# Patient Record
Sex: MALE | Race: WHITE | NOT HISPANIC OR LATINO | Employment: OTHER | URBAN - METROPOLITAN AREA
[De-identification: names, ages, dates, MRNs, and addresses within clinical notes are randomized per-mention and may not be internally consistent; named-entity substitution may affect disease eponyms.]

---

## 2017-04-19 ENCOUNTER — LAB VISIT (OUTPATIENT)
Dept: LAB | Facility: HOSPITAL | Age: 82
End: 2017-04-19
Attending: INTERNAL MEDICINE
Payer: MEDICARE

## 2017-04-19 DIAGNOSIS — E78.5 HYPERLIPIDEMIA, UNSPECIFIED HYPERLIPIDEMIA TYPE: ICD-10-CM

## 2017-04-19 LAB
ALBUMIN SERPL BCP-MCNC: 3.8 G/DL
ALP SERPL-CCNC: 74 U/L
ALT SERPL W/O P-5'-P-CCNC: 17 U/L
ANION GAP SERPL CALC-SCNC: 9 MMOL/L
AST SERPL-CCNC: 22 U/L
BILIRUB SERPL-MCNC: 0.7 MG/DL
BUN SERPL-MCNC: 42 MG/DL
CALCIUM SERPL-MCNC: 9.3 MG/DL
CHLORIDE SERPL-SCNC: 106 MMOL/L
CHOLEST/HDLC SERPL: 3.6 {RATIO}
CO2 SERPL-SCNC: 22 MMOL/L
CREAT SERPL-MCNC: 1.5 MG/DL
EST. GFR  (AFRICAN AMERICAN): 48.4 ML/MIN/1.73 M^2
EST. GFR  (NON AFRICAN AMERICAN): 41.8 ML/MIN/1.73 M^2
GLUCOSE SERPL-MCNC: 126 MG/DL
HDL/CHOLESTEROL RATIO: 28 %
HDLC SERPL-MCNC: 125 MG/DL
HDLC SERPL-MCNC: 35 MG/DL
LDLC SERPL CALC-MCNC: 77.8 MG/DL
NONHDLC SERPL-MCNC: 90 MG/DL
POTASSIUM SERPL-SCNC: 4.6 MMOL/L
PROT SERPL-MCNC: 7.3 G/DL
SODIUM SERPL-SCNC: 137 MMOL/L
TRIGL SERPL-MCNC: 61 MG/DL

## 2017-04-19 PROCEDURE — 80053 COMPREHEN METABOLIC PANEL: CPT

## 2017-04-19 PROCEDURE — 80061 LIPID PANEL: CPT

## 2017-04-19 PROCEDURE — 36415 COLL VENOUS BLD VENIPUNCTURE: CPT | Mod: PO

## 2017-04-26 ENCOUNTER — OFFICE VISIT (OUTPATIENT)
Dept: CARDIOLOGY | Facility: CLINIC | Age: 82
End: 2017-04-26
Payer: MEDICARE

## 2017-04-26 ENCOUNTER — CLINICAL SUPPORT (OUTPATIENT)
Dept: CARDIOLOGY | Facility: CLINIC | Age: 82
End: 2017-04-26
Payer: MEDICARE

## 2017-04-26 VITALS
SYSTOLIC BLOOD PRESSURE: 90 MMHG | WEIGHT: 174 LBS | BODY MASS INDEX: 25.77 KG/M2 | HEART RATE: 75 BPM | DIASTOLIC BLOOD PRESSURE: 60 MMHG | HEIGHT: 69 IN

## 2017-04-26 DIAGNOSIS — I35.0 AORTIC VALVE STENOSIS, UNSPECIFIED ETIOLOGY: ICD-10-CM

## 2017-04-26 DIAGNOSIS — R42 LIGHT HEADED: ICD-10-CM

## 2017-04-26 DIAGNOSIS — I10 ESSENTIAL HYPERTENSION: ICD-10-CM

## 2017-04-26 DIAGNOSIS — E78.5 HYPERLIPIDEMIA, UNSPECIFIED HYPERLIPIDEMIA TYPE: ICD-10-CM

## 2017-04-26 DIAGNOSIS — R42 DIZZINESS AND GIDDINESS: ICD-10-CM

## 2017-04-26 DIAGNOSIS — I48.0 PAROXYSMAL ATRIAL FIBRILLATION: Primary | ICD-10-CM

## 2017-04-26 PROCEDURE — 99999 PR PBB SHADOW E&M-EST. PATIENT-LVL III: CPT | Mod: PBBFAC,,, | Performed by: INTERNAL MEDICINE

## 2017-04-26 PROCEDURE — 1159F MED LIST DOCD IN RCRD: CPT | Mod: S$GLB,,, | Performed by: INTERNAL MEDICINE

## 2017-04-26 PROCEDURE — 3074F SYST BP LT 130 MM HG: CPT | Mod: S$GLB,,, | Performed by: INTERNAL MEDICINE

## 2017-04-26 PROCEDURE — 3078F DIAST BP <80 MM HG: CPT | Mod: S$GLB,,, | Performed by: INTERNAL MEDICINE

## 2017-04-26 PROCEDURE — 1160F RVW MEDS BY RX/DR IN RCRD: CPT | Mod: S$GLB,,, | Performed by: INTERNAL MEDICINE

## 2017-04-26 PROCEDURE — 93000 ELECTROCARDIOGRAM COMPLETE: CPT | Mod: S$GLB,,, | Performed by: INTERNAL MEDICINE

## 2017-04-26 PROCEDURE — 99214 OFFICE O/P EST MOD 30 MIN: CPT | Mod: S$GLB,,, | Performed by: INTERNAL MEDICINE

## 2017-04-26 RX ORDER — CEFDINIR 300 MG/1
300 CAPSULE ORAL
COMMUNITY
Start: 2017-04-18 | End: 2017-04-28

## 2017-04-26 NOTE — PROGRESS NOTES
Subjective:   Patient ID:  Chung Meneses Sr. is a 85 y.o. male who presents for follow up of Atrial Fibrillation (Patient presents to clinic today for 6 month f/u.)      HPI  An 84 yo male with aortic stenosis afib on tikosyn is here for f/u afib he noticed 1 months ago he is short of breath with  exertion and has some lightheadedness he has noted some decrease in exercise tolerance. Has no orthopnea pnd. He stays active compliant with mneds takes his anticoagulation non stop.ekg shows afib   Past Medical History:   Diagnosis Date    *Atrial fibrillation     Aortic stenosis 8/2/2013    Atrial fibrillation 7/5/2013    Dizziness - light-headed     Dizziness and giddiness     Hyperlipidemia     Hypertension     Unspecified hypertensive heart disease without heart failure        Past Surgical History:   Procedure Laterality Date    BACK SURGERY  2003    knee replacemnt bilateral  2001    SHOULDER SURGERY  2002       Social History   Substance Use Topics    Smoking status: Never Smoker    Smokeless tobacco: Never Used    Alcohol use No      Comment: HOLIDAY       Family History   Problem Relation Age of Onset    Hypertension Mother     Hypertension Father     Hypertension Sister     Hypertension Brother     Heart failure Brother     Heart disease Brother     Heart attack Brother        Current Outpatient Prescriptions   Medication Sig    cefdinir (OMNICEF) 300 MG capsule Take 300 mg by mouth.    dofetilide (TIKOSYN) 250 MCG Cap Take 1 capsule (250 mcg total) by mouth every 12 (twelve) hours.    fish oil-omega-3 fatty acids 300-1,000 mg capsule Take 1,200 mg by mouth once daily.    losartan (COZAAR) 100 MG tablet Take 100 mg by mouth.    metoprolol tartrate (LOPRESSOR) 25 MG tablet TAKE 1 TABLET TWICE DAILY    multivitamin capsule Take 1 capsule by mouth once daily.    niacin, inositol niacinate, (NIACIN FLUSH FREE) 400 mg Cap Take 500 mg by mouth once daily.    ranitidine (ZANTAC) 75 MG  tablet Take 75 mg by mouth 2 (two) times daily.    rivaroxaban (XARELTO) 15 mg Tab Take 1 tablet (15 mg total) by mouth daily with dinner or evening meal. Since 2015 never increased    simvastatin (ZOCOR) 20 MG tablet Take 20 mg by mouth every evening.     No current facility-administered medications for this visit.      Current Outpatient Prescriptions on File Prior to Visit   Medication Sig    dofetilide (TIKOSYN) 250 MCG Cap Take 1 capsule (250 mcg total) by mouth every 12 (twelve) hours.    fish oil-omega-3 fatty acids 300-1,000 mg capsule Take 1,200 mg by mouth once daily.    losartan (COZAAR) 100 MG tablet Take 100 mg by mouth.    metoprolol tartrate (LOPRESSOR) 25 MG tablet TAKE 1 TABLET TWICE DAILY    multivitamin capsule Take 1 capsule by mouth once daily.    niacin, inositol niacinate, (NIACIN FLUSH FREE) 400 mg Cap Take 500 mg by mouth once daily.    ranitidine (ZANTAC) 75 MG tablet Take 75 mg by mouth 2 (two) times daily.    rivaroxaban (XARELTO) 15 mg Tab Take 1 tablet (15 mg total) by mouth daily with dinner or evening meal. Since 2015 never increased    simvastatin (ZOCOR) 20 MG tablet Take 20 mg by mouth every evening.    [DISCONTINUED] tadalafil (CIALIS) 5 MG tablet Take 1 tablet (5 mg total) by mouth daily as needed for Erectile Dysfunction.     No current facility-administered medications on file prior to visit.        ROS  Constitution: Negative for weakness and malaise/fatigue.    HENT: Negative for headaches.    Eyes: Negative for blurred vision.    Cardiovascular: positive for dyspnea on exertion Negative for chest pain, claudication, cyanosis, , irregular heartbeat, leg swelling, near-syncope, orthopnea, palpitations, paroxysmal nocturnal dyspnia and syncope.    Respiratory: Negative for cough, hemoptysis and shortness of breath.    Hematologic/Lymphatic: Negative for bleeding problem. Does not bruise/bleed easily.    Skin: Negative for dry skin and itching.    Musculoskeletal:  "Positive for arthritis, back pain and joint pain. Negative for falls, muscle weakness and myalgias.    Gastrointestinal: Negative for abdominal pain, diarrhea, heartburn, hematemesis, hematochezia and melena.    Genitourinary: Negative for flank pain and hematuria.    Neurological: has no  focal weakness, light-headedness, numbness, paresthesias and seizures.    Psychiatric/Behavioral: Negative for altered mental status and memory loss. The patient is not nervous/anxious.    Allergic/Immunologic: Negative for hives.   Objective:   Physical Exam  Vitals:    04/26/17 1430   BP: 90/60   Pulse: 75   Weight: 78.9 kg (174 lb)   Height: 5' 9" (1.753 m)     Vitals   Vitals:     10/28/16 1020   BP: (!) 108/48   Pulse: 60   Weight: 79.1 kg (174 lb 7.6 oz)   Height: 5' 9" (1.753 m)      Constitutional: He is oriented to person, place, and time. He appears well-developed and well-nourished.    Head: Normocephalic and atraumatic.    Eyes: EOM are normal. Pupils are equal, round, and reactive to light.    Neck: Neck supple. No JVD present.    Cardiovascular: Normal rate and intact distal pulses.IRREGULARILY IRREGULAR  rhythm present. Exam reveals no gallop and no friction rub.    Murmur heard.    Harsh midsystolic murmur is present with a grade of 1/6 at the upper right sternal border radiating to the neck    Pulmonary/Chest: Effort normal and breath sounds normal. No respiratory distress. He has no wheezes. He has no rales. He exhibits no tenderness.    Abdominal: Soft. Bowel sounds are normal. He exhibits no distension. There is no tenderness.   Musculoskeletal: Normal range of motion.   Scar of previous knee surgery well healed.prominent varicose veins and spider veins noted.   Neurological: He is alert and oriented to person, place, and time. No cranial nerve deficit.   Skin: Skin is warm and dry. No rash noted. He is not diaphoretic.   Psychiatric: He has a normal mood and affect    Lab Results   Component Value Date    CHOL " 125 04/19/2017    CHOL 147 10/21/2016    CHOL 146 04/15/2016     Lab Results   Component Value Date    HDL 35 (L) 04/19/2017    HDL 31 (L) 10/21/2016    HDL 29 (L) 04/15/2016     Lab Results   Component Value Date    LDLCALC 77.8 04/19/2017    LDLCALC 97.6 10/21/2016    LDLCALC 89.4 04/15/2016     Lab Results   Component Value Date    TRIG 61 04/19/2017    TRIG 92 10/21/2016    TRIG 138 04/15/2016     Lab Results   Component Value Date    CHOLHDL 28.0 04/19/2017    CHOLHDL 21.1 10/21/2016    CHOLHDL 19.9 (L) 04/15/2016       Chemistry        Component Value Date/Time     04/19/2017 0802    K 4.6 04/19/2017 0802     04/19/2017 0802    CO2 22 (L) 04/19/2017 0802    BUN 42 (H) 04/19/2017 0802    CREATININE 1.5 (H) 04/19/2017 0802     (H) 04/19/2017 0802        Component Value Date/Time    CALCIUM 9.3 04/19/2017 0802    ALKPHOS 74 04/19/2017 0802    AST 22 04/19/2017 0802    ALT 17 04/19/2017 0802    BILITOT 0.7 04/19/2017 0802          Lab Results   Component Value Date    TSH 2.418 04/17/2015     No results found for: INR, PROTIME  Lab Results   Component Value Date    WBC 5.34 08/22/2006    HGB 13.8 (L) 08/22/2006    HCT 42.3 08/22/2006    MCV 87.9 08/22/2006     08/22/2006     BMP  Sodium   Date Value Ref Range Status   04/19/2017 137 136 - 145 mmol/L Final     Potassium   Date Value Ref Range Status   04/19/2017 4.6 3.5 - 5.1 mmol/L Final     Chloride   Date Value Ref Range Status   04/19/2017 106 95 - 110 mmol/L Final     CO2   Date Value Ref Range Status   04/19/2017 22 (L) 23 - 29 mmol/L Final     BUN, Bld   Date Value Ref Range Status   04/19/2017 42 (H) 8 - 23 mg/dL Final     Creatinine   Date Value Ref Range Status   04/19/2017 1.5 (H) 0.5 - 1.4 mg/dL Final     Calcium   Date Value Ref Range Status   04/19/2017 9.3 8.7 - 10.5 mg/dL Final     Anion Gap   Date Value Ref Range Status   04/19/2017 9 8 - 16 mmol/L Final     eGFR if    Date Value Ref Range Status    04/19/2017 48.4 (A) >60 mL/min/1.73 m^2 Final     eGFR if non    Date Value Ref Range Status   04/19/2017 41.8 (A) >60 mL/min/1.73 m^2 Final     Comment:     Calculation used to obtain the estimated glomerular filtration  rate (eGFR) is the CKD-EPI equation. Since race is unknown   in our information system, the eGFR values for   -American and Non--American patients are given   for each creatinine result.       Estimated Creatinine Clearance: 36 mL/min (based on Cr of 1.5).    Assessment:     1. Paroxysmal atrial fibrillation    2. Aortic valve stenosis, unspecified etiology    3. Dizziness and giddiness    4. Light headed    5. Essential hypertension    6. Hyperlipidemia, unspecified hyperlipidemia type      BACK IN AFIB SYMPTOMATIC NEEDS TO BE CARDIOVERTED BECAUSE HE IS SYMPTOMATIC.  Plan:     DC CARDIOVERSION  I have explained the risks, benefits , and alternatives of the procedure in detail.the patient voices understanding and all questions have been answered.the patient agrees to proceed as planned.

## 2017-04-26 NOTE — MR AVS SNAPSHOT
[facility] - Cardiology  [address] [facility] [address] [place] [address]  Phone: [phone]  Fax: [phone]                  Chung Meneses Fifi   2017 2:15 PM   Office Visit    Description:  Male : 1931   Provider:  Patricia Espinoza MD   Department:  [facility] - Cardiology           Reason for Visit     Atrial Fibrillation           Diagnoses this Visit        Comments    Paroxysmal atrial fibrillation    -  Primary     Aortic valve stenosis, unspecified etiology         Dizziness and giddiness         Light headed         Essential hypertension         Hyperlipidemia, unspecified hyperlipidemia type                To Do List           Goals (5 Years of Data)     None      [facility]     [facility] On Call Nurse Care Line -  Assistance  Unless otherwise directed by your provider, please contact [facility], our nurse care line that is available for  assistance.     Registered nurses in the [facility] Call Center provide: appointment scheduling, clinical advisement, health education, and other advisory services.  Call: [phone] (toll free)               Medications           Message regarding Medications     Verify the changes and/or additions to your medication regime listed below are the same as discussed with your clinician today.  If any of these changes or additions are incorrect, please notify your healthcare provider.        STOP taking these medications     tadalafil (CIALIS) 5 MG tablet Take 1 tablet (5 mg total) by mouth daily as needed for Erectile Dysfunction.           Verify that the below list of medications is an accurate representation of the medications you are currently taking.  If none reported, the list may be blank. If incorrect, please contact your healthcare provider. Carry this list with you in case of emergency.           Current Medications     cefdinir (OMNICEF) 300 MG capsule Take 300 mg by mouth.    dofetilide (TIKOSYN) 250 MCG Cap Take 1 capsule (250 mcg total)  "by mouth every 12 (twelve) hours.    fish oil-omega-3 fatty acids 300-1,000 mg capsule Take 1,200 mg by mouth once daily.    losartan (COZAAR) 100 MG tablet Take 100 mg by mouth.    metoprolol tartrate (LOPRESSOR) 25 MG tablet TAKE 1 TABLET TWICE DAILY    multivitamin capsule Take 1 capsule by mouth once daily.    niacin, inositol niacinate, (NIACIN FLUSH FREE) 400 mg Cap Take 500 mg by mouth once daily.    ranitidine (ZANTAC) 75 MG tablet Take 75 mg by mouth 2 (two) times daily.    rivaroxaban (XARELTO) 15 mg Tab Take 1 tablet (15 mg total) by mouth daily with dinner or evening meal. Since 2015 never increased    simvastatin (ZOCOR) 20 MG tablet Take 20 mg by mouth every evening.           Clinical Reference Information           Your Vitals Were     BP Pulse Height Weight BMI    90/60 75 5' 9" (1.753 m) 78.9 kg (174 lb) 25.7 kg/m2      Blood Pressure          Most Recent Value    Right Arm BP - Sitting  90/60    Left Arm BP - Sitting  90/60    BP  90/60      Allergies as of 4/26/2017     Sulfa (Sulfonamide Antibiotics)      Immunizations Administered on Date of Encounter - 4/26/2017     None      Orders Placed During Today's Visit     Future Labs/Procedures Expected by Expires    SCHEDULED EKG 12-LEAD (to Muse)  As directed 4/25/2018      [facility] Sign-Up     Activating your [facility] account is as easy as 1-2-3!     1) Visit [email or web address], select Sign Up Now, enter this activation code and your date of birth, then select Next.  [email or web address]  Expires: 6/10/2017  2:45 PM      2) Create a username and password to use when you visit [facility] in the future and select a security question in case you lose your password and select Next.    3) Enter your e-mail address and click Sign Up!    Additional Information  If you have questions, please e-mail [email or web address] or call [phone] to talk to our [facility] staff. Remember, [facility] is NOT to be used for urgent needs. For medical emergencies, dial 911. "         Language Assistance Services     ATTENTION: Language assistance services are available, free of charge. Please call [phone].      ATENCIÓN: Si habla alexx, tiene a burns disposición servicios gratuitos de asistencia lingüística. Llame al [phone].     CHÚ Ý: N?u b?n nói Ti?ng Vi?t, có các d?ch v? h? tr? ngôn ng? mi?n phí dành cho b?n. G?i s? [phone].         [facility] complies with applicable Federal civil rights laws and does not discriminate on the basis of race, color, national origin, age, disability, or sex.

## 2017-05-01 ENCOUNTER — HOSPITAL ENCOUNTER (OUTPATIENT)
Facility: HOSPITAL | Age: 82
Discharge: HOME | End: 2017-05-01
Attending: INTERNAL MEDICINE | Admitting: INTERNAL MEDICINE
Payer: MEDICARE

## 2017-05-01 ENCOUNTER — ANESTHESIA EVENT (OUTPATIENT)
Dept: CARDIOLOGY | Facility: HOSPITAL | Age: 82
End: 2017-05-01
Payer: MEDICARE

## 2017-05-01 ENCOUNTER — SURGERY (OUTPATIENT)
Age: 82
End: 2017-05-01

## 2017-05-01 ENCOUNTER — ANESTHESIA (OUTPATIENT)
Dept: CARDIOLOGY | Facility: HOSPITAL | Age: 82
End: 2017-05-01
Payer: MEDICARE

## 2017-05-01 VITALS
SYSTOLIC BLOOD PRESSURE: 118 MMHG | TEMPERATURE: 98 F | RESPIRATION RATE: 18 BRPM | HEART RATE: 68 BPM | DIASTOLIC BLOOD PRESSURE: 58 MMHG | OXYGEN SATURATION: 98 %

## 2017-05-01 DIAGNOSIS — I48.91 ATRIAL FIBRILLATION: ICD-10-CM

## 2017-05-01 DIAGNOSIS — I48.0 PAROXYSMAL ATRIAL FIBRILLATION: Primary | ICD-10-CM

## 2017-05-01 PROCEDURE — 92960 CARDIOVERSION ELECTRIC EXT: CPT | Mod: ,,, | Performed by: INTERNAL MEDICINE

## 2017-05-01 PROCEDURE — 25000003 PHARM REV CODE 250: Performed by: NURSE ANESTHETIST, CERTIFIED REGISTERED

## 2017-05-01 PROCEDURE — 93010 ELECTROCARDIOGRAM REPORT: CPT | Mod: ,,, | Performed by: INTERNAL MEDICINE

## 2017-05-01 PROCEDURE — 25000003 PHARM REV CODE 250

## 2017-05-01 PROCEDURE — 63600175 PHARM REV CODE 636 W HCPCS

## 2017-05-01 PROCEDURE — 63600175 PHARM REV CODE 636 W HCPCS: Performed by: NURSE ANESTHETIST, CERTIFIED REGISTERED

## 2017-05-01 PROCEDURE — 37000008 HC ANESTHESIA 1ST 15 MINUTES: Performed by: INTERNAL MEDICINE

## 2017-05-01 PROCEDURE — 93005 ELECTROCARDIOGRAM TRACING: CPT | Mod: 59

## 2017-05-01 PROCEDURE — 92960 CARDIOVERSION ELECTRIC EXT: CPT

## 2017-05-01 RX ORDER — LIDOCAINE HYDROCHLORIDE 10 MG/ML
INJECTION INFILTRATION; PERINEURAL
Status: DISCONTINUED | OUTPATIENT
Start: 2017-05-01 | End: 2017-05-01

## 2017-05-01 RX ORDER — CEFDINIR 300 MG/1
300 CAPSULE ORAL 2 TIMES DAILY
COMMUNITY
End: 2017-08-17 | Stop reason: ALTCHOICE

## 2017-05-01 RX ORDER — PROPOFOL 10 MG/ML
VIAL (ML) INTRAVENOUS
Status: DISCONTINUED | OUTPATIENT
Start: 2017-05-01 | End: 2017-05-01

## 2017-05-01 RX ORDER — SODIUM CHLORIDE 9 MG/ML
INJECTION, SOLUTION INTRAVENOUS CONTINUOUS
Status: DISCONTINUED | OUTPATIENT
Start: 2017-05-01 | End: 2017-05-01 | Stop reason: HOSPADM

## 2017-05-01 RX ADMIN — PROPOFOL 50 MG: 10 INJECTION, EMULSION INTRAVENOUS at 07:05

## 2017-05-01 RX ADMIN — PROPOFOL 50 MG: 10 INJECTION, EMULSION INTRAVENOUS at 08:05

## 2017-05-01 RX ADMIN — LIDOCAINE HYDROCHLORIDE 80 MG: 10 INJECTION, SOLUTION INFILTRATION; PERINEURAL at 07:05

## 2017-05-01 RX ADMIN — SODIUM CHLORIDE: 9 INJECTION, SOLUTION INTRAVENOUS at 07:05

## 2017-05-01 NOTE — ANESTHESIA RELEASE NOTE
Anesthesia Release from PACU Note    Patient: Chung Meneses Sr.    Procedure(s) Performed: Procedure(s) (LRB):  CARDIOVERSION (N/A)    Anesthesia type: MAC    Post pain: Adequate analgesia    Post assessment: no apparent anesthetic complications    Last Vitals:   Visit Vitals    BP (!) 118/58    Pulse 68    Temp 36.7 °C (98.1 °F)    Resp 18    SpO2 98%       Post vital signs: stable    Level of consciousness: awake    Nausea/Vomiting: no nausea/no vomiting    Complications: none    Airway Patency: patent    Respiratory: unassisted    Cardiovascular: stable and blood pressure at baseline    Hydration: euvolemic

## 2017-05-01 NOTE — ANESTHESIA PREPROCEDURE EVALUATION
05/01/2017  Chung Meneses Sr. is a 85 y.o., male.    Anesthesia Evaluation    I have reviewed the Patient Summary Reports.    I have reviewed the Nursing Notes.   I have reviewed the Medications.     Review of Systems  Anesthesia Hx:  No problems with previous Anesthesia  History of prior surgery of interest to airway management or planning: Denies Family Hx of Anesthesia complications.   Denies Personal Hx of Anesthesia complications.   Social:  Non-Smoker    Cardiovascular:   Hypertension Valvular problems/Murmurs, AS Dysrhythmias hyperlipidemia ECG has been reviewed. Echo 05/15  CONCLUSIONS     1 - Biatrial enlargement.     2 - Concentric hypertrophy.     3 - Normal left ventricular systolic function (EF 55-60%).     4 - Left ventricular diastolic dysfunction.     5 - Normal right ventricular systolic function .     6 - Moderate aortic stenosis, HERNANDEZ = 1.15 cm2, mean gradient = 11.0 mmHg.     7 - Trivial aortic regurgitation.     8 - Mild mitral regurgitation.     9 - Mild tricuspid regurgitation.     10 - Mild pulmonic regurgitation.    Pulmonary:  Pulmonary Normal    Hepatic/GI:  Hepatic/GI Normal    Neurological:  Neurology Normal    Endocrine:  Endocrine Normal    Psych:  Psychiatric Normal           Physical Exam  General:  Well nourished    Airway/Jaw/Neck:  Airway Findings: Mouth Opening: Normal Tongue: Normal  General Airway Assessment: Adult  Mallampati: II  TM Distance: Normal, at least 6 cm  Jaw/Neck Findings:  Neck ROM: Extension Decreased, Mod.      Dental:  Dental Findings: upper partial denturesPoor dentition, upper partial removed.   Chest/Lungs:  Chest/Lungs Findings: Normal Respiratory Rate     Heart/Vascular:  Heart Findings: Rhythm: Irregularly Irregular             Anesthesia Plan  Type of Anesthesia, risks & benefits discussed:  Anesthesia Type:  MAC  Patient's Preference:   Intra-op  Monitoring Plan:   Intra-op Monitoring Plan Comments:   Post Op Pain Control Plan:   Post Op Pain Control Plan Comments:   Induction:   IV  Beta Blocker:  Patient is on a Beta-Blocker and has received one dose within the past 24 hours (No further documentation required).       Informed Consent: Patient understands risks and agrees with Anesthesia plan.  Questions answered. Anesthesia consent signed with patient.  ASA Score: 3     Day of Surgery Review of History & Physical:    H&P update referred to the surgeon.         Ready For Surgery From Anesthesia Perspective.

## 2017-05-01 NOTE — DISCHARGE INSTRUCTIONS
ACTIVITY LEVEL  You may feel sleepy for several hours.  Rest until you are more awake.  Gradually resume your normal activities over the next 24 hours.  You should avoid heavy lifting, strenuous activity or operating equipment for 24 hours.    DIET  At home, begin with liquids and then progress to normal foods if you don't experience nausea.    MEDICATIONS  Continue home medications as before procedure.  You may take Tylenol every four hours as needed for minor discomfort at pad sites or chest soreness.  Resume your normal Coumadin regimen and continue to have your blood tested weekly as directed by your physician.    FOLLOW UP  You will be scheduled for a follow up appointment and EKG within two weeks of your procedure.

## 2017-05-01 NOTE — ANESTHESIA POSTPROCEDURE EVALUATION
Anesthesia Post Evaluation    Patient: Chung Meneses Sr.    Procedure(s) Performed: Procedure(s) (LRB):  CARDIOVERSION (N/A)    Final Anesthesia Type: MAC  Patient location: [facility].  Patient participation: Yes- Able to Participate  Level of consciousness: awake and alert  Post-procedure vital signs: reviewed and stable  Pain management: adequate  Airway patency: patent  PONV status at discharge: No PONV  Anesthetic complications: no      Cardiovascular status: blood pressure returned to baseline  Respiratory status: unassisted  Hydration status: euvolemic  Follow-up not needed.        Visit Vitals    BP (!) 118/58    Pulse 68    Temp 36.7 °C (98.1 °F)    Resp 18    SpO2 98%       Pain/Dandy Score: No Data Recorded

## 2017-05-01 NOTE — INTERVAL H&P NOTE
The patient has been examined and the H&P has been reviewed:    I concur with the findings and no changes have occurred since H&P was written.    Anesthesia/Surgery risks, benefits and alternative options discussed and understood by patient/family.  I have explained the risks, benefits , and alternatives of the procedure in detail.the patient voices understanding and all questions have been answered.the patient agrees to proceed as planned.          Active Hospital Problems    Diagnosis  POA    Atrial fibrillation [I48.91]  Yes      Resolved Hospital Problems    Diagnosis Date Resolved POA   No resolved problems to display.

## 2017-05-01 NOTE — TRANSFER OF CARE
Anesthesia Transfer of Care Note    Patient: Chung Meneses .    Procedure(s) Performed: Procedure(s) (LRB):  CARDIOVERSION (N/A)    Patient location: Other: [facility]    Anesthesia Type: MAC    Transport from OR: Transported from OR on room air with adequate spontaneous ventilation    Post pain: adequate analgesia    Post assessment: no apparent anesthetic complications    Post vital signs: stable    Level of consciousness: awake    Nausea/Vomiting: no nausea/vomiting    Complications: none          Last vitals:   Visit Vitals    BP (!) 118/58    Pulse 68    Temp 36.7 °C (98.1 °F)    Resp 18    SpO2 98%

## 2017-05-01 NOTE — H&P (VIEW-ONLY)
Subjective:   Patient ID:  Chung Meneses Sr. is a 85 y.o. male who presents for follow up of Atrial Fibrillation (Patient presents to clinic today for 6 month f/u.)      HPI  An 86 yo male with aortic stenosis afib on tikosyn is here for f/u afib he noticed 1 months ago he is short of breath with  exertion and has some lightheadedness he has noted some decrease in exercise tolerance. Has no orthopnea pnd. He stays active compliant with mneds takes his anticoagulation non stop.ekg shows afib   Past Medical History:   Diagnosis Date    *Atrial fibrillation     Aortic stenosis 8/2/2013    Atrial fibrillation 7/5/2013    Dizziness - light-headed     Dizziness and giddiness     Hyperlipidemia     Hypertension     Unspecified hypertensive heart disease without heart failure        Past Surgical History:   Procedure Laterality Date    BACK SURGERY  2003    knee replacemnt bilateral  2001    SHOULDER SURGERY  2002       Social History   Substance Use Topics    Smoking status: Never Smoker    Smokeless tobacco: Never Used    Alcohol use No      Comment: HOLIDAY       Family History   Problem Relation Age of Onset    Hypertension Mother     Hypertension Father     Hypertension Sister     Hypertension Brother     Heart failure Brother     Heart disease Brother     Heart attack Brother        Current Outpatient Prescriptions   Medication Sig    cefdinir (OMNICEF) 300 MG capsule Take 300 mg by mouth.    dofetilide (TIKOSYN) 250 MCG Cap Take 1 capsule (250 mcg total) by mouth every 12 (twelve) hours.    fish oil-omega-3 fatty acids 300-1,000 mg capsule Take 1,200 mg by mouth once daily.    losartan (COZAAR) 100 MG tablet Take 100 mg by mouth.    metoprolol tartrate (LOPRESSOR) 25 MG tablet TAKE 1 TABLET TWICE DAILY    multivitamin capsule Take 1 capsule by mouth once daily.    niacin, inositol niacinate, (NIACIN FLUSH FREE) 400 mg Cap Take 500 mg by mouth once daily.    ranitidine (ZANTAC) 75 MG  tablet Take 75 mg by mouth 2 (two) times daily.    rivaroxaban (XARELTO) 15 mg Tab Take 1 tablet (15 mg total) by mouth daily with dinner or evening meal. Since 2015 never increased    simvastatin (ZOCOR) 20 MG tablet Take 20 mg by mouth every evening.     No current facility-administered medications for this visit.      Current Outpatient Prescriptions on File Prior to Visit   Medication Sig    dofetilide (TIKOSYN) 250 MCG Cap Take 1 capsule (250 mcg total) by mouth every 12 (twelve) hours.    fish oil-omega-3 fatty acids 300-1,000 mg capsule Take 1,200 mg by mouth once daily.    losartan (COZAAR) 100 MG tablet Take 100 mg by mouth.    metoprolol tartrate (LOPRESSOR) 25 MG tablet TAKE 1 TABLET TWICE DAILY    multivitamin capsule Take 1 capsule by mouth once daily.    niacin, inositol niacinate, (NIACIN FLUSH FREE) 400 mg Cap Take 500 mg by mouth once daily.    ranitidine (ZANTAC) 75 MG tablet Take 75 mg by mouth 2 (two) times daily.    rivaroxaban (XARELTO) 15 mg Tab Take 1 tablet (15 mg total) by mouth daily with dinner or evening meal. Since 2015 never increased    simvastatin (ZOCOR) 20 MG tablet Take 20 mg by mouth every evening.    [DISCONTINUED] tadalafil (CIALIS) 5 MG tablet Take 1 tablet (5 mg total) by mouth daily as needed for Erectile Dysfunction.     No current facility-administered medications on file prior to visit.        ROS  Constitution: Negative for weakness and malaise/fatigue.    HENT: Negative for headaches.    Eyes: Negative for blurred vision.    Cardiovascular: positive for dyspnea on exertion Negative for chest pain, claudication, cyanosis, , irregular heartbeat, leg swelling, near-syncope, orthopnea, palpitations, paroxysmal nocturnal dyspnia and syncope.    Respiratory: Negative for cough, hemoptysis and shortness of breath.    Hematologic/Lymphatic: Negative for bleeding problem. Does not bruise/bleed easily.    Skin: Negative for dry skin and itching.    Musculoskeletal:  "Positive for arthritis, back pain and joint pain. Negative for falls, muscle weakness and myalgias.    Gastrointestinal: Negative for abdominal pain, diarrhea, heartburn, hematemesis, hematochezia and melena.    Genitourinary: Negative for flank pain and hematuria.    Neurological: has no  focal weakness, light-headedness, numbness, paresthesias and seizures.    Psychiatric/Behavioral: Negative for altered mental status and memory loss. The patient is not nervous/anxious.    Allergic/Immunologic: Negative for hives.   Objective:   Physical Exam  Vitals:    04/26/17 1430   BP: 90/60   Pulse: 75   Weight: 78.9 kg (174 lb)   Height: 5' 9" (1.753 m)     Vitals   Vitals:     10/28/16 1020   BP: (!) 108/48   Pulse: 60   Weight: 79.1 kg (174 lb 7.6 oz)   Height: 5' 9" (1.753 m)      Constitutional: He is oriented to person, place, and time. He appears well-developed and well-nourished.    Head: Normocephalic and atraumatic.    Eyes: EOM are normal. Pupils are equal, round, and reactive to light.    Neck: Neck supple. No JVD present.    Cardiovascular: Normal rate and intact distal pulses.IRREGULARILY IRREGULAR  rhythm present. Exam reveals no gallop and no friction rub.    Murmur heard.    Harsh midsystolic murmur is present with a grade of 1/6 at the upper right sternal border radiating to the neck    Pulmonary/Chest: Effort normal and breath sounds normal. No respiratory distress. He has no wheezes. He has no rales. He exhibits no tenderness.    Abdominal: Soft. Bowel sounds are normal. He exhibits no distension. There is no tenderness.   Musculoskeletal: Normal range of motion.   Scar of previous knee surgery well healed.prominent varicose veins and spider veins noted.   Neurological: He is alert and oriented to person, place, and time. No cranial nerve deficit.   Skin: Skin is warm and dry. No rash noted. He is not diaphoretic.   Psychiatric: He has a normal mood and affect    Lab Results   Component Value Date    CHOL " 125 04/19/2017    CHOL 147 10/21/2016    CHOL 146 04/15/2016     Lab Results   Component Value Date    HDL 35 (L) 04/19/2017    HDL 31 (L) 10/21/2016    HDL 29 (L) 04/15/2016     Lab Results   Component Value Date    LDLCALC 77.8 04/19/2017    LDLCALC 97.6 10/21/2016    LDLCALC 89.4 04/15/2016     Lab Results   Component Value Date    TRIG 61 04/19/2017    TRIG 92 10/21/2016    TRIG 138 04/15/2016     Lab Results   Component Value Date    CHOLHDL 28.0 04/19/2017    CHOLHDL 21.1 10/21/2016    CHOLHDL 19.9 (L) 04/15/2016       Chemistry        Component Value Date/Time     04/19/2017 0802    K 4.6 04/19/2017 0802     04/19/2017 0802    CO2 22 (L) 04/19/2017 0802    BUN 42 (H) 04/19/2017 0802    CREATININE 1.5 (H) 04/19/2017 0802     (H) 04/19/2017 0802        Component Value Date/Time    CALCIUM 9.3 04/19/2017 0802    ALKPHOS 74 04/19/2017 0802    AST 22 04/19/2017 0802    ALT 17 04/19/2017 0802    BILITOT 0.7 04/19/2017 0802          Lab Results   Component Value Date    TSH 2.418 04/17/2015     No results found for: INR, PROTIME  Lab Results   Component Value Date    WBC 5.34 08/22/2006    HGB 13.8 (L) 08/22/2006    HCT 42.3 08/22/2006    MCV 87.9 08/22/2006     08/22/2006     BMP  Sodium   Date Value Ref Range Status   04/19/2017 137 136 - 145 mmol/L Final     Potassium   Date Value Ref Range Status   04/19/2017 4.6 3.5 - 5.1 mmol/L Final     Chloride   Date Value Ref Range Status   04/19/2017 106 95 - 110 mmol/L Final     CO2   Date Value Ref Range Status   04/19/2017 22 (L) 23 - 29 mmol/L Final     BUN, Bld   Date Value Ref Range Status   04/19/2017 42 (H) 8 - 23 mg/dL Final     Creatinine   Date Value Ref Range Status   04/19/2017 1.5 (H) 0.5 - 1.4 mg/dL Final     Calcium   Date Value Ref Range Status   04/19/2017 9.3 8.7 - 10.5 mg/dL Final     Anion Gap   Date Value Ref Range Status   04/19/2017 9 8 - 16 mmol/L Final     eGFR if    Date Value Ref Range Status    04/19/2017 48.4 (A) >60 mL/min/1.73 m^2 Final     eGFR if non    Date Value Ref Range Status   04/19/2017 41.8 (A) >60 mL/min/1.73 m^2 Final     Comment:     Calculation used to obtain the estimated glomerular filtration  rate (eGFR) is the CKD-EPI equation. Since race is unknown   in our information system, the eGFR values for   -American and Non--American patients are given   for each creatinine result.       Estimated Creatinine Clearance: 36 mL/min (based on Cr of 1.5).    Assessment:     1. Paroxysmal atrial fibrillation    2. Aortic valve stenosis, unspecified etiology    3. Dizziness and giddiness    4. Light headed    5. Essential hypertension    6. Hyperlipidemia, unspecified hyperlipidemia type      BACK IN AFIB SYMPTOMATIC NEEDS TO BE CARDIOVERTED BECAUSE HE IS SYMPTOMATIC.  Plan:     DC CARDIOVERSION  I have explained the risks, benefits , and alternatives of the procedure in detail.the patient voices understanding and all questions have been answered.the patient agrees to proceed as planned.

## 2017-05-01 NOTE — IP AVS SNAPSHOT
[address] [place] [address]           Patient Discharge Instructions   Our goal is to set you up for success. This packet includes information on your condition, medications, and your home care.  It will help you care for yourself to prevent having to return to the hospital.     Please ask your nurse if you have any questions.      There are many details to remember when preparing to leave the hospital. Here is what you will need to do:    1. Take your medicine. If you are prescribed medications, review your Medication List on the following pages. You may have new medications to  at the pharmacy and others that you'll need to stop taking. Review the instructions for how and when to take your medications. Talk with your doctor or nurses if you are unsure of what to do.     2. Go to your follow-up appointments. Specific follow-up information is listed in the following pages. Your may be contacted by a nurse or clinical provider about future appointments. Be sure we have all of the phone numbers to reach you. Please contact your provider's office if you are unable to make an appointment.     3. Watch for warning signs. Your doctor or nurse will give you detailed warning signs to watch for and when to call for assistance. These instructions may also include educational information about your condition. If you experience any of warning signs to your health, call your doctor.               ** Verify the list of medication(s) below is accurate and up to date. Carry this with you in case of emergency. If your medications have changed, please notify your healthcare provider.             Medication List      CONTINUE taking these medications        Additional Info                      cefdinir 300 MG capsule   Commonly known as:  OMNICEF   Refills:  0   Dose:  300 mg    Instructions:  Take 300 mg by mouth 2 (two) times daily.     Begin Date    AM    Noon    PM     Bedtime       dofetilide 250 MCG Cap   Commonly known as:  TIKOSYN   Quantity:  180 capsule   Refills:  3   Dose:  250 mcg    Instructions:  Take 1 capsule (250 mcg total) by mouth every 12 (twelve) hours.     Begin Date    AM    Noon    PM    Bedtime       fish oil-omega-3 fatty acids 300-1,000 mg capsule   Refills:  0   Dose:  1200 mg    Instructions:  Take 1,200 mg by mouth once daily.     Begin Date    AM    Noon    PM    Bedtime       losartan 100 MG tablet   Commonly known as:  COZAAR   Refills:  0   Dose:  100 mg    Instructions:  Take 100 mg by mouth.     Begin Date    AM    Noon    PM    Bedtime       metoprolol tartrate 25 MG tablet   Commonly known as:  LOPRESSOR   Quantity:  180 tablet   Refills:  3    Instructions:  TAKE 1 TABLET TWICE DAILY     Begin Date    AM    Noon    PM    Bedtime       multivitamin capsule   Refills:  0   Dose:  1 capsule    Instructions:  Take 1 capsule by mouth once daily.     Begin Date    AM    Noon    PM    Bedtime       NIACIN FLUSH FREE 400 mg niacin (500 mg) Cap   Refills:  0   Dose:  500 mg   Generic drug:  niacin (inositol niacinate)    Instructions:  Take 500 mg by mouth once daily.     Begin Date    AM    Noon    PM    Bedtime       ranitidine 75 MG tablet   Commonly known as:  ZANTAC   Refills:  0   Dose:  75 mg    Instructions:  Take 75 mg by mouth 2 (two) times daily.     Begin Date    AM    Noon    PM    Bedtime       rivaroxaban 15 mg Tab   Commonly known as:  XARELTO   Quantity:  90 tablet   Refills:  3   Dose:  15 mg    Instructions:  Take 1 tablet (15 mg total) by mouth daily with dinner or evening meal. Since 2015 never increased     Begin Date    AM    Noon    PM    Bedtime       simvastatin 20 MG tablet   Commonly known as:  ZOCOR   Refills:  0   Dose:  20 mg    Instructions:  Take 20 mg by mouth every evening.     Begin Date    AM    Noon    PM    Bedtime                  Please bring to all follow up appointments:    1. A copy of your discharge  instructions.  2. All medicines you are currently taking in their original bottles.  3. Identification and insurance card.    Please arrive 15 minutes ahead of scheduled appointment time.    Please call 24 hours in advance if you must reschedule your appointment and/or time.        Follow-up Information     Follow up with Ramo Geronimo MD In 1 week.    Specialties:  Cardiology, Electrophysiology    Contact information:    [address]  [facility] [address]  [phone]          Discharge Instructions     Future Orders    Activity as tolerated     Call MD for:  difficulty breathing, headache or visual disturbances     Call MD for:  extreme fatigue     Call MD for:  hives     Call MD for:  persistent dizziness or light-headedness     Call MD for:  persistent nausea and vomiting     Call MD for:  redness, tenderness, or signs of infection (pain, swelling, redness, odor or green/yellow discharge around incision site)     Call MD for:  severe uncontrolled pain     Call MD for:  temperature >100.4     Diet general     Questions:    Total calories:      Fat restriction, if any:      Protein restriction, if any:      Na restriction, if any:      Fluid restriction:      Additional restrictions:      No dressing needed         Discharge Instructions         ACTIVITY LEVEL  You may feel sleepy for several hours.  Rest until you are more awake.  Gradually resume your normal activities over the next 24 hours.  You should avoid heavy lifting, strenuous activity or operating equipment for 24 hours.    DIET  At home, begin with liquids and then progress to normal foods if you don't experience nausea.    MEDICATIONS  Continue home medications as before procedure.  You may take Tylenol every four hours as needed for minor discomfort at pad sites or chest soreness.  Resume your normal Coumadin regimen and continue to have your blood tested weekly as directed by your physician.    FOLLOW UP  You will be scheduled for a  follow up appointment and EKG within two weeks of your procedure.        Discharge References/Attachments     ABLATION, HAVING CATHETER (ENGLISH)        Primary Diagnosis     Your primary diagnosis was:  Atrial Fibrillation (Irregular Heartbeat)      Admission Information     Date & Time Provider Department CSN    5/1/2017  6:25 AM Patricia Espinoza MD [facility] [id number]      Care Providers     Provider Role Specialty Primary office phone    Patricia Espinoza MD Attending Provider Cardiology [phone]    Patricia Espinoza MD Surgeon  Cardiology [phone]      Your Vitals Were     BP Pulse Temp Resp SpO2       118/58 68 98.1 °F (36.7 °C) 18 98%       Recent Lab Values     No lab values to display.      Allergies as of 5/1/2017        Reactions    Sulfa (Sulfonamide Antibiotics) Hives      [facility]     [facility] Nurse Care Line - 24/7 Assistance  Unless otherwise directed by your provider, please contact [facility], our nurse care line that is available for 24/7 assistance.     Registered nurses in the [facility] provide clinical advisement, health education, appointment booking, and other advisory services.  Call for this free service at [phone].        Advance Directives     An advance directive is a document which, in the event you are no longer able to make decisions for yourself, tells your healthcare team what kind of treatment you do or do not want to receive, or who you would like to make those decisions for you.  If you do not currently have an advance directive, [facility] encourages you to create one.  For more information call:  [phone]), [phone] ([phone]),  or log on to [email or web address].        Language Assistance Services     ATTENTION: Language assistance services are available, free of charge. Please call [phone].      ATENCIÓN: Si habla español, tiene a burns disposición servicios gratuitos de asistencia  russella. Ree whitmore [phone].     SERGIO Ý: N?u b?n nói Ti?ng Vi?t, có các d?ch v? h? tr? ngôn ng? mi?n phí dành cho b?n. G?i s? [phone].        Cath[facility]teve Information           [facility] Sign-Up     Activating your [facility] account is as easy as 1-2-3!     1) Visit [email or web address], select Sign Up Now, enter this activation code and your date of birth, then select Next.  [email or web address]  Expires: 6/10/2017  2:45 PM      2) Create a username and password to use when you visit [facility] in the future and select a security question in case you lose your password and select Next.    3) Enter your e-mail address and click Sign Up!    Additional Information  If you have questions, please e-mail [email or web address] or call [phone] to talk to our [facility] staff. Remember, [facility] is NOT to be used for urgent needs. For medical emergencies, dial 911.          [facility] complies with applicable Federal civil rights laws and does not discriminate on the basis of race, color, national origin, age, disability, or sex.

## 2017-05-01 NOTE — BRIEF OP NOTE
Date: 05/01/2017  Surgeon/Physician: Samuel Espinoza MD  Assistants: none    Pre Op Diagnosis: afib    Post OP Diagnosis: afib    Procedure Performed: cardioversion      ANESTHESIA:RN IV SEDATION    COMPLICATION: none    Specimen / Tissue Removed: No    Estimated Blood Loss: <50 cc    Prostetics/Devices: None    Findings / Operative Note:   afib would transiently cardiovert to sinus rhythm f0r 30 seconds then recverts back to afib. He got cardioverted three times.      PLAN:  refer to ep to discuss ablation.      Discharge Note  Short Stay      SUMMARY     Admit Date: 5/1/2017    Attending Physician: Patricia Espinoza MD     Discharge Physician: Samuel Espinoza MD     Discharge Date: 5/1/2017    Final Diagnosis:afib failing cardioversion    Outcome of Stay:patient was cardioverted three times using propofol 150 mg w/o any permanent success he will be discharged home to follow up with ep and have an mri to see if ablation could be planned.he is stable for discharge w/o any complications.    Disposition: Home or Self Care    Patient Instructions:   Current Discharge Medication List      CONTINUE these medications which have NOT CHANGED    Details   cefdinir (OMNICEF) 300 MG capsule Take 300 mg by mouth 2 (two) times daily.      dofetilide (TIKOSYN) 250 MCG Cap Take 1 capsule (250 mcg total) by mouth every 12 (twelve) hours.  Qty: 180 capsule, Refills: 3    Associated Diagnoses: Persistent atrial fibrillation      fish oil-omega-3 fatty acids 300-1,000 mg capsule Take 1,200 mg by mouth once daily.      losartan (COZAAR) 100 MG tablet Take 100 mg by mouth.      metoprolol tartrate (LOPRESSOR) 25 MG tablet TAKE 1 TABLET TWICE DAILY  Qty: 180 tablet, Refills: 3      multivitamin capsule Take 1 capsule by mouth once daily.      niacin, inositol niacinate, (NIACIN FLUSH FREE) 400 mg Cap Take 500 mg by mouth once daily.      ranitidine (ZANTAC) 75 MG tablet Take 75 mg by mouth 2 (two) times daily.      rivaroxaban (XARELTO) 15  mg Tab Take 1 tablet (15 mg total) by mouth daily with dinner or evening meal. Since 2015 never increased  Qty: 90 tablet, Refills: 3      simvastatin (ZOCOR) 20 MG tablet Take 20 mg by mouth every evening.             Discharge Procedure Orders (must include Diet, Follow-up, Activity)    Discharge Procedure Orders (must include Diet, Follow-up, Activity)  Diet general     Activity as tolerated     Call MD for:  temperature >100.4     Call MD for:  persistent nausea and vomiting     Call MD for:  severe uncontrolled pain     Call MD for:  difficulty breathing, headache or visual disturbances     Call MD for:  redness, tenderness, or signs of infection (pain, swelling, redness, odor or green/yellow discharge around incision site)     Call MD for:  hives     Call MD for:  persistent dizziness or light-headedness     Call MD for:  extreme fatigue     No dressing needed       Follow-up Information     Follow up with Ramo Geronimo MD In 1 week.    Specialties:  Cardiology, Electrophysiology    Contact information:    [facility] Benigno jud  [facility] [id number] [phone]

## 2017-05-03 DIAGNOSIS — I48.19 PERSISTENT ATRIAL FIBRILLATION: ICD-10-CM

## 2017-05-03 RX ORDER — DOFETILIDE 0.25 MG/1
CAPSULE ORAL
Qty: 180 CAPSULE | Refills: 1 | Status: SHIPPED | OUTPATIENT
Start: 2017-05-03 | End: 2017-05-05 | Stop reason: ALTCHOICE

## 2017-05-05 ENCOUNTER — TELEPHONE (OUTPATIENT)
Dept: CARDIOLOGY | Facility: CLINIC | Age: 82
End: 2017-05-05

## 2017-05-05 NOTE — TELEPHONE ENCOUNTER
Calling to let Dr. Espinoza know that he did see Dr. Gold and he advised stop Tikosyn, not a good candidate for ablation nor does he need PPM right now.  Requested that we get records [phone]   Copy of Ekg and office notes received for Dr. Espinoza's review

## 2017-08-17 ENCOUNTER — OFFICE VISIT (OUTPATIENT)
Dept: CARDIOLOGY | Facility: CLINIC | Age: 82
End: 2017-08-17
Payer: MEDICARE

## 2017-08-17 VITALS
SYSTOLIC BLOOD PRESSURE: 138 MMHG | HEART RATE: 64 BPM | HEIGHT: 69 IN | DIASTOLIC BLOOD PRESSURE: 56 MMHG | WEIGHT: 168.63 LBS | BODY MASS INDEX: 24.98 KG/M2

## 2017-08-17 DIAGNOSIS — I35.0 NONRHEUMATIC AORTIC VALVE STENOSIS: ICD-10-CM

## 2017-08-17 DIAGNOSIS — E78.5 HYPERLIPIDEMIA, UNSPECIFIED HYPERLIPIDEMIA TYPE: ICD-10-CM

## 2017-08-17 DIAGNOSIS — I10 ESSENTIAL HYPERTENSION: ICD-10-CM

## 2017-08-17 DIAGNOSIS — I48.0 PAROXYSMAL ATRIAL FIBRILLATION: Primary | ICD-10-CM

## 2017-08-17 PROCEDURE — 99999 PR PBB SHADOW E&M-EST. PATIENT-LVL II: CPT | Mod: PBBFAC,,, | Performed by: INTERNAL MEDICINE

## 2017-08-17 PROCEDURE — 3075F SYST BP GE 130 - 139MM HG: CPT | Mod: S$GLB,,, | Performed by: INTERNAL MEDICINE

## 2017-08-17 PROCEDURE — 93000 ELECTROCARDIOGRAM COMPLETE: CPT | Mod: S$GLB,,, | Performed by: INTERNAL MEDICINE

## 2017-08-17 PROCEDURE — 99214 OFFICE O/P EST MOD 30 MIN: CPT | Mod: S$GLB,,, | Performed by: INTERNAL MEDICINE

## 2017-08-17 PROCEDURE — 3008F BODY MASS INDEX DOCD: CPT | Mod: S$GLB,,, | Performed by: INTERNAL MEDICINE

## 2017-08-17 PROCEDURE — 3078F DIAST BP <80 MM HG: CPT | Mod: S$GLB,,, | Performed by: INTERNAL MEDICINE

## 2017-08-17 PROCEDURE — 1159F MED LIST DOCD IN RCRD: CPT | Mod: S$GLB,,, | Performed by: INTERNAL MEDICINE

## 2017-08-17 RX ORDER — ALBUTEROL SULFATE 90 UG/1
AEROSOL, METERED RESPIRATORY (INHALATION)
Refills: 0 | COMMUNITY
Start: 2017-07-02 | End: 2018-02-27

## 2017-08-17 RX ORDER — ACETAMINOPHEN, CHLORPHENIRAMINE MALEATE, AND PHENYLEPHRINE HCL 325; 4; 10 MG/1; MG/1; MG/1
1 TABLET, MULTILAYER ORAL 2 TIMES DAILY
Refills: 1 | COMMUNITY
Start: 2017-08-04 | End: 2017-11-08

## 2017-08-17 RX ORDER — AMLODIPINE BESYLATE 5 MG/1
5 TABLET ORAL DAILY
Refills: 2 | Status: ON HOLD | COMMUNITY
Start: 2017-06-07 | End: 2017-12-06 | Stop reason: HOSPADM

## 2017-08-17 NOTE — PROGRESS NOTES
Subjective:   Patient ID:  Chung Meneses Sr. is a 85 y.o. male who presents for follow up of Atrial Fibrillation (Patient presents to clinic today for follow up visit. )      HPI  An 86 yo male with afib s/p failed cardioversion aortic stenosis is here for f/u he has been more sedentary no chest pain but he feels more tired. He is also getting over a bronchitis. no bleeding issues/ no chf symptoms no syncope near syncope . No palpitations. Has seen DR Gold since his cardioversion no further f/u he thought he was getting a sleep study. He has no symptoms of tia or chf.  Past Medical History:   Diagnosis Date    *Atrial fibrillation     Aortic stenosis 8/2/2013    Atrial fibrillation 7/5/2013    Dizziness - light-headed     Dizziness and giddiness     Hyperlipidemia     Hypertension     Unspecified hypertensive heart disease without heart failure        Past Surgical History:   Procedure Laterality Date    BACK SURGERY  2003    knee replacemnt bilateral  2001    SHOULDER SURGERY  2002       Social History   Substance Use Topics    Smoking status: Never Smoker    Smokeless tobacco: Never Used    Alcohol use No      Comment: HOLIDAY       Family History   Problem Relation Age of Onset    Hypertension Mother     Hypertension Father     Hypertension Sister     Hypertension Brother     Heart failure Brother     Heart disease Brother     Heart attack Brother        Current Outpatient Prescriptions   Medication Sig    amlodipine (NORVASC) 5 MG tablet Take 5 mg by mouth once daily.    fish oil-omega-3 fatty acids 300-1,000 mg capsule Take 1,200 mg by mouth once daily.    metoprolol tartrate (LOPRESSOR) 25 MG tablet TAKE 1 TABLET TWICE DAILY    multivitamin capsule Take 1 capsule by mouth once daily.    niacin, inositol niacinate, (NIACIN FLUSH FREE) 400 mg Cap Take 500 mg by mouth once daily.    NOREL AD 4- mg Tab Take 1 tablet by mouth 2 (two) times daily.    ranitidine (ZANTAC) 75 MG  tablet Take 75 mg by mouth 2 (two) times daily.    rivaroxaban (XARELTO) 15 mg Tab Take 1 tablet (15 mg total) by mouth daily with dinner or evening meal. Since 2015 never increased    simvastatin (ZOCOR) 20 MG tablet Take 20 mg by mouth every evening.    VENTOLIN HFA 90 mcg/actuation inhaler INHALE 1 TO 2 PUFFS 4 TIMES A DAY AS NEEDED FOR WHEEZING     No current facility-administered medications for this visit.      Current Outpatient Prescriptions on File Prior to Visit   Medication Sig    fish oil-omega-3 fatty acids 300-1,000 mg capsule Take 1,200 mg by mouth once daily.    metoprolol tartrate (LOPRESSOR) 25 MG tablet TAKE 1 TABLET TWICE DAILY    multivitamin capsule Take 1 capsule by mouth once daily.    niacin, inositol niacinate, (NIACIN FLUSH FREE) 400 mg Cap Take 500 mg by mouth once daily.    ranitidine (ZANTAC) 75 MG tablet Take 75 mg by mouth 2 (two) times daily.    rivaroxaban (XARELTO) 15 mg Tab Take 1 tablet (15 mg total) by mouth daily with dinner or evening meal. Since 2015 never increased    simvastatin (ZOCOR) 20 MG tablet Take 20 mg by mouth every evening.    [DISCONTINUED] cefdinir (OMNICEF) 300 MG capsule Take 300 mg by mouth 2 (two) times daily.    [DISCONTINUED] losartan (COZAAR) 100 MG tablet Take 100 mg by mouth.     No current facility-administered medications on file prior to visit.      Review of patient's allergies indicates:   Allergen Reactions    Sulfa (sulfonamide antibiotics) Hives       Review of Systems   Constitution: Positive for malaise/fatigue. Negative for weakness.   HENT: Negative for headaches.    Eyes: Negative for blurred vision.   Cardiovascular: Negative for chest pain, claudication, cyanosis, dyspnea on exertion, irregular heartbeat, leg swelling, near-syncope, orthopnea, palpitations and paroxysmal nocturnal dyspnea.   Respiratory: Negative for cough, hemoptysis and shortness of breath.    Hematologic/Lymphatic: Negative for bleeding problem. Does not  "bruise/bleed easily.   Skin: Negative for dry skin and itching.   Musculoskeletal: Positive for arthritis and back pain. Negative for falls, muscle weakness and myalgias.   Gastrointestinal: Negative for abdominal pain, diarrhea, heartburn, hematemesis, hematochezia and melena.   Genitourinary: Negative for flank pain and hematuria.   Neurological: Negative for dizziness, focal weakness, light-headedness, numbness, paresthesias and seizures.   Psychiatric/Behavioral: Negative for altered mental status and memory loss. The patient is not nervous/anxious.    Allergic/Immunologic: Negative for hives.       Objective:   Physical Exam  Vitals:    08/17/17 0812   BP: (!) 138/56   Pulse: 64   Weight: 76.5 kg (168 lb 10.4 oz)   Height: 5' 9" (1.753 m)   Constitutional: He is oriented to person, place, and time. He appears well-developed and well-nourished.    Head: Normocephalic and atraumatic.    Eyes: EOM are normal. Pupils are equal, round, and reactive to light.    Neck: Neck supple. No JVD present.    Cardiovascular: Normal rate and intact distal pulses.IRREGULARILY IRREGULAR  rhythm present. Exam reveals no gallop and no friction rub.    Murmur heard.    Harsh midsystolic murmur is present with a grade of 2/6 at the upper right sternal border radiating to the neck    Pulmonary/Chest: Effort normal and breath sounds normal. No respiratory distress. He has no wheezes. He has no rales. He exhibits no tenderness.    Abdominal: Soft. Bowel sounds are normal. He exhibits no distension. There is no tenderness.   Musculoskeletal: Normal range of motion.   Scar of previous knee surgery well healed.prominent varicose veins and spider veins noted.   Neurological: He is alert and oriented to person, place, and time. No cranial nerve deficit.   Skin: Skin is warm and dry. No rash noted. He is not diaphoretic.   Psychiatric: He has a normal mood and affect  Lab Results   Component Value Date    CHOL 125 04/19/2017    CHOL 147 " 10/21/2016    CHOL 146 04/15/2016     Lab Results   Component Value Date    HDL 35 (L) 04/19/2017    HDL 31 (L) 10/21/2016    HDL 29 (L) 04/15/2016     Lab Results   Component Value Date    LDLCALC 77.8 04/19/2017    LDLCALC 97.6 10/21/2016    LDLCALC 89.4 04/15/2016     Lab Results   Component Value Date    TRIG 61 04/19/2017    TRIG 92 10/21/2016    TRIG 138 04/15/2016     Lab Results   Component Value Date    CHOLHDL 28.0 04/19/2017    CHOLHDL 21.1 10/21/2016    CHOLHDL 19.9 (L) 04/15/2016       Chemistry        Component Value Date/Time     04/19/2017 0802    K 4.6 04/19/2017 0802     04/19/2017 0802    CO2 22 (L) 04/19/2017 0802    BUN 42 (H) 04/19/2017 0802    CREATININE 1.5 (H) 04/19/2017 0802     (H) 04/19/2017 0802        Component Value Date/Time    CALCIUM 9.3 04/19/2017 0802    ALKPHOS 74 04/19/2017 0802    AST 22 04/19/2017 0802    ALT 17 04/19/2017 0802    BILITOT 0.7 04/19/2017 0802    ESTGFRAFRICA 48.4 (A) 04/19/2017 0802    EGFRNONAA 41.8 (A) 04/19/2017 0802          Lab Results   Component Value Date    TSH 2.418 04/17/2015     No results found for: INR, PROTIME  Lab Results   Component Value Date    WBC 5.34 08/22/2006    HGB 13.8 (L) 08/22/2006    HCT 42.3 08/22/2006    MCV 87.9 08/22/2006     08/22/2006     BMP  Sodium   Date Value Ref Range Status   04/19/2017 137 136 - 145 mmol/L Final     Potassium   Date Value Ref Range Status   04/19/2017 4.6 3.5 - 5.1 mmol/L Final     Chloride   Date Value Ref Range Status   04/19/2017 106 95 - 110 mmol/L Final     CO2   Date Value Ref Range Status   04/19/2017 22 (L) 23 - 29 mmol/L Final     BUN, Bld   Date Value Ref Range Status   04/19/2017 42 (H) 8 - 23 mg/dL Final     Creatinine   Date Value Ref Range Status   04/19/2017 1.5 (H) 0.5 - 1.4 mg/dL Final     Calcium   Date Value Ref Range Status   04/19/2017 9.3 8.7 - 10.5 mg/dL Final     Anion Gap   Date Value Ref Range Status   04/19/2017 9 8 - 16 mmol/L Final     eGFR if     Date Value Ref Range Status   04/19/2017 48.4 (A) >60 mL/min/1.73 m^2 Final     eGFR if non    Date Value Ref Range Status   04/19/2017 41.8 (A) >60 mL/min/1.73 m^2 Final     Comment:     Calculation used to obtain the estimated glomerular filtration  rate (eGFR) is the CKD-EPI equation. Since race is unknown   in our information system, the eGFR values for   -American and Non--American patients are given   for each creatinine result.       CrCl cannot be calculated (Patient's most recent sCr result is older than the maximum 7 days allowed.).    Assessment:     1. Paroxysmal atrial fibrillation    2. Nonrheumatic aortic valve stenosis    3. Hyperlipidemia, unspecified hyperlipidemia type    4. Essential hypertension      Patient is now in chronic afib recovering from a uri  he is improved. He has no significant bradycardia will keep same medical therapy for now.  Plan:     Continue current therapy  Cardiac low salt diet.  Risk factor modification and excercise program.  F/u in 6 months with lipid cmp .

## 2017-11-07 DIAGNOSIS — I50.9 CONGESTIVE HEART FAILURE, UNSPECIFIED CONGESTIVE HEART FAILURE CHRONICITY, UNSPECIFIED CONGESTIVE HEART FAILURE TYPE: Primary | ICD-10-CM

## 2017-11-07 DIAGNOSIS — I48.0 PAF (PAROXYSMAL ATRIAL FIBRILLATION): ICD-10-CM

## 2017-11-07 RX ORDER — FUROSEMIDE 40 MG/1
40 TABLET ORAL
Status: ON HOLD | COMMUNITY
Start: 2017-11-06 | End: 2017-12-06 | Stop reason: HOSPADM

## 2017-11-07 RX ORDER — METOPROLOL TARTRATE 25 MG/1
25 TABLET, FILM COATED ORAL 2 TIMES DAILY
Status: ON HOLD | COMMUNITY
Start: 2017-11-05 | End: 2017-12-06 | Stop reason: HOSPADM

## 2017-11-07 RX ORDER — MULTIVITAMIN
1 TABLET ORAL
COMMUNITY

## 2017-11-07 NOTE — PROGRESS NOTES
Received phone call from patient requesting Hospital follow up from ER visit and admit [facility] with CHF over weekend.  Addendum to Med card now on Lasix 40 mg daily and Lopressor decreased to 12.5 mg bid

## 2017-11-08 ENCOUNTER — LAB VISIT (OUTPATIENT)
Dept: LAB | Facility: HOSPITAL | Age: 82
End: 2017-11-08
Attending: INTERNAL MEDICINE
Payer: MEDICARE

## 2017-11-08 ENCOUNTER — OFFICE VISIT (OUTPATIENT)
Dept: CARDIOLOGY | Facility: CLINIC | Age: 82
End: 2017-11-08
Payer: MEDICARE

## 2017-11-08 ENCOUNTER — CLINICAL SUPPORT (OUTPATIENT)
Dept: CARDIOLOGY | Facility: CLINIC | Age: 82
End: 2017-11-08
Attending: INTERNAL MEDICINE
Payer: MEDICARE

## 2017-11-08 VITALS
DIASTOLIC BLOOD PRESSURE: 56 MMHG | SYSTOLIC BLOOD PRESSURE: 116 MMHG | HEIGHT: 69 IN | WEIGHT: 170.88 LBS | BODY MASS INDEX: 25.31 KG/M2 | HEART RATE: 60 BPM

## 2017-11-08 DIAGNOSIS — I50.9 CONGESTIVE HEART FAILURE, UNSPECIFIED CONGESTIVE HEART FAILURE CHRONICITY, UNSPECIFIED CONGESTIVE HEART FAILURE TYPE: ICD-10-CM

## 2017-11-08 DIAGNOSIS — I35.0 NONRHEUMATIC AORTIC VALVE STENOSIS: ICD-10-CM

## 2017-11-08 DIAGNOSIS — I48.0 PAROXYSMAL ATRIAL FIBRILLATION: ICD-10-CM

## 2017-11-08 DIAGNOSIS — I50.9 CONGESTIVE HEART FAILURE, UNSPECIFIED CONGESTIVE HEART FAILURE CHRONICITY, UNSPECIFIED CONGESTIVE HEART FAILURE TYPE: Primary | ICD-10-CM

## 2017-11-08 DIAGNOSIS — I48.0 PAF (PAROXYSMAL ATRIAL FIBRILLATION): ICD-10-CM

## 2017-11-08 DIAGNOSIS — E78.5 HYPERLIPIDEMIA, UNSPECIFIED HYPERLIPIDEMIA TYPE: ICD-10-CM

## 2017-11-08 DIAGNOSIS — I10 ESSENTIAL HYPERTENSION: ICD-10-CM

## 2017-11-08 LAB
ANION GAP SERPL CALC-SCNC: 9 MMOL/L
AORTIC VALVE STENOSIS: ABNORMAL
BASOPHILS # BLD AUTO: 0.06 K/UL
BASOPHILS NFR BLD: 1.1 %
BUN SERPL-MCNC: 33 MG/DL
CALCIUM SERPL-MCNC: 9.8 MG/DL
CHLORIDE SERPL-SCNC: 102 MMOL/L
CO2 SERPL-SCNC: 28 MMOL/L
CREAT SERPL-MCNC: 1.6 MG/DL
DIFFERENTIAL METHOD: ABNORMAL
EOSINOPHIL # BLD AUTO: 0.1 K/UL
EOSINOPHIL NFR BLD: 2.1 %
ERYTHROCYTE [DISTWIDTH] IN BLOOD BY AUTOMATED COUNT: 16.4 %
EST. GFR  (AFRICAN AMERICAN): 44 ML/MIN/1.73 M^2
EST. GFR  (NON AFRICAN AMERICAN): 38 ML/MIN/1.73 M^2
ESTIMATED PA SYSTOLIC PRESSURE: 47.89
GLUCOSE SERPL-MCNC: 98 MG/DL
HCT VFR BLD AUTO: 38.6 %
HGB BLD-MCNC: 12.2 G/DL
LYMPHOCYTES # BLD AUTO: 0.9 K/UL
LYMPHOCYTES NFR BLD: 15.3 %
MCH RBC QN AUTO: 26.1 PG
MCHC RBC AUTO-ENTMCNC: 31.6 G/DL
MCV RBC AUTO: 83 FL
MITRAL VALVE REGURGITATION: ABNORMAL
MONOCYTES # BLD AUTO: 0.7 K/UL
MONOCYTES NFR BLD: 11.9 %
NEUTROPHILS # BLD AUTO: 4 K/UL
NEUTROPHILS NFR BLD: 69.6 %
PLATELET # BLD AUTO: 236 K/UL
PMV BLD AUTO: 9.4 FL
POTASSIUM SERPL-SCNC: 4.4 MMOL/L
RBC # BLD AUTO: 4.67 M/UL
RETIRED EF AND QEF - SEE NOTES: 35 (ref 55–65)
SODIUM SERPL-SCNC: 139 MMOL/L
TRICUSPID VALVE REGURGITATION: ABNORMAL
WBC # BLD AUTO: 5.7 K/UL

## 2017-11-08 PROCEDURE — 36415 COLL VENOUS BLD VENIPUNCTURE: CPT | Mod: PO

## 2017-11-08 PROCEDURE — 93000 ELECTROCARDIOGRAM COMPLETE: CPT | Mod: S$GLB,,, | Performed by: INTERNAL MEDICINE

## 2017-11-08 PROCEDURE — 93306 TTE W/DOPPLER COMPLETE: CPT | Mod: S$GLB,,, | Performed by: INTERNAL MEDICINE

## 2017-11-08 PROCEDURE — 85025 COMPLETE CBC W/AUTO DIFF WBC: CPT | Mod: PO

## 2017-11-08 PROCEDURE — 99999 PR PBB SHADOW E&M-EST. PATIENT-LVL III: CPT | Mod: PBBFAC,,, | Performed by: INTERNAL MEDICINE

## 2017-11-08 PROCEDURE — 99214 OFFICE O/P EST MOD 30 MIN: CPT | Mod: S$GLB,,, | Performed by: INTERNAL MEDICINE

## 2017-11-08 PROCEDURE — 80048 BASIC METABOLIC PNL TOTAL CA: CPT | Mod: PO

## 2017-11-08 NOTE — PROGRESS NOTES
Subjective:   Patient ID:  Chung Meneses Sr. is a 86 y.o. male who presents for follow up of Atrial Fibrillation (hosp f/u//[facility])      HPI  A 85 yo male with h/o afib aortic stenosis is here for f/u. [place] [facility] [place]uresed discharged  Home . His b blockers dose got dropped he was diuresed and he is feeling better. Has no more shortness of breath has no syncope near syncope no tia no claudication has no leg edema. Has no chest pain . He is complaint with salt and meds intake.   Past Medical History:   Diagnosis Date    *Atrial fibrillation     Aortic stenosis 8/2/2013    Atrial fibrillation 7/5/2013    CHF (congestive heart failure)     Dizziness - light-headed     Dizziness and giddiness     Hyperlipidemia     Hypertension     Unspecified hypertensive heart disease without heart failure        Past Surgical History:   Procedure Laterality Date    BACK SURGERY  2003    knee replacemnt bilateral  2001    SHOULDER SURGERY  2002       Social History   Substance Use Topics    Smoking status: Never Smoker    Smokeless tobacco: Never Used    Alcohol use No      Comment: HOLIDAY       Family History   Problem Relation Age of Onset    Hypertension Mother     Hypertension Father     Hypertension Sister     Hypertension Brother     Heart failure Brother     Heart disease Brother     Heart attack Brother        Current Outpatient Prescriptions   Medication Sig    amlodipine (NORVASC) 5 MG tablet Take 5 mg by mouth once daily.    fish oil-omega-3 fatty acids 300-1,000 mg capsule Take 1,200 mg by mouth once daily.    furosemide (LASIX) 40 MG tablet Take 40 mg by mouth.    metoprolol tartrate (LOPRESSOR) 25 MG tablet Take 25 mg by mouth 2 (two) times daily. Takes one-half tablet by mouth twice a day    multivitamin with folic acid 400 mcg Tab Take 1 tablet by mouth.    niacin, inositol niacinate, (NIACIN FLUSH FREE) 400 mg Cap Take 500 mg by mouth once daily.     ranitidine (ZANTAC) 75 MG tablet Take 75 mg by mouth 2 (two) times daily.    rivaroxaban (XARELTO) 15 mg Tab Take 1 tablet (15 mg total) by mouth daily with dinner or evening meal. Since 2015 never increased    simvastatin (ZOCOR) 20 MG tablet Take 20 mg by mouth every evening.    multivitamin capsule Take 1 capsule by mouth once daily.    VENTOLIN HFA 90 mcg/actuation inhaler INHALE 1 TO 2 PUFFS 4 TIMES A DAY AS NEEDED FOR WHEEZING     No current facility-administered medications for this visit.      Current Outpatient Prescriptions on File Prior to Visit   Medication Sig    amlodipine (NORVASC) 5 MG tablet Take 5 mg by mouth once daily.    fish oil-omega-3 fatty acids 300-1,000 mg capsule Take 1,200 mg by mouth once daily.    furosemide (LASIX) 40 MG tablet Take 40 mg by mouth.    metoprolol tartrate (LOPRESSOR) 25 MG tablet Take 25 mg by mouth 2 (two) times daily. Takes one-half tablet by mouth twice a day    multivitamin with folic acid 400 mcg Tab Take 1 tablet by mouth.    niacin, inositol niacinate, (NIACIN FLUSH FREE) 400 mg Cap Take 500 mg by mouth once daily.    ranitidine (ZANTAC) 75 MG tablet Take 75 mg by mouth 2 (two) times daily.    rivaroxaban (XARELTO) 15 mg Tab Take 1 tablet (15 mg total) by mouth daily with dinner or evening meal. Since 2015 never increased    simvastatin (ZOCOR) 20 MG tablet Take 20 mg by mouth every evening.    multivitamin capsule Take 1 capsule by mouth once daily.    VENTOLIN HFA 90 mcg/actuation inhaler INHALE 1 TO 2 PUFFS 4 TIMES A DAY AS NEEDED FOR WHEEZING    [DISCONTINUED] NOREL AD 4- mg Tab Take 1 tablet by mouth 2 (two) times daily.     No current facility-administered medications on file prior to visit.      Review of patient's allergies indicates:   Allergen Reactions    Sulfa (sulfonamide antibiotics) Hives       Review of Systems   Constitution: Positive for weakness and malaise/fatigue.   Eyes: Negative for blurred vision.   Cardiovascular:  "Positive for dyspnea on exertion. Negative for chest pain, claudication, cyanosis, irregular heartbeat, leg swelling, near-syncope, orthopnea, palpitations and paroxysmal nocturnal dyspnea.   Respiratory: Positive for shortness of breath. Negative for cough and hemoptysis.    Hematologic/Lymphatic: Negative for bleeding problem. Does not bruise/bleed easily.   Skin: Negative for dry skin and itching.   Musculoskeletal: Negative for falls, muscle weakness and myalgias.   Gastrointestinal: Negative for abdominal pain, diarrhea, heartburn, hematemesis, hematochezia and melena.   Genitourinary: Negative for flank pain and hematuria.   Neurological: Negative for dizziness, focal weakness, headaches, light-headedness, numbness, paresthesias and seizures.   Psychiatric/Behavioral: Negative for altered mental status and memory loss. The patient is not nervous/anxious.    Allergic/Immunologic: Negative for hives.       Objective:   Physical Exam  Vitals:    11/08/17 1137   BP: (!) 116/56   BP Method: Small (Manual)   Pulse: 60   Weight: 77.5 kg (170 lb 13.7 oz)   Height: 5' 9" (1.753 m)   Constitutional: He is oriented to person, place, and time. He appears well-developed and well-nourished.    Head: Normocephalic and atraumatic.    Eyes: EOM are normal. Pupils are equal, round, and reactive to light.    Neck: Neck supple. No JVD present.    Cardiovascular: Normal rate and intact distal pulses.IRREGULARILY IRREGULAR  rhythm present. Exam reveals no gallop and no friction rub.    Murmur heard.    Harsh midsystolic murmur is present with a grade of 2/6 at the upper right sternal border radiating to the neck  s2 is muffled.  Pulmonary/Chest: Effort normal and breath sounds normal. No respiratory distress. He has no wheezes. He has no rales. He exhibits no tenderness.    Abdominal: Soft. Bowel sounds are normal. He exhibits no distension. There is no tenderness.   Musculoskeletal: Normal range of motion.   Scar of previous knee " surgery well healed.prominent varicose veins and spider veins noted.   Neurological: He is alert and oriented to person, place, and time. No cranial nerve deficit.   Skin: Skin is warm and dry. No rash noted. He is not diaphoretic.   Psychiatric: He has a normal mood and affect  Lab Results   Component Value Date    CHOL 125 04/19/2017    CHOL 147 10/21/2016    CHOL 146 04/15/2016     Lab Results   Component Value Date    HDL 35 (L) 04/19/2017    HDL 31 (L) 10/21/2016    HDL 29 (L) 04/15/2016     Lab Results   Component Value Date    LDLCALC 77.8 04/19/2017    LDLCALC 97.6 10/21/2016    LDLCALC 89.4 04/15/2016     Lab Results   Component Value Date    TRIG 61 04/19/2017    TRIG 92 10/21/2016    TRIG 138 04/15/2016     Lab Results   Component Value Date    CHOLHDL 28.0 04/19/2017    CHOLHDL 21.1 10/21/2016    CHOLHDL 19.9 (L) 04/15/2016       Chemistry        Component Value Date/Time     04/19/2017 0802    K 4.6 04/19/2017 0802     04/19/2017 0802    CO2 22 (L) 04/19/2017 0802    BUN 42 (H) 04/19/2017 0802    CREATININE 1.5 (H) 04/19/2017 0802     (H) 04/19/2017 0802        Component Value Date/Time    CALCIUM 9.3 04/19/2017 0802    ALKPHOS 74 04/19/2017 0802    AST 22 04/19/2017 0802    ALT 17 04/19/2017 0802    BILITOT 0.7 04/19/2017 0802    ESTGFRAFRICA 48.4 (A) 04/19/2017 0802    EGFRNONAA 41.8 (A) 04/19/2017 0802          Lab Results   Component Value Date    TSH 2.418 04/17/2015     No results found for: INR, PROTIME  Lab Results   Component Value Date    WBC 5.34 08/22/2006    HGB 13.8 (L) 08/22/2006    HCT 42.3 08/22/2006    MCV 87.9 08/22/2006     08/22/2006     BMP  Sodium   Date Value Ref Range Status   04/19/2017 137 136 - 145 mmol/L Final     Potassium   Date Value Ref Range Status   04/19/2017 4.6 3.5 - 5.1 mmol/L Final     Chloride   Date Value Ref Range Status   04/19/2017 106 95 - 110 mmol/L Final     CO2   Date Value Ref Range Status   04/19/2017 22 (L) 23 - 29 mmol/L  Final     BUN, Bld   Date Value Ref Range Status   04/19/2017 42 (H) 8 - 23 mg/dL Final     Creatinine   Date Value Ref Range Status   04/19/2017 1.5 (H) 0.5 - 1.4 mg/dL Final     Calcium   Date Value Ref Range Status   04/19/2017 9.3 8.7 - 10.5 mg/dL Final     Anion Gap   Date Value Ref Range Status   04/19/2017 9 8 - 16 mmol/L Final     eGFR if    Date Value Ref Range Status   04/19/2017 48.4 (A) >60 mL/min/1.73 m^2 Final     eGFR if non    Date Value Ref Range Status   04/19/2017 41.8 (A) >60 mL/min/1.73 m^2 Final     Comment:     Calculation used to obtain the estimated glomerular filtration  rate (eGFR) is the CKD-EPI equation. Since race is unknown   in our information system, the eGFR values for   -American and Non--American patients are given   for each creatinine result.       CrCl cannot be calculated (Patient's most recent lab result is older than the maximum 7 days allowed.).    Assessment:     1. Congestive heart failure, unspecified congestive heart failure chronicity, unspecified congestive heart failure type    2. Essential hypertension    3. Hyperlipidemia, unspecified hyperlipidemia type    4. Paroxysmal atrial fibrillation    5. Nonrheumatic aortic valve stenosis      Had an episode of chf acute improved with diuresis needs to reassess ao valve and lvf.  Needs reepat labs to make sure he is not hypokalemic  Plan:   ekg   Echo today   Stat cbc bmp.  Phone  review and decide f/u.

## 2017-11-14 ENCOUNTER — HOSPITAL ENCOUNTER (OUTPATIENT)
Facility: HOSPITAL | Age: 82
Discharge: HOME | End: 2017-11-14
Attending: INTERNAL MEDICINE | Admitting: INTERNAL MEDICINE
Payer: MEDICARE

## 2017-11-14 ENCOUNTER — SURGERY (OUTPATIENT)
Age: 82
End: 2017-11-14

## 2017-11-14 VITALS
SYSTOLIC BLOOD PRESSURE: 121 MMHG | HEART RATE: 64 BPM | HEIGHT: 69 IN | BODY MASS INDEX: 24.73 KG/M2 | DIASTOLIC BLOOD PRESSURE: 53 MMHG | TEMPERATURE: 97 F | WEIGHT: 167 LBS | OXYGEN SATURATION: 97 % | RESPIRATION RATE: 16 BRPM

## 2017-11-14 DIAGNOSIS — I48.0 PAROXYSMAL ATRIAL FIBRILLATION: ICD-10-CM

## 2017-11-14 DIAGNOSIS — I50.9 CONGESTIVE HEART FAILURE, UNSPECIFIED CONGESTIVE HEART FAILURE CHRONICITY, UNSPECIFIED CONGESTIVE HEART FAILURE TYPE: ICD-10-CM

## 2017-11-14 DIAGNOSIS — I50.9 CHF (CONGESTIVE HEART FAILURE): ICD-10-CM

## 2017-11-14 DIAGNOSIS — I35.0 NONRHEUMATIC AORTIC VALVE STENOSIS: Primary | ICD-10-CM

## 2017-11-14 LAB
ANION GAP SERPL CALC-SCNC: 11 MMOL/L
BUN SERPL-MCNC: 30 MG/DL
CALCIUM SERPL-MCNC: 9.6 MG/DL
CHLORIDE SERPL-SCNC: 104 MMOL/L
CO2 SERPL-SCNC: 24 MMOL/L
CREAT SERPL-MCNC: 1.5 MG/DL
ERYTHROCYTE [DISTWIDTH] IN BLOOD BY AUTOMATED COUNT: 16.1 %
EST. GFR  (AFRICAN AMERICAN): 48 ML/MIN/1.73 M^2
EST. GFR  (NON AFRICAN AMERICAN): 42 ML/MIN/1.73 M^2
GLUCOSE SERPL-MCNC: 100 MG/DL
HCT VFR BLD AUTO: 37.1 %
HGB BLD-MCNC: 11.9 G/DL
MCH RBC QN AUTO: 26.1 PG
MCHC RBC AUTO-ENTMCNC: 32.1 G/DL
MCV RBC AUTO: 81 FL
PLATELET # BLD AUTO: 239 K/UL
PMV BLD AUTO: 9.6 FL
POTASSIUM SERPL-SCNC: 4.1 MMOL/L
RBC # BLD AUTO: 4.56 M/UL
SODIUM SERPL-SCNC: 139 MMOL/L
WBC # BLD AUTO: 7.43 K/UL

## 2017-11-14 PROCEDURE — 63600175 PHARM REV CODE 636 W HCPCS

## 2017-11-14 PROCEDURE — 93572 IV DOP VEL&/PRESS C FLO EA: CPT | Mod: 26,52,LD, | Performed by: INTERNAL MEDICINE

## 2017-11-14 PROCEDURE — 85027 COMPLETE CBC AUTOMATED: CPT

## 2017-11-14 PROCEDURE — 25000003 PHARM REV CODE 250

## 2017-11-14 PROCEDURE — 99152 MOD SED SAME PHYS/QHP 5/>YRS: CPT | Mod: ,,, | Performed by: INTERNAL MEDICINE

## 2017-11-14 PROCEDURE — 93460 R&L HRT ART/VENTRICLE ANGIO: CPT | Mod: 26,,, | Performed by: INTERNAL MEDICINE

## 2017-11-14 PROCEDURE — 80048 BASIC METABOLIC PNL TOTAL CA: CPT

## 2017-11-14 PROCEDURE — 93571 IV DOP VEL&/PRESS C FLO 1ST: CPT | Mod: 26,52,LM, | Performed by: INTERNAL MEDICINE

## 2017-11-14 PROCEDURE — 93567 NJX CAR CTH SPRVLV AORTGRPHY: CPT | Mod: ,,, | Performed by: INTERNAL MEDICINE

## 2017-11-14 PROCEDURE — C1887 CATHETER, GUIDING: HCPCS

## 2017-11-14 RX ORDER — SODIUM CHLORIDE 9 MG/ML
INJECTION, SOLUTION INTRAVENOUS CONTINUOUS
Status: DISCONTINUED | OUTPATIENT
Start: 2017-11-14 | End: 2017-11-14 | Stop reason: HOSPADM

## 2017-11-14 RX ORDER — NAPROXEN SODIUM 220 MG/1
81 TABLET, FILM COATED ORAL ONCE
Status: COMPLETED | OUTPATIENT
Start: 2017-11-14 | End: 2017-11-14

## 2017-11-14 RX ORDER — SODIUM CHLORIDE 450 MG/100ML
INJECTION, SOLUTION INTRAVENOUS CONTINUOUS
Status: DISCONTINUED | OUTPATIENT
Start: 2017-11-14 | End: 2017-11-14

## 2017-11-14 RX ORDER — DIPHENHYDRAMINE HCL 50 MG
50 CAPSULE ORAL ONCE
Status: COMPLETED | OUTPATIENT
Start: 2017-11-14 | End: 2017-11-14

## 2017-11-14 RX ADMIN — NAPROXEN SODIUM 81 MG: 220 TABLET, FILM COATED ORAL at 10:11

## 2017-11-14 RX ADMIN — Medication 50 MG: at 10:11

## 2017-11-14 RX ADMIN — SODIUM CHLORIDE: 9 INJECTION, SOLUTION INTRAVENOUS at 09:11

## 2017-11-14 NOTE — H&P (VIEW-ONLY)
Subjective:   Patient ID:  Chung Meneses Sr. is a 86 y.o. male who presents for follow up of Atrial Fibrillation (hosp f/u//[facility])      HPI  A 87 yo male with h/o afib aortic stenosis is here for f/u. [place] [facility] [place] diuresed discharged  Home . His b blockers dose got dropped he was diuresed and he is feeling better. Has no more shortness of breath has no syncope near syncope no tia no claudication has no leg edema. Has no chest pain . He is complaint with salt and meds intake.   Past Medical History:   Diagnosis Date    *Atrial fibrillation     Aortic stenosis 8/2/2013    Atrial fibrillation 7/5/2013    CHF (congestive heart failure)     Dizziness - light-headed     Dizziness and giddiness     Hyperlipidemia     Hypertension     Unspecified hypertensive heart disease without heart failure        Past Surgical History:   Procedure Laterality Date    BACK SURGERY  2003    knee replacemnt bilateral  2001    SHOULDER SURGERY  2002       Social History   Substance Use Topics    Smoking status: Never Smoker    Smokeless tobacco: Never Used    Alcohol use No      Comment: HOLIDAY       Family History   Problem Relation Age of Onset    Hypertension Mother     Hypertension Father     Hypertension Sister     Hypertension Brother     Heart failure Brother     Heart disease Brother     Heart attack Brother        Current Outpatient Prescriptions   Medication Sig    amlodipine (NORVASC) 5 MG tablet Take 5 mg by mouth once daily.    fish oil-omega-3 fatty acids 300-1,000 mg capsule Take 1,200 mg by mouth once daily.    furosemide (LASIX) 40 MG tablet Take 40 mg by mouth.    metoprolol tartrate (LOPRESSOR) 25 MG tablet Take 25 mg by mouth 2 (two) times daily. Takes one-half tablet by mouth twice a day    multivitamin with folic acid 400 mcg Tab Take 1 tablet by mouth.    niacin, inositol niacinate, (NIACIN FLUSH FREE) 400 mg Cap Take 500 mg by mouth once daily.     ranitidine (ZANTAC) 75 MG tablet Take 75 mg by mouth 2 (two) times daily.    rivaroxaban (XARELTO) 15 mg Tab Take 1 tablet (15 mg total) by mouth daily with dinner or evening meal. Since 2015 never increased    simvastatin (ZOCOR) 20 MG tablet Take 20 mg by mouth every evening.    multivitamin capsule Take 1 capsule by mouth once daily.    VENTOLIN HFA 90 mcg/actuation inhaler INHALE 1 TO 2 PUFFS 4 TIMES A DAY AS NEEDED FOR WHEEZING     No current facility-administered medications for this visit.      Current Outpatient Prescriptions on File Prior to Visit   Medication Sig    amlodipine (NORVASC) 5 MG tablet Take 5 mg by mouth once daily.    fish oil-omega-3 fatty acids 300-1,000 mg capsule Take 1,200 mg by mouth once daily.    furosemide (LASIX) 40 MG tablet Take 40 mg by mouth.    metoprolol tartrate (LOPRESSOR) 25 MG tablet Take 25 mg by mouth 2 (two) times daily. Takes one-half tablet by mouth twice a day    multivitamin with folic acid 400 mcg Tab Take 1 tablet by mouth.    niacin, inositol niacinate, (NIACIN FLUSH FREE) 400 mg Cap Take 500 mg by mouth once daily.    ranitidine (ZANTAC) 75 MG tablet Take 75 mg by mouth 2 (two) times daily.    rivaroxaban (XARELTO) 15 mg Tab Take 1 tablet (15 mg total) by mouth daily with dinner or evening meal. Since 2015 never increased    simvastatin (ZOCOR) 20 MG tablet Take 20 mg by mouth every evening.    multivitamin capsule Take 1 capsule by mouth once daily.    VENTOLIN HFA 90 mcg/actuation inhaler INHALE 1 TO 2 PUFFS 4 TIMES A DAY AS NEEDED FOR WHEEZING    [DISCONTINUED] NOREL AD 4- mg Tab Take 1 tablet by mouth 2 (two) times daily.     No current facility-administered medications on file prior to visit.      Review of patient's allergies indicates:   Allergen Reactions    Sulfa (sulfonamide antibiotics) Hives       Review of Systems   Constitution: Positive for weakness and malaise/fatigue.   Eyes: Negative for blurred vision.   Cardiovascular:  "Positive for dyspnea on exertion. Negative for chest pain, claudication, cyanosis, irregular heartbeat, leg swelling, near-syncope, orthopnea, palpitations and paroxysmal nocturnal dyspnea.   Respiratory: Positive for shortness of breath. Negative for cough and hemoptysis.    Hematologic/Lymphatic: Negative for bleeding problem. Does not bruise/bleed easily.   Skin: Negative for dry skin and itching.   Musculoskeletal: Negative for falls, muscle weakness and myalgias.   Gastrointestinal: Negative for abdominal pain, diarrhea, heartburn, hematemesis, hematochezia and melena.   Genitourinary: Negative for flank pain and hematuria.   Neurological: Negative for dizziness, focal weakness, headaches, light-headedness, numbness, paresthesias and seizures.   Psychiatric/Behavioral: Negative for altered mental status and memory loss. The patient is not nervous/anxious.    Allergic/Immunologic: Negative for hives.       Objective:   Physical Exam  Vitals:    11/08/17 1137   BP: (!) 116/56   BP Method: Small (Manual)   Pulse: 60   Weight: 77.5 kg (170 lb 13.7 oz)   Height: 5' 9" (1.753 m)   Constitutional: He is oriented to person, place, and time. He appears well-developed and well-nourished.    Head: Normocephalic and atraumatic.    Eyes: EOM are normal. Pupils are equal, round, and reactive to light.    Neck: Neck supple. No JVD present.    Cardiovascular: Normal rate and intact distal pulses.IRREGULARILY IRREGULAR  rhythm present. Exam reveals no gallop and no friction rub.    Murmur heard.    Harsh midsystolic murmur is present with a grade of 2/6 at the upper right sternal border radiating to the neck  s2 is muffled.  Pulmonary/Chest: Effort normal and breath sounds normal. No respiratory distress. He has no wheezes. He has no rales. He exhibits no tenderness.    Abdominal: Soft. Bowel sounds are normal. He exhibits no distension. There is no tenderness.   Musculoskeletal: Normal range of motion.   Scar of previous knee " surgery well healed.prominent varicose veins and spider veins noted.   Neurological: He is alert and oriented to person, place, and time. No cranial nerve deficit.   Skin: Skin is warm and dry. No rash noted. He is not diaphoretic.   Psychiatric: He has a normal mood and affect  Lab Results   Component Value Date    CHOL 125 04/19/2017    CHOL 147 10/21/2016    CHOL 146 04/15/2016     Lab Results   Component Value Date    HDL 35 (L) 04/19/2017    HDL 31 (L) 10/21/2016    HDL 29 (L) 04/15/2016     Lab Results   Component Value Date    LDLCALC 77.8 04/19/2017    LDLCALC 97.6 10/21/2016    LDLCALC 89.4 04/15/2016     Lab Results   Component Value Date    TRIG 61 04/19/2017    TRIG 92 10/21/2016    TRIG 138 04/15/2016     Lab Results   Component Value Date    CHOLHDL 28.0 04/19/2017    CHOLHDL 21.1 10/21/2016    CHOLHDL 19.9 (L) 04/15/2016       Chemistry        Component Value Date/Time     04/19/2017 0802    K 4.6 04/19/2017 0802     04/19/2017 0802    CO2 22 (L) 04/19/2017 0802    BUN 42 (H) 04/19/2017 0802    CREATININE 1.5 (H) 04/19/2017 0802     (H) 04/19/2017 0802        Component Value Date/Time    CALCIUM 9.3 04/19/2017 0802    ALKPHOS 74 04/19/2017 0802    AST 22 04/19/2017 0802    ALT 17 04/19/2017 0802    BILITOT 0.7 04/19/2017 0802    ESTGFRAFRICA 48.4 (A) 04/19/2017 0802    EGFRNONAA 41.8 (A) 04/19/2017 0802          Lab Results   Component Value Date    TSH 2.418 04/17/2015     No results found for: INR, PROTIME  Lab Results   Component Value Date    WBC 5.34 08/22/2006    HGB 13.8 (L) 08/22/2006    HCT 42.3 08/22/2006    MCV 87.9 08/22/2006     08/22/2006     BMP  Sodium   Date Value Ref Range Status   04/19/2017 137 136 - 145 mmol/L Final     Potassium   Date Value Ref Range Status   04/19/2017 4.6 3.5 - 5.1 mmol/L Final     Chloride   Date Value Ref Range Status   04/19/2017 106 95 - 110 mmol/L Final     CO2   Date Value Ref Range Status   04/19/2017 22 (L) 23 - 29 mmol/L  Final     BUN, Bld   Date Value Ref Range Status   04/19/2017 42 (H) 8 - 23 mg/dL Final     Creatinine   Date Value Ref Range Status   04/19/2017 1.5 (H) 0.5 - 1.4 mg/dL Final     Calcium   Date Value Ref Range Status   04/19/2017 9.3 8.7 - 10.5 mg/dL Final     Anion Gap   Date Value Ref Range Status   04/19/2017 9 8 - 16 mmol/L Final     eGFR if    Date Value Ref Range Status   04/19/2017 48.4 (A) >60 mL/min/1.73 m^2 Final     eGFR if non    Date Value Ref Range Status   04/19/2017 41.8 (A) >60 mL/min/1.73 m^2 Final     Comment:     Calculation used to obtain the estimated glomerular filtration  rate (eGFR) is the CKD-EPI equation. Since race is unknown   in our information system, the eGFR values for   -American and Non--American patients are given   for each creatinine result.       CrCl cannot be calculated (Patient's most recent lab result is older than the maximum 7 days allowed.).    Assessment:     1. Congestive heart failure, unspecified congestive heart failure chronicity, unspecified congestive heart failure type    2. Essential hypertension    3. Hyperlipidemia, unspecified hyperlipidemia type    4. Paroxysmal atrial fibrillation    5. Nonrheumatic aortic valve stenosis      Had an episode of chf acute improved with diuresis needs to reassess ao valve and lvf.  Needs reepat labs to make sure he is not hypokalemic  Plan:   ekg   Echo today   Stat cbc bmp.  Phone  review and decide f/u.

## 2017-11-14 NOTE — PLAN OF CARE
1430 TR BAND OFF. SITE INTACT.  1500 DISCHARGE INSTRUCTIONS REVIEWED AND COPY GIVEN. 1510 IV REMOVED.  1525 DISCHARGED HOME. TO EXIT VIA W/C

## 2017-11-14 NOTE — BRIEF OP NOTE
Date: 11/14/2017  Surgeon/Physician: Samuel Espinoza MD  Assistants: Manuel Lincoln    Pre Op Diagnosis: as severe     Post OP Diagnosis:as severe non obs cad    Procedure Performed:rlhc ascending aogram  ifr lad and om1    ANESTHESIA:RN IV SEDATION    COMPLICATION: none    Specimen / Tissue Removed: No    Estimated Blood Loss: <50 cc    Prostetics/Devices: None    Findings / Operative Note:   40 % ostial lad ifr 0.92 d1 90%   lcx nopn obs cad   om1 40% ifr o.96  rca luminal irregularities   Severe as   Mildly elevated lvedp     PLAN:  Evaluate for avr       Discharge Note  Short Stay      SUMMARY     Admit Date: 11/14/2017    Attending Physician: Patricia Espinoza MD     Discharge Physician: Samuel Espinoza MD     Discharge Date:11/14/2017        Final Diagnosis: severe aortic stenosis   Non obstructive cad     Outcome of Stay:patient tolerated procedure well no complications he was stable for discharge .will get evaluation by cvt for aortic valve replacement as outpatient .    Disposition: Home or Self Care    Patient Instructions:   Current Discharge Medication List      CONTINUE these medications which have NOT CHANGED    Details   amlodipine (NORVASC) 5 MG tablet Take 5 mg by mouth once daily.  Refills: 2      furosemide (LASIX) 40 MG tablet Take 40 mg by mouth.      metoprolol tartrate (LOPRESSOR) 25 MG tablet Take 25 mg by mouth 2 (two) times daily. Takes one-half tablet by mouth twice a day      multivitamin capsule Take 1 capsule by mouth once daily.      multivitamin with folic acid 400 mcg Tab Take 1 tablet by mouth.      niacin, inositol niacinate, (NIACIN FLUSH FREE) 400 mg Cap Take 500 mg by mouth once daily.      ranitidine (ZANTAC) 75 MG tablet Take 75 mg by mouth 2 (two) times daily.      simvastatin (ZOCOR) 20 MG tablet Take 20 mg by mouth every evening.      fish oil-omega-3 fatty acids 300-1,000 mg capsule Take 1,200 mg by mouth once daily.      rivaroxaban (XARELTO) 15 mg Tab Take 1 tablet (15 mg  total) by mouth daily with dinner or evening meal. Since 2015 never increased  Qty: 90 tablet, Refills: 3      VENTOLIN HFA 90 mcg/actuation inhaler INHALE 1 TO 2 PUFFS 4 TIMES A DAY AS NEEDED FOR WHEEZING  Refills: 0             Discharge Procedure Orders (must include Diet, Follow-up, Activity)    Discharge Procedure Orders (must include Diet, Follow-up, Activity)  Diet general     Activity as tolerated   Order Comments: As per post cath instructions     Call MD for:  temperature >100.4     Call MD for:  persistent nausea and vomiting     Call MD for:  severe uncontrolled pain     Call MD for:  difficulty breathing, headache or visual disturbances     Call MD for:  redness, tenderness, or signs of infection (pain, swelling, redness, odor or green/yellow discharge around incision site)     Call MD for:  hives     Call MD for:  persistent dizziness or light-headedness     Call MD for:  extreme fatigue       Follow-up Information     Samuel Espinoza MD In 2 weeks.    Specialty:  Cardiology  Contact information:  [address]  [place] [address] [phone]

## 2017-11-14 NOTE — INTERVAL H&P NOTE
The patient has been examined and the H&P has been reviewed:    I concur with the findings and no changes have occurred since H&P was written.  Has drop in ef after an admit with chf and has severe aortic stenosis by echo will evaluate with /[facility]  Anesthesia/Surgery risks, benefits and alternative options discussed and understood by patient/family.  I have explained the risks, benefits , and alternatives of the procedure in detail.the patient voices understanding and all questions have been answered.the patient agrees to proceed as planned.          Active Hospital Problems    Diagnosis  POA    CHF (congestive heart failure) [I50.9]  Yes     Priority: High      Resolved Hospital Problems    Diagnosis Date Resolved POA   No resolved problems to display.

## 2017-11-24 DIAGNOSIS — I25.10 ATHEROSCLEROSIS OF NATIVE CORONARY ARTERY OF NATIVE HEART, ANGINA PRESENCE UNSPECIFIED: ICD-10-CM

## 2017-11-24 DIAGNOSIS — I35.0 AORTIC VALVE STENOSIS, ETIOLOGY OF CARDIAC VALVE DISEASE UNSPECIFIED: Primary | ICD-10-CM

## 2017-11-24 NOTE — DISCHARGE INSTRUCTIONS
To confirm, Your doctor has instructed you that surgery is scheduled for 11/30/17  at  07:00 a.m.       Please report to [facility], [address], 1st floor, main lobby by 05:00 a.m.  Pre admit office will call afternoon prior to surgery with final arrival time    INSTRUCTIONS IMPORTANT!!!   Do not eat, drink, or smoke after 12 midnight-including water. OK to brush teeth, no gum, candy or mints!    ¨ Take only these medicines with a small swallow of water-morning of surgery.  N/A    Last dose Xarelto 11/26/17/ Dr Boateng    DO NOT REMOVE RED BLOOD ARMBAND PRIOR TO SURGERY  Pre operative instructions:  Please review the Pre-Operative Instruction booklet that you were given.        Bathing Instructions--See page 6 in the Pre-operative booklet.      Prevention of surgical site infections:     -Keep incisions clean and dry.   -Do not soak/submerge incisions in water until completely healed.   -Do not apply lotions, powders, creams, or deodorants to site.   -Always make sure hands are cleaned with antibacterial soap/ alcohol-based                 prior to touching the surgical site.  (This includes doctors,                 nurses, staff, and yourself.)    Signs and symptoms:   -Redness and pain around the area where you had surgery   -Drainage of cloudy fluid from your surgical wound   -Fever over 100.4       I have read or had read and explained to me, and understand the above information.  Additional comments or instructions:  Received a copy of Pre-operative instructions booklet, FAQ surgical site infection sheet, and packets of hibiclens (if indicated).

## 2017-11-28 ENCOUNTER — HOSPITAL ENCOUNTER (OUTPATIENT)
Dept: RADIOLOGY | Facility: HOSPITAL | Age: 82
Discharge: HOME | DRG: 219 | End: 2017-11-28
Attending: THORACIC SURGERY (CARDIOTHORACIC VASCULAR SURGERY)
Payer: MEDICARE

## 2017-11-28 ENCOUNTER — HOSPITAL ENCOUNTER (OUTPATIENT)
Dept: PULMONOLOGY | Facility: HOSPITAL | Age: 82
Discharge: HOME | DRG: 219 | End: 2017-11-28
Attending: THORACIC SURGERY (CARDIOTHORACIC VASCULAR SURGERY)
Payer: MEDICARE

## 2017-11-28 ENCOUNTER — HOSPITAL ENCOUNTER (OUTPATIENT)
Dept: PREADMISSION TESTING | Facility: HOSPITAL | Age: 82
Discharge: HOME | DRG: 219 | End: 2017-11-28
Attending: THORACIC SURGERY (CARDIOTHORACIC VASCULAR SURGERY)
Payer: MEDICARE

## 2017-11-28 VITALS
TEMPERATURE: 98 F | OXYGEN SATURATION: 98 % | HEART RATE: 75 BPM | RESPIRATION RATE: 18 BRPM | SYSTOLIC BLOOD PRESSURE: 141 MMHG | DIASTOLIC BLOOD PRESSURE: 64 MMHG | WEIGHT: 170 LBS | BODY MASS INDEX: 25.18 KG/M2 | HEIGHT: 69 IN

## 2017-11-28 DIAGNOSIS — I25.10 ATHEROSCLEROSIS OF NATIVE CORONARY ARTERY OF NATIVE HEART, ANGINA PRESENCE UNSPECIFIED: ICD-10-CM

## 2017-11-28 DIAGNOSIS — I35.0 AORTIC VALVE STENOSIS, ETIOLOGY OF CARDIAC VALVE DISEASE UNSPECIFIED: ICD-10-CM

## 2017-11-28 LAB
ALLENS TEST: ABNORMAL
DELSYS: ABNORMAL
FIO2: 21
HCO3 UR-SCNC: 25.1 MMOL/L (ref 24–28)
MODE: ABNORMAL
PCO2 BLDA: 36.6 MMHG (ref 35–45)
PH SMN: 7.44 [PH] (ref 7.35–7.45)
PO2 BLDA: 67 MMHG (ref 80–100)
POC BE: 1 MMOL/L
POC SATURATED O2: 94 % (ref 95–100)
SAMPLE: ABNORMAL
SITE: ABNORMAL

## 2017-11-28 PROCEDURE — 82800 BLOOD PH: CPT

## 2017-11-28 PROCEDURE — S0017 INJECTION, AMINOCAPROIC ACID: HCPCS

## 2017-11-28 PROCEDURE — 63600175 PHARM REV CODE 636 W HCPCS

## 2017-11-28 PROCEDURE — 82803 BLOOD GASES ANY COMBINATION: CPT

## 2017-11-28 PROCEDURE — 93005 ELECTROCARDIOGRAM TRACING: CPT

## 2017-11-28 PROCEDURE — 93010 ELECTROCARDIOGRAM REPORT: CPT | Mod: ,,, | Performed by: INTERNAL MEDICINE

## 2017-11-28 PROCEDURE — 71020 XR CHEST PA AND LATERAL PRE-OP: CPT | Mod: TC

## 2017-11-28 PROCEDURE — 87081 CULTURE SCREEN ONLY: CPT

## 2017-11-28 PROCEDURE — P9047 ALBUMIN (HUMAN), 25%, 50ML: HCPCS

## 2017-11-28 PROCEDURE — 25000003 PHARM REV CODE 250

## 2017-11-28 RX ORDER — CHOLECALCIFEROL (VITAMIN D3) 25 MCG
1000 TABLET ORAL DAILY
COMMUNITY
End: 2018-02-16

## 2017-11-29 ENCOUNTER — TELEPHONE (OUTPATIENT)
Dept: CARDIOLOGY | Facility: CLINIC | Age: 82
End: 2017-11-29

## 2017-11-29 NOTE — TELEPHONE ENCOUNTER
Patient wanted to notify Cardiology that Bypass surgery is scheduled for tomorrow at 7AM with Dr. Boateng

## 2017-11-30 ENCOUNTER — ANESTHESIA EVENT (OUTPATIENT)
Dept: SURGERY | Facility: HOSPITAL | Age: 82
DRG: 219 | End: 2017-11-30
Payer: MEDICARE

## 2017-11-30 ENCOUNTER — HOSPITAL ENCOUNTER (INPATIENT)
Facility: HOSPITAL | Age: 82
LOS: 6 days | Discharge: HOME | DRG: 219 | End: 2017-12-06
Attending: THORACIC SURGERY (CARDIOTHORACIC VASCULAR SURGERY) | Admitting: THORACIC SURGERY (CARDIOTHORACIC VASCULAR SURGERY)
Payer: MEDICARE

## 2017-11-30 ENCOUNTER — ANESTHESIA (OUTPATIENT)
Dept: SURGERY | Facility: HOSPITAL | Age: 82
DRG: 219 | End: 2017-11-30
Payer: MEDICARE

## 2017-11-30 DIAGNOSIS — E78.5 HYPERLIPIDEMIA, UNSPECIFIED HYPERLIPIDEMIA TYPE: Chronic | ICD-10-CM

## 2017-11-30 DIAGNOSIS — I50.22 CHRONIC SYSTOLIC CONGESTIVE HEART FAILURE: ICD-10-CM

## 2017-11-30 DIAGNOSIS — Z95.2 S/P AVR: Primary | ICD-10-CM

## 2017-11-30 DIAGNOSIS — I35.0 AORTIC STENOSIS: ICD-10-CM

## 2017-11-30 DIAGNOSIS — I35.0 NONRHEUMATIC AORTIC VALVE STENOSIS: ICD-10-CM

## 2017-11-30 DIAGNOSIS — I27.20 PULMONARY HYPERTENSION: Chronic | ICD-10-CM

## 2017-11-30 DIAGNOSIS — R73.9 HYPERGLYCEMIA, UNSPECIFIED: ICD-10-CM

## 2017-11-30 DIAGNOSIS — I48.0 PAROXYSMAL ATRIAL FIBRILLATION: Chronic | ICD-10-CM

## 2017-11-30 DIAGNOSIS — Z99.11 ON MECHANICALLY ASSISTED VENTILATION: ICD-10-CM

## 2017-11-30 LAB
ANION GAP SERPL CALC-SCNC: 8 MMOL/L
APTT BLDCRRT: 32.4 SEC
APTT BLDCRRT: 33.1 SEC
APTT BLDCRRT: 33.9 SEC
BASOPHILS # BLD AUTO: 0.02 K/UL
BASOPHILS NFR BLD: 0.2 %
BLD PROD TYP BPU: NORMAL
BLOOD UNIT EXPIRATION DATE: NORMAL
BLOOD UNIT TYPE CODE: 6200
BLOOD UNIT TYPE CODE: 6200
BLOOD UNIT TYPE CODE: 9500
BLOOD UNIT TYPE: NORMAL
BUN SERPL-MCNC: 28 MG/DL
CALCIUM SERPL-MCNC: 9.9 MG/DL
CHLORIDE SERPL-SCNC: 108 MMOL/L
CK SERPL-CCNC: 71 U/L
CO2 SERPL-SCNC: 24 MMOL/L
CODING SYSTEM: NORMAL
CREAT SERPL-MCNC: 1.3 MG/DL
DELSYS: ABNORMAL
DIFFERENTIAL METHOD: ABNORMAL
DISPENSE STATUS: NORMAL
EOSINOPHIL # BLD AUTO: 0.1 K/UL
EOSINOPHIL NFR BLD: 0.4 %
ERYTHROCYTE [DISTWIDTH] IN BLOOD BY AUTOMATED COUNT: 16.5 %
ERYTHROCYTE [SEDIMENTATION RATE] IN BLOOD BY WESTERGREN METHOD: 12 MM/H
EST. GFR  (AFRICAN AMERICAN): 57 ML/MIN/1.73 M^2
EST. GFR  (NON AFRICAN AMERICAN): 49 ML/MIN/1.73 M^2
ESTIMATED AVG GLUCOSE: 140 MG/DL
FIBRINOGEN PPP-MCNC: 272 MG/DL
FIBRINOGEN PPP-MCNC: 287 MG/DL
FIO2: 50
GLUCOSE SERPL-MCNC: 115 MG/DL (ref 70–110)
GLUCOSE SERPL-MCNC: 128 MG/DL (ref 70–110)
GLUCOSE SERPL-MCNC: 133 MG/DL (ref 70–110)
GLUCOSE SERPL-MCNC: 170 MG/DL (ref 70–110)
GLUCOSE SERPL-MCNC: 174 MG/DL
GLUCOSE SERPL-MCNC: 190 MG/DL (ref 70–110)
GLUCOSE SERPL-MCNC: 190 MG/DL (ref 70–110)
HBA1C MFR BLD HPLC: 6.5 %
HCO3 UR-SCNC: 23.7 MMOL/L (ref 24–28)
HCO3 UR-SCNC: 24.6 MMOL/L (ref 24–28)
HCO3 UR-SCNC: 24.8 MMOL/L (ref 24–28)
HCO3 UR-SCNC: 25.7 MMOL/L (ref 24–28)
HCO3 UR-SCNC: 26 MMOL/L (ref 24–28)
HCO3 UR-SCNC: 26.4 MMOL/L (ref 24–28)
HCO3 UR-SCNC: 26.9 MMOL/L (ref 24–28)
HCT VFR BLD AUTO: 26.5 %
HCT VFR BLD AUTO: 27.1 %
HCT VFR BLD AUTO: 33.7 %
HCT VFR BLD CALC: 25 %PCV (ref 36–54)
HCT VFR BLD CALC: 26 %PCV (ref 36–54)
HCT VFR BLD CALC: 27 %PCV (ref 36–54)
HCT VFR BLD CALC: 27 %PCV (ref 36–54)
HCT VFR BLD CALC: 33 %PCV (ref 36–54)
HCT VFR BLD CALC: 34 %PCV (ref 36–54)
HGB BLD-MCNC: 10.5 G/DL
HGB BLD-MCNC: 8.2 G/DL
HGB BLD-MCNC: 8.5 G/DL
INR PPP: 1.2
INR PPP: 1.6
LYMPHOCYTES # BLD AUTO: 0.4 K/UL
LYMPHOCYTES NFR BLD: 3.6 %
MAGNESIUM SERPL-MCNC: 2.6 MG/DL
MCH RBC QN AUTO: 26.3 PG
MCHC RBC AUTO-ENTMCNC: 30.9 G/DL
MCV RBC AUTO: 85 FL
MODE: ABNORMAL
MONOCYTES # BLD AUTO: 0.7 K/UL
MONOCYTES NFR BLD: 6 %
MRSA SPEC QL CULT: NORMAL
NEUTROPHILS # BLD AUTO: 10.2 K/UL
NEUTROPHILS NFR BLD: 89.8 %
NUM UNITS TRANS FFP: NORMAL
NUM UNITS TRANS FFP: NORMAL
PCO2 BLDA: 39.1 MMHG (ref 35–45)
PCO2 BLDA: 41.1 MMHG (ref 35–45)
PCO2 BLDA: 43 MMHG (ref 35–45)
PCO2 BLDA: 43.3 MMHG (ref 35–45)
PCO2 BLDA: 43.9 MMHG (ref 35–45)
PCO2 BLDA: 44.3 MMHG (ref 35–45)
PCO2 BLDA: 44.5 MMHG (ref 35–45)
PEEP: 5
PH SMN: 7.34 [PH] (ref 7.35–7.45)
PH SMN: 7.37 [PH] (ref 7.35–7.45)
PH SMN: 7.37 [PH] (ref 7.35–7.45)
PH SMN: 7.39 [PH] (ref 7.35–7.45)
PH SMN: 7.39 [PH] (ref 7.35–7.45)
PH SMN: 7.41 [PH] (ref 7.35–7.45)
PH SMN: 7.41 [PH] (ref 7.35–7.45)
PLATELET # BLD AUTO: 177 K/UL
PLATELET # BLD AUTO: 196 K/UL
PLATELET # BLD AUTO: 214 K/UL
PMV BLD AUTO: 9.5 FL
PMV BLD AUTO: 9.7 FL
PMV BLD AUTO: 9.9 FL
PO2 BLDA: 159 MMHG (ref 80–100)
PO2 BLDA: 199 MMHG (ref 80–100)
PO2 BLDA: 218 MMHG (ref 80–100)
PO2 BLDA: 249 MMHG (ref 80–100)
PO2 BLDA: 373 MMHG (ref 80–100)
PO2 BLDA: 50 MMHG (ref 40–60)
PO2 BLDA: 69 MMHG (ref 80–100)
POC ACTIVATED CLOTTING TIME K: 120 SEC (ref 74–137)
POC ACTIVATED CLOTTING TIME K: 147 SEC (ref 74–137)
POC ACTIVATED CLOTTING TIME K: 395 SEC (ref 74–137)
POC ACTIVATED CLOTTING TIME K: 411 SEC (ref 74–137)
POC ACTIVATED CLOTTING TIME K: 648 SEC (ref 74–137)
POC ACTIVATED CLOTTING TIME K: 698 SEC (ref 74–137)
POC BE: -1 MMOL/L
POC BE: -2 MMOL/L
POC BE: 0 MMOL/L
POC BE: 0 MMOL/L
POC BE: 1 MMOL/L
POC BE: 1 MMOL/L
POC BE: 2 MMOL/L
POC IONIZED CALCIUM: 1.06 MMOL/L (ref 1.06–1.42)
POC IONIZED CALCIUM: 1.08 MMOL/L (ref 1.06–1.42)
POC IONIZED CALCIUM: 1.08 MMOL/L (ref 1.06–1.42)
POC IONIZED CALCIUM: 1.16 MMOL/L (ref 1.06–1.42)
POC IONIZED CALCIUM: 1.22 MMOL/L (ref 1.06–1.42)
POC IONIZED CALCIUM: 1.77 MMOL/L (ref 1.06–1.42)
POC SATURATED O2: 100 % (ref 95–100)
POC SATURATED O2: 85 % (ref 95–100)
POC SATURATED O2: 92 % (ref 95–100)
POC SATURATED O2: 99 % (ref 95–100)
POCT GLUCOSE: 123 MG/DL (ref 70–110)
POCT GLUCOSE: 140 MG/DL (ref 70–110)
POCT GLUCOSE: 149 MG/DL (ref 70–110)
POCT GLUCOSE: 162 MG/DL (ref 70–110)
POCT GLUCOSE: 182 MG/DL (ref 70–110)
POCT GLUCOSE: 194 MG/DL (ref 70–110)
POCT GLUCOSE: 199 MG/DL (ref 70–110)
POCT GLUCOSE: 207 MG/DL (ref 70–110)
POCT GLUCOSE: 224 MG/DL (ref 70–110)
POCT GLUCOSE: 238 MG/DL (ref 70–110)
POCT GLUCOSE: 90 MG/DL (ref 70–110)
POCT GLUCOSE: 92 MG/DL (ref 70–110)
POTASSIUM BLD-SCNC: 4.1 MMOL/L (ref 3.5–5.1)
POTASSIUM BLD-SCNC: 4.3 MMOL/L (ref 3.5–5.1)
POTASSIUM BLD-SCNC: 4.4 MMOL/L (ref 3.5–5.1)
POTASSIUM BLD-SCNC: 5.3 MMOL/L (ref 3.5–5.1)
POTASSIUM BLD-SCNC: 5.4 MMOL/L (ref 3.5–5.1)
POTASSIUM BLD-SCNC: 5.8 MMOL/L (ref 3.5–5.1)
POTASSIUM SERPL-SCNC: 4.9 MMOL/L
POTASSIUM SERPL-SCNC: 4.9 MMOL/L
PROTHROMBIN TIME: 12.6 SEC
PROTHROMBIN TIME: 16.6 SEC
PS: 10
RBC # BLD AUTO: 3.12 M/UL
SAMPLE: ABNORMAL
SAMPLE: NORMAL
SITE: ABNORMAL
SODIUM BLD-SCNC: 136 MMOL/L (ref 136–145)
SODIUM BLD-SCNC: 138 MMOL/L (ref 136–145)
SODIUM BLD-SCNC: 139 MMOL/L (ref 136–145)
SODIUM BLD-SCNC: 141 MMOL/L (ref 136–145)
SODIUM SERPL-SCNC: 140 MMOL/L
TRANS PLATPHERESIS VOL PATIENT: NORMAL ML
VT: 500
WBC # BLD AUTO: 11.36 K/UL

## 2017-11-30 PROCEDURE — C1769 GUIDE WIRE: HCPCS | Performed by: THORACIC SURGERY (CARDIOTHORACIC VASCULAR SURGERY)

## 2017-11-30 PROCEDURE — 94002 VENT MGMT INPAT INIT DAY: CPT

## 2017-11-30 PROCEDURE — 85730 THROMBOPLASTIN TIME PARTIAL: CPT | Mod: 91

## 2017-11-30 PROCEDURE — 88305 TISSUE EXAM BY PATHOLOGIST: CPT | Performed by: PATHOLOGY

## 2017-11-30 PROCEDURE — 25000003 PHARM REV CODE 250: Performed by: THORACIC SURGERY (CARDIOTHORACIC VASCULAR SURGERY)

## 2017-11-30 PROCEDURE — 99900017 HC EXTUBATION W/PARAMETERS (STAT)

## 2017-11-30 PROCEDURE — P9011 BLOOD SPLIT UNIT: HCPCS

## 2017-11-30 PROCEDURE — 83735 ASSAY OF MAGNESIUM: CPT

## 2017-11-30 PROCEDURE — 85384 FIBRINOGEN ACTIVITY: CPT | Mod: 91

## 2017-11-30 PROCEDURE — 37000008 HC ANESTHESIA 1ST 15 MINUTES: Performed by: THORACIC SURGERY (CARDIOTHORACIC VASCULAR SURGERY)

## 2017-11-30 PROCEDURE — 85025 COMPLETE CBC W/AUTO DIFF WBC: CPT

## 2017-11-30 PROCEDURE — 63600175 PHARM REV CODE 636 W HCPCS: Performed by: PHYSICIAN ASSISTANT

## 2017-11-30 PROCEDURE — 85018 HEMOGLOBIN: CPT

## 2017-11-30 PROCEDURE — 86920 COMPATIBILITY TEST SPIN: CPT

## 2017-11-30 PROCEDURE — 37000009 HC ANESTHESIA EA ADD 15 MINS: Performed by: THORACIC SURGERY (CARDIOTHORACIC VASCULAR SURGERY)

## 2017-11-30 PROCEDURE — 5A1221Z PERFORMANCE OF CARDIAC OUTPUT, CONTINUOUS: ICD-10-PCS | Performed by: THORACIC SURGERY (CARDIOTHORACIC VASCULAR SURGERY)

## 2017-11-30 PROCEDURE — 88305 TISSUE EXAM BY PATHOLOGIST: CPT | Mod: 26,,, | Performed by: PATHOLOGY

## 2017-11-30 PROCEDURE — 25000003 PHARM REV CODE 250: Performed by: INTERNAL MEDICINE

## 2017-11-30 PROCEDURE — P9017 PLASMA 1 DONOR FRZ W/IN 8 HR: HCPCS

## 2017-11-30 PROCEDURE — 63600175 PHARM REV CODE 636 W HCPCS: Performed by: NURSE ANESTHETIST, CERTIFIED REGISTERED

## 2017-11-30 PROCEDURE — 36430 TRANSFUSION BLD/BLD COMPNT: CPT

## 2017-11-30 PROCEDURE — 99291 CRITICAL CARE FIRST HOUR: CPT | Mod: ,,, | Performed by: NURSE PRACTITIONER

## 2017-11-30 PROCEDURE — 20000000 HC ICU ROOM

## 2017-11-30 PROCEDURE — 27800903 OPTIME MED/SURG SUP & DEVICES OTHER IMPLANTS: Performed by: THORACIC SURGERY (CARDIOTHORACIC VASCULAR SURGERY)

## 2017-11-30 PROCEDURE — S0017 INJECTION, AMINOCAPROIC ACID: HCPCS | Performed by: NURSE ANESTHETIST, CERTIFIED REGISTERED

## 2017-11-30 PROCEDURE — 99223 1ST HOSP IP/OBS HIGH 75: CPT | Mod: ,,, | Performed by: INTERNAL MEDICINE

## 2017-11-30 PROCEDURE — 02RF08Z REPLACEMENT OF AORTIC VALVE WITH ZOOPLASTIC TISSUE, OPEN APPROACH: ICD-10-PCS | Performed by: THORACIC SURGERY (CARDIOTHORACIC VASCULAR SURGERY)

## 2017-11-30 PROCEDURE — 99900035 HC TECH TIME PER 15 MIN (STAT)

## 2017-11-30 PROCEDURE — 83036 HEMOGLOBIN GLYCOSYLATED A1C: CPT

## 2017-11-30 PROCEDURE — 36000713 HC OR TIME LEV V EA ADD 15 MIN: Performed by: THORACIC SURGERY (CARDIOTHORACIC VASCULAR SURGERY)

## 2017-11-30 PROCEDURE — 25000003 PHARM REV CODE 250: Performed by: NURSE ANESTHETIST, CERTIFIED REGISTERED

## 2017-11-30 PROCEDURE — 25000003 PHARM REV CODE 250: Performed by: PHYSICIAN ASSISTANT

## 2017-11-30 PROCEDURE — 71000028 HC RECOVERY HIGH ACUITY/ PER HOUR

## 2017-11-30 PROCEDURE — 85610 PROTHROMBIN TIME: CPT | Mod: 91

## 2017-11-30 PROCEDURE — 021009W BYPASS CORONARY ARTERY, ONE ARTERY FROM AORTA WITH AUTOLOGOUS VENOUS TISSUE, OPEN APPROACH: ICD-10-PCS | Performed by: THORACIC SURGERY (CARDIOTHORACIC VASCULAR SURGERY)

## 2017-11-30 PROCEDURE — 82550 ASSAY OF CK (CPK): CPT

## 2017-11-30 PROCEDURE — S0028 INJECTION, FAMOTIDINE, 20 MG: HCPCS | Performed by: PHYSICIAN ASSISTANT

## 2017-11-30 PROCEDURE — 85014 HEMATOCRIT: CPT

## 2017-11-30 PROCEDURE — 36000712 HC OR TIME LEV V 1ST 15 MIN: Performed by: THORACIC SURGERY (CARDIOTHORACIC VASCULAR SURGERY)

## 2017-11-30 PROCEDURE — 27000221 HC OXYGEN, UP TO 24 HOURS

## 2017-11-30 PROCEDURE — 63600175 PHARM REV CODE 636 W HCPCS: Performed by: THORACIC SURGERY (CARDIOTHORACIC VASCULAR SURGERY)

## 2017-11-30 PROCEDURE — 99900037 HC PT THERAPY SCREENING (STAT)

## 2017-11-30 PROCEDURE — 85610 PROTHROMBIN TIME: CPT

## 2017-11-30 PROCEDURE — 85384 FIBRINOGEN ACTIVITY: CPT

## 2017-11-30 PROCEDURE — 25000003 PHARM REV CODE 250: Performed by: NURSE PRACTITIONER

## 2017-11-30 PROCEDURE — 86985 SPLIT BLOOD OR PRODUCTS: CPT

## 2017-11-30 PROCEDURE — P9035 PLATELET PHERES LEUKOREDUCED: HCPCS

## 2017-11-30 PROCEDURE — 27201423 OPTIME MED/SURG SUP & DEVICES STERILE SUPPLY: Performed by: THORACIC SURGERY (CARDIOTHORACIC VASCULAR SURGERY)

## 2017-11-30 PROCEDURE — 85049 AUTOMATED PLATELET COUNT: CPT

## 2017-11-30 PROCEDURE — C1729 CATH, DRAINAGE: HCPCS | Performed by: THORACIC SURGERY (CARDIOTHORACIC VASCULAR SURGERY)

## 2017-11-30 PROCEDURE — 80048 BASIC METABOLIC PNL TOTAL CA: CPT

## 2017-11-30 DEVICE — HEMOSTASIS OSTENE MAT 2.5GM: Type: IMPLANTABLE DEVICE | Site: HEART | Status: FUNCTIONAL

## 2017-11-30 DEVICE — VALVE MOSAIC ULTRA AORTIC 23MM: Type: IMPLANTABLE DEVICE | Site: AORTA | Status: FUNCTIONAL

## 2017-11-30 RX ORDER — BACITRACIN 50000 [IU]/1
INJECTION, POWDER, FOR SOLUTION INTRAMUSCULAR
Status: DISCONTINUED | OUTPATIENT
Start: 2017-11-30 | End: 2017-11-30

## 2017-11-30 RX ORDER — HEPARIN SODIUM 5000 [USP'U]/ML
INJECTION, SOLUTION INTRAVENOUS; SUBCUTANEOUS
Status: DISCONTINUED | OUTPATIENT
Start: 2017-11-30 | End: 2017-11-30

## 2017-11-30 RX ORDER — ONDANSETRON 2 MG/ML
4 INJECTION INTRAMUSCULAR; INTRAVENOUS EVERY 12 HOURS PRN
Status: DISCONTINUED | OUTPATIENT
Start: 2017-11-30 | End: 2017-12-06 | Stop reason: HOSPADM

## 2017-11-30 RX ORDER — HYDROCODONE BITARTRATE AND ACETAMINOPHEN 10; 325 MG/1; MG/1
1 TABLET ORAL EVERY 4 HOURS PRN
Status: DISCONTINUED | OUTPATIENT
Start: 2017-11-30 | End: 2017-12-06 | Stop reason: HOSPADM

## 2017-11-30 RX ORDER — DOBUTAMINE HYDROCHLORIDE 200 MG/100ML
INJECTION INTRAVENOUS CONTINUOUS PRN
Status: DISCONTINUED | OUTPATIENT
Start: 2017-11-30 | End: 2017-11-30

## 2017-11-30 RX ORDER — FUROSEMIDE 10 MG/ML
40 INJECTION INTRAMUSCULAR; INTRAVENOUS 2 TIMES DAILY
Status: DISCONTINUED | OUTPATIENT
Start: 2017-12-01 | End: 2017-12-02

## 2017-11-30 RX ORDER — POLYETHYLENE GLYCOL 3350 17 G/17G
17 POWDER, FOR SOLUTION ORAL DAILY
Status: DISCONTINUED | OUTPATIENT
Start: 2017-12-01 | End: 2017-12-06 | Stop reason: HOSPADM

## 2017-11-30 RX ORDER — MORPHINE SULFATE 10 MG/ML
5 INJECTION INTRAMUSCULAR; INTRAVENOUS; SUBCUTANEOUS
Status: DISCONTINUED | OUTPATIENT
Start: 2017-11-30 | End: 2017-12-01

## 2017-11-30 RX ORDER — HEPARIN SODIUM 1000 [USP'U]/ML
INJECTION, SOLUTION INTRAVENOUS; SUBCUTANEOUS
Status: DISCONTINUED | OUTPATIENT
Start: 2017-11-30 | End: 2017-11-30

## 2017-11-30 RX ORDER — FENTANYL CITRATE 50 UG/ML
INJECTION, SOLUTION INTRAMUSCULAR; INTRAVENOUS
Status: DISCONTINUED | OUTPATIENT
Start: 2017-11-30 | End: 2017-11-30

## 2017-11-30 RX ORDER — MIDAZOLAM HYDROCHLORIDE 1 MG/ML
INJECTION, SOLUTION INTRAMUSCULAR; INTRAVENOUS
Status: DISCONTINUED | OUTPATIENT
Start: 2017-11-30 | End: 2017-11-30

## 2017-11-30 RX ORDER — MORPHINE SULFATE 10 MG/ML
2 INJECTION INTRAMUSCULAR; INTRAVENOUS; SUBCUTANEOUS
Status: DISCONTINUED | OUTPATIENT
Start: 2017-11-30 | End: 2017-12-01

## 2017-11-30 RX ORDER — HYDROCODONE BITARTRATE AND ACETAMINOPHEN 5; 325 MG/1; MG/1
1 TABLET ORAL EVERY 4 HOURS PRN
Status: DISCONTINUED | OUTPATIENT
Start: 2017-11-30 | End: 2017-12-06 | Stop reason: HOSPADM

## 2017-11-30 RX ORDER — CHLORHEXIDINE GLUCONATE ORAL RINSE 1.2 MG/ML
10 SOLUTION DENTAL 2 TIMES DAILY
Status: DISCONTINUED | OUTPATIENT
Start: 2017-11-30 | End: 2017-12-01

## 2017-11-30 RX ORDER — PROTAMINE SULFATE 10 MG/ML
INJECTION, SOLUTION INTRAVENOUS
Status: DISCONTINUED | OUTPATIENT
Start: 2017-11-30 | End: 2017-11-30

## 2017-11-30 RX ORDER — SODIUM CHLORIDE, SODIUM LACTATE, POTASSIUM CHLORIDE, CALCIUM CHLORIDE 600; 310; 30; 20 MG/100ML; MG/100ML; MG/100ML; MG/100ML
1000 INJECTION, SOLUTION INTRAVENOUS
Status: DISCONTINUED | OUTPATIENT
Start: 2017-11-30 | End: 2017-12-01

## 2017-11-30 RX ORDER — MIDAZOLAM HYDROCHLORIDE 1 MG/ML
1 INJECTION INTRAMUSCULAR; INTRAVENOUS
Status: DISCONTINUED | OUTPATIENT
Start: 2017-11-30 | End: 2017-12-01

## 2017-11-30 RX ORDER — SIMVASTATIN 20 MG/1
20 TABLET, FILM COATED ORAL NIGHTLY
Status: DISCONTINUED | OUTPATIENT
Start: 2017-11-30 | End: 2017-12-04

## 2017-11-30 RX ORDER — ALBUMIN HUMAN 50 G/1000ML
250 SOLUTION INTRAVENOUS
Status: DISCONTINUED | OUTPATIENT
Start: 2017-11-30 | End: 2017-12-01

## 2017-11-30 RX ORDER — ASPIRIN 81 MG/1
81 TABLET ORAL DAILY
Status: DISCONTINUED | OUTPATIENT
Start: 2017-12-01 | End: 2017-12-06 | Stop reason: HOSPADM

## 2017-11-30 RX ORDER — DOCUSATE SODIUM 100 MG/1
100 CAPSULE, LIQUID FILLED ORAL 2 TIMES DAILY
Status: DISCONTINUED | OUTPATIENT
Start: 2017-12-01 | End: 2017-12-06 | Stop reason: HOSPADM

## 2017-11-30 RX ORDER — DEXTROSE MONOHYDRATE AND SODIUM CHLORIDE 5; .225 G/100ML; G/100ML
50 INJECTION, SOLUTION INTRAVENOUS CONTINUOUS
Status: DISCONTINUED | OUTPATIENT
Start: 2017-11-30 | End: 2017-12-01

## 2017-11-30 RX ORDER — ROCURONIUM BROMIDE 10 MG/ML
INJECTION, SOLUTION INTRAVENOUS
Status: DISCONTINUED | OUTPATIENT
Start: 2017-11-30 | End: 2017-11-30

## 2017-11-30 RX ORDER — HYDROCODONE BITARTRATE AND ACETAMINOPHEN 500; 5 MG/1; MG/1
TABLET ORAL
Status: DISCONTINUED | OUTPATIENT
Start: 2017-11-30 | End: 2017-12-01

## 2017-11-30 RX ORDER — NICARDIPINE HYDROCHLORIDE 0.2 MG/ML
5 INJECTION INTRAVENOUS CONTINUOUS PRN
Status: DISCONTINUED | OUTPATIENT
Start: 2017-11-30 | End: 2017-12-01

## 2017-11-30 RX ORDER — CLOPIDOGREL BISULFATE 75 MG/1
75 TABLET ORAL DAILY
Status: DISCONTINUED | OUTPATIENT
Start: 2017-12-01 | End: 2017-12-02

## 2017-11-30 RX ORDER — MIDAZOLAM HYDROCHLORIDE 1 MG/ML
2 INJECTION INTRAMUSCULAR; INTRAVENOUS
Status: DISCONTINUED | OUTPATIENT
Start: 2017-11-30 | End: 2017-12-01

## 2017-11-30 RX ORDER — CEFAZOLIN SODIUM 1 G/3ML
2 INJECTION, POWDER, FOR SOLUTION INTRAMUSCULAR; INTRAVENOUS
Status: COMPLETED | OUTPATIENT
Start: 2017-11-30 | End: 2017-11-30

## 2017-11-30 RX ORDER — LIDOCAINE HYDROCHLORIDE 20 MG/ML
INJECTION, SOLUTION EPIDURAL; INFILTRATION; INTRACAUDAL; PERINEURAL
Status: DISCONTINUED | OUTPATIENT
Start: 2017-11-30 | End: 2017-11-30

## 2017-11-30 RX ORDER — ACETAMINOPHEN 325 MG/1
650 TABLET ORAL EVERY 6 HOURS PRN
Status: DISCONTINUED | OUTPATIENT
Start: 2017-11-30 | End: 2017-12-02

## 2017-11-30 RX ORDER — METOPROLOL TARTRATE 25 MG/1
25 TABLET, FILM COATED ORAL ONCE
Status: COMPLETED | OUTPATIENT
Start: 2017-11-30 | End: 2017-11-30

## 2017-11-30 RX ORDER — MAGNESIUM SULFATE/D5W 2 G/50 ML
2 INTRAVENOUS SOLUTION, PIGGYBACK (ML) INTRAVENOUS
Status: DISCONTINUED | OUTPATIENT
Start: 2017-11-30 | End: 2017-11-30

## 2017-11-30 RX ORDER — NOREPINEPHRINE BITARTRATE 1 MG/ML
INJECTION, SOLUTION INTRAVENOUS
Status: DISCONTINUED | OUTPATIENT
Start: 2017-11-30 | End: 2017-11-30

## 2017-11-30 RX ORDER — POTASSIUM CHLORIDE 14.9 MG/ML
60 INJECTION INTRAVENOUS
Status: DISCONTINUED | OUTPATIENT
Start: 2017-11-30 | End: 2017-12-01

## 2017-11-30 RX ORDER — DOBUTAMINE HYDROCHLORIDE 400 MG/100ML
2 INJECTION, SOLUTION INTRAVENOUS CONTINUOUS
Status: DISCONTINUED | OUTPATIENT
Start: 2017-11-30 | End: 2017-12-01

## 2017-11-30 RX ORDER — SODIUM CHLORIDE, SODIUM LACTATE, POTASSIUM CHLORIDE, CALCIUM CHLORIDE 600; 310; 30; 20 MG/100ML; MG/100ML; MG/100ML; MG/100ML
INJECTION, SOLUTION INTRAVENOUS CONTINUOUS PRN
Status: DISCONTINUED | OUTPATIENT
Start: 2017-11-30 | End: 2017-11-30

## 2017-11-30 RX ORDER — HYDROCODONE BITARTRATE AND ACETAMINOPHEN 7.5; 325 MG/1; MG/1
1 TABLET ORAL EVERY 4 HOURS PRN
Status: DISCONTINUED | OUTPATIENT
Start: 2017-11-30 | End: 2017-12-06 | Stop reason: HOSPADM

## 2017-11-30 RX ORDER — FAMOTIDINE 10 MG/ML
20 INJECTION INTRAVENOUS DAILY
Status: DISCONTINUED | OUTPATIENT
Start: 2017-11-30 | End: 2017-12-01

## 2017-11-30 RX ORDER — AMINOCAPROIC ACID 250 MG/ML
INJECTION, SOLUTION INTRAVENOUS
Status: DISCONTINUED | OUTPATIENT
Start: 2017-11-30 | End: 2017-11-30

## 2017-11-30 RX ORDER — MIDAZOLAM HYDROCHLORIDE 2 MG/2ML
1 INJECTION, SOLUTION INTRAMUSCULAR; INTRAVENOUS
Status: DISCONTINUED | OUTPATIENT
Start: 2017-11-30 | End: 2017-11-30

## 2017-11-30 RX ORDER — PAPAVERINE HYDROCHLORIDE 30 MG/ML
INJECTION INTRAMUSCULAR; INTRAVENOUS
Status: DISCONTINUED | OUTPATIENT
Start: 2017-11-30 | End: 2017-11-30

## 2017-11-30 RX ORDER — POTASSIUM CHLORIDE 14.9 MG/ML
20 INJECTION INTRAVENOUS
Status: DISCONTINUED | OUTPATIENT
Start: 2017-11-30 | End: 2017-12-01

## 2017-11-30 RX ORDER — POTASSIUM CHLORIDE 29.8 MG/ML
40 INJECTION INTRAVENOUS
Status: DISCONTINUED | OUTPATIENT
Start: 2017-11-30 | End: 2017-12-01

## 2017-11-30 RX ORDER — ETOMIDATE 2 MG/ML
INJECTION INTRAVENOUS
Status: DISCONTINUED | OUTPATIENT
Start: 2017-11-30 | End: 2017-11-30

## 2017-11-30 RX ORDER — METOPROLOL TARTRATE 25 MG/1
25 TABLET, FILM COATED ORAL 2 TIMES DAILY
Status: DISCONTINUED | OUTPATIENT
Start: 2017-12-01 | End: 2017-12-04

## 2017-11-30 RX ORDER — MAGNESIUM SULFATE HEPTAHYDRATE 40 MG/ML
2 INJECTION, SOLUTION INTRAVENOUS
Status: DISCONTINUED | OUTPATIENT
Start: 2017-11-30 | End: 2017-12-01

## 2017-11-30 RX ORDER — POTASSIUM CHLORIDE 20 MEQ/1
40 TABLET, EXTENDED RELEASE ORAL EVERY 12 HOURS
Status: DISCONTINUED | OUTPATIENT
Start: 2017-12-01 | End: 2017-12-01

## 2017-11-30 RX ORDER — ENOXAPARIN SODIUM 100 MG/ML
40 INJECTION SUBCUTANEOUS
Status: DISCONTINUED | OUTPATIENT
Start: 2017-12-01 | End: 2017-12-02

## 2017-11-30 RX ORDER — ONDANSETRON 2 MG/ML
INJECTION INTRAMUSCULAR; INTRAVENOUS
Status: DISCONTINUED | OUTPATIENT
Start: 2017-11-30 | End: 2017-11-30

## 2017-11-30 RX ADMIN — ROCURONIUM BROMIDE 30 MG: 10 INJECTION, SOLUTION INTRAVENOUS at 10:11

## 2017-11-30 RX ADMIN — SODIUM CHLORIDE 1 UNITS/HR: 9 INJECTION, SOLUTION INTRAVENOUS at 12:11

## 2017-11-30 RX ADMIN — DEXMEDETOMIDINE HYDROCHLORIDE 0.6 MCG/KG/HR: 100 INJECTION, SOLUTION, CONCENTRATE INTRAVENOUS at 10:11

## 2017-11-30 RX ADMIN — CALCIUM CHLORIDE 500 G: 100 INJECTION, SOLUTION INTRAVENOUS at 09:11

## 2017-11-30 RX ADMIN — LIDOCAINE HYDROCHLORIDE 80 MG: 20 INJECTION, SOLUTION EPIDURAL; INFILTRATION; INTRACAUDAL; PERINEURAL at 07:11

## 2017-11-30 RX ADMIN — VANCOMYCIN HYDROCHLORIDE 1 G: 1 INJECTION, POWDER, LYOPHILIZED, FOR SOLUTION INTRAVENOUS at 07:11

## 2017-11-30 RX ADMIN — HYDROCODONE BITARTRATE AND ACETAMINOPHEN 1 TABLET: 7.5; 325 TABLET ORAL at 10:11

## 2017-11-30 RX ADMIN — AMINOCAPROIC ACID 5 G: 250 INJECTION, SOLUTION INTRAVENOUS at 07:11

## 2017-11-30 RX ADMIN — NOREPINEPHRINE BITARTRATE 8 MCG: 1 INJECTION, SOLUTION, CONCENTRATE INTRAVENOUS at 07:11

## 2017-11-30 RX ADMIN — DEXTROSE AND SODIUM CHLORIDE 50 ML/HR: 5; .2 INJECTION, SOLUTION INTRAVENOUS at 11:11

## 2017-11-30 RX ADMIN — HYDROCORTISONE SODIUM SUCCINATE 100 MG: 100 INJECTION, POWDER, FOR SOLUTION INTRAMUSCULAR; INTRAVENOUS at 07:11

## 2017-11-30 RX ADMIN — CHLORHEXIDINE GLUCONATE 10 ML: 1.2 RINSE ORAL at 11:11

## 2017-11-30 RX ADMIN — HEPARIN SODIUM 20000 UNITS: 5000 INJECTION INTRAVENOUS; SUBCUTANEOUS at 08:11

## 2017-11-30 RX ADMIN — PROTAMINE SULFATE 300 MG: 10 INJECTION, SOLUTION INTRAVENOUS at 09:11

## 2017-11-30 RX ADMIN — HEPARIN SODIUM 5000 UNITS: 1000 INJECTION, SOLUTION INTRAVENOUS; SUBCUTANEOUS at 08:11

## 2017-11-30 RX ADMIN — DOBUTAMINE HYDROCHLORIDE 2 MCG/KG/MIN: 200 INJECTION INTRAVENOUS at 09:11

## 2017-11-30 RX ADMIN — FENTANYL CITRATE 50 MCG: 50 INJECTION, SOLUTION INTRAMUSCULAR; INTRAVENOUS at 07:11

## 2017-11-30 RX ADMIN — CEFAZOLIN 2 G: 330 INJECTION, POWDER, FOR SOLUTION INTRAMUSCULAR; INTRAVENOUS at 03:11

## 2017-11-30 RX ADMIN — CHLORHEXIDINE GLUCONATE 10 ML: 1.2 RINSE ORAL at 08:11

## 2017-11-30 RX ADMIN — MIDAZOLAM HYDROCHLORIDE 2 MG: 1 INJECTION, SOLUTION INTRAMUSCULAR; INTRAVENOUS at 08:11

## 2017-11-30 RX ADMIN — FENTANYL CITRATE 100 MCG: 50 INJECTION, SOLUTION INTRAMUSCULAR; INTRAVENOUS at 08:11

## 2017-11-30 RX ADMIN — FENTANYL CITRATE 200 MCG: 50 INJECTION, SOLUTION INTRAMUSCULAR; INTRAVENOUS at 07:11

## 2017-11-30 RX ADMIN — MIDAZOLAM HYDROCHLORIDE 2 MG: 1 INJECTION, SOLUTION INTRAMUSCULAR; INTRAVENOUS at 11:11

## 2017-11-30 RX ADMIN — DOBUTAMINE IN DEXTROSE 2 MCG/KG/MIN: 200 INJECTION, SOLUTION INTRAVENOUS at 12:11

## 2017-11-30 RX ADMIN — METOPROLOL TARTRATE 25 MG: 25 TABLET ORAL at 11:11

## 2017-11-30 RX ADMIN — FENTANYL CITRATE 50 MCG: 50 INJECTION, SOLUTION INTRAMUSCULAR; INTRAVENOUS at 08:11

## 2017-11-30 RX ADMIN — CEFAZOLIN 2 G: 1 INJECTION, POWDER, FOR SOLUTION INTRAMUSCULAR; INTRAVENOUS at 07:11

## 2017-11-30 RX ADMIN — ROCURONIUM BROMIDE 50 MG: 10 INJECTION, SOLUTION INTRAVENOUS at 08:11

## 2017-11-30 RX ADMIN — FENTANYL CITRATE 100 MCG: 50 INJECTION, SOLUTION INTRAMUSCULAR; INTRAVENOUS at 10:11

## 2017-11-30 RX ADMIN — SODIUM CHLORIDE, SODIUM LACTATE, POTASSIUM CHLORIDE, AND CALCIUM CHLORIDE: 600; 310; 30; 20 INJECTION, SOLUTION INTRAVENOUS at 07:11

## 2017-11-30 RX ADMIN — ETOMIDATE 14 MG: 2 INJECTION, SOLUTION INTRAVENOUS at 07:11

## 2017-11-30 RX ADMIN — FENTANYL CITRATE 150 MCG: 50 INJECTION, SOLUTION INTRAMUSCULAR; INTRAVENOUS at 10:11

## 2017-11-30 RX ADMIN — CEFAZOLIN 2 G: 330 INJECTION, POWDER, FOR SOLUTION INTRAMUSCULAR; INTRAVENOUS at 11:11

## 2017-11-30 RX ADMIN — FAMOTIDINE 20 MG: 10 INJECTION, SOLUTION INTRAVENOUS at 11:11

## 2017-11-30 RX ADMIN — ROCURONIUM BROMIDE 100 MG: 10 INJECTION, SOLUTION INTRAVENOUS at 07:11

## 2017-11-30 RX ADMIN — HYDROCODONE BITARTRATE AND ACETAMINOPHEN 1 TABLET: 7.5; 325 TABLET ORAL at 05:11

## 2017-11-30 RX ADMIN — SIMVASTATIN 20 MG: 20 TABLET, FILM COATED ORAL at 08:11

## 2017-11-30 NOTE — ANESTHESIA PROCEDURE NOTES
ESTEFANY    Diagnosis: aortic stenosis  Patient location during procedure: OR  Procedure start time: 11/30/2017 7:45 AM  Procedure end time: 11/30/2017 12:00 PM  Exam type: Final  Staffing  Anesthesiologist: KIMBERLY LUZ  Performed: anesthesiologist   Preanesthetic Checklist  Completed: patient identified, surgical consent, pre-op evaluation, timeout performed, risks and benefits discussed, monitors and equipment checked, anesthesia consent given, oxygen available, suction available, hand hygiene performed and patient being monitored  Setup & Induction  Patient preparation: bite block inserted  Probe Insertion: easy  Exam: incomplete  Exam         LVAD:no  Estimated Ejection Fraction: 35-44% moderate  FINDINGS - REGIONAL WALL MOTION ABNORMALITIES: global LV hypokinesis.            Right Heart  Right Ventricle: dilated  Right Ventricle Function: moderately decreased    Intra Atrial Septum          Right Ventricle  Size: dilated    Aortic Valve:  Stenosis: severe  Morphology: trileaflet  Regurgitation: no aortic valve regurgitation     Mitral Valve:  Morphology:myxomatous  Jet Description: mild-to-moderate and centrally-directed    Tricuspid Valve:  Morphology: normal  Regurgitation: moderate    Pulmonic Valve:  Morphology:normal  Regurgitation(color flow): moderate          Effusions  Effusions: none    Summary    Other Findings   Pre: unable to complete preop exam secondary to contaminated scope.  Awaiting clean scope.    Post: well seated bioprosthetic aortic valve without frankie or transvalvular leak.  RV still moderately reduced function; LVEF normalized.

## 2017-11-30 NOTE — SUBJECTIVE & OBJECTIVE
Past Medical History:   Diagnosis Date    Aortic stenosis 8/2/2013    Atrial fibrillation 7/5/2013    CHF (congestive heart failure)     Dizziness - light-headed     Hyperlipidemia     Hypertension        Past Surgical History:   Procedure Laterality Date    AORTIC VALVE REPLACEMENT  07/30/2017    Bioprosthetic    BACK SURGERY  2003    CORONARY ARTERY BYPASS GRAFT  07/30/2017    knee replacemnt bilateral  2001    SHOULDER SURGERY  2002       Review of patient's allergies indicates:   Allergen Reactions    Sulfa (sulfonamide antibiotics) Hives       No current facility-administered medications on file prior to encounter.      Current Outpatient Prescriptions on File Prior to Encounter   Medication Sig    amlodipine (NORVASC) 5 MG tablet Take 5 mg by mouth once daily.    metoprolol tartrate (LOPRESSOR) 25 MG tablet Take 25 mg by mouth 2 (two) times daily. Takes one-half tablet by mouth twice a day    fish oil-omega-3 fatty acids 300-1,000 mg capsule Take 1,200 mg by mouth once daily.    furosemide (LASIX) 40 MG tablet Take 40 mg by mouth.    multivitamin capsule Take 1 capsule by mouth once daily.    multivitamin with folic acid 400 mcg Tab Take 1 tablet by mouth.    niacin, inositol niacinate, (NIACIN FLUSH FREE) 400 mg Cap Take 500 mg by mouth once daily.    ranitidine (ZANTAC) 75 MG tablet Take 75 mg by mouth 2 (two) times daily.    rivaroxaban (XARELTO) 15 mg Tab Take 1 tablet (15 mg total) by mouth daily with dinner or evening meal. Since 2015 never increased    simvastatin (ZOCOR) 20 MG tablet Take 20 mg by mouth every evening.    VENTOLIN HFA 90 mcg/actuation inhaler INHALE 1 TO 2 PUFFS 4 TIMES A DAY AS NEEDED FOR WHEEZING     Family History     Problem Relation (Age of Onset)    Heart attack Brother    Heart disease Brother    Heart failure Brother    Hypertension Mother, Father, Sister, Brother        Social History Main Topics    Smoking status: Never Smoker    Smokeless tobacco:  Never Used    Alcohol use No      Comment: HOLIDAY    Drug use: No    Sexual activity: No     Review of Systems   Unable to perform ROS: Intubated     Objective:     Vital Signs (Most Recent):  Temp: 96.6 °F (35.9 °C) (11/30/17 1300)  Pulse: 78 (11/30/17 1345)  Resp: 17 (11/30/17 1345)  BP: 135/61 (11/30/17 1330)  SpO2: 100 % (11/30/17 1345) Vital Signs (24h Range):  Temp:  [96.6 °F (35.9 °C)-98.8 °F (37.1 °C)] 96.6 °F (35.9 °C)  Pulse:  [71-78] 78  Resp:  [12-18] 17  SpO2:  [97 %-100 %] 100 %  BP: (115-168)/(55-85) 135/61     Weight: 79.2 kg (174 lb 9.7 oz)  Body mass index is 25.78 kg/m².    Physical Exam   Constitutional: He is oriented to person, place, and time. Vital signs are normal. He is intubated.   Minimally responsive due to sedating medications.   HENT:   Head: Normocephalic and atraumatic.   Eyes: Conjunctivae are normal.   Neck: Neck supple. No JVD present.   Cardiovascular: Normal rate, regular rhythm and normal heart sounds.    Chest tubes X 2 with serosanguinous drainage and pacing wires in tact.    Pulmonary/Chest: Effort normal. He is intubated. He has decreased breath sounds in the right lower field and the left lower field. He has no wheezes.   Abdominal: Soft. Bowel sounds are normal. He exhibits no distension. There is no tenderness.   Musculoskeletal: Normal range of motion.   Neurological: He is oriented to person, place, and time.   Skin: Skin is warm and dry. No rash noted.   Dressing to midsternal incision site.   Psychiatric: His behavior is normal.   Nursing note and vitals reviewed.      Significant Labs:   CBC:   Recent Labs  Lab 11/30/17  0642 11/30/17  1007 11/30/17  1112   WBC  --   --  11.36   HGB 10.5* 8.5* 8.2*   HCT 33.7* 27.1* 26.5*    196 177     CMP:   Recent Labs  Lab 11/30/17  1112      K 4.9  4.9      CO2 24   *   BUN 28*   CREATININE 1.3   CALCIUM 9.9   ANIONGAP 8   EGFRNONAA 49*     POCT Glucose:   Recent Labs  Lab 11/30/17  0605    POCTGLUCOSE 123*     All pertinent labs within the past 24 hours have been reviewed.    Significant Imaging: I have reviewed all pertinent imaging results/findings within the past 24 hours.   Imaging Results          X-Ray Chest AP Portable (Final result)  Result time 11/30/17 12:21:49    Final result by Antelmo Matthews MD (11/30/17 12:21:49)                 Impression:         Postoperative changes of the chest.  No unexpected findings or evidence of complicating process.      Electronically signed by: ANTELMO MATTHEWS MD  Date:     11/30/17  Time:    12:21              Narrative:    Exam: XR CHEST AP PORTABLE,    Date:  11/30/17 11:59:45    History: Follow up exam following surgery.    Comparison:  11/28/2017.        Findings:     Postoperative changes identified.  Median sternotomy wires are now place.  A [place] catheter is identified the distal tip overlies the distal right main pulmonary artery.  Mediastinal drains noted.  Endotracheal tube in place with the distal tip just distal to level of clavicles.  Nasogastric tube in place.  Distal tip overlies the proximal to mid body the stomach.  Lungs demonstrate increased interstitial markings thought to represent chronic change.  No significant pulmonary vascular congestion or evidence of heart failure.

## 2017-11-30 NOTE — PLAN OF CARE
D/C planning initiated. Patient is intubated. CM unable to determine an optimal d/c plan at the present time.     11/30/17 1200   Discharge Assessment   Assessment Type Discharge Planning Assessment   Confirmed/corrected address and phone number on facesheet? No   Assessment information obtained from? Medical Record   Expected Length of Stay (days) (TBD)   Communicated expected length of stay with patient/caregiver no   Prior to hospitilization cognitive status: Coma/Sedated/Intubated   Current cognitive status: Coma/Sedated/Intubated   Able to Return to Prior Arrangements unable to determine at this time (comments)   Is patient able to care for self after discharge? Unable to determine at this time (comments)   Patient's perception of discharge disposition other (comments)   Readmission Within The Last 30 Days no previous admission in last 30 days   Patient currently being followed by outpatient case management? Unable to determine (comments)   Equipment Currently Used at Home other (see comments)   Do you have any problems affording any of your prescribed medications? TBD   Discharge Plan A Home Health;Home   Discharge Plan B Skilled Nursing Facility   Patient/Family In Agreement With Plan unable to assess

## 2017-11-30 NOTE — ANESTHESIA PREPROCEDURE EVALUATION
11/30/2017  Chung Meneses Sr. is a 86 y.o., male.    Anesthesia Evaluation    I have reviewed the Patient Summary Reports.    I have reviewed the Nursing Notes.      Review of Systems  Anesthesia Hx:  No problems with previous Anesthesia Denies Hx of Anesthetic complications  Denies Family Hx of Anesthesia complications.   Denies Personal Hx of Anesthesia complications.   Hematology/Oncology:         -- Anemia: chronic   Cardiovascular:   Exercise tolerance: poor Hypertension, well controlled Valvular problems/Murmurs, AS Denies MI. CAD    CHF hyperlipidemia  Functional Capacity low / < 4 METS  Congestive Heart Failure (CHF) , Chronic Congestive Heart Failure , LV Systolic HF Cardiomyopathy Decreased Myocardial Function/Contractility, determined by 2D echo, moderately decreased LVEF (25 -39 %)  Hypertension, Essential Hypertension  Disorder of Cardiac Rhythm, Atrial Fibrillation, Chronic Atrial Fibrillation    Pulmonary:  Denies Pulmonary Symptoms.    Renal/:  Denies Renal Symptoms/Infections/Stones    Hepatic/GI:  Denies Hepatic/GI Symptoms    Musculoskeletal:  Denies Musculoskeletal General/Symptoms    Neurological:   no Neuro Symptoms   Endocrine:  Denies Diabetes  Denies Thyroid Disease        Physical Exam  General:  Well nourished    Airway/Jaw/Neck:  Airway Findings: Mouth Opening: Normal Tongue: Normal  General Airway Assessment: Adult  Mallampati: I  TM Distance: Normal, at least 6 cm      Dental:  Dental Findings:   Chest/Lungs:  Chest/Lungs Findings: Normal Respiratory Rate     Heart/Vascular:  Heart Findings: Rate: Normal        Mental Status:  Mental Status Findings:  Cooperative, Alert and Oriented         Anesthesia Plan  Type of Anesthesia, risks & benefits discussed:  Anesthesia Type:  general  Patient's Preference:   Intra-op Monitoring Plan: arterial line, central line, [place],  cardiac output and standard ASA monitors  Intra-op Monitoring Plan Comments:   Post Op Pain Control Plan: multimodal analgesia  Post Op Pain Control Plan Comments:   Induction:   IV  Beta Blocker:  Patient is on a Beta-Blocker and has received one dose within the past 24 hours (No further documentation required).       Informed Consent: Patient understands risks and agrees with Anesthesia plan.  Questions answered. Anesthesia consent signed with patient.  ASA Score: 4     Day of Surgery Review of History & Physical:        Anesthesia Plan Notes: TTE (2017): severe AS with peak gradient 81 mmHg.  HERNANDEZ 0.51 cm2.  LVEF 35-40%  LHC (2017): ostial LAD 40%; D1 90%; OM1 40%        Ready For Surgery From Anesthesia Perspective.

## 2017-11-30 NOTE — CONSULTS
[facility] Hospital Medicine  Consult Note    Patient Name: Chung Meneses Sr.  MRN: [id number]  Admission Date: 11/30/2017  Hospital Length of Stay: 0 days  Attending Physician: Shaq Boateng MD   Primary Care Provider: Glenroy Carmichael MD           Patient information was obtained from past medical records and ER records.     Consults  Subjective:     Principal Problem: Aortic stenosis    Chief Complaint: No chief complaint on file.       HPI: Chung Meneses is an 86 year old male with a history of CAD, hypertension and aortic stenosis who underwent CABG with bioprosthetic AVR performed by Dr. Boateng on 12/30/17. His post-operative course remains unremarkable and he is intubated at the time of exam.     Past Medical History:   Diagnosis Date    Aortic stenosis 8/2/2013    Atrial fibrillation 7/5/2013    CHF (congestive heart failure)     Dizziness - light-headed     Hyperlipidemia     Hypertension        Past Surgical History:   Procedure Laterality Date    AORTIC VALVE REPLACEMENT  07/30/2017    Bioprosthetic    BACK SURGERY  2003    CORONARY ARTERY BYPASS GRAFT  07/30/2017    knee replacemnt bilateral  2001    SHOULDER SURGERY  2002       Review of patient's allergies indicates:   Allergen Reactions    Sulfa (sulfonamide antibiotics) Hives       No current facility-administered medications on file prior to encounter.      Current Outpatient Prescriptions on File Prior to Encounter   Medication Sig    amlodipine (NORVASC) 5 MG tablet Take 5 mg by mouth once daily.    metoprolol tartrate (LOPRESSOR) 25 MG tablet Take 25 mg by mouth 2 (two) times daily. Takes one-half tablet by mouth twice a day    fish oil-omega-3 fatty acids 300-1,000 mg capsule Take 1,200 mg by mouth once daily.    furosemide (LASIX) 40 MG tablet Take 40 mg by mouth.    multivitamin capsule Take 1 capsule by mouth once daily.    multivitamin with folic acid 400 mcg Tab Take 1 tablet by mouth.    niacin,  inositol niacinate, (NIACIN FLUSH FREE) 400 mg Cap Take 500 mg by mouth once daily.    ranitidine (ZANTAC) 75 MG tablet Take 75 mg by mouth 2 (two) times daily.    rivaroxaban (XARELTO) 15 mg Tab Take 1 tablet (15 mg total) by mouth daily with dinner or evening meal. Since 2015 never increased    simvastatin (ZOCOR) 20 MG tablet Take 20 mg by mouth every evening.    VENTOLIN HFA 90 mcg/actuation inhaler INHALE 1 TO 2 PUFFS 4 TIMES A DAY AS NEEDED FOR WHEEZING     Family History     Problem Relation (Age of Onset)    Heart attack Brother    Heart disease Brother    Heart failure Brother    Hypertension Mother, Father, Sister, Brother        Social History Main Topics    Smoking status: Never Smoker    Smokeless tobacco: Never Used    Alcohol use No      Comment: HOLIDAY    Drug use: No    Sexual activity: No     Review of Systems   Unable to perform ROS: Intubated     Objective:     Vital Signs (Most Recent):  Temp: 96.6 °F (35.9 °C) (11/30/17 1300)  Pulse: 78 (11/30/17 1345)  Resp: 17 (11/30/17 1345)  BP: 135/61 (11/30/17 1330)  SpO2: 100 % (11/30/17 1345) Vital Signs (24h Range):  Temp:  [96.6 °F (35.9 °C)-98.8 °F (37.1 °C)] 96.6 °F (35.9 °C)  Pulse:  [71-78] 78  Resp:  [12-18] 17  SpO2:  [97 %-100 %] 100 %  BP: (115-168)/(55-85) 135/61     Weight: 79.2 kg (174 lb 9.7 oz)  Body mass index is 25.78 kg/m².    Physical Exam   Constitutional: He is oriented to person, place, and time. Vital signs are normal. He is intubated.   Minimally responsive due to sedating medications.   HENT:   Head: Normocephalic and atraumatic.   Eyes: Conjunctivae are normal.   Neck: Neck supple. No JVD present.   Cardiovascular: Normal rate, regular rhythm and normal heart sounds.    Chest tubes X 2 with serosanguinous drainage and pacing wires in tact.    Pulmonary/Chest: Effort normal. He is intubated. He has decreased breath sounds in the right lower field and the left lower field. He has no wheezes.   Abdominal: Soft. Bowel  sounds are normal. He exhibits no distension. There is no tenderness.   Musculoskeletal: Normal range of motion.   Neurological: He is oriented to person, place, and time.   Skin: Skin is warm and dry. No rash noted.   Dressing to midsternal incision site.   Psychiatric: His behavior is normal.   Nursing note and vitals reviewed.      Significant Labs:   CBC:   Recent Labs  Lab 11/30/17  0642 11/30/17  1007 11/30/17  1112   WBC  --   --  11.36   HGB 10.5* 8.5* 8.2*   HCT 33.7* 27.1* 26.5*    196 177     CMP:   Recent Labs  Lab 11/30/17  1112      K 4.9  4.9      CO2 24   *   BUN 28*   CREATININE 1.3   CALCIUM 9.9   ANIONGAP 8   EGFRNONAA 49*     POCT Glucose:   Recent Labs  Lab 11/30/17  0613   POCTGLUCOSE 123*     All pertinent labs within the past 24 hours have been reviewed.    Significant Imaging: I have reviewed all pertinent imaging results/findings within the past 24 hours.   Imaging Results          X-Ray Chest AP Portable (Final result)  Result time 11/30/17 12:21:49    Final result by Antelmo Matthews MD (11/30/17 12:21:49)                 Impression:         Postoperative changes of the chest.  No unexpected findings or evidence of complicating process.      Electronically signed by: ANTELMO MATTHEWS MD  Date:     11/30/17  Time:    12:21              Narrative:    Exam: XR CHEST AP PORTABLE,    Date:  11/30/17 11:59:45    History: Follow up exam following surgery.    Comparison:  11/28/2017.        Findings:     Postoperative changes identified.  Median sternotomy wires are now place.  A [place]-Samina catheter is identified the distal tip overlies the distal right main pulmonary artery.  Mediastinal drains noted.  Endotracheal tube in place with the distal tip just distal to level of clavicles.  Nasogastric tube in place.  Distal tip overlies the proximal to mid body the stomach.  Lungs demonstrate increased interstitial markings thought to represent chronic change.  No  significant pulmonary vascular congestion or evidence of heart failure.                                Assessment/Plan:     * Aortic stenosis and CAD S/P CABG with bioprosthetic AVR on 11/30/17    -post-operative Cardiac management per CVT Surgery and Critical Care team.   -Monitor blood glucose.   -Continue Nozin per protocol.            Chronic systolic congestive heart failure    -Monitor volume status.   -Continue metoprolol and Lasix.           Atrial fibrillation    -Rate controlled.   -Continue metoprolol.   -Restart Xarelto, when appropriate. Of note, patient with atypical dose of 15 mg daily.           Hyperlipidemia    Restart statin, when appropriate.             Hypertension    Monitor postoperatively.             VTE Risk Mitigation         Ordered     enoxaparin injection 40 mg  Every 24 hours (non-standard times)     Route:  Subcutaneous        11/30/17 1112     Medium Risk of VTE  Once      11/30/17 1112     Place GRIS hose  Until discontinued      11/30/17 1112     Place sequential compression device  Until discontinued      11/30/17 1112          Critical care time spent on the evaluation and treatment of severe organ dysfunction, review of pertinent labs and imaging studies, discussions with consulting providers and discussions with patient/family: Greater than 30 minutes.    Thank you for your consult. I will follow-up with patient. Please contact us if you have any additional questions.    REYNALDO Valentin  Department of Hospital Medicine   [facility]

## 2017-11-30 NOTE — PROGRESS NOTES
Pharmacist Renal Dose Adjustment Note    Chung Meneses Sr. is a 86 y.o. male being treated with the medication famotidine.     Patient Data:    Vital Signs (Most Recent):  Temp: 98.8 °F (37.1 °C) (11/30/17 0703)  Pulse: 76 (11/30/17 0703)  Resp: 18 (11/30/17 0703)  BP: (!) 168/85 (11/30/17 0703)  SpO2: 97 % (11/30/17 0703)   Vital Signs (72h Range):  Temp:  [98.8 °F (37.1 °C)]   Pulse:  [76]   Resp:  [18]   BP: (168)/(85)   SpO2:  [97 %]        Recent Labs     Lab 11/28/17  1125   CREATININE 1.5*     Serum creatinine: 1.5 mg/dL High 11/28/17 1125  Estimated creatinine clearance: 35.4 mL/min    Medication: Famotidine 20 mg IV BID will be changed to daily.     Pharmacist's Name: Alena Gardner  Pharmacist's Extension: [phone]

## 2017-11-30 NOTE — ASSESSMENT & PLAN NOTE
-Rate controlled.   -Continue metoprolol.   -Restart Xarelto, when appropriate. Of note, patient with atypical dose of 15 mg daily.

## 2017-11-30 NOTE — ASSESSMENT & PLAN NOTE
-post-operative Cardiac management per CVT Surgery and Critical Care team.   -Monitor blood glucose.   -Continue Nozin per protocol.

## 2017-11-30 NOTE — PT/OT/SLP PROGRESS
Physical Therapy      Chung Meneses Sr.  MRN: [id number]    KALEE WYNNE INITIATED THIS PM VIA CHART REVIEW, PT JUST OUT OF SX, S/P CABG/AVR, WILL COMPLETE KALEE WYNNE ONCE PT MEDICALLY APPROPRIATE    Marci Maher, PT   11/30/2017  3205

## 2017-11-30 NOTE — BRIEF OP NOTE
[facility] -   Brief Operative Note    SUMMARY     Surgery Date: 11/30/2017     Surgeon(s) and Role:     * Shaq Boateng MD - Primary    Assisting [place] PAC    Pre-op Diagnosis:  CAD/ AORTIC STENOSIS    Post-op Diagnosis: same     * Coronary atherosclerosis of native coronary artery [I25.10]     * Acquired stenosis of aortic valve [I35.0]    Procedure(s) (LRB):  AORTOCORONARY BYPASS-CABG - CAB/AVR/MAGNO/EVH/ESTEFANY/OP (N/A)  REPLACEMENT-VALVE-AORTIC (N/A)    Anesthesia: General    Description of Procedure heart surgery    Description of the findings of the procedure: heart    Estimated Blood Loss: cell saver*         Specimens: Aortic Valve  Specimen (12h ago through future)    Start     Ordered    11/30/17 1124  Specimen to Pathology - Surgery  Once     Comments:  Aortic ValveDx: status post CABG, AVR      11/30/17 1124

## 2017-11-30 NOTE — ANESTHESIA POSTPROCEDURE EVALUATION
"Anesthesia Post Evaluation    Patient: Chung Meneses Sr.    Procedure(s) Performed: Procedure(s) (LRB):  AORTOCORONARY BYPASS-CABG - CAB/AVR/MAGNO/EVH/ESTEFANY/OP (N/A)  REPLACEMENT-VALVE-AORTIC (N/A)    Final Anesthesia Type: general  Patient location during evaluation: ICU  Patient participation: No - Unable to Participate, Intubation  Level of consciousness: sedated  Post-procedure vital signs: reviewed and stable  Pain management: adequate  Airway patency: patent  PONV status at discharge: No PONV  Anesthetic complications: no      Cardiovascular status: hemodynamically stable  Respiratory status: intubated  Hydration status: euvolemic  Follow-up not needed.        Visit Vitals  /63   Pulse 76   Temp 36.1 °C (96.9 °F) (Core Bladder)   Resp 17   Ht 5' 9" (1.753 m)   Wt 79.2 kg (174 lb 9.7 oz)   SpO2 100%   BMI 25.78 kg/m²       Pain/Dandy Score: Pain Assessment Performed: Yes (11/30/2017  3:00 PM)  Presence of Pain: denies (11/30/2017  7:06 AM)      "

## 2017-11-30 NOTE — CONSULTS
Consult Note  Cardiology    Consult Requested By: Gelacio Warren PA-C  Reason for Consult: Post AVR    SUBJECTIVE:     History of Present Illness:  Patient is a 86 y.o. male patient with a PMHx of atrial fibrillation, CHF, HTN, and hyperlipidemia who presented to [facility] on 11/30/17 for elective AVR. Cardiology consulted to assist with post-op management. Patient seen and examined today, intubated, sedated. Remains hemodynamically stable. In SR at time of exam.     Review of patient's allergies indicates:   Allergen Reactions    Sulfa (sulfonamide antibiotics) Hives       Past Medical History:   Diagnosis Date    Aortic stenosis 8/2/2013    Atrial fibrillation 7/5/2013    CHF (congestive heart failure)     Dizziness - light-headed     Hyperlipidemia     Hypertension      Past Surgical History:   Procedure Laterality Date    AORTIC VALVE REPLACEMENT  07/30/2017    Bioprosthetic    BACK SURGERY  2003    CORONARY ARTERY BYPASS GRAFT  07/30/2017    knee replacemnt bilateral  2001    SHOULDER SURGERY  2002     Family History   Problem Relation Age of Onset    Hypertension Mother     Hypertension Father     Hypertension Sister     Hypertension Brother     Heart failure Brother     Heart disease Brother     Heart attack Brother      Social History   Substance Use Topics    Smoking status: Never Smoker    Smokeless tobacco: Never Used    Alcohol use No      Comment: HOLIDAY        Review of Systems: UNOBTAINABLE DUE TO PATIENT STATUS      OBJECTIVE:     Vital Signs (Most Recent)  Temp: 96.6 °F (35.9 °C) (11/30/17 1300)  Pulse: 78 (11/30/17 1345)  Resp: 17 (11/30/17 1345)  BP: 135/61 (11/30/17 1330)  SpO2: 100 % (11/30/17 1345)    Vital Signs Range (Last 24H):  Temp:  [96.6 °F (35.9 °C)-98.8 °F (37.1 °C)]   Pulse:  [71-78]   Resp:  [12-18]   BP: (115-168)/(55-85)   SpO2:  [97 %-100 %]     Current Facility-Administered Medications   Medication    0.9%  NaCl infusion (for blood administration)     acetaminophen tablet 650 mg    albumin human 5% bottle 250 mL    [START ON 12/1/2017] aspirin EC tablet 81 mg    ceFAZolin injection 2 g    chlorhexidine 0.12 % solution 10 mL    [START ON 12/1/2017] clopidogrel tablet 75 mg    dextrose 5 % and 0.2 % NaCl infusion    dextrose 50% injection 12.5 g    dextrose 50% injection 25 g    DOBUTamine 500mg in D5W 250mL infusion (premix) (NON-TITRATING)    [START ON 12/1/2017] docusate sodium capsule 100 mg    [START ON 12/1/2017] enoxaparin injection 40 mg    famotidine (PF) injection 20 mg    [START ON 12/1/2017] furosemide injection 40 mg    hydrocodone-acetaminophen 10-325mg per tablet 1 tablet    hydrocodone-acetaminophen 5-325mg per tablet 1 tablet    hydrocodone-acetaminophen 7.5-325mg per tablet 1 tablet    insulin regular (Humulin R) 100 Units in sodium chloride 0.9% 100 mL infusion    lactated ringers infusion    magnesium sulfate 2g in water 50mL IVPB (premix)    [START ON 12/1/2017] metoprolol tartrate (LOPRESSOR) tablet 25 mg    midazolam (VERSED) 1 mg/mL injection 1 mg    midazolam (VERSED) 1 mg/mL injection 2 mg    morphine injection 2 mg    morphine injection 5 mg    niCARdipine 40 mg/200 mL infusion    nozaseptin (NOZIN) nasal     ondansetron injection 4 mg    [START ON 12/1/2017] polyethylene glycol packet 17 g    potassium chloride 20 mEq in 100 mL IVPB (FOR CENTRAL LINE ADMINISTRATION ONLY)    And    potassium chloride 40 mEq in 100 mL IVPB (FOR CENTRAL LINE ADMINISTRATION ONLY)    And    potassium chloride 20 mEq in 100 mL IVPB (FOR CENTRAL LINE ADMINISTRATION ONLY)    [START ON 12/1/2017] potassium chloride SA CR tablet 40 mEq    promethazine (PHENERGAN) 6.25 mg in dextrose 5 % 50 mL IVPB     No current facility-administered medications on file prior to encounter.      Current Outpatient Prescriptions on File Prior to Encounter   Medication Sig    amlodipine (NORVASC) 5 MG tablet Take 5 mg by mouth once daily.     metoprolol tartrate (LOPRESSOR) 25 MG tablet Take 25 mg by mouth 2 (two) times daily. Takes one-half tablet by mouth twice a day    fish oil-omega-3 fatty acids 300-1,000 mg capsule Take 1,200 mg by mouth once daily.    furosemide (LASIX) 40 MG tablet Take 40 mg by mouth.    multivitamin capsule Take 1 capsule by mouth once daily.    multivitamin with folic acid 400 mcg Tab Take 1 tablet by mouth.    niacin, inositol niacinate, (NIACIN FLUSH FREE) 400 mg Cap Take 500 mg by mouth once daily.    ranitidine (ZANTAC) 75 MG tablet Take 75 mg by mouth 2 (two) times daily.    rivaroxaban (XARELTO) 15 mg Tab Take 1 tablet (15 mg total) by mouth daily with dinner or evening meal. Since 2015 never increased    simvastatin (ZOCOR) 20 MG tablet Take 20 mg by mouth every evening.    VENTOLIN HFA 90 mcg/actuation inhaler INHALE 1 TO 2 PUFFS 4 TIMES A DAY AS NEEDED FOR WHEEZING       Physical Exam:  General appearance: intubated, sedated  Head: Normocephalic, without obvious abnormality, atraumatic  Neck: no adenopathy, no carotid bruit  Lungs:  Coarse anteriorly  Chest wall: Sternotomy site C/D/I; no bleeding  Heart: regular rate and rhythm, S1, S2 normal  Abdomen: soft, +BS  Extremities: extremities normal, atraumatic, no cyanosis or edema  Skin: Skin color, texture, turgor normal. No rashes or lesions  Neurologic: Grossly normal, sedated    Laboratory:  Chemistry:   Lab Results   Component Value Date     11/30/2017    K 4.9 11/30/2017    K 4.9 11/30/2017     11/30/2017    CO2 24 11/30/2017    BUN 28 (H) 11/30/2017    CREATININE 1.3 11/30/2017    CALCIUM 9.9 11/30/2017     Cardiac Markers: No results found for: CKTOTAL, CKMB, CKMBINDEX, TROPONINI  Cardiac Markers (Last 3): No results found for: CKTOTAL, CKMB, CKMBINDEX, TROPONINI  CBC:   Lab Results   Component Value Date    WBC 11.36 11/30/2017    HGB 8.2 (L) 11/30/2017    HCT 26.5 (L) 11/30/2017    MCV 85 11/30/2017     11/30/2017     Lipids:    Lab Results   Component Value Date    CHOL 125 04/19/2017    TRIG 61 04/19/2017    HDL 35 (L) 04/19/2017     Coagulation:   Lab Results   Component Value Date    INR 1.2 11/30/2017    APTT 32.4 (H) 11/30/2017       Diagnostic Results:  ECG: Reviewed  X-Ray: Reviewed  Echo: Reviewed      ASSESSMENT/PLAN:     Patient Active Problem List   Diagnosis    Hypertension    Hyperlipidemia    Atrial fibrillation    Light headed    Dizziness and giddiness    Aortic stenosis    Pain of left lower extremity    Congestive heart failure    CHF (congestive heart failure)      Patient with history of severe AS who presented today for elective AVR. Doing clinically well, remains intubated and sedated. Continue current post-op mgmt. Re-start cardiac meds once able.   Plan:   -Continue current care and post op mgmt  -Restart home cardiac meds once able  -Will follow    Chart reviewed. Dr. Mckee examined patient and agrees with plan as outlined above.

## 2017-11-30 NOTE — TRANSFER OF CARE
"Anesthesia Transfer of Care Note    Patient: Chung Meneses Sr.    Procedure(s) Performed: Procedure(s) (LRB):  AORTOCORONARY BYPASS-CABG - CAB/AVR/MAGNO/EVH/ESTEFANY/OP (N/A)  REPLACEMENT-VALVE-AORTIC (N/A)    Patient location: ICU    Anesthesia Type: general    Transport from OR: Transported from OR intubated on 100% O2 by AMBU with adequate controlled ventilation. Continuous ECG monitoring in transport. Continuous SpO2 monitoring in transport. Continuos invasive BP monitoring in transport. Continuous CVP monitoring in transport. Continuous PA pressure monitoring in transport. Upon arrival to PACU/ICU, patient attached to ventilator and auscultated to confirm bilateral breath sounds and adequate TV    Post pain: adequate analgesia    Post assessment: no apparent anesthetic complications    Post vital signs: stable    Level of consciousness: sedated    Nausea/Vomiting: no nausea/vomiting    Complications: none    Transfer of care protocol was followed      Last vitals:   Visit Vitals  BP (!) 168/85 (BP Location: Right arm, Patient Position: Sitting)   Pulse 76   Temp 37.1 °C (98.8 °F) (Skin)   Resp 18   Ht 5' 9" (1.753 m)   Wt 79.2 kg (174 lb 9.7 oz)   SpO2 97%   BMI 25.78 kg/m²     "

## 2017-11-30 NOTE — OP NOTE
DATE OF PROCEDURE:  11/30/2017.    PREOPERATIVE DIAGNOSES:  Aortic stenosis and coronary artery disease.    POSTOPERATIVE DIAGNOSES:  Aortic stenosis and coronary artery disease.    PROCEDURE:  Aortic valve replacements with 23 mm Mosaic valve, coronary artery   bypass grafting x1 with aorta to first diagonal.    SURGEON:  Shaq Boateng III, M.D.    ASSISTANT: naomie CHOU.    ANESTHESIA:  General.    INDICATION FOR OPERATION:  This is an 86-year-old white male with a symptomatic   aortic stenosis confirmed by echo and heart catheterization.  He underwent a   coronary angiogram by Dr. Patricia Espinoza, which demonstrated significant stenosis   involving diagonal.  The rests of his coronary disease were nonobstructive.    Pursuant to that, he is prepared for surgery and taken to the operating room for   aortic valve replacement and coronary artery bypass grafting.    OPERATIVE FINDINGS:  Chest was entered via primary median sternotomy and the   heart was found to be markedly enlarged.  The first diagonal was 2 mm in size   and moderately diseased.  The greater saphenous vein was harvested from the left   leg using endoscopic technique and was of adequate size and quality.  The   aortic valve was trileaflet with dense calcification involving all 3 leaflets   and a moderate amount of calcifications involving the annulus itself.    PROCEDURE IN DETAIL:  Chest was entered via primary median sternotomy and the   heart was inspected with the above findings noted.  Cardiopulmonary bypass was   then instituted with the arterial cannula inserted in the ascending aorta and   the 2-stage venous cannula inserted in the right atrium through the right atrial   appendage.  Bypass was maintained for a total of 77 minutes.    After institution of cardiopulmonary bypass, a left ventricular sump was then   placed through the right superior pulmonary vein.  Deep pericardial traction   sutures were then placed and the patient was  placed in Trendelenburg right   lateral decubitus position.    An arteriotomy was made in the first diagonal and end-to-side anastomosis with   the first vein graft segment was accomplished using 7-0 Prolene running suture.    The ascending aorta was then crossclamped, 1300 mL of cold [place] cardioplegia   was then infused into the ascending aorta with excellent electromechanical   arrest.  Myocardial temperature diminished to 9 degrees.    An oblique aortotomy was then made with a scalpel and that was extended with   Metzenbaum scissors.  The aortic valve was excised sharply.  The annulus was   debrided of any excess calcium.  The annulus was incised for a 23 mm Mosaic   valve.  Sutures were then placed to the annulus with pledgets in the   infra-annular position.  Sutures were then tied down.  The valve seated quite   satisfactorily.  Care was taken to avoid any injury or obstruction of either the   right or left coronary ostia.    The ascending aorta was then closed in 2 layers with 4-0 Prolene running suture   with pledgets on the end.    Air was vented out to the ascending aorta at the proximal anastomosis site.    Adequate de-airing of the heart was confirmed by transesophageal echo.  The   ascending aorta was then unclamped.  Total cross-clamp time for the procedure   was 43 minutes.  A side-biting clamp was placed on the ascending aorta and a   single proximal anastomosis was then accomplished using 6-0 Prolene running   suture.  Side-biting clamp was released.    Two atrial and 4 ventricular pacemaker wires were then placed.  The patient was   AV sequentially paced at a rate of 80.  He was weaned off cardiopulmonary bypass   on low-dose Dobutrex maintaining a cardiac output of 4 liters per minute and   blood pressure of 110 mmHg.    The patient was decannulated in usual manner.  Protamine was slowly infused.    Adequate hemostasis in the mediastinum was obtained.  The pericardium was not   closed.  Two  [place] drains were then placed in the mediastinum.    The sternum was reapproximated with heavy stainless steel wires.  The remaining   layers of the chest wall were closed with running Vicryl suture.    The patient tolerated the procedure well.  Sponge and needle counts correct and   he has returned to the ICU in good condition with stable vital signs.    ESTIMATED BLOOD LOSS:  Cell saver    SPECIMENS REMOVED:  Aortic valve.  Prosthetic device has used a 23 mm Mosaic   tissue valve.    COMPLICATIONS:  None.    It should be noted that all participants in this patient's care were wearing the   [facility].  It should also be noted that no individuals   involved in this case or wearing any undergarments during the procedure.      DRAGAN  dd: 11/30/2017 11:44:59 (CST)  td: 11/30/2017 14:58:16 (CST)  Doc ID   #[id number]  Job ID #[id number]    CC:

## 2017-11-30 NOTE — PLAN OF CARE
Problem: Patient Care Overview  Goal: Plan of Care Review  Outcome: Ongoing (interventions implemented as appropriate)  Received patient from OR on vent, VSS. Levophed gtt stopped on arrival to ICU. Temporary pacemaker in place pacing with 100% capture. Family visited, update given, POC discussed.

## 2017-11-30 NOTE — HPI
Chung Meneses is an 86 year old male with a history of CAD, hypertension and aortic stenosis who underwent CABG with bioprosthetic AVR performed by Dr. Boateng on 12/30/17. His post-operative course remains unremarkable and he is intubated at the time of exam.

## 2017-12-01 PROBLEM — Z95.2 S/P AVR: Status: ACTIVE | Noted: 2017-12-01

## 2017-12-01 PROBLEM — Z99.11 ON MECHANICALLY ASSISTED VENTILATION: Status: RESOLVED | Noted: 2017-11-30 | Resolved: 2017-12-01

## 2017-12-01 LAB
ANION GAP SERPL CALC-SCNC: 9 MMOL/L
BASOPHILS # BLD AUTO: 0.02 K/UL
BASOPHILS NFR BLD: 0.2 %
BUN SERPL-MCNC: 30 MG/DL
CALCIUM SERPL-MCNC: 9.1 MG/DL
CHLORIDE SERPL-SCNC: 106 MMOL/L
CO2 SERPL-SCNC: 21 MMOL/L
CREAT SERPL-MCNC: 1.6 MG/DL
DIFFERENTIAL METHOD: ABNORMAL
EOSINOPHIL # BLD AUTO: 0 K/UL
EOSINOPHIL NFR BLD: 0 %
ERYTHROCYTE [DISTWIDTH] IN BLOOD BY AUTOMATED COUNT: 16.7 %
EST. GFR  (AFRICAN AMERICAN): 44 ML/MIN/1.73 M^2
EST. GFR  (NON AFRICAN AMERICAN): 38 ML/MIN/1.73 M^2
GLUCOSE SERPL-MCNC: 154 MG/DL
HCT VFR BLD AUTO: 29 %
HGB BLD-MCNC: 9.4 G/DL
LYMPHOCYTES # BLD AUTO: 0.8 K/UL
LYMPHOCYTES NFR BLD: 6.9 %
MCH RBC QN AUTO: 26.1 PG
MCHC RBC AUTO-ENTMCNC: 32.4 G/DL
MCV RBC AUTO: 81 FL
MONOCYTES # BLD AUTO: 1.3 K/UL
MONOCYTES NFR BLD: 12 %
NEUTROPHILS # BLD AUTO: 8.8 K/UL
NEUTROPHILS NFR BLD: 80.9 %
PLATELET # BLD AUTO: 186 K/UL
PMV BLD AUTO: 9.9 FL
POCT GLUCOSE: 131 MG/DL (ref 70–110)
POCT GLUCOSE: 144 MG/DL (ref 70–110)
POCT GLUCOSE: 146 MG/DL (ref 70–110)
POCT GLUCOSE: 151 MG/DL (ref 70–110)
POCT GLUCOSE: 164 MG/DL (ref 70–110)
POCT GLUCOSE: 169 MG/DL (ref 70–110)
POCT GLUCOSE: 169 MG/DL (ref 70–110)
POCT GLUCOSE: 190 MG/DL (ref 70–110)
POTASSIUM SERPL-SCNC: 4.9 MMOL/L
RBC # BLD AUTO: 3.6 M/UL
SODIUM SERPL-SCNC: 136 MMOL/L
WBC # BLD AUTO: 10.82 K/UL

## 2017-12-01 PROCEDURE — 94799 UNLISTED PULMONARY SVC/PX: CPT

## 2017-12-01 PROCEDURE — 85025 COMPLETE CBC W/AUTO DIFF WBC: CPT

## 2017-12-01 PROCEDURE — 36415 COLL VENOUS BLD VENIPUNCTURE: CPT

## 2017-12-01 PROCEDURE — 63600175 PHARM REV CODE 636 W HCPCS: Performed by: NURSE PRACTITIONER

## 2017-12-01 PROCEDURE — 63600175 PHARM REV CODE 636 W HCPCS: Performed by: PHYSICIAN ASSISTANT

## 2017-12-01 PROCEDURE — 99900035 HC TECH TIME PER 15 MIN (STAT)

## 2017-12-01 PROCEDURE — 97530 THERAPEUTIC ACTIVITIES: CPT

## 2017-12-01 PROCEDURE — 97802 MEDICAL NUTRITION INDIV IN: CPT

## 2017-12-01 PROCEDURE — 99233 SBSQ HOSP IP/OBS HIGH 50: CPT | Mod: ,,, | Performed by: NURSE PRACTITIONER

## 2017-12-01 PROCEDURE — 80048 BASIC METABOLIC PNL TOTAL CA: CPT

## 2017-12-01 PROCEDURE — G8979 MOBILITY GOAL STATUS: HCPCS | Mod: CK

## 2017-12-01 PROCEDURE — 25000003 PHARM REV CODE 250: Performed by: NURSE PRACTITIONER

## 2017-12-01 PROCEDURE — 20000000 HC ICU ROOM

## 2017-12-01 PROCEDURE — 99233 SBSQ HOSP IP/OBS HIGH 50: CPT | Mod: ,,, | Performed by: INTERNAL MEDICINE

## 2017-12-01 PROCEDURE — G8978 MOBILITY CURRENT STATUS: HCPCS | Mod: CL

## 2017-12-01 PROCEDURE — 25000003 PHARM REV CODE 250: Performed by: PHYSICIAN ASSISTANT

## 2017-12-01 PROCEDURE — S0028 INJECTION, FAMOTIDINE, 20 MG: HCPCS | Performed by: PHYSICIAN ASSISTANT

## 2017-12-01 PROCEDURE — 27000221 HC OXYGEN, UP TO 24 HOURS

## 2017-12-01 PROCEDURE — 97162 PT EVAL MOD COMPLEX 30 MIN: CPT

## 2017-12-01 RX ORDER — GLUCAGON 1 MG
1 KIT INJECTION
Status: DISCONTINUED | OUTPATIENT
Start: 2017-12-01 | End: 2017-12-06 | Stop reason: HOSPADM

## 2017-12-01 RX ORDER — POTASSIUM CHLORIDE 20 MEQ/1
40 TABLET, EXTENDED RELEASE ORAL DAILY
Status: DISCONTINUED | OUTPATIENT
Start: 2017-12-02 | End: 2017-12-02

## 2017-12-01 RX ORDER — INSULIN ASPART 100 [IU]/ML
1-10 INJECTION, SOLUTION INTRAVENOUS; SUBCUTANEOUS
Status: DISCONTINUED | OUTPATIENT
Start: 2017-12-01 | End: 2017-12-06 | Stop reason: HOSPADM

## 2017-12-01 RX ORDER — IBUPROFEN 200 MG
24 TABLET ORAL
Status: DISCONTINUED | OUTPATIENT
Start: 2017-12-01 | End: 2017-12-06 | Stop reason: HOSPADM

## 2017-12-01 RX ORDER — FAMOTIDINE 20 MG/1
20 TABLET, FILM COATED ORAL DAILY
Status: DISCONTINUED | OUTPATIENT
Start: 2017-12-01 | End: 2017-12-06 | Stop reason: HOSPADM

## 2017-12-01 RX ORDER — IBUPROFEN 200 MG
16 TABLET ORAL
Status: DISCONTINUED | OUTPATIENT
Start: 2017-12-01 | End: 2017-12-06 | Stop reason: HOSPADM

## 2017-12-01 RX ORDER — CHLORHEXIDINE GLUCONATE ORAL RINSE 1.2 MG/ML
10 SOLUTION DENTAL 2 TIMES DAILY
Status: DISCONTINUED | OUTPATIENT
Start: 2017-12-01 | End: 2017-12-06 | Stop reason: HOSPADM

## 2017-12-01 RX ADMIN — METOPROLOL TARTRATE 25 MG: 25 TABLET ORAL at 08:12

## 2017-12-01 RX ADMIN — CHLORHEXIDINE GLUCONATE 10 ML: 1.2 RINSE ORAL at 08:12

## 2017-12-01 RX ADMIN — DOCUSATE SODIUM 100 MG: 100 CAPSULE, LIQUID FILLED ORAL at 08:12

## 2017-12-01 RX ADMIN — FAMOTIDINE 20 MG: 10 INJECTION, SOLUTION INTRAVENOUS at 08:12

## 2017-12-01 RX ADMIN — SIMVASTATIN 20 MG: 20 TABLET, FILM COATED ORAL at 08:12

## 2017-12-01 RX ADMIN — INSULIN ASPART 2 UNITS: 100 INJECTION, SOLUTION INTRAVENOUS; SUBCUTANEOUS at 11:12

## 2017-12-01 RX ADMIN — FAMOTIDINE 20 MG: 20 TABLET ORAL at 10:12

## 2017-12-01 RX ADMIN — DEXTROSE AND SODIUM CHLORIDE 50 ML/HR: 5; .2 INJECTION, SOLUTION INTRAVENOUS at 05:12

## 2017-12-01 RX ADMIN — CLOPIDOGREL BISULFATE 75 MG: 75 TABLET ORAL at 08:12

## 2017-12-01 RX ADMIN — POLYETHYLENE GLYCOL (3350) 17 G: 17 POWDER, FOR SOLUTION ORAL at 08:12

## 2017-12-01 RX ADMIN — ENOXAPARIN SODIUM 40 MG: 100 INJECTION SUBCUTANEOUS at 10:12

## 2017-12-01 RX ADMIN — FUROSEMIDE 40 MG: 10 INJECTION, SOLUTION INTRAMUSCULAR; INTRAVENOUS at 08:12

## 2017-12-01 RX ADMIN — SODIUM CHLORIDE, SODIUM LACTATE, POTASSIUM CHLORIDE, AND CALCIUM CHLORIDE 250 ML: .6; .31; .03; .02 INJECTION, SOLUTION INTRAVENOUS at 04:12

## 2017-12-01 RX ADMIN — POTASSIUM CHLORIDE 40 MEQ: 1500 TABLET, EXTENDED RELEASE ORAL at 08:12

## 2017-12-01 RX ADMIN — MORPHINE SULFATE 2 MG: 10 INJECTION, SOLUTION INTRAMUSCULAR; INTRAVENOUS at 12:12

## 2017-12-01 RX ADMIN — INSULIN ASPART 1 UNITS: 100 INJECTION, SOLUTION INTRAVENOUS; SUBCUTANEOUS at 10:12

## 2017-12-01 RX ADMIN — ASPIRIN 81 MG: 81 TABLET, COATED ORAL at 08:12

## 2017-12-01 RX ADMIN — HYDROCODONE BITARTRATE AND ACETAMINOPHEN 1 TABLET: 7.5; 325 TABLET ORAL at 05:12

## 2017-12-01 RX ADMIN — INSULIN ASPART 2 UNITS: 100 INJECTION, SOLUTION INTRAVENOUS; SUBCUTANEOUS at 05:12

## 2017-12-01 NOTE — HPI
Mr Meneses is a 85 yo WM with a PMH of HTN, HLD, AS and paroxysmal A-fib.  He was followed as outpt and referred to CVT for AVR.  He was electively admitted today and taken to OR undergoing AVR and 1-vessel CABG without reported complications.

## 2017-12-01 NOTE — PLAN OF CARE
CM gave patient info for listing of [facility] for [facility] PT. Patient stated he wanted to wait for his dtr to make a decision. CM to f/u for safe transition

## 2017-12-01 NOTE — PROGRESS NOTES
Cardiology Progress Note        SUBJECTIVE:     History of Present Illness:  Patient is a 86 y.o. male who presents with severe AS s/p AVR POD#1. Patient seen and examined today, sitting up in chair. Complains of incisional pain. Had a brief run of afib overnight, SR at time of exam. No other issues. Labs reviewed and are stable.       OBJECTIVE:     Vital Signs (Most Recent)  Temp: 97.9 °F (36.6 °C) (12/01/17 1107)  Pulse: 69 (12/01/17 1130)  Resp: 18 (12/01/17 1130)  BP: 120/61 (12/01/17 1130)  SpO2: 100 % (12/01/17 1130)    Vital Signs Range (Last 24H):  Temp:  [96.6 °F (35.9 °C)-100.4 °F (38 °C)]   Pulse:  []   Resp:  [12-32]   BP: ()/(44-76)   SpO2:  [93 %-100 %]     Intake/Output last 3 shifts:  I/O last 3 completed shifts:  In: 4742.5 [P.O.:180; I.V.:3489.5; Blood:1073]  Out: 1173 [Urine:753; Chest Tube:420]    Intake/Output this shift:  I/O this shift:  In: -   Out: 72 [Urine:52; Chest Tube:20]    Review of patient's allergies indicates:   Allergen Reactions    Sulfa (sulfonamide antibiotics) Hives       Current Facility-Administered Medications   Medication    acetaminophen tablet 650 mg    aspirin EC tablet 81 mg    clopidogrel tablet 75 mg    dextrose 50% injection 12.5 g    dextrose 50% injection 25 g    docusate sodium capsule 100 mg    enoxaparin injection 40 mg    famotidine tablet 20 mg    furosemide injection 40 mg    glucagon (human recombinant) injection 1 mg    glucose chewable tablet 16 g    glucose chewable tablet 24 g    hydrocodone-acetaminophen 10-325mg per tablet 1 tablet    hydrocodone-acetaminophen 5-325mg per tablet 1 tablet    hydrocodone-acetaminophen 7.5-325mg per tablet 1 tablet    insulin aspart pen 1-10 Units    metoprolol tartrate (LOPRESSOR) tablet 25 mg    ondansetron injection 4 mg    polyethylene glycol packet 17 g    [START ON 12/2/2017] potassium chloride SA CR tablet 40 mEq    promethazine (PHENERGAN) 6.25 mg in dextrose 5 % 50 mL IVPB     simvastatin tablet 20 mg     No current facility-administered medications on file prior to encounter.      Current Outpatient Prescriptions on File Prior to Encounter   Medication Sig    amlodipine (NORVASC) 5 MG tablet Take 5 mg by mouth once daily.    metoprolol tartrate (LOPRESSOR) 25 MG tablet Take 25 mg by mouth 2 (two) times daily. Takes one-half tablet by mouth twice a day    fish oil-omega-3 fatty acids 300-1,000 mg capsule Take 1,200 mg by mouth once daily.    furosemide (LASIX) 40 MG tablet Take 40 mg by mouth.    multivitamin capsule Take 1 capsule by mouth once daily.    multivitamin with folic acid 400 mcg Tab Take 1 tablet by mouth.    niacin, inositol niacinate, (NIACIN FLUSH FREE) 400 mg Cap Take 500 mg by mouth once daily.    ranitidine (ZANTAC) 75 MG tablet Take 75 mg by mouth 2 (two) times daily.    rivaroxaban (XARELTO) 15 mg Tab Take 1 tablet (15 mg total) by mouth daily with dinner or evening meal. Since 2015 never increased    simvastatin (ZOCOR) 20 MG tablet Take 20 mg by mouth every evening.    VENTOLIN HFA 90 mcg/actuation inhaler INHALE 1 TO 2 PUFFS 4 TIMES A DAY AS NEEDED FOR WHEEZING       Physical Exam:  General appearance: alert, appears stated age and cooperative  Head: Normocephalic, without obvious abnormality, atraumatic  Neck: no adenopathy, no carotid bruit, no JVD  Lungs:  Diminished at bases  Chest wall: Sternotomy site C/D/I; no bleeding  Heart: regular rate and rhythm, S1, S2 normal  Extremities: extremities normal, atraumatic, no cyanosis or edema  Skin: Skin color, texture, turgor normal. No rashes or lesions  Neurologic: Grossly normal, AAO x 3    Laboratory:  Chemistry:   Lab Results   Component Value Date     12/01/2017    K 4.9 12/01/2017     12/01/2017    CO2 21 (L) 12/01/2017    BUN 30 (H) 12/01/2017    CREATININE 1.6 (H) 12/01/2017    CALCIUM 9.1 12/01/2017     Cardiac Markers: No results found for: CKTOTAL, CKMB, CKMBINDEX, TROPONINI  Cardiac  Markers (Last 3): No results found for: CKTOTAL, CKMB, CKMBINDEX, TROPONINI  CBC:   Lab Results   Component Value Date    WBC 10.82 12/01/2017    HGB 9.4 (L) 12/01/2017    HCT 29.0 (L) 12/01/2017    HCT 27 (L) 11/30/2017    MCV 81 (L) 12/01/2017     12/01/2017     Lipids:   Lab Results   Component Value Date    CHOL 125 04/19/2017    TRIG 61 04/19/2017    HDL 35 (L) 04/19/2017     Coagulation:   Lab Results   Component Value Date    INR 1.2 11/30/2017    APTT 32.4 (H) 11/30/2017       Diagnostic Results:  ECG: Reviewed  X-Ray: Reviewed  Echo: Reviewed      ASSESSMENT/PLAN:     Patient Active Problem List   Diagnosis    Hypertension    Hyperlipidemia    Paroxysmal atrial fibrillation    Light headed    Dizziness and giddiness    Nonrheumatic aortic valve stenosis    Pain of left lower extremity    Chronic systolic congestive heart failure    CHF (congestive heart failure)    Pulmonary hypertension    Hyperglycemia, unspecified    S/P AVR      Patient recovering well s/p AVR. Remains stable CV wise. Continue current post-op mgmt. Will need to re-start Xarelto for PAF when ok with CVT.   Plan:   -Continue ASA, Plavix, statin  -IS usage  -Ambulation once able  -Will need to re-start Xarelto    Chart reviewed. Dr. Mckee examined patient and agrees with plan as outlined above.

## 2017-12-01 NOTE — PROGRESS NOTES
Pharmacist Renal Dose Adjustment Note    Chung Meneses Sr. is a 86 y.o. male being treated with the medication famotidine.     Patient Data:    Vital Signs (Most Recent):  Temp: 99.5 °F (37.5 °C) (12/01/17 0715)  Pulse: 75 (12/01/17 0930)  Resp: 16 (12/01/17 0930)  BP: (!) 108/58 (12/01/17 0930)  SpO2: 98 % (12/01/17 0930)   Vital Signs (72h Range):  Temp:  [96.6 °F (35.9 °C)-100.4 °F (38 °C)]   Pulse:  []   Resp:  [12-32]   BP: ()/(44-85)   SpO2:  [93 %-100 %]        Recent Labs     Lab 11/28/17  1125 11/30/17  1112 12/01/17  0428   CREATININE 1.5* 1.3 1.6*     Serum creatinine: 1.6 mg/dL High 12/01/17 0428  Estimated creatinine clearance: 33.1 mL/min    Famotidine 20 mg BID will be changed to daily.     Pharmacist's Name: Alena Gardner  Pharmacist's Extension: [phone]

## 2017-12-01 NOTE — PLAN OF CARE
Problem: Patient Care Overview  Goal: Plan of Care Review  Outcome: Ongoing (interventions implemented as appropriate)  Patient had several episodes of afib that would last  seconds each time with a heart rate up to the 120's.  After each episode he would return to Normal Sinus in the 80's.  I contacted Dr Saab and he ordered a dose of patients metoprolol to be given.  Patient remains on Nasal Cannula at 4L this a.m., Patient up to bedside chair this morning.  Bath given this morning prior to getting patient up to chair.  See MAR and I/O flowsheet for medication doses and times given.

## 2017-12-01 NOTE — ASSESSMENT & PLAN NOTE
Nutrition problem:  Nutrient decreased needs (sodium, fat)    Related to (etiology):   Current medical condition    Signs and Symptoms (as evidenced by):   Hx of CHF, S/p Aortocoronary bypass CABG and replacement valve aortic and       Interventions/Recommendations (treatment strategy):  1. Continue Cardiac Diet    Nutrition Diagnosis Status:   Continues

## 2017-12-01 NOTE — SUBJECTIVE & OBJECTIVE
Past Medical History:   Diagnosis Date    Aortic stenosis 8/2/2013    Atrial fibrillation 7/5/2013    CHF (congestive heart failure)     Dizziness - light-headed     Hyperlipidemia     Hypertension        Past Surgical History:   Procedure Laterality Date    AORTIC VALVE REPLACEMENT  07/30/2017    Bioprosthetic    BACK SURGERY  2003    CORONARY ARTERY BYPASS GRAFT  07/30/2017    knee replacemnt bilateral  2001    SHOULDER SURGERY  2002       Review of patient's allergies indicates:   Allergen Reactions    Sulfa (sulfonamide antibiotics) Hives       Family History     Problem Relation (Age of Onset)    Heart attack Brother    Heart disease Brother    Heart failure Brother    Hypertension Mother, Father, Sister, Brother        Social History Main Topics    Smoking status: Never Smoker    Smokeless tobacco: Never Used    Alcohol use No      Comment: HOLIDAY    Drug use: No    Sexual activity: No         Review of Systems   Unable to perform ROS: Intubated     Objective:     Vital Signs (Most Recent):  Temp: 98.5 °F (36.9 °C) (11/30/17 1800)  Pulse: 85 (11/30/17 1815)  Resp: (!) 23 (11/30/17 1815)  BP: 129/68 (11/30/17 1800)  SpO2: 99 % (11/30/17 1815) Vital Signs (24h Range):  Temp:  [96.6 °F (35.9 °C)-98.8 °F (37.1 °C)] 98.5 °F (36.9 °C)  Pulse:  [66-85] 85  Resp:  [12-25] 23  SpO2:  [97 %-100 %] 99 %  BP: (115-168)/(54-85) 129/68     Weight: 79.2 kg (174 lb 9.7 oz)  Body mass index is 25.78 kg/m².      Intake/Output Summary (Last 24 hours) at 11/30/17 1820  Last data filed at 11/30/17 1800   Gross per 24 hour   Intake          3404.22 ml   Output              659 ml   Net          2745.22 ml       Physical Exam   Constitutional: Vital signs are normal. He appears well-developed and well-nourished. He is cooperative. He is easily aroused. No distress. He is sedated and intubated.   HENT:   Head: Normocephalic and atraumatic.   Mouth/Throat: Oropharynx is clear and moist.   Eyes: EOM are normal. Pupils  are equal, round, and reactive to light.   Neck: Trachea normal and normal range of motion.       Cardiovascular: Normal rate and regular rhythm.    Pulses:       Radial pulses are 2+ on the right side, and 2+ on the left side.        Dorsalis pedis pulses are 1+ on the right side, and 1+ on the left side.   Pulmonary/Chest: He is intubated. No respiratory distress. He has wheezes. He has rales.       Abdominal: Soft. He exhibits no distension. Bowel sounds are decreased. There is no tenderness.   Genitourinary: Penis normal.   Genitourinary Comments: Dawson in place   Musculoskeletal: Normal range of motion.   No edema   Lymphadenopathy:     He has no cervical adenopathy.   Neurological: He is alert and easily aroused.   Fully awake and follows commands   Skin: Skin is warm and dry. Capillary refill takes less than 2 seconds. No cyanosis.        Psychiatric: He has a normal mood and affect. His speech is normal and behavior is normal. Judgment and thought content normal. Cognition and memory are normal.       Vents:  Vent Mode: SIMV (11/30/17 1530)  Ventilator Initiated: Yes (11/30/17 1115)  Set Rate: 12 bmp (11/30/17 1530)  Vt Set: 500 mL (11/30/17 1530)  Pressure Support: 10 cmH20 (11/30/17 1530)  PEEP/CPAP: 5 cmH20 (11/30/17 1530)  Oxygen Concentration (%): 40 (11/30/17 1745)  Peak Airway Pressure: 19 cmH2O (11/30/17 1530)  Plateau Pressure: 0 cmH20 (11/30/17 1530)  Total Ve: 8.32 mL (11/30/17 1530)  F/VT Ratio<105 (RSBI): (!) 41.57 (11/30/17 1530)    Lines/Drains/Airways     Central Venous Catheter Line                 Percutaneous Central Line Insertion/Assessment - double lumen  11/30/17 0749 right internal jugular less than 1 day         Pulmonary Artery Catheter Assessment  11/30/17 less than 1 day          Drain                 Urethral Catheter 11/30/17 0725 Temperature probe 16 Fr. less than 1 day         Y Chest Tube 1 and 2 11/30/17 1027 Other (Comment) Mediastinal 24 Fr. Other (Comment) Pleural 24 Fr.  less than 1 day          Arterial Line                 Arterial Line 11/30/17 0715 Right Radial less than 1 day          Line                 Pacer Wires 11/30/17 less than 1 day          Peripheral Intravenous Line                 Peripheral IV - Single Lumen 11/30/17 0615 Left Forearm less than 1 day                Significant Labs:    CBC/Anemia Profile:    Recent Labs  Lab 11/30/17  0642  11/30/17  1007 11/30/17  1008 11/30/17  1112   WBC  --   --   --   --  11.36   HGB 10.5*  --  8.5*  --  8.2*   HCT 33.7*  < > 27.1* 27* 26.5*     --  196  --  177   MCV  --   --   --   --  85   RDW  --   --   --   --  16.5*   < > = values in this interval not displayed.     Chemistries:    Recent Labs  Lab 11/30/17  1112      K 4.9  4.9      CO2 24   BUN 28*   CREATININE 1.3   CALCIUM 9.9   MG 2.6       ABGs:   Recent Labs  Lab 11/30/17  1124   PH 7.340*   PCO2 43.9   HCO3 23.7*   POCSATURATED 92*   BE -2     Coagulation:   Recent Labs  Lab 11/30/17  1112   INR 1.2   APTT 32.4*     All pertinent labs within the past 24 hours have been reviewed.    Significant Imaging:   CXR: I have reviewed all pertinent results/findings within the past 24 hours and my personal findings are:  mild bibasal post op pl effusions vs atelectasis

## 2017-12-01 NOTE — PROGRESS NOTES
[facility] -   Critical Care Medicine  Progress Note    Patient Name: Chung Meneses Sr.  MRN: [id number]  Admission Date: 11/30/2017  Hospital Length of Stay: 0 days  Code Status: No Order  Attending Provider: Shaq Boateng MD  Primary Care Provider: Glenroy Carmichael MD   Principal Problem: Aortic stenosis    Subjective:     HPI:  Mr Meneses is a 87 yo WM with a PMH of HTN, HLD, AS and paroxysmal A-fib.  He was followed as outpt and referred to CVT for AVR.  He was electively admitted today and taken to OR undergoing AVR and 1-vessel CABG without reported complications.    Hospital/ICU Course:  11/30 - [facility]    Past Medical History:   Diagnosis Date    Aortic stenosis 8/2/2013    Atrial fibrillation 7/5/2013    CHF (congestive heart failure)     Dizziness - light-headed     Hyperlipidemia     Hypertension        Past Surgical History:   Procedure Laterality Date              BACK SURGERY  2003 07/30/2017    knee replacemnt bilateral  2001    SHOULDER SURGERY  2002       Review of patient's allergies indicates:   Allergen Reactions    Sulfa (sulfonamide antibiotics) Hives       Family History     Problem Relation (Age of Onset)    Heart attack Brother    Heart disease Brother    Heart failure Brother    Hypertension Mother, Father, Sister, Brother        Social History Main Topics    Smoking status: Never Smoker    Smokeless tobacco: Never Used    Alcohol use No      Comment: HOLIDAY    Drug use: No    Sexual activity: No         Review of Systems   Unable to perform ROS: Intubated     Objective:     Vital Signs (Most Recent):  Temp: 98.5 °F (36.9 °C) (11/30/17 1800)  Pulse: 85 (11/30/17 1815)  Resp: (!) 23 (11/30/17 1815)  BP: 129/68 (11/30/17 1800)  SpO2: 99 % (11/30/17 1815) Vital Signs (24h Range):  Temp:  [96.6 °F (35.9 °C)-98.8 °F (37.1 °C)] 98.5 °F (36.9 °C)  Pulse:  [66-85] 85  Resp:  [12-25] 23  SpO2:  [97 %-100 %] 99 %  BP:  (115-168)/(54-85) 129/68     Weight: 79.2 kg (174 lb 9.7 oz)  Body mass index is 25.78 kg/m².      Intake/Output Summary (Last 24 hours) at 11/30/17 1820  Last data filed at 11/30/17 1800   Gross per 24 hour   Intake          3404.22 ml   Output              659 ml   Net          2745.22 ml       Physical Exam   Constitutional: Vital signs are normal. He appears well-developed and well-nourished. He is cooperative. He is easily aroused. No distress. He is sedated and intubated.   HENT:   Head: Normocephalic and atraumatic.   Mouth/Throat: Oropharynx is clear and moist.   Eyes: EOM are normal. Pupils are equal, round, and reactive to light.   Neck: Trachea normal and normal range of motion.       Cardiovascular: Normal rate and regular rhythm.    Pulses:       Radial pulses are 2+ on the right side, and 2+ on the left side.        Dorsalis pedis pulses are 1+ on the right side, and 1+ on the left side.   Pulmonary/Chest: He is intubated. No respiratory distress. He has wheezes. He has rales.       Abdominal: Soft. He exhibits no distension. Bowel sounds are decreased. There is no tenderness.   Genitourinary: Penis normal.   Genitourinary Comments: Dawson in place   Musculoskeletal: Normal range of motion.   No edema   Lymphadenopathy:     He has no cervical adenopathy.   Neurological: He is alert and easily aroused.   Fully awake and follows commands   Skin: Skin is warm and dry. Capillary refill takes less than 2 seconds. No cyanosis.        Psychiatric: He has a normal mood and affect. His speech is normal and behavior is normal. Judgment and thought content normal. Cognition and memory are normal.       Vents:  Vent Mode: SIMV (11/30/17 1530)  Ventilator Initiated: Yes (11/30/17 1115)  Set Rate: 12 bmp (11/30/17 1530)  Vt Set: 500 mL (11/30/17 1530)  Pressure Support: 10 cmH20 (11/30/17 1530)  PEEP/CPAP: 5 cmH20 (11/30/17 1530)  Oxygen Concentration (%): 40 (11/30/17 1745)  Peak Airway Pressure: 19 cmH2O (11/30/17  1530)  Plateau Pressure: 0 cmH20 (11/30/17 1530)  Total Ve: 8.32 mL (11/30/17 1530)  F/VT Ratio<105 (RSBI): (!) 41.57 (11/30/17 1530)    Lines/Drains/Airways     Central Venous Catheter Line                 Percutaneous Central Line Insertion/Assessment - double lumen  11/30/17 0749 right internal jugular less than 1 day         Pulmonary Artery Catheter Assessment  11/30/17 less than 1 day          Drain                 Urethral Catheter 11/30/17 0725 Temperature probe 16 Fr. less than 1 day         Y Chest Tube 1 and 2 11/30/17 1027 Other (Comment) Mediastinal 24 Fr. Other (Comment) Pleural 24 Fr. less than 1 day          Arterial Line                 Arterial Line 11/30/17 0715 Right Radial less than 1 day          Line                 Pacer Wires 11/30/17 less than 1 day          Peripheral Intravenous Line                 Peripheral IV - Single Lumen 11/30/17 0615 Left Forearm less than 1 day                Significant Labs:    CBC/Anemia Profile:    Recent Labs  Lab 11/30/17  0642  11/30/17  1007 11/30/17  1008 11/30/17  1112   WBC  --   --   --   --  11.36   HGB 10.5*  --  8.5*  --  8.2*   HCT 33.7*  < > 27.1* 27* 26.5*     --  196  --  177   MCV  --   --   --   --  85   RDW  --   --   --   --  16.5*   < > = values in this interval not displayed.     Chemistries:    Recent Labs  Lab 11/30/17  1112      K 4.9  4.9      CO2 24   BUN 28*   CREATININE 1.3   CALCIUM 9.9   MG 2.6       ABGs:   Recent Labs  Lab 11/30/17  1124   PH 7.340*   PCO2 43.9   HCO3 23.7*   POCSATURATED 92*   BE -2     Coagulation:   Recent Labs  Lab 11/30/17  1112   INR 1.2   APTT 32.4*     All pertinent labs within the past 24 hours have been reviewed.    Significant Imaging:   CXR: I have reviewed all pertinent results/findings within the past 24 hours and my personal findings are:  mild bibasal post op pl effusions vs atelectasis    Assessment/Plan:     Pulmonary   Pulmonary hypertension    Cont Lasix         Cardiac/Vascular   * Aortic stenosis and CAD S/P CABG with bioprosthetic AVR on 11/30/17    Cards and CVT following  Cont Lopressor, ASA, Plavix        Hyperlipidemia    Resume home statin        Chronic systolic congestive heart failure    Cont Lasix  Repeat CXR in AM        Paroxysmal atrial fibrillation    ICU cardiac monitoring  Cont Lopressor PO  Resume home Xarelto in AM if CVT agrees     Hyperglycemia        Insulin infusion    Post op Mech Ventilation        Laura SBT/SAT will extubate        Encourage C&DB and IS use.  OOB in AM       Preventive Measures and Monitoring:   Stress Ulcer: Pepcid  Nutrition: start Cardiac diet in AM  Glucose control: insulin infusion  Bowel prophylaxis: Miralax  DVT prophylaxis: LMWH/SCDs  Hx CAD on B-Blocker: Lopressor  Head of Bed/Reposition: Elevate HOB and turn Q1-2 hours   Early Mobility: OOB in AM  Vent Day: extubated  OG Day: removed  Central Line Right PA catheter Day: #1  Right radial arterial line Day: #1  Chest tubes X 2 Day: #1  Dawson Day: #1  IVAB Day: #1  Code Status: Full  Pneumonia Vaccine: UTD per patient  Flu Vaccine: UTD per patient    Counseling/Consultation:I have discussed the care of this patient in detail with the bedside nursing staff and Dr. Smith and Dr. Boateng    Critical Care Time: 47 minutes  Critical secondary to Patient has a condition that poses threat to life and bodily function: Post Op Mech Ventilation      Critical care was time spent personally by me on the following activities: development of treatment plan with patient or surrogate and bedside caregivers, discussions with consultants, evaluation of patient's response to treatment, examination of patient, ordering and performing treatments and interventions, ordering and review of laboratory studies, ordering and review of radiographic studies, pulse oximetry, re-evaluation of patient's condition. This critical care time did not overlap with that of any other provider or involve time  for any procedures.     Teodoro Crump NP  Critical Care Medicine  [facility] [place]

## 2017-12-01 NOTE — PLAN OF CARE
Problem: Patient Care Overview  Goal: Plan of Care Review  Outcome: Ongoing (interventions implemented as appropriate)  Patient currently resting quietly in bed. VS currently stable. Patient NSR on monitor at this time. Patient currently receiving oxygen via nasal cannula. Patient turned in bed with assist avoid skin breakdown to back side. No sign of wound development during shift noted. Patient encouraged to use call light. Fall prevention reviewed with patient. Patient free of falls. Patient has no complaints of chest pain or incisional pain at this time. Plan of care reviewed with patient and family. There are no further questions after updated on plan of care at this time. Will continue to monitor.

## 2017-12-01 NOTE — PT/OT/SLP EVAL
Physical Therapy  Evaluation    Chung Meneses .   MRN: [id number]   Admitting Diagnosis: Nonrheumatic aortic valve stenosis    PT Received On: 12/01/17  PT Start Time: 0831     PT Stop Time: 0855    PT Total Time (min): 24 min       Billable Minutes:  Evaluation 14 and Therapeutic Activity 10    Diagnosis: Nonrheumatic aortic valve stenosis  P.T. DX: GT INSTABILITY     Past Medical History:   Diagnosis Date    Aortic stenosis 8/2/2013    Atrial fibrillation 7/5/2013    CHF (congestive heart failure)     Dizziness - light-headed     Hyperlipidemia     Hypertension       Past Surgical History:   Procedure Laterality Date    BACK SURGERY  2003    knee replacemnt bilateral  2001    SHOULDER SURGERY  2002       Referring physician: CARMELA  Date referred to PT: 12/1/2017      General Precautions: Standard, fall, sternal  Orthopedic Precautions:     Braces:              Patient History:  Lives With: alone  Living Arrangements: house  Home Layout: Able to live on 1st floor  Stair Railings at Home: none  Transportation Available: family or friend will provide  Living Environment Comment: PT WAS LIVING IND AT HOME ABD DRIVES. NO STEPS TO ENTER HOME  Equipment Currently Used at Home: none  DME owned (not currently used): none    Previous Level of Function:  Ambulation Skills: independent  Transfer Skills: independent  ADL Skills: independent  Work/Leisure Activity: independent    Subjective:  Communicated with NURSE GALICIA AND [facility] CHART REVIEW  prior to session.   PT AGREED TO EVAL AND TX   Chief Complaint:  PAIN   Patient goals: WALK     Pain/Comfort  Pain Rating 1: 2/10  Location 1: chest  Pain Rating Post-Intervention 1: 2/10      Objective:   Patient found with: peripheral IV, telemetry, oxygen, chest tube, king catheter, blood pressure cuff, pulse ox (continuous)     Cognitive Exam:  Oriented to: Person, Place, Time and Situation    Follows Commands/attention: Follows multistep  commands  Communication:  clear/fluent  Safety awareness/insight to disability: intact    Physical Exam:  Postural examination/scapula alignment: No postural abnormalities identified    Skin integrity: Visible skin intact  Edema: None noted     Sensation:   Intact    Lower Extremity Range of Motion:  Right Lower Extremity: WFL  Left Lower Extremity: WFL    Lower Extremity Strength:  Right Lower Extremity: WFL  Left Lower Extremity: WFL    Functional Mobility:  Bed Mobility:       Transfers:  Sit <> Stand Assistance: Moderate Assistance  Sit <> Stand Assistive Device: No Assistive Device  Bed <> Chair Technique: Stand Pivot  Bed <> Chair Assistance: Moderate Assistance  Bed <> Chair Assistive Device: No Assistive Device    Gait:   Gait Distance: NA       Balance:   Static Sit: FAIR: Maintains without assist, but unable to take any challenges   Dynamic Sit: FAIR: Cannot move trunk without losing balance  Static Stand: POOR: Needs MODERATE assist to maintain  Dynamic stand: POOR: N/A    Therapeutic Activities and Exercises:  PT SEATED IN CHAIR AND STOOD WITH NO AD AND MOD A WITH POST LEAN AND C/O DIZZINESS. PT TOOK 1 STEP BACK WITH MOD A. PT SEATED IN CHAIR. PT COMPLETED 10 REPS OF TKE AND AP. PT LEFT SEATED INCHAIR WITH B LE ELEVATED. PT EDUCATED ON STERNAL PRECAUTION AND ROLE OF P.T.     AM-PAC 6 CLICK MOBILITY  How much help from another person does this patient currently need?   1 = Unable, Total/Dependent Assistance  2 = A lot, Maximum/Moderate Assistance  3 = A little, Minimum/Contact Guard/Supervision  4 = None, Modified [place]/Independent    Turning over in bed (including adjusting bedclothes, sheets and blankets)?: 2  Sitting down on and standing up from a chair with arms (e.g., wheelchair, bedside commode, etc.): 2  Moving from lying on back to sitting on the side of the bed?: 2  Moving to and from a bed to a chair (including a wheelchair)?: 2  Need to walk in hospital room?: 1  Climbing 3-5 steps with a railing?: 1  Total  Score: 10     AM-PAC Raw Score CMS G-Code Modifier Level of Impairment Assistance   6 % Total / Unable   7 - 9 CM 80 - 100% Maximal Assist   10 - 14 CL 60 - 80% Moderate Assist   15 - 19 CK 40 - 60% Moderate Assist   20 - 22 CJ 20 - 40% Minimal Assist   23 CI 1-20% SBA / CGA   24 CH 0% Independent/ Mod I     Patient left up in chair with call button in reach and NURSE present.    Assessment:   Chung Meneses Sr. is a 86 y.o. male with a medical diagnosis of Nonrheumatic aortic valve stenosis and presents with BALANCE IMPAIRMENT AND WILL BENEFIT FROM P.T. TO ADDRESS IMPAIRMENTS LISTED AND ASSIST IN RETURN TO IND AS PLOF.    Rehab identified problem list/impairments: Rehab identified problem list/impairments: weakness, impaired endurance, impaired self care skills, decreased lower extremity function, decreased ROM, impaired balance, decreased upper extremity function, gait instability, impaired functional mobilty, pain, impaired cardiopulmonary response to activity    Rehab potential is excellent.    Activity tolerance: Fair    Discharge recommendations: Discharge Facility/Level Of Care Needs: home health PT     Barriers to discharge: Barriers to Discharge: None    Equipment recommendations: Equipment Needed After Discharge: none     GOALS:    Physical Therapy Goals        Problem: Physical Therapy Goal    Goal Priority Disciplines Outcome Goal Variances Interventions   Physical Therapy Goal     PT/OT, PT      Description:  PT WILL BE SEEN FOR P.T. FOR A MIN OF 5 OUT OF 7 DAYS A WEEK  LT17  1. PT WILL COMPLETE BED MOBILITY WITH CGA.  2. PT WILL T/F TO CHAIR WITH S  3. PT WILL GT TRAIN WITH S X 300'   4. PT WILL BE IND WITH ALL STERNAL PRECAUTIONS  5. PT WILL COMPLETE B LE TE X 20 REPS                    PLAN:    Patient to be seen   to address the above listed problems via gait training, therapeutic activities, therapeutic exercises  Plan of Care expires: 17  Plan of Care reviewed with: patient,  family    Functional Assessment Tool Used: [place]ON AM PAC  Score: CL  Functional Limitation: Mobility: Walking and moving around  Mobility: Walking and Moving Around Current Status (): CL  Mobility: Walking and Moving Around Goal Status (): CHARANJIT Okeefe, PT  12/01/2017

## 2017-12-01 NOTE — SUBJECTIVE & OBJECTIVE
Review of Systems   Constitutional: Negative for chills, fever and malaise/fatigue.   HENT: Negative for congestion.    Eyes: Negative for blurred vision.   Respiratory: Negative for cough, sputum production and shortness of breath.    Cardiovascular: Negative for chest pain and leg swelling.   Gastrointestinal: Negative for abdominal pain, nausea and vomiting.   Genitourinary: Negative for dysuria.   Musculoskeletal: Positive for myalgias (post op CP).   Skin: Negative for rash.   Neurological: Positive for weakness. Negative for dizziness, focal weakness and headaches.   Endo/Heme/Allergies: Does not bruise/bleed easily.   Psychiatric/Behavioral: The patient is not nervous/anxious.        Objective:     Vital Signs (Most Recent):  Temp: 99.5 °F (37.5 °C) (12/01/17 0715)  Pulse: 75 (12/01/17 0930)  Resp: 16 (12/01/17 0930)  BP: (!) 108/58 (12/01/17 0930)  SpO2: 98 % (12/01/17 0930) Vital Signs (24h Range):  Temp:  [96.6 °F (35.9 °C)-100.4 °F (38 °C)] 99.5 °F (37.5 °C)  Pulse:  [] 75  Resp:  [12-32] 16  SpO2:  [93 %-100 %] 98 %  BP: ()/(44-76) 108/58     Weight: 79.2 kg (174 lb 9.7 oz)  Body mass index is 25.78 kg/m².      Intake/Output Summary (Last 24 hours) at 12/01/17 1002  Last data filed at 12/01/17 0900   Gross per 24 hour   Intake          4269.52 ml   Output             1183 ml   Net          3086.52 ml       Physical Exam   Constitutional: He is oriented to person, place, and time. Vital signs are normal. He appears well-developed and well-nourished. He is cooperative. He is easily aroused. No distress. He is not intubated. Nasal cannula in place.   HENT:   Head: Normocephalic and atraumatic.   Mouth/Throat: Oropharynx is clear and moist.   Eyes: EOM are normal. Pupils are equal, round, and reactive to light.   Neck: Trachea normal and normal range of motion.   Cardiovascular: Normal rate and regular rhythm.    Pulses:       Radial pulses are 2+ on the right side, and 2+ on the left side.         Dorsalis pedis pulses are 1+ on the right side, and 1+ on the left side.   Pulmonary/Chest: Breath sounds normal. No accessory muscle usage. He is not intubated. No respiratory distress.       Abdominal: Soft. He exhibits no distension. Bowel sounds are decreased. There is no tenderness.   Genitourinary: Penis normal.   Genitourinary Comments: Dawson in place   Musculoskeletal: Normal range of motion.   No edema   Lymphadenopathy:     He has no cervical adenopathy.   Neurological: He is alert, oriented to person, place, and time and easily aroused.   Skin: Skin is warm and dry. Capillary refill takes less than 2 seconds. No cyanosis.   Psychiatric: He has a normal mood and affect. His speech is normal and behavior is normal. Judgment and thought content normal. Cognition and memory are normal.       Vents:  Vent Mode: SIMV (11/30/17 1530)  Ventilator Initiated: Yes (11/30/17 1115)  Set Rate: 12 bmp (11/30/17 1530)  Vt Set: 500 mL (11/30/17 1530)  Pressure Support: 10 cmH20 (11/30/17 1530)  PEEP/CPAP: 5 cmH20 (11/30/17 1530)  Oxygen Concentration (%): 36 (12/01/17 0736)  Peak Airway Pressure: 19 cmH2O (11/30/17 1530)  Plateau Pressure: 0 cmH20 (11/30/17 1530)  Total Ve: 8.32 mL (11/30/17 1530)  F/VT Ratio<105 (RSBI): (!) 41.57 (11/30/17 1530)    Lines/Drains/Airways     Drain                 Urethral Catheter 11/30/17 0725 Temperature probe 16 Fr. 1 day         Y Chest Tube 1 and 2 11/30/17 1027 Other (Comment) Mediastinal 24 Fr. Other (Comment) Pleural 24 Fr. less than 1 day          Line                 Pacer Wires 11/30/17 1 day          Peripheral Intravenous Line                 Peripheral IV - Single Lumen 11/30/17 0615 Left Forearm 1 day                Significant Labs:    CBC/Anemia Profile:    Recent Labs  Lab 11/30/17  1007 11/30/17  1008 11/30/17  1112 12/01/17  0428   WBC  --   --  11.36 10.82   HGB 8.5*  --  8.2* 9.4*   HCT 27.1* 27* 26.5* 29.0*     --  177 186   MCV  --   --  85 81*   RDW  --    --  16.5* 16.7*        Chemistries:    Recent Labs  Lab 11/30/17  1112 12/01/17  0428    136   K 4.9  4.9 4.9    106   CO2 24 21*   BUN 28* 30*   CREATININE 1.3 1.6*   CALCIUM 9.9 9.1   MG 2.6  --        ABGs:   Recent Labs  Lab 11/30/17  1124   PH 7.340*   PCO2 43.9   HCO3 23.7*   POCSATURATED 92*   BE -2     Coagulation:   Recent Labs  Lab 11/30/17  1112   INR 1.2   APTT 32.4*     All pertinent labs within the past 24 hours have been reviewed.    Significant Imaging:  CXR: I have reviewed all pertinent results/findings within the past 24 hours and my personal findings are:  no acute process noted

## 2017-12-01 NOTE — PROGRESS NOTES
CVT Cardiothoracic Surgery    PLAN:  -d/c king and appropriate line  -chest tubes to stay  -transfer to tele  -monitor creatine     [place] [facility] [place]    POD 1: AVR (tissue) & CABG x 1  Status:  Telemetry  Interval History:  No new events overnight   Extubated, Awake and alert & up in chair    CO-2.8     Pressors: None    Rhythm: SR with PVCs  Respiratory: No dyspnea at rest with NC  Tubes: @420, drained 100cc/8 hours with suction   Extremity: No peripheral edema noted  Sternum: Stable    Wounds: Clean, dry and intact M/S and LLE  Mobility: Ambulating with assistance      Labs:  BMP  Lab Results   Component Value Date     12/01/2017    K 4.9 12/01/2017     12/01/2017    CO2 21 (L) 12/01/2017    BUN 30 (H) 12/01/2017    CREATININE 1.6 (H) 12/01/2017    CALCIUM 9.1 12/01/2017    ANIONGAP 9 12/01/2017    ESTGFRAFRICA 44 (A) 12/01/2017    EGFRNONAA 38 (A) 12/01/2017     Lab Results   Component Value Date    WBC 10.82 12/01/2017    HGB 9.4 (L) 12/01/2017    HCT 29.0 (L) 12/01/2017    MCV 81 (L) 12/01/2017     12/01/2017       Imaging: CXR    VITAL SIGNS: 24 HR MIN & MAX LAST    Temp  Min: 96.6 °F (35.9 °C)  Max: 100.4 °F (38 °C)  99.5 °F (37.5 °C)        BP  Min: 89/44  Max: 146/66  (!) 97/55     Pulse  Min: 66  Max: 98  75     Resp  Min: 12  Max: 32  (!) 24    SpO2  Min: 95 %  Max: 100 %  97 %      Intake/Output:  I/O this shift:  In: -   Out: 10 [Chest Tube:10]  I/O last 3 completed shifts:  In: 4742.5 [P.O.:180; I.V.:3489.5; Blood:1073]  Out: 1173 [Urine:753; Chest Tube:420]

## 2017-12-01 NOTE — HOSPITAL COURSE
11/30 - [facility]  12/1 - Tolerated extubation yesterday afternoon.  Up in chair this AM with no complaints.  Cleared for transfer to [facility] from Dr. Shanks

## 2017-12-01 NOTE — CONSULTS
[facility] -   Adult Nutrition  Consult Note    SUMMARY     Recommendations    Recommendation/Intervention: 1. Continue current diet. 2. Consider adding oral supplements ~ boost plus BID.  3. Patient was educated on Cardiac diet. Pt was given a handout from the nutrition care manual on Cardiac Nutrition Therapy. Pt verbalized understanding and the importance of diet compliance. Pt was given contact information for further questions or concerns. 4. Will continue to encourage PO intake.    Goals: Intake of 85 - 100 % of meals by next RD visit  Nutrition Goal Status: new  Communication of RD Recs:  (care plan and sticky note)    Reason for Assessment    Reason for Assessment: nurse/nurse practitioner consult   Diagnosis:  1. Aortic stenosis    2. Nonrheumatic aortic valve stenosis    3. Chronic systolic congestive heart failure    4. Hyperglycemia, unspecified    5. Hyperlipidemia, unspecified hyperlipidemia type    6. On mechanically assisted ventilation    7. Paroxysmal atrial fibrillation    8. Pulmonary hypertension      Past Medical History:   Diagnosis Date    Aortic stenosis 8/2/2013    Atrial fibrillation 7/5/2013    CHF (congestive heart failure)     Dizziness - light-headed     Hyperlipidemia     Hypertension        Interdisciplinary Rounds: attended     General Information Comments: S/p Aortocoronary bypass CABG and replacement valve aortic. Pt seen this AM. Patient reports decreased appetite (PO intake ~ 25 %). Patient agreed to try oral supplements. Patient was educated on cardiac diet.     Nutrition Discharge Planning: Cardiac diet    Nutrition Prescription Ordered    Current Diet Order: Cardiac diet    Evaluation of Received Nutrients/Fluid Intake    Energy Calories Required: not meeting needs   Protein Required: not meeting needs    Intake/Output Summary (Last 24 hours) at 12/01/17 1035  Last data filed at 12/01/17 1000   Gross per 24 hour   Intake          4019.52 ml   Output       "       1220 ml   Net          2799.52 ml   % Intake of Estimated Energy Needs: 0 - 25 %  % Meal Intake: 25%     Nutrition Risk Screen          Nutrition/Diet History       Typical Food/Fluid Intake: good intake at home  Food Preferences: none reported including Jainism or cultural     Labs/Tests/Procedures/Meds       Pertinent Labs Reviewed: reviewed, pertinent      BMP  Lab Results   Component Value Date     12/01/2017    K 4.9 12/01/2017     12/01/2017    CO2 21 (L) 12/01/2017    BUN 30 (H) 12/01/2017    CREATININE 1.6 (H) 12/01/2017    CALCIUM 9.1 12/01/2017    ANIONGAP 9 12/01/2017    ESTGFRAFRICA 44 (A) 12/01/2017    EGFRNONAA 38 (A) 12/01/2017     Lab Results   Component Value Date    CALCIUM 9.1 12/01/2017     Lab Results   Component Value Date    ALBUMIN 3.6 11/28/2017       Recent Labs  Lab 12/01/17  0623   POCTGLUCOSE 190*       Pertinent Medications Reviewed: reviewed       Physical Findings    Overall Physical Appearance: overweight  Oral/Mouth Cavity:  (teeth missing)  Skin:  (Shabbir 18, Incision sites: chest midline, left groin, left knee )    Anthropometrics    Temp: 99.5 °F (37.5 °C)     Height: 5' 9" (175.3 cm)  Weight Method: Standard Scale  Weight: 79.2 kg (174 lb 9.7 oz)  Ideal Body Weight (IBW), Male: 160 lb     % Ideal Body Weight, Male (lb): 109.13 lb     BMI (Calculated): 25.8  BMI Grade: 25 - 29.9 - overweight       Estimated/Assessed Needs    Weight Used For Calorie Calculations: 79.2 kg (174 lb 9.7 oz)   Height (cm): 175.3 cm  Energy Calorie Requirements (kcal): 1828  Energy Need Method: Crisp-St Jeor (x1.25 )    RMR (Crisp-St. Jeor Equation): 1462.38     Weight Used For Protein Calculations: 79.2 kg (174 lb 9.7 oz)     1.2 gm Protein (gm): 95.24     Fluid Need Method: RDA Method (or as needed per MD)   RDA Method (mL): 1828      Assessment and Plan    Nutrition Problem  Inadequate energy intake     Related to (etiology):   Decreased appetite    Signs and Symptoms (as " evidenced by):   PO intake ~ 25 %    Interventions/Recommendations (treatment strategy):  See above    Nutrition Diagnosis Status:   New        Chronic systolic congestive heart failure    Nutrition problem:  Nutrient decreased needs (sodium, fat)    Related to (etiology):   Current medical condition    Signs and Symptoms (as evidenced by):   Hx of CHF, S/p Aortocoronary bypass CABG and replacement valve aortic and       Interventions/Recommendations (treatment strategy):  See above     Nutrition Diagnosis Status:   New              Monitor and Evaluation    Food and Nutrient Intake: energy intake, food and beverage intake  Food and Nutrient Adminstration: diet order  Knowledge/Beliefs/Attitudes: beliefs and attitudes  Anthropometric Measurements: weight  Biochemical Data, Medical Tests and Procedures: electrolyte and renal panel, glucose/endocrine profile  Nutrition-Focused Physical Findings: overall appearance      Nutrition Follow-Up    RD Follow-up?: Yes (2xweekly)

## 2017-12-01 NOTE — PLAN OF CARE
Problem: Patient Care Overview  Goal: Plan of Care Review  Outcome: Ongoing (interventions implemented as appropriate)  Recommendations     Recommendation/Intervention: 1. Continue current diet. 2. Consider adding oral supplements ~ boost plus BID.  3. Patient was educated on Cardiac diet. Pt was given a handout from the nutrition care manual on Cardiac Nutrition Therapy. Pt verbalized understanding and the importance of diet compliance. Pt was given contact information for further questions or concerns. 4. Will continue to encourage PO intake.    Goals: Intake of 85 - 100 % of meals by next RD visit  Nutrition Goal Status: new  Communication of RD Recs:  (care plan and sticky note)

## 2017-12-02 PROBLEM — I25.10 CAD (CORONARY ARTERY DISEASE): Status: ACTIVE | Noted: 2017-12-02

## 2017-12-02 PROBLEM — N17.9 AKI (ACUTE KIDNEY INJURY): Status: ACTIVE | Noted: 2017-12-02

## 2017-12-02 PROBLEM — E87.5 HYPERKALEMIA: Status: ACTIVE | Noted: 2017-12-02

## 2017-12-02 LAB
ANION GAP SERPL CALC-SCNC: 10 MMOL/L
BASOPHILS # BLD AUTO: 0.04 K/UL
BASOPHILS NFR BLD: 0.3 %
BLD PROD TYP BPU: NORMAL
BLOOD UNIT EXPIRATION DATE: NORMAL
BLOOD UNIT TYPE CODE: 6200
BLOOD UNIT TYPE: NORMAL
BUN SERPL-MCNC: 48 MG/DL
CALCIUM SERPL-MCNC: 9.1 MG/DL
CHLORIDE SERPL-SCNC: 101 MMOL/L
CO2 SERPL-SCNC: 23 MMOL/L
CODING SYSTEM: NORMAL
CREAT SERPL-MCNC: 2.6 MG/DL
DIFFERENTIAL METHOD: ABNORMAL
DISPENSE STATUS: NORMAL
EOSINOPHIL # BLD AUTO: 0 K/UL
EOSINOPHIL NFR BLD: 0.2 %
ERYTHROCYTE [DISTWIDTH] IN BLOOD BY AUTOMATED COUNT: 16.9 %
EST. GFR  (AFRICAN AMERICAN): 25 ML/MIN/1.73 M^2
EST. GFR  (NON AFRICAN AMERICAN): 21 ML/MIN/1.73 M^2
GLUCOSE SERPL-MCNC: 136 MG/DL
HCT VFR BLD AUTO: 30.7 %
HGB BLD-MCNC: 9.8 G/DL
LYMPHOCYTES # BLD AUTO: 1.3 K/UL
LYMPHOCYTES NFR BLD: 9.6 %
MAGNESIUM SERPL-MCNC: 2.4 MG/DL
MCH RBC QN AUTO: 26 PG
MCHC RBC AUTO-ENTMCNC: 31.9 G/DL
MCV RBC AUTO: 81 FL
MONOCYTES # BLD AUTO: 1.5 K/UL
MONOCYTES NFR BLD: 11 %
NEUTROPHILS # BLD AUTO: 10.4 K/UL
NEUTROPHILS NFR BLD: 78.9 %
NUM UNITS TRANS PACKED RBC: NORMAL
PLATELET # BLD AUTO: 184 K/UL
PMV BLD AUTO: 10.2 FL
POCT GLUCOSE: 136 MG/DL (ref 70–110)
POCT GLUCOSE: 146 MG/DL (ref 70–110)
POCT GLUCOSE: 192 MG/DL (ref 70–110)
POCT GLUCOSE: 193 MG/DL (ref 70–110)
POTASSIUM SERPL-SCNC: 5.4 MMOL/L
RBC # BLD AUTO: 3.77 M/UL
SODIUM SERPL-SCNC: 134 MMOL/L
WBC # BLD AUTO: 13.2 K/UL

## 2017-12-02 PROCEDURE — 27000221 HC OXYGEN, UP TO 24 HOURS

## 2017-12-02 PROCEDURE — 99223 1ST HOSP IP/OBS HIGH 75: CPT | Mod: ,,, | Performed by: INTERNAL MEDICINE

## 2017-12-02 PROCEDURE — 83735 ASSAY OF MAGNESIUM: CPT

## 2017-12-02 PROCEDURE — 25000003 PHARM REV CODE 250: Performed by: INTERNAL MEDICINE

## 2017-12-02 PROCEDURE — 63600175 PHARM REV CODE 636 W HCPCS: Performed by: PHYSICIAN ASSISTANT

## 2017-12-02 PROCEDURE — 97530 THERAPEUTIC ACTIVITIES: CPT

## 2017-12-02 PROCEDURE — 80048 BASIC METABOLIC PNL TOTAL CA: CPT

## 2017-12-02 PROCEDURE — 93005 ELECTROCARDIOGRAM TRACING: CPT

## 2017-12-02 PROCEDURE — 25000003 PHARM REV CODE 250: Performed by: NURSE PRACTITIONER

## 2017-12-02 PROCEDURE — 25000003 PHARM REV CODE 250: Performed by: PHYSICIAN ASSISTANT

## 2017-12-02 PROCEDURE — 25000003 PHARM REV CODE 250: Performed by: THORACIC SURGERY (CARDIOTHORACIC VASCULAR SURGERY)

## 2017-12-02 PROCEDURE — 93010 ELECTROCARDIOGRAM REPORT: CPT | Mod: ,,, | Performed by: INTERNAL MEDICINE

## 2017-12-02 PROCEDURE — 36415 COLL VENOUS BLD VENIPUNCTURE: CPT

## 2017-12-02 PROCEDURE — 99900035 HC TECH TIME PER 15 MIN (STAT)

## 2017-12-02 PROCEDURE — 99233 SBSQ HOSP IP/OBS HIGH 50: CPT | Mod: ,,, | Performed by: INTERNAL MEDICINE

## 2017-12-02 PROCEDURE — 21400001 HC TELEMETRY ROOM

## 2017-12-02 PROCEDURE — 85025 COMPLETE CBC W/AUTO DIFF WBC: CPT

## 2017-12-02 RX ORDER — ACETAMINOPHEN 325 MG/1
650 TABLET ORAL EVERY 6 HOURS PRN
Status: DISCONTINUED | OUTPATIENT
Start: 2017-12-02 | End: 2017-12-06 | Stop reason: HOSPADM

## 2017-12-02 RX ORDER — SODIUM CHLORIDE 9 MG/ML
INJECTION, SOLUTION INTRAVENOUS CONTINUOUS
Status: DISCONTINUED | OUTPATIENT
Start: 2017-12-02 | End: 2017-12-05

## 2017-12-02 RX ADMIN — METOPROLOL TARTRATE 25 MG: 25 TABLET ORAL at 08:12

## 2017-12-02 RX ADMIN — ACETAMINOPHEN 650 MG: 325 TABLET ORAL at 06:12

## 2017-12-02 RX ADMIN — FAMOTIDINE 20 MG: 20 TABLET ORAL at 08:12

## 2017-12-02 RX ADMIN — POLYETHYLENE GLYCOL (3350) 17 G: 17 POWDER, FOR SOLUTION ORAL at 08:12

## 2017-12-02 RX ADMIN — CLOPIDOGREL BISULFATE 75 MG: 75 TABLET ORAL at 08:12

## 2017-12-02 RX ADMIN — INSULIN ASPART 1 UNITS: 100 INJECTION, SOLUTION INTRAVENOUS; SUBCUTANEOUS at 08:12

## 2017-12-02 RX ADMIN — ASPIRIN 81 MG: 81 TABLET, COATED ORAL at 08:12

## 2017-12-02 RX ADMIN — APIXABAN 2.5 MG: 2.5 TABLET, FILM COATED ORAL at 08:12

## 2017-12-02 RX ADMIN — DOCUSATE SODIUM 100 MG: 100 CAPSULE, LIQUID FILLED ORAL at 08:12

## 2017-12-02 RX ADMIN — SODIUM CHLORIDE: 0.9 INJECTION, SOLUTION INTRAVENOUS at 10:12

## 2017-12-02 RX ADMIN — ACETAMINOPHEN 650 MG: 325 TABLET ORAL at 04:12

## 2017-12-02 RX ADMIN — SIMVASTATIN 20 MG: 20 TABLET, FILM COATED ORAL at 08:12

## 2017-12-02 RX ADMIN — ENOXAPARIN SODIUM 40 MG: 100 INJECTION SUBCUTANEOUS at 10:12

## 2017-12-02 RX ADMIN — SODIUM POLYSTYRENE SULFONATE 30 G: 15 SUSPENSION ORAL; RECTAL at 10:12

## 2017-12-02 NOTE — SUBJECTIVE & OBJECTIVE
Past Medical History:   Diagnosis Date    Aortic stenosis 8/2/2013    Atrial fibrillation 7/5/2013    CHF (congestive heart failure)     Dizziness - light-headed     Hyperlipidemia     Hypertension        Past Surgical History:   Procedure Laterality Date    BACK SURGERY  2003    knee replacemnt bilateral  2001    SHOULDER SURGERY  2002       Review of patient's allergies indicates:   Allergen Reactions    Sulfa (sulfonamide antibiotics) Hives     Current Facility-Administered Medications   Medication Frequency    acetaminophen tablet 650 mg Q6H PRN    aspirin EC tablet 81 mg Daily    chlorhexidine 0.12 % solution 10 mL BID    clopidogrel tablet 75 mg Daily    dextrose 50% injection 12.5 g PRN    dextrose 50% injection 25 g PRN    docusate sodium capsule 100 mg BID    enoxaparin injection 40 mg Q24H    famotidine tablet 20 mg Daily    glucagon (human recombinant) injection 1 mg PRN    glucose chewable tablet 16 g PRN    glucose chewable tablet 24 g PRN    hydrocodone-acetaminophen 10-325mg per tablet 1 tablet Q4H PRN    hydrocodone-acetaminophen 5-325mg per tablet 1 tablet Q4H PRN    hydrocodone-acetaminophen 7.5-325mg per tablet 1 tablet Q4H PRN    insulin aspart pen 1-10 Units QID (AC + HS) PRN    metoprolol tartrate (LOPRESSOR) tablet 25 mg BID    nozaseptin (NOZIN) nasal  BID    ondansetron injection 4 mg Q12H PRN    polyethylene glycol packet 17 g Daily    promethazine (PHENERGAN) 6.25 mg in dextrose 5 % 50 mL IVPB Q6H PRN    simvastatin tablet 20 mg QHS     Family History     Problem Relation (Age of Onset)    Heart attack Brother    Heart disease Brother    Heart failure Brother    Hypertension Mother, Father, Sister, Brother        Social History Main Topics    Smoking status: Never Smoker    Smokeless tobacco: Never Used    Alcohol use No      Comment: HOLIDAY    Drug use: No    Sexual activity: No     Review of Systems   Constitutional: Negative for fever.    HENT: Negative for congestion.    Eyes: Negative for visual disturbance.   Respiratory: Negative for cough, shortness of breath and wheezing.    Cardiovascular: Negative for chest pain and palpitations.   Gastrointestinal: Negative for abdominal pain, diarrhea, nausea and vomiting.   Genitourinary: Positive for dysuria. Negative for difficulty urinating.   Musculoskeletal: Negative for joint swelling.   Skin: Negative for rash.   Neurological: Negative for weakness and headaches.     Objective:     Vital Signs (Most Recent):  Temp: 98.3 °F (36.8 °C) (12/02/17 0700)  Pulse: 91 (12/02/17 0903)  Resp: 18 (12/02/17 0903)  BP: (!) 102/46 (12/02/17 0903)  SpO2: 97 % (12/02/17 0903)  O2 Device (Oxygen Therapy): nasal cannula (12/02/17 0903) Vital Signs (24h Range):  Temp:  [97.9 °F (36.6 °C)-98.6 °F (37 °C)] 98.3 °F (36.8 °C)  Pulse:  [67-91] 91  Resp:  [16-34] 18  SpO2:  [96 %-100 %] 97 %  BP: (102-150)/(46-91) 102/46     Weight: 86 kg (189 lb 9.5 oz) (12/02/17 0512)  Body mass index is 28 kg/m².  Body surface area is 2.05 meters squared.    I/O last 3 completed shifts:  In: 1935.6 [P.O.:830; I.V.:1105.6]  Out: 1085 [Urine:795; Chest Tube:290]    Physical Exam   Constitutional: He is oriented to person, place, and time. He appears well-developed. He is cooperative.   HENT:   Head: Normocephalic.   Nose: No rhinorrhea.   Mouth/Throat: Mucous membranes are normal. No oropharyngeal exudate.   Eyes: Pupils are equal, round, and reactive to light.   Neck: No thyroid mass present.   Cardiovascular: Normal rate, regular rhythm, S1 normal, S2 normal and intact distal pulses.    Pulmonary/Chest: Effort normal. No respiratory distress. He has no wheezes.   Abdominal: Soft. Bowel sounds are normal. He exhibits no distension. There is no tenderness. No hernia.   Musculoskeletal: He exhibits edema.   Trace LE edema.   Lymphadenopathy:     He has no cervical adenopathy.   Neurological: He is alert and oriented to person, place, and  time.   Skin: Skin is warm and dry.   Psychiatric: He has a normal mood and affect.       Significant Labs:  Lab Results   Component Value Date    CREATININE 2.6 (H) 12/02/2017    BUN 48 (H) 12/02/2017     (L) 12/02/2017    K 5.4 (H) 12/02/2017     12/02/2017    CO2 23 12/02/2017     Lab Results   Component Value Date    CALCIUM 9.1 12/02/2017     Lab Results   Component Value Date    ALBUMIN 3.6 11/28/2017     Lab Results   Component Value Date    WBC 13.20 (H) 12/02/2017    HGB 9.8 (L) 12/02/2017    HCT 30.7 (L) 12/02/2017    MCV 81 (L) 12/02/2017     12/02/2017       Recent Labs  Lab 12/02/17  0405   MG 2.4         Significant Imaging:  Imaging Results          X-Ray Chest AP Portable (Final result)  Result time 12/01/17 07:16:38    Final result by Frida Magana MD (12/01/17 07:16:38)                 Impression:     No adverse interval change      Electronically signed by: FRIDA MAGANA MD  Date:     12/01/17  Time:    07:16              Narrative:    History: Postoperative    Comparison: 11/30/17    Result: Single view of the chest.      Endotracheal and nasogastric tubes have been removed. In comparison to the prior study, there is no adverse interval changes.                             X-Ray Chest AP Portable (Final result)  Result time 11/30/17 12:21:49    Final result by Kimberly Matthews MD (11/30/17 12:21:49)                 Impression:         Postoperative changes of the chest.  No unexpected findings or evidence of complicating process.      Electronically signed by: KIMBERLY MATTHEWS MD  Date:     11/30/17  Time:    12:21              Narrative:    Exam: XR CHEST AP PORTABLE,    Date:  11/30/17 11:59:45    History: Follow up exam following surgery.    Comparison:  11/28/2017.        Findings:     Postoperative changes identified.  Median sternotomy wires are now place.  A Leon-Samina catheter is identified the distal tip overlies the distal right main pulmonary artery.   Mediastinal drains noted.  Endotracheal tube in place with the distal tip just distal to level of clavicles.  Nasogastric tube in place.  Distal tip overlies the proximal to mid body the stomach.  Lungs demonstrate increased interstitial markings thought to represent chronic change.  No significant pulmonary vascular congestion or evidence of heart failure.

## 2017-12-02 NOTE — PLAN OF CARE
Problem: Patient Care Overview  Goal: Plan of Care Review  PT REQUIRES MOD A FOR SIT>STAND WITH POST LEAN   Outcome: Ongoing (interventions implemented as appropriate)  Patient rested well overnight.  No PRN pain meds given, only complaints 2/10 incisional pain from chest tubes.  Patient slept most of the night.  CARDIAC: no dysrhythmias, vital signs stable without pressor or inotropic IV support.  Chest tube output appx 40mL serosanguinous fluid this shift..  PULMONARY O2 sats >92% on 3lpm NC, no acute resp issues.  : pt voiding spontaneously post cath removal.  Patient and daughter updated to plan of care, no questions at this time.

## 2017-12-02 NOTE — PROGRESS NOTES
[facility] Hospital Medicine  Progress Note    Patient Name: Chung Meneses Sr.  MRN: [id number]  Patient Class: IP- Inpatient   Admission Date: 11/30/2017  Length of Stay: 1 days  Attending Physician: Shaq Boateng MD  Primary Care Provider: Glenroy Carmichael MD        Subjective:     Principal Problem:Nonrheumatic aortic valve stenosis    HPI:  Chung Meneses is an 86 year old male with a history of CAD, hypertension and aortic stenosis who underwent CABG with bioprosthetic AVR performed by Dr. Boateng on 12/30/17. His post-operative course remains unremarkable and he is intubated at the time of exam.     Hospital Course:  No notes on file    Interval History:  Expected post-surgical chest pain.  Tolerated small amounts of food.  Mild shortness of breath.    Review of Systems   Constitutional: Negative.  Negative for chills and fever.   HENT: Negative.  Negative for congestion and sore throat.    Eyes: Negative.  Negative for visual disturbance.   Respiratory: Positive for chest tightness and shortness of breath. Negative for cough and wheezing.    Cardiovascular: Positive for chest pain.   Gastrointestinal: Negative for abdominal pain, diarrhea, nausea and vomiting.   Endocrine: Negative.    Genitourinary: Negative.    Musculoskeletal: Negative.  Negative for myalgias and neck stiffness.   Skin: Negative.  Negative for color change and pallor.   Allergic/Immunologic: Negative.    Neurological: Negative.    Hematological: Negative.    Psychiatric/Behavioral: Negative.    All other systems reviewed and are negative.    Objective:     Vital Signs (Most Recent):  Temp: 98 °F (36.7 °C) (12/01/17 1905)  Pulse: 72 (12/01/17 1951)  Resp: (!) 22 (12/01/17 1951)  BP: (!) 115/59 (12/01/17 1905)  SpO2: 98 % (12/01/17 1951) Vital Signs (24h Range):  Temp:  [97.9 °F (36.6 °C)-100.4 °F (38 °C)] 98 °F (36.7 °C)  Pulse:  [] 72  Resp:  [14-34] 22  SpO2:  [93 %-100 %] 98 %  BP: ()/(44-91) 115/59      Weight: 79.2 kg (174 lb 9.7 oz)  Body mass index is 25.78 kg/m².    Intake/Output Summary (Last 24 hours) at 12/01/17 2219  Last data filed at 12/01/17 2005   Gross per 24 hour   Intake             1490 ml   Output              575 ml   Net              915 ml      Physical Exam   Constitutional: He is oriented to person, place, and time. He appears well-developed and well-nourished. No distress.   HENT:   Head: Normocephalic and atraumatic.   Mouth/Throat: Oropharynx is clear and moist.   Eyes: Conjunctivae and EOM are normal. Pupils are equal, round, and reactive to light.   Neck: No JVD present. No thyromegaly present.   Cardiovascular: Normal rate, regular rhythm and normal heart sounds.  Exam reveals no gallop and no friction rub.    No murmur heard.  Sternotomy wound dressed clean and intact.  Mediastinal drains in place draining sero-sanguinous fluid.   Pulmonary/Chest: Effort normal and breath sounds normal. No respiratory distress. He has no wheezes. He has no rales.   Abdominal: Soft. Bowel sounds are normal. He exhibits no distension. There is no tenderness. There is no rebound and no guarding.   Musculoskeletal: Normal range of motion. He exhibits no edema or tenderness.   Lymphadenopathy:     He has no cervical adenopathy.   Neurological: He is alert and oriented to person, place, and time. He has normal reflexes. He displays normal reflexes. No cranial nerve deficit.   Skin: Skin is warm and dry. No rash noted. He is not diaphoretic. No erythema.   Psychiatric: He has a normal mood and affect. His behavior is normal. Judgment and thought content normal.       Significant Labs:   CBC:   Recent Labs  Lab 11/30/17  1007 11/30/17  1008 11/30/17  1112 12/01/17  0428   WBC  --   --  11.36 10.82   HGB 8.5*  --  8.2* 9.4*   HCT 27.1* 27* 26.5* 29.0*     --  177 186     CMP:   Recent Labs  Lab 11/30/17  1112 12/01/17  0428    136   K 4.9  4.9 4.9    106   CO2 24 21*   * 154*   BUN  28* 30*   CREATININE 1.3 1.6*   CALCIUM 9.9 9.1   ANIONGAP 8 9   EGFRNONAA 49* 38*       Significant Imaging: I have reviewed all pertinent imaging results/findings within the past 24 hours.    Assessment/Plan:      * Nonrheumatic aortic valve stenosis    -post-operative Cardiac management per CVT Surgery and Critical Care team.   -Monitor blood glucose.   -Continue Nozin per protocol.        Chronic systolic congestive heart failure    -Monitor volume status.   -Continue metoprolol and Lasix.        Paroxysmal atrial fibrillation    -Rate controlled.   -Continue metoprolol.   -Restart Xarelto, when appropriate. Of note, patient with atypical dose of 15 mg daily.         Hyperlipidemia    Restart statin, when appropriate.         Hypertension    Monitor postoperatively.           VTE Risk Mitigation         Ordered     enoxaparin injection 40 mg  Every 24 hours (non-standard times)     Route:  Subcutaneous        11/30/17 1112     Medium Risk of VTE  Once      11/30/17 1112     Place GRIS hose  Until discontinued      11/30/17 1112     Place sequential compression device  Until discontinued      11/30/17 1112          Critical care time spent on the evaluation and treatment of severe organ dysfunction, review of pertinent labs and imaging studies, discussions with consulting providers and discussions with patient/family: 30 minutes.    Jake Eastman MD  Department of Hospital Medicine   [facility]

## 2017-12-02 NOTE — PT/OT/SLP PROGRESS
Physical Therapy  Treatment    Chung Meneses .   MRN: [id number]   Admitting Diagnosis: Nonrheumatic aortic valve stenosis    PT Received On: 12/02/17  PT Start Time: 0922     PT Stop Time: 0940    PT Total Time (min): 18 min       Billable Minutes:  Therapeutic Activity 18    Treatment Type: Treatment  PT/PTA: PT             General Precautions: Standard, sternal  Orthopedic Precautions: N/A   Braces:           Subjective:  Communicated with Omero and Sharad prior to session.      Pain/Comfort  Pain Rating 1: 2/10  Location 1: chest  Pain Addressed 1: Pre-medicate for activity  Pain Rating Post-Intervention 1: 2/10    Objective:   Patient found with: oxygen, peripheral IV, pulse ox (continuous), telemetry    Functional Mobility:  Bed Mobility:   Rolling/Turning to Left:  (pt up in chair)    Transfers:  Sit <> Stand Assistance: Moderate Assistance  Sit <> Stand Assistive Device: No Assistive Device    Gait:   Gait Distance: 60'  Assistance 1: Minimum assistance  Gait Assistive Device: No device (holding cardiac pillow)  Gait Pattern: swing-through gait  Gait Deviation(s): decreased minoo, increased time in double stance, decreased step length    Stairs:      Balance:   Static Sit: GOOD-: Takes MODERATE challenges from all directions but inconsistently  Dynamic Sit: FAIR+: Maintains balance through MINIMAL excursions of active trunk motion  Static Stand: POOR+: Needs MINIMAL assist to maintain  Dynamic stand: POOR: N/A     Therapeutic Activities and Exercises:  Standing position 5:00 x2 min A balance, patient with orthostatic hypotension upon change in position, increased time to decrease.  Ambulate 60' holding pillow with min A for balance.       AM-PAC 6 CLICK MOBILITY  How much help from another person does this patient currently need?   1 = Unable, Total/Dependent Assistance  2 = A lot, Maximum/Moderate Assistance  3 = A little, Minimum/Contact Guard/Supervision  4 = None, Modified  [place]/Independent    Turning over in bed (including adjusting bedclothes, sheets and blankets)?:  (not tested)  Sitting down on and standing up from a chair with arms (e.g., wheelchair, bedside commode, etc.): 3  Moving from lying on back to sitting on the side of the bed?:  (not tested)  Moving to and from a bed to a chair (including a wheelchair)?: 3  Need to walk in hospital room?: 2  Climbing 3-5 steps with a railing?: 1    AM-PAC Raw Score CMS G-Code Modifier Level of Impairment Assistance   6 % Total / Unable   7 - 9 CM 80 - 100% Maximal Assist   10 - 14 CL 60 - 80% Moderate Assist   15 - 19 CK 40 - 60% Moderate Assist   20 - 22 CJ 20 - 40% Minimal Assist   23 CI 1-20% SBA / CGA   24 CH 0% Independent/ Mod I     Patient left up in chair with all lines intact, call button in reach and daughter present.    Assessment:  Chung Meneses  is a 86 y.o. male with a medical diagnosis of Nonrheumatic aortic valve stenosis and presents with gait instability.    Rehab identified problem list/impairments: Rehab identified problem list/impairments: weakness, impaired endurance, gait instability, impaired functional mobilty, impaired balance    Rehab potential is good.    Activity tolerance: Fair    Discharge recommendations: Discharge Facility/Level Of Care Needs: home     Barriers to discharge: Barriers to Discharge: None    Equipment recommendations: Equipment Needed After Discharge: none     GOALS:    Physical Therapy Goals        Problem: Physical Therapy Goal    Goal Priority Disciplines Outcome Goal Variances Interventions   Physical Therapy Goal     PT/OT, PT      Description:  PT WILL BE SEEN FOR P.T. FOR A MIN OF 5 OUT OF 7 DAYS A WEEK  LT17  1. PT WILL COMPLETE BED MOBILITY WITH CGA.  2. PT WILL T/F TO CHAIR WITH S  3. PT WILL GT TRAIN WITH S X 300'   4. PT WILL BE IND WITH ALL STERNAL PRECAUTIONS  5. PT WILL COMPLETE B LE TE X 20 REPS                    PLAN:    Patient to be seen  (pt  will be seen a min of 5-7 days as tolerated)  to address the above listed problems via gait training, therapeutic exercises, therapeutic activities  Plan of Care expires: 12/08/17  Plan of Care reviewed with: patient, daughter         Yokasta Carver, PT  12/02/2017

## 2017-12-02 NOTE — HPI
86 year old male with h/o aortic stenosis, atrial fibrillation, CHF, HTN, hyperlipidemia, CAD [place] [facility] CABG and AVR (bioprosthetic valve). Workup revealed XIANG with creatinine increasing from 1.3 on 11/30/17 to 2.6 on 12/2/17. Patient now oliguric.   Nephrology was consulted to help with patient's renal care while [place] is [place] [facility] I saw and examined patient in his hospital room. Patient is resting in chair. He denies any chest pain, SOB, nausea/cvomiting, diarrhea, LE edema. Dawson catheter was removed yesterday and patient uses urinal. He reports mild dysuria with urination.  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

## 2017-12-02 NOTE — ASSESSMENT & PLAN NOTE
Patient presents with XIANG and creatinine has increased from 1.3 on 11/30/17 to 2.6 on 12/2/17. Patient is oliguric with UOP of about 700 ml.  XIANG likely related to recent cardiac surgery (CABG and bioprosthetic AVR) and XIANG is likely due to ATN. Will monitor renal function closely and start mild hydration (NS at 50 cc/h). No need for HD at present.

## 2017-12-02 NOTE — ASSESSMENT & PLAN NOTE
12/2 - POD 2 CAB - pt following expected recovery.  Stating resumed, on beta blocker, will follow

## 2017-12-02 NOTE — PROGRESS NOTES
Cardiology Progress Note        SUBJECTIVE:     History of Present Illness:    Pt seen and examined in ICU earlier this AM.  Denied cp sxs.  No acute dyspnea.  A fib on monitor, controlled HR.  Labs show worsening creatinine.        OBJECTIVE:     Vital Signs (Most Recent)  Temp: 96.6 °F (35.9 °C) (12/02/17 1535)  Pulse: 98 (12/02/17 1535)  Resp: 20 (12/02/17 1535)  BP: (!) 128/59 (12/02/17 1535)  SpO2: 99 % (12/02/17 1535)    Vital Signs Range (Last 24H):  Temp:  [96.6 °F (35.9 °C)-98.6 °F (37 °C)]   Pulse:  [69-98]   Resp:  [17-25]   BP: (102-150)/(46-91)   SpO2:  [97 %-100 %]     Intake/Output last 3 shifts:  I/O last 3 completed shifts:  In: 1935.6 [P.O.:830; I.V.:1105.6]  Out: 1085 [Urine:795; Chest Tube:290]    Intake/Output this shift:  I/O this shift:  In: 240 [P.O.:240]  Out: -     Review of patient's allergies indicates:   Allergen Reactions    Sulfa (sulfonamide antibiotics) Hives       Current Facility-Administered Medications   Medication    0.9%  NaCl infusion    acetaminophen tablet 650 mg    apixaban tablet 2.5 mg    aspirin EC tablet 81 mg    chlorhexidine 0.12 % solution 10 mL    dextrose 50% injection 12.5 g    dextrose 50% injection 25 g    docusate sodium capsule 100 mg    famotidine tablet 20 mg    glucagon (human recombinant) injection 1 mg    glucose chewable tablet 16 g    glucose chewable tablet 24 g    hydrocodone-acetaminophen 10-325mg per tablet 1 tablet    hydrocodone-acetaminophen 5-325mg per tablet 1 tablet    hydrocodone-acetaminophen 7.5-325mg per tablet 1 tablet    insulin aspart pen 1-10 Units    metoprolol tartrate (LOPRESSOR) tablet 25 mg    nozaseptin (NOZIN) nasal     ondansetron injection 4 mg    polyethylene glycol packet 17 g    promethazine (PHENERGAN) 6.25 mg in dextrose 5 % 50 mL IVPB    simvastatin tablet 20 mg     No current facility-administered medications on file prior to encounter.      Current Outpatient Prescriptions on File Prior  to Encounter   Medication Sig    amlodipine (NORVASC) 5 MG tablet Take 5 mg by mouth once daily.    metoprolol tartrate (LOPRESSOR) 25 MG tablet Take 25 mg by mouth 2 (two) times daily. Takes one-half tablet by mouth twice a day    fish oil-omega-3 fatty acids 300-1,000 mg capsule Take 1,200 mg by mouth once daily.    furosemide (LASIX) 40 MG tablet Take 40 mg by mouth.    multivitamin capsule Take 1 capsule by mouth once daily.    multivitamin with folic acid 400 mcg Tab Take 1 tablet by mouth.    niacin, inositol niacinate, (NIACIN FLUSH FREE) 400 mg Cap Take 500 mg by mouth once daily.    ranitidine (ZANTAC) 75 MG tablet Take 75 mg by mouth 2 (two) times daily.    rivaroxaban (XARELTO) 15 mg Tab Take 1 tablet (15 mg total) by mouth daily with dinner or evening meal. Since 2015 never increased    simvastatin (ZOCOR) 20 MG tablet Take 20 mg by mouth every evening.    VENTOLIN HFA 90 mcg/actuation inhaler INHALE 1 TO 2 PUFFS 4 TIMES A DAY AS NEEDED FOR WHEEZING       Physical Exam:  General appearance: alert, appears stated age and cooperative  Head: Normocephalic, without obvious abnormality, atraumatic  Eyes: EOM's intact.   Neck: no adenopathy, no carotid bruit, + JVD, supple, symmetrical, trachea midline and thyroid not enlarged, symmetric, no tenderness/mass/nodules  Lungs: diminished breath sounds at bases  Chest wall: median sternotomy site healing well, no drainage noted  Heart: irregular irregular rhythm, S1, S2 normal, no murmur, click, rub or gallop  Abdomen: soft, non-tender; bowel sounds normal; no masses,  no organomegaly  Extremities: extremities normal, atraumatic, no cyanosis or edema  Pulses: 2+ and symmetric  Skin: Skin color, texture, turgor normal. No rashes or lesions  Neurologic: Grossly normal    Laboratory:  Chemistry:   Lab Results   Component Value Date     (L) 12/02/2017    K 5.4 (H) 12/02/2017     12/02/2017    CO2 23 12/02/2017    BUN 48 (H) 12/02/2017     CREATININE 2.6 (H) 12/02/2017    CALCIUM 9.1 12/02/2017     Cardiac Markers: No results found for: CKTOTAL, CKMB, CKMBINDEX, TROPONINI  Cardiac Markers (Last 3): No results found for: CKTOTAL, CKMB, CKMBINDEX, TROPONINI  CBC:   Lab Results   Component Value Date    WBC 13.20 (H) 12/02/2017    HGB 9.8 (L) 12/02/2017    HCT 30.7 (L) 12/02/2017    HCT 27 (L) 11/30/2017    MCV 81 (L) 12/02/2017     12/02/2017     Lipids:   Lab Results   Component Value Date    CHOL 125 04/19/2017    TRIG 61 04/19/2017    HDL 35 (L) 04/19/2017     Coagulation:   Lab Results   Component Value Date    INR 1.2 11/30/2017    APTT 32.4 (H) 11/30/2017       Diagnostic Results:  ECG: Reviewed  X-Ray: Reviewed  US: Reviewed  CT: Reviewed  Echo: Reviewed      ASSESSMENT/PLAN:     Patient Active Problem List   Diagnosis    Hypertension    Hyperlipidemia    Atrial fibrillation    Light headed    Dizziness and giddiness    Nonrheumatic aortic valve stenosis    Pain of left lower extremity    Chronic systolic congestive heart failure    CHF (congestive heart failure)    Pulmonary hypertension    Hyperglycemia, unspecified    S/P AVR    XIANG (acute kidney injury)    Hyperkalemia    CAD (coronary artery disease)        Plan:     Continue post op care/mgt.  Start Eliquis 2.5 mg bid for PAF CVA protection (renal dose) (pt was on Xarelto for PAF prior to surgery, recommend Eliquis for pt as I think it is better tolerated and might have less risk of bleeding than Xarelto) .  Discussed indications, risks/benefits, lack of antidote with pt/family).  Stop plavix but continue 81 mg ASA qd.  F/u Nephrology recs for renal failure.    Diurese prn for any worsening CHF sxs.  LVEF in 40 - 50% range.  Recheck labs in am.  Metoprolol bid for a fib HR control.  Overall doing ok at his age, s/p major surgery.

## 2017-12-02 NOTE — CONSULTS
[facility] -   Nephrology  Consult Note    Patient Name: Chung Meneses Sr.  MRN: [id number]  Admission Date: 11/30/2017  Hospital Length of Stay: 2 days  Attending Provider: Shaq Boateng MD   Primary Care Physician: Glenroy Carmichael MD  Principal Problem:Nonrheumatic aortic valve stenosis    Consults  Subjective:     HPI: 86 year old male with h/o aortic stenosis, atrial fibrillation, CHF, HTN, hyperlipidemia, CAD [place] [facility] [place] elective CABG and AVR (bioprosthetic valve). Workup revealed XIANG with creatinine increasing from 1.3 on 11/30/17 to 2.6 on 12/2/17. Patient now oliguric.   Nephrology was consulted to help with patient's renal care while [place] [facility] I saw and examined patient in his hospital room. Patient is resting in chair. He denies any chest pain, SOB, nausea/cvomiting, diarrhea, LE edema. Dawson catheter was removed yesterday and patient uses urinal. He reports mild dysuria with urination.  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     Past Medical History:   Diagnosis Date    Aortic stenosis 8/2/2013    Atrial fibrillation 7/5/2013    CHF (congestive heart failure)     Dizziness - light-headed     Hyperlipidemia     Hypertension        Past Surgical History:   Procedure Laterality Date    BACK SURGERY  2003    knee replacemnt bilateral  2001    SHOULDER SURGERY  2002       Review of patient's allergies indicates:   Allergen Reactions    Sulfa (sulfonamide antibiotics) Hives     Current Facility-Administered Medications   Medication Frequency    acetaminophen tablet 650 mg Q6H PRN    aspirin EC tablet 81 mg Daily    chlorhexidine 0.12 % solution 10 mL BID    clopidogrel tablet 75 mg Daily    dextrose 50% injection 12.5 g PRN    dextrose 50% injection 25 g PRN    docusate sodium capsule 100 mg BID    enoxaparin injection 40 mg Q24H    famotidine tablet 20 mg Daily    glucagon (human  recombinant) injection 1 mg PRN    glucose chewable tablet 16 g PRN    glucose chewable tablet 24 g PRN    hydrocodone-acetaminophen 10-325mg per tablet 1 tablet Q4H PRN    hydrocodone-acetaminophen 5-325mg per tablet 1 tablet Q4H PRN    hydrocodone-acetaminophen 7.5-325mg per tablet 1 tablet Q4H PRN    insulin aspart pen 1-10 Units QID (AC + HS) PRN    metoprolol tartrate (LOPRESSOR) tablet 25 mg BID    nozaseptin (NOZIN) nasal  BID    ondansetron injection 4 mg Q12H PRN    polyethylene glycol packet 17 g Daily    promethazine (PHENERGAN) 6.25 mg in dextrose 5 % 50 mL IVPB Q6H PRN    simvastatin tablet 20 mg QHS     Family History     Problem Relation (Age of Onset)    Heart attack Brother    Heart disease Brother    Heart failure Brother    Hypertension Mother, Father, Sister, Brother        Social History Main Topics    Smoking status: Never Smoker    Smokeless tobacco: Never Used    Alcohol use No      Comment: HOLIDAY    Drug use: No    Sexual activity: No     Review of Systems   Constitutional: Negative for fever.   HENT: Negative for congestion.    Eyes: Negative for visual disturbance.   Respiratory: Negative for cough, shortness of breath and wheezing.    Cardiovascular: Negative for chest pain and palpitations.   Gastrointestinal: Negative for abdominal pain, diarrhea, nausea and vomiting.   Genitourinary: Positive for dysuria. Negative for difficulty urinating.   Musculoskeletal: Negative for joint swelling.   Skin: Negative for rash.   Neurological: Negative for weakness and headaches.     Objective:     Vital Signs (Most Recent):  Temp: 98.3 °F (36.8 °C) (12/02/17 0700)  Pulse: 91 (12/02/17 0903)  Resp: 18 (12/02/17 0903)  BP: (!) 102/46 (12/02/17 0903)  SpO2: 97 % (12/02/17 0903)  O2 Device (Oxygen Therapy): nasal cannula (12/02/17 0903) Vital Signs (24h Range):  Temp:  [97.9 °F (36.6 °C)-98.6 °F (37 °C)] 98.3 °F (36.8 °C)  Pulse:  [67-91] 91  Resp:  [16-34] 18  SpO2:  [96  %-100 %] 97 %  BP: (102-150)/(46-91) 102/46     Weight: 86 kg (189 lb 9.5 oz) (12/02/17 0512)  Body mass index is 28 kg/m².  Body surface area is 2.05 meters squared.    I/O last 3 completed shifts:  In: 1935.6 [P.O.:830; I.V.:1105.6]  Out: 1085 [Urine:795; Chest Tube:290]    Physical Exam   Constitutional: He is oriented to person, place, and time. He appears well-developed. He is cooperative.   HENT:   Head: Normocephalic.   Nose: No rhinorrhea.   Mouth/Throat: Mucous membranes are normal. No oropharyngeal exudate.   Eyes: Pupils are equal, round, and reactive to light.   Neck: No thyroid mass present.   Cardiovascular: Normal rate, regular rhythm, S1 normal, S2 normal and intact distal pulses.    Pulmonary/Chest: Effort normal. No respiratory distress. He has no wheezes.   Abdominal: Soft. Bowel sounds are normal. He exhibits no distension. There is no tenderness. No hernia.   Musculoskeletal: He exhibits edema.   Trace LE edema.   Lymphadenopathy:     He has no cervical adenopathy.   Neurological: He is alert and oriented to person, place, and time.   Skin: Skin is warm and dry.   Psychiatric: He has a normal mood and affect.       Significant Labs:  Lab Results   Component Value Date    CREATININE 2.6 (H) 12/02/2017    BUN 48 (H) 12/02/2017     (L) 12/02/2017    K 5.4 (H) 12/02/2017     12/02/2017    CO2 23 12/02/2017     Lab Results   Component Value Date    CALCIUM 9.1 12/02/2017     Lab Results   Component Value Date    ALBUMIN 3.6 11/28/2017     Lab Results   Component Value Date    WBC 13.20 (H) 12/02/2017    HGB 9.8 (L) 12/02/2017    HCT 30.7 (L) 12/02/2017    MCV 81 (L) 12/02/2017     12/02/2017       Recent Labs  Lab 12/02/17  0405   MG 2.4         Significant Imaging:  Imaging Results          X-Ray Chest AP Portable (Final result)  Result time 12/01/17 07:16:38    Final result by Sharad Wall MD (12/01/17 07:16:38)                 Impression:     No adverse interval  change      Electronically signed by: FRIDA MAGANA MD  Date:     12/01/17  Time:    07:16              Narrative:    History: Postoperative    Comparison: 11/30/17    Result: Single view of the chest.      Endotracheal and nasogastric tubes have been removed. In comparison to the prior study, there is no adverse interval changes.                             X-Ray Chest AP Portable (Final result)  Result time 11/30/17 12:21:49    Final result by Kimberly Matthews MD (11/30/17 12:21:49)                 Impression:         Postoperative changes of the chest.  No unexpected findings or evidence of complicating process.      Electronically signed by: KIMBERLY MATTHEWS MD  Date:     11/30/17  Time:    12:21              Narrative:    Exam: XR CHEST AP PORTABLE,    Date:  11/30/17 11:59:45    History: Follow up exam following surgery.    Comparison:  11/28/2017.        Findings:     Postoperative changes identified.  Median sternotomy wires are now place.  A [place] catheter is identified the distal tip overlies the distal right main pulmonary artery.  Mediastinal drains noted.  Endotracheal tube in place with the distal tip just distal to level of clavicles.  Nasogastric tube in place.  Distal tip overlies the proximal to mid body the stomach.  Lungs demonstrate increased interstitial markings thought to represent chronic change.  No significant pulmonary vascular congestion or evidence of heart failure.                                  Assessment/Plan:     Hyperkalemia    Will start Kayexalate.         XIANG (acute kidney injury)    Patient presents with XIANG and creatinine has increased from 1.3 on 11/30/17 to 2.6 on 12/2/17. Patient is oliguric with UOP of about 700 ml.  XIANG likely related to recent cardiac surgery (CABG and bioprosthetic AVR) and XIANG is likely due to ATN. Will monitor renal function closely and start mild hydration (NS at 50 cc/h). No need for HD at present.             Thank you for your  consult. I will follow-up with patient. Please contact us if you have any additional questions.    Romulo Rodrigez MD  Nephrology  [facility] [place]

## 2017-12-02 NOTE — ASSESSMENT & PLAN NOTE
-post-operative Cardiac management per CVT Surgery and Critical Care team.   -Monitor blood glucose.   -Continue Nozin per protocol.    12/2 - POD 2, from AVR, pain controlled, no cardiovasc symptoms, on anticoagulation.

## 2017-12-02 NOTE — PLAN OF CARE
Problem: Physical Therapy Goal  Goal: Physical Therapy Goal  PT WILL BE SEEN FOR P.T. FOR A MIN OF 5 OUT OF 7 DAYS A WEEK  LT17  1. PT WILL COMPLETE BED MOBILITY WITH CGA.  2. PT WILL T/F TO CHAIR WITH S  3. PT WILL GT TRAIN WITH S X 300'   4. PT WILL BE IND WITH ALL STERNAL PRECAUTIONS  5. PT WILL COMPLETE B LE TE X 20 REPS   Outcome: Ongoing (interventions implemented as appropriate)  Sit to stand mod A.  Progressed ambulation, distance limited secondary to lightheadedness.

## 2017-12-02 NOTE — PROGRESS NOTES
CVT Cardiothoracic Surgery    PLAN:  -d/c CT  -transfer to [facility] [place], hold Lasix and K, Consult Renal   - Ok to start Xarellto, chest tubes and TPW removed       Nelsy Conteer    POD 3: AVR (tissue) & CABG x 1  Status:  Telemetry  Interval History:  No new events overnight, in and out of a fib     Pressors: None    Rhythm: Afib, rate 80s-100s  Respiratory: No dyspnea at rest with NC  Tubes: 60CC/12hrs   Extremity: No peripheral edema noted  Sternum: Stable    Wounds: Clean, dry and intact M/S and LLE  Mobility: Ambulating with assistance      Labs:  BMP  Lab Results   Component Value Date     (L) 12/02/2017    K 5.4 (H) 12/02/2017     12/02/2017    CO2 23 12/02/2017    BUN 48 (H) 12/02/2017    CREATININE 2.6 (H) 12/02/2017    CALCIUM 9.1 12/02/2017    ANIONGAP 10 12/02/2017    ESTGFRAFRICA 25 (A) 12/02/2017    EGFRNONAA 21 (A) 12/02/2017     Lab Results   Component Value Date    WBC 13.20 (H) 12/02/2017    HGB 9.8 (L) 12/02/2017    HCT 30.7 (L) 12/02/2017    MCV 81 (L) 12/02/2017     12/02/2017       Imaging: CXR    VITAL SIGNS: 24 HR MIN & MAX LAST    Temp  Min: 97.9 °F (36.6 °C)  Max: 99.5 °F (37.5 °C)  98.6 °F (37 °C)        BP  Min: 89/44  Max: 150/63  (!) 140/72     Pulse  Min: 67  Max: 110  78     Resp  Min: 14  Max: 34  (!) 21    SpO2  Min: 93 %  Max: 100 %  100 %      Intake/Output:  I/O this shift:  In: 120 [P.O.:120]  Out: 210 [Urine:150; Chest Tube:60]  I/O last 3 completed shifts:  In: 5222.5 [P.O.:660; I.V.:3489.5; Blood:1073]  Out: 1401 [Urine:921; Chest Tube:480]

## 2017-12-02 NOTE — PROGRESS NOTES
1905 Assumed client care at this time.  Pt resting comfortably in bed with only mild intermittent complaints of 2/10 pain from chest tubes.  All vital signs stable at this time.  O2 sats via pulse oximetry >92% on 3-4lpm NC.  Midline incision clean dry and intact.  Pacer wires secured and isolated, pacemaker not attached but at bedside  Patient and daughter updated to plan of care.      0605 Pt not ambulated due to significant orthostatic hypotension.

## 2017-12-02 NOTE — SUBJECTIVE & OBJECTIVE
Review of Systems   Constitutional: Negative.  Negative for chills and fever.   HENT: Negative.  Negative for congestion and sore throat.    Eyes: Negative.  Negative for visual disturbance.   Respiratory: Negative for cough, chest tightness, shortness of breath and wheezing.    Cardiovascular: Negative for chest pain.   Gastrointestinal: Negative for abdominal pain, diarrhea, nausea and vomiting.   Endocrine: Negative.    Genitourinary: Negative.  Negative for difficulty urinating.   Musculoskeletal: Negative.  Negative for myalgias and neck stiffness.   Skin: Negative.  Negative for color change and pallor.   Allergic/Immunologic: Negative.    Neurological: Negative.  Negative for dizziness, facial asymmetry, light-headedness and headaches.   Hematological: Negative.    Psychiatric/Behavioral: Negative.  Negative for agitation, behavioral problems and confusion.   All other systems reviewed and are negative.    Objective:     Vital Signs (Most Recent):  Temp: 98.2 °F (36.8 °C) (12/02/17 1100)  Pulse: 89 (12/02/17 1100)  Resp: (!) 21 (12/02/17 1100)  BP: (!) 143/65 (12/02/17 1100)  SpO2: 98 % (12/02/17 1100) Vital Signs (24h Range):  Temp:  [98 °F (36.7 °C)-98.6 °F (37 °C)] 98.2 °F (36.8 °C)  Pulse:  [68-91] 89  Resp:  [16-34] 21  SpO2:  [96 %-100 %] 98 %  BP: (102-150)/(46-91) 143/65     Weight: 86 kg (189 lb 9.5 oz)  Body mass index is 28 kg/m².    Intake/Output Summary (Last 24 hours) at 12/02/17 1150  Last data filed at 12/02/17 0900   Gross per 24 hour   Intake              770 ml   Output              566 ml   Net              204 ml      Physical Exam   Constitutional: He is oriented to person, place, and time. He appears well-developed and well-nourished. No distress.   HENT:   Head: Normocephalic and atraumatic.   Mouth/Throat: Oropharynx is clear and moist.   Eyes: Conjunctivae and EOM are normal. Pupils are equal, round, and reactive to light.   Neck: No JVD present. No thyromegaly present.    Cardiovascular: Normal rate, regular rhythm and normal heart sounds.  Exam reveals no gallop and no friction rub.    No murmur heard.  Sternotomy wound dressed clean and intact.  Mediastinal drains removed.   Pulmonary/Chest: Effort normal and breath sounds normal. No respiratory distress. He has no wheezes. He has no rales.   Abdominal: Soft. Bowel sounds are normal. He exhibits no distension. There is no tenderness. There is no rebound and no guarding.   Musculoskeletal: Normal range of motion. He exhibits no edema or tenderness.   Lymphadenopathy:     He has no cervical adenopathy.   Neurological: He is alert and oriented to person, place, and time. He has normal reflexes. He displays normal reflexes. No cranial nerve deficit.   Skin: Skin is warm and dry. Capillary refill takes 2 to 3 seconds. No rash noted. He is not diaphoretic. No erythema.   Psychiatric: He has a normal mood and affect. His behavior is normal. Judgment and thought content normal.       Significant Labs: All pertinent labs within the past 24 hours have been reviewed.    Significant Imaging: I have reviewed and interpreted all pertinent imaging results/findings within the past 24 hours.

## 2017-12-02 NOTE — PROGRESS NOTES
[facility] Hospital Medicine  Progress Note    Patient Name: Chung Meneses Sr.  MRN: [id number]  Patient Class: IP- Inpatient   Admission Date: 11/30/2017  Length of Stay: 2 days  Attending Physician: Shqa Boateng MD  Primary Care Provider: Glenroy Carmichael MD        Subjective:     Principal Problem:Nonrheumatic aortic valve stenosis    HPI:  Chung Meneses is an 86 year old male with a history of CAD, hypertension and aortic stenosis who underwent CABG with bioprosthetic AVR performed by Dr. Boateng on 12/30/17. His post-operative course remains unremarkable and he is intubated at the time of exam.     Hospital Course:  12/2 - Here with daughter this Am.  pt much improved, is passing gas, seen by CVT and cards this Am.          Review of Systems   Constitutional: Negative.  Negative for chills and fever.   HENT: Negative.  Negative for congestion and sore throat.    Eyes: Negative.  Negative for visual disturbance.   Respiratory: Negative for cough, chest tightness, shortness of breath and wheezing.    Cardiovascular: Negative for chest pain.   Gastrointestinal: Negative for abdominal pain, diarrhea, nausea and vomiting.   Endocrine: Negative.    Genitourinary: Negative.  Negative for difficulty urinating.   Musculoskeletal: Negative.  Negative for myalgias and neck stiffness.   Skin: Negative.  Negative for color change and pallor.   Allergic/Immunologic: Negative.    Neurological: Negative.  Negative for dizziness, facial asymmetry, light-headedness and headaches.   Hematological: Negative.    Psychiatric/Behavioral: Negative.  Negative for agitation, behavioral problems and confusion.   All other systems reviewed and are negative.    Objective:     Vital Signs (Most Recent):  Temp: 98.2 °F (36.8 °C) (12/02/17 1100)  Pulse: 89 (12/02/17 1100)  Resp: (!) 21 (12/02/17 1100)  BP: (!) 143/65 (12/02/17 1100)  SpO2: 98 % (12/02/17 1100) Vital Signs (24h Range):  Temp:  [98 °F (36.7 °C)-98.6 °F (37  °C)] 98.2 °F (36.8 °C)  Pulse:  [68-91] 89  Resp:  [16-34] 21  SpO2:  [96 %-100 %] 98 %  BP: (102-150)/(46-91) 143/65     Weight: 86 kg (189 lb 9.5 oz)  Body mass index is 28 kg/m².    Intake/Output Summary (Last 24 hours) at 12/02/17 1150  Last data filed at 12/02/17 0900   Gross per 24 hour   Intake              770 ml   Output              566 ml   Net              204 ml      Physical Exam   Constitutional: He is oriented to person, place, and time. He appears well-developed and well-nourished. No distress.   HENT:   Head: Normocephalic and atraumatic.   Mouth/Throat: Oropharynx is clear and moist.   Eyes: Conjunctivae and EOM are normal. Pupils are equal, round, and reactive to light.   Neck: No JVD present. No thyromegaly present.   Cardiovascular: Normal rate, regular rhythm and normal heart sounds.  Exam reveals no gallop and no friction rub.    No murmur heard.  Sternotomy wound dressed clean and intact.  Mediastinal drains removed.   Pulmonary/Chest: Effort normal and breath sounds normal. No respiratory distress. He has no wheezes. He has no rales.   Abdominal: Soft. Bowel sounds are normal. He exhibits no distension. There is no tenderness. There is no rebound and no guarding.   Musculoskeletal: Normal range of motion. He exhibits no edema or tenderness.   Lymphadenopathy:     He has no cervical adenopathy.   Neurological: He is alert and oriented to person, place, and time. He has normal reflexes. He displays normal reflexes. No cranial nerve deficit.   Skin: Skin is warm and dry. Capillary refill takes 2 to 3 seconds. No rash noted. He is not diaphoretic. No erythema.   Psychiatric: He has a normal mood and affect. His behavior is normal. Judgment and thought content normal.       Significant Labs: All pertinent labs within the past 24 hours have been reviewed.    Significant Imaging: I have reviewed and interpreted all pertinent imaging results/findings within the past 24 hours.    Assessment/Plan:       * Nonrheumatic aortic valve stenosis    -post-operative Cardiac management per CVT Surgery and Critical Care team.   -Monitor blood glucose.   -Continue Nozin per protocol.    12/2 - POD 2, from AVR, pain controlled, no cardiovasc symptoms, on anticoagulation.          CAD (coronary artery disease)    12/2 - POD 2 CAB - pt following expected recovery.  Stating resumed, on beta blocker, will follow          Pulmonary hypertension    12/2 - stable        Chronic systolic congestive heart failure    -Monitor volume status.   -Continue metoprolol and Lasix.    12/2 - currently compensated, mild overdiuresis per nephrology        Atrial fibrillation    -Rate controlled.   -Continue metoprolol.   -Restart Xarelto, when appropriate. Of note, patient with atypical dose of 15 mg daily.     12/2 - Per CVT suggested starting xarelto.  lovenox was given today, d/c-ed and ordered xarelto for Am.  Per daughter, cards may be starting on eliquis, will defer future changes to cards.          Hyperlipidemia    Restart statin, when appropriate.     12/2 - home statin resumed        Hypertension    Monitor postoperatively.     12/2 - BP stable, continue metoprolol, d/c-ed lasix          VTE Risk Mitigation         Ordered     rivaroxaban tablet 15 mg  Daily     Route:  Oral        12/02/17 1040     Medium Risk of VTE  Once      11/30/17 1112          Critical care time spent on the evaluation and treatment of severe organ dysfunction, review of pertinent labs and imaging studies, discussions with consulting providers and discussions with patient/family: 49 minutes.    Vasquez Schreiber MD  Department of Hospital Medicine   [facility]

## 2017-12-02 NOTE — ASSESSMENT & PLAN NOTE
-Monitor volume status.   -Continue metoprolol and Lasix.    12/2 - currently compensated, mild overdiuresis per nephrology

## 2017-12-02 NOTE — SUBJECTIVE & OBJECTIVE
Interval History:  Expected post-surgical chest pain.  Tolerated small amounts of food.  Mild shortness of breath.    Review of Systems   Constitutional: Negative.  Negative for chills and fever.   HENT: Negative.  Negative for congestion and sore throat.    Eyes: Negative.  Negative for visual disturbance.   Respiratory: Positive for chest tightness and shortness of breath. Negative for cough and wheezing.    Cardiovascular: Positive for chest pain.   Gastrointestinal: Negative for abdominal pain, diarrhea, nausea and vomiting.   Endocrine: Negative.    Genitourinary: Negative.    Musculoskeletal: Negative.  Negative for myalgias and neck stiffness.   Skin: Negative.  Negative for color change and pallor.   Allergic/Immunologic: Negative.    Neurological: Negative.    Hematological: Negative.    Psychiatric/Behavioral: Negative.    All other systems reviewed and are negative.    Objective:     Vital Signs (Most Recent):  Temp: 98 °F (36.7 °C) (12/01/17 1905)  Pulse: 72 (12/01/17 1951)  Resp: (!) 22 (12/01/17 1951)  BP: (!) 115/59 (12/01/17 1905)  SpO2: 98 % (12/01/17 1951) Vital Signs (24h Range):  Temp:  [97.9 °F (36.6 °C)-100.4 °F (38 °C)] 98 °F (36.7 °C)  Pulse:  [] 72  Resp:  [14-34] 22  SpO2:  [93 %-100 %] 98 %  BP: ()/(44-91) 115/59     Weight: 79.2 kg (174 lb 9.7 oz)  Body mass index is 25.78 kg/m².    Intake/Output Summary (Last 24 hours) at 12/01/17 2219  Last data filed at 12/01/17 2005   Gross per 24 hour   Intake             1490 ml   Output              575 ml   Net              915 ml      Physical Exam   Constitutional: He is oriented to person, place, and time. He appears well-developed and well-nourished. No distress.   HENT:   Head: Normocephalic and atraumatic.   Mouth/Throat: Oropharynx is clear and moist.   Eyes: Conjunctivae and EOM are normal. Pupils are equal, round, and reactive to light.   Neck: No JVD present. No thyromegaly present.   Cardiovascular: Normal rate, regular  rhythm and normal heart sounds.  Exam reveals no gallop and no friction rub.    No murmur heard.  Sternotomy wound dressed clean and intact.  Mediastinal drains in place draining sero-sanguinous fluid.   Pulmonary/Chest: Effort normal and breath sounds normal. No respiratory distress. He has no wheezes. He has no rales.   Abdominal: Soft. Bowel sounds are normal. He exhibits no distension. There is no tenderness. There is no rebound and no guarding.   Musculoskeletal: Normal range of motion. He exhibits no edema or tenderness.   Lymphadenopathy:     He has no cervical adenopathy.   Neurological: He is alert and oriented to person, place, and time. He has normal reflexes. He displays normal reflexes. No cranial nerve deficit.   Skin: Skin is warm and dry. No rash noted. He is not diaphoretic. No erythema.   Psychiatric: He has a normal mood and affect. His behavior is normal. Judgment and thought content normal.       Significant Labs:   CBC:   Recent Labs  Lab 11/30/17  1007 11/30/17  1008 11/30/17  1112 12/01/17  0428   WBC  --   --  11.36 10.82   HGB 8.5*  --  8.2* 9.4*   HCT 27.1* 27* 26.5* 29.0*     --  177 186     CMP:   Recent Labs  Lab 11/30/17  1112 12/01/17  0428    136   K 4.9  4.9 4.9    106   CO2 24 21*   * 154*   BUN 28* 30*   CREATININE 1.3 1.6*   CALCIUM 9.9 9.1   ANIONGAP 8 9   EGFRNONAA 49* 38*       Significant Imaging: I have reviewed all pertinent imaging results/findings within the past 24 hours.

## 2017-12-02 NOTE — HOSPITAL COURSE
12/2 - Here with daughter this Am.  pt much improved, is passing gas, seen by CVT and cards this Am.  12/3 pt doing better, no fever or elevate wBC, able to walk and + Bm, is using Is.  UOP ~0.4cc/kg, creat improving, discussed with renal and is following expected resolution of XIANG.  tolerating eliquis, aware of risks  12/4/17 - Sitting in chair at bedside. He has participated in PT/OT this AM, walking in hallway. He denies SOB. Pt has had a bowel movement and reports coughing up phlegm at times. He is S/P AVR tissue valve and CABG x 1. Telemetry revealed rhythm in and out of A fib with controlled rate. Beta blocker, Eliquis, ASA and Statin in progress. Both CVT and Cardiology following. 12/05 - pt feeling ready to d/c hmoe.  Per cards and CVT likely go in Am.  Pt walking, regular PO and Bm.  No typicla CP and post op pain controlled and no s/s infection.  XIANG resolved, shock liver with transaminitis improving. 12/6/17 - Pt is discharged to home by CVT surgeon

## 2017-12-02 NOTE — ASSESSMENT & PLAN NOTE
-Rate controlled.   -Continue metoprolol.   -Restart Xarelto, when appropriate. Of note, patient with atypical dose of 15 mg daily.     12/2 - Per CVT suggested starting xarelto.  lovenox was given today, d/c-ed and ordered xarelto for Am.  Per daughter, cards may be starting on eliquis, will defer future changes to cards.

## 2017-12-03 LAB
ANION GAP SERPL CALC-SCNC: 6 MMOL/L
BASOPHILS # BLD AUTO: 0.02 K/UL
BASOPHILS NFR BLD: 0.2 %
BUN SERPL-MCNC: 43 MG/DL
CALCIUM SERPL-MCNC: 8.6 MG/DL
CHLORIDE SERPL-SCNC: 103 MMOL/L
CO2 SERPL-SCNC: 26 MMOL/L
CREAT SERPL-MCNC: 1.7 MG/DL
DIFFERENTIAL METHOD: ABNORMAL
EOSINOPHIL # BLD AUTO: 0.1 K/UL
EOSINOPHIL NFR BLD: 0.8 %
ERYTHROCYTE [DISTWIDTH] IN BLOOD BY AUTOMATED COUNT: 17.3 %
EST. GFR  (AFRICAN AMERICAN): 41 ML/MIN/1.73 M^2
EST. GFR  (NON AFRICAN AMERICAN): 36 ML/MIN/1.73 M^2
GLUCOSE SERPL-MCNC: 111 MG/DL
HCT VFR BLD AUTO: 30.2 %
HGB BLD-MCNC: 9.6 G/DL
LYMPHOCYTES # BLD AUTO: 0.9 K/UL
LYMPHOCYTES NFR BLD: 7.9 %
MAGNESIUM SERPL-MCNC: 2.1 MG/DL
MCH RBC QN AUTO: 26 PG
MCHC RBC AUTO-ENTMCNC: 31.8 G/DL
MCV RBC AUTO: 82 FL
MONOCYTES # BLD AUTO: 1.1 K/UL
MONOCYTES NFR BLD: 9.7 %
NEUTROPHILS # BLD AUTO: 9.1 K/UL
NEUTROPHILS NFR BLD: 81.4 %
PHOSPHATE SERPL-MCNC: 2.9 MG/DL
PLATELET # BLD AUTO: 163 K/UL
PMV BLD AUTO: 9.7 FL
POCT GLUCOSE: 119 MG/DL (ref 70–110)
POCT GLUCOSE: 157 MG/DL (ref 70–110)
POCT GLUCOSE: 176 MG/DL (ref 70–110)
POCT GLUCOSE: 195 MG/DL (ref 70–110)
POTASSIUM SERPL-SCNC: 4 MMOL/L
RBC # BLD AUTO: 3.69 M/UL
SODIUM SERPL-SCNC: 135 MMOL/L
WBC # BLD AUTO: 11.16 K/UL

## 2017-12-03 PROCEDURE — 99232 SBSQ HOSP IP/OBS MODERATE 35: CPT | Mod: ,,, | Performed by: INTERNAL MEDICINE

## 2017-12-03 PROCEDURE — 85025 COMPLETE CBC W/AUTO DIFF WBC: CPT

## 2017-12-03 PROCEDURE — 25000003 PHARM REV CODE 250: Performed by: INTERNAL MEDICINE

## 2017-12-03 PROCEDURE — 21400001 HC TELEMETRY ROOM

## 2017-12-03 PROCEDURE — 84100 ASSAY OF PHOSPHORUS: CPT

## 2017-12-03 PROCEDURE — 63600175 PHARM REV CODE 636 W HCPCS: Performed by: NURSE PRACTITIONER

## 2017-12-03 PROCEDURE — 25000003 PHARM REV CODE 250: Performed by: NURSE PRACTITIONER

## 2017-12-03 PROCEDURE — 93005 ELECTROCARDIOGRAM TRACING: CPT

## 2017-12-03 PROCEDURE — 93010 ELECTROCARDIOGRAM REPORT: CPT | Mod: ,,, | Performed by: INTERNAL MEDICINE

## 2017-12-03 PROCEDURE — 97116 GAIT TRAINING THERAPY: CPT

## 2017-12-03 PROCEDURE — 25000003 PHARM REV CODE 250: Performed by: THORACIC SURGERY (CARDIOTHORACIC VASCULAR SURGERY)

## 2017-12-03 PROCEDURE — 80048 BASIC METABOLIC PNL TOTAL CA: CPT

## 2017-12-03 PROCEDURE — 25000003 PHARM REV CODE 250: Performed by: PHYSICIAN ASSISTANT

## 2017-12-03 PROCEDURE — 99233 SBSQ HOSP IP/OBS HIGH 50: CPT | Mod: ,,, | Performed by: INTERNAL MEDICINE

## 2017-12-03 PROCEDURE — 83735 ASSAY OF MAGNESIUM: CPT

## 2017-12-03 PROCEDURE — 36415 COLL VENOUS BLD VENIPUNCTURE: CPT

## 2017-12-03 RX ADMIN — FAMOTIDINE 20 MG: 20 TABLET ORAL at 09:12

## 2017-12-03 RX ADMIN — METOPROLOL TARTRATE 25 MG: 25 TABLET ORAL at 09:12

## 2017-12-03 RX ADMIN — METOPROLOL TARTRATE 25 MG: 25 TABLET ORAL at 08:12

## 2017-12-03 RX ADMIN — SIMVASTATIN 20 MG: 20 TABLET, FILM COATED ORAL at 08:12

## 2017-12-03 RX ADMIN — INSULIN ASPART 2 UNITS: 100 INJECTION, SOLUTION INTRAVENOUS; SUBCUTANEOUS at 05:12

## 2017-12-03 RX ADMIN — ACETAMINOPHEN 650 MG: 325 TABLET ORAL at 09:12

## 2017-12-03 RX ADMIN — CHLORHEXIDINE GLUCONATE 10 ML: 1.2 RINSE ORAL at 10:12

## 2017-12-03 RX ADMIN — INSULIN ASPART 2 UNITS: 100 INJECTION, SOLUTION INTRAVENOUS; SUBCUTANEOUS at 12:12

## 2017-12-03 RX ADMIN — SODIUM CHLORIDE: 0.9 INJECTION, SOLUTION INTRAVENOUS at 05:12

## 2017-12-03 RX ADMIN — INSULIN ASPART 1 UNITS: 100 INJECTION, SOLUTION INTRAVENOUS; SUBCUTANEOUS at 08:12

## 2017-12-03 RX ADMIN — ASPIRIN 81 MG: 81 TABLET, COATED ORAL at 09:12

## 2017-12-03 RX ADMIN — APIXABAN 2.5 MG: 2.5 TABLET, FILM COATED ORAL at 09:12

## 2017-12-03 RX ADMIN — ACETAMINOPHEN 650 MG: 325 TABLET ORAL at 05:12

## 2017-12-03 RX ADMIN — POLYETHYLENE GLYCOL (3350) 17 G: 17 POWDER, FOR SOLUTION ORAL at 09:12

## 2017-12-03 RX ADMIN — CHLORHEXIDINE GLUCONATE 10 ML: 1.2 RINSE ORAL at 09:12

## 2017-12-03 RX ADMIN — DOCUSATE SODIUM 100 MG: 100 CAPSULE, LIQUID FILLED ORAL at 10:12

## 2017-12-03 RX ADMIN — DOCUSATE SODIUM 100 MG: 100 CAPSULE, LIQUID FILLED ORAL at 08:12

## 2017-12-03 RX ADMIN — APIXABAN 2.5 MG: 2.5 TABLET, FILM COATED ORAL at 08:12

## 2017-12-03 NOTE — PROGRESS NOTES
CVT Cardiothoracic Surgery    PLAN:  - Cr improving, UOP good  - wean O2  - ambulate/IS       Lucille Elias    POD 4: AVR (tissue) & CABG x 1  Status:  Telemetry  Interval History:  No new events overnight, in and out of a fib     Pressors: None    Rhythm: Afib, rate 80s-100s  Respiratory: No dyspnea at rest with NC  Extremity: No peripheral edema noted  Sternum: Stable    Wounds: Clean, dry and intact M/S and LLE  Mobility: Ambulating with assistance      Labs:  BMP  Lab Results   Component Value Date     (L) 12/03/2017    K 4.0 12/03/2017     12/03/2017    CO2 26 12/03/2017    BUN 43 (H) 12/03/2017    CREATININE 1.7 (H) 12/03/2017    CALCIUM 8.6 (L) 12/03/2017    ANIONGAP 6 (L) 12/03/2017    ESTGFRAFRICA 41 (A) 12/03/2017    EGFRNONAA 36 (A) 12/03/2017     Lab Results   Component Value Date    WBC 11.16 12/03/2017    HGB 9.6 (L) 12/03/2017    HCT 30.2 (L) 12/03/2017    MCV 82 12/03/2017     12/03/2017       Imaging: CXR    VITAL SIGNS: 24 HR MIN & MAX LAST    Temp  Min: 96.6 °F (35.9 °C)  Max: 98.2 °F (36.8 °C)  97.9 °F (36.6 °C)        BP  Min: 104/70  Max: 143/65  124/66     Pulse  Min: 76  Max: 98  94     Resp  Min: 16  Max: 22  (!) 22    SpO2  Min: 95 %  Max: 99 %  97 %      Intake/Output:  No intake/output data recorded.  I/O last 3 completed shifts:  In: 1335.8 [P.O.:410; I.V.:925.8]  Out: 1210 [Urine:1150; Chest Tube:60]

## 2017-12-03 NOTE — PT/OT/SLP PROGRESS
Physical Therapy  Treatment    Chung Meneses .   MRN: [id number]   Admitting Diagnosis: Nonrheumatic aortic valve stenosis    PT Received On: 12/03/17  PT Start Time: 1030     PT Stop Time: 1040    PT Total Time (min): 10 min       Billable Minutes:  Gait [id number]    Treatment Type: Treatment  PT/PTA: PT             General Precautions: Standard, sternal  Orthopedic Precautions: N/A   Braces:           Subjective:  Communicated with Epic and Rosalia prior to session.      Pain/Comfort  Pain Rating 1: 2/10  Location 1: chest  Pain Addressed 1: Reposition  Pain Rating Post-Intervention 1: 2/10    Objective:   Patient found with: oxygen    Functional Mobility:  Bed Mobility:   Rolling/Turning to Left:  (patient up in chair)    Transfers:  Sit <> Stand Assistance: Contact Guard Assistance  Sit <> Stand Assistive Device: No Assistive Device    Gait:   Gait Distance: 400' with one standing rest break  Assistance 1: Stand by Assistance, Contact Guard Assistance  Gait Assistive Device: No device  Gait Deviation(s): decreased minoo, decreased stride length    Stairs:      Balance:   Static Sit: GOOD: Takes MODERATE challenges from all directions  Dynamic Sit: GOOD-: Maintains balance through MODERATE excursions of active trunk movement,     Static Stand: FAIR+: Takes MINIMAL challenges from all directions  Dynamic stand: FAIR: Needs CONTACT GUARD during gait     Therapeutic Activities and Exercises:  No complaints of dizziness in standing.     AM-PAC 6 CLICK MOBILITY  How much help from another person does this patient currently need?   1 = Unable, Total/Dependent Assistance  2 = A lot, Maximum/Moderate Assistance  3 = A little, Minimum/Contact Guard/Supervision  4 = None, Modified [place]/Independent    Need to walk in hospital room?: 3    AM-PAC Raw Score CMS G-Code Modifier Level of Impairment Assistance   6 % Total / Unable   7 - 9 CM 80 - 100% Maximal Assist   10 - 14 CL 60 - 80% Moderate Assist   15 - 19  CK 40 - 60% Moderate Assist   20 - 22 CJ 20 - 40% Minimal Assist   23 CI 1-20% SBA / CGA   24 CH 0% Independent/ Mod I     Patient left up in chair with all lines intact, call button in reach and daughter present.    Assessment:  Chung Meneses Sr. is a 86 y.o. male with a medical diagnosis of Nonrheumatic aortic valve stenosis and presents with decreased gait tolerance and instability.    Rehab identified problem list/impairments: Rehab identified problem list/impairments: impaired endurance, gait instability    Rehab potential is good.    Activity tolerance: Good    Discharge recommendations: Discharge Facility/Level Of Care Needs: home     Barriers to discharge: Barriers to Discharge: None    Equipment recommendations: Equipment Needed After Discharge: none     GOALS:    Physical Therapy Goals        Problem: Physical Therapy Goal    Goal Priority Disciplines Outcome Goal Variances Interventions   Physical Therapy Goal     PT/OT, PT Ongoing (interventions implemented as appropriate)     Description:  PT WILL BE SEEN FOR P.T. FOR A MIN OF 5 OUT OF 7 DAYS A WEEK  LT17  1. PT WILL COMPLETE BED MOBILITY WITH CGA.  2. PT WILL T/F TO CHAIR WITH S  3. PT WILL GT TRAIN WITH S X 300'   4. PT WILL BE IND WITH ALL STERNAL PRECAUTIONS  5. PT WILL COMPLETE B LE TE X 20 REPS                    PLAN:    Patient to be seen  (pt will be seen a min of 5-7 days as tolerated)  to address the above listed problems via gait training, therapeutic activities, therapeutic exercises  Plan of Care expires: 17  Plan of Care reviewed with: patient         Yokasta Wen, PT  2017

## 2017-12-03 NOTE — SUBJECTIVE & OBJECTIVE
Interval History:       12/3/17:         Review of patient's allergies indicates:   Allergen Reactions    Sulfa (sulfonamide antibiotics) Hives     Current Facility-Administered Medications   Medication Frequency    0.9%  NaCl infusion Continuous    acetaminophen tablet 650 mg Q6H PRN    apixaban tablet 2.5 mg BID    aspirin EC tablet 81 mg Daily    chlorhexidine 0.12 % solution 10 mL BID    dextrose 50% injection 12.5 g PRN    dextrose 50% injection 25 g PRN    docusate sodium capsule 100 mg BID    famotidine tablet 20 mg Daily    glucagon (human recombinant) injection 1 mg PRN    glucose chewable tablet 16 g PRN    glucose chewable tablet 24 g PRN    hydrocodone-acetaminophen 10-325mg per tablet 1 tablet Q4H PRN    hydrocodone-acetaminophen 5-325mg per tablet 1 tablet Q4H PRN    hydrocodone-acetaminophen 7.5-325mg per tablet 1 tablet Q4H PRN    insulin aspart pen 1-10 Units QID (AC + HS) PRN    metoprolol tartrate (LOPRESSOR) tablet 25 mg BID    nozaseptin (NOZIN) nasal  BID    ondansetron injection 4 mg Q12H PRN    polyethylene glycol packet 17 g Daily    promethazine (PHENERGAN) 6.25 mg in dextrose 5 % 50 mL IVPB Q6H PRN    simvastatin tablet 20 mg QHS       Objective:     Vital Signs (Most Recent):  Temp: 98 °F (36.7 °C) (12/03/17 0923)  Pulse: 84 (12/03/17 0923)  Resp: 16 (12/03/17 0923)  BP: 131/72 (12/03/17 0923)  SpO2: 97 % (12/03/17 0923)  O2 Device (Oxygen Therapy): nasal cannula (12/02/17 1923) Vital Signs (24h Range):  Temp:  [96.6 °F (35.9 °C)-98 °F (36.7 °C)] 98 °F (36.7 °C)  Pulse:  [76-98] 84  Resp:  [16-22] 16  SpO2:  [95 %-99 %] 97 %  BP: (104-131)/(59-72) 131/72     Weight: 78 kg (171 lb 15.3 oz) (12/03/17 0522)  Body mass index is 25.39 kg/m².  Body surface area is 1.95 meters squared.    I/O last 3 completed shifts:  In: 1335.8 [P.O.:410; I.V.:925.8]  Out: 1210 [Urine:1150; Chest Tube:60]    Physical Exam   Constitutional: He is oriented to person, place, and  time. He appears well-developed. He is cooperative.   HENT:   Head: Normocephalic.   Nose: No rhinorrhea.   Mouth/Throat: Mucous membranes are normal. No oropharyngeal exudate.   Eyes: Pupils are equal, round, and reactive to light.   Neck: No thyroid mass present.   Cardiovascular: Normal rate, regular rhythm, S1 normal, S2 normal and intact distal pulses.    Pulmonary/Chest: Effort normal. No respiratory distress. He has no wheezes.   Abdominal: Soft. Bowel sounds are normal. He exhibits no distension. There is no tenderness. No hernia.   Musculoskeletal: He exhibits edema.   Trace LE edema.   Lymphadenopathy:     He has no cervical adenopathy.   Neurological: He is alert and oriented to person, place, and time.   Skin: Skin is warm and dry.   Psychiatric: He has a normal mood and affect.       Significant Labs:  Lab Results   Component Value Date    CREATININE 1.7 (H) 12/03/2017    BUN 43 (H) 12/03/2017     (L) 12/03/2017    K 4.0 12/03/2017     12/03/2017    CO2 26 12/03/2017     Lab Results   Component Value Date    CALCIUM 8.6 (L) 12/03/2017    PHOS 2.9 12/03/2017     Lab Results   Component Value Date    ALBUMIN 3.6 11/28/2017     Lab Results   Component Value Date    WBC 11.16 12/03/2017    HGB 9.6 (L) 12/03/2017    HCT 30.2 (L) 12/03/2017    MCV 82 12/03/2017     12/03/2017       Recent Labs  Lab 12/03/17  0550   MG 2.1         Significant Imaging:  Imaging Results          X-Ray Chest AP Portable (Final result)  Result time 12/03/17 07:24:51    Final result by Micah Hernandez Jr., MD (12/03/17 07:24:51)                 Impression:     Removal of support devices with no signs of acute cardiopulmonary abnormality.        Electronically signed by: MICAH HERNANDEZ M.D.  Date:     12/03/17  Time:    07:24              Narrative:    XR CHEST AP PORTABLE    Clinical history: Post-op    Comparison: Previous frontal view of the chest on 12/01/2017 was reviewed.    Findings: There has been a median  sternotomy.  The previously demonstrated mediastinal drain and Sunshine-Samina catheter have been removed.  The lungs are well-aerated.  No pneumothorax or significant pleural fluid.  No widening of the mediastinum.  The heart is normal in size. There are bilateral shoulder joint replacements.                             X-Ray Chest AP Portable (Final result)  Result time 12/01/17 07:16:38    Final result by Frida Magana MD (12/01/17 07:16:38)                 Impression:     No adverse interval change      Electronically signed by: FRIDA MAGANA MD  Date:     12/01/17  Time:    07:16              Narrative:    History: Postoperative    Comparison: 11/30/17    Result: Single view of the chest.      Endotracheal and nasogastric tubes have been removed. In comparison to the prior study, there is no adverse interval changes.                             X-Ray Chest AP Portable (Final result)  Result time 11/30/17 12:21:49    Final result by Kimberly Matthews MD (11/30/17 12:21:49)                 Impression:         Postoperative changes of the chest.  No unexpected findings or evidence of complicating process.      Electronically signed by: KIMBERLY MATTHEWS MD  Date:     11/30/17  Time:    12:21              Narrative:    Exam: XR CHEST AP PORTABLE,    Date:  11/30/17 11:59:45    History: Follow up exam following surgery.    Comparison:  11/28/2017.        Findings:     Postoperative changes identified.  Median sternotomy wires are now place.  A Sunshine-Samina catheter is identified the distal tip overlies the distal right main pulmonary artery.  Mediastinal drains noted.  Endotracheal tube in place with the distal tip just distal to level of clavicles.  Nasogastric tube in place.  Distal tip overlies the proximal to mid body the stomach.  Lungs demonstrate increased interstitial markings thought to represent chronic change.  No significant pulmonary vascular congestion or evidence of heart failure.

## 2017-12-03 NOTE — ASSESSMENT & PLAN NOTE
Patient presents with XIANG and creatinine has increased from 1.3 on 11/30/17 to 2.6 on 12/2/17. Patient is oliguric with UOP of about 700 ml.  XIANG likely related to recent cardiac surgery (CABG and bioprosthetic AVR) and XIANG is likely due to ATN/poor perfusion. Renal function has now improved and creatinine has declined to 1.7. About 0.8 L of urine yesterday. Will monitor closely. Continue mild hydration.

## 2017-12-03 NOTE — PROGRESS NOTES
[facility] -   Nephrology  Progress Note    Patient Name: Chung Meneses Sr.  MRN: [id number]  Admission Date: 11/30/2017  Hospital Length of Stay: 3 days  Attending Provider: Shaq Boateng MD   Primary Care Physician: Glenroy Carmichael MD  Principal Problem:Nonrheumatic aortic valve stenosis    Subjective:     HPI: 86 year old male with h/o aortic stenosis, atrial fibrillation, CHF, HTN, hyperlipidemia, CAD presented to [facility] [place] on 11/30/17 for elective CABG and AVR (bioprosthetic valve). Workup revealed XIANG with creatinine increasing from 1.3 on 11/30/17 to 2.6 on 12/2/17. Patient now oliguric.   Nephrology was consulted to help with patient's renal care while he is admitted at [facility] [place]. I saw and examined patient in his hospital room. Patient is resting in chair. He denies any chest pain, SOB, nausea/cvomiting, diarrhea, LE edema. Dawson catheter was removed yesterday and patient uses urinal. He reports mild dysuria with urination.  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     Interval History:       12/3/17: Patient denies any chest pain, SOB today.         Review of patient's allergies indicates:   Allergen Reactions    Sulfa (sulfonamide antibiotics) Hives     Current Facility-Administered Medications   Medication Frequency    0.9%  NaCl infusion Continuous    acetaminophen tablet 650 mg Q6H PRN    apixaban tablet 2.5 mg BID    aspirin EC tablet 81 mg Daily    chlorhexidine 0.12 % solution 10 mL BID    dextrose 50% injection 12.5 g PRN    dextrose 50% injection 25 g PRN    docusate sodium capsule 100 mg BID    famotidine tablet 20 mg Daily    glucagon (human recombinant) injection 1 mg PRN    glucose chewable tablet 16 g PRN    glucose chewable tablet 24 g PRN    hydrocodone-acetaminophen 10-325mg per tablet 1 tablet Q4H PRN    hydrocodone-acetaminophen 5-325mg per tablet 1 tablet Q4H PRN    hydrocodone-acetaminophen 7.5-325mg per tablet  1 tablet Q4H PRN    insulin aspart pen 1-10 Units QID (AC + HS) PRN    metoprolol tartrate (LOPRESSOR) tablet 25 mg BID    nozaseptin (NOZIN) nasal  BID    ondansetron injection 4 mg Q12H PRN    polyethylene glycol packet 17 g Daily    promethazine (PHENERGAN) 6.25 mg in dextrose 5 % 50 mL IVPB Q6H PRN    simvastatin tablet 20 mg QHS       Objective:     Vital Signs (Most Recent):  Temp: 98 °F (36.7 °C) (12/03/17 0923)  Pulse: 84 (12/03/17 0923)  Resp: 16 (12/03/17 0923)  BP: 131/72 (12/03/17 0923)  SpO2: 97 % (12/03/17 0923)  O2 Device (Oxygen Therapy): nasal cannula (12/02/17 1923) Vital Signs (24h Range):  Temp:  [96.6 °F (35.9 °C)-98 °F (36.7 °C)] 98 °F (36.7 °C)  Pulse:  [76-98] 84  Resp:  [16-22] 16  SpO2:  [95 %-99 %] 97 %  BP: (104-131)/(59-72) 131/72     Weight: 78 kg (171 lb 15.3 oz) (12/03/17 0522)  Body mass index is 25.39 kg/m².  Body surface area is 1.95 meters squared.    I/O last 3 completed shifts:  In: 1335.8 [P.O.:410; I.V.:925.8]  Out: 1210 [Urine:1150; Chest Tube:60]    Physical Exam   Constitutional: He is oriented to person, place, and time. He appears well-developed. He is cooperative.   HENT:   Head: Normocephalic.   Nose: No rhinorrhea.   Mouth/Throat: Mucous membranes are normal. No oropharyngeal exudate.   Eyes: Pupils are equal, round, and reactive to light.   Neck: No thyroid mass present.   Cardiovascular: Irregular, regular rhythm, S1 normal, S2 normal and intact distal pulses.    Pulmonary/Chest: Effort normal. No respiratory distress. He has no wheezes.   Abdominal: Soft. Bowel sounds are normal. He exhibits no distension. There is no tenderness. No hernia.   Musculoskeletal: He exhibits edema.   Trace LE edema.   Lymphadenopathy:     He has no cervical adenopathy.   Neurological: He is alert and oriented to person, place, and time.   Skin: Skin is warm and dry.   Psychiatric: He has a normal mood and affect.       Significant Labs:  Lab Results   Component Value  Date    CREATININE 1.7 (H) 12/03/2017    BUN 43 (H) 12/03/2017     (L) 12/03/2017    K 4.0 12/03/2017     12/03/2017    CO2 26 12/03/2017     Lab Results   Component Value Date    CALCIUM 8.6 (L) 12/03/2017    PHOS 2.9 12/03/2017     Lab Results   Component Value Date    ALBUMIN 3.6 11/28/2017     Lab Results   Component Value Date    WBC 11.16 12/03/2017    HGB 9.6 (L) 12/03/2017    HCT 30.2 (L) 12/03/2017    MCV 82 12/03/2017     12/03/2017       Recent Labs  Lab 12/03/17  0550   MG 2.1         Significant Imaging:  Imaging Results          X-Ray Chest AP Portable (Final result)  Result time 12/03/17 07:24:51    Final result by Micah Hernandez Jr., MD (12/03/17 07:24:51)                 Impression:     Removal of support devices with no signs of acute cardiopulmonary abnormality.        Electronically signed by: MICAH HERNANDEZ M.D.  Date:     12/03/17  Time:    07:24              Narrative:    XR CHEST AP PORTABLE    Clinical history: Post-op    Comparison: Previous frontal view of the chest on 12/01/2017 was reviewed.    Findings: There has been a median sternotomy.  The previously demonstrated mediastinal drain and [place]-Samina catheter have been removed.  The lungs are well-aerated.  No pneumothorax or significant pleural fluid.  No widening of the mediastinum.  The heart is normal in size. There are bilateral shoulder joint replacements.                             X-Ray Chest AP Portable (Final result)  Result time 12/01/17 07:16:38    Final result by Frida Magana MD (12/01/17 07:16:38)                 Impression:     No adverse interval change      Electronically signed by: FRIDA MAGANA MD  Date:     12/01/17  Time:    07:16              Narrative:    History: Postoperative    Comparison: 11/30/17    Result: Single view of the chest.      Endotracheal and nasogastric tubes have been removed. In comparison to the prior study, there is no adverse interval changes.                              X-Ray Chest AP Portable (Final result)  Result time 11/30/17 12:21:49    Final result by Kimberly Matthews MD (11/30/17 12:21:49)                 Impression:         Postoperative changes of the chest.  No unexpected findings or evidence of complicating process.      Electronically signed by: KIMBERLY MATTHEWS MD  Date:     11/30/17  Time:    12:21              Narrative:    Exam: XR CHEST AP PORTABLE,    Date:  11/30/17 11:59:45    History: Follow up exam following surgery.    Comparison:  11/28/2017.        Findings:     Postoperative changes identified.  Median sternotomy wires are now place.  A [place]-Samina catheter is identified the distal tip overlies the distal right main pulmonary artery.  Mediastinal drains noted.  Endotracheal tube in place with the distal tip just distal to level of clavicles.  Nasogastric tube in place.  Distal tip overlies the proximal to mid body the stomach.  Lungs demonstrate increased interstitial markings thought to represent chronic change.  No significant pulmonary vascular congestion or evidence of heart failure.                                Assessment/Plan:     Hyperkalemia    Has resolved.         XIANG (acute kidney injury)    Patient presents with XIANG and creatinine has increased from 1.3 on 11/30/17 to 2.6 on 12/2/17. Patient is oliguric with UOP of about 700 ml.  XIANG likely related to recent cardiac surgery (CABG and bioprosthetic AVR) and XIANG is likely due to ATN/poor perfusion. Renal function has now improved and creatinine has declined to 1.7. About 0.8 L of urine yesterday. Will monitor closely. Continue mild hydration.             Thank you for your consult. I will follow-up with patient. Please contact us if you have any additional questions.    Romulo Rodrigez MD  Nephrology  [facility] -

## 2017-12-03 NOTE — PROGRESS NOTES
Cardiology Progress Note        SUBJECTIVE:     History of Present Illness:  Patient is a 86 y.o. male who presents with severe AS s/p AVR POD#3. Patient seen and examined today, sitting up in chair. States he feels well. No complaints. Labs reviewed, creatinine improving. Remains in afib, rate-controlled. Eliquis re-started.      OBJECTIVE:     Vital Signs (Most Recent)  Temp: 98 °F (36.7 °C) (12/03/17 0923)  Pulse: 84 (12/03/17 0923)  Resp: 16 (12/03/17 0923)  BP: 131/72 (12/03/17 0923)  SpO2: 97 % (12/03/17 0923)    Vital Signs Range (Last 24H):  Temp:  [96.6 °F (35.9 °C)-98 °F (36.7 °C)]   Pulse:  [76-98]   Resp:  [16-22]   BP: (104-131)/(59-72)   SpO2:  [95 %-99 %]     Intake/Output last 3 shifts:  I/O last 3 completed shifts:  In: 1335.8 [P.O.:410; I.V.:925.8]  Out: 1210 [Urine:1150; Chest Tube:60]    Intake/Output this shift:  No intake/output data recorded.    Review of patient's allergies indicates:   Allergen Reactions    Sulfa (sulfonamide antibiotics) Hives       Current Facility-Administered Medications   Medication    0.9%  NaCl infusion    acetaminophen tablet 650 mg    apixaban tablet 2.5 mg    aspirin EC tablet 81 mg    chlorhexidine 0.12 % solution 10 mL    dextrose 50% injection 12.5 g    dextrose 50% injection 25 g    docusate sodium capsule 100 mg    famotidine tablet 20 mg    glucagon (human recombinant) injection 1 mg    glucose chewable tablet 16 g    glucose chewable tablet 24 g    hydrocodone-acetaminophen 10-325mg per tablet 1 tablet    hydrocodone-acetaminophen 5-325mg per tablet 1 tablet    hydrocodone-acetaminophen 7.5-325mg per tablet 1 tablet    insulin aspart pen 1-10 Units    metoprolol tartrate (LOPRESSOR) tablet 25 mg    nozaseptin (NOZIN) nasal     ondansetron injection 4 mg    polyethylene glycol packet 17 g    promethazine (PHENERGAN) 6.25 mg in dextrose 5 % 50 mL IVPB    simvastatin tablet 20 mg     No current facility-administered medications  on file prior to encounter.      Current Outpatient Prescriptions on File Prior to Encounter   Medication Sig    amlodipine (NORVASC) 5 MG tablet Take 5 mg by mouth once daily.    metoprolol tartrate (LOPRESSOR) 25 MG tablet Take 25 mg by mouth 2 (two) times daily. Takes one-half tablet by mouth twice a day    fish oil-omega-3 fatty acids 300-1,000 mg capsule Take 1,200 mg by mouth once daily.    furosemide (LASIX) 40 MG tablet Take 40 mg by mouth.    multivitamin capsule Take 1 capsule by mouth once daily.    multivitamin with folic acid 400 mcg Tab Take 1 tablet by mouth.    niacin, inositol niacinate, (NIACIN FLUSH FREE) 400 mg Cap Take 500 mg by mouth once daily.    ranitidine (ZANTAC) 75 MG tablet Take 75 mg by mouth 2 (two) times daily.    rivaroxaban (XARELTO) 15 mg Tab Take 1 tablet (15 mg total) by mouth daily with dinner or evening meal. Since 2015 never increased    simvastatin (ZOCOR) 20 MG tablet Take 20 mg by mouth every evening.    VENTOLIN HFA 90 mcg/actuation inhaler INHALE 1 TO 2 PUFFS 4 TIMES A DAY AS NEEDED FOR WHEEZING       Physical Exam:  General appearance: alert, appears stated age and cooperative  Head: Normocephalic, without obvious abnormality, atraumatic  Neck: no adenopathy, no carotid bruit, no JVD  Lungs: Diminished at bases  Chest wall: no tenderness  Heart: irregularly irregular rate and rhythm, S1, S2 normal  Abdomen: soft, non-tender  Extremities: extremities normal, atraumatic, trace BLE  Skin: Skin color, texture, turgor normal. No rashes or lesions  Neurologic: Grossly normal, AAO x 3    Laboratory:  Chemistry:   Lab Results   Component Value Date     (L) 12/03/2017    K 4.0 12/03/2017     12/03/2017    CO2 26 12/03/2017    BUN 43 (H) 12/03/2017    CREATININE 1.7 (H) 12/03/2017    CALCIUM 8.6 (L) 12/03/2017     Cardiac Markers: No results found for: CKTOTAL, CKMB, CKMBINDEX, TROPONINI  Cardiac Markers (Last 3): No results found for: CKTOTAL, CKMB,  CKMBINDEX, TROPONINI  CBC:   Lab Results   Component Value Date    WBC 11.16 12/03/2017    HGB 9.6 (L) 12/03/2017    HCT 30.2 (L) 12/03/2017    HCT 27 (L) 11/30/2017    MCV 82 12/03/2017     12/03/2017     Lipids:   Lab Results   Component Value Date    CHOL 125 04/19/2017    TRIG 61 04/19/2017    HDL 35 (L) 04/19/2017     Coagulation:   Lab Results   Component Value Date    INR 1.2 11/30/2017    APTT 32.4 (H) 11/30/2017       Diagnostic Results:  ECG: Reviewed  X-Ray: Reviewed  Echo: Reviewed      ASSESSMENT/PLAN:     Patient Active Problem List   Diagnosis    Hypertension    Hyperlipidemia    Atrial fibrillation    Light headed    Dizziness and giddiness    Nonrheumatic aortic valve stenosis    Pain of left lower extremity    Chronic systolic congestive heart failure    CHF (congestive heart failure)    Pulmonary hypertension    Hyperglycemia, unspecified    S/P AVR    XIANG (acute kidney injury)    Hyperkalemia    CAD (coronary artery disease)      Patient doing clinically well s/p AVR. Remains stable. Continue current meds. IS usage and ambulation.  Plan:   -Continue Eliquis, ASA, BB, statin  -IS usage  -Ambulation    Chart reviewed. Dr. Mckee examined patient and agrees with plan as outlined above.     s

## 2017-12-03 NOTE — PLAN OF CARE
Problem: Physical Therapy Goal  Goal: Physical Therapy Goal  PT WILL BE SEEN FOR P.T. FOR A MIN OF 5 OUT OF 7 DAYS A WEEK  LT17  1. PT WILL COMPLETE BED MOBILITY WITH CGA.  2. PT WILL T/F TO CHAIR WITH S  3. PT WILL GT TRAIN WITH S X 300'   4. PT WILL BE IND WITH ALL STERNAL PRECAUTIONS  5. PT WILL COMPLETE B LE TE X 20 REPS   Outcome: Ongoing (interventions implemented as appropriate)  Progressing toward all goals.  Increased ambulation to 400' CG/SBA, one standing rest break with c/o SOB.

## 2017-12-03 NOTE — PLAN OF CARE
Problem: Patient Care Overview  Goal: Plan of Care Review  PT REQUIRES MOD A FOR SIT>STAND WITH POST LEAN   Outcome: Ongoing (interventions implemented as appropriate)  POC reviewed with patient, verbalized understanding. Patient remains free from falls. Fall precautions in place. Afib on cardiac monitor, rate 70-90. Midline incision CDI. Sternal precautions in place. IVF infusing per orders. VSS. No other c/o at this time. Call bell within reach. Reminded to call for assistance.

## 2017-12-03 NOTE — PROGRESS NOTES
Pt arrived in stable condition accompanied by nurse.  Bedside report given.  Patient IV patent.  Pt has no complaints at this time.  Heart monitor put on.  Will cont to monitor.

## 2017-12-04 LAB
ALBUMIN SERPL BCP-MCNC: 2.7 G/DL
ALP SERPL-CCNC: 131 U/L
ALT SERPL W/O P-5'-P-CCNC: 245 U/L
ANION GAP SERPL CALC-SCNC: 7 MMOL/L
AST SERPL-CCNC: 188 U/L
BASOPHILS # BLD AUTO: 0.02 K/UL
BASOPHILS NFR BLD: 0.2 %
BILIRUB SERPL-MCNC: 0.9 MG/DL
BUN SERPL-MCNC: 40 MG/DL
CALCIUM SERPL-MCNC: 8.5 MG/DL
CHLORIDE SERPL-SCNC: 102 MMOL/L
CO2 SERPL-SCNC: 25 MMOL/L
CREAT SERPL-MCNC: 1.6 MG/DL
DIFFERENTIAL METHOD: ABNORMAL
EOSINOPHIL # BLD AUTO: 0.2 K/UL
EOSINOPHIL NFR BLD: 1.7 %
ERYTHROCYTE [DISTWIDTH] IN BLOOD BY AUTOMATED COUNT: 17.1 %
EST. GFR  (AFRICAN AMERICAN): 44 ML/MIN/1.73 M^2
EST. GFR  (NON AFRICAN AMERICAN): 38 ML/MIN/1.73 M^2
GLUCOSE SERPL-MCNC: 120 MG/DL
HCT VFR BLD AUTO: 29.8 %
HGB BLD-MCNC: 9.3 G/DL
LYMPHOCYTES # BLD AUTO: 1 K/UL
LYMPHOCYTES NFR BLD: 10.4 %
MCH RBC QN AUTO: 25.5 PG
MCHC RBC AUTO-ENTMCNC: 31.2 G/DL
MCV RBC AUTO: 82 FL
MONOCYTES # BLD AUTO: 1 K/UL
MONOCYTES NFR BLD: 10.6 %
NEUTROPHILS # BLD AUTO: 7.1 K/UL
NEUTROPHILS NFR BLD: 77.1 %
PLATELET # BLD AUTO: 168 K/UL
PMV BLD AUTO: 9.7 FL
POCT GLUCOSE: 116 MG/DL (ref 70–110)
POCT GLUCOSE: 119 MG/DL (ref 70–110)
POCT GLUCOSE: 123 MG/DL (ref 70–110)
POCT GLUCOSE: 223 MG/DL (ref 70–110)
POTASSIUM SERPL-SCNC: 4 MMOL/L
PROT SERPL-MCNC: 6.2 G/DL
RBC # BLD AUTO: 3.65 M/UL
SODIUM SERPL-SCNC: 134 MMOL/L
WBC # BLD AUTO: 9.16 K/UL

## 2017-12-04 PROCEDURE — 97530 THERAPEUTIC ACTIVITIES: CPT

## 2017-12-04 PROCEDURE — 99232 SBSQ HOSP IP/OBS MODERATE 35: CPT | Mod: ,,, | Performed by: INTERNAL MEDICINE

## 2017-12-04 PROCEDURE — 97116 GAIT TRAINING THERAPY: CPT

## 2017-12-04 PROCEDURE — 36415 COLL VENOUS BLD VENIPUNCTURE: CPT

## 2017-12-04 PROCEDURE — 25000003 PHARM REV CODE 250: Performed by: NURSE PRACTITIONER

## 2017-12-04 PROCEDURE — 21400001 HC TELEMETRY ROOM

## 2017-12-04 PROCEDURE — 25000003 PHARM REV CODE 250: Performed by: INTERNAL MEDICINE

## 2017-12-04 PROCEDURE — 80053 COMPREHEN METABOLIC PANEL: CPT

## 2017-12-04 PROCEDURE — 85025 COMPLETE CBC W/AUTO DIFF WBC: CPT

## 2017-12-04 PROCEDURE — 25000003 PHARM REV CODE 250: Performed by: THORACIC SURGERY (CARDIOTHORACIC VASCULAR SURGERY)

## 2017-12-04 PROCEDURE — 25000003 PHARM REV CODE 250: Performed by: PHYSICIAN ASSISTANT

## 2017-12-04 PROCEDURE — 94761 N-INVAS EAR/PLS OXIMETRY MLT: CPT

## 2017-12-04 PROCEDURE — 63600175 PHARM REV CODE 636 W HCPCS: Performed by: NURSE PRACTITIONER

## 2017-12-04 RX ORDER — SIMVASTATIN 5 MG/1
10 TABLET, FILM COATED ORAL NIGHTLY
Status: DISCONTINUED | OUTPATIENT
Start: 2017-12-04 | End: 2017-12-04

## 2017-12-04 RX ORDER — FUROSEMIDE 10 MG/ML
20 INJECTION INTRAMUSCULAR; INTRAVENOUS ONCE
Status: COMPLETED | OUTPATIENT
Start: 2017-12-04 | End: 2017-12-04

## 2017-12-04 RX ORDER — METOPROLOL TARTRATE 50 MG/1
50 TABLET ORAL 2 TIMES DAILY
Status: DISCONTINUED | OUTPATIENT
Start: 2017-12-04 | End: 2017-12-06 | Stop reason: HOSPADM

## 2017-12-04 RX ADMIN — ASPIRIN 81 MG: 81 TABLET, COATED ORAL at 10:12

## 2017-12-04 RX ADMIN — DOCUSATE SODIUM 100 MG: 100 CAPSULE, LIQUID FILLED ORAL at 10:12

## 2017-12-04 RX ADMIN — POLYETHYLENE GLYCOL (3350) 17 G: 17 POWDER, FOR SOLUTION ORAL at 10:12

## 2017-12-04 RX ADMIN — INSULIN ASPART 4 UNITS: 100 INJECTION, SOLUTION INTRAVENOUS; SUBCUTANEOUS at 12:12

## 2017-12-04 RX ADMIN — METOPROLOL TARTRATE 25 MG: 25 TABLET ORAL at 10:12

## 2017-12-04 RX ADMIN — CHLORHEXIDINE GLUCONATE 10 ML: 1.2 RINSE ORAL at 08:12

## 2017-12-04 RX ADMIN — DOCUSATE SODIUM 100 MG: 100 CAPSULE, LIQUID FILLED ORAL at 08:12

## 2017-12-04 RX ADMIN — FAMOTIDINE 20 MG: 20 TABLET ORAL at 10:12

## 2017-12-04 RX ADMIN — ACETAMINOPHEN 650 MG: 325 TABLET ORAL at 04:12

## 2017-12-04 RX ADMIN — FUROSEMIDE 20 MG: 10 INJECTION, SOLUTION INTRAMUSCULAR; INTRAVENOUS at 03:12

## 2017-12-04 RX ADMIN — CHLORHEXIDINE GLUCONATE 10 ML: 1.2 RINSE ORAL at 10:12

## 2017-12-04 RX ADMIN — APIXABAN 2.5 MG: 2.5 TABLET, FILM COATED ORAL at 08:12

## 2017-12-04 RX ADMIN — APIXABAN 2.5 MG: 2.5 TABLET, FILM COATED ORAL at 10:12

## 2017-12-04 RX ADMIN — METOPROLOL TARTRATE 50 MG: 50 TABLET ORAL at 08:12

## 2017-12-04 NOTE — ASSESSMENT & PLAN NOTE
Patient presents with XIANG and creatinine has increased from 1.3 on 11/30/17 to 2.6 on 12/2/17. Patient is oliguric with UOP of about 700 ml.  XIANG likely related to recent cardiac surgery (CABG and bioprosthetic AVR) and XIANG is likely due to ATN/poor perfusion. Renal function has now improved and creatinine has declined to 1.6. Will monitor closely. Continue mild hydration.

## 2017-12-04 NOTE — PROGRESS NOTES
[facility] Hospital Medicine  Progress Note    Patient Name: Chung Meneses Sr.  MRN: [id number]  Patient Class: IP- Inpatient   Admission Date: 11/30/2017  Length of Stay: 4 days  Attending Physician: Shaq Boateng MD  Primary Care Provider: Glenroy Carmichael MD        Subjective:     Principal Problem:S/P AVR    HPI:  Chung Meneses is an 86 year old male with a history of CAD, hypertension and aortic stenosis who underwent CABG with bioprosthetic AVR performed by Dr. Boateng on 12/30/17. His post-operative course remains unremarkable and he is intubated at the time of exam.     Hospital Course:  12/2 - Here with daughter this Am.  pt much improved, is passing gas, seen by CVT and cards this Am.  12/3 pt doing better, no fever or elevate wBC, able to walk and + Bm, is using Is.  UOP ~0.4cc/kg, creat improving, discussed with renal and is following expected resolution of XIANG.  tolerating eliquis, aware of risks  12/4/17 - Sitting in chair at bedside. He has participated in PT/OT this AM, walking in hallway. He denies SOB. Pt has had a bowel movement and reports coughing up phlegm at times. He is S/P AVR tissue valve and CABG x 1. Telemetry revealed rhythm in and out of A fib with controlled rate. Beta blocker, Eliquis, ASA and Statin in progress. Both CVT and Cardiology following.     I    Review of Systems   Constitutional: Negative for chills and fever.   HENT: Negative.  Negative for congestion and sore throat.    Eyes: Negative.  Negative for visual disturbance.   Respiratory: Positive for cough. Negative for chest tightness, shortness of breath and wheezing.    Cardiovascular: Negative for chest pain.   Gastrointestinal: Negative for abdominal pain, diarrhea, nausea and vomiting.   Endocrine: Negative.    Genitourinary: Negative.  Negative for difficulty urinating.   Musculoskeletal: Negative.  Negative for myalgias and neck stiffness.   Skin: Negative.  Negative for color change and pallor.    Allergic/Immunologic: Negative.    Neurological: Positive for weakness. Negative for dizziness, facial asymmetry, light-headedness and headaches.   Hematological: Negative.    Psychiatric/Behavioral: Negative.  Negative for agitation, behavioral problems and confusion.   All other systems reviewed and are negative.    Objective:     Vital Signs (Most Recent):  Temp: 98 °F (36.7 °C) (12/04/17 1148)  Pulse: 75 (12/04/17 1148)  Resp: 18 (12/04/17 1148)  BP: 133/63 (12/04/17 1148)  SpO2: 99 % (12/04/17 1148) Vital Signs (24h Range):  Temp:  [98 °F (36.7 °C)-98.6 °F (37 °C)] 98 °F (36.7 °C)  Pulse:  [69-93] 75  Resp:  [18-20] 18  SpO2:  [96 %-99 %] 99 %  BP: (115-139)/(62-73) 133/63     Weight: 79.5 kg (175 lb 4.3 oz)  Body mass index is 25.88 kg/m².    Intake/Output Summary (Last 24 hours) at 12/04/17 1501  Last data filed at 12/04/17 0500   Gross per 24 hour   Intake             1200 ml   Output              500 ml   Net              700 ml      Physical Exam   Constitutional: He is oriented to person, place, and time. He appears well-developed and well-nourished. He is cooperative.   HENT:   Head: Normocephalic and atraumatic.   Nose: No rhinorrhea.   Mouth/Throat: Mucous membranes are normal. No oropharyngeal exudate.   Eyes: Conjunctivae are normal.   Neck: Neck supple. No thyroid mass present.   Cardiovascular: Normal rate, S1 normal and S2 normal.  An irregular rhythm present.   Pulmonary/Chest: Effort normal and breath sounds normal. No respiratory distress. He has no wheezes.   Musculoskeletal: He exhibits edema (+1/+2 BLE edema).   Lymphadenopathy:     He has no cervical adenopathy.   Neurological: He is alert and oriented to person, place, and time.   Skin: Skin is warm and dry.   Well approximated incision to sternum   Psychiatric: He has a normal mood and affect.   Nursing note and vitals reviewed.      Significant Labs:   CBC:   Recent Labs  Lab 12/03/17  0550 12/04/17  0709   WBC 11.16 9.16   HGB 9.6*  9.3*   HCT 30.2* 29.8*    168     CMP:   Recent Labs  Lab 12/03/17  0550 12/04/17  0709   * 134*   K 4.0 4.0    102   CO2 26 25   * 120*   BUN 43* 40*   CREATININE 1.7* 1.6*   CALCIUM 8.6* 8.5*   PROT  --  6.2   ALBUMIN  --  2.7*   BILITOT  --  0.9   ALKPHOS  --  131   AST  --  188*   ALT  --  245*   ANIONGAP 6* 7*   EGFRNONAA 36* 38*     All pertinent labs within the past 24 hours have been reviewed.    Significant Imaging: I have reviewed all pertinent imaging results/findings within the past 24 hours.    Assessment/Plan:      * S/P AVR              CAD (coronary artery disease)    S/P CABG x 1  - beta blocker, ASA and Statin          XIANG (acute kidney injury)    Creat improving, will f/u UOP.  Renal on board.            Pulmonary hypertension    Supplemental oxygen to maintain sat > 92 %  Outpatient follow up with Pulmonology        Chronic systolic congestive heart failure    Compensated  Renal function improving after mild overdiuresis per nephrology            Nonrheumatic aortic valve stenosis    -post-operative Cardiac management per CVT Surgery and Critical Care team.   -Monitor blood glucose.   -Continue Nozin per protocol.    12/2 - POD 2, from AVR, pain controlled, no cardiovasc symptoms, on anticoagulation.          Atrial fibrillation    -Rate controlled.   -Continue metoprolol.     12/03 - on eliquis ASA and metoprolol.           Hyperlipidemia    Restart statin, when appropriate.             Hypertension         BP stable, continue metoprolol, d/c-ed lasix          VTE Risk Mitigation         Ordered     apixaban tablet 2.5 mg  2 times daily     Route:  Oral        12/02/17 1602     Medium Risk of VTE  Once      11/30/17 1112              Jo Cunningham NP  Department of Hospital Medicine   [facility]

## 2017-12-04 NOTE — PROGRESS NOTES
Pt had 19 beat run of vtach at this time. MONICA Mckee MD, notified. Orders given for CBC and CMP in am. Will continue to monitor.

## 2017-12-04 NOTE — SUBJECTIVE & OBJECTIVE
Interval History:       12/3/17: Patient denies any chest pain, SOB today.     12/4/17: Patient is feeling fine, no complaints today.         Review of patient's allergies indicates:   Allergen Reactions    Sulfa (sulfonamide antibiotics) Hives     Current Facility-Administered Medications   Medication Frequency    0.9%  NaCl infusion Continuous    acetaminophen tablet 650 mg Q6H PRN    apixaban tablet 2.5 mg BID    aspirin EC tablet 81 mg Daily    chlorhexidine 0.12 % solution 10 mL BID    dextrose 50% injection 12.5 g PRN    dextrose 50% injection 25 g PRN    docusate sodium capsule 100 mg BID    famotidine tablet 20 mg Daily    furosemide injection 20 mg Once    glucagon (human recombinant) injection 1 mg PRN    glucose chewable tablet 16 g PRN    glucose chewable tablet 24 g PRN    hydrocodone-acetaminophen 10-325mg per tablet 1 tablet Q4H PRN    hydrocodone-acetaminophen 5-325mg per tablet 1 tablet Q4H PRN    hydrocodone-acetaminophen 7.5-325mg per tablet 1 tablet Q4H PRN    insulin aspart pen 1-10 Units QID (AC + HS) PRN    metoprolol tartrate (LOPRESSOR) tablet 50 mg BID    nozaseptin (NOZIN) nasal  BID    ondansetron injection 4 mg Q12H PRN    polyethylene glycol packet 17 g Daily    promethazine (PHENERGAN) 6.25 mg in dextrose 5 % 50 mL IVPB Q6H PRN    simvastatin tablet 20 mg QHS       Objective:     Vital Signs (Most Recent):  Temp: 98 °F (36.7 °C) (12/04/17 1148)  Pulse: 75 (12/04/17 1148)  Resp: 18 (12/04/17 1148)  BP: 133/63 (12/04/17 1148)  SpO2: 99 % (12/04/17 1148)  O2 Device (Oxygen Therapy): room air (12/04/17 0904) Vital Signs (24h Range):  Temp:  [98 °F (36.7 °C)-98.6 °F (37 °C)] 98 °F (36.7 °C)  Pulse:  [69-93] 75  Resp:  [18-20] 18  SpO2:  [96 %-99 %] 99 %  BP: (115-139)/(62-73) 133/63     Weight: 79.5 kg (175 lb 4.3 oz) (12/04/17 0420)  Body mass index is 25.88 kg/m².  Body surface area is 1.97 meters squared.    I/O last 3 completed shifts:  In: 2125.8  [I.V.:2125.8]  Out: 1300 [Urine:1300]    Physical Exam   Constitutional: He is oriented to person, place, and time. He appears well-developed. He is cooperative.   HENT:   Head: Normocephalic.   Nose: No rhinorrhea.   Mouth/Throat: Mucous membranes are normal. No oropharyngeal exudate.   Eyes: Pupils are equal, round, and reactive to light.   Neck: No thyroid mass present.   Cardiovascular: Normal rate, regular rhythm, S1 normal, S2 normal and intact distal pulses.    Pulmonary/Chest: Effort normal. No respiratory distress. He has no wheezes.   Abdominal: Soft. Bowel sounds are normal. He exhibits no distension. There is no tenderness. No hernia.   Musculoskeletal: He exhibits edema.   Trace LE edema.   Lymphadenopathy:     He has no cervical adenopathy.   Neurological: He is alert and oriented to person, place, and time.   Skin: Skin is warm and dry.   Psychiatric: He has a normal mood and affect.       Significant Labs:  Lab Results   Component Value Date    CREATININE 1.6 (H) 12/04/2017    BUN 40 (H) 12/04/2017     (L) 12/04/2017    K 4.0 12/04/2017     12/04/2017    CO2 25 12/04/2017     Lab Results   Component Value Date    CALCIUM 8.5 (L) 12/04/2017    PHOS 2.9 12/03/2017     Lab Results   Component Value Date    ALBUMIN 2.7 (L) 12/04/2017     Lab Results   Component Value Date    WBC 9.16 12/04/2017    HGB 9.3 (L) 12/04/2017    HCT 29.8 (L) 12/04/2017    MCV 82 12/04/2017     12/04/2017       Recent Labs  Lab 12/03/17  0550   MG 2.1         Significant Imaging:  Imaging Results          X-Ray Chest AP Portable (Final result)  Result time 12/03/17 07:24:51    Final result by Micah Hernandez Jr., MD (12/03/17 07:24:51)                 Impression:     Removal of support devices with no signs of acute cardiopulmonary abnormality.        Electronically signed by: MICAH HERNANDEZ M.D.  Date:     12/03/17  Time:    07:24              Narrative:    XR CHEST AP PORTABLE    Clinical history:  Post-op    Comparison: Previous frontal view of the chest on 12/01/2017 was reviewed.    Findings: There has been a median sternotomy.  The previously demonstrated mediastinal drain and [place]-Sam[place] catheter have been removed.  The lungs are well-aerated.  No pneumothorax or significant pleural fluid.  No widening of the mediastinum.  The heart is normal in size. There are bilateral shoulder joint replacements.                             X-Ray Chest AP Portable (Final result)  Result time 12/01/17 07:16:38    Final result by Frida Magana MD (12/01/17 07:16:38)                 Impression:     No adverse interval change      Electronically signed by: FRIDA MAGANA MD  Date:     12/01/17  Time:    07:16              Narrative:    History: Postoperative    Comparison: 11/30/17    Result: Single view of the chest.      Endotracheal and nasogastric tubes have been removed. In comparison to the prior study, there is no adverse interval changes.                             X-Ray Chest AP Portable (Final result)  Result time 11/30/17 12:21:49    Final result by Kimberly Matthews MD (11/30/17 12:21:49)                 Impression:         Postoperative changes of the chest.  No unexpected findings or evidence of complicating process.      Electronically signed by: KIMBERLY MATTHEWS MD  Date:     11/30/17  Time:    12:21              Narrative:    Exam: XR CHEST AP PORTABLE,    Date:  11/30/17 11:59:45    History: Follow up exam following surgery.    Comparison:  11/28/2017.        Findings:     Postoperative changes identified.  Median sternotomy wires are now place.  A [place]-Samina catheter is identified the distal tip overlies the distal right main pulmonary artery.  Mediastinal drains noted.  Endotracheal tube in place with the distal tip just distal to level of clavicles.  Nasogastric tube in place.  Distal tip overlies the proximal to mid body the stomach.  Lungs demonstrate increased interstitial markings thought to  represent chronic change.  No significant pulmonary vascular congestion or evidence of heart failure.

## 2017-12-04 NOTE — SUBJECTIVE & OBJECTIVE
I    Review of Systems   Constitutional: Negative for chills and fever.   HENT: Negative.  Negative for congestion and sore throat.    Eyes: Negative.  Negative for visual disturbance.   Respiratory: Positive for cough. Negative for chest tightness, shortness of breath and wheezing.    Cardiovascular: Negative for chest pain.   Gastrointestinal: Negative for abdominal pain, diarrhea, nausea and vomiting.   Endocrine: Negative.    Genitourinary: Negative.  Negative for difficulty urinating.   Musculoskeletal: Negative.  Negative for myalgias and neck stiffness.   Skin: Negative.  Negative for color change and pallor.   Allergic/Immunologic: Negative.    Neurological: Positive for weakness. Negative for dizziness, facial asymmetry, light-headedness and headaches.   Hematological: Negative.    Psychiatric/Behavioral: Negative.  Negative for agitation, behavioral problems and confusion.   All other systems reviewed and are negative.    Objective:     Vital Signs (Most Recent):  Temp: 98 °F (36.7 °C) (12/04/17 1148)  Pulse: 75 (12/04/17 1148)  Resp: 18 (12/04/17 1148)  BP: 133/63 (12/04/17 1148)  SpO2: 99 % (12/04/17 1148) Vital Signs (24h Range):  Temp:  [98 °F (36.7 °C)-98.6 °F (37 °C)] 98 °F (36.7 °C)  Pulse:  [69-93] 75  Resp:  [18-20] 18  SpO2:  [96 %-99 %] 99 %  BP: (115-139)/(62-73) 133/63     Weight: 79.5 kg (175 lb 4.3 oz)  Body mass index is 25.88 kg/m².    Intake/Output Summary (Last 24 hours) at 12/04/17 1501  Last data filed at 12/04/17 0500   Gross per 24 hour   Intake             1200 ml   Output              500 ml   Net              700 ml      Physical Exam   Constitutional: He is oriented to person, place, and time. He appears well-developed and well-nourished. He is cooperative.   HENT:   Head: Normocephalic and atraumatic.   Nose: No rhinorrhea.   Mouth/Throat: Mucous membranes are normal. No oropharyngeal exudate.   Eyes: Conjunctivae are normal.   Neck: Neck supple. No thyroid mass present.    Cardiovascular: Normal rate, S1 normal and S2 normal.  An irregular rhythm present.   Pulmonary/Chest: Effort normal and breath sounds normal. No respiratory distress. He has no wheezes.   Musculoskeletal: He exhibits edema (+1/+2 BLE edema).   Lymphadenopathy:     He has no cervical adenopathy.   Neurological: He is alert and oriented to person, place, and time.   Skin: Skin is warm and dry.   Well approximated incision to sternum   Psychiatric: He has a normal mood and affect.   Nursing note and vitals reviewed.      Significant Labs:   CBC:   Recent Labs  Lab 12/03/17  0550 12/04/17  0709   WBC 11.16 9.16   HGB 9.6* 9.3*   HCT 30.2* 29.8*    168     CMP:   Recent Labs  Lab 12/03/17  0550 12/04/17  0709   * 134*   K 4.0 4.0    102   CO2 26 25   * 120*   BUN 43* 40*   CREATININE 1.7* 1.6*   CALCIUM 8.6* 8.5*   PROT  --  6.2   ALBUMIN  --  2.7*   BILITOT  --  0.9   ALKPHOS  --  131   AST  --  188*   ALT  --  245*   ANIONGAP 6* 7*   EGFRNONAA 36* 38*     All pertinent labs within the past 24 hours have been reviewed.    Significant Imaging: I have reviewed all pertinent imaging results/findings within the past 24 hours.

## 2017-12-04 NOTE — PLAN OF CARE
Problem: Patient Care Overview  Goal: Plan of Care Review  PT REQUIRES MOD A FOR SIT>STAND WITH POST LEAN   Pt free of falls. PIV intact and saline locked.. Pt parrish pain. Afib on the monitor. Call light in reach and hourly rounding made.

## 2017-12-04 NOTE — PLAN OF CARE
Problem: Patient Care Overview  Goal: Plan of Care Review  PT REQUIRES MOD A FOR SIT>STAND WITH POST LEAN   Outcome: Ongoing (interventions implemented as appropriate)  POC reviewed with patient, verbalized understanding. Patient remains free from falls. Fall precautions in place. afib on cardiac monitor, rate 75-85. Tylenol given PRN for headache. IVF infusing at 50 ml/hr. Pt had 19 beat run of vtach around 2100. Cardiology notified, AM labs ordered. Ambulating with standby assistance. O2 weaned except for when ambulating. Sternal precautions in place. All other VSS. No other c/o at this time. Call bell within reach. Reminded to call for assistance.

## 2017-12-04 NOTE — PLAN OF CARE
Problem: Patient Care Overview  Goal: Plan of Care Review  PT REQUIRES MOD A FOR SIT>STAND WITH POST LEAN   Outcome: Ongoing (interventions implemented as appropriate)  Pt on room air tolerating well. No distress noted at this time.

## 2017-12-04 NOTE — PROGRESS NOTES
[facility] Hospital Medicine  Progress Note    Patient Name: Chung Meneses Sr.  MRN: [id number]  Patient Class: IP- Inpatient   Admission Date: 11/30/2017  Length of Stay: 3 days  Attending Physician: Shaq Boateng MD  Primary Care Provider: Glenroy Carmichael MD        Subjective:     Principal Problem:Nonrheumatic aortic valve stenosis    HPI:  Chung Meneses is an 86 year old male with a history of CAD, hypertension and aortic stenosis who underwent CABG with bioprosthetic AVR performed by Dr. Boateng on 12/30/17. His post-operative course remains unremarkable and he is intubated at the time of exam.     Hospital Course:  12/2 - Here with daughter this Am.  pt much improved, is passing gas, seen by CVT and cards this Am.  12/3 pt doing better, no fever or elevate wBC, able to walk and + Bm, is using Is.  UOP ~0.4cc/kg, creat improving, discussed with renal and is following expected resolution of XIANG.  tolerating eliquis, aware of risks          Review of Systems   Constitutional: Negative.  Negative for chills and fever.   HENT: Negative.  Negative for congestion and sore throat.    Eyes: Negative.  Negative for visual disturbance.   Respiratory: Negative for cough, chest tightness, shortness of breath and wheezing.    Cardiovascular: Negative for chest pain.   Gastrointestinal: Negative for abdominal pain, diarrhea, nausea and vomiting.   Endocrine: Negative.    Genitourinary: Negative.  Negative for difficulty urinating.   Musculoskeletal: Negative.  Negative for myalgias and neck stiffness.   Skin: Negative.  Negative for color change and pallor.   Allergic/Immunologic: Negative.    Neurological: Negative.  Negative for dizziness, facial asymmetry, light-headedness and headaches.   Hematological: Negative.    Psychiatric/Behavioral: Negative.  Negative for agitation, behavioral problems and confusion.   All other systems reviewed and are negative.    Objective:     Vital Signs (Most  Recent):  Temp: 98.4 °F (36.9 °C) (12/03/17 2031)  Pulse: 82 (12/03/17 2031)  Resp: 18 (12/03/17 2031)  BP: 139/67 (12/03/17 2031)  SpO2: 97 % (12/03/17 2031) Vital Signs (24h Range):  Temp:  [97.6 °F (36.4 °C)-98.4 °F (36.9 °C)] 98.4 °F (36.9 °C)  Pulse:  [72-94] 82  Resp:  [16-22] 18  SpO2:  [95 %-98 %] 97 %  BP: (104-139)/(66-73) 139/67     Weight: 78 kg (171 lb 15.3 oz)  Body mass index is 25.39 kg/m².    Intake/Output Summary (Last 24 hours) at 12/03/17 2259  Last data filed at 12/03/17 1900   Gross per 24 hour   Intake          1180.83 ml   Output              600 ml   Net           580.83 ml      Physical Exam   Constitutional: He is oriented to person, place, and time. He appears well-developed and well-nourished. No distress.   HENT:   Head: Normocephalic and atraumatic.   Mouth/Throat: Oropharynx is clear and moist.   Eyes: Conjunctivae and EOM are normal. Pupils are equal, round, and reactive to light.   Neck: No JVD present. No thyromegaly present.   Cardiovascular: Normal rate, regular rhythm and normal heart sounds.  Exam reveals no gallop and no friction rub.    No murmur heard.  Sternotomy wound dressed clean and intact.  Mediastinal drains removed.   Pulmonary/Chest: Effort normal and breath sounds normal. No respiratory distress. He has no wheezes. He has no rales.   Abdominal: Soft. Bowel sounds are normal. He exhibits no distension. There is no tenderness. There is no rebound and no guarding.   Musculoskeletal: Normal range of motion. He exhibits no edema or tenderness.   Lymphadenopathy:     He has no cervical adenopathy.   Neurological: He is alert and oriented to person, place, and time. He has normal reflexes. He displays normal reflexes. No cranial nerve deficit.   Skin: Skin is warm and dry. Capillary refill takes 2 to 3 seconds. No rash noted. He is not diaphoretic. No erythema.   Psychiatric: He has a normal mood and affect. His behavior is normal. Judgment and thought content normal.        Significant Labs: All pertinent labs within the past 24 hours have been reviewed.    Significant Imaging: I have reviewed and interpreted all pertinent imaging results/findings within the past 24 hours.    Assessment/Plan:      * Nonrheumatic aortic valve stenosis    -post-operative Cardiac management per CVT Surgery and Critical Care team.   -Monitor blood glucose.   -Continue Nozin per protocol.    12/2 - POD 2, from AVR, pain controlled, no cardiovasc symptoms, on anticoagulation.          CAD (coronary artery disease)    12/2 - POD 2 CAB - pt following expected recovery.  Stating resumed, on beta blocker, will follow          XIANG (acute kidney injury)    Creat improving, will f/u UOP.  Renal on board.            Pulmonary hypertension    12/2 - stable        Chronic systolic congestive heart failure    -Monitor volume status.   -Continue metoprolol and Lasix.    12/2 - currently compensated, mild overdiuresis per nephrology        Atrial fibrillation    -Rate controlled.   -Continue metoprolol.   -Restart Xarelto, when appropriate. Of note, patient with atypical dose of 15 mg daily.     12/2 - Per CVT suggested starting xarelto.  lovenox was given today, d/c-ed and ordered xarelto for Am.  Per daughter, cards may be starting on eliquis, will defer future changes to cards.     12/03 - on eliquis ASA and metoprolol.           Hyperlipidemia    Restart statin, when appropriate.     12/2 - home statin resumed        Hypertension    Monitor postoperatively.     12/2 - BP stable, continue metoprolol, d/c-ed lasix          VTE Risk Mitigation         Ordered     apixaban tablet 2.5 mg  2 times daily     Route:  Oral        12/02/17 1602     Medium Risk of VTE  Once      11/30/17 1112              Vasquez Schreiber MD  Department of Hospital Medicine   [facility]

## 2017-12-04 NOTE — PROGRESS NOTES
[facility]   Nephrology  Progress Note    Patient Name: Chung Meneses Sr.  MRN: [id number]  Admission Date: 11/30/2017  Hospital Length of Stay: 4 days  Attending Provider: Shaq Boateng MD   Primary Care Physician: Glenroy Carmichael MD  Principal Problem:Nonrheumatic aortic valve stenosis    Subjective:     HPI: 86 year old male with h/o aortic stenosis, atrial fibrillation, CHF, HTN, hyperlipidemia, CAD [place] [facility] [place] for elective CABG and AVR (bioprosthetic valve). Workup revealed XIANG with creatinine increasing from 1.3 on 11/30/17 to 2.6 on 12/2/17. Patient now oliguric.   Nephrology was consulted to help with patient's renal care while he is [place] [facility]. I saw and examined patient in his hospital room. Patient is resting in chair. He denies any chest pain, SOB, nausea/cvomiting, diarrhea, LE edema. [place] catheter was removed yesterday and patient uses urinal. He reports mild dysuria with urination.  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     Interval History:       12/3/17: Patient denies any chest pain, SOB today.     12/4/17: Patient is feeling fine, no complaints today.         Review of patient's allergies indicates:   Allergen Reactions    Sulfa (sulfonamide antibiotics) Hives     Current Facility-Administered Medications   Medication Frequency    0.9%  NaCl infusion Continuous    acetaminophen tablet 650 mg Q6H PRN    apixaban tablet 2.5 mg BID    aspirin EC tablet 81 mg Daily    chlorhexidine 0.12 % solution 10 mL BID    dextrose 50% injection 12.5 g PRN    dextrose 50% injection 25 g PRN    docusate sodium capsule 100 mg BID    famotidine tablet 20 mg Daily    furosemide injection 20 mg Once    glucagon (human recombinant) injection 1 mg PRN    glucose chewable tablet 16 g PRN    glucose chewable tablet 24 g PRN    hydrocodone-acetaminophen 10-325mg per tablet 1 tablet Q4H PRN     hydrocodone-acetaminophen 5-325mg per tablet 1 tablet Q4H PRN    hydrocodone-acetaminophen 7.5-325mg per tablet 1 tablet Q4H PRN    insulin aspart pen 1-10 Units QID (AC + HS) PRN    metoprolol tartrate (LOPRESSOR) tablet 50 mg BID    nozaseptin (NOZIN) nasal  BID    ondansetron injection 4 mg Q12H PRN    polyethylene glycol packet 17 g Daily    promethazine (PHENERGAN) 6.25 mg in dextrose 5 % 50 mL IVPB Q6H PRN    simvastatin tablet 20 mg QHS       Objective:     Vital Signs (Most Recent):  Temp: 98 °F (36.7 °C) (12/04/17 1148)  Pulse: 75 (12/04/17 1148)  Resp: 18 (12/04/17 1148)  BP: 133/63 (12/04/17 1148)  SpO2: 99 % (12/04/17 1148)  O2 Device (Oxygen Therapy): room air (12/04/17 0904) Vital Signs (24h Range):  Temp:  [98 °F (36.7 °C)-98.6 °F (37 °C)] 98 °F (36.7 °C)  Pulse:  [69-93] 75  Resp:  [18-20] 18  SpO2:  [96 %-99 %] 99 %  BP: (115-139)/(62-73) 133/63     Weight: 79.5 kg (175 lb 4.3 oz) (12/04/17 0420)  Body mass index is 25.88 kg/m².  Body surface area is 1.97 meters squared.    I/O last 3 completed shifts:  In: 2125.8 [I.V.:2125.8]  Out: 1300 [Urine:1300]    Physical Exam   Constitutional: He is oriented to person, place, and time. He appears well-developed. He is cooperative.   HENT:   Head: Normocephalic.   Nose: No rhinorrhea.   Mouth/Throat: Mucous membranes are normal. No oropharyngeal exudate.   Eyes: Pupils are equal, round, and reactive to light.   Neck: No thyroid mass present.   Cardiovascular: Normal rate, regular rhythm, S1 normal, S2 normal and intact distal pulses.    Pulmonary/Chest: Effort normal. No respiratory distress. He has no wheezes.   Abdominal: Soft. Bowel sounds are normal. He exhibits no distension. There is no tenderness. No hernia.   Musculoskeletal: He exhibits edema.   Trace LE edema.   Lymphadenopathy:     He has no cervical adenopathy.   Neurological: He is alert and oriented to person, place, and time.   Skin: Skin is warm and dry.   Psychiatric: He has  a normal mood and affect.       Significant Labs:  Lab Results   Component Value Date    CREATININE 1.6 (H) 12/04/2017    BUN 40 (H) 12/04/2017     (L) 12/04/2017    K 4.0 12/04/2017     12/04/2017    CO2 25 12/04/2017     Lab Results   Component Value Date    CALCIUM 8.5 (L) 12/04/2017    PHOS 2.9 12/03/2017     Lab Results   Component Value Date    ALBUMIN 2.7 (L) 12/04/2017     Lab Results   Component Value Date    WBC 9.16 12/04/2017    HGB 9.3 (L) 12/04/2017    HCT 29.8 (L) 12/04/2017    MCV 82 12/04/2017     12/04/2017       Recent Labs  Lab 12/03/17  0550   MG 2.1         Significant Imaging:  Imaging Results          X-Ray Chest AP Portable (Final result)  Result time 12/03/17 07:24:51    Final result by Micah Hernandez Jr., MD (12/03/17 07:24:51)                 Impression:     Removal of support devices with no signs of acute cardiopulmonary abnormality.        Electronically signed by: MICAH HERNANDEZ M.D.  Date:     12/03/17  Time:    07:24              Narrative:    XR CHEST AP PORTABLE    Clinical history: Post-op    Comparison: Previous frontal view of the chest on 12/01/2017 was reviewed.    Findings: There has been a median sternotomy.  The previously demonstrated mediastinal drain and [place]-Samina catheter have been removed.  The lungs are well-aerated.  No pneumothorax or significant pleural fluid.  No widening of the mediastinum.  The heart is normal in size. There are bilateral shoulder joint replacements.                             X-Ray Chest AP Portable (Final result)  Result time 12/01/17 07:16:38    Final result by Frida Magana MD (12/01/17 07:16:38)                 Impression:     No adverse interval change      Electronically signed by: FRIDA MAGANA MD  Date:     12/01/17  Time:    07:16              Narrative:    History: Postoperative    Comparison: 11/30/17    Result: Single view of the chest.      Endotracheal and nasogastric tubes have been removed. In comparison  to the prior study, there is no adverse interval changes.                             X-Ray Chest AP Portable (Final result)  Result time 11/30/17 12:21:49    Final result by Kimberly Matthews MD (11/30/17 12:21:49)                 Impression:         Postoperative changes of the chest.  No unexpected findings or evidence of complicating process.      Electronically signed by: KIMBERLY MATTHEWS MD  Date:     11/30/17  Time:    12:21              Narrative:    Exam: XR CHEST AP PORTABLE,    Date:  11/30/17 11:59:45    History: Follow up exam following surgery.    Comparison:  11/28/2017.        Findings:     Postoperative changes identified.  Median sternotomy wires are now place.  A [place]-Samina catheter is identified the distal tip overlies the distal right main pulmonary artery.  Mediastinal drains noted.  Endotracheal tube in place with the distal tip just distal to level of clavicles.  Nasogastric tube in place.  Distal tip overlies the proximal to mid body the stomach.  Lungs demonstrate increased interstitial markings thought to represent chronic change.  No significant pulmonary vascular congestion or evidence of heart failure.                                Assessment/Plan:     Hyperkalemia    Has resolved.         XIANG (acute kidney injury)    Patient presents with XIANG and creatinine has increased from 1.3 on 11/30/17 to 2.6 on 12/2/17. Patient is oliguric with UOP of about 700 ml.  XIANG likely related to recent cardiac surgery (CABG and bioprosthetic AVR) and XIANG is likely due to ATN/poor perfusion. Renal function has now improved and creatinine has declined to 1.6. Will monitor closely. Continue mild hydration.             Thank you for your consult. I will follow-up with patient. Please contact us if you have any additional questions.    Romulo Rodrigez MD  Nephrology  [facility]

## 2017-12-04 NOTE — PROGRESS NOTES
CVT Cardiothoracic Surgery    PLAN:  - ALT/AST elevated - recheck tomorrow   - Cr improving, UOP good  - ambulate/IS   - home soon     Savanna Ball    POD 4: AVR (tissue) & CABG x 1  Status:  Telemetry  Interval History:  No new events overnight, in and out of a fib     Pressors: None    Rhythm: NSR  Respiratory: No dyspnea at rest  Extremity: RLE +2 pitting edema   Sternum: Stable    Wounds: Clean, dry and intact M/S and LLE  Mobility: Ambulating with assistance      Labs:  BMP  Lab Results   Component Value Date     (L) 12/04/2017    K 4.0 12/04/2017     12/04/2017    CO2 25 12/04/2017    BUN 40 (H) 12/04/2017    CREATININE 1.6 (H) 12/04/2017    CALCIUM 8.5 (L) 12/04/2017    ANIONGAP 7 (L) 12/04/2017    ESTGFRAFRICA 44 (A) 12/04/2017    EGFRNONAA 38 (A) 12/04/2017     Lab Results   Component Value Date    WBC 9.16 12/04/2017    HGB 9.3 (L) 12/04/2017    HCT 29.8 (L) 12/04/2017    MCV 82 12/04/2017     12/04/2017       Imaging: CXR    VITAL SIGNS: 24 HR MIN & MAX LAST    Temp  Min: 98 °F (36.7 °C)  Max: 98.6 °F (37 °C)  98 °F (36.7 °C)        BP  Min: 115/62  Max: 139/67  133/63     Pulse  Min: 69  Max: 93  75     Resp  Min: 18  Max: 20  18    SpO2  Min: 96 %  Max: 99 %  99 %      Intake/Output:  No intake/output data recorded.  I/O last 3 completed shifts:  In: 2125.8 [I.V.:2125.8]  Out: 1300 [Urine:1300]

## 2017-12-04 NOTE — SUBJECTIVE & OBJECTIVE
Review of Systems   Constitutional: Negative.  Negative for chills and fever.   HENT: Negative.  Negative for congestion and sore throat.    Eyes: Negative.  Negative for visual disturbance.   Respiratory: Negative for cough, chest tightness, shortness of breath and wheezing.    Cardiovascular: Negative for chest pain.   Gastrointestinal: Negative for abdominal pain, diarrhea, nausea and vomiting.   Endocrine: Negative.    Genitourinary: Negative.  Negative for difficulty urinating.   Musculoskeletal: Negative.  Negative for myalgias and neck stiffness.   Skin: Negative.  Negative for color change and pallor.   Allergic/Immunologic: Negative.    Neurological: Negative.  Negative for dizziness, facial asymmetry, light-headedness and headaches.   Hematological: Negative.    Psychiatric/Behavioral: Negative.  Negative for agitation, behavioral problems and confusion.   All other systems reviewed and are negative.    Objective:     Vital Signs (Most Recent):  Temp: 98.4 °F (36.9 °C) (12/03/17 2031)  Pulse: 82 (12/03/17 2031)  Resp: 18 (12/03/17 2031)  BP: 139/67 (12/03/17 2031)  SpO2: 97 % (12/03/17 2031) Vital Signs (24h Range):  Temp:  [97.6 °F (36.4 °C)-98.4 °F (36.9 °C)] 98.4 °F (36.9 °C)  Pulse:  [72-94] 82  Resp:  [16-22] 18  SpO2:  [95 %-98 %] 97 %  BP: (104-139)/(66-73) 139/67     Weight: 78 kg (171 lb 15.3 oz)  Body mass index is 25.39 kg/m².    Intake/Output Summary (Last 24 hours) at 12/03/17 0157  Last data filed at 12/03/17 1900   Gross per 24 hour   Intake          1180.83 ml   Output              600 ml   Net           580.83 ml      Physical Exam   Constitutional: He is oriented to person, place, and time. He appears well-developed and well-nourished. No distress.   HENT:   Head: Normocephalic and atraumatic.   Mouth/Throat: Oropharynx is clear and moist.   Eyes: Conjunctivae and EOM are normal. Pupils are equal, round, and reactive to light.   Neck: No JVD present. No thyromegaly present.    Cardiovascular: Normal rate, regular rhythm and normal heart sounds.  Exam reveals no gallop and no friction rub.    No murmur heard.  Sternotomy wound dressed clean and intact.  Mediastinal drains removed.   Pulmonary/Chest: Effort normal and breath sounds normal. No respiratory distress. He has no wheezes. He has no rales.   Abdominal: Soft. Bowel sounds are normal. He exhibits no distension. There is no tenderness. There is no rebound and no guarding.   Musculoskeletal: Normal range of motion. He exhibits no edema or tenderness.   Lymphadenopathy:     He has no cervical adenopathy.   Neurological: He is alert and oriented to person, place, and time. He has normal reflexes. He displays normal reflexes. No cranial nerve deficit.   Skin: Skin is warm and dry. Capillary refill takes 2 to 3 seconds. No rash noted. He is not diaphoretic. No erythema.   Psychiatric: He has a normal mood and affect. His behavior is normal. Judgment and thought content normal.       Significant Labs: All pertinent labs within the past 24 hours have been reviewed.    Significant Imaging: I have reviewed and interpreted all pertinent imaging results/findings within the past 24 hours.

## 2017-12-04 NOTE — PT/OT/SLP PROGRESS
Physical Therapy  Treatment    Chung Meneses .   MRN: [id number]   Admitting Diagnosis: Nonrheumatic aortic valve stenosis    PT Received On: 12/04/17  PT Start Time: 0845     PT Stop Time: 0905    PT Total Time (min): 20 min       Billable Minutes:  Gait Training 10 and Therapeutic Activity 10    Treatment Type: Treatment  PT/PTA: PT             General Precautions: Standard, fall, sternal  Orthopedic Precautions: N/A   Braces:           Subjective:  Communicated with EPIC AND NURSE GINO prior to session.  PT AGREES TO THERAPY    Pain/Comfort  Pain Rating 1: 0/10    Objective:   Patient found with: telemetry    Functional Mobility:  · Bed Mobility: Bed Mobility:  · PT UP IN CHAIR    Transfers: Transfers     Sit to Stand:  minimum assistance with no AD    · Gait: Gait:  · AMBULATED IN HALLWAY, 325 FEET AT CGA, BALANCE IS FAIR TODAY, LOB WITH MARY TO CORRECT.  RETURNED TO SITTING IN CHAIR        Balance:   Static Sit: FAIR: Maintains without assist, but unable to take any challenges   Dynamic Sit: FAIR+: Maintains balance through MINIMAL excursions of active trunk motion  Static Stand: FAIR: Maintains without assist but unable to take challenges  Dynamic stand: POOR: N/A     Therapeutic Activities and Exercises:  GAIT IN HALLWAY, PT EDUCATION, STERNAL PRECAUTIONS AND TRANSFER TECHNIQUE     AM-PAC 6 CLICK MOBILITY  How much help from another person does this patient currently need?   1 = Unable, Total/Dependent Assistance  2 = A lot, Maximum/Moderate Assistance  3 = A little, Minimum/Contact Guard/Supervision  4 = None, Modified [place]/Independent    Turning over in bed (including adjusting bedclothes, sheets and blankets)?: 3  Sitting down on and standing up from a chair with arms (e.g., wheelchair, bedside commode, etc.): 3  Moving from lying on back to sitting on the side of the bed?: 3  Moving to and from a bed to a chair (including a wheelchair)?: 3  Need to walk in hospital room?: 3  Climbing 3-5 steps  with a railing?: 1  Total Score: 16    AM-PAC Raw Score CMS G-Code Modifier Level of Impairment Assistance   6 % Total / Unable   7 - 9 CM 80 - 100% Maximal Assist   10 - 14 CL 60 - 80% Moderate Assist   15 - 19 CK 40 - 60% Moderate Assist   20 - 22 CJ 20 - 40% Minimal Assist   23 CI 1-20% SBA / CGA   24 CH 0% Independent/ Mod I     Patient left up in chair with all lines intact, call button in reach and NURSE notified.    Assessment:  Chung Meneses . is a 86 y.o. male with a medical diagnosis of Nonrheumatic aortic valve stenosis and presents with IMPAIRED MOBILITY AND WILL NEED CONT P.T.    Rehab identified problem list/impairments: Rehab identified problem list/impairments: weakness, impaired endurance, gait instability, impaired functional mobilty, impaired balance, decreased upper extremity function, orthopedic precautions, decreased safety awareness    Rehab potential is good.    Activity tolerance: Good    Discharge recommendations: Discharge Facility/Level Of Care Needs: home health PT (CARDIAC REHAB)     Barriers to discharge:      Equipment recommendations: Equipment Needed After Discharge: none     GOALS:    Physical Therapy Goals        Problem: Physical Therapy Goal    Goal Priority Disciplines Outcome Goal Variances Interventions   Physical Therapy Goal     PT/OT, PT Ongoing (interventions implemented as appropriate)     Description:  PT WILL BE SEEN FOR P.T. FOR A MIN OF 5 OUT OF 7 DAYS A WEEK  LT17  1. PT WILL COMPLETE BED MOBILITY WITH CGA.  2. PT WILL T/F TO CHAIR WITH S  3. PT WILL GT TRAIN WITH S X 300'   4. PT WILL BE IND WITH ALL STERNAL PRECAUTIONS  5. PT WILL COMPLETE B LE TE X 20 REPS                    PLAN:    Patient to be seen 5 x/week  to address the above listed problems via gait training, therapeutic activities, therapeutic exercises  Plan of Care expires: 17  Plan of Care reviewed with: patient         Freya Mcneal, PT  2017

## 2017-12-04 NOTE — PLAN OF CARE
Problem: Physical Therapy Goal  Goal: Physical Therapy Goal  PT WILL BE SEEN FOR P.T. FOR A MIN OF 5 OUT OF 7 DAYS A WEEK  LT17  1. PT WILL COMPLETE BED MOBILITY WITH CGA.  2. PT WILL T/F TO CHAIR WITH S  3. PT WILL GT TRAIN WITH S X 300'   4. PT WILL BE IND WITH ALL STERNAL PRECAUTIONS  5. PT WILL COMPLETE B LE TE X 20 REPS   Outcome: Ongoing (interventions implemented as appropriate)  PT AMBULATED 325 FEET AT CGA, UNSTEADY GAIT PATTERN, LOSS OF BALANCE CORRECTED WITH MARY.  WILL NEED ASSIST AT HOME FOR GAIT, REMAINS A FALL RISK AT THIS TIME

## 2017-12-04 NOTE — ASSESSMENT & PLAN NOTE
-Rate controlled.   -Continue metoprolol.   -Restart Xarelto, when appropriate. Of note, patient with atypical dose of 15 mg daily.     12/2 - Per CVT suggested starting xarelto.  lovenox was given today, d/c-ed and ordered xarelto for Am.  Per daughter, cards may be starting on eliquis, will defer future changes to cards.     12/03 - on eliquis ASA and metoprolol.

## 2017-12-05 LAB
ALBUMIN SERPL BCP-MCNC: 2.5 G/DL
ALBUMIN SERPL BCP-MCNC: 2.5 G/DL
ALP SERPL-CCNC: 131 U/L
ALP SERPL-CCNC: 131 U/L
ALT SERPL W/O P-5'-P-CCNC: 224 U/L
ALT SERPL W/O P-5'-P-CCNC: 225 U/L
ANION GAP SERPL CALC-SCNC: 8 MMOL/L
AST SERPL-CCNC: 151 U/L
AST SERPL-CCNC: 151 U/L
BASOPHILS # BLD AUTO: 0.02 K/UL
BASOPHILS NFR BLD: 0.3 %
BILIRUB DIRECT SERPL-MCNC: 0.5 MG/DL
BILIRUB SERPL-MCNC: 0.8 MG/DL
BILIRUB SERPL-MCNC: 0.9 MG/DL
BUN SERPL-MCNC: 36 MG/DL
CALCIUM SERPL-MCNC: 8.3 MG/DL
CHLORIDE SERPL-SCNC: 104 MMOL/L
CO2 SERPL-SCNC: 23 MMOL/L
CREAT SERPL-MCNC: 1.2 MG/DL
DIFFERENTIAL METHOD: ABNORMAL
EOSINOPHIL # BLD AUTO: 0.1 K/UL
EOSINOPHIL NFR BLD: 1.9 %
ERYTHROCYTE [DISTWIDTH] IN BLOOD BY AUTOMATED COUNT: 16.7 %
EST. GFR  (AFRICAN AMERICAN): >60 ML/MIN/1.73 M^2
EST. GFR  (NON AFRICAN AMERICAN): 54 ML/MIN/1.73 M^2
GLUCOSE SERPL-MCNC: 109 MG/DL
HCT VFR BLD AUTO: 26.9 %
HGB BLD-MCNC: 8.5 G/DL
LYMPHOCYTES # BLD AUTO: 0.8 K/UL
LYMPHOCYTES NFR BLD: 11.3 %
MCH RBC QN AUTO: 26.4 PG
MCHC RBC AUTO-ENTMCNC: 31.6 G/DL
MCV RBC AUTO: 84 FL
MONOCYTES # BLD AUTO: 0.9 K/UL
MONOCYTES NFR BLD: 12.1 %
NEUTROPHILS # BLD AUTO: 5.4 K/UL
NEUTROPHILS NFR BLD: 74.4 %
PLATELET # BLD AUTO: 169 K/UL
PMV BLD AUTO: 10.1 FL
POCT GLUCOSE: 121 MG/DL (ref 70–110)
POCT GLUCOSE: 151 MG/DL (ref 70–110)
POCT GLUCOSE: 174 MG/DL (ref 70–110)
POTASSIUM SERPL-SCNC: 3.8 MMOL/L
PROT SERPL-MCNC: 5.7 G/DL
PROT SERPL-MCNC: 5.7 G/DL
RBC # BLD AUTO: 3.22 M/UL
SODIUM SERPL-SCNC: 135 MMOL/L
WBC # BLD AUTO: 7.28 K/UL

## 2017-12-05 PROCEDURE — 25000003 PHARM REV CODE 250: Performed by: PHYSICIAN ASSISTANT

## 2017-12-05 PROCEDURE — 25000003 PHARM REV CODE 250: Performed by: THORACIC SURGERY (CARDIOTHORACIC VASCULAR SURGERY)

## 2017-12-05 PROCEDURE — 25000003 PHARM REV CODE 250: Performed by: NURSE PRACTITIONER

## 2017-12-05 PROCEDURE — 80053 COMPREHEN METABOLIC PANEL: CPT

## 2017-12-05 PROCEDURE — 21400001 HC TELEMETRY ROOM

## 2017-12-05 PROCEDURE — 36415 COLL VENOUS BLD VENIPUNCTURE: CPT

## 2017-12-05 PROCEDURE — 63600175 PHARM REV CODE 636 W HCPCS: Performed by: NURSE PRACTITIONER

## 2017-12-05 PROCEDURE — 97530 THERAPEUTIC ACTIVITIES: CPT

## 2017-12-05 PROCEDURE — 97116 GAIT TRAINING THERAPY: CPT

## 2017-12-05 PROCEDURE — 97110 THERAPEUTIC EXERCISES: CPT

## 2017-12-05 PROCEDURE — 25000003 PHARM REV CODE 250: Performed by: INTERNAL MEDICINE

## 2017-12-05 PROCEDURE — 99232 SBSQ HOSP IP/OBS MODERATE 35: CPT | Mod: ,,, | Performed by: INTERNAL MEDICINE

## 2017-12-05 PROCEDURE — 80076 HEPATIC FUNCTION PANEL: CPT

## 2017-12-05 PROCEDURE — 94761 N-INVAS EAR/PLS OXIMETRY MLT: CPT

## 2017-12-05 PROCEDURE — 85025 COMPLETE CBC W/AUTO DIFF WBC: CPT

## 2017-12-05 RX ADMIN — APIXABAN 2.5 MG: 2.5 TABLET, FILM COATED ORAL at 08:12

## 2017-12-05 RX ADMIN — CHLORHEXIDINE GLUCONATE 10 ML: 1.2 RINSE ORAL at 09:12

## 2017-12-05 RX ADMIN — INSULIN ASPART 1 UNITS: 100 INJECTION, SOLUTION INTRAVENOUS; SUBCUTANEOUS at 10:12

## 2017-12-05 RX ADMIN — METOPROLOL TARTRATE 50 MG: 50 TABLET ORAL at 08:12

## 2017-12-05 RX ADMIN — ACETAMINOPHEN 650 MG: 325 TABLET ORAL at 10:12

## 2017-12-05 RX ADMIN — POLYETHYLENE GLYCOL (3350) 17 G: 17 POWDER, FOR SOLUTION ORAL at 08:12

## 2017-12-05 RX ADMIN — METOPROLOL TARTRATE 50 MG: 50 TABLET ORAL at 09:12

## 2017-12-05 RX ADMIN — ACETAMINOPHEN 650 MG: 325 TABLET ORAL at 04:12

## 2017-12-05 RX ADMIN — DOCUSATE SODIUM 100 MG: 100 CAPSULE, LIQUID FILLED ORAL at 09:12

## 2017-12-05 RX ADMIN — APIXABAN 2.5 MG: 2.5 TABLET, FILM COATED ORAL at 09:12

## 2017-12-05 RX ADMIN — ACETAMINOPHEN 650 MG: 325 TABLET ORAL at 12:12

## 2017-12-05 RX ADMIN — FAMOTIDINE 20 MG: 20 TABLET ORAL at 08:12

## 2017-12-05 RX ADMIN — ASPIRIN 81 MG: 81 TABLET, COATED ORAL at 08:12

## 2017-12-05 RX ADMIN — CHLORHEXIDINE GLUCONATE 10 ML: 1.2 RINSE ORAL at 08:12

## 2017-12-05 RX ADMIN — DOCUSATE SODIUM 100 MG: 100 CAPSULE, LIQUID FILLED ORAL at 08:12

## 2017-12-05 NOTE — PHYSICIAN QUERY
PT Name: Chung Meneses .  MR #: [id number]  Physician Query Form - CKD Clarification     CDS/: Michell Rick               Contact information: [email or web address]    This form is a permanent document in the medical record.     Query Date: December 5, 2017    By submitting this query, we are merely seeking further clarification of documentation. Please utilize your independent clinical judgment when addressing the question(s) below.    The Medical record contains the following:     Indicators   Supporting Clinical Findings   Location in Medical Record   X CKD or Chronic Kidney (Renal) Failure / Disease Chart review revealed that patient suffers from mild CKD with baseline creatinine of about 1.4.  Nephrology note 12/4 12/3 Consult     BUN/Creatinine                          GFR      Dehydration      Nausea / Vomiting      Dialysis / CRRT      Medication      Treatment      Other Chronic Conditions      Other       Provider, please further specify the stage of CKD.      [  ] Chronic Kidney Disease (CKD) (please specify stage* below)       [employer] Definitions  Stage Description  eGFR (mL/min)   [  ]     I Slight kidney damage with normal or increased filtration 90+   [ x ]     II Mildly reduced kidney function 60-89     [  ] Other (please specify): ________________________  [  ] Clinically Undetermined    Please document in your progress notes daily for the duration of treatment until resolved and include in your discharge summary.

## 2017-12-05 NOTE — HOSPITAL COURSE
Patient is post CABG  x1 and  bioprosthetic AVR performed by Dr. Boateng on 11/30/17. He did well overnight. Had no events of NSVT since increasing metoprolol to 50mg BID. BP stable. Liver enzymes improving. Statin stopped on yesterday. Renal function has returned to normal. Ambulating with PT and using IS. Has no abnormal bleeding on OAC. Still in A-fib with controlled rate. No CNS complaints to suggest TIA or CVA. Responded well to one dose of IV Lasix on yesterday. CVT planning to DC home tomorrow

## 2017-12-05 NOTE — PLAN OF CARE
Problem: Patient Care Overview  Goal: Plan of Care Review  Outcome: Ongoing (interventions implemented as appropriate)  PATIENT WITH GREAT PARTICIPATION WITH ALL THERAPEUTIC ACTIVITY. PATIENT DID FATIGUE AFTER GT AND WITHOUT  WARNING AYO KNEES BUCKLED AND HE WAS ASSISTED TO THE BED SUPINE POSITION AT DEPENDENT X2. NURSE GINO WAS CALLED . PATIENT REMAINED ALERT , VITALS; HR 68, B/P 141/67, O2@ 97% ROOM AIR. PATIENT DID WELL WITH GT ACTIVITY INTO HUI WAY 50'X4 WITH SEVERAL STANDING REST PERIODS. PATIENT WITH UNSTEADY GT. PATIENT ADHERE WELL WITH STENAL PERCAUTIONS.

## 2017-12-05 NOTE — SUBJECTIVE & OBJECTIVE
Interval History:         Review of Systems   Constitution: Negative for diaphoresis, weakness, malaise/fatigue, weight gain and weight loss.   HENT: Negative for congestion and nosebleeds.    Cardiovascular: Positive for dyspnea on exertion (mild, but improving) and leg swelling (improving ). Negative for chest pain, claudication, cyanosis, irregular heartbeat, near-syncope, orthopnea, palpitations, paroxysmal nocturnal dyspnea and syncope.   Respiratory: Negative for cough, hemoptysis, shortness of breath, sleep disturbances due to breathing, snoring, sputum production and wheezing.    Hematologic/Lymphatic: Negative for bleeding problem. Does not bruise/bleed easily.   Skin: Negative for rash.   Musculoskeletal: Negative for arthritis, back pain, falls, joint pain, muscle cramps and muscle weakness.   Gastrointestinal: Negative for abdominal pain, constipation, diarrhea, heartburn, hematemesis, hematochezia, melena and nausea.   Genitourinary: Negative for dysuria, hematuria and nocturia.   Neurological: Negative for excessive daytime sleepiness, dizziness, headaches, light-headedness, loss of balance, numbness and vertigo.     Objective:     Vital Signs (Most Recent):  Temp: 98.2 °F (36.8 °C) (12/05/17 1213)  Pulse: 71 (12/05/17 1213)  Resp: 18 (12/05/17 0734)  BP: (!) 99/55 (12/05/17 1213)  SpO2: 96 % (12/05/17 1213) Vital Signs (24h Range):  Temp:  [97.5 °F (36.4 °C)-98.2 °F (36.8 °C)] 98.2 °F (36.8 °C)  Pulse:  [65-84] 71  Resp:  [16-18] 18  SpO2:  [96 %-99 %] 96 %  BP: ()/(55-70) 99/55     Weight: 82.8 kg (182 lb 8.7 oz)  Body mass index is 26.96 kg/m².     SpO2: 96 %  O2 Device (Oxygen Therapy): room air      Intake/Output Summary (Last 24 hours) at 12/05/17 1329  Last data filed at 12/05/17 0600   Gross per 24 hour   Intake             1184 ml   Output              850 ml   Net              334 ml       Lines/Drains/Airways     Pressure Ulcer                 Pressure Ulcer 12/02/17 0705 Left coccyx  suspected deep tissue injury 3 days          Peripheral Intravenous Line                 Peripheral IV - Single Lumen 12/03/17 2130 Right Forearm 1 day                Physical Exam   Constitutional: He is oriented to person, place, and time. He appears well-developed and well-nourished.   Neck: Neck supple. No JVD present.   Cardiovascular: Normal rate, regular rhythm, normal heart sounds and normal pulses.  Exam reveals no friction rub.    No murmur heard.  Pulmonary/Chest: Effort normal and breath sounds normal. No respiratory distress. He has no wheezes. He has no rales.   Mid sternal CABG scar. Well approximated. No drainage or hematoma      Abdominal: Soft. Bowel sounds are normal. He exhibits no distension.   Musculoskeletal: He exhibits edema. He exhibits no tenderness ( trace BLE edema ).   Neurological: He is alert and oriented to person, place, and time.   Skin: Skin is warm and dry. No rash noted.   Psychiatric: He has a normal mood and affect. His behavior is normal.   Nursing note and vitals reviewed.      Significant Labs:   BMP:   Recent Labs  Lab 12/04/17  0709 12/05/17  0646   * 109   * 135*   K 4.0 3.8    104   CO2 25 23   BUN 40* 36*   CREATININE 1.6* 1.2   CALCIUM 8.5* 8.3*   , CMP   Recent Labs  Lab 12/04/17  0709 12/05/17  0646   * 135*   K 4.0 3.8    104   CO2 25 23   * 109   BUN 40* 36*   CREATININE 1.6* 1.2   CALCIUM 8.5* 8.3*   PROT 6.2 5.7*  5.7*   ALBUMIN 2.7* 2.5*  2.5*   BILITOT 0.9 0.9  0.8   ALKPHOS 131 131  131   * 151*  151*   * 225*  224*   ANIONGAP 7* 8   ESTGFRAFRICA 44* >60   EGFRNONAA 38* 54*   , CBC   Recent Labs  Lab 12/04/17  0709 12/05/17  0646   WBC 9.16 7.28   HGB 9.3* 8.5*   HCT 29.8* 26.9*    169   , INR No results for input(s): INR, PROTIME in the last 48 hours., Lipid Panel No results for input(s): CHOL, HDL, LDLCALC, TRIG, CHOLHDL in the last 48 hours. and Troponin No results for input(s): TROPONINI in  the last 48 hours.    Significant Imaging: Echocardiogram:   2D echo with color flow doppler:   Results for orders placed or performed in visit on 11/08/17   2D Echo w/ Color Flow Doppler   Result Value Ref Range    EF 35 (A) 55 - 65    Mitral Valve Regurgitation MILD TO MODERATE     Aortic Valve Stenosis SEVERE (A)     Est. PA Systolic Pressure 47.89 (A)     Tricuspid Valve Regurgitation MILD    , EKG:  and X-Ray: CXR: X-Ray Chest PA and Lateral (CXR): No results found for this visit on 11/30/17.

## 2017-12-05 NOTE — PROGRESS NOTES
[facility] -   Nephrology  Progress Note    Patient Name: Chung Meneses Sr.  MRN: [id number]  Admission Date: 11/30/2017  Hospital Length of Stay: 5 days  Attending Provider: Shaq Boateng MD   Primary Care Physician: Glenroy Carmichael MD  Principal Problem:S/P AVR    Subjective:     HPI: 86 year old male with h/o aortic stenosis, atrial fibrillation, CHF, HTN, hyperlipidemia, CAD presented to [facility] on 11/30/17 for elective CABG and AVR (bioprosthetic valve). Workup revealed XIANG with creatinine increasing from 1.3 on 11/30/17 to 2.6 on 12/2/17. Patient now oliguric.   Nephrology was consulted to help with patient's renal care while he is admitted at [facility]. I saw and examined patient in his hospital room. Patient is resting in chair. He denies any chest pain, SOB, nausea/cvomiting, diarrhea, LE edema. Dawson catheter was removed yesterday and patient uses urinal. He reports mild dysuria with urination.  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     Interval History:       12/3/17: Patient denies any chest pain, SOB today.     12/4/17: Patient is feeling fine, no complaints today.     12/5/17: Patient without pain, SOB, nausea today.         Review of patient's allergies indicates:   Allergen Reactions    Sulfa (sulfonamide antibiotics) Hives     Current Facility-Administered Medications   Medication Frequency    acetaminophen tablet 650 mg Q6H PRN    apixaban tablet 2.5 mg BID    aspirin EC tablet 81 mg Daily    chlorhexidine 0.12 % solution 10 mL BID    dextrose 50% injection 12.5 g PRN    dextrose 50% injection 25 g PRN    docusate sodium capsule 100 mg BID    famotidine tablet 20 mg Daily    glucagon (human recombinant) injection 1 mg PRN    glucose chewable tablet 16 g PRN    glucose chewable tablet 24 g PRN    hydrocodone-acetaminophen 10-325mg per tablet 1 tablet Q4H PRN    hydrocodone-acetaminophen 5-325mg per tablet 1 tablet Q4H PRN     hydrocodone-acetaminophen 7.5-325mg per tablet 1 tablet Q4H PRN    insulin aspart pen 1-10 Units QID (AC + HS) PRN    metoprolol tartrate (LOPRESSOR) tablet 50 mg BID    nozaseptin (NOZIN) nasal  BID    ondansetron injection 4 mg Q12H PRN    polyethylene glycol packet 17 g Daily    promethazine (PHENERGAN) 6.25 mg in dextrose 5 % 50 mL IVPB Q6H PRN       Objective:     Vital Signs (Most Recent):  Temp: 98 °F (36.7 °C) (12/05/17 0734)  Pulse: 65 (12/05/17 0929)  Resp: 18 (12/05/17 0734)  BP: (!) 140/70 (12/05/17 0734)  SpO2: 99 % (12/05/17 0929)  O2 Device (Oxygen Therapy): room air (12/05/17 0929) Vital Signs (24h Range):  Temp:  [97.5 °F (36.4 °C)-98 °F (36.7 °C)] 98 °F (36.7 °C)  Pulse:  [65-84] 65  Resp:  [16-18] 18  SpO2:  [96 %-99 %] 99 %  BP: (113-141)/(61-70) 140/70     Weight: 82.8 kg (182 lb 8.7 oz) (12/05/17 1000)  Body mass index is 26.96 kg/m².  Body surface area is 2.01 meters squared.    I/O last 3 completed shifts:  In: 2164 [P.O.:1180; I.V.:984]  Out: 1350 [Urine:1350]    Physical Exam   Constitutional: He is oriented to person, place, and time. He appears well-developed. He is cooperative.   HENT:   Head: Normocephalic.   Nose: No rhinorrhea.   Mouth/Throat: Mucous membranes are normal. No oropharyngeal exudate.   Eyes: Pupils are equal, round, and reactive to light.   Neck: No thyroid mass present.   Cardiovascular: Normal rate, regular rhythm, S1 normal, S2 normal and intact distal pulses.    Pulmonary/Chest: Effort normal. No respiratory distress. He has no wheezes.   Abdominal: Soft. Bowel sounds are normal. He exhibits no distension. There is no tenderness. No hernia.   Musculoskeletal: He exhibits edema.   Trace LE edema.   Lymphadenopathy:     He has no cervical adenopathy.   Neurological: He is alert and oriented to person, place, and time.   Skin: Skin is warm and dry.   Psychiatric: He has a normal mood and affect.       Significant Labs:  Lab Results   Component Value  Date    CREATININE 1.2 12/05/2017    BUN 36 (H) 12/05/2017     (L) 12/05/2017    K 3.8 12/05/2017     12/05/2017    CO2 23 12/05/2017     Lab Results   Component Value Date    CALCIUM 8.3 (L) 12/05/2017    PHOS 2.9 12/03/2017     Lab Results   Component Value Date    ALBUMIN 2.5 (L) 12/05/2017    ALBUMIN 2.5 (L) 12/05/2017     Lab Results   Component Value Date    WBC 7.28 12/05/2017    HGB 8.5 (L) 12/05/2017    HCT 26.9 (L) 12/05/2017    MCV 84 12/05/2017     12/05/2017       Recent Labs  Lab 12/03/17  0550   MG 2.1         Significant Imaging:  Imaging Results          X-Ray Chest AP Portable (Final result)  Result time 12/03/17 07:24:51    Final result by Micah Hernandez Jr., MD (12/03/17 07:24:51)                 Impression:     Removal of support devices with no signs of acute cardiopulmonary abnormality.        Electronically signed by: MICAH HERNANDEZ M.D.  Date:     12/03/17  Time:    07:24              Narrative:    XR CHEST AP PORTABLE    Clinical history: Post-op    Comparison: Previous frontal view of the chest on 12/01/2017 was reviewed.    Findings: There has been a median sternotomy.  The previously demonstrated mediastinal drain and [place]-Samina catheter have been removed.  The lungs are well-aerated.  No pneumothorax or significant pleural fluid.  No widening of the mediastinum.  The heart is normal in size. There are bilateral shoulder joint replacements.                             X-Ray Chest AP Portable (Final result)  Result time 12/01/17 07:16:38    Final result by Frida Magana MD (12/01/17 07:16:38)                 Impression:     No adverse interval change      Electronically signed by: FRIDA MAGANA MD  Date:     12/01/17  Time:    07:16              Narrative:    History: Postoperative    Comparison: 11/30/17    Result: Single view of the chest.      Endotracheal and nasogastric tubes have been removed. In comparison to the prior study, there is no adverse interval  changes.                             X-Ray Chest AP Portable (Final result)  Result time 11/30/17 12:21:49    Final result by Kimberly Matthews MD (11/30/17 12:21:49)                 Impression:         Postoperative changes of the chest.  No unexpected findings or evidence of complicating process.      Electronically signed by: KIMBERLY MATTHEWS MD  Date:     11/30/17  Time:    12:21              Narrative:    Exam: XR CHEST AP PORTABLE,    Date:  11/30/17 11:59:45    History: Follow up exam following surgery.    Comparison:  11/28/2017.        Findings:     Postoperative changes identified.  Median sternotomy wires are now place.  A [place]-Samina catheter is identified the distal tip overlies the distal right main pulmonary artery.  Mediastinal drains noted.  Endotracheal tube in place with the distal tip just distal to level of clavicles.  Nasogastric tube in place.  Distal tip overlies the proximal to mid body the stomach.  Lungs demonstrate increased interstitial markings thought to represent chronic change.  No significant pulmonary vascular congestion or evidence of heart failure.                                Assessment/Plan:     XIANG (acute kidney injury)    Patient presents with XIANG and creatinine has increased from 1.3 on 11/30/17 to 2.6 on 12/2/17.  XIANG likely related to recent cardiac surgery (CABG and bioprosthetic AVR) and XIANG is likely due to ATN/poor perfusion. Renal function has now improved and creatinine has declined to 1.2. Will monitor closely.             Thank you for your consult. I will follow-up with patient. Please contact us if you have any additional questions.    Romulo Rodrigez MD  Nephrology  [facility] -

## 2017-12-05 NOTE — PT/OT/SLP PROGRESS
Physical Therapy  Treatment    Chung Meneses .   MRN: [id number]   Admitting Diagnosis: S/P AVR    PT Received On: 12/05/17  PT Start Time: 0940     PT Stop Time: 1005    PT Total Time (min): 25 min       Billable Minutes:  Gait Training 15 and Therapeutic Exercise 10    Treatment Type: Treatment  PT/PTA: PT             General Precautions: Standard, fall, sternal  Orthopedic Precautions: N/A   Braces:           Subjective:  Communicated with nurse reddy and epic chart review  prior to session.  PT AGREED TO TX     Pain/Comfort  Pain Rating 1: 0/10  Pain Rating Post-Intervention 1: 0/10    Objective:   Patient found with: telemetry, peripheral IV    Functional Mobility:  Bed Mobility: PT SIT>SUP IN BED WITH SBA AND SCOOTED TO HOB WITH BRIDGING B LE AND SBA.        Transfers: PT T/F TO EOB WITH CGA         Gait: PT GT TRAINED WITH NO AD X220' WITH 2 STAND REST BREAKS AND SLOW PACE GT.        Balance:   Static Sit: GOOD+: Takes MAXIMAL challenges from all directions.    Dynamic Sit: GOOD+: Maintains balance through MAXIMAL excursions of active trunk motion  Static Stand: FAIR: Maintains without assist but unable to take challenges  Dynamic stand: FAIR: Needs CONTACT GUARD during gait     Therapeutic Activities and Exercises:  PT COMPLETED GT TRAINING AND SEATED EOB FOR B LE TE X 20 REPS OF MIP, TKE, AND AP. PT RETURNED SUP IN BED WITH ALL NEEDS MET AND CALL BELL IN REACH     AM-PAC 6 CLICK MOBILITY  How much help from another person does this patient currently need?   1 = Unable, Total/Dependent Assistance  2 = A lot, Maximum/Moderate Assistance  3 = A little, Minimum/Contact Guard/Supervision  4 = None, Modified Heard/Independent    Turning over in bed (including adjusting bedclothes, sheets and blankets)?: 4  Sitting down on and standing up from a chair with arms (e.g., wheelchair, bedside commode, etc.): 4  Moving from lying on back to sitting on the side of the bed?: 3  Moving to and from a bed to a chair  (including a wheelchair)?: 3  Need to walk in hospital room?: 3  Climbing 3-5 steps with a railing?: 1  Total Score: 18    AM-PAC Raw Score CMS G-Code Modifier Level of Impairment Assistance   6 % Total / Unable   7 - 9 CM 80 - 100% Maximal Assist   10 - 14 CL 60 - 80% Moderate Assist   15 - 19 CK 40 - 60% Moderate Assist   20 - 22 CJ 20 - 40% Minimal Assist   23 CI 1-20% SBA / CGA   24 CH 0% Independent/ Mod I     Patient left supine with call button in reach.    Assessment:  Chung Meneses Sr. is a 86 y.o. male with a medical diagnosis of S/P AVR and presents S/P CABG AND PROGRESSING WITH GT TRAINING. PT WILL CONTINUE TO BENEFIT FROM P.T. TO ADDRESS IMPAIRMENTS.    Rehab identified problem list/impairments: Rehab identified problem list/impairments: weakness, impaired endurance, gait instability, impaired balance, impaired cardiopulmonary response to activity, impaired self care skills, impaired functional mobilty    Rehab potential is excellent.    Activity tolerance: Good    Discharge recommendations: Discharge Facility/Level Of Care Needs: home health PT     Barriers to discharge: Barriers to Discharge: None    Equipment recommendations: Equipment Needed After Discharge: none     GOALS:    Physical Therapy Goals        Problem: Physical Therapy Goal    Goal Priority Disciplines Outcome Goal Variances Interventions   Physical Therapy Goal     PT/OT, PT Ongoing (interventions implemented as appropriate)     Description:  PT WILL BE SEEN FOR P.T. FOR A MIN OF 5 OUT OF 7 DAYS A WEEK  LT17  1. PT WILL COMPLETE BED MOBILITY WITH CGA.  2. PT WILL T/F TO CHAIR WITH S  3. PT WILL GT TRAIN WITH S X 300'   4. PT WILL BE IND WITH ALL STERNAL PRECAUTIONS  5. PT WILL COMPLETE B LE TE X 20 REPS                    PLAN:    Patient to be seen 5 x/week  to address the above listed problems via gait training, therapeutic activities, therapeutic exercises  Plan of Care expires: 17  Plan of Care reviewed with:  patient         Dulce Maria Okeefe, PT  12/05/2017

## 2017-12-05 NOTE — ASSESSMENT & PLAN NOTE
Continue routine post AVR management  Continue to ambulate and use IS  CVT likely discharging patient home tomorrow

## 2017-12-05 NOTE — PHYSICIAN QUERY
PT Name: Chung Meneses .  MR #: [id number]     Physician Query Form - Documentation Clarification      CDS/: Michell Rick               Contact information: [email or web address]      This form is a permanent document in the medical record.     Query Date: December 5, 2017    By submitting this query, we are merely seeking further clarification of documentation. Please utilize your independent clinical judgment when addressing the question(s) below.    The Medical record reflects the following:    Supporting Clinical Findings Location in Medical Record       Pulmonary hypertension    Cont Lasix          Pulmonary hypertension    12/2 - stable         Progress notes           Progress notes                                                                             Doctor, Please specify diagnosis or diagnoses associated with above clinical findings.    Provider Use Only      Please specify the type of Pulmonary Hypertension:    [     ] Primary pulmonary hypertension (Group 1)  [     ] Other Secondary pulmonary hypertension  (Group 5)  [     ] Secondary pulmonary arterial hypertension due to: _______________  [     ] Pulmonary hypertension due to Left  heart disease (Group 2)  [     ] Pulmonary hypertension due to Lung disease and hypoxia (Group 3)  [     ] Chronic thromboembolic pulmonary hypertension (Group 4)  [     ] Pulmonary hypertension, unspecified   [  X   ] Other: Defer to primary team. Query sent to Cardiology provider                                                                                                              [  ] Clinically undetermined

## 2017-12-05 NOTE — PROGRESS NOTES
Resting in bed    Remains Atrial Fib on telemetry but rate controlled    Home once okay with cardiology

## 2017-12-05 NOTE — PROGRESS NOTES
[facility] -   Cardiology  Progress Note    Patient Name: Chung Meneses Sr.  MRN: [id number]  Admission Date: 11/30/2017  Hospital Length of Stay: 5 days  Code Status: No Order   Attending Physician: Shaq Boateng MD   Primary Care Physician: Glenroy Carmichael MD  Expected Discharge Date:   Principal Problem:S/P AVR    Subjective:     Hospital Course:   Patient is post CABG  x1 and  bioprosthetic AVR performed by Dr. Boateng on 11/30/17. He did well overnight. Had no events of NSVT since increasing metoprolol to 50mg BID. BP stable. Liver enzymes improving. Statin stopped on yesterday. Renal function has returned to normal. Ambulating with PT and using IS. Has no abnormal bleeding on OAC. Still in A-fib with controlled rate. No CNS complaints to suggest TIA or CVA. Responded well to one dose of IV Lasix on yesterday. CVT planning to DC home tomorrow      Review of Systems   Constitution: Negative for diaphoresis, weakness, malaise/fatigue, weight gain and weight loss.   HENT: Negative for congestion and nosebleeds.    Cardiovascular: Positive for dyspnea on exertion (mild, but improving) and leg swelling (improving ). Negative for chest pain, claudication, cyanosis, irregular heartbeat, near-syncope, orthopnea, palpitations, paroxysmal nocturnal dyspnea and syncope.   Respiratory: Negative for cough, hemoptysis, shortness of breath, sleep disturbances due to breathing, snoring, sputum production and wheezing.    Hematologic/Lymphatic: Negative for bleeding problem. Does not bruise/bleed easily.   Skin: Negative for rash.   Musculoskeletal: Negative for arthritis, back pain, falls, joint pain, muscle cramps and muscle weakness.   Gastrointestinal: Negative for abdominal pain, constipation, diarrhea, heartburn, hematemesis, hematochezia, melena and nausea.   Genitourinary: Negative for dysuria, hematuria and nocturia.   Neurological: Negative for excessive daytime sleepiness, dizziness, headaches,  light-headedness, loss of balance, numbness and vertigo.     Objective:     Vital Signs (Most Recent):  Temp: 98.2 °F (36.8 °C) (12/05/17 1213)  Pulse: 71 (12/05/17 1213)  Resp: 18 (12/05/17 0734)  BP: (!) 99/55 (12/05/17 1213)  SpO2: 96 % (12/05/17 1213) Vital Signs (24h Range):  Temp:  [97.5 °F (36.4 °C)-98.2 °F (36.8 °C)] 98.2 °F (36.8 °C)  Pulse:  [65-84] 71  Resp:  [16-18] 18  SpO2:  [96 %-99 %] 96 %  BP: ()/(55-70) 99/55     Weight: 82.8 kg (182 lb 8.7 oz)  Body mass index is 26.96 kg/m².     SpO2: 96 %  O2 Device (Oxygen Therapy): room air      Intake/Output Summary (Last 24 hours) at 12/05/17 1329  Last data filed at 12/05/17 0600   Gross per 24 hour   Intake             1184 ml   Output              850 ml   Net              334 ml       Lines/Drains/Airways     Pressure Ulcer                 Pressure Ulcer 12/02/17 0705 Left coccyx suspected deep tissue injury 3 days          Peripheral Intravenous Line                 Peripheral IV - Single Lumen 12/03/17 2130 Right Forearm 1 day                Physical Exam   Constitutional: He is oriented to person, place, and time. He appears well-developed and well-nourished.   Neck: Neck supple. No JVD present.   Cardiovascular: Normal rate, regular rhythm, normal heart sounds and normal pulses.  Exam reveals no friction rub.    No murmur heard.  Pulmonary/Chest: Effort normal and breath sounds normal. No respiratory distress. He has no wheezes. He has no rales.   Mid sternal CABG scar. Well approximated. No drainage or hematoma      Abdominal: Soft. Bowel sounds are normal. He exhibits no distension.   Musculoskeletal: He exhibits edema. He exhibits no tenderness ( trace BLE edema ).   Neurological: He is alert and oriented to person, place, and time.   Skin: Skin is warm and dry. No rash noted.   Psychiatric: He has a normal mood and affect. His behavior is normal.   Nursing note and vitals reviewed.      Significant Labs:   BMP:   Recent Labs  Lab  12/04/17  0709 12/05/17  0646   * 109   * 135*   K 4.0 3.8    104   CO2 25 23   BUN 40* 36*   CREATININE 1.6* 1.2   CALCIUM 8.5* 8.3*   , CMP   Recent Labs  Lab 12/04/17  0709 12/05/17  0646   * 135*   K 4.0 3.8    104   CO2 25 23   * 109   BUN 40* 36*   CREATININE 1.6* 1.2   CALCIUM 8.5* 8.3*   PROT 6.2 5.7*  5.7*   ALBUMIN 2.7* 2.5*  2.5*   BILITOT 0.9 0.9  0.8   ALKPHOS 131 131  131   * 151*  151*   * 225*  224*   ANIONGAP 7* 8   ESTGFRAFRICA 44* >60   EGFRNONAA 38* 54*   , CBC   Recent Labs  Lab 12/04/17  0709 12/05/17  0646   WBC 9.16 7.28   HGB 9.3* 8.5*   HCT 29.8* 26.9*    169   , INR No results for input(s): INR, PROTIME in the last 48 hours., Lipid Panel No results for input(s): CHOL, HDL, LDLCALC, TRIG, CHOLHDL in the last 48 hours. and Troponin No results for input(s): TROPONINI in the last 48 hours.    Significant Imaging: Echocardiogram:   2D echo with color flow doppler:   Results for orders placed or performed in visit on 11/08/17   2D Echo w/ Color Flow Doppler   Result Value Ref Range    EF 35 (A) 55 - 65    Mitral Valve Regurgitation MILD TO MODERATE     Aortic Valve Stenosis SEVERE (A)     Est. PA Systolic Pressure 47.89 (A)     Tricuspid Valve Regurgitation MILD    , EKG:  and X-Ray: CXR: X-Ray Chest PA and Lateral (CXR): No results found for this visit on 11/30/17.    Assessment and Plan:       * S/P AVR    Continue routine post AVR management  Continue to ambulate and use IS  CVT likely discharging patient home tomorrow        XIANG (acute kidney injury)    Resolved. Will stop IVF to avoid volume overload         Chronic systolic congestive heart failure    Currently compensated on exam   Continue current medical management for now  Will need to follow up with Cardiology as an OP for further optimization of meds        Atrial fibrillation    Rate currently controlled on current dose of BB  Continue OAC for CVA prophylaxis.   No  recurrent NSVT overnight        Hyperlipidemia    Statin stopped due to elevated liver enzymes. Improved on morning labs. Will repeat in the morning. May need to be restarted as an OP             VTE Risk Mitigation         Ordered     apixaban tablet 2.5 mg  2 times daily     Route:  Oral        12/02/17 1602     Medium Risk of VTE  Once      11/30/17 1112        Chart reviewed. Patient examined by Dr. Palomares and agrees with plan that has been outlined.       GARRETT Martinez  Cardiology  [facility]

## 2017-12-05 NOTE — PLAN OF CARE
Problem: Patient Care Overview  Goal: Plan of Care Review  Outcome: Ongoing (interventions implemented as appropriate)  Pt stable. Pt is Afib on the heart monitor, with a controlled rate 70-90.  Pt c/o headache, prn tylenol given.  Pt encouraged to use IS, pt does good with IS reaching 1000.  Pt walked twice this shift, pt aware of another walk needed tonight.  Sternal precautions reinforced.  Plan of care reviewed.  Pt verbalizes understanding.  Pt was free from falls or injuries during duration of shift.  Pt turned and repositioned self.  PIV intact with no redness, swelling or drainage.  Bed low, wheels locked, bed alarm on, call light in reach.  Pt instructed to call for assistance.  Will continue to monitor.

## 2017-12-05 NOTE — PROGRESS NOTES
Making progress.    LFT's and Cr improved today.    A.fib medically managed.    Ambulating.    Possible d/c tomorrow.

## 2017-12-05 NOTE — PLAN OF CARE
Problem: Patient Care Overview  Goal: Plan of Care Review  Recommendation/Intervention: 1. Continue current diet and encourage good intake  Goals: Continued intake of % of meals  Nutrition Goal Status: goal met  Communication of RD Recs:  (care plan and sticky note)

## 2017-12-05 NOTE — PROGRESS NOTES
Cardiology Progress Note        SUBJECTIVE:   Patient is CABG  x1 and  bioprosthetic AVR performed by Dr. Boateng on 11/30/17. Patient feels good today. Has been ambulating with PT and using IS. Did have 19 beat run of VT last night. K and Mg WNL. Remains in A-fib with controlled Ventricular rate. Patient was asymptomatic. Noted to have EF of 35% on echo. Liver enzymes elevated on morning labs. Vitals stable. Tolerating meds well. No abnormal bleeding on Eliquis. No CNS complaints to suggest TIA or CVA. Creatine continues to improve.     OBJECTIVE:     Vital Signs (Most Recent)  Temp: 98 °F (36.7 °C) (12/04/17 1615)  Pulse: 70 (12/04/17 1615)  Resp: 18 (12/04/17 1615)  BP: (!) 141/67 (12/04/17 1615)  SpO2: 99 % (12/04/17 1615)    Vital Signs Range (Last 24H):  Temp:  [98 °F (36.7 °C)-98.6 °F (37 °C)]   Pulse:  [69-93]   Resp:  [18-20]   BP: (115-141)/(62-68)   SpO2:  [96 %-99 %]     Intake/Output last 3 shifts:  I/O last 3 completed shifts:  In: 1200 [I.V.:1200]  Out: 750 [Urine:750]    Intake/Output this shift:  No intake/output data recorded.    Review of patient's allergies indicates:   Allergen Reactions    Sulfa (sulfonamide antibiotics) Hives       Current Facility-Administered Medications   Medication    0.9%  NaCl infusion    acetaminophen tablet 650 mg    apixaban tablet 2.5 mg    aspirin EC tablet 81 mg    chlorhexidine 0.12 % solution 10 mL    dextrose 50% injection 12.5 g    dextrose 50% injection 25 g    docusate sodium capsule 100 mg    famotidine tablet 20 mg    glucagon (human recombinant) injection 1 mg    glucose chewable tablet 16 g    glucose chewable tablet 24 g    hydrocodone-acetaminophen 10-325mg per tablet 1 tablet    hydrocodone-acetaminophen 5-325mg per tablet 1 tablet    hydrocodone-acetaminophen 7.5-325mg per tablet 1 tablet    insulin aspart pen 1-10 Units    metoprolol tartrate (LOPRESSOR) tablet 50 mg    nozaseptin (NOZIN) nasal     ondansetron injection  4 mg    polyethylene glycol packet 17 g    promethazine (PHENERGAN) 6.25 mg in dextrose 5 % 50 mL IVPB    simvastatin tablet 20 mg     No current facility-administered medications on file prior to encounter.      Current Outpatient Prescriptions on File Prior to Encounter   Medication Sig    amlodipine (NORVASC) 5 MG tablet Take 5 mg by mouth once daily.    metoprolol tartrate (LOPRESSOR) 25 MG tablet Take 25 mg by mouth 2 (two) times daily. Takes one-half tablet by mouth twice a day    fish oil-omega-3 fatty acids 300-1,000 mg capsule Take 1,200 mg by mouth once daily.    furosemide (LASIX) 40 MG tablet Take 40 mg by mouth.    multivitamin capsule Take 1 capsule by mouth once daily.    multivitamin with folic acid 400 mcg Tab Take 1 tablet by mouth.    niacin, inositol niacinate, (NIACIN FLUSH FREE) 400 mg Cap Take 500 mg by mouth once daily.    ranitidine (ZANTAC) 75 MG tablet Take 75 mg by mouth 2 (two) times daily.    rivaroxaban (XARELTO) 15 mg Tab Take 1 tablet (15 mg total) by mouth daily with dinner or evening meal. Since 2015 never increased    simvastatin (ZOCOR) 20 MG tablet Take 20 mg by mouth every evening.    VENTOLIN HFA 90 mcg/actuation inhaler INHALE 1 TO 2 PUFFS 4 TIMES A DAY AS NEEDED FOR WHEEZING       Physical Exam:  General appearance: alert, appears stated age and cooperative  Head: Normocephalic, without obvious abnormality, atraumatic  Neck: no adenopathy, no carotid bruit, no JVD  Lungs:  clear to auscultation bilaterally  Chest wall:  Mid sternal surgical scar. Well approximated. No drainage or hematoma   Heart: irregular rate and rhythm, S1, S2 normal, no murmur, click, rub or gallop  Abdomen: soft, non-tender; bowel sounds normal; no masses,  no organomegaly  Extremities: extremities normal, atraumatic, no cyanosis. +1  BLE edema  Pulses: 2+ and symmetric  Skin: Skin color, texture, turgor normal. No rashes or lesions  Neurologic: Grossly normal    Laboratory:  Chemistry:    Lab Results   Component Value Date     (L) 12/04/2017    K 4.0 12/04/2017     12/04/2017    CO2 25 12/04/2017    BUN 40 (H) 12/04/2017    CREATININE 1.6 (H) 12/04/2017    CALCIUM 8.5 (L) 12/04/2017     Cardiac Markers: No results found for: CKTOTAL, CKMB, CKMBINDEX, TROPONINI  Cardiac Markers (Last 3): No results found for: CKTOTAL, CKMB, CKMBINDEX, TROPONINI  CBC:   Lab Results   Component Value Date    WBC 9.16 12/04/2017    HGB 9.3 (L) 12/04/2017    HCT 29.8 (L) 12/04/2017    HCT 27 (L) 11/30/2017    MCV 82 12/04/2017     12/04/2017     Lipids:   Lab Results   Component Value Date    CHOL 125 04/19/2017    TRIG 61 04/19/2017    HDL 35 (L) 04/19/2017     Coagulation:   Lab Results   Component Value Date    INR 1.2 11/30/2017    APTT 32.4 (H) 11/30/2017       Diagnostic Results:  ECG: Reviewed  X-Ray: Reviewed  Echo: Reviewed      ASSESSMENT/PLAN:     Patient Active Problem List   Diagnosis    Hypertension    Hyperlipidemia    Atrial fibrillation    Light headed    Dizziness and giddiness    Nonrheumatic aortic valve stenosis    Pain of left lower extremity    Chronic systolic congestive heart failure    CHF (congestive heart failure)    Pulmonary hypertension    Hyperglycemia, unspecified    S/P AVR    IXANG (acute kidney injury)    Hyperkalemia    CAD (coronary artery disease)        Plan:   Recommend increasing metoprolol to 50mg BID for NSVT. Unable to start Amio due to liver enzymes. Hold Statin for now.  Continue Eliquis for CVA prophylaxis. Continue to diurese with Lasix. Will monitor renal function. Continue to ambulate and use IS. Home soon    Chart reviewed. Patient examined by Dr. Palomares and agrees with plan that has been outlined.

## 2017-12-05 NOTE — PT/OT/SLP PROGRESS
Physical Therapy  Treatment    Chung Meneses .   MRN: [id number]   Admitting Diagnosis: S/P AVR    PT Received On: 12/05/17  PT Start Time: 0145     PT Stop Time: 0210    PT Total Time (min): 25 min       Billable Minutes:  Gait Training 15 and Therapeutic Activity 10       PT/PTA: PTA     PTA Visit Number: 1       General Precautions: Standard, fall, sternal  Orthopedic Precautions: N/A   Braces: N/A         Subjective:  Communicated with [facility] NURSE GINO PRIOR TO Tx. PT AGREETOTX NOW.    Pain/Comfort  Pain Rating 1: 0/10    Objective:   Patient found with: telemetry, peripheral IV    Functional Mobility:  Bed Mobility:    SBA ASSIST X1 FOR BED MOBILITY. PATIENT ADHERE WELL WITH STERNAL PERCAUTIONS, UTILIZING HIS CARDIAC PILLOW WITH T/FS.  Transfers:   CGA FOR SIT TO STAND, STAND TO SIT AT DEPENDENT X2 DUE TO PATIENT AYO KNEES BUCKLING AT BEDSIDE AFTER GT ACTIVITY. PATIENT NURSE GINO CALLED FOR PATIENT FATIGUE EPISODE.    Gait: AMBULATE INTO HALLWAY 50'X4 WITH SEVERAL STANDING REST PERIODS AT CGA X1 , 2ND ASSIST FOR SAFETY. PATIENT WITH UNSTEADY GT .        Stairs:  N/A    Balance:   Static Sit: FAIR+: Able to take MINIMAL challenges from all directions  Dynamic Sit: FAIR+: Maintains balance through MINIMAL excursions of active trunk motion  Static Stand: POOR+: Needs MINIMAL assist to maintain  Dynamic stand: POOR: N/A     Therapeutic Activities and Exercises:  PATIENT INSTRUCTED WITH AYO LE EXERCISES , SHORTSEATED , REVIEW OF STERNAL PERCAUTIONS, GT ACTIVITY.    AM-PAC 6 CLICK MOBILITY  How much help from another person does this patient currently need?   1 = Unable, Total/Dependent Assistance  2 = A lot, Maximum/Moderate Assistance  3 = A little, Minimum/Contact Guard/Supervision  4 = None, Modified [place]/Independent    Turning over in bed (including adjusting bedclothes, sheets and blankets)?: 4  Sitting down on and standing up from a chair with arms (e.g., wheelchair, bedside commode, etc.):  4  Moving from lying on back to sitting on the side of the bed?: 3  Moving to and from a bed to a chair (including a wheelchair)?: 3  Need to walk in hospital room?: 3  Climbing 3-5 steps with a railing?: 1  Total Score: 18    AM-PAC Raw Score CMS G-Code Modifier Level of Impairment Assistance   6 % Total / Unable   7 - 9 CM 80 - 100% Maximal Assist   10 - 14 CL 60 - 80% Moderate Assist   15 - 19 CK 40 - 60% Moderate Assist   20 - 22 CJ 20 - 40% Minimal Assist   23 CI 1-20% SBA / CGA   24 CH 0% Independent/ Mod I     Patient ASSISTED BACK TO BED SUPINE , NURSE CALLED DUE TO PATIENT KNEES BUCKLING AFTER GT AND FATIGUE LEVEL . ALL VITALS TAKEN AND IN GOOD RANGE.    Assessment:  Chung Meneses  is a 86 y.o. male with a medical diagnosis of S/P AVR .    Rehab identified problem list/impairments: Rehab identified problem list/impairments: weakness, impaired endurance, impaired self care skills, impaired functional mobilty, impaired balance, gait instability    Rehab potential is excellent.    Activity tolerance: Excellent    Discharge recommendations: Discharge Facility/Level Of Care Needs: home health PT     Barriers to discharge:      Equipment recommendations: Equipment Needed After Discharge: none     GOALS:    Physical Therapy Goals        Problem: Physical Therapy Goal    Goal Priority Disciplines Outcome Goal Variances Interventions   Physical Therapy Goal     PT/OT, PT Ongoing (interventions implemented as appropriate)     Description:  PT WILL BE SEEN FOR P.T. FOR A MIN OF 5 OUT OF 7 DAYS A WEEK  LT17  1. PT WILL COMPLETE BED MOBILITY WITH CGA.  2. PT WILL T/F TO CHAIR WITH S  3. PT WILL GT TRAIN WITH S X 300'   4. PT WILL BE IND WITH ALL STERNAL PRECAUTIONS  5. PT WILL COMPLETE B LE TE X 20 REPS                    PLAN:    Patient to be seen 5 x/week  to address the above listed problems via gait training, therapeutic activities, therapeutic exercises  Plan of Care expires: 17  Plan of  Care reviewed with: patient         Lizet [place], PTA  12/05/2017

## 2017-12-05 NOTE — PLAN OF CARE
Problem: Patient Care Overview  Goal: Plan of Care Review  Outcome: Ongoing (interventions implemented as appropriate)  PT REQUIRES CGA FOR GT X 220' WITH SLOW PACE

## 2017-12-05 NOTE — ASSESSMENT & PLAN NOTE
Statin stopped due to elevated liver enzymes. Improved on morning labs. Will repeat in the morning. May need to be restarted as an OP

## 2017-12-05 NOTE — PROGRESS NOTES
[facility] -   Adult Nutrition  Consult Note    SUMMARY     Recommendations    Recommendation/Intervention: 1. Continue current diet and encourage good intake  Goals: Continued intake of % of meals  Nutrition Goal Status: goal met  Communication of RD Recs:  (care plan and sticky note)    Reason for Assessment    Reason for Assessment: RD follow-up   Diagnosis:  1. Aortic stenosis    2. Nonrheumatic aortic valve stenosis    3. Chronic systolic congestive heart failure    4. Hyperglycemia, unspecified    5. Hyperlipidemia, unspecified hyperlipidemia type    6. On mechanically assisted ventilation    7. Paroxysmal atrial fibrillation    8. Pulmonary hypertension      Past Medical History:   Diagnosis Date    Aortic stenosis 8/2/2013    Atrial fibrillation 7/5/2013    CHF (congestive heart failure)     Dizziness - light-headed     Hyperlipidemia     Hypertension        Interdisciplinary Rounds: attended     General Information Comments: S/p Aortocoronary bypass CABG and replacement valve aortic.  Patient was educated on cardiac diet. At initial RD visit. Intake this AM was 100%. States appetite is good. Has no further questions concerning diet education.    Nutrition Discharge Planning: Cardiac diet    Nutrition Prescription Ordered    Current Diet Order: Cardiac    Evaluation of Received Nutrients/Fluid Intake  % Intake of Estimated Energy Needs: 75 - 100 %  % Meal Intake: 100%       Nutrition/Diet History       Typical Food/Fluid Intake: good intake at home  Food Preferences: none reported including Lutheran or cultural     Labs/Tests/Procedures/Meds       Pertinent Labs Reviewed: reviewed      BMP  Lab Results   Component Value Date     (L) 12/05/2017    K 3.8 12/05/2017     12/05/2017    CO2 23 12/05/2017    BUN 36 (H) 12/05/2017    CREATININE 1.2 12/05/2017    CALCIUM 8.3 (L) 12/05/2017    ANIONGAP 8 12/05/2017    ESTGFRAFRICA >60 12/05/2017    EGFRNONAA 54 (A) 12/05/2017  "    Lab Results   Component Value Date    CALCIUM 8.3 (L) 12/05/2017    PHOS 2.9 12/03/2017     Lab Results   Component Value Date    ALBUMIN 2.5 (L) 12/05/2017    ALBUMIN 2.5 (L) 12/05/2017       Recent Labs  Lab 12/05/17  0555   POCTGLUCOSE 121*       Pertinent Medications Reviewed: reviewed       Physical Findings    Overall Physical Appearance: overweight  Oral/Mouth Cavity:  (teeth missing)  Skin:  (Shabbir 18, Incision sites: chest midline, left groin, left knee )    Anthropometrics  Height: 5' 9" (175.3 cm)  Weight Method: Bed Scale  Weight: 82.8 kg (182 lb 8.7 oz)  Ideal Body Weight (IBW), Male: 160 lb     % Ideal Body Weight, Male (lb): 114.09 lb     BMI (Calculated): 27  BMI Grade: 25 - 29.9 - overweight       Estimated/Assessed Needs    Weight Used For Calorie Calculations: 79.2 kg (174 lb 9.7 oz)   Height (cm): 175.3 cm  Energy Calorie Requirements (kcal): 1828  Energy Need Method: Reeves-St Jeor (x1.25 )    RMR (Reeves-St. Jeor Equation): 1462.38     Weight Used For Protein Calculations: 79.2 kg (174 lb 9.7 oz)     1.2 gm Protein (gm): 95.24     Fluid Need Method: RDA Method (or as needed per MD)   RDA Method (mL): 1828      Assessment and Plan    Nutrition Problem  Inadequate energy intake     Related to (etiology):   Decreased appetite    Signs and Symptoms (as evidenced by):   PO intake ~ 25 %    Interventions/Recommendations (treatment strategy):  See above    Nutrition Diagnosis Status:   Resolved        Chronic systolic congestive heart failure    Nutrition problem:  Nutrient decreased needs (sodium, fat)    Related to (etiology):   Current medical condition    Signs and Symptoms (as evidenced by):   Hx of CHF, S/p Aortocoronary bypass CABG and replacement valve aortic and       Interventions/Recommendations (treatment strategy):  1. Continue Cardiac Diet    Nutrition Diagnosis Status:   Continues              Monitor and Evaluation    Food and Nutrient Intake: energy intake, food and beverage " intake  Food and Nutrient Adminstration: diet order  Knowledge/Beliefs/Attitudes: beliefs and attitudes  Anthropometric Measurements: weight  Biochemical Data, Medical Tests and Procedures: electrolyte and renal panel, glucose/endocrine profile  Nutrition-Focused Physical Findings: overall appearance      Nutrition Follow-Up    RD Follow-up?: Yes (1x weekly)

## 2017-12-05 NOTE — PLAN OF CARE
Problem: Patient Care Overview  Goal: Plan of Care Review  Outcome: Ongoing (interventions implemented as appropriate)  Recommendation/Intervention: 1. Continue current diet and encourage good intake  Goals: Continued intake of % of meals  Nutrition Goal Status: goal met  Communication of RD Recs:  (care plan and sticky note)

## 2017-12-05 NOTE — ASSESSMENT & PLAN NOTE
Currently compensated on exam   Continue current medical management for now  Will need to follow up with Cardiology as an OP for further optimization of meds

## 2017-12-05 NOTE — ASSESSMENT & PLAN NOTE
Patient presents with XIANG and creatinine has increased from 1.3 on 11/30/17 to 2.6 on 12/2/17.  XIANG likely related to recent cardiac surgery (CABG and bioprosthetic AVR) and XIANG is likely due to ATN/poor perfusion. Renal function has now improved and creatinine has declined to 1.2. Will monitor closely.

## 2017-12-05 NOTE — SUBJECTIVE & OBJECTIVE
Interval History:       12/3/17: Patient denies any chest pain, SOB today.     12/4/17: Patient is feeling fine, no complaints today.     12/5/17: Patient without pain, SOB, nausea today.         Review of patient's allergies indicates:   Allergen Reactions    Sulfa (sulfonamide antibiotics) Hives     Current Facility-Administered Medications   Medication Frequency    acetaminophen tablet 650 mg Q6H PRN    apixaban tablet 2.5 mg BID    aspirin EC tablet 81 mg Daily    chlorhexidine 0.12 % solution 10 mL BID    dextrose 50% injection 12.5 g PRN    dextrose 50% injection 25 g PRN    docusate sodium capsule 100 mg BID    famotidine tablet 20 mg Daily    glucagon (human recombinant) injection 1 mg PRN    glucose chewable tablet 16 g PRN    glucose chewable tablet 24 g PRN    hydrocodone-acetaminophen 10-325mg per tablet 1 tablet Q4H PRN    hydrocodone-acetaminophen 5-325mg per tablet 1 tablet Q4H PRN    hydrocodone-acetaminophen 7.5-325mg per tablet 1 tablet Q4H PRN    insulin aspart pen 1-10 Units QID (AC + HS) PRN    metoprolol tartrate (LOPRESSOR) tablet 50 mg BID    nozaseptin (NOZIN) nasal  BID    ondansetron injection 4 mg Q12H PRN    polyethylene glycol packet 17 g Daily    promethazine (PHENERGAN) 6.25 mg in dextrose 5 % 50 mL IVPB Q6H PRN       Objective:     Vital Signs (Most Recent):  Temp: 98 °F (36.7 °C) (12/05/17 0734)  Pulse: 65 (12/05/17 0929)  Resp: 18 (12/05/17 0734)  BP: (!) 140/70 (12/05/17 0734)  SpO2: 99 % (12/05/17 0929)  O2 Device (Oxygen Therapy): room air (12/05/17 0929) Vital Signs (24h Range):  Temp:  [97.5 °F (36.4 °C)-98 °F (36.7 °C)] 98 °F (36.7 °C)  Pulse:  [65-84] 65  Resp:  [16-18] 18  SpO2:  [96 %-99 %] 99 %  BP: (113-141)/(61-70) 140/70     Weight: 82.8 kg (182 lb 8.7 oz) (12/05/17 1000)  Body mass index is 26.96 kg/m².  Body surface area is 2.01 meters squared.    I/O last 3 completed shifts:  In: 2164 [P.O.:1180; I.V.:984]  Out: 1350  [Urine:1350]    Physical Exam   Constitutional: He is oriented to person, place, and time. He appears well-developed. He is cooperative.   HENT:   Head: Normocephalic.   Nose: No rhinorrhea.   Mouth/Throat: Mucous membranes are normal. No oropharyngeal exudate.   Eyes: Pupils are equal, round, and reactive to light.   Neck: No thyroid mass present.   Cardiovascular: Normal rate, regular rhythm, S1 normal, S2 normal and intact distal pulses.    Pulmonary/Chest: Effort normal. No respiratory distress. He has no wheezes.   Abdominal: Soft. Bowel sounds are normal. He exhibits no distension. There is no tenderness. No hernia.   Musculoskeletal: He exhibits edema.   Trace LE edema.   Lymphadenopathy:     He has no cervical adenopathy.   Neurological: He is alert and oriented to person, place, and time.   Skin: Skin is warm and dry.   Psychiatric: He has a normal mood and affect.       Significant Labs:  Lab Results   Component Value Date    CREATININE 1.2 12/05/2017    BUN 36 (H) 12/05/2017     (L) 12/05/2017    K 3.8 12/05/2017     12/05/2017    CO2 23 12/05/2017     Lab Results   Component Value Date    CALCIUM 8.3 (L) 12/05/2017    PHOS 2.9 12/03/2017     Lab Results   Component Value Date    ALBUMIN 2.5 (L) 12/05/2017    ALBUMIN 2.5 (L) 12/05/2017     Lab Results   Component Value Date    WBC 7.28 12/05/2017    HGB 8.5 (L) 12/05/2017    HCT 26.9 (L) 12/05/2017    MCV 84 12/05/2017     12/05/2017       Recent Labs  Lab 12/03/17  0550   MG 2.1         Significant Imaging:  Imaging Results          X-Ray Chest AP Portable (Final result)  Result time 12/03/17 07:24:51    Final result by Micah Hernandez Jr., MD (12/03/17 07:24:51)                 Impression:     Removal of support devices with no signs of acute cardiopulmonary abnormality.        Electronically signed by: MICAH HERNANDEZ M.D.  Date:     12/03/17  Time:    07:24              Narrative:    XR CHEST AP PORTABLE    Clinical history:  Post-op    Comparison: Previous frontal view of the chest on 12/01/2017 was reviewed.    Findings: There has been a median sternotomy.  The previously demonstrated mediastinal drain and [place]-Samina catheter have been removed.  The lungs are well-aerated.  No pneumothorax or significant pleural fluid.  No widening of the mediastinum.  The heart is normal in size. There are bilateral shoulder joint replacements.                             X-Ray Chest AP Portable (Final result)  Result time 12/01/17 07:16:38    Final result by Frida Magana MD (12/01/17 07:16:38)                 Impression:     No adverse interval change      Electronically signed by: FRIDA MAGANA MD  Date:     12/01/17  Time:    07:16              Narrative:    History: Postoperative    Comparison: 11/30/17    Result: Single view of the chest.      Endotracheal and nasogastric tubes have been removed. In comparison to the prior study, there is no adverse interval changes.                             X-Ray Chest AP Portable (Final result)  Result time 11/30/17 12:21:49    Final result by Kimberly Matthews MD (11/30/17 12:21:49)                 Impression:         Postoperative changes of the chest.  No unexpected findings or evidence of complicating process.      Electronically signed by: KIMBERLY MATTHEWS MD  Date:     11/30/17  Time:    12:21              Narrative:    Exam: XR CHEST AP PORTABLE,    Date:  11/30/17 11:59:45    History: Follow up exam following surgery.    Comparison:  11/28/2017.        Findings:     Postoperative changes identified.  Median sternotomy wires are now place.  A [place]-Samina catheter is identified the distal tip overlies the distal right main pulmonary artery.  Mediastinal drains noted.  Endotracheal tube in place with the distal tip just distal to level of clavicles.  Nasogastric tube in place.  Distal tip overlies the proximal to mid body the stomach.  Lungs demonstrate increased interstitial markings thought to  represent chronic change.  No significant pulmonary vascular congestion or evidence of heart failure.

## 2017-12-06 VITALS
TEMPERATURE: 98 F | RESPIRATION RATE: 18 BRPM | HEIGHT: 69 IN | HEART RATE: 67 BPM | SYSTOLIC BLOOD PRESSURE: 141 MMHG | OXYGEN SATURATION: 99 % | DIASTOLIC BLOOD PRESSURE: 69 MMHG | WEIGHT: 182.75 LBS | BODY MASS INDEX: 27.07 KG/M2

## 2017-12-06 PROBLEM — E87.5 HYPERKALEMIA: Status: RESOLVED | Noted: 2017-12-02 | Resolved: 2017-12-06

## 2017-12-06 PROBLEM — N17.9 AKI (ACUTE KIDNEY INJURY): Status: RESOLVED | Noted: 2017-12-02 | Resolved: 2017-12-06

## 2017-12-06 LAB
ALBUMIN SERPL BCP-MCNC: 2.5 G/DL
ALP SERPL-CCNC: 124 U/L
ALT SERPL W/O P-5'-P-CCNC: 192 U/L
ANION GAP SERPL CALC-SCNC: 7 MMOL/L
AST SERPL-CCNC: 96 U/L
BASOPHILS # BLD AUTO: 0.02 K/UL
BASOPHILS NFR BLD: 0.3 %
BILIRUB SERPL-MCNC: 0.8 MG/DL
BUN SERPL-MCNC: 35 MG/DL
CALCIUM SERPL-MCNC: 8.6 MG/DL
CHLORIDE SERPL-SCNC: 104 MMOL/L
CO2 SERPL-SCNC: 25 MMOL/L
CREAT SERPL-MCNC: 1.4 MG/DL
DIFFERENTIAL METHOD: ABNORMAL
EOSINOPHIL # BLD AUTO: 0.2 K/UL
EOSINOPHIL NFR BLD: 3.1 %
ERYTHROCYTE [DISTWIDTH] IN BLOOD BY AUTOMATED COUNT: 17.1 %
EST. GFR  (AFRICAN AMERICAN): 52 ML/MIN/1.73 M^2
EST. GFR  (NON AFRICAN AMERICAN): 45 ML/MIN/1.73 M^2
GLUCOSE SERPL-MCNC: 108 MG/DL
HCT VFR BLD AUTO: 28.3 %
HGB BLD-MCNC: 8.9 G/DL
LYMPHOCYTES # BLD AUTO: 0.9 K/UL
LYMPHOCYTES NFR BLD: 13.2 %
MCH RBC QN AUTO: 25.7 PG
MCHC RBC AUTO-ENTMCNC: 31.4 G/DL
MCV RBC AUTO: 82 FL
MONOCYTES # BLD AUTO: 0.8 K/UL
MONOCYTES NFR BLD: 12.1 %
NEUTROPHILS # BLD AUTO: 4.9 K/UL
NEUTROPHILS NFR BLD: 71.3 %
PLATELET # BLD AUTO: 174 K/UL
PMV BLD AUTO: 9.5 FL
POCT GLUCOSE: 114 MG/DL (ref 70–110)
POCT GLUCOSE: 145 MG/DL (ref 70–110)
POCT GLUCOSE: 152 MG/DL (ref 70–110)
POTASSIUM SERPL-SCNC: 4.1 MMOL/L
PROT SERPL-MCNC: 5.8 G/DL
RBC # BLD AUTO: 3.46 M/UL
SODIUM SERPL-SCNC: 136 MMOL/L
WBC # BLD AUTO: 6.88 K/UL

## 2017-12-06 PROCEDURE — 36415 COLL VENOUS BLD VENIPUNCTURE: CPT

## 2017-12-06 PROCEDURE — 25000003 PHARM REV CODE 250: Performed by: NURSE PRACTITIONER

## 2017-12-06 PROCEDURE — 85025 COMPLETE CBC W/AUTO DIFF WBC: CPT

## 2017-12-06 PROCEDURE — 25000003 PHARM REV CODE 250: Performed by: PHYSICIAN ASSISTANT

## 2017-12-06 PROCEDURE — 97116 GAIT TRAINING THERAPY: CPT

## 2017-12-06 PROCEDURE — 99232 SBSQ HOSP IP/OBS MODERATE 35: CPT | Mod: ,,, | Performed by: INTERNAL MEDICINE

## 2017-12-06 PROCEDURE — 25000003 PHARM REV CODE 250: Performed by: INTERNAL MEDICINE

## 2017-12-06 PROCEDURE — 80053 COMPREHEN METABOLIC PANEL: CPT

## 2017-12-06 PROCEDURE — 97110 THERAPEUTIC EXERCISES: CPT

## 2017-12-06 RX ORDER — ASPIRIN 81 MG/1
81 TABLET ORAL DAILY
Qty: 30 TABLET | Refills: 0 | Status: SHIPPED | OUTPATIENT
Start: 2017-12-07 | End: 2018-12-07

## 2017-12-06 RX ORDER — POLYETHYLENE GLYCOL 3350 17 G/17G
17 POWDER, FOR SOLUTION ORAL DAILY
Qty: 30 EACH | Refills: 0 | Status: SHIPPED | OUTPATIENT
Start: 2017-12-07 | End: 2018-01-06

## 2017-12-06 RX ORDER — METOPROLOL TARTRATE 50 MG/1
50 TABLET ORAL 2 TIMES DAILY
Qty: 60 TABLET | Refills: 0 | Status: SHIPPED | OUTPATIENT
Start: 2017-12-06 | End: 2017-12-22 | Stop reason: SDUPTHER

## 2017-12-06 RX ORDER — HYDROCODONE BITARTRATE AND ACETAMINOPHEN 5; 325 MG/1; MG/1
1 TABLET ORAL EVERY 6 HOURS PRN
Qty: 12 TABLET | Refills: 0 | Status: SHIPPED | OUTPATIENT
Start: 2017-12-06 | End: 2017-12-06

## 2017-12-06 RX ORDER — HYDROCODONE BITARTRATE AND ACETAMINOPHEN 5; 325 MG/1; MG/1
1 TABLET ORAL EVERY 6 HOURS PRN
Qty: 12 TABLET | Refills: 0 | Status: SHIPPED | OUTPATIENT
Start: 2017-12-06 | End: 2018-01-10

## 2017-12-06 RX ADMIN — CHLORHEXIDINE GLUCONATE 10 ML: 1.2 RINSE ORAL at 09:12

## 2017-12-06 RX ADMIN — APIXABAN 2.5 MG: 2.5 TABLET, FILM COATED ORAL at 09:12

## 2017-12-06 RX ADMIN — ASPIRIN 81 MG: 81 TABLET, COATED ORAL at 09:12

## 2017-12-06 RX ADMIN — FAMOTIDINE 20 MG: 20 TABLET ORAL at 09:12

## 2017-12-06 RX ADMIN — DOCUSATE SODIUM 100 MG: 100 CAPSULE, LIQUID FILLED ORAL at 09:12

## 2017-12-06 RX ADMIN — POLYETHYLENE GLYCOL (3350) 17 G: 17 POWDER, FOR SOLUTION ORAL at 09:12

## 2017-12-06 RX ADMIN — METOPROLOL TARTRATE 50 MG: 50 TABLET ORAL at 09:12

## 2017-12-06 NOTE — PT/OT/SLP PROGRESS
Physical Therapy      Patient Name:  Chung Meneses Sr.   MRN:  [id number]    PT MET IN RM SUP IN BED. PT REFUSED P.T. PT REPORTED HE IS AWAITING D/C HOME. PT LEFT WITH ALL NEEDS MET.     Dulce Maria Okeefe, PT

## 2017-12-06 NOTE — ASSESSMENT & PLAN NOTE
S/P CABG x 1  - beta blocker, ASA and Statin    12/05 POD % s/p CABG, following expected post op course, likely d/c in AM

## 2017-12-06 NOTE — SUBJECTIVE & OBJECTIVE
Review of Systems   Constitutional: Negative.  Negative for chills and fever.   HENT: Negative.  Negative for congestion and sore throat.    Eyes: Negative.  Negative for visual disturbance.   Respiratory: Negative for cough, chest tightness, shortness of breath and wheezing.    Cardiovascular: Negative for chest pain.   Gastrointestinal: Negative for abdominal pain, diarrhea, nausea and vomiting.   Endocrine: Negative.    Genitourinary: Negative.  Negative for difficulty urinating.   Musculoskeletal: Negative.  Negative for myalgias and neck stiffness.   Skin: Negative.  Negative for color change and pallor.   Allergic/Immunologic: Negative.    Neurological: Negative.  Negative for dizziness, facial asymmetry, light-headedness and headaches.   Hematological: Negative.    Psychiatric/Behavioral: Negative.  Negative for agitation, behavioral problems and confusion.   All other systems reviewed and are negative.    Objective:     Vital Signs (Most Recent):  Temp: 97.7 °F (36.5 °C) (12/05/17 2321)  Pulse: 64 (12/05/17 2321)  Resp: 18 (12/05/17 2321)  BP: 121/68 (12/05/17 2321)  SpO2: 96 % (12/05/17 2321) Vital Signs (24h Range):  Temp:  [97.5 °F (36.4 °C)-98.5 °F (36.9 °C)] 97.7 °F (36.5 °C)  Pulse:  [64-84] 64  Resp:  [18-20] 18  SpO2:  [95 %-99 %] 96 %  BP: ()/(55-75) 121/68     Weight: 82.8 kg (182 lb 8.7 oz)  Body mass index is 26.96 kg/m².    Intake/Output Summary (Last 24 hours) at 12/05/17 4654  Last data filed at 12/05/17 1300   Gross per 24 hour   Intake              460 ml   Output              850 ml   Net             -390 ml      Physical Exam   Constitutional: He is oriented to person, place, and time. He appears well-developed and well-nourished. No distress.   HENT:   Head: Normocephalic and atraumatic.   Mouth/Throat: Oropharynx is clear and moist.   Eyes: Conjunctivae and EOM are normal. Pupils are equal, round, and reactive to light.   Neck: No JVD present. No thyromegaly present.    Cardiovascular: Normal rate, regular rhythm and normal heart sounds.  Exam reveals no gallop and no friction rub.    No murmur heard.  Sternotomy wound dressed clean and intact.  Mediastinal drains removed.   Pulmonary/Chest: Effort normal and breath sounds normal. No respiratory distress. He has no wheezes. He has no rales.   Abdominal: Soft. Bowel sounds are normal. He exhibits no distension. There is no tenderness. There is no rebound and no guarding.   Musculoskeletal: Normal range of motion. He exhibits no edema or tenderness.   Lymphadenopathy:     He has no cervical adenopathy.   Neurological: He is alert and oriented to person, place, and time. He has normal reflexes. He displays normal reflexes. No cranial nerve deficit.   Skin: Skin is warm and dry. Capillary refill takes 2 to 3 seconds. No rash noted. He is not diaphoretic. No erythema.   Psychiatric: He has a normal mood and affect. His behavior is normal. Judgment and thought content normal.       Significant Labs: All pertinent labs within the past 24 hours have been reviewed.    Significant Imaging: I have reviewed and interpreted all pertinent imaging results/findings within the past 24 hours.

## 2017-12-06 NOTE — PHYSICIAN QUERY
PT Name: Chung Meneses .  MR #: [id number]     Physician Query Form - Documentation Clarification      CDS/: Michell Rick               Contact information: [email or web address]      This form is a permanent document in the medical record.     Query Date: December 6, 2017    By submitting this query, we are merely seeking further clarification of documentation. Please utilize your independent clinical judgment when addressing the question(s) below.    The Medical record reflects the following:    Supporting Clinical Findings Location in Medical Record       Pulmonary hypertension    Cont Lasix          Pulmonary hypertension    12/2 - stable         Progress notes           Progress notes                                                                             Doctor, Please specify diagnosis or diagnoses associated with above clinical findings.    Provider Use Only      Please specify the type of Pulmonary Hypertension:    [   x  ] Primary pulmonary hypertension (Group 1)  [     ] Other Secondary pulmonary hypertension  (Group 5)  [     ] Secondary pulmonary arterial hypertension due to: _______________  [     ] Pulmonary hypertension due to Left  heart disease (Group 2)  [     ] Pulmonary hypertension due to Lung disease and hypoxia (Group 3)  [     ] Chronic thromboembolic pulmonary hypertension (Group 4)  [     ] Pulmonary hypertension, unspecified   [     ] Other: __________________                                                                                                             [  ] Clinically undetermined

## 2017-12-06 NOTE — PLAN OF CARE
Message received form [facility]. They declined this patient , can not meet his needs. Contacted patient's daughter via telephone Jocelin Machuca, Preference letter obtained via telephone for [facility]. Referral faxed to [facility]. Contacted [facility]  spoke with Ruth Ann.

## 2017-12-06 NOTE — PROGRESS NOTES
Discharge orders received, orders reviewed with patient and family, pt instructed when to take each medicine next.  Pt and daughter instructed on where to get Rx and given hard rx for pain med. Pt informed of follow up appointments and apt that they need to make.  PIV out, tele off, pt dressed self in own clothing. Pt and daughter extensively educated on sternal precautions, using IS, doing daily walks and taking medications as prescribed. Pt nor daughter verbalized understanding and does not have any questions at this time.  Pt escorted to lobby via wheelchair via staff.  Pt personal belongings with pt and family.

## 2017-12-06 NOTE — DISCHARGE INSTRUCTIONS
Please keep your follow up appointment with Cardiology - they will check lab work and need to restart your medication to lower your cholesterol/lipids in blood

## 2017-12-06 NOTE — PROGRESS NOTES
[facility] -   Nephrology  Progress Note    Patient Name: Chung Meneses Sr.  MRN: [id number]  Admission Date: 11/30/2017  Hospital Length of Stay: 6 days  Attending Provider: Shaq Boateng MD   Primary Care Physician: Glenroy Carmichael MD  Principal Problem:S/P AVR    Subjective:     HPI: 86 year old male with h/o aortic stenosis, atrial fibrillation, CHF, HTN, hyperlipidemia, CAD presented to [facility] on 11/30/17 for elective CABG and AVR (bioprosthetic valve). Workup revealed XIANG with creatinine increasing from 1.3 on 11/30/17 to 2.6 on 12/2/17. Patient now oliguric.   Nephrology was consulted to help with patient's renal care while he is admitted at [facility]. I saw and examined patient in his hospital room. Patient is resting in chair. He denies any chest pain, SOB, nausea/cvomiting, diarrhea, LE edema. Dawson catheter was removed yesterday and patient uses urinal. He reports mild dysuria with urination.  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     Interval History:       12/3/17: Patient denies any chest pain, SOB today.     12/4/17: Patient is feeling fine, no complaints today.     12/5/17: Patient without pain, SOB, nausea today.     12/6/17: patient is resting in recliner. No complaints at present.         Review of patient's allergies indicates:   Allergen Reactions    Sulfa (sulfonamide antibiotics) Hives     Current Facility-Administered Medications   Medication Frequency    acetaminophen tablet 650 mg Q6H PRN    apixaban tablet 2.5 mg BID    aspirin EC tablet 81 mg Daily    chlorhexidine 0.12 % solution 10 mL BID    dextrose 50% injection 12.5 g PRN    dextrose 50% injection 25 g PRN    docusate sodium capsule 100 mg BID    famotidine tablet 20 mg Daily    glucagon (human recombinant) injection 1 mg PRN    glucose chewable tablet 16 g PRN    glucose chewable tablet 24 g PRN    hydrocodone-acetaminophen 10-325mg per tablet 1 tablet Q4H  Was the patient seen in the last year in this department? Yes     Does patient have an active prescription for medications requested? No     Received Request Via: Pharmacy      Pt met protocol?: Yes pt last ov 9/16   Lab Results   Component Value Date/Time    HDL 38* 10/13/2016 09:09 AM              PRN    hydrocodone-acetaminophen 5-325mg per tablet 1 tablet Q4H PRN    hydrocodone-acetaminophen 7.5-325mg per tablet 1 tablet Q4H PRN    insulin aspart pen 1-10 Units QID (AC + HS) PRN    metoprolol tartrate (LOPRESSOR) tablet 50 mg BID    ondansetron injection 4 mg Q12H PRN    polyethylene glycol packet 17 g Daily    promethazine (PHENERGAN) 6.25 mg in dextrose 5 % 50 mL IVPB Q6H PRN       Objective:     Vital Signs (Most Recent):  Temp: 97.5 °F (36.4 °C) (12/06/17 0753)  Pulse: 78 (12/06/17 1100)  Resp: 16 (12/06/17 0753)  BP: (!) 146/70 (12/06/17 0753)  SpO2: 99 % (12/06/17 0753)  O2 Device (Oxygen Therapy): room air (12/06/17 0700) Vital Signs (24h Range):  Temp:  [97.5 °F (36.4 °C)-98.5 °F (36.9 °C)] 97.5 °F (36.4 °C)  Pulse:  [63-78] 78  Resp:  [16-20] 16  SpO2:  [95 %-99 %] 99 %  BP: ()/(55-75) 146/70     Weight: 82.9 kg (182 lb 12.2 oz) (12/06/17 0411)  Body mass index is 26.99 kg/m².  Body surface area is 2.01 meters squared.    I/O last 3 completed shifts:  In: 1584 [P.O.:1100; I.V.:484]  Out: 1450 [Urine:1450]    Physical Exam   Constitutional: He is oriented to person, place, and time. He appears well-developed. He is cooperative.   HENT:   Head: Normocephalic.   Nose: No rhinorrhea.   Mouth/Throat: Mucous membranes are normal. No oropharyngeal exudate.   Eyes: Pupils are equal, round, and reactive to light.   Neck: No thyroid mass present.   Cardiovascular: Normal rate, regular rhythm, S1 normal, S2 normal and intact distal pulses.    Pulmonary/Chest: Effort normal. No respiratory distress. He has no wheezes.   Abdominal: Soft. Bowel sounds are normal. He exhibits no distension. There is no tenderness. No hernia.   Musculoskeletal: He exhibits edema.   Trace LE edema.   Lymphadenopathy:     He has no cervical adenopathy.   Neurological: He is alert and oriented to person, place, and time.   Skin: Skin is warm and dry.   Psychiatric: He has a normal mood and affect.       Significant  Labs:  Lab Results   Component Value Date    CREATININE 1.4 12/06/2017    BUN 35 (H) 12/06/2017     12/06/2017    K 4.1 12/06/2017     12/06/2017    CO2 25 12/06/2017     Lab Results   Component Value Date    CALCIUM 8.6 (L) 12/06/2017    PHOS 2.9 12/03/2017     Lab Results   Component Value Date    ALBUMIN 2.5 (L) 12/06/2017     Lab Results   Component Value Date    WBC 6.88 12/06/2017    HGB 8.9 (L) 12/06/2017    HCT 28.3 (L) 12/06/2017    MCV 82 12/06/2017     12/06/2017       Recent Labs  Lab 12/03/17  0550   MG 2.1         Significant Imaging:  Imaging Results          X-Ray Chest AP Portable (Final result)  Result time 12/03/17 07:24:51    Final result by Micah Hernandez Jr., MD (12/03/17 07:24:51)                 Impression:     Removal of support devices with no signs of acute cardiopulmonary abnormality.        Electronically signed by: MICAH HERNANDEZ M.D.  Date:     12/03/17  Time:    07:24              Narrative:    XR CHEST AP PORTABLE    Clinical history: Post-op    Comparison: Previous frontal view of the chest on 12/01/2017 was reviewed.    Findings: There has been a median sternotomy.  The previously demonstrated mediastinal drain and Frederica-Samina catheter have been removed.  The lungs are well-aerated.  No pneumothorax or significant pleural fluid.  No widening of the mediastinum.  The heart is normal in size. There are bilateral shoulder joint replacements.                             X-Ray Chest AP Portable (Final result)  Result time 12/01/17 07:16:38    Final result by Frida Magana MD (12/01/17 07:16:38)                 Impression:     No adverse interval change      Electronically signed by: FRIDA MAGANA MD  Date:     12/01/17  Time:    07:16              Narrative:    History: Postoperative    Comparison: 11/30/17    Result: Single view of the chest.      Endotracheal and nasogastric tubes have been removed. In comparison to the prior study, there is no adverse interval  changes.                             X-Ray Chest AP Portable (Final result)  Result time 11/30/17 12:21:49    Final result by Kimberly Matthews MD (11/30/17 12:21:49)                 Impression:         Postoperative changes of the chest.  No unexpected findings or evidence of complicating process.      Electronically signed by: KIMBERLY MATTHEWS MD  Date:     11/30/17  Time:    12:21              Narrative:    Exam: XR CHEST AP PORTABLE,    Date:  11/30/17 11:59:45    History: Follow up exam following surgery.    Comparison:  11/28/2017.        Findings:     Postoperative changes identified.  Median sternotomy wires are now place.  A Reinholds-Samina catheter is identified the distal tip overlies the distal right main pulmonary artery.  Mediastinal drains noted.  Endotracheal tube in place with the distal tip just distal to level of clavicles.  Nasogastric tube in place.  Distal tip overlies the proximal to mid body the stomach.  Lungs demonstrate increased interstitial markings thought to represent chronic change.  No significant pulmonary vascular congestion or evidence of heart failure.                                Assessment/Plan:     XIANG (acute kidney injury)    Patient presents with XIANG and creatinine has increased from 1.3 on 11/30/17 to 2.6 on 12/2/17.  XIANG likely related to recent cardiac surgery (CABG and bioprosthetic AVR) and XIANG is likely due to ATN/poor perfusion. Renal function has now improved and creatinine has stabilized at 1.4. Will monitor closely.             Thank you for your consult. I will follow-up with patient. Please contact us if you have any additional questions.    Romulo Rodrigez MD  Nephrology  [facility] -

## 2017-12-06 NOTE — PLAN OF CARE
Problem: Patient Care Overview  Goal: Plan of Care Review  Pt had an uneventful night. A fib noted on tele monitor, HR=70-80s. No complaint of pain or discomfort. No signs of infection to surgical sites. POC discussed with pt, questions answered as they arise. No falls or injury this shift, safety maintained. Bed low, call bell within reach.

## 2017-12-06 NOTE — ASSESSMENT & PLAN NOTE
Patient presents with XIANG and creatinine has increased from 1.3 on 11/30/17 to 2.6 on 12/2/17.  XIANG likely related to recent cardiac surgery (CABG and bioprosthetic AVR) and XIANG is likely due to ATN/poor perfusion. Renal function has now improved and creatinine has stabilized at 1.4. Will monitor closely.

## 2017-12-06 NOTE — SUBJECTIVE & OBJECTIVE
Interval History:       12/3/17: Patient denies any chest pain, SOB today.     12/4/17: Patient is feeling fine, no complaints today.     12/5/17: Patient without pain, SOB, nausea today.     12/6/17: patient is resting in recliner. No complaints at present.         Review of patient's allergies indicates:   Allergen Reactions    Sulfa (sulfonamide antibiotics) Hives     Current Facility-Administered Medications   Medication Frequency    acetaminophen tablet 650 mg Q6H PRN    apixaban tablet 2.5 mg BID    aspirin EC tablet 81 mg Daily    chlorhexidine 0.12 % solution 10 mL BID    dextrose 50% injection 12.5 g PRN    dextrose 50% injection 25 g PRN    docusate sodium capsule 100 mg BID    famotidine tablet 20 mg Daily    glucagon (human recombinant) injection 1 mg PRN    glucose chewable tablet 16 g PRN    glucose chewable tablet 24 g PRN    hydrocodone-acetaminophen 10-325mg per tablet 1 tablet Q4H PRN    hydrocodone-acetaminophen 5-325mg per tablet 1 tablet Q4H PRN    hydrocodone-acetaminophen 7.5-325mg per tablet 1 tablet Q4H PRN    insulin aspart pen 1-10 Units QID (AC + HS) PRN    metoprolol tartrate (LOPRESSOR) tablet 50 mg BID    ondansetron injection 4 mg Q12H PRN    polyethylene glycol packet 17 g Daily    promethazine (PHENERGAN) 6.25 mg in dextrose 5 % 50 mL IVPB Q6H PRN       Objective:     Vital Signs (Most Recent):  Temp: 97.5 °F (36.4 °C) (12/06/17 0753)  Pulse: 78 (12/06/17 1100)  Resp: 16 (12/06/17 0753)  BP: (!) 146/70 (12/06/17 0753)  SpO2: 99 % (12/06/17 0753)  O2 Device (Oxygen Therapy): room air (12/06/17 0700) Vital Signs (24h Range):  Temp:  [97.5 °F (36.4 °C)-98.5 °F (36.9 °C)] 97.5 °F (36.4 °C)  Pulse:  [63-78] 78  Resp:  [16-20] 16  SpO2:  [95 %-99 %] 99 %  BP: ()/(55-75) 146/70     Weight: 82.9 kg (182 lb 12.2 oz) (12/06/17 0411)  Body mass index is 26.99 kg/m².  Body surface area is 2.01 meters squared.    I/O last 3 completed shifts:  In: 1584 [P.O.:1100;  I.V.:484]  Out: 1450 [Urine:1450]    Physical Exam   Constitutional: He is oriented to person, place, and time. He appears well-developed. He is cooperative.   HENT:   Head: Normocephalic.   Nose: No rhinorrhea.   Mouth/Throat: Mucous membranes are normal. No oropharyngeal exudate.   Eyes: Pupils are equal, round, and reactive to light.   Neck: No thyroid mass present.   Cardiovascular: Normal rate, regular rhythm, S1 normal, S2 normal and intact distal pulses.    Pulmonary/Chest: Effort normal. No respiratory distress. He has no wheezes.   Abdominal: Soft. Bowel sounds are normal. He exhibits no distension. There is no tenderness. No hernia.   Musculoskeletal: He exhibits edema.   Trace LE edema.   Lymphadenopathy:     He has no cervical adenopathy.   Neurological: He is alert and oriented to person, place, and time.   Skin: Skin is warm and dry.   Psychiatric: He has a normal mood and affect.       Significant Labs:  Lab Results   Component Value Date    CREATININE 1.4 12/06/2017    BUN 35 (H) 12/06/2017     12/06/2017    K 4.1 12/06/2017     12/06/2017    CO2 25 12/06/2017     Lab Results   Component Value Date    CALCIUM 8.6 (L) 12/06/2017    PHOS 2.9 12/03/2017     Lab Results   Component Value Date    ALBUMIN 2.5 (L) 12/06/2017     Lab Results   Component Value Date    WBC 6.88 12/06/2017    HGB 8.9 (L) 12/06/2017    HCT 28.3 (L) 12/06/2017    MCV 82 12/06/2017     12/06/2017       Recent Labs  Lab 12/03/17  0550   MG 2.1         Significant Imaging:  Imaging Results          X-Ray Chest AP Portable (Final result)  Result time 12/03/17 07:24:51    Final result by Micah Hernandez Jr., MD (12/03/17 07:24:51)                 Impression:     Removal of support devices with no signs of acute cardiopulmonary abnormality.        Electronically signed by: MICAH HERNANDEZ M.D.  Date:     12/03/17  Time:    07:24              Narrative:    XR CHEST AP PORTABLE    Clinical history: Post-op    Comparison:  Previous frontal view of the chest on 12/01/2017 was reviewed.    Findings: There has been a median sternotomy.  The previously demonstrated mediastinal drain and Norlina-Samina catheter have been removed.  The lungs are well-aerated.  No pneumothorax or significant pleural fluid.  No widening of the mediastinum.  The heart is normal in size. There are bilateral shoulder joint replacements.                             X-Ray Chest AP Portable (Final result)  Result time 12/01/17 07:16:38    Final result by Frida Magana MD (12/01/17 07:16:38)                 Impression:     No adverse interval change      Electronically signed by: FRIDA MAGANA MD  Date:     12/01/17  Time:    07:16              Narrative:    History: Postoperative    Comparison: 11/30/17    Result: Single view of the chest.      Endotracheal and nasogastric tubes have been removed. In comparison to the prior study, there is no adverse interval changes.                             X-Ray Chest AP Portable (Final result)  Result time 11/30/17 12:21:49    Final result by Kimberly Matthews MD (11/30/17 12:21:49)                 Impression:         Postoperative changes of the chest.  No unexpected findings or evidence of complicating process.      Electronically signed by: KIMBERLY MATTHEWS MD  Date:     11/30/17  Time:    12:21              Narrative:    Exam: XR CHEST AP PORTABLE,    Date:  11/30/17 11:59:45    History: Follow up exam following surgery.    Comparison:  11/28/2017.        Findings:     Postoperative changes identified.  Median sternotomy wires are now place.  A Norlina-Samina catheter is identified the distal tip overlies the distal right main pulmonary artery.  Mediastinal drains noted.  Endotracheal tube in place with the distal tip just distal to level of clavicles.  Nasogastric tube in place.  Distal tip overlies the proximal to mid body the stomach.  Lungs demonstrate increased interstitial markings thought to represent chronic  change.  No significant pulmonary vascular congestion or evidence of heart failure.

## 2017-12-06 NOTE — SUBJECTIVE & OBJECTIVE
Review of Systems   Constitutional: Negative.  Negative for chills and fever.   HENT: Negative.  Negative for congestion and sore throat.    Eyes: Negative.  Negative for visual disturbance.   Respiratory: Negative for cough, chest tightness, shortness of breath and wheezing.    Cardiovascular: Negative for chest pain.   Gastrointestinal: Negative for abdominal pain, diarrhea, nausea and vomiting.   Endocrine: Negative.    Genitourinary: Negative.  Negative for difficulty urinating.   Musculoskeletal: Negative.  Negative for myalgias and neck stiffness.   Skin: Negative.  Negative for color change and pallor.   Allergic/Immunologic: Negative.    Neurological: Negative.  Negative for dizziness, facial asymmetry, light-headedness and headaches.   Hematological: Negative.    Psychiatric/Behavioral: Negative.  Negative for agitation, behavioral problems and confusion.   All other systems reviewed and are negative.    Objective:     Vital Signs (Most Recent):  Temp: 97.8 °F (36.6 °C) (12/06/17 1530)  Pulse: 67 (12/06/17 1530)  Resp: 18 (12/06/17 1530)  BP: (!) 141/69 (12/06/17 1530)  SpO2: 99 % (12/06/17 1530) Vital Signs (24h Range):  Temp:  [97.5 °F (36.4 °C)-98.5 °F (36.9 °C)] 97.8 °F (36.6 °C)  Pulse:  [60-78] 67  Resp:  [16-18] 18  SpO2:  [95 %-99 %] 99 %  BP: (121-158)/(68-75) 141/69     Weight: 82.9 kg (182 lb 12.2 oz)  Body mass index is 26.99 kg/m².    Intake/Output Summary (Last 24 hours) at 12/06/17 1633  Last data filed at 12/06/17 1400   Gross per 24 hour   Intake             1000 ml   Output             1250 ml   Net             -250 ml      Physical Exam   Constitutional: He is oriented to person, place, and time. He appears well-developed and well-nourished. He is cooperative.   HENT:   Head: Normocephalic and atraumatic.   Nose: No rhinorrhea.   Mouth/Throat: Mucous membranes are normal. No oropharyngeal exudate.   Eyes: Conjunctivae are normal.   Neck: Neck supple. No thyroid mass present.    Cardiovascular: Normal rate, S1 normal and S2 normal.  An irregular rhythm present.   Pulmonary/Chest: Effort normal and breath sounds normal. No respiratory distress. He has no wheezes.   Musculoskeletal: He exhibits edema (+1/+2 BLE edema).   Lymphadenopathy:     He has no cervical adenopathy.   Neurological: He is alert and oriented to person, place, and time.   Skin: Skin is warm and dry.   Well approximated incision to sternum   Psychiatric: He has a normal mood and affect.   Nursing note and vitals reviewed.      Significant Labs:   CBC:   Recent Labs  Lab 12/05/17  0646 12/06/17  0510   WBC 7.28 6.88   HGB 8.5* 8.9*   HCT 26.9* 28.3*    174     CMP:   Recent Labs  Lab 12/05/17  0646 12/06/17  0510   * 136   K 3.8 4.1    104   CO2 23 25    108   BUN 36* 35*   CREATININE 1.2 1.4   CALCIUM 8.3* 8.6*   PROT 5.7*  5.7* 5.8*   ALBUMIN 2.5*  2.5* 2.5*   BILITOT 0.9  0.8 0.8   ALKPHOS 131  131 124   *  151* 96*   *  224* 192*   ANIONGAP 8 7*   EGFRNONAA 54* 45*     All pertinent labs within the past 24 hours have been reviewed.    Significant Imaging: I have reviewed all pertinent imaging results/findings within the past 24 hours.

## 2017-12-06 NOTE — PLAN OF CARE
"Problem: Patient Care Overview  Goal: Plan of Care Review  Outcome: Ongoing (interventions implemented as appropriate)  Pt stable. Pt is Afib on the heart monitor, with rate controlled.  Pt did not want to sit up in the chair most of the shift, and pt only walked twice this shift, pt stated he "was just really tired today".  Pt used IS frequently.  Sternal precautions reinforced.  Plan of care reviewed.  Pt verbalizes understanding.  Pt was free from falls or injuries during duration of shift.  Pt didn't want to turn and reposition frequently, pt educated on risk for pressure ulcers.  PIV intact with no redness, swelling or drainage.  Bed low, wheels locked, bed alarm on, call light in reach.  Pt instructed to call for assistance.  Will continue to monitor.        "

## 2017-12-06 NOTE — PROGRESS NOTES
[facility] Hospital Medicine  Progress Note    Patient Name: Chung Meneses Sr.  MRN: [id number]  Patient Class: IP- Inpatient   Admission Date: 11/30/2017  Length of Stay: 6 days  Attending Physician: No att. providers found  Primary Care Provider: Glenroy Carmichael MD        Subjective:     Principal Problem:S/P AVR    HPI:  Chung Meneses is an 86 year old male with a history of CAD, hypertension and aortic stenosis who underwent CABG with bioprosthetic AVR performed by Dr. Boateng on 12/30/17. His post-operative course remains unremarkable and he is intubated at the time of exam.     Hospital Course:  12/2 - Here with daughter this Am.  pt much improved, is passing gas, seen by CVT and cards this Am.  12/3 pt doing better, no fever or elevate wBC, able to walk and + Bm, is using Is.  UOP ~0.4cc/kg, creat improving, discussed with renal and is following expected resolution of XIANG.  tolerating eliquis, aware of risks  12/4/17 - Sitting in chair at bedside. He has participated in PT/OT this AM, walking in hallway. He denies SOB. Pt has had a bowel movement and reports coughing up phlegm at times. He is S/P AVR tissue valve and CABG x 1. Telemetry revealed rhythm in and out of A fib with controlled rate. Beta blocker, Eliquis, ASA and Statin in progress. Both CVT and Cardiology following. 12/05 - pt feeling ready to d/c hmoe.  Per cards and CVT likely go in Am.  Pt walking, regular PO and Bm.  No typicla CP and post op pain controlled and no s/s infection.  XIANG resolved, shock liver with transaminitis improving. 12/6/17 - Pt is discharged to home by CVT surgeon        Review of Systems   Constitutional: Negative.  Negative for chills and fever.   HENT: Negative.  Negative for congestion and sore throat.    Eyes: Negative.  Negative for visual disturbance.   Respiratory: Negative for cough, chest tightness, shortness of breath and wheezing.    Cardiovascular: Negative for chest pain.   Gastrointestinal:  Negative for abdominal pain, diarrhea, nausea and vomiting.   Endocrine: Negative.    Genitourinary: Negative.  Negative for difficulty urinating.   Musculoskeletal: Negative.  Negative for myalgias and neck stiffness.   Skin: Negative.  Negative for color change and pallor.   Allergic/Immunologic: Negative.    Neurological: Negative.  Negative for dizziness, facial asymmetry, light-headedness and headaches.   Hematological: Negative.    Psychiatric/Behavioral: Negative.  Negative for agitation, behavioral problems and confusion.   All other systems reviewed and are negative.    Objective:     Vital Signs (Most Recent):  Temp: 97.8 °F (36.6 °C) (12/06/17 1530)  Pulse: 67 (12/06/17 1530)  Resp: 18 (12/06/17 1530)  BP: (!) 141/69 (12/06/17 1530)  SpO2: 99 % (12/06/17 1530) Vital Signs (24h Range):  Temp:  [97.5 °F (36.4 °C)-98.5 °F (36.9 °C)] 97.8 °F (36.6 °C)  Pulse:  [60-78] 67  Resp:  [16-18] 18  SpO2:  [95 %-99 %] 99 %  BP: (121-158)/(68-75) 141/69     Weight: 82.9 kg (182 lb 12.2 oz)  Body mass index is 26.99 kg/m².    Intake/Output Summary (Last 24 hours) at 12/06/17 1633  Last data filed at 12/06/17 1400   Gross per 24 hour   Intake             1000 ml   Output             1250 ml   Net             -250 ml      Physical Exam   Constitutional: He is oriented to person, place, and time. He appears well-developed and well-nourished. He is cooperative.   HENT:   Head: Normocephalic and atraumatic.   Nose: No rhinorrhea.   Mouth/Throat: Mucous membranes are normal. No oropharyngeal exudate.   Eyes: Conjunctivae are normal.   Neck: Neck supple. No thyroid mass present.   Cardiovascular: Normal rate, S1 normal and S2 normal.  An irregular rhythm present.   Pulmonary/Chest: Effort normal and breath sounds normal. No respiratory distress. He has no wheezes.   Musculoskeletal: He exhibits edema (+1/+2 BLE edema).   Lymphadenopathy:     He has no cervical adenopathy.   Neurological: He is alert and oriented to person,  place, and time.   Skin: Skin is warm and dry.   Well approximated incision to sternum   Psychiatric: He has a normal mood and affect.   Nursing note and vitals reviewed.      Significant Labs:   CBC:   Recent Labs  Lab 12/05/17  0646 12/06/17  0510   WBC 7.28 6.88   HGB 8.5* 8.9*   HCT 26.9* 28.3*    174     CMP:   Recent Labs  Lab 12/05/17  0646 12/06/17  0510   * 136   K 3.8 4.1    104   CO2 23 25    108   BUN 36* 35*   CREATININE 1.2 1.4   CALCIUM 8.3* 8.6*   PROT 5.7*  5.7* 5.8*   ALBUMIN 2.5*  2.5* 2.5*   BILITOT 0.9  0.8 0.8   ALKPHOS 131  131 124   *  151* 96*   *  224* 192*   ANIONGAP 8 7*   EGFRNONAA 54* 45*     All pertinent labs within the past 24 hours have been reviewed.    Significant Imaging: I have reviewed all pertinent imaging results/findings within the past 24 hours.    Assessment/Plan:      * S/P AVR    12/05 following expected post op course, likely d/c in AM          CAD (coronary artery disease)    S/P CABG x 1  - beta blocker, ASA and Statin    12/05 POD % s/p CABG, following expected post op course, likely d/c in AM          Pulmonary hypertension    Supplemental oxygen to maintain sat > 92 %  Outpatient follow up with Pulmonology        Chronic systolic congestive heart failure    Compensated  Renal function improving after mild overdiuresis per nephrology            Nonrheumatic aortic valve stenosis    -post-operative Cardiac management per CVT Surgery and Critical Care team.   -Monitor blood glucose.   -Continue Nozin per protocol.    12/2 - POD 2, from AVR, pain controlled, no cardiovasc symptoms, on anticoagulation.          Atrial fibrillation    -Rate controlled.   -Continue metoprolol.     12/03 - on eliquis ASA and metoprolol.           Hyperlipidemia    Restart statin, when appropriate.             Hypertension         BP stable, continue metoprolol, d/c-ed lasix    12/05 increased metoprolol to 50 BID           VTE Risk Mitigation          Ordered     apixaban tablet 2.5 mg  2 times daily     Route:  Oral        12/02/17 1602     Medium Risk of VTE  Once      11/30/17 1112              Jo Cunningham NP  Department of Hospital Medicine   [facility]

## 2017-12-06 NOTE — PT/OT/SLP PROGRESS
Physical Therapy  Treatment    Chung Meneses Sr.   MRN: [id number]   Admitting Diagnosis: S/P AVR    PT Received On: 12/06/17  PT Start Time: 0850     PT Stop Time: 0915    PT Total Time (min): 25 min       Billable Minutes:  Gait Training 15 and Therapeutic Exercise 10    Treatment Type: Treatment  PT/PTA: PT     PTA Visit Number: 1       General Precautions: Standard, fall, sternal  Orthopedic Precautions: N/A   Braces:           Subjective:  Communicated with NURSE AND EPIC CHART REVIEW  prior to session.   PT AGREED TO TX     Pain/Comfort  Pain Rating 1: 0/10  Pain Rating Post-Intervention 1: 0/10    Objective:   Patient found with: peripheral IV, telemetry    Functional Mobility:  Bed Mobility: NA       Transfers:PT COMPLETED STAND PIVOT T/F WITH MOD A FROM LOW CHAIR.        Gait: PT GT TRAINED WITH NO AD AND CGA X 220' WITH ONE STAND REST BREAK. PT REPORTED SOB AND P.T. CHECK O2 SATS AT 99% ON ROOOM AIR.        Balance:   Static Sit: GOOD+: Takes MAXIMAL challenges from all directions.    Dynamic Sit: GOOD: Maintains balance through MODERATE excursions of active trunk movement  Static Stand: FAIR: Maintains without assist but unable to take challenges  Dynamic stand: FAIR: Needs CONTACT GUARD during gait     Therapeutic Activities and Exercises:  PT GT TRAINED AND RETURNED TO  T/F TO CHAIR. PT ABLE TO VERBALIZE STERNAL PRECAUTIONS. PT COMPLETED SEATED TE 2X15 REPS OF AP, TKE, MIP AND GLUT SETS. PT LEFT SEATED INCHAIR WITH ALL NEEDS MET.      AM-PAC 6 CLICK MOBILITY  How much help from another person does this patient currently need?   1 = Unable, Total/Dependent Assistance  2 = A lot, Maximum/Moderate Assistance  3 = A little, Minimum/Contact Guard/Supervision  4 = None, Modified [place]/Independent    Turning over in bed (including adjusting bedclothes, sheets and blankets)?: 4  Sitting down on and standing up from a chair with arms (e.g., wheelchair, bedside commode, etc.): 4  Moving from lying on back  to sitting on the side of the bed?: 3  Moving to and from a bed to a chair (including a wheelchair)?: 3  Need to walk in hospital room?: 3  Climbing 3-5 steps with a railing?: 1  Total Score: 18    AM-PAC Raw Score CMS G-Code Modifier Level of Impairment Assistance   6 % Total / Unable   7 - 9 CM 80 - 100% Maximal Assist   10 - 14 CL 60 - 80% Moderate Assist   15 - 19 CK 40 - 60% Moderate Assist   20 - 22 CJ 20 - 40% Minimal Assist   23 CI 1-20% SBA / CGA   24 CH 0% Independent/ Mod I     Patient left up in chair with call button in reach.    Assessment:  PT BETHANIE TX WELL WITH MIN C/O SOB.    Rehab identified problem list/impairments: Rehab identified problem list/impairments: weakness, impaired endurance, gait instability, impaired balance, impaired self care skills, decreased lower extremity function, decreased upper extremity function, decreased ROM, impaired functional mobilty, impaired cardiopulmonary response to activity    Rehab potential is excellent.    Activity tolerance: Good    Discharge recommendations: Discharge Facility/Level Of Care Needs: home health PT     Barriers to discharge: Barriers to Discharge: None    Equipment recommendations: Equipment Needed After Discharge: none     GOALS:    Physical Therapy Goals        Problem: Physical Therapy Goal    Goal Priority Disciplines Outcome Goal Variances Interventions   Physical Therapy Goal     PT/OT, PT Ongoing (interventions implemented as appropriate)     Description:  PT WILL BE SEEN FOR P.T. FOR A MIN OF 5 OUT OF 7 DAYS A WEEK  LT17  1. PT WILL COMPLETE BED MOBILITY WITH CGA.  2. PT WILL T/F TO CHAIR WITH S  3. PT WILL GT TRAIN WITH S X 300'   4. PT WILL BE IND WITH ALL STERNAL PRECAUTIONS  5. PT WILL COMPLETE B LE TE X 20 REPS                    PLAN:    Patient to be seen 5 x/week  to address the above listed problems via gait training, therapeutic activities, therapeutic exercises  Plan of Care expires: 17  Plan of Care  reviewed with: patient         Dulce Maria Maldonado, PT  12/06/2017

## 2017-12-06 NOTE — PLAN OF CARE
Problem: Patient Care Overview  Goal: Plan of Care Review  Outcome: Ongoing (interventions implemented as appropriate)  PT REQUIRES CGA FOR GT X 220' WITH ONE STAND REST BREAK

## 2017-12-06 NOTE — PROGRESS NOTES
[facility] Hospital Medicine  Progress Note    Patient Name: Chung Meneses Sr.  MRN: [id number]  Patient Class: IP- Inpatient   Admission Date: 11/30/2017  Length of Stay: 5 days  Attending Physician: hSaq Boateng MD  Primary Care Provider: Glenroy Carmichael MD        Subjective:     Principal Problem:S/P AVR    HPI:  Chung Meneses is an 86 year old male with a history of CAD, hypertension and aortic stenosis who underwent CABG with bioprosthetic AVR performed by Dr. Boateng on 12/30/17. His post-operative course remains unremarkable and he is intubated at the time of exam.     Hospital Course:  12/2 - Here with daughter this Am.  pt much improved, is passing gas, seen by CVT and cards this Am.  12/3 pt doing better, no fever or elevate wBC, able to walk and + Bm, is using Is.  UOP ~0.4cc/kg, creat improving, discussed with renal and is following expected resolution of XIANG.  tolerating eliquis, aware of risks  12/4/17 - Sitting in chair at bedside. He has participated in PT/OT this AM, walking in hallway. He denies SOB. Pt has had a bowel movement and reports coughing up phlegm at times. He is S/P AVR tissue valve and CABG x 1. Telemetry revealed rhythm in and out of A fib with controlled rate. Beta blocker, Eliquis, ASA and Statin in progress. Both CVT and Cardiology following. 12/05 - pt feeling ready to d/c hmoe.  Per cards and CVT likely go in Am.  Pt walking, regular PO and Bm.  No typicla CP and post op pain controlled and no s/s infection.  XIANG resolved, shock liver with transaminitis improving        Review of Systems   Constitutional: Negative.  Negative for chills and fever.   HENT: Negative.  Negative for congestion and sore throat.    Eyes: Negative.  Negative for visual disturbance.   Respiratory: Negative for cough, chest tightness, shortness of breath and wheezing.    Cardiovascular: Negative for chest pain.   Gastrointestinal: Negative for abdominal pain, diarrhea, nausea and  vomiting.   Endocrine: Negative.    Genitourinary: Negative.  Negative for difficulty urinating.   Musculoskeletal: Negative.  Negative for myalgias and neck stiffness.   Skin: Negative.  Negative for color change and pallor.   Allergic/Immunologic: Negative.    Neurological: Negative.  Negative for dizziness, facial asymmetry, light-headedness and headaches.   Hematological: Negative.    Psychiatric/Behavioral: Negative.  Negative for agitation, behavioral problems and confusion.   All other systems reviewed and are negative.    Objective:     Vital Signs (Most Recent):  Temp: 97.7 °F (36.5 °C) (12/05/17 2321)  Pulse: 64 (12/05/17 2321)  Resp: 18 (12/05/17 2321)  BP: 121/68 (12/05/17 2321)  SpO2: 96 % (12/05/17 2321) Vital Signs (24h Range):  Temp:  [97.5 °F (36.4 °C)-98.5 °F (36.9 °C)] 97.7 °F (36.5 °C)  Pulse:  [64-84] 64  Resp:  [18-20] 18  SpO2:  [95 %-99 %] 96 %  BP: ()/(55-75) 121/68     Weight: 82.8 kg (182 lb 8.7 oz)  Body mass index is 26.96 kg/m².    Intake/Output Summary (Last 24 hours) at 12/05/17 2345  Last data filed at 12/05/17 1300   Gross per 24 hour   Intake              460 ml   Output              850 ml   Net             -390 ml      Physical Exam   Constitutional: He is oriented to person, place, and time. He appears well-developed and well-nourished. No distress.   HENT:   Head: Normocephalic and atraumatic.   Mouth/Throat: Oropharynx is clear and moist.   Eyes: Conjunctivae and EOM are normal. Pupils are equal, round, and reactive to light.   Neck: No JVD present. No thyromegaly present.   Cardiovascular: Normal rate, regular rhythm and normal heart sounds.  Exam reveals no gallop and no friction rub.    No murmur heard.  Sternotomy wound dressed clean and intact.  Mediastinal drains removed.   Pulmonary/Chest: Effort normal and breath sounds normal. No respiratory distress. He has no wheezes. He has no rales.   Abdominal: Soft. Bowel sounds are normal. He exhibits no distension. There  is no tenderness. There is no rebound and no guarding.   Musculoskeletal: Normal range of motion. He exhibits no edema or tenderness.   Lymphadenopathy:     He has no cervical adenopathy.   Neurological: He is alert and oriented to person, place, and time. He has normal reflexes. He displays normal reflexes. No cranial nerve deficit.   Skin: Skin is warm and dry. Capillary refill takes 2 to 3 seconds. No rash noted. He is not diaphoretic. No erythema.   Psychiatric: He has a normal mood and affect. His behavior is normal. Judgment and thought content normal.       Significant Labs: All pertinent labs within the past 24 hours have been reviewed.    Significant Imaging: I have reviewed and interpreted all pertinent imaging results/findings within the past 24 hours.    Assessment/Plan:      * S/P AVR    12/05 following expected post op course, likely d/c in AM          CAD (coronary artery disease)    S/P CABG x 1  - beta blocker, ASA and Statin    12/05 POD % s/p CABG, following expected post op course, likely d/c in AM          XIANG (acute kidney injury)    Creat improving, will f/u UOP.  Renal on board.            Pulmonary hypertension    Supplemental oxygen to maintain sat > 92 %  Outpatient follow up with Pulmonology        Chronic systolic congestive heart failure    Compensated  Renal function improving after mild overdiuresis per nephrology            Nonrheumatic aortic valve stenosis    -post-operative Cardiac management per CVT Surgery and Critical Care team.   -Monitor blood glucose.   -Continue Nozin per protocol.    12/2 - POD 2, from AVR, pain controlled, no cardiovasc symptoms, on anticoagulation.          Atrial fibrillation    -Rate controlled.   -Continue metoprolol.     12/03 - on eliquis ASA and metoprolol.           Hyperlipidemia    Restart statin, when appropriate.             Hypertension         BP stable, continue metoprolol, d/c-ed lasix    12/05 increased metoprolol to 50 BID           VTE  Risk Mitigation         Ordered     apixaban tablet 2.5 mg  2 times daily     Route:  Oral        12/02/17 1602     Medium Risk of VTE  Once      11/30/17 1112              Vasquez Schreiber MD  Department of Hospital Medicine   [facility]

## 2017-12-08 ENCOUNTER — NURSE TRIAGE (OUTPATIENT)
Dept: ADMINISTRATIVE | Facility: CLINIC | Age: 82
End: 2017-12-08

## 2017-12-08 ENCOUNTER — TELEPHONE (OUTPATIENT)
Dept: CARDIOLOGY | Facility: CLINIC | Age: 82
End: 2017-12-08

## 2017-12-08 ENCOUNTER — OFFICE VISIT (OUTPATIENT)
Dept: CARDIOLOGY | Facility: CLINIC | Age: 82
DRG: 292 | End: 2017-12-08
Payer: MEDICARE

## 2017-12-08 ENCOUNTER — PATIENT OUTREACH (OUTPATIENT)
Dept: ADMINISTRATIVE | Facility: CLINIC | Age: 82
End: 2017-12-08

## 2017-12-08 VITALS
SYSTOLIC BLOOD PRESSURE: 118 MMHG | HEART RATE: 80 BPM | HEIGHT: 69 IN | DIASTOLIC BLOOD PRESSURE: 68 MMHG | WEIGHT: 194 LBS | BODY MASS INDEX: 28.73 KG/M2

## 2017-12-08 DIAGNOSIS — I25.10 CORONARY ARTERY DISEASE, ANGINA PRESENCE UNSPECIFIED, UNSPECIFIED VESSEL OR LESION TYPE, UNSPECIFIED WHETHER NATIVE OR TRANSPLANTED HEART: ICD-10-CM

## 2017-12-08 DIAGNOSIS — I10 ESSENTIAL HYPERTENSION: Chronic | ICD-10-CM

## 2017-12-08 DIAGNOSIS — E78.5 HYPERLIPIDEMIA, UNSPECIFIED HYPERLIPIDEMIA TYPE: Chronic | ICD-10-CM

## 2017-12-08 DIAGNOSIS — I50.22 CHRONIC SYSTOLIC CONGESTIVE HEART FAILURE: Primary | ICD-10-CM

## 2017-12-08 DIAGNOSIS — I35.0 NONRHEUMATIC AORTIC VALVE STENOSIS: ICD-10-CM

## 2017-12-08 DIAGNOSIS — Z95.2 S/P AVR: ICD-10-CM

## 2017-12-08 DIAGNOSIS — I48.0 PAROXYSMAL ATRIAL FIBRILLATION: Chronic | ICD-10-CM

## 2017-12-08 PROCEDURE — 99999 PR PBB SHADOW E&M-EST. PATIENT-LVL III: CPT | Mod: PBBFAC,,, | Performed by: INTERNAL MEDICINE

## 2017-12-08 PROCEDURE — 99214 OFFICE O/P EST MOD 30 MIN: CPT | Mod: S$GLB,,, | Performed by: INTERNAL MEDICINE

## 2017-12-08 RX ORDER — FUROSEMIDE 40 MG/1
40 TABLET ORAL DAILY
Qty: 90 TABLET | Refills: 3 | Status: ON HOLD | OUTPATIENT
Start: 2017-12-08 | End: 2017-12-13

## 2017-12-08 NOTE — TELEPHONE ENCOUNTER
Patient called  was released from [facility] on 12/6/17 post CABG--states experiencing shortness of breath and generalized swelling--states he is elevating legs when lying down and swelling does decrease at that time--states was told at discharge to discontinue taking Lasix--advised patient to contact Dr. Boateng's office to inform--patient verbalizes understanding

## 2017-12-08 NOTE — TELEPHONE ENCOUNTER
Reason for Disposition   MILD difficulty breathing (e.g., minimal/no SOB at rest, SOB with walking, pulse < 100) of new onset or worse than normal    Protocols used: ST BREATHING DIFFICULTY-A-OH    Spoke with pt for a TCC / post discharge call. Pt is S/P AVR and CABG x 1.  He indicated that he didn't feel like he was getting any better - his legs were swelling when he is up and walking and he is having mild SOB and orthopnea.  He is NOT speaking in short phrases and does not sound like he is in acute distress.  Protocol states that he needs to be seen in clinic today, but there are no openings in cardiology today - told pt he may need to go to the ER for evaluation.  I spoke with Dr. Boateng's nurse and she advised pt to see his Cardiologist, Dr. Espinoza. High priority message routed to his office staff and I told pt to call [facility] to come out and check him..  nurse will be there this afternoon around 1530hrs.

## 2017-12-08 NOTE — PATIENT INSTRUCTIONS
After Coronary Artery Bypass Surgery  Your healthcare provider performed coronary artery bypass graft surgery (also called CABG, pronounced cabbage). This surgery created new pathways around blocked parts of your hearts blood vessels, allowing blood to reach your heart muscle. Your healthcare provider used a healthy blood vessel from another part of your body (a graft) to restore blood flow.  Activity  · Discuss with your healthcare doctor what you can and cant do as you recover. You will have good and bad days. This is normal. But tell your healthcare provider if you feel depressed, have trouble sleeping, or have a persistent decrease in appetite. Although these problems are common after surgery, they can slow your recovery. Its important to seek help.  · Let others drive you wherever you need to go for the first 3 to 6 weeks after your surgery.  · Ask someone to stand nearby while you shower or do other activities, just in case you need help.  · Avoid using very hot water while showering. It can affect your circulation and make you dizzy.  · Weigh yourself every day, at the same time of day, and in the same kind of clothes. Quick weight gain can be a sign of a problem that needs your healthcare providers attention.  · You may start doing light work around the house and yard after 2 to 3 weeks at home. Dont lift anything heavier than 5 pounds. Your healthcare provider may give you a more specific weight restriction. Until approved by your healthcare provider, avoid mowing the lawn, vacuuming, driving, and doing other activities that could strain your breastbone.  · Ask your healthcare provider when you can expect to return to work.  Pain relief  You will recover faster after surgery if your pain is kept under control:  · Dont be surprised if you feel sharp pains as your breastbone heals or if you have soreness in your incision during changes in weather.  · Tell your healthcare provider if you have  questions about what youre feeling, if your medicines dont reduce your pain, or if you suddenly feel worse.  Incision care  Healing takes several weeks. The bandage or dressing on your chest will likely be removed before you go home. If it is still in place, ask your healthcare provider how you should care for it after you return home. Do the following to care for your incision:  · If there are any steri-strips still on your incision, you can remove them after a week if they haven't already fallen off.  · Clean your incision every day with soap and water.  · Gently pat the area of the incision to dry it.  · Dont use any powders, lotions, or oils on your incision until it is well healed.  Lifestyle changes  · Ask your healthcare provider when you can start a walking program:  ¨ If you havent already started a walking program in the hospital, begin with short walks (about 5 minutes) at home. Go a little longer each day.  ¨ Choose a safe place with a level surface, such as a local park or mall.  ¨ Wear supportive shoes to prevent injury to your knees and ankles.  ¨ Walk with someone. Its more fun and helps you stay with it.  · Take your medicines exactly as directed. Dont skip doses.  · Maintain a healthy weight. Get help to lose any extra pounds.  · Avoid fatty and fried foods. Stick to lean meats, such as chicken or fish.   · Cut back on salt:  ¨ Limit canned, dried, packaged, and fast foods.  ¨ Dont add salt to your food at the table.  ¨ Season foods with herbs instead of salt when you cook.  · Break the smoking habit. Enroll in a stop-smoking program to improve your chances of success.  When to call your healthcare provider  Call your healthcare provider immediately if you have any of the following:  · Chest pain or a return of the heart symptoms you had before your surgery  · Fever of 100.4°F (38°C) or higher, or as directed by your healthcare provider  · Signs of infection (redness, swelling, drainage, or  warmth) at the incision site  · Shortness of breath  · Fainting  · Weight gain of more than 3 pounds in 1 day, more than 5 pounds in 1 week, or whatever weight gain you were told to report by your healthcare provider  · New or increased swelling in your hands, feet, or ankles  · Unrelieved pain at the incision site(s)  · Changes in the location, type, or severity of pain  · Fast or irregular pulse  · Persistent abdominal pain  · Nausea  · Trouble urinating  · Any unusual bleeding   Date Last Reviewed: 10/1/2016  © 4681-0001 [employer]. [address] [place] [address]. All rights reserved. This information is not intended as a substitute for professional medical care. Always follow your healthcare professional's instructions.

## 2017-12-08 NOTE — PROGRESS NOTES
Subjective:   Patient ID:  Chung Meneses Sr. is a 86 y.o. male who presents for cardiac consult of Hospital Follow Up; Congestive Heart Failure; and Coronary Artery Disease      HPI  The patient came in today for cardiac consult of Hospital Follow Up; Congestive Heart Failure; and Coronary Artery Disease    This is an 86 year old male pt with PMHx of CAD/Severe AS s/p recentCABG  x1 and  bioprosthetic AVR performed by Dr. Boateng on 11/30/17, atrial fibrillation, CHF, HTN, and hyperlipidemia who presented to [facility] on 11/30/17 for elective AVR presents for follow up evaluation.     PROCEDURE:  Aortic valve replacements with 23 mm Mosaic valve, coronary artery bypass grafting x1 with aorta to first diagonal.    Today pt feels more SOB and EISENBERG. Gets SOB with minimal exertional but about the same as before surgery, had some PT/OT but did not do too much. Has some mild chest soreness, but feels ok. Also has some LE swelling but with standing, improves with compression stockings.     Patient feels no PND, no palpitation, no dizziness, no syncope, no CNS symptoms.    Patient is compliant with medications.    preop ECHO  CONCLUSIONS     1 - Biatrial enlargement.     2 - Concentric hypertrophy.     3 - No wall motion abnormalities.     4 - Moderately depressed left ventricular systolic function (EF 35-40%).     5 - Right ventricular enlargement with low normal to mildly depressed systolic function.     6 - Pulmonary hypertension. The estimated PA systolic pressure is greater than 48 mmHg.     7 - Mild to moderate mitral regurgitation.     8 - Mild tricuspid regurgitation.     9 - Severe aortic stenosis, HERNANDEZ = 0.51 cm2, peak velocity = 3.0 m/s, mean gradient = 23 mmHg.     Past Medical History:   Diagnosis Date    Aortic stenosis 8/2/2013    Atrial fibrillation 7/5/2013    CHF (congestive heart failure)     Dizziness - light-headed     Hyperlipidemia     Hypertension        Past Surgical History:   Procedure  Laterality Date    BACK SURGERY  2003    knee replacemnt bilateral  2001    SHOULDER SURGERY  2002       Social History   Substance Use Topics    Smoking status: Never Smoker    Smokeless tobacco: Never Used    Alcohol use No      Comment: HOLIDAY       Family History   Problem Relation Age of Onset    Hypertension Mother     Hypertension Father     Hypertension Sister     Hypertension Brother     Heart failure Brother     Heart disease Brother     Heart attack Brother        Patient's Medications   New Prescriptions    FUROSEMIDE (LASIX) 40 MG TABLET    Take 1 tablet (40 mg total) by mouth once daily.   Previous Medications    APIXABAN 2.5 MG TAB    Take 1 tablet (2.5 mg total) by mouth 2 (two) times daily.    ASPIRIN (ECOTRIN) 81 MG EC TABLET    Take 1 tablet (81 mg total) by mouth once daily.    FISH OIL-OMEGA-3 FATTY ACIDS 300-1,000 MG CAPSULE    Take 1,200 mg by mouth once daily.    HYDROCODONE-ACETAMINOPHEN 5-325MG (NORCO) 5-325 MG PER TABLET    Take 1 tablet by mouth every 6 (six) hours as needed for Pain.    METOPROLOL TARTRATE (LOPRESSOR) 50 MG TABLET    Take 1 tablet (50 mg total) by mouth 2 (two) times daily.    MULTIVITAMIN WITH FOLIC ACID 400 MCG TAB    Take 1 tablet by mouth.    POLYETHYLENE GLYCOL (GLYCOLAX) 17 GRAM PWPK    Take 17 g by mouth once daily.    RANITIDINE (ZANTAC) 75 MG TABLET    Take 75 mg by mouth 2 (two) times daily.     VENTOLIN HFA 90 MCG/ACTUATION INHALER    INHALE 1 TO 2 PUFFS 4 TIMES A DAY AS NEEDED FOR WHEEZING    VITAMIN D 1000 UNITS TAB    Take 1,000 Units by mouth once daily.   Modified Medications    No medications on file   Discontinued Medications    No medications on file       Review of Systems   Constitutional: Negative.    HENT: Negative.    Eyes: Negative.    Respiratory: Positive for shortness of breath. Negative for cough and wheezing.    Cardiovascular: Positive for leg swelling. Negative for chest pain and palpitations.   Gastrointestinal: Negative.   "Negative for heartburn.   Genitourinary: Negative.    Musculoskeletal: Negative.    Skin: Negative.    Neurological: Negative.    Endo/Heme/Allergies: Negative.    Psychiatric/Behavioral: Negative.    All 12 systems otherwise negative.      Wt Readings from Last 3 Encounters:   12/08/17 88 kg (194 lb 0.1 oz)   12/06/17 82.9 kg (182 lb 12.2 oz)   11/28/17 77.1 kg (170 lb)     Temp Readings from Last 3 Encounters:   12/06/17 97.8 °F (36.6 °C) (Oral)   11/28/17 98.1 °F (36.7 °C) (Oral)   11/14/17 97.3 °F (36.3 °C) (Oral)     BP Readings from Last 3 Encounters:   12/08/17 118/68   12/06/17 (!) 141/69   11/28/17 (!) 141/64     Pulse Readings from Last 3 Encounters:   12/08/17 80   12/06/17 67   11/28/17 75       /68 (BP Location: Left arm)   Pulse 80   Ht 5' 9" (1.753 m)   Wt 88 kg (194 lb 0.1 oz)   BMI 28.65 kg/m²     Objective:   Physical Exam   Constitutional: He is oriented to person, place, and time. He appears well-developed and well-nourished. No distress.   HENT:   Head: Normocephalic and atraumatic.   Nose: Nose normal.   Mouth/Throat: Oropharynx is clear and moist.   Eyes: Conjunctivae and EOM are normal. No scleral icterus.   Neck: Normal range of motion. Neck supple. No JVD present. No thyromegaly present.   Cardiovascular: S1 normal and S2 normal.  Exam reveals no gallop, no S3, no S4 and no friction rub.    No murmur heard.  Irregular rate   Pulmonary/Chest: Effort normal and breath sounds normal. No stridor. No respiratory distress. He has no wheezes. He has no rales. He exhibits no tenderness.   Abdominal: Soft. Bowel sounds are normal. He exhibits no distension and no mass. There is no tenderness. There is no rebound.   Genitourinary:   Genitourinary Comments: Deferred   Musculoskeletal: Normal range of motion. He exhibits no edema, tenderness or deformity.   Lymphadenopathy:     He has no cervical adenopathy.   Neurological: He is alert and oriented to person, place, and time. He exhibits " normal muscle tone. Coordination normal.   Skin: Skin is warm and dry. No rash noted. He is not diaphoretic. No erythema. No pallor.   Psychiatric: He has a normal mood and affect. His behavior is normal. Judgment and thought content normal.   Nursing note and vitals reviewed.      Lab Results   Component Value Date     12/06/2017    K 4.1 12/06/2017     12/06/2017    CO2 25 12/06/2017    BUN 35 (H) 12/06/2017    CREATININE 1.4 12/06/2017     12/06/2017    HGBA1C 6.5 (H) 11/30/2017    MG 2.1 12/03/2017    AST 96 (H) 12/06/2017     (H) 12/06/2017    ALBUMIN 2.5 (L) 12/06/2017    PROT 5.8 (L) 12/06/2017    BILITOT 0.8 12/06/2017    WBC 6.88 12/06/2017    HGB 8.9 (L) 12/06/2017    HCT 28.3 (L) 12/06/2017    HCT 27 (L) 11/30/2017    MCV 82 12/06/2017     12/06/2017    INR 1.2 11/30/2017    TSH 2.418 04/17/2015    CHOL 125 04/19/2017    HDL 35 (L) 04/19/2017    LDLCALC 77.8 04/19/2017    TRIG 61 04/19/2017     Assessment:      1. Chronic systolic congestive heart failure    2. Paroxysmal atrial fibrillation    3. S/P AVR    4. Coronary artery disease, angina presence unspecified, unspecified vessel or lesion type, unspecified whether native or transplanted heart    5. Essential hypertension    6. Hyperlipidemia, unspecified hyperlipidemia type    7. Nonrheumatic aortic valve stenosis        Plan:   1. CAD s/p CABG x 1  - cont asa and BB  - no statin due to liver dysfunction    2. Severe AS s/p AVR  - cont A/C    3. Chronic combined systolic/diastolic HF  - start lasix, evaluate in a few days for response, check BMP  - cont low salt    4. AF  - cont OAC    5. HTN  - cont meds    6. HLD  - restart statin once LFTs improve    7. LE edema  - likely due to venous insufficency in addition to CHF  - rec compression stockings and elevate legs    Thank you for allowing me to participate in this patient's care. Please do not hesitate to contact me with any questions or concerns. Consult note has  been forwarded to the referral physician.

## 2017-12-11 ENCOUNTER — TELEPHONE (OUTPATIENT)
Dept: CARDIOLOGY | Facility: CLINIC | Age: 82
End: 2017-12-11

## 2017-12-11 ENCOUNTER — HOSPITAL ENCOUNTER (OUTPATIENT)
Facility: HOSPITAL | Age: 82
Discharge: HOME HEALTH | DRG: 292 | End: 2017-12-13
Attending: EMERGENCY MEDICINE | Admitting: HOSPITALIST
Payer: MEDICARE

## 2017-12-11 DIAGNOSIS — I50.22 CHRONIC SYSTOLIC CONGESTIVE HEART FAILURE: ICD-10-CM

## 2017-12-11 DIAGNOSIS — I50.9 CHF (CONGESTIVE HEART FAILURE): ICD-10-CM

## 2017-12-11 DIAGNOSIS — Z95.1 HX OF CABG: ICD-10-CM

## 2017-12-11 DIAGNOSIS — I48.91 ATRIAL FIBRILLATION: ICD-10-CM

## 2017-12-11 DIAGNOSIS — Z79.01 CHRONIC ANTICOAGULATION: ICD-10-CM

## 2017-12-11 DIAGNOSIS — I50.9 ACUTE ON CHRONIC HEART FAILURE, UNSPECIFIED HEART FAILURE TYPE: Primary | ICD-10-CM

## 2017-12-11 DIAGNOSIS — Z95.2 H/O AORTIC VALVE REPLACEMENT: ICD-10-CM

## 2017-12-11 DIAGNOSIS — R06.02 SHORTNESS OF BREATH: ICD-10-CM

## 2017-12-11 PROBLEM — E87.5 HYPERKALEMIA: Status: RESOLVED | Noted: 2017-12-11 | Resolved: 2017-12-06

## 2017-12-11 PROBLEM — I25.10 CAD (CORONARY ARTERY DISEASE): Status: ACTIVE | Noted: 2017-12-11

## 2017-12-11 LAB
ALBUMIN SERPL BCP-MCNC: 3 G/DL
ALP SERPL-CCNC: 133 U/L
ALT SERPL W/O P-5'-P-CCNC: 69 U/L
ANION GAP SERPL CALC-SCNC: 9 MMOL/L
APTT BLDCRRT: 33.5 SEC
AST SERPL-CCNC: 35 U/L
BASOPHILS # BLD AUTO: 0.04 K/UL
BASOPHILS NFR BLD: 0.5 %
BILIRUB SERPL-MCNC: 0.7 MG/DL
BILIRUB UR QL STRIP: NEGATIVE
BNP SERPL-MCNC: 1546 PG/ML
BUN SERPL-MCNC: 45 MG/DL
CALCIUM SERPL-MCNC: 9.2 MG/DL
CHLORIDE SERPL-SCNC: 104 MMOL/L
CLARITY UR: CLEAR
CO2 SERPL-SCNC: 24 MMOL/L
COLOR UR: YELLOW
CREAT SERPL-MCNC: 1.6 MG/DL
DIFFERENTIAL METHOD: ABNORMAL
EOSINOPHIL # BLD AUTO: 0.1 K/UL
EOSINOPHIL NFR BLD: 1.7 %
ERYTHROCYTE [DISTWIDTH] IN BLOOD BY AUTOMATED COUNT: 17.1 %
EST. GFR  (AFRICAN AMERICAN): 44 ML/MIN/1.73 M^2
EST. GFR  (NON AFRICAN AMERICAN): 38 ML/MIN/1.73 M^2
GLUCOSE SERPL-MCNC: 127 MG/DL
GLUCOSE UR QL STRIP: NEGATIVE
HCT VFR BLD AUTO: 30.3 %
HGB BLD-MCNC: 9.4 G/DL
HGB UR QL STRIP: ABNORMAL
INR PPP: 1.3
KETONES UR QL STRIP: NEGATIVE
LEUKOCYTE ESTERASE UR QL STRIP: NEGATIVE
LYMPHOCYTES # BLD AUTO: 0.9 K/UL
LYMPHOCYTES NFR BLD: 12.4 %
MCH RBC QN AUTO: 25.1 PG
MCHC RBC AUTO-ENTMCNC: 31 G/DL
MCV RBC AUTO: 81 FL
MICROSCOPIC COMMENT: NORMAL
MONOCYTES # BLD AUTO: 0.6 K/UL
MONOCYTES NFR BLD: 7.9 %
NEUTROPHILS # BLD AUTO: 5.8 K/UL
NEUTROPHILS NFR BLD: 77.5 %
NITRITE UR QL STRIP: NEGATIVE
PH UR STRIP: 6 [PH] (ref 5–8)
PLATELET # BLD AUTO: 341 K/UL
PMV BLD AUTO: 8.6 FL
POTASSIUM SERPL-SCNC: 4.5 MMOL/L
PROT SERPL-MCNC: 6.8 G/DL
PROT UR QL STRIP: NEGATIVE
PROTHROMBIN TIME: 13.3 SEC
RBC # BLD AUTO: 3.75 M/UL
RBC #/AREA URNS HPF: 0 /HPF (ref 0–4)
SODIUM SERPL-SCNC: 137 MMOL/L
SP GR UR STRIP: 1.01 (ref 1–1.03)
TROPONIN I SERPL DL<=0.01 NG/ML-MCNC: 0.17 NG/ML
URN SPEC COLLECT METH UR: ABNORMAL
UROBILINOGEN UR STRIP-ACNC: NEGATIVE EU/DL
WBC # BLD AUTO: 7.5 K/UL

## 2017-12-11 PROCEDURE — 81000 URINALYSIS NONAUTO W/SCOPE: CPT

## 2017-12-11 PROCEDURE — 80061 LIPID PANEL: CPT

## 2017-12-11 PROCEDURE — 96374 THER/PROPH/DIAG INJ IV PUSH: CPT

## 2017-12-11 PROCEDURE — 25000003 PHARM REV CODE 250: Performed by: HOSPITALIST

## 2017-12-11 PROCEDURE — 85025 COMPLETE CBC W/AUTO DIFF WBC: CPT

## 2017-12-11 PROCEDURE — 93010 ELECTROCARDIOGRAM REPORT: CPT | Mod: ,,, | Performed by: INTERNAL MEDICINE

## 2017-12-11 PROCEDURE — 25000003 PHARM REV CODE 250: Performed by: EMERGENCY MEDICINE

## 2017-12-11 PROCEDURE — G0378 HOSPITAL OBSERVATION PER HR: HCPCS

## 2017-12-11 PROCEDURE — 93005 ELECTROCARDIOGRAM TRACING: CPT

## 2017-12-11 PROCEDURE — 85730 THROMBOPLASTIN TIME PARTIAL: CPT

## 2017-12-11 PROCEDURE — 85610 PROTHROMBIN TIME: CPT

## 2017-12-11 PROCEDURE — 84443 ASSAY THYROID STIM HORMONE: CPT

## 2017-12-11 PROCEDURE — 80053 COMPREHEN METABOLIC PANEL: CPT

## 2017-12-11 PROCEDURE — 84484 ASSAY OF TROPONIN QUANT: CPT

## 2017-12-11 PROCEDURE — 63600175 PHARM REV CODE 636 W HCPCS: Performed by: EMERGENCY MEDICINE

## 2017-12-11 PROCEDURE — 99285 EMERGENCY DEPT VISIT HI MDM: CPT | Mod: 25

## 2017-12-11 PROCEDURE — 93010 ELECTROCARDIOGRAM REPORT: CPT | Mod: 76,,, | Performed by: INTERNAL MEDICINE

## 2017-12-11 PROCEDURE — 11000001 HC ACUTE MED/SURG PRIVATE ROOM

## 2017-12-11 PROCEDURE — 83880 ASSAY OF NATRIURETIC PEPTIDE: CPT

## 2017-12-11 PROCEDURE — 96376 TX/PRO/DX INJ SAME DRUG ADON: CPT

## 2017-12-11 RX ORDER — METOPROLOL TARTRATE 50 MG/1
50 TABLET ORAL 2 TIMES DAILY
Status: DISCONTINUED | OUTPATIENT
Start: 2017-12-11 | End: 2017-12-13 | Stop reason: HOSPADM

## 2017-12-11 RX ORDER — ATORVASTATIN CALCIUM 40 MG/1
40 TABLET, FILM COATED ORAL DAILY
Status: DISCONTINUED | OUTPATIENT
Start: 2017-12-11 | End: 2017-12-13 | Stop reason: HOSPADM

## 2017-12-11 RX ORDER — CHOLECALCIFEROL (VITAMIN D3) 25 MCG
1000 TABLET ORAL DAILY
Status: DISCONTINUED | OUTPATIENT
Start: 2017-12-12 | End: 2017-12-13 | Stop reason: HOSPADM

## 2017-12-11 RX ORDER — ASPIRIN 81 MG/1
81 TABLET ORAL DAILY
Status: DISCONTINUED | OUTPATIENT
Start: 2017-12-12 | End: 2017-12-13 | Stop reason: HOSPADM

## 2017-12-11 RX ORDER — FAMOTIDINE 20 MG/1
20 TABLET, FILM COATED ORAL 2 TIMES DAILY
Status: DISCONTINUED | OUTPATIENT
Start: 2017-12-11 | End: 2017-12-13 | Stop reason: HOSPADM

## 2017-12-11 RX ORDER — ASPIRIN 81 MG/1
81 TABLET ORAL DAILY
Status: DISCONTINUED | OUTPATIENT
Start: 2017-12-12 | End: 2017-12-11

## 2017-12-11 RX ORDER — FUROSEMIDE 10 MG/ML
40 INJECTION INTRAMUSCULAR; INTRAVENOUS 2 TIMES DAILY
Status: DISCONTINUED | OUTPATIENT
Start: 2017-12-12 | End: 2017-12-13 | Stop reason: HOSPADM

## 2017-12-11 RX ORDER — FUROSEMIDE 10 MG/ML
60 INJECTION INTRAMUSCULAR; INTRAVENOUS
Status: COMPLETED | OUTPATIENT
Start: 2017-12-11 | End: 2017-12-11

## 2017-12-11 RX ADMIN — FUROSEMIDE 60 MG: 10 INJECTION, SOLUTION INTRAVENOUS at 08:12

## 2017-12-11 RX ADMIN — FAMOTIDINE 20 MG: 20 TABLET, FILM COATED ORAL at 10:12

## 2017-12-11 RX ADMIN — METOPROLOL TARTRATE 50 MG: 50 TABLET ORAL at 10:12

## 2017-12-11 RX ADMIN — ATORVASTATIN CALCIUM 40 MG: 40 TABLET, FILM COATED ORAL at 10:12

## 2017-12-11 RX ADMIN — APIXABAN 2.5 MG: 2.5 TABLET, FILM COATED ORAL at 10:12

## 2017-12-11 NOTE — TELEPHONE ENCOUNTER
Received phone call from patient stating Dr. Alvarenga told him to get a catheter placed to help him with emptying bladder advised unfortunately his only alternative would be to go to ER.  Notified Aida at [facility] ER of patient's pending arrival and history

## 2017-12-11 NOTE — TELEPHONE ENCOUNTER
Dr Alvarenga spoke directly with patient.    Pt was called concerning SOB when lying down, and walking short distance.  Also leg swelling.  Pt states that he is following low salt diet, taking lasix, and elevating his legs at night but not during the day and wearing his compression stockings.  Pt states he is drinking very little fluid and not eating many water packed foods.  Please call pt at .  Thank you.

## 2017-12-11 NOTE — PLAN OF CARE
12/11/17 0927   Final Note   Assessment Type Final Discharge Note   Discharge Disposition Home-Health  (Vasili[facility] PADILLA)

## 2017-12-12 LAB
ALBUMIN SERPL BCP-MCNC: 2.5 G/DL
ANION GAP SERPL CALC-SCNC: 9 MMOL/L
BASOPHILS # BLD AUTO: 0.02 K/UL
BASOPHILS NFR BLD: 0.3 %
BUN SERPL-MCNC: 44 MG/DL
CALCIUM SERPL-MCNC: 8.7 MG/DL
CHLORIDE SERPL-SCNC: 105 MMOL/L
CHOLEST SERPL-MCNC: 104 MG/DL
CHOLEST/HDLC SERPL: 4.3 {RATIO}
CO2 SERPL-SCNC: 24 MMOL/L
CREAT SERPL-MCNC: 1.4 MG/DL
DIFFERENTIAL METHOD: ABNORMAL
EOSINOPHIL # BLD AUTO: 0.2 K/UL
EOSINOPHIL NFR BLD: 2.2 %
ERYTHROCYTE [DISTWIDTH] IN BLOOD BY AUTOMATED COUNT: 16.9 %
EST. GFR  (AFRICAN AMERICAN): 52 ML/MIN/1.73 M^2
EST. GFR  (NON AFRICAN AMERICAN): 45 ML/MIN/1.73 M^2
ESTIMATED PA SYSTOLIC PRESSURE: 42.04
GLUCOSE SERPL-MCNC: 101 MG/DL
HCT VFR BLD AUTO: 26.5 %
HDLC SERPL-MCNC: 24 MG/DL
HDLC SERPL: 23.1 %
HGB BLD-MCNC: 8.3 G/DL
LDLC SERPL CALC-MCNC: 57 MG/DL
LYMPHOCYTES # BLD AUTO: 1.1 K/UL
LYMPHOCYTES NFR BLD: 13.8 %
MAGNESIUM SERPL-MCNC: 1.8 MG/DL
MCH RBC QN AUTO: 25.2 PG
MCHC RBC AUTO-ENTMCNC: 31.3 G/DL
MCV RBC AUTO: 80 FL
MONOCYTES # BLD AUTO: 0.7 K/UL
MONOCYTES NFR BLD: 9.6 %
NEUTROPHILS # BLD AUTO: 5.7 K/UL
NEUTROPHILS NFR BLD: 74.1 %
NONHDLC SERPL-MCNC: 80 MG/DL
PHOSPHATE SERPL-MCNC: 4.7 MG/DL
PHOSPHATE SERPL-MCNC: 4.7 MG/DL
PLATELET # BLD AUTO: 320 K/UL
PMV BLD AUTO: 8.8 FL
POTASSIUM SERPL-SCNC: 3.9 MMOL/L
RBC # BLD AUTO: 3.3 M/UL
RETIRED EF AND QEF - SEE NOTES: 50 (ref 55–65)
SODIUM SERPL-SCNC: 138 MMOL/L
TRIGL SERPL-MCNC: 115 MG/DL
TROPONIN I SERPL DL<=0.01 NG/ML-MCNC: 0.17 NG/ML
TROPONIN I SERPL DL<=0.01 NG/ML-MCNC: 0.18 NG/ML
TSH SERPL DL<=0.005 MIU/L-ACNC: 3.78 UIU/ML
WBC # BLD AUTO: 7.7 K/UL

## 2017-12-12 PROCEDURE — 25000003 PHARM REV CODE 250: Performed by: EMERGENCY MEDICINE

## 2017-12-12 PROCEDURE — 83735 ASSAY OF MAGNESIUM: CPT

## 2017-12-12 PROCEDURE — 80069 RENAL FUNCTION PANEL: CPT

## 2017-12-12 PROCEDURE — 21400001 HC TELEMETRY ROOM

## 2017-12-12 PROCEDURE — 63600175 PHARM REV CODE 636 W HCPCS: Performed by: HOSPITALIST

## 2017-12-12 PROCEDURE — 84484 ASSAY OF TROPONIN QUANT: CPT

## 2017-12-12 PROCEDURE — G0378 HOSPITAL OBSERVATION PER HR: HCPCS

## 2017-12-12 PROCEDURE — 93306 TTE W/DOPPLER COMPLETE: CPT | Mod: 26,,, | Performed by: INTERNAL MEDICINE

## 2017-12-12 PROCEDURE — 25000003 PHARM REV CODE 250: Performed by: HOSPITALIST

## 2017-12-12 PROCEDURE — 93306 TTE W/DOPPLER COMPLETE: CPT

## 2017-12-12 PROCEDURE — 85025 COMPLETE CBC W/AUTO DIFF WBC: CPT

## 2017-12-12 PROCEDURE — 25000003 PHARM REV CODE 250: Performed by: NURSE PRACTITIONER

## 2017-12-12 PROCEDURE — 36415 COLL VENOUS BLD VENIPUNCTURE: CPT

## 2017-12-12 RX ORDER — ACETAMINOPHEN 325 MG/1
650 TABLET ORAL EVERY 6 HOURS PRN
Status: DISCONTINUED | OUTPATIENT
Start: 2017-12-12 | End: 2017-12-13 | Stop reason: HOSPADM

## 2017-12-12 RX ADMIN — FAMOTIDINE 20 MG: 20 TABLET, FILM COATED ORAL at 08:12

## 2017-12-12 RX ADMIN — METOPROLOL TARTRATE 50 MG: 50 TABLET ORAL at 09:12

## 2017-12-12 RX ADMIN — FUROSEMIDE 40 MG: 10 INJECTION, SOLUTION INTRAMUSCULAR; INTRAVENOUS at 06:12

## 2017-12-12 RX ADMIN — APIXABAN 2.5 MG: 2.5 TABLET, FILM COATED ORAL at 09:12

## 2017-12-12 RX ADMIN — ACETAMINOPHEN 650 MG: 325 TABLET ORAL at 02:12

## 2017-12-12 RX ADMIN — VITAMIN D, TAB 1000IU (100/BT) 1000 UNITS: 25 TAB at 09:12

## 2017-12-12 RX ADMIN — FAMOTIDINE 20 MG: 20 TABLET, FILM COATED ORAL at 09:12

## 2017-12-12 RX ADMIN — ATORVASTATIN CALCIUM 40 MG: 40 TABLET, FILM COATED ORAL at 09:12

## 2017-12-12 RX ADMIN — ACETAMINOPHEN 650 MG: 325 TABLET ORAL at 12:12

## 2017-12-12 RX ADMIN — APIXABAN 2.5 MG: 2.5 TABLET, FILM COATED ORAL at 08:12

## 2017-12-12 RX ADMIN — ASPIRIN 81 MG: 81 TABLET, COATED ORAL at 09:12

## 2017-12-12 RX ADMIN — METOPROLOL TARTRATE 50 MG: 50 TABLET ORAL at 08:12

## 2017-12-12 RX ADMIN — FUROSEMIDE 40 MG: 10 INJECTION, SOLUTION INTRAMUSCULAR; INTRAVENOUS at 09:12

## 2017-12-12 NOTE — ED NOTES
Called dietary for pt's lunch tray which is supposed to be en route; pt given 2 packs of timmy crackers. PT repositioned in bed.

## 2017-12-12 NOTE — PLAN OF CARE
Patient readmit due to shortness of breath.     12/12/17 1153   Readmission Questionnaire   At the time of your discharge, did someone talk to you about what your health problems were? Yes   At the time of discharge, did someone talk to you about what to watch out for regarding worsening of your health problem? Yes   At the time of discharge, did someone talk to you about what to do if you experienced worsening of your health problem? Yes   At the time of discharge, did someone talk to you about which medication to take when you left the hospital and which ones to stop taking? Yes   At the time of discharge, did someone talk to you about when and where to follow up with a doctor after you left the hospital? Yes   What do you believe caused you to be sick enough to be re-admitted? shortness of breath   How often do you need to have someone help you when you read instructions, pamphlets, or other written material from your doctor or pharmacy? Rarely   Do you have problems taking your medications as prescribed? No   Do you have any problems affording any of  your prescribed medications? Yes   Do you have problems obtaining/receiving your medications? No   Does the patient have transportation to healthcare appointments? Yes   Does the patient have family/friends to help with healtcare needs after discharge? yes   Who are your caregiver(s) and their phone number(s)? kwan wayne [phone]   Does your caregiver provide all the help you need? Yes   Are you currently feeling confused? No   Are you currently having problems thinking? No   Are you currently having memory problems? (sometimes)   Have you felt down, depressed, or hopeless? 2  (sometimes)   Have you felt little interest or pleasure in doing things? 1   In the last 7 days, my sleep quality was: fair

## 2017-12-12 NOTE — ED PROVIDER NOTES
"SCRIBE #1 NOTE: I, Gareth Shook, am scribing for, and in the presence of, Najma Acosta MD. I have scribed the entire note.      History      Chief Complaint   Patient presents with    Shortness of Breath     Pt states,"I am very short of breath, can't walk 10 feet." PT also reports Dysuria.       Review of patient's allergies indicates:   Allergen Reactions    Sulfa (sulfonamide antibiotics) Hives        HPI   HPI    12/11/2017, 7:31 PM   History obtained from the patient and daughter      History of Present Illness: Chung Meneses Sr. is a 86 y.o. male patient who presents to the Emergency Department for SOB which onset gradually yesterday. Sxs are constant, worsening, and moderate in severity. Pt had aortic repair and single bypass surgery on 11/30 performed by Dr. Boateng.   Pt saw Dr. Alvarenga 3 days ago and was started back on LASIX. Pt believes he is still not urinating enough and is retaining fluid. There are no mitigating or exacerbating factors noted. Associated sxs include bilateral leg swelling, decreased urination. Pt states he was sent to ED for catheter.  Pt denies any fever, N/V/D, calf pain, CP, chest tightness, rash, HA, LOC, numbness, arm pain, cough, trauma, and all other sxs at this time. No further complaints or concerns at this time.         Arrival mode: Personal vehicle      PCP: Glenroy Carmichael MD       Past Medical History:  Past Medical History:   Diagnosis Date    Aortic stenosis 8/2/2013    Atrial fibrillation 7/5/2013    CHF (congestive heart failure)     Dizziness - light-headed     Hyperlipidemia     Hypertension        Past Surgical History:  Past Surgical History:   Procedure Laterality Date    BACK SURGERY  2003    knee replacemnt bilateral  2001    SHOULDER SURGERY  2002         Family History:  Family History   Problem Relation Age of Onset    Hypertension Mother     Hypertension Father     Hypertension Sister     Hypertension Brother     Heart " failure Brother     Heart disease Brother     Heart attack Brother        Social History:  Social History     Social History Main Topics    Smoking status: Never Smoker    Smokeless tobacco: Never Used    Alcohol use No      Comment: HOLIDAY    Drug use: No    Sexual activity: No       ROS   Review of Systems   Constitutional: Negative for fever.   HENT: Negative for sore throat.    Respiratory: Positive for shortness of breath.    Cardiovascular: Positive for leg swelling. Negative for chest pain and palpitations.   Gastrointestinal: Negative for abdominal pain, constipation, diarrhea, nausea and vomiting.   Genitourinary: Positive for decreased urine volume. Negative for dysuria.   Musculoskeletal: Negative for back pain.   Skin: Negative for rash.   Neurological: Negative for weakness.   Hematological: Does not bruise/bleed easily.     Physical Exam      Initial Vitals [12/11/17 1759]   BP Pulse Resp Temp SpO2   (!) 152/90 91 20 97.9 °F (36.6 °C) (!) 94 %      MAP       110.67          Physical Exam  Nursing Notes and Vital Signs Reviewed.  Constitutional: Patient is in no acute distress. Well-developed and well-nourished.  Head: Atraumatic. Normocephalic.  Eyes: PERRL. EOM intact. Conjunctivae are not pale. No scleral icterus.  ENT: Mucous membranes are moist. Oropharynx is clear and symmetric.    Neck: Supple. Full ROM. No lymphadenopathy.  Cardiovascular: Regular rate. Regular rhythm. No murmurs, rubs, or gallops. Distal pulses are 2+ and symmetric.  Pulmonary/Chest: No respiratory distress. Clear to auscultation bilaterally. No wheezing or rales.  Abdominal: Soft and non-distended.  There is no tenderness.  No rebound, guarding, or rigidity. Good bowel sounds.  Genitourinary: No CVA tenderness  Musculoskeletal: Moves all extremities. No obvious deformities. 1  + BLE edema. No calf tenderness.  Skin: Warm and dry. Well healing midline sternotomy scar, wound edges well approximated.  Neurological:   "Alert, awake, and appropriate.  Normal speech.  No acute focal neurological deficits are appreciated.  Psychiatric: Normal affect. Good eye contact. Appropriate in content.    ED Course    Procedures  ED Vital Signs:  Vitals:    12/11/17 1759 12/11/17 2000 12/11/17 2130   BP: (!) 152/90  (!) 146/78   Pulse: 91 86 81   Resp: 20  20   Temp: 97.9 °F (36.6 °C)  98.8 °F (37.1 °C)   TempSrc: Oral  Oral   SpO2: (!) 94%  97%   Weight: 86.2 kg (190 lb)     Height: 5' 9" (1.753 m)         Abnormal Lab Results:  Labs Reviewed   CBC W/ AUTO DIFFERENTIAL - Abnormal; Notable for the following:        Result Value    RBC 3.75 (*)     Hemoglobin 9.4 (*)     Hematocrit 30.3 (*)     MCV 81 (*)     MCH 25.1 (*)     MCHC 31.0 (*)     RDW 17.1 (*)     MPV 8.6 (*)     Lymph # 0.9 (*)     Gran% 77.5 (*)     Lymph% 12.4 (*)     All other components within normal limits   COMPREHENSIVE METABOLIC PANEL - Abnormal; Notable for the following:     Glucose 127 (*)     BUN, Bld 45 (*)     Creatinine 1.6 (*)     Albumin 3.0 (*)     ALT 69 (*)     eGFR if  44 (*)     eGFR if non  38 (*)     All other components within normal limits   TROPONIN I - Abnormal; Notable for the following:     Troponin I 0.166 (*)     All other components within normal limits   B-TYPE NATRIURETIC PEPTIDE - Abnormal; Notable for the following:     BNP 1,546 (*)     All other components within normal limits   APTT - Abnormal; Notable for the following:     aPTT 33.5 (*)     All other components within normal limits   PROTIME-INR - Abnormal; Notable for the following:     Prothrombin Time 13.3 (*)     INR 1.3 (*)     All other components within normal limits   URINALYSIS - Abnormal; Notable for the following:     Occult Blood UA 1+ (*)     All other components within normal limits   URINALYSIS MICROSCOPIC        All Lab Results:  Results for orders placed or performed during the hospital encounter of 12/11/17   CBC auto differential   Result " Value Ref Range    WBC 7.50 3.90 - 12.70 K/uL    RBC 3.75 (L) 4.60 - 6.20 M/uL    Hemoglobin 9.4 (L) 14.0 - 18.0 g/dL    Hematocrit 30.3 (L) 40.0 - 54.0 %    MCV 81 (L) 82 - 98 fL    MCH 25.1 (L) 27.0 - 31.0 pg    MCHC 31.0 (L) 32.0 - 36.0 g/dL    RDW 17.1 (H) 11.5 - 14.5 %    Platelets 341 150 - 350 K/uL    MPV 8.6 (L) 9.2 - 12.9 fL    Gran # 5.8 1.8 - 7.7 K/uL    Lymph # 0.9 (L) 1.0 - 4.8 K/uL    Mono # 0.6 0.3 - 1.0 K/uL    Eos # 0.1 0.0 - 0.5 K/uL    Baso # 0.04 0.00 - 0.20 K/uL    Gran% 77.5 (H) 38.0 - 73.0 %    Lymph% 12.4 (L) 18.0 - 48.0 %    Mono% 7.9 4.0 - 15.0 %    Eosinophil% 1.7 0.0 - 8.0 %    Basophil% 0.5 0.0 - 1.9 %    Differential Method Automated    Comprehensive metabolic panel   Result Value Ref Range    Sodium 137 136 - 145 mmol/L    Potassium 4.5 3.5 - 5.1 mmol/L    Chloride 104 95 - 110 mmol/L    CO2 24 23 - 29 mmol/L    Glucose 127 (H) 70 - 110 mg/dL    BUN, Bld 45 (H) 8 - 23 mg/dL    Creatinine 1.6 (H) 0.5 - 1.4 mg/dL    Calcium 9.2 8.7 - 10.5 mg/dL    Total Protein 6.8 6.0 - 8.4 g/dL    Albumin 3.0 (L) 3.5 - 5.2 g/dL    Total Bilirubin 0.7 0.1 - 1.0 mg/dL    Alkaline Phosphatase 133 55 - 135 U/L    AST 35 10 - 40 U/L    ALT 69 (H) 10 - 44 U/L    Anion Gap 9 8 - 16 mmol/L    eGFR if African American 44 (A) >60 mL/min/1.73 m^2    eGFR if non African American 38 (A) >60 mL/min/1.73 m^2   Troponin I #1   Result Value Ref Range    Troponin I 0.166 (H) 0.000 - 0.026 ng/mL   B-Type natriuretic peptide (BNP)   Result Value Ref Range    BNP 1,546 (H) 0 - 99 pg/mL   APTT   Result Value Ref Range    aPTT 33.5 (H) 21.0 - 32.0 sec   Protime-INR   Result Value Ref Range    Prothrombin Time 13.3 (H) 9.0 - 12.5 sec    INR 1.3 (H) 0.8 - 1.2   Urinalysis Catheterized   Result Value Ref Range    Specimen UA Urine, Catheterized     Color, UA Yellow Yellow, Straw, Alina    Appearance, UA Clear Clear    pH, UA 6.0 5.0 - 8.0    Specific Gravity, UA 1.015 1.005 - 1.030    Protein, UA Negative Negative    Glucose,  UA Negative Negative    Ketones, UA Negative Negative    Bilirubin (UA) Negative Negative    Occult Blood UA 1+ (A) Negative    Nitrite, UA Negative Negative    Urobilinogen, UA Negative <2.0 EU/dL    Leukocytes, UA Negative Negative   Urinalysis Microscopic   Result Value Ref Range    RBC, UA 0 0 - 4 /hpf    Microscopic Comment SEE COMMENT          Imaging Results:  Imaging Results          X-Ray Chest AP Portable (Final result)  Result time 12/11/17 21:22:24    Final result by Yanely Adkins III, MD (12/11/17 21:22:24)                 Impression:     Mild pulmonary vascular congestion and low-grade interstitial edema.  Probable trace right pleural effusion mild adjacent right basilar compressive atelectasis.        Electronically signed by: YANELY ADKINS MD  Date:     12/11/17  Time:    21:22              Narrative:    XR CHEST AP PORTABLE    Clinical history: Chest Pain.      Comparison: 12/03/2017    Findings: Prior sternotomy is again noted. Heart size is upper limits of normal.  There has been interval development of mild pulmonary vascular congestion, peribronchial cuffing and suspected low-grade interstitial edema.  There appears be a trace right pleural effusion. There is a small adjacent hazy right basilar opacity likely related to layering pleural effusion and mild adjacent compressive atelectasis.  Remainder the lungs appear clear of active disease.                                      The Emergency Provider reviewed the vital signs and test results, which are outlined above.    ED Discussion     Patient has diuresed 1,100 cc of clear urine after catheter placed and given Lasix 60 IV.     9:35 PM: Discussed case with Ana Maria Cooley NP ([facility]). Ana Maria agrees with current care and management of pt and accepts admission.   Admitting Service: Hospital medicine   Admitting Physician: Dr. Gomez  Admit to: Telemetry    9:36 PM: Re-evaluated pt. I have discussed test results, shared treatment plan,  and the need for admission with patient and family at bedside. Pt and family express understanding at this time and agree with all information. All questions answered. Pt and family have no further questions or concerns at this time. Pt is ready for admit.    ED Medication(s):  Medications   furosemide injection 60 mg (60 mg Intravenous Given 12/11/17 2008)       New Prescriptions    No medications on file             Medical Decision Making    Medical Decision Making:   Clinical Tests:   Lab Tests: Reviewed and Ordered  Radiological Study: Reviewed and Ordered           Scribe Attestation:   Scribe #1: I performed the above scribed service and the documentation accurately describes the services I performed. I attest to the accuracy of the note.    Attending:   Physician Attestation Statement for Scribe #1: I, Najma Acosta MD, personally performed the services described in this documentation, as scribed by Gareth Shook, in my presence, and it is both accurate and complete.          Clinical Impression       ICD-10-CM ICD-9-CM   1. Acute on chronic heart failure, unspecified heart failure type I50.9 428.0   2. Shortness of breath R06.02 786.05   3. Hx of CABG Z95.1 V45.81   4. H/O aortic valve replacement Z95.2 V43.3   5. Chronic anticoagulation Z79.01 V58.61       Disposition:   Disposition: Admitted  Condition: Serious         Najma Acosta MD  12/11/17 2153

## 2017-12-12 NOTE — ED NOTES
Pt lying on stretcher in NAD; a.m. Medications administered. Awaiting vitamin D from pharmacy. Pt updated on POC. No current needs at this time

## 2017-12-12 NOTE — ASSESSMENT & PLAN NOTE
- Did not fill lasix outpatient, last EF 50% during cardiac procedure  - BNP >1000, trop abnormal, c/w demand ischemia  - improved symptoms after 60mg IV x 1 given in ER  - admit inpatient  - strict I/O, repeat echo eval LV function  - fluid restriction 1.5L

## 2017-12-12 NOTE — H&P
"[facility] Hospital Medicine  History & Physical    Patient Name: Chung Meneses Sr.  MRN: [id number]  Admission Date: 12/11/2017  Attending Physician: Augustin Gomez MD   Primary Care Provider: Glenroy Carmichael MD         Patient information was obtained from patient and ER records.     Subjective:     Principal Problem:Acute on chronic heart failure    Chief Complaint:   Chief Complaint   Patient presents with    Shortness of Breath     Pt states,"I am very short of breath, can't walk 10 feet." PT also reports Dysuria.        HPI: 86M h/o recent AVR for severe AS w/ tandem CABG x 1 p/w sob for the last 2-3 days.  He states increasing shortness of breath after discharged from [facility] on 11/30/17.  He reported sob at follow up visit with cardiologist (dr. Alvarenga) who ordered lasix 40mg daily for the patient.  This was not filled and the patient presents with worsening sob.  Also reports light headedness this morning and scrotal edema that started 2 days ago.  He denies chest pain.  He reports decreased urine output over the last few days.     Pertinent ER course  CXR shows interstitial edema and right pleural effusion.  He was given 60mg IV lasix.  The patient reported improved dyspnea after lasix.     He lives with his daughter who is his primary caretaker.      Past Medical History:   Diagnosis Date    Aortic stenosis 8/2/2013    Atrial fibrillation 7/5/2013    CHF (congestive heart failure)     Dizziness - light-headed     Hyperlipidemia     Hypertension        Past Surgical History:   Procedure Laterality Date    BACK SURGERY  2003    knee replacemnt bilateral  2001    SHOULDER SURGERY  2002       Review of patient's allergies indicates:   Allergen Reactions    Sulfa (sulfonamide antibiotics) Hives       No current facility-administered medications on file prior to encounter.      Current Outpatient Prescriptions on File Prior to Encounter   Medication Sig    apixaban 2.5 mg Tab Take 1 " tablet (2.5 mg total) by mouth 2 (two) times daily.    aspirin (ECOTRIN) 81 MG EC tablet Take 1 tablet (81 mg total) by mouth once daily.    fish oil-omega-3 fatty acids 300-1,000 mg capsule Take 1,200 mg by mouth once daily.    furosemide (LASIX) 40 MG tablet Take 1 tablet (40 mg total) by mouth once daily.    hydrocodone-acetaminophen 5-325mg (NORCO) 5-325 mg per tablet Take 1 tablet by mouth every 6 (six) hours as needed for Pain.    metoprolol tartrate (LOPRESSOR) 50 MG tablet Take 1 tablet (50 mg total) by mouth 2 (two) times daily.    multivitamin with folic acid 400 mcg Tab Take 1 tablet by mouth.    polyethylene glycol (GLYCOLAX) 17 gram PwPk Take 17 g by mouth once daily.    ranitidine (ZANTAC) 75 MG tablet Take 75 mg by mouth 2 (two) times daily.     VENTOLIN HFA 90 mcg/actuation inhaler INHALE 1 TO 2 PUFFS 4 TIMES A DAY AS NEEDED FOR WHEEZING    vitamin D 1000 units Tab Take 1,000 Units by mouth once daily.     Family History     Problem Relation (Age of Onset)    Heart attack Brother    Heart disease Brother    Heart failure Brother    Hypertension Mother, Father, Sister, Brother        Social History Main Topics    Smoking status: Never Smoker    Smokeless tobacco: Never Used    Alcohol use No      Comment: HOLIDAY    Drug use: No    Sexual activity: No     Review of Systems   Constitutional: Negative for activity change, appetite change, chills, diaphoresis, fatigue and fever.   HENT: Negative for facial swelling, sore throat, tinnitus and trouble swallowing.    Eyes: Negative for photophobia and visual disturbance.   Respiratory: Positive for shortness of breath. Negative for apnea, chest tightness and wheezing.    Cardiovascular: Positive for leg swelling. Negative for chest pain and palpitations.   Gastrointestinal: Negative for abdominal distention, abdominal pain, constipation, diarrhea, nausea and vomiting.   Endocrine: Negative for polydipsia, polyphagia and polyuria.    Genitourinary: Positive for decreased urine volume and scrotal swelling. Negative for dysuria, flank pain, frequency and hematuria.   Musculoskeletal: Negative for arthralgias, back pain, joint swelling, myalgias and neck stiffness.   Skin: Negative for pallor and rash.   Allergic/Immunologic: Negative for immunocompromised state.   Neurological: Positive for light-headedness. Negative for dizziness, seizures, syncope, weakness, numbness and headaches.   Psychiatric/Behavioral: Negative for confusion, hallucinations and suicidal ideas. The patient is not nervous/anxious.    All other systems reviewed and are negative.    Objective:     Vital Signs (Most Recent):  Temp: 98.8 °F (37.1 °C) (12/11/17 2130)  Pulse: 81 (12/11/17 2130)  Resp: 20 (12/11/17 2130)  BP: (!) 146/78 (12/11/17 2130)  SpO2: 97 % (12/11/17 2130) Vital Signs (24h Range):  Temp:  [97.9 °F (36.6 °C)-98.8 °F (37.1 °C)] 98.8 °F (37.1 °C)  Pulse:  [81-91] 81  Resp:  [20] 20  SpO2:  [94 %-97 %] 97 %  BP: (146-152)/(78-90) 146/78     Weight: 86.2 kg (190 lb)  Body mass index is 28.06 kg/m².    Physical Exam   Constitutional: He is oriented to person, place, and time. He appears well-developed and well-nourished. No distress.   HENT:   Head: Normocephalic and atraumatic.   Mouth/Throat: Oropharynx is clear and moist.   Eyes: Conjunctivae are normal. Pupils are equal, round, and reactive to light. No scleral icterus.   Neck: No JVD present. No thyromegaly present.   Cardiovascular: Normal rate and intact distal pulses.  Exam reveals no gallop and no friction rub.    No murmur heard.  Pulmonary/Chest: Effort normal and breath sounds normal. No respiratory distress. He has no wheezes. He has no rales.   Abdominal: Soft. Bowel sounds are normal. He exhibits no distension. There is no tenderness. There is no guarding.   Musculoskeletal: Normal range of motion. He exhibits edema (+1 BLE ).   Neurological: He is alert and oriented to person, place, and time. No  cranial nerve deficit.   Skin: Skin is warm. Capillary refill takes 2 to 3 seconds. He is not diaphoretic. No erythema.   Psychiatric: He has a normal mood and affect.   Nursing note and vitals reviewed.        CRANIAL NERVES     CN III, IV, VI   Pupils are equal, round, and reactive to light.       Significant Labs:   BMP:   Recent Labs  Lab 12/11/17 1944   *      K 4.5      CO2 24   BUN 45*   CREATININE 1.6*   CALCIUM 9.2     CBC:   Recent Labs  Lab 12/11/17 1944   WBC 7.50   HGB 9.4*   HCT 30.3*        All pertinent labs within the past 24 hours have been reviewed.    Significant Imaging: I have reviewed and interpreted all pertinent imaging results/findings within the past 24 hours.   Imaging Results          X-Ray Chest AP Portable (Final result)  Result time 12/11/17 21:22:24    Final result by Yanely Adkins III, MD (12/11/17 21:22:24)                 Impression:     Mild pulmonary vascular congestion and low-grade interstitial edema.  Probable trace right pleural effusion mild adjacent right basilar compressive atelectasis.        Electronically signed by: YANELY ADKINS MD  Date:     12/11/17  Time:    21:22              Narrative:    XR CHEST AP PORTABLE    Clinical history: Chest Pain.      Comparison: 12/03/2017    Findings: Prior sternotomy is again noted. Heart size is upper limits of normal.  There has been interval development of mild pulmonary vascular congestion, peribronchial cuffing and suspected low-grade interstitial edema.  There appears be a trace right pleural effusion. There is a small adjacent hazy right basilar opacity likely related to layering pleural effusion and mild adjacent compressive atelectasis.  Remainder the lungs appear clear of active disease.                                Assessment/Plan:     * Acute on chronic heart failure    - Did not fill lasix outpatient, last EF 50% during cardiac procedure  - BNP >1000, trop abnormal, c/w demand ischemia  -  improved symptoms after 60mg IV x 1 given in ER  - admit inpatient  - strict I/O, repeat echo eval LV function  - fluid restriction 1.5L            CAD (coronary artery disease)    S/p CABG x1 11/14/17  Risk stratification: check a1c, tsh, lipid panel  Start on atorvastatin 40mg daily  Continue asa and apixaban          Nonrheumatic aortic valve stenosis    S/p AVR EF 50% during procedure   Was given 60mg IV lasix in ER  Continue apixaban at home dose  Start lasix 40mg iv bid        Atrial fibrillation    - rate controlled; CHADsVASC 6  - check tsh, place on tele  - continue metoprolol 50mg bid and asa/apixaban        Hyperlipidemia    - h/o CAD and HLD  - cannot find lipid panel in chart, check lipid panel   - would benefit from atorvastatin, will start 40mg now, and daily  - follow up with cardiology and pcp to continue or dc at that time          Hypertension    - restart metoprolol 50mg bid with holding parameters             VTE Risk Mitigation         Ordered     apixaban tablet 2.5 mg  2 times daily     Route:  Oral        12/11/17 2153     High Risk of VTE  Once      12/11/17 2153     Place sequential compression device  Until discontinued      12/11/17 2153     Reason for No Pharmacological VTE Prophylaxis  Once      12/11/17 2153             Augustin Gomez MD  Department of Hospital Medicine   [facility]

## 2017-12-12 NOTE — ASSESSMENT & PLAN NOTE
- h/o CAD and HLD  - cannot find lipid panel in chart, check lipid panel   - would benefit from atorvastatin, will start 40mg now, and daily  - follow up with cardiology and pcp to continue or dc at that time

## 2017-12-12 NOTE — ED NOTES
The pt states that he has a generalized headache and is requesting something for pain. The pt was informed that there isn't anything ordered but the provider will be notified. Pt verbalized understanding.

## 2017-12-12 NOTE — ED NOTES
Pt lying on stretcher in NAD; SR up x 2 with bed in low/locked position, call bell w/in reach, pt updated on POC, instructed to call for assistance; pt did eat breakfast.

## 2017-12-12 NOTE — ASSESSMENT & PLAN NOTE
S/p AVR EF 50% during procedure   Was given 60mg IV lasix in ER  Continue apixaban at home dose  Start lasix 40mg iv bid

## 2017-12-12 NOTE — ASSESSMENT & PLAN NOTE
- rate controlled; CHADsVASC 6  - check tsh, place on tele  - continue metoprolol 50mg bid and asa/apixaban

## 2017-12-12 NOTE — SUBJECTIVE & OBJECTIVE
Past Medical History:   Diagnosis Date    Aortic stenosis 8/2/2013    Atrial fibrillation 7/5/2013    CHF (congestive heart failure)     Dizziness - light-headed     Hyperlipidemia     Hypertension        Past Surgical History:   Procedure Laterality Date    BACK SURGERY  2003    knee replacemnt bilateral  2001    SHOULDER SURGERY  2002       Review of patient's allergies indicates:   Allergen Reactions    Sulfa (sulfonamide antibiotics) Hives       No current facility-administered medications on file prior to encounter.      Current Outpatient Prescriptions on File Prior to Encounter   Medication Sig    apixaban 2.5 mg Tab Take 1 tablet (2.5 mg total) by mouth 2 (two) times daily.    aspirin (ECOTRIN) 81 MG EC tablet Take 1 tablet (81 mg total) by mouth once daily.    fish oil-omega-3 fatty acids 300-1,000 mg capsule Take 1,200 mg by mouth once daily.    furosemide (LASIX) 40 MG tablet Take 1 tablet (40 mg total) by mouth once daily.    hydrocodone-acetaminophen 5-325mg (NORCO) 5-325 mg per tablet Take 1 tablet by mouth every 6 (six) hours as needed for Pain.    metoprolol tartrate (LOPRESSOR) 50 MG tablet Take 1 tablet (50 mg total) by mouth 2 (two) times daily.    multivitamin with folic acid 400 mcg Tab Take 1 tablet by mouth.    polyethylene glycol (GLYCOLAX) 17 gram PwPk Take 17 g by mouth once daily.    ranitidine (ZANTAC) 75 MG tablet Take 75 mg by mouth 2 (two) times daily.     VENTOLIN HFA 90 mcg/actuation inhaler INHALE 1 TO 2 PUFFS 4 TIMES A DAY AS NEEDED FOR WHEEZING    vitamin D 1000 units Tab Take 1,000 Units by mouth once daily.     Family History     Problem Relation (Age of Onset)    Heart attack Brother    Heart disease Brother    Heart failure Brother    Hypertension Mother, Father, Sister, Brother        Social History Main Topics    Smoking status: Never Smoker    Smokeless tobacco: Never Used    Alcohol use No      Comment: HOLIDAY    Drug use: No    Sexual activity:  No     Review of Systems   Constitutional: Negative for activity change, appetite change, chills, diaphoresis, fatigue and fever.   HENT: Negative for facial swelling, sore throat, tinnitus and trouble swallowing.    Eyes: Negative for photophobia and visual disturbance.   Respiratory: Positive for shortness of breath. Negative for apnea, chest tightness and wheezing.    Cardiovascular: Positive for leg swelling. Negative for chest pain and palpitations.   Gastrointestinal: Negative for abdominal distention, abdominal pain, constipation, diarrhea, nausea and vomiting.   Endocrine: Negative for polydipsia, polyphagia and polyuria.   Genitourinary: Positive for decreased urine volume and scrotal swelling. Negative for dysuria, flank pain, frequency and hematuria.   Musculoskeletal: Negative for arthralgias, back pain, joint swelling, myalgias and neck stiffness.   Skin: Negative for pallor and rash.   Allergic/Immunologic: Negative for immunocompromised state.   Neurological: Positive for light-headedness. Negative for dizziness, seizures, syncope, weakness, numbness and headaches.   Psychiatric/Behavioral: Negative for confusion, hallucinations and suicidal ideas. The patient is not nervous/anxious.    All other systems reviewed and are negative.    Objective:     Vital Signs (Most Recent):  Temp: 98.8 °F (37.1 °C) (12/11/17 2130)  Pulse: 81 (12/11/17 2130)  Resp: 20 (12/11/17 2130)  BP: (!) 146/78 (12/11/17 2130)  SpO2: 97 % (12/11/17 2130) Vital Signs (24h Range):  Temp:  [97.9 °F (36.6 °C)-98.8 °F (37.1 °C)] 98.8 °F (37.1 °C)  Pulse:  [81-91] 81  Resp:  [20] 20  SpO2:  [94 %-97 %] 97 %  BP: (146-152)/(78-90) 146/78     Weight: 86.2 kg (190 lb)  Body mass index is 28.06 kg/m².    Physical Exam   Constitutional: He is oriented to person, place, and time. He appears well-developed and well-nourished. No distress.   HENT:   Head: Normocephalic and atraumatic.   Mouth/Throat: Oropharynx is clear and moist.   Eyes:  Conjunctivae are normal. Pupils are equal, round, and reactive to light. No scleral icterus.   Neck: No JVD present. No thyromegaly present.   Cardiovascular: Normal rate and intact distal pulses.  Exam reveals no gallop and no friction rub.    No murmur heard.  Pulmonary/Chest: Effort normal and breath sounds normal. No respiratory distress. He has no wheezes. He has no rales.   Abdominal: Soft. Bowel sounds are normal. He exhibits no distension. There is no tenderness. There is no guarding.   Musculoskeletal: Normal range of motion. He exhibits edema (+1 BLE ).   Neurological: He is alert and oriented to person, place, and time. No cranial nerve deficit.   Skin: Skin is warm. Capillary refill takes 2 to 3 seconds. He is not diaphoretic. No erythema.   Psychiatric: He has a normal mood and affect.   Nursing note and vitals reviewed.        CRANIAL NERVES     CN III, IV, VI   Pupils are equal, round, and reactive to light.       Significant Labs:   BMP:   Recent Labs  Lab 12/11/17 1944   *      K 4.5      CO2 24   BUN 45*   CREATININE 1.6*   CALCIUM 9.2     CBC:   Recent Labs  Lab 12/11/17 1944   WBC 7.50   HGB 9.4*   HCT 30.3*        All pertinent labs within the past 24 hours have been reviewed.    Significant Imaging: I have reviewed and interpreted all pertinent imaging results/findings within the past 24 hours.   Imaging Results          X-Ray Chest AP Portable (Final result)  Result time 12/11/17 21:22:24    Final result by Yanely Adkins III, MD (12/11/17 21:22:24)                 Impression:     Mild pulmonary vascular congestion and low-grade interstitial edema.  Probable trace right pleural effusion mild adjacent right basilar compressive atelectasis.        Electronically signed by: YANELY ADKINS MD  Date:     12/11/17  Time:    21:22              Narrative:    XR CHEST AP PORTABLE    Clinical history: Chest Pain.      Comparison: 12/03/2017    Findings: Prior sternotomy is  again noted. Heart size is upper limits of normal.  There has been interval development of mild pulmonary vascular congestion, peribronchial cuffing and suspected low-grade interstitial edema.  There appears be a trace right pleural effusion. There is a small adjacent hazy right basilar opacity likely related to layering pleural effusion and mild adjacent compressive atelectasis.  Remainder the lungs appear clear of active disease.

## 2017-12-12 NOTE — ED NOTES
Pt lying on stretcher with eyes closed, RR even and unlabored, NAD noted, SR up x 2 with bed in low/locked position, call bell w/in reach, awaiting admitting room assignment.

## 2017-12-12 NOTE — PLAN OF CARE
SW met with patient in ED. Patient stated that his daughter, Georgiana Machuca [phone] is currently staying with him and his wife is in rehab.   Patient independent with ADL's. Patient currently has HH services.  Patient does need help with the cost of his medications.  No other anticipated needs at present. Case mgt to follow up with d/c needs.     12/12/17 1144   Discharge Assessment   Assessment Type Discharge Planning Assessment   Confirmed/corrected address and phone number on facesheet? Yes   Assessment information obtained from? Patient   Communicated expected length of stay with patient/caregiver no   Current cognitive status: Alert/Oriented   Current Functional Status: Independent   Lives With spouse;child(nina), adult   Able to Return to Prior Arrangements unable to determine at this time (comments)   Is patient able to care for self after discharge? Unable to determine at this time (comments)   Who are your caregiver(s) and their phone number(s)? [phone]   Patient's perception of discharge disposition home or selfcare;home health   Readmission Within The Last 30 Days (yes, shortness of breath)   Patient currently being followed by outpatient case management? Unable to determine (comments)   Patient currently receives any other outside agency services? No   Equipment Currently Used at Home none   Do you have any problems affording any of your prescribed medications? Yes   Is the patient taking medications as prescribed? yes   Does the patient have transportation home? Yes   Transportation Available family or friend will provide   Does the patient receive services at the [facility]? No   Discharge Plan A Home with family;Home Health

## 2017-12-12 NOTE — ED NOTES
Pt awake and watching TV, no current needs at this time, SR up x 2 with bed in low/locked position, call bell w/in reach. Awaiting admitting room assignment.

## 2017-12-12 NOTE — ED NOTES
Valentine Chan NP was notified of the pt's request for pain medication due to a headache. Rocio VALERIO gave a verbal order for Tylenol 650mg PO q6hrs for pain.

## 2017-12-12 NOTE — HPI
86M h/o recent AVR for severe AS w/ tandem CABG x 1 p/w sob for the last 2-3 days.  He states increasing shortness of breath after discharged from [facility] on 11/30/17.  He reported sob at follow up visit with cardiologist (dr. Alvarenga) who ordered lasix 40mg daily for the patient.  This was not filled and the patient presents with worsening sob.  Also reports light headedness this morning and scrotal edema that started 2 days ago.  He denies chest pain.  He reports decreased urine output over the last few days.     Pertinent ER course  CXR shows interstitial edema and right pleural effusion.  He was given 60mg IV lasix.  The patient reported improved dyspnea after lasix.     He lives with his daughter who is his primary caretaker.

## 2017-12-12 NOTE — ASSESSMENT & PLAN NOTE
S/p CABG x1 11/14/17  Risk stratification: check a1c, tsh, lipid panel  Start on atorvastatin 40mg daily  Continue asa and apixaban

## 2017-12-13 ENCOUNTER — TELEPHONE (OUTPATIENT)
Dept: CARDIOLOGY | Facility: CLINIC | Age: 82
End: 2017-12-13

## 2017-12-13 VITALS
WEIGHT: 184.06 LBS | TEMPERATURE: 97 F | RESPIRATION RATE: 18 BRPM | OXYGEN SATURATION: 97 % | HEART RATE: 72 BPM | SYSTOLIC BLOOD PRESSURE: 132 MMHG | BODY MASS INDEX: 27.26 KG/M2 | DIASTOLIC BLOOD PRESSURE: 68 MMHG | HEIGHT: 69 IN

## 2017-12-13 LAB
ALBUMIN SERPL BCP-MCNC: 2.6 G/DL
ANION GAP SERPL CALC-SCNC: 9 MMOL/L
BASOPHILS # BLD AUTO: 0.03 K/UL
BASOPHILS NFR BLD: 0.4 %
BUN SERPL-MCNC: 38 MG/DL
CALCIUM SERPL-MCNC: 8.6 MG/DL
CHLORIDE SERPL-SCNC: 103 MMOL/L
CO2 SERPL-SCNC: 26 MMOL/L
CREAT SERPL-MCNC: 1.5 MG/DL
DIFFERENTIAL METHOD: ABNORMAL
EOSINOPHIL # BLD AUTO: 0.2 K/UL
EOSINOPHIL NFR BLD: 2.4 %
ERYTHROCYTE [DISTWIDTH] IN BLOOD BY AUTOMATED COUNT: 17.4 %
EST. GFR  (AFRICAN AMERICAN): 48 ML/MIN/1.73 M^2
EST. GFR  (NON AFRICAN AMERICAN): 42 ML/MIN/1.73 M^2
GLUCOSE SERPL-MCNC: 96 MG/DL
HCT VFR BLD AUTO: 28 %
HGB BLD-MCNC: 8.7 G/DL
LYMPHOCYTES # BLD AUTO: 1.1 K/UL
LYMPHOCYTES NFR BLD: 13.6 %
MAGNESIUM SERPL-MCNC: 1.9 MG/DL
MCH RBC QN AUTO: 25.3 PG
MCHC RBC AUTO-ENTMCNC: 31.1 G/DL
MCV RBC AUTO: 81 FL
MONOCYTES # BLD AUTO: 0.7 K/UL
MONOCYTES NFR BLD: 8.1 %
NEUTROPHILS # BLD AUTO: 6.3 K/UL
NEUTROPHILS NFR BLD: 75.5 %
PHOSPHATE SERPL-MCNC: 4.2 MG/DL
PHOSPHATE SERPL-MCNC: 4.2 MG/DL
PLATELET # BLD AUTO: 337 K/UL
PMV BLD AUTO: 8.9 FL
POTASSIUM SERPL-SCNC: 3.6 MMOL/L
RBC # BLD AUTO: 3.44 M/UL
SODIUM SERPL-SCNC: 138 MMOL/L
WBC # BLD AUTO: 8.37 K/UL

## 2017-12-13 PROCEDURE — 25000003 PHARM REV CODE 250: Performed by: EMERGENCY MEDICINE

## 2017-12-13 PROCEDURE — 80069 RENAL FUNCTION PANEL: CPT

## 2017-12-13 PROCEDURE — 99223 1ST HOSP IP/OBS HIGH 75: CPT | Mod: ,,, | Performed by: INTERNAL MEDICINE

## 2017-12-13 PROCEDURE — 36415 COLL VENOUS BLD VENIPUNCTURE: CPT

## 2017-12-13 PROCEDURE — G0378 HOSPITAL OBSERVATION PER HR: HCPCS

## 2017-12-13 PROCEDURE — 25000003 PHARM REV CODE 250: Performed by: HOSPITALIST

## 2017-12-13 PROCEDURE — 63600175 PHARM REV CODE 636 W HCPCS: Performed by: HOSPITALIST

## 2017-12-13 PROCEDURE — 83735 ASSAY OF MAGNESIUM: CPT

## 2017-12-13 PROCEDURE — 85025 COMPLETE CBC W/AUTO DIFF WBC: CPT

## 2017-12-13 PROCEDURE — 94761 N-INVAS EAR/PLS OXIMETRY MLT: CPT

## 2017-12-13 RX ORDER — POTASSIUM CHLORIDE 1500 MG/1
20 TABLET, EXTENDED RELEASE ORAL DAILY
Qty: 15 TABLET | Refills: 0 | Status: SHIPPED | OUTPATIENT
Start: 2017-12-13 | End: 2017-12-13

## 2017-12-13 RX ORDER — PRAVASTATIN SODIUM 20 MG/1
20 TABLET ORAL DAILY
Qty: 30 TABLET | Refills: 0 | Status: SHIPPED | OUTPATIENT
Start: 2017-12-13 | End: 2017-12-22 | Stop reason: SDUPTHER

## 2017-12-13 RX ORDER — FUROSEMIDE 40 MG/1
TABLET ORAL
Qty: 30 TABLET | Refills: 0 | Status: SHIPPED | OUTPATIENT
Start: 2017-12-13 | End: 2017-12-13

## 2017-12-13 RX ORDER — FUROSEMIDE 40 MG/1
TABLET ORAL
Qty: 30 TABLET | Refills: 0 | Status: SHIPPED | OUTPATIENT
Start: 2017-12-13 | End: 2017-12-22 | Stop reason: SDUPTHER

## 2017-12-13 RX ORDER — POTASSIUM CHLORIDE 1500 MG/1
20 TABLET, EXTENDED RELEASE ORAL DAILY
Qty: 15 TABLET | Refills: 0 | Status: SHIPPED | OUTPATIENT
Start: 2017-12-13 | End: 2017-12-22 | Stop reason: SDUPTHER

## 2017-12-13 RX ORDER — PRAVASTATIN SODIUM 20 MG/1
20 TABLET ORAL DAILY
Qty: 30 TABLET | Refills: 0 | Status: SHIPPED | OUTPATIENT
Start: 2017-12-13 | End: 2017-12-13

## 2017-12-13 RX ADMIN — ASPIRIN 81 MG: 81 TABLET, COATED ORAL at 09:12

## 2017-12-13 RX ADMIN — FAMOTIDINE 20 MG: 20 TABLET, FILM COATED ORAL at 09:12

## 2017-12-13 RX ADMIN — FUROSEMIDE 40 MG: 10 INJECTION, SOLUTION INTRAMUSCULAR; INTRAVENOUS at 09:12

## 2017-12-13 RX ADMIN — APIXABAN 2.5 MG: 2.5 TABLET, FILM COATED ORAL at 09:12

## 2017-12-13 RX ADMIN — METOPROLOL TARTRATE 50 MG: 50 TABLET ORAL at 09:12

## 2017-12-13 RX ADMIN — VITAMIN D, TAB 1000IU (100/BT) 1000 UNITS: 25 TAB at 09:12

## 2017-12-13 RX ADMIN — ATORVASTATIN CALCIUM 40 MG: 40 TABLET, FILM COATED ORAL at 09:12

## 2017-12-13 NOTE — ASSESSMENT & PLAN NOTE
-Improved since admission  -Send home on Lasix 40 mg BID x 3 days, then back to 40 mg daily  -CHF appointment next week  -Counseled on dietary restrictions  -Will need KCl supplement as well, discussed with hospital medicine

## 2017-12-13 NOTE — CONSULTS
12/13/17 1050   Handoff Report   Given To ALEXANDRA Hsatings   Pain/Comfort Assessments   Pain Assessment Performed Yes       Number Scale   Presence of Pain denies   Skin   Skin WDL ex   Skin Color/Characteristics without discoloration   Skin Temperature warm   Skin Integrity pressure ulcer(s)   Shabbir Risk Assessment   Sensory Perception 3-->slightly limited   Moisture 4-->rarely moist   Activity 4-->walks frequently   Mobility 4-->no limitation   Nutrition 3-->adequate   Friction and Shear 1-->problem   Shabbir Score 19       Pressure Ulcer coccyx   No Date First Assessed or Time First Assessed found.   Pressure Ulcer Present on Admission: yes  Location: coccyx   Staging Stage II   Healing Pressure Ulcer no   Pressure Ulcer Risk Factors activity;mobility;nutrition;shear/friction;moisture   Dressing Appearance intact   Appearance edges    Periwound Area normal skin tone   Wound Edges jagged   Wound Length (cm) 2.5   Wound Width (cm) 1.5   Cleansed W/ sterile normal saline   Interventions barrier applied   Dressing hydrocolloid   Skin Interventions   Pressure Reduction Devices pressure-redistributing mattress utilized;positioning supports utilized   Pressure Reduction Techniques frequent weight shift encouraged;heels elevated off bed;positioned off wounds   Skin Protection adhesive use limited;incontinence pads utilized;skin sealant/moisture barrier applied   Consulted on this 85 y/o M patient for present on admission Stage 2 pressure injury to coccyx. Patient denies pain. He states wound developed after discharging home s/p open heart surgery 2 weeks ago.  He has a PMH significant for AVR, CAD, CABG, HTN, AFib, Pulm HTN, CHF.  He is feeling better and hoping to discharge this afternoon.  Patient turned to right side and sacral mepilex removed.  Stage 2 pressure injury noted to coccyx measuring 2.5x1.5cm with moist pink wound bed.  No redness noted to frankie wound skin.  CLeansed with sterile normal saline and  patted dry.  Intact frankie wound skin painted with cavilon.  Duoderm applied to cover site.  Patient and daughter at bedside educated on pressure injury prevention while at home and treatment plan for this Stage 2 pressure injury. Provided with extra Duoderm and cavilon for home use after discharge.  Stressed importance of pressure offloading to prevent further deterioration of wound.  Please see below for wound care recommendations:    Stage 2 pressure injury Coccyx:  1. Cleanse with saline  2. Pat dry  3. Paint intact frankie wound skin with cavilon  4. Apply Duoderm. Change DuoDERM (hydrocolloid) dressing every 5 days (PRN if edges roll)

## 2017-12-13 NOTE — PROGRESS NOTES
[facility] Hospital Medicine  Progress Note    Patient Name: Chung Meneses Sr.  MRN: [id number]  Patient Class: IP- Inpatient   Admission Date: 12/11/2017  Length of Stay: 1 days  Attending Physician: Augustin Gomez MD  Primary Care Provider: Glenroy Carmichael MD        Subjective:     Principal Problem:Acute on chronic heart failure    HPI:  86M h/o recent AVR for severe AS w/ tandem CABG x 1 p/w sob for the last 2-3 days.  He states increasing shortness of breath after discharged from [facility] on 11/30/17.  He reported sob at follow up visit with cardiologist (dr. Alvarenga) who ordered lasix 40mg daily for the patient.  This was not filled and the patient presents with worsening sob.  Also reports light headedness this morning and scrotal edema that started 2 days ago.  He denies chest pain.  He reports decreased urine output over the last few days.     Pertinent ER course  CXR shows interstitial edema and right pleural effusion.  He was given 60mg IV lasix.  The patient reported improved dyspnea after lasix.     He lives with his daughter who is his primary caretaker.      Hospital Course:  Pt admitted to Inpatient status for Acute on chronic heart failure.  Chest xray showed mild pulmonary vascular congestion and low-grade interstitial edema.  Probable trace right pleural effusion mild adjacent right basilar compressive atelectasis.  BNP of 1546 noted.  Elevated troponin likely due to demand ischemia trended.  Pt treated with IV Lasix and verbalized significant improvement of symptoms.  Echo results showed EF 50% and elevated PAP.  Atrial fibrillation controlled on monitor.  Midsternal incision from recent CABG healing with edges approximated and no signs of infection.        Interval History: pt stable and ambulating in room at time of exam.  Pt reports significant symptom improvement with diuresis and asks to be discharged.  Atrial fibrillation rate controlled on monitor. Pt confirms compliance with  "prescribed medication regime and fluid restriction.  Daughter states, "I don't add salt to anything."  CABG incision healing with scab and edges intact.  Echo showed EF 50% and elevated PAPs.  Will continue diuresis.  Plan of care discussed with patient and daughter at bedside.      Review of Systems   Constitutional: Positive for unexpected weight change. Negative for activity change, appetite change, chills, diaphoresis, fatigue and fever.   HENT: Negative for facial swelling, sore throat, tinnitus and trouble swallowing.    Eyes: Negative for photophobia and visual disturbance.   Respiratory: Positive for shortness of breath (improved). Negative for apnea, chest tightness and wheezing.    Cardiovascular: Negative for chest pain and palpitations.   Gastrointestinal: Negative for abdominal distention, abdominal pain, constipation, diarrhea, nausea and vomiting.   Endocrine: Negative for polydipsia, polyphagia and polyuria.   Genitourinary: Positive for scrotal swelling (improved). Negative for decreased urine volume, dysuria, flank pain, frequency and hematuria.   Musculoskeletal: Negative for arthralgias, back pain, joint swelling, myalgias and neck stiffness.   Skin: Positive for wound. Negative for pallor and rash.        Surgical incision   Allergic/Immunologic: Negative for immunocompromised state.   Neurological: Negative for dizziness, seizures, syncope, weakness, light-headedness, numbness and headaches.   Psychiatric/Behavioral: Negative for confusion, hallucinations and suicidal ideas. The patient is not nervous/anxious.    All other systems reviewed and are negative.    Objective:     Vital Signs (Most Recent):  Temp: 98.2 °F (36.8 °C) (12/12/17 1913)  Pulse: 79 (12/12/17 1913)  Resp: (!) 22 (12/12/17 1913)  BP: 124/65 (12/12/17 1913)  SpO2: 95 % (12/12/17 1913) Vital Signs (24h Range):  Temp:  [97.7 °F (36.5 °C)-98.8 °F (37.1 °C)] 98.2 °F (36.8 °C)  Pulse:  [65-95] 79  Resp:  [15-22] 22  SpO2:  [95 " %-100 %] 95 %  BP: (112-146)/(53-78) 124/65     Weight: 86.2 kg (190 lb)  Body mass index is 28.06 kg/m².    Intake/Output Summary (Last 24 hours) at 12/12/17 1925  Last data filed at 12/12/17 1700   Gross per 24 hour   Intake              240 ml   Output             4300 ml   Net            -4060 ml      Physical Exam   Constitutional: He is oriented to person, place, and time. He appears well-developed and well-nourished. No distress.   HENT:   Head: Normocephalic and atraumatic.   Mouth/Throat: Oropharynx is clear and moist.   Eyes: Conjunctivae are normal. Pupils are equal, round, and reactive to light. No scleral icterus.   Neck: No JVD present. No thyromegaly present.   Cardiovascular: Normal rate and intact distal pulses.  Exam reveals no gallop and no friction rub.    No murmur heard.  Pulmonary/Chest: Effort normal and breath sounds normal. No respiratory distress. He has no wheezes. He has no rales.   Abdominal: Soft. Bowel sounds are normal. He exhibits no distension. There is no tenderness. There is no guarding.   Genitourinary:   Genitourinary Comments: Deferred   Musculoskeletal: Normal range of motion. He exhibits edema (+1 pitting edema to BLE ).   Neurological: He is alert and oriented to person, place, and time. No cranial nerve deficit.   Skin: Skin is warm. Capillary refill takes 2 to 3 seconds. He is not diaphoretic. No erythema.   Healing midsternal incision with scab   Psychiatric: He has a normal mood and affect.   Nursing note and vitals reviewed.      Significant Labs:   CBC:   Recent Labs  Lab 12/11/17 1944 12/12/17  0544   WBC 7.50 7.70   HGB 9.4* 8.3*   HCT 30.3* 26.5*    320     CMP:   Recent Labs  Lab 12/11/17 1944 12/12/17  0544    138   K 4.5 3.9    105   CO2 24 24   * 101   BUN 45* 44*   CREATININE 1.6* 1.4   CALCIUM 9.2 8.7   PROT 6.8  --    ALBUMIN 3.0* 2.5*   BILITOT 0.7  --    ALKPHOS 133  --    AST 35  --    ALT 69*  --    ANIONGAP 9 9   EGFRNONAA  38* 45*     Troponin:   Recent Labs  Lab 12/11/17  1944 12/12/17  0140 12/12/17  0544   TROPONINI 0.166* 0.168* 0.184*       Significant Imaging:   Imaging Results          X-Ray Chest AP Portable (Final result)  Result time 12/11/17 21:22:24    Final result by Yanely Adkins III, MD (12/11/17 21:22:24)                 Impression:     Mild pulmonary vascular congestion and low-grade interstitial edema.  Probable trace right pleural effusion mild adjacent right basilar compressive atelectasis.        Electronically signed by: YANELY ADKINS MD  Date:     12/11/17  Time:    21:22              Narrative:    XR CHEST AP PORTABLE    Clinical history: Chest Pain.      Comparison: 12/03/2017    Findings: Prior sternotomy is again noted. Heart size is upper limits of normal.  There has been interval development of mild pulmonary vascular congestion, peribronchial cuffing and suspected low-grade interstitial edema.  There appears be a trace right pleural effusion. There is a small adjacent hazy right basilar opacity likely related to layering pleural effusion and mild adjacent compressive atelectasis.  Remainder the lungs appear clear of active disease.                            Assessment/Plan:      * Acute on chronic heart failure    - Did not fill lasix outpatient, last EF 50% during cardiac procedure  - BNP >1000, trop abnormal, c/w demand ischemia  - improved symptoms after 60mg IV x 1 given in ER  - admit inpatient  - strict I/O  -repeat echo eval LV function-EF 50% and elevated PAP  - fluid restriction 1.5L  - daily weight   -supplemental oxygen             CAD (coronary artery disease)    S/p CABG x1 11/14/17  Risk stratification: check a1c, tsh, lipid panel  Start on atorvastatin 40mg daily  Continue asa and apixaban          Pulmonary hypertension    -Echo results showed EF 50% and elevated PAPs          Nonrheumatic aortic valve stenosis    S/p AVR EF 50% during procedure   Was given 60mg IV lasix in ER  Continue  apixaban at home dose  Start lasix 40mg iv bid        Atrial fibrillation    - rate controlled; CHADsVASC 6  - TSH pending  - cardiac monitoring  - continue metoprolol 50mg bid and asa/apixaban        Hyperlipidemia    - h/o CAD and HLD  - cannot find lipid panel in chart, check lipid panel   - would benefit from atorvastatin 40mg   - follow up with cardiology and pcp to continue or dc at that time          Hypertension    - restart metoprolol 50mg bid with holding parameters   -controlled            VTE Risk Mitigation         Ordered     apixaban tablet 2.5 mg  2 times daily     Route:  Oral        12/11/17 2153     High Risk of VTE  Once      12/11/17 2243     Place sequential compression device  Until discontinued      12/11/17 2153     Reason for No Pharmacological VTE Prophylaxis  Once      12/11/17 2153              Mari Collins NP  Department of Hospital Medicine   [facility]

## 2017-12-13 NOTE — CONSULTS
[facility]  Cardiology  Consult Note    Patient Name: Chung Meneses Sr.  MRN: [id number]  Admission Date: 12/11/2017  Hospital Length of Stay: 2 days  Code Status: Full Code   Attending Provider: Sophie Raymundo MD   Consulting Provider: Laura Woodward PA-C  Primary Care Physician: Glenroy Carmichael MD  Principal Problem:Acute on chronic heart failure    Patient information was obtained from patient, past medical records and ER records.     Inpatient consult to Cardiology  Consult performed by: LAURA WOODWARD  Consult ordered by: HI DUBON        Subjective:     Chief Complaint:  SOB    HPI:   Mr. Meneses is an 86 year old male patient with a PMHx of CAD, severe AS, PAF, CHF, HTN, hyperlipidemia and s/p recent CABG x 1 and bioprosthetic AVR who presented to [facility] ED on 12/11/17 with a chief complaint of increased SOB over the past 2-3 days. Associated symptoms included bilateral lower extremity edema, scrotal edema, and lightheadedness. Initial workup in ED revealed BNP of 1546. CXR showed interstitial edema and right-sided pleural effusion and patient was subsequently admitted for IV diuresis. Cardiology consulted to assist with management. Patient seen and examined today, resting in bed. States he feels much better since admission, SOB greatly improved. Still has some mild lower extremity edema. Denies any chest pain, orthopnea, or PND. He reports compliance with his medications. Labs reviewed. Creatinine 1.5, K 3.6. 2D echo reviewed and showed EF of 50-55%, pulmonary HTN, aortic valve prosthesis.              Past Medical History:   Diagnosis Date    Aortic stenosis 8/2/2013    Atrial fibrillation 7/5/2013    CHF (congestive heart failure)     Dizziness - light-headed     Hyperlipidemia     Hypertension        Past Surgical History:   Procedure Laterality Date    BACK SURGERY  2003    knee replacemnt bilateral  2001    SHOULDER SURGERY  2002       Review of patient's  allergies indicates:   Allergen Reactions    Sulfa (sulfonamide antibiotics) Hives       No current facility-administered medications on file prior to encounter.      Current Outpatient Prescriptions on File Prior to Encounter   Medication Sig    apixaban 2.5 mg Tab Take 1 tablet (2.5 mg total) by mouth 2 (two) times daily.    aspirin (ECOTRIN) 81 MG EC tablet Take 1 tablet (81 mg total) by mouth once daily.    fish oil-omega-3 fatty acids 300-1,000 mg capsule Take 1,200 mg by mouth once daily.    furosemide (LASIX) 40 MG tablet Take 1 tablet (40 mg total) by mouth once daily.    metoprolol tartrate (LOPRESSOR) 50 MG tablet Take 1 tablet (50 mg total) by mouth 2 (two) times daily.    multivitamin with folic acid 400 mcg Tab Take 1 tablet by mouth.    polyethylene glycol (GLYCOLAX) 17 gram PwPk Take 17 g by mouth once daily.    ranitidine (ZANTAC) 75 MG tablet Take 75 mg by mouth 2 (two) times daily.     VENTOLIN HFA 90 mcg/actuation inhaler INHALE 1 TO 2 PUFFS 4 TIMES A DAY AS NEEDED FOR WHEEZING    vitamin D 1000 units Tab Take 1,000 Units by mouth once daily.    hydrocodone-acetaminophen 5-325mg (NORCO) 5-325 mg per tablet Take 1 tablet by mouth every 6 (six) hours as needed for Pain.     Family History     Problem Relation (Age of Onset)    Heart attack Brother    Heart disease Brother    Heart failure Brother    Hypertension Mother, Father, Sister, Brother        Social History Main Topics    Smoking status: Never Smoker    Smokeless tobacco: Never Used    Alcohol use No      Comment: HOLIDAY    Drug use: No    Sexual activity: No     Review of Systems   Constitution: Negative.   HENT: Negative.    Eyes: Negative.    Cardiovascular: Positive for leg swelling. Negative for chest pain, dyspnea on exertion, orthopnea and paroxysmal nocturnal dyspnea.   Respiratory: Negative.  Negative for cough, shortness of breath and snoring.    Endocrine: Negative.    Hematologic/Lymphatic: Bruises/bleeds  easily.   Skin: Negative.    Musculoskeletal: Negative.    Gastrointestinal: Negative.    Genitourinary: Negative.    Neurological: Negative.    Psychiatric/Behavioral: Negative.      Objective:     Vital Signs (Most Recent):  Temp: 96 °F (35.6 °C) (12/13/17 0743)  Pulse: 80 (12/13/17 0913)  Resp: 18 (12/13/17 0743)  BP: 132/63 (12/13/17 0743)  SpO2: 98 % (12/13/17 0913) Vital Signs (24h Range):  Temp:  [96 °F (35.6 °C)-98.2 °F (36.8 °C)] 96 °F (35.6 °C)  Pulse:  [65-92] 80  Resp:  [15-22] 18  SpO2:  [95 %-100 %] 98 %  BP: (115-136)/(56-68) 132/63     Weight: 83.5 kg (184 lb 1.4 oz)  Body mass index is 27.18 kg/m².    SpO2: 98 %  O2 Device (Oxygen Therapy): room air      Intake/Output Summary (Last 24 hours) at 12/13/17 1224  Last data filed at 12/13/17 1125   Gross per 24 hour   Intake                0 ml   Output             3450 ml   Net            -3450 ml       Lines/Drains/Airways     Pressure Ulcer                 Pressure Ulcer coccyx -- days         Pressure Ulcer 12/02/17 0705 Left coccyx suspected deep tissue injury 11 days          Peripheral Intravenous Line                 Peripheral IV - Single Lumen 12/11/17 1944 Left Antecubital 1 day                Physical Exam   Constitutional: He is oriented to person, place, and time. He appears well-developed and well-nourished. No distress.   HENT:   Head: Normocephalic and atraumatic.   Eyes: Pupils are equal, round, and reactive to light. Right eye exhibits no discharge. Left eye exhibits no discharge.   Neck: Neck supple. No JVD present. No tracheal deviation present. No thyromegaly present.   Cardiovascular: Normal rate, regular rhythm, S1 normal, S2 normal and normal heart sounds.  PMI is not displaced.  Exam reveals no gallop, no S3, no S4 and no friction rub.    No murmur heard.  Pulses:       Radial pulses are 2+ on the right side, and 2+ on the left side.   Pulmonary/Chest: Effort normal. He has rales (faint at left base).   Mildly diminished BS  right base    Sternotomy site C/D/I; no bleeding   Abdominal: He exhibits no distension. There is no tenderness. There is no rebound.   Musculoskeletal: He exhibits edema (1+ BLE).   Neurological: He is alert and oriented to person, place, and time.   Skin: Skin is warm and dry. He is not diaphoretic. No erythema.   Psychiatric: He has a normal mood and affect. His behavior is normal. Thought content normal.   Nursing note and vitals reviewed.      Significant Labs:   CMP   Recent Labs  Lab 12/11/17 1944 12/12/17 0544 12/13/17  0344    138 138   K 4.5 3.9 3.6    105 103   CO2 24 24 26   * 101 96   BUN 45* 44* 38*   CREATININE 1.6* 1.4 1.5*   CALCIUM 9.2 8.7 8.6*   PROT 6.8  --   --    ALBUMIN 3.0* 2.5* 2.6*   BILITOT 0.7  --   --    ALKPHOS 133  --   --    AST 35  --   --    ALT 69*  --   --    ANIONGAP 9 9 9   ESTGFRAFRICA 44* 52* 48*   EGFRNONAA 38* 45* 42*   , CBC   Recent Labs  Lab 12/11/17 1944 12/12/17 0544 12/13/17  0344   WBC 7.50 7.70 8.37   HGB 9.4* 8.3* 8.7*   HCT 30.3* 26.5* 28.0*    320 337    and Troponin   Recent Labs  Lab 12/11/17 1944 12/12/17  0140 12/12/17  0544   TROPONINI 0.166* 0.168* 0.184*       Significant Imaging: CXR, EKG reviewed    Assessment and Plan:   Patient who presents with acute on chronic diastolic CHF. Clinically improved since admission with IV diuresis. Still mildly volume overloaded. Discussed with hospital medicine. Send home on Lasix 40 mg BID x 3 days. Continue other cardiac meds. Restart statin, elevated liver enzymes likely secondary to congestion from CHF. Will arrange for close OP f/u and repeat labs.       * Acute on chronic heart failure    -Improved since admission  -Send home on Lasix 40 mg BID x 3 days, then back to 40 mg daily  -CHF appointment next week  -Counseled on dietary restrictions  -Will need KCl supplement as well, discussed with hospital medicine        CAD (coronary artery disease)    -Continue ASA, statin, BB  -Elevated  liver enzymes noted, trending down, likely from congestion from CHF, will re-check in clinic        Hyperlipidemia    -Continue statin        Hypertension    -Continue Metoprolol                VTE Risk Mitigation         Ordered     apixaban tablet 2.5 mg  2 times daily     Route:  Oral        12/11/17 2153     High Risk of VTE  Once      12/11/17 2243     Place sequential compression device  Until discontinued      12/11/17 2153     Reason for No Pharmacological VTE Prophylaxis  Once      12/11/17 2153          Thank you for your consult. I will follow-up with patient. Please contact us if you have any additional questions.    Laura Bowens PA-C  Cardiology   [facility]    Chart reviewed. Dr. Espinoza examined patient and agrees with plan as outlined above.

## 2017-12-13 NOTE — SUBJECTIVE & OBJECTIVE
Past Medical History:   Diagnosis Date    Aortic stenosis 8/2/2013    Atrial fibrillation 7/5/2013    CHF (congestive heart failure)     Dizziness - light-headed     Hyperlipidemia     Hypertension        Past Surgical History:   Procedure Laterality Date    BACK SURGERY  2003    knee replacemnt bilateral  2001    SHOULDER SURGERY  2002       Review of patient's allergies indicates:   Allergen Reactions    Sulfa (sulfonamide antibiotics) Hives       No current facility-administered medications on file prior to encounter.      Current Outpatient Prescriptions on File Prior to Encounter   Medication Sig    apixaban 2.5 mg Tab Take 1 tablet (2.5 mg total) by mouth 2 (two) times daily.    aspirin (ECOTRIN) 81 MG EC tablet Take 1 tablet (81 mg total) by mouth once daily.    fish oil-omega-3 fatty acids 300-1,000 mg capsule Take 1,200 mg by mouth once daily.    furosemide (LASIX) 40 MG tablet Take 1 tablet (40 mg total) by mouth once daily.    metoprolol tartrate (LOPRESSOR) 50 MG tablet Take 1 tablet (50 mg total) by mouth 2 (two) times daily.    multivitamin with folic acid 400 mcg Tab Take 1 tablet by mouth.    polyethylene glycol (GLYCOLAX) 17 gram PwPk Take 17 g by mouth once daily.    ranitidine (ZANTAC) 75 MG tablet Take 75 mg by mouth 2 (two) times daily.     VENTOLIN HFA 90 mcg/actuation inhaler INHALE 1 TO 2 PUFFS 4 TIMES A DAY AS NEEDED FOR WHEEZING    vitamin D 1000 units Tab Take 1,000 Units by mouth once daily.    hydrocodone-acetaminophen 5-325mg (NORCO) 5-325 mg per tablet Take 1 tablet by mouth every 6 (six) hours as needed for Pain.     Family History     Problem Relation (Age of Onset)    Heart attack Brother    Heart disease Brother    Heart failure Brother    Hypertension Mother, Father, Sister, Brother        Social History Main Topics    Smoking status: Never Smoker    Smokeless tobacco: Never Used    Alcohol use No      Comment: HOLIDAY    Drug use: No    Sexual activity:  No     Review of Systems   Constitution: Negative.   HENT: Negative.    Eyes: Negative.    Cardiovascular: Positive for leg swelling. Negative for chest pain, dyspnea on exertion, orthopnea and paroxysmal nocturnal dyspnea.   Respiratory: Negative.  Negative for cough, shortness of breath and snoring.    Endocrine: Negative.    Hematologic/Lymphatic: Bruises/bleeds easily.   Skin: Negative.    Musculoskeletal: Negative.    Gastrointestinal: Negative.    Genitourinary: Negative.    Neurological: Negative.    Psychiatric/Behavioral: Negative.      Objective:     Vital Signs (Most Recent):  Temp: 96 °F (35.6 °C) (12/13/17 0743)  Pulse: 80 (12/13/17 0913)  Resp: 18 (12/13/17 0743)  BP: 132/63 (12/13/17 0743)  SpO2: 98 % (12/13/17 0913) Vital Signs (24h Range):  Temp:  [96 °F (35.6 °C)-98.2 °F (36.8 °C)] 96 °F (35.6 °C)  Pulse:  [65-92] 80  Resp:  [15-22] 18  SpO2:  [95 %-100 %] 98 %  BP: (115-136)/(56-68) 132/63     Weight: 83.5 kg (184 lb 1.4 oz)  Body mass index is 27.18 kg/m².    SpO2: 98 %  O2 Device (Oxygen Therapy): room air      Intake/Output Summary (Last 24 hours) at 12/13/17 1224  Last data filed at 12/13/17 1125   Gross per 24 hour   Intake                0 ml   Output             3450 ml   Net            -3450 ml       Lines/Drains/Airways     Pressure Ulcer                 Pressure Ulcer coccyx -- days         Pressure Ulcer 12/02/17 0705 Left coccyx suspected deep tissue injury 11 days          Peripheral Intravenous Line                 Peripheral IV - Single Lumen 12/11/17 1944 Left Antecubital 1 day                Physical Exam   Constitutional: He is oriented to person, place, and time. He appears well-developed and well-nourished. No distress.   HENT:   Head: Normocephalic and atraumatic.   Eyes: Pupils are equal, round, and reactive to light. Right eye exhibits no discharge. Left eye exhibits no discharge.   Neck: Neck supple. No JVD present. No tracheal deviation present. No thyromegaly present.    Cardiovascular: Normal rate, regular rhythm, S1 normal, S2 normal and normal heart sounds.  PMI is not displaced.  Exam reveals no gallop, no S3, no S4 and no friction rub.    No murmur heard.  Pulses:       Radial pulses are 2+ on the right side, and 2+ on the left side.   Pulmonary/Chest: Effort normal. He has rales (faint at left base).   Mildly diminished BS right base    Sternotomy site C/D/I; no bleeding   Abdominal: He exhibits no distension. There is no tenderness. There is no rebound.   Musculoskeletal: He exhibits edema (1+ BLE).   Neurological: He is alert and oriented to person, place, and time.   Skin: Skin is warm and dry. He is not diaphoretic. No erythema.   Psychiatric: He has a normal mood and affect. His behavior is normal. Thought content normal.   Nursing note and vitals reviewed.      Significant Labs:   CMP   Recent Labs  Lab 12/11/17 1944 12/12/17 0544 12/13/17  0344    138 138   K 4.5 3.9 3.6    105 103   CO2 24 24 26   * 101 96   BUN 45* 44* 38*   CREATININE 1.6* 1.4 1.5*   CALCIUM 9.2 8.7 8.6*   PROT 6.8  --   --    ALBUMIN 3.0* 2.5* 2.6*   BILITOT 0.7  --   --    ALKPHOS 133  --   --    AST 35  --   --    ALT 69*  --   --    ANIONGAP 9 9 9   ESTGFRAFRICA 44* 52* 48*   EGFRNONAA 38* 45* 42*   , CBC   Recent Labs  Lab 12/11/17 1944 12/12/17 0544 12/13/17  0344   WBC 7.50 7.70 8.37   HGB 9.4* 8.3* 8.7*   HCT 30.3* 26.5* 28.0*    320 337    and Troponin   Recent Labs  Lab 12/11/17 1944 12/12/17  0140 12/12/17  0544   TROPONINI 0.166* 0.168* 0.184*       Significant Imaging: CXR, EKG reviewed

## 2017-12-13 NOTE — DISCHARGE SUMMARY
[facility] Hospital Medicine  Discharge Summary      Patient Name: Chung Meneses Sr.  MRN: [id number]  Admission Date: 12/11/2017  Hospital Length of Stay: 2 days  Discharge Date and Time: 12/13/2017  1:29 PM  Attending Physician: Dr. LENNY Raymundo  Discharging Provider: Mari Collins NP  Primary Care Provider: Glenroy Carmichael MD      HPI:   86M h/o recent AVR for severe AS w/ tandem CABG x 1 p/w sob for the last 2-3 days.  He states increasing shortness of breath after discharged from [facility] on 11/30/17.  He reported sob at follow up visit with cardiologist (dr. Alvarenga) who ordered lasix 40mg daily for the patient.  This was not filled and the patient presents with worsening sob.  Also reports light headedness this morning and scrotal edema that started 2 days ago.  He denies chest pain.  He reports decreased urine output over the last few days.     Pertinent ER course  CXR shows interstitial edema and right pleural effusion.  He was given 60mg IV lasix.  The patient reported improved dyspnea after lasix.     He lives with his daughter who is his primary caretaker.      * No surgery found *      Hospital Course:   Pt admitted to Inpatient status for Acute on chronic heart failure.  Chest xray showed mild pulmonary vascular congestion and low-grade interstitial edema.  Probable trace right pleural effusion mild adjacent right basilar compressive atelectasis.  BNP of 1546 noted.  Elevated troponin likely due to demand ischemia trended.  Pt treated with IV Lasix and verbalized significant improvement of symptoms.  Echo results showed EF 50% and elevated PAP.  Atrial fibrillation controlled on monitor.  Midsternal incision from recent CABG healing with edges approximated and no signs of infection.  LFTs trending downward.  Atrial fibrillation controlled.  Pt verbalized symptom improvement with diuresis.  Cardiology consulted with recommendations given.  Pt seen and examined on the date of discharge and  deemed suitable for discharge to home accompanied by daughter with home health established.  Current medications resumed with Lasix dose increased, Potassium, and Pravastatin prescribed.  Pt counseled on the importance of maintaining compliance with prescribed medication regime, dietary/fluid restrictions, and medical follow up with understanding verbalized.  Pt instructed to follow up with PCP, Cardiology (ambulatory referral placed), and to keep previously scheduled appointment with [facility] for further evaluation.      Consults:   Consults         Status Ordering Provider     Inpatient consult to Cardiology  Once     Provider:  Patricia Espinoza MD    Completed HI DUBON     IP consult to case management  Once     Provider:  (Not yet assigned)    Completed OANH HOLLAND          No new Assessment & Plan notes have been filed under this hospital service since the last note was generated.  Service: Hospital Medicine    Final Active Diagnoses:    Diagnosis Date Noted POA    PRINCIPAL PROBLEM:  Acute on chronic heart failure [I50.9] 12/11/2017 Yes    CAD (coronary artery disease) [I25.10] 12/11/2017 Yes    Pulmonary hypertension [I27.20] 11/30/2017 Yes     Chronic    Nonrheumatic aortic valve stenosis [I35.0] 08/02/2013 Yes    Atrial fibrillation [I48.91] 07/05/2013 Yes     Chronic    Hypertension [I10]  Yes     Chronic    Hyperlipidemia [E78.5]  Yes     Chronic      Problems Resolved During this Admission:    Diagnosis Date Noted Date Resolved POA       Discharged Condition: stable    Disposition: Home-Health Care [facility]    Follow Up:  Follow-up Information     [facility].    Specialty:  Home Health Services  Why:  Home Health:  SN, PT, OT  Contact information:  [address] [address] [phone]             Glenroy Carmichael MD In 3 days.    Specialty:  Family Medicine  Why:  hospital follow up   Contact information:  [address]  [facility]  [place] [id number] [phone]             Laura Bowens PA-C In 5 days.    Specialty:  Cardiology  Why:  -hospital follow up for volume overload-  Contact information:  [address] [place] [address]  [phone]             Shaq Boateng MD.    Specialty:  Cardiothoracic Surgery  Why:  -please keep next previously scheduled scheduled appointment   Contact information:  [address]  [address]  [place] [address]  [phone]                 Patient Instructions:     Ambulatory referral to Home Health   Referral Priority: Routine Referral Type: Home Health   Referral Reason: Specialty Services Required    Requested Specialty: Home Health Services    Number of Visits Requested: 1      Ambulatory Referral to Cardiology   Referral Priority: Routine Referral Type: Consultation   Referral Reason: Specialty Services Required    Requested Specialty: Cardiology    Number of Visits Requested: 1      Diet general   Order Specific Question Answer Comments   Na restriction, if any: 2gNa    Fluid restriction: Fluid - 1500mL      Activity as tolerated     Call MD for:  temperature >100.4     Call MD for:  persistent nausea and vomiting or diarrhea     Call MD for:  increased confusion or weakness     Call MD for:  persistent dizziness, light-headedness, or visual disturbances     Call MD for:  difficulty breathing or increased cough     Call MD for:  severe uncontrolled pain         Significant Diagnostic Studies:  Imaging Results          X-Ray Chest AP Portable (Final result)  Result time 12/11/17 21:22:24    Final result by Yanely Figueroa III, MD (12/11/17 21:22:24)                 Impression:     Mild pulmonary vascular congestion and low-grade interstitial edema.  Probable trace right pleural effusion mild adjacent right basilar compressive atelectasis.        Electronically signed by: YANELY FIGUEROA MD  Date:     12/11/17  Time:    21:22              Narrative:    XR  CHEST AP PORTABLE    Clinical history: Chest Pain.      Comparison: 12/03/2017    Findings: Prior sternotomy is again noted. Heart size is upper limits of normal.  There has been interval development of mild pulmonary vascular congestion, peribronchial cuffing and suspected low-grade interstitial edema.  There appears be a trace right pleural effusion. There is a small adjacent hazy right basilar opacity likely related to layering pleural effusion and mild adjacent compressive atelectasis.  Remainder the lungs appear clear of active disease.                              Pending Diagnostic Studies:     None         Medications:  Reconciled Home Medications:   Discharge Medication List as of 12/13/2017  1:38 PM      CONTINUE these medications which have CHANGED    Details   furosemide (LASIX) 40 MG tablet Please take 1 tablet (40 mg) twice daily for the next 3 days then return to daily dose of 1 tablet (40 mg), Print      potassium chloride (K-TAB) 20 mEq Take 1 tablet (20 mEq total) by mouth once daily., Starting Wed 12/13/2017, Print      pravastatin (PRAVACHOL) 20 MG tablet Take 1 tablet (20 mg total) by mouth once daily., Starting Wed 12/13/2017, Until Fri 1/12/2018, Print         CONTINUE these medications which have NOT CHANGED    Details   apixaban 2.5 mg Tab Take 1 tablet (2.5 mg total) by mouth 2 (two) times daily., Starting Wed 12/6/2017, Until Fri 1/5/2018, Normal      aspirin (ECOTRIN) 81 MG EC tablet Take 1 tablet (81 mg total) by mouth once daily., Starting Thu 12/7/2017, Until Fri 12/7/2018, Normal      fish oil-omega-3 fatty acids 300-1,000 mg capsule Take 1,200 mg by mouth once daily., Until Discontinued, Historical Med      metoprolol tartrate (LOPRESSOR) 50 MG tablet Take 1 tablet (50 mg total) by mouth 2 (two) times daily., Starting Wed 12/6/2017, Until Thu 12/6/2018, Normal      multivitamin with folic acid 400 mcg Tab Take 1 tablet by mouth., Historical Med      polyethylene glycol (GLYCOLAX) 17  gram PwPk Take 17 g by mouth once daily., Starting Thu 12/7/2017, Until Sat 1/6/2018, Normal      ranitidine (ZANTAC) 75 MG tablet Take 75 mg by mouth 2 (two) times daily. , Historical Med      VENTOLIN HFA 90 mcg/actuation inhaler INHALE 1 TO 2 PUFFS 4 TIMES A DAY AS NEEDED FOR WHEEZING, Historical Med      vitamin D 1000 units Tab Take 1,000 Units by mouth once daily., Historical Med      hydrocodone-acetaminophen 5-325mg (NORCO) 5-325 mg per tablet Take 1 tablet by mouth every 6 (six) hours as needed for Pain., Starting Wed 12/6/2017, Print             Indwelling Lines/Drains at time of discharge:   Lines/Drains/Airways     Pressure Ulcer                 Pressure Ulcer coccyx -- days         Pressure Ulcer 12/02/17 0705 Left coccyx suspected deep tissue injury 11 days                Time spent on the discharge of patient: 45 minutes  Patient was seen and examined on the date of discharge and determined to be suitable for discharge.         Mari Collins NP  Department of Hospital Medicine  [facility]

## 2017-12-13 NOTE — NURSING
Pt admitted to room 207, via stretcher, from ED in no apparent distress. Assisted from stretcher to bed without issue.  Bedside report given by Rupal ED RN, no further questions at this time.  Oriented pt to room, bed, call light.  Tele monitor placed with rhythm verification by tele monitor tech.  Bed in low position, side rails up x2, call light in reach.

## 2017-12-13 NOTE — PLAN OF CARE
Patient to discharge today following voiding.  Met with patient and patient's daughter, Jocelin Machuca, in hospital room.  Patient and daughter report that the only post-hospitalization need that patient has from a case management perspective is for HH services to be resumed for patient via [facility].  Patient Preference Form signed reflecting this; and signed form placed in patient's blue folder.  Patient and daughter indicate patient to have no HME or other needs at this time.  Patient's daughter states that she is residing with patient currently while patient's wife is in inpatient rehab and that she (daughter) transports patient to/from MD appointments.      Contacted Charmaine at [facility] and determined that patient was receiving HH SN, PT, OT services.  Informed Charmaine that patient is anticipated to discharge home today.  Requested HH order for these services from hospitalist.    PLAN:  Once HH order obtained, will fax it, along with other needed patient information, to [facility] via [facility] in order to complete HH referral.    1340:   order obtained and faxed, along with other needed patient information, to [facility] via [facility] to complete HH referral       12/13/17 1148   Final Note   Assessment Type Final Discharge Note   Discharge Disposition Home-Health  ( SN, PT, OT via [facility] )   What phone number can be called within the next 1-3 days to see how you are doing after discharge? [phone]   Right Care Referral Info   Post Acute Recommendation Home-care   Referral Type HH SN, PT, OT   Facility Name [address] [address]

## 2017-12-13 NOTE — ASSESSMENT & PLAN NOTE
-Continue ASA, statin, BB  -Elevated liver enzymes noted, trending down, likely from congestion from CHF, will re-check in clinic

## 2017-12-13 NOTE — HPI
Mr. Meneses is an 86 year old male patient with a PMHx of CAD, severe AS, PAF, CHF, HTN, hyperlipidemia and s/p recent CABG x 1 and bioprosthetic AVR who presented to [facility] ED on 12/11/17 with a chief complaint of increased SOB over the past 2-3 days. Associated symptoms included bilateral lower extremity edema, scrotal edema, and lightheadedness. Initial workup in ED revealed BNP of 1546. CXR showed interstitial edema and right-sided pleural effusion and patient was subsequently admitted for IV diuresis. Cardiology consulted to assist with management. Patient seen and examined today, resting in bed. States he feels much better since admission, SOB greatly improved. Still has some mild lower extremity edema. Denies any chest pain, orthopnea, or PND. He reports compliance with his medications. Labs reviewed. Creatinine 1.5, K 3.6.

## 2017-12-13 NOTE — HOSPITAL COURSE
Pt admitted to Inpatient status for Acute on chronic heart failure.  Chest xray showed mild pulmonary vascular congestion and low-grade interstitial edema.  Probable trace right pleural effusion mild adjacent right basilar compressive atelectasis.  BNP of 1546 noted.  Elevated troponin likely due to demand ischemia trended.  Pt treated with IV Lasix and verbalized significant improvement of symptoms.  Echo results showed EF 50% and elevated PAP.  Atrial fibrillation controlled on monitor.  Midsternal incision from recent CABG healing with edges approximated and no signs of infection.  LFTs trending downward.  Atrial fibrillation controlled.  Pt verbalized symptom improvement with diuresis.  Cardiology consulted with recommendations given.  Pt seen and examined on the date of discharge and deemed suitable for discharge to home accompanied by daughter.  Current medications resumed with Lasix dose increased, Potassium, and Pravastatin prescribed.  Pt counseled on the importance of maintaining compliance with prescribed medication regime, dietary/fluid restrictions, and medical follow up with understanding verbalized.  Pt instructed to follow up with PCP, Cardiology (ambulatory referral placed), and to keep previously scheduled appointment with [facility] for further evaluation.

## 2017-12-13 NOTE — ASSESSMENT & PLAN NOTE
- rate controlled; CHADsVASC 6  - TSH pending  - cardiac monitoring  - continue metoprolol 50mg bid and asa/apixaban

## 2017-12-13 NOTE — SUBJECTIVE & OBJECTIVE
"Interval History: pt stable and ambulating in room at time of exam.  Pt reports significant symptom improvement with diuresis and asks to be discharged.  Atrial fibrillation rate controlled on monitor. Pt confirms compliance with prescribed medication regime and fluid restriction.  Daughter states, "I don't add salt to anything."  CABG incision healing with scab and edges intact.  Echo showed EF 50% and elevated PAPs.  Will continue diuresis.  Plan of care discussed with patient and daughter at bedside.      Review of Systems   Constitutional: Positive for unexpected weight change. Negative for activity change, appetite change, chills, diaphoresis, fatigue and fever.   HENT: Negative for facial swelling, sore throat, tinnitus and trouble swallowing.    Eyes: Negative for photophobia and visual disturbance.   Respiratory: Positive for shortness of breath (improved). Negative for apnea, chest tightness and wheezing.    Cardiovascular: Negative for chest pain and palpitations.   Gastrointestinal: Negative for abdominal distention, abdominal pain, constipation, diarrhea, nausea and vomiting.   Endocrine: Negative for polydipsia, polyphagia and polyuria.   Genitourinary: Positive for scrotal swelling (improved). Negative for decreased urine volume, dysuria, flank pain, frequency and hematuria.   Musculoskeletal: Negative for arthralgias, back pain, joint swelling, myalgias and neck stiffness.   Skin: Positive for wound. Negative for pallor and rash.        Surgical incision   Allergic/Immunologic: Negative for immunocompromised state.   Neurological: Negative for dizziness, seizures, syncope, weakness, light-headedness, numbness and headaches.   Psychiatric/Behavioral: Negative for confusion, hallucinations and suicidal ideas. The patient is not nervous/anxious.    All other systems reviewed and are negative.    Objective:     Vital Signs (Most Recent):  Temp: 98.2 °F (36.8 °C) (12/12/17 1913)  Pulse: 79 (12/12/17 " 1913)  Resp: (!) 22 (12/12/17 1913)  BP: 124/65 (12/12/17 1913)  SpO2: 95 % (12/12/17 1913) Vital Signs (24h Range):  Temp:  [97.7 °F (36.5 °C)-98.8 °F (37.1 °C)] 98.2 °F (36.8 °C)  Pulse:  [65-95] 79  Resp:  [15-22] 22  SpO2:  [95 %-100 %] 95 %  BP: (112-146)/(53-78) 124/65     Weight: 86.2 kg (190 lb)  Body mass index is 28.06 kg/m².    Intake/Output Summary (Last 24 hours) at 12/12/17 1925  Last data filed at 12/12/17 1700   Gross per 24 hour   Intake              240 ml   Output             4300 ml   Net            -4060 ml      Physical Exam   Constitutional: He is oriented to person, place, and time. He appears well-developed and well-nourished. No distress.   HENT:   Head: Normocephalic and atraumatic.   Mouth/Throat: Oropharynx is clear and moist.   Eyes: Conjunctivae are normal. Pupils are equal, round, and reactive to light. No scleral icterus.   Neck: No JVD present. No thyromegaly present.   Cardiovascular: Normal rate and intact distal pulses.  Exam reveals no gallop and no friction rub.    No murmur heard.  Pulmonary/Chest: Effort normal and breath sounds normal. No respiratory distress. He has no wheezes. He has no rales.   Abdominal: Soft. Bowel sounds are normal. He exhibits no distension. There is no tenderness. There is no guarding.   Genitourinary:   Genitourinary Comments: Deferred   Musculoskeletal: Normal range of motion. He exhibits edema (+1 pitting edema to BLE ).   Neurological: He is alert and oriented to person, place, and time. No cranial nerve deficit.   Skin: Skin is warm. Capillary refill takes 2 to 3 seconds. He is not diaphoretic. No erythema.   Healing midsternal incision with scab   Psychiatric: He has a normal mood and affect.   Nursing note and vitals reviewed.      Significant Labs:   CBC:   Recent Labs  Lab 12/11/17 1944 12/12/17  0544   WBC 7.50 7.70   HGB 9.4* 8.3*   HCT 30.3* 26.5*    320     CMP:   Recent Labs  Lab 12/11/17 1944 12/12/17  0544    138   K  4.5 3.9    105   CO2 24 24   * 101   BUN 45* 44*   CREATININE 1.6* 1.4   CALCIUM 9.2 8.7   PROT 6.8  --    ALBUMIN 3.0* 2.5*   BILITOT 0.7  --    ALKPHOS 133  --    AST 35  --    ALT 69*  --    ANIONGAP 9 9   EGFRNONAA 38* 45*     Troponin:   Recent Labs  Lab 12/11/17  1944 12/12/17  0140 12/12/17  0544   TROPONINI 0.166* 0.168* 0.184*       Significant Imaging:   Imaging Results          X-Ray Chest AP Portable (Final result)  Result time 12/11/17 21:22:24    Final result by Yanely Adkins III, MD (12/11/17 21:22:24)                 Impression:     Mild pulmonary vascular congestion and low-grade interstitial edema.  Probable trace right pleural effusion mild adjacent right basilar compressive atelectasis.        Electronically signed by: YANELY ADKINS MD  Date:     12/11/17  Time:    21:22              Narrative:    XR CHEST AP PORTABLE    Clinical history: Chest Pain.      Comparison: 12/03/2017    Findings: Prior sternotomy is again noted. Heart size is upper limits of normal.  There has been interval development of mild pulmonary vascular congestion, peribronchial cuffing and suspected low-grade interstitial edema.  There appears be a trace right pleural effusion. There is a small adjacent hazy right basilar opacity likely related to layering pleural effusion and mild adjacent compressive atelectasis.  Remainder the lungs appear clear of active disease.

## 2017-12-13 NOTE — PLAN OF CARE
12/13/17 1201   Medicare Message   Important Message from Medicare regarding Discharge Appeal Rights Given to patient/caregiver;Explained to patient/caregiver;Signed/date by patient/caregiver   Date IMM was signed 12/13/17   Time IMM was signed 1145

## 2017-12-13 NOTE — ASSESSMENT & PLAN NOTE
- h/o CAD and HLD  - cannot find lipid panel in chart, check lipid panel   - would benefit from atorvastatin 40mg   - follow up with cardiology and pcp to continue or dc at that time

## 2017-12-13 NOTE — ASSESSMENT & PLAN NOTE
- Did not fill lasix outpatient, last EF 50% during cardiac procedure  - BNP >1000, trop abnormal, c/w demand ischemia  - improved symptoms after 60mg IV x 1 given in ER  - admit inpatient  - strict I/O  -repeat echo eval LV function-EF 50% and elevated PAP  - fluid restriction 1.5L  - daily weight   -supplemental oxygen

## 2017-12-13 NOTE — PLAN OF CARE
Problem: Patient Care Overview  Goal: Plan of Care Review  Outcome: Ongoing (interventions implemented as appropriate)  Afib on monitor HR controlled between 60s80s on RA. Midsternal incision from recent surgery CDI; LLL harvest sites intact. Stage 2 noted to sacrum; dressing applied. POC reviewed with pt. Will continue to monitor.

## 2017-12-13 NOTE — PROGRESS NOTES
D/C instructions were given, all questions were answered, no signs of distress were noted. Patient brought to family car via wheelchair.

## 2017-12-13 NOTE — TELEPHONE ENCOUNTER
----- Message from Laura Bowens PA-C sent at 12/13/2017 12:32 PM CST -----  Needs appt Friday or Monday; if Monday schedule with me    Thanks

## 2017-12-14 NOTE — DISCHARGE SUMMARY
[facility] -   Cardiology  Discharge Summary      Patient Name: Chung Meneses Sr.  MRN: [id number]  Admission Date: 11/30/2017  Hospital Length of Stay: 6 days  Discharge Date and Time: 12/6/2017  4:13 PM  Attending Physician: Lucy att. providers found  Discharging Provider: GARRETT Bellamy  Primary Care Physician: Glenroy Carmichael MD    HPI: Mr. Meneses is an 86-year-old white male with a symptomatic aortic stenosis confirmed by echo and heart catheterization.  He underwent a coronary angiogram by Dr. Patricia Espinoza, which demonstrated significant stenosis involving diagonal.  The rests of his coronary disease were nonobstructive.  Pursuant to that, he is prepared for surgery and taken to the operating room for  aortic valve replacement and coronary artery bypass grafting x 1 on 11/30/2017.     Procedure(s) (LRB):  AORTOCORONARY BYPASS-CABG (N/A)  REPLACEMENT-VALVE-AORTIC (N/A)  TRANSESOPHAGEAL ECHOCARDIOGRAM (ESTEFANY) (N/A)  HARVEST-VEIN-ENDOVASCULAR (Left)     Indwelling Lines/Drains at time of discharge:  Lines/Drains/Airways     Pressure Ulcer                 Pressure Ulcer coccyx -- days         Pressure Ulcer 12/02/17 0705 Left coccyx suspected deep tissue injury 12 days                Hospital Course: The patient tolerated the procedure well and was transferred to ICU in stable condition.  POD # 1 the patient was seen and examined in the ICU.  Patient was extubated per CVT protocol and OOB in the chair. V/S were stable as well as labs.  Chest tubes with moderate amount of drainage and thus left in place.  POD # 2 the chest tubes & pacing wires were removed.  The patient was cleared by CVT and cardiology for transfer to telemetry to begin cardiac rehab.  He was noted to have elevation in kidney function.  Renal team was consulted and lasix/kdur were placed on hold. The patient also had Atrial fib which was being managed by cardiology.  POD #3 the patients kidney function improved.  He continued to be in  Atrial Fib and was started on eliquis  per cardiology.  POD # 4 patient with good urine output.  Continued ambulation and IS usage.  He remains in Atrial fib with rate control.   On POD # 5 the patient was discharged home in stable condition.      Consults:   Consults         Status Ordering Provider     Consult to Cardiology  Once     Provider:  (Not yet assigned)    Completed ANDREA SHERWOOD     Consult to Dietary  Once     Provider:  (Not yet assigned)    Completed ANDREA SHERWOOD          Significant Diagnostic Studies: Labs:   CBC   Recent Labs  Lab 12/13/17  0344   WBC 8.37   HGB 8.7*   HCT 28.0*          Pending Diagnostic Studies:     None          Final Active Diagnoses:    Diagnosis Date Noted POA    PRINCIPAL PROBLEM:  S/P AVR [Z95.2] 12/01/2017 Not Applicable    CAD (coronary artery disease) [I25.10] 12/11/2017 Yes    Pulmonary hypertension [I27.20] 11/30/2017 Yes     Chronic    Hyperglycemia, unspecified [R73.9] 11/30/2017 Yes    Chronic systolic congestive heart failure [I50.22] 11/08/2017 Yes    Nonrheumatic aortic valve stenosis [I35.0] 08/02/2013 Yes    Atrial fibrillation [I48.91] 07/05/2013 Yes     Chronic    Hypertension [I10]  Yes     Chronic    Hyperlipidemia [E78.5]  Yes     Chronic      Problems Resolved During this Admission:    Diagnosis Date Noted Date Resolved POA    Hyperkalemia [E87.5] 12/11/2017 12/06/2017 No    XIANG (acute kidney injury) [N17.9] 12/02/2017 12/06/2017 Unknown    On mechanically assisted ventilation [Z99.11] 11/30/2017 12/01/2017 Not Applicable       Discharged Condition: stable    Follow Up:  Follow-up Information     [facility].    Specialty:  Home Health Services  Why:  Home Health  Contact information:  [address] [facility] [id number] [phone]             Cardiology.           Cardiothoracic Surgery - Nurse Practitioner, Vilma On 12/20/2017.    Why:  Appointment time 1:00, [facility]  [address]           Shaq Boateng MD On 1/8/2018.    Specialty:  Cardiothoracic Surgery  Why:  Appointment 1:00  Contact information:  [address]  [address]  [facility] [id number]  [phone]                 Patient Instructions:     Diet general     Diet general     Activity as tolerated       Medications:  Reconciled Home Medications:   Discharge Medication List as of 12/6/2017  4:23 PM      START taking these medications    Details   apixaban 2.5 mg Tab Take 1 tablet (2.5 mg total) by mouth 2 (two) times daily., Starting Wed 12/6/2017, Until Fri 1/5/2018, Normal      aspirin (ECOTRIN) 81 MG EC tablet Take 1 tablet (81 mg total) by mouth once daily., Starting Thu 12/7/2017, Until Fri 12/7/2018, Normal      polyethylene glycol (GLYCOLAX) 17 gram PwPk Take 17 g by mouth once daily., Starting Thu 12/7/2017, Until Sat 1/6/2018, Normal         CONTINUE these medications which have CHANGED    Details   hydrocodone-acetaminophen 5-325mg (NORCO) 5-325 mg per tablet Take 1 tablet by mouth every 6 (six) hours as needed for Pain., Starting Wed 12/6/2017, Print      metoprolol tartrate (LOPRESSOR) 50 MG tablet Take 1 tablet (50 mg total) by mouth 2 (two) times daily., Starting Wed 12/6/2017, Until Thu 12/6/2018, Normal         CONTINUE these medications which have NOT CHANGED    Details   fish oil-omega-3 fatty acids 300-1,000 mg capsule Take 1,200 mg by mouth once daily., Until Discontinued, Historical Med      multivitamin with folic acid 400 mcg Tab Take 1 tablet by mouth., Historical Med      ranitidine (ZANTAC) 75 MG tablet Take 75 mg by mouth 2 (two) times daily., Until Discontinued, Historical Med      VENTOLIN HFA 90 mcg/actuation inhaler INHALE 1 TO 2 PUFFS 4 TIMES A DAY AS NEEDED FOR WHEEZING, Historical Med      vitamin D 1000 units Tab Take 1,000 Units by mouth once daily., Historical Med         STOP taking these medications       amlodipine (NORVASC) 5 MG tablet Comments:   Reason for Stopping:          furosemide (LASIX) 40 MG tablet Comments:   Reason for Stopping:         multivitamin capsule Comments:   Reason for Stopping:         niacin, inositol niacinate, (NIACIN FLUSH FREE) 400 mg Cap Comments:   Reason for Stopping:         rivaroxaban (XARELTO) 15 mg Tab Comments:   Reason for Stopping:         simvastatin (ZOCOR) 20 MG tablet Comments:   Reason for Stopping:               Time spent on the discharge of patient: 25 minutes    GARRETT Bellamy  Cardiology  [facility]

## 2017-12-15 ENCOUNTER — TELEPHONE (OUTPATIENT)
Dept: CARDIOLOGY | Facility: CLINIC | Age: 82
End: 2017-12-15

## 2017-12-15 NOTE — TELEPHONE ENCOUNTER
----- Message from Laura Bowens PA-C sent at 12/15/2017  2:21 PM CST -----  Needs clinic f/u next week

## 2017-12-18 PROBLEM — N17.9 AKI (ACUTE KIDNEY INJURY): Status: RESOLVED | Noted: 2017-12-18 | Resolved: 2017-12-06

## 2017-12-18 NOTE — H&P
[facility] -   History & Physical    Subjective:      Chief Complaint/Reason for Admission: Aortic Stenosis     Chung Meneses Sr. is a 86 y.o. male who presents with aortic stenosis and coronary artery disease. He is a long term patient of Dr. Espinoza. Patient denies any classic chest pain. He has developed shortness of breath recently and presented to a local urgent care center where he was found to be in heart failure and was admitted to [facility] for 24 hours. Patient was placed on diuretics at that time, and is feeling better. Patient presents today to [facility] for aortic valve replacement, with a tissue valve, and coronary artery bypass surgery.    Past Medical History:   Diagnosis Date    Aortic stenosis 8/2/2013    Atrial fibrillation 7/5/2013    CHF (congestive heart failure)     Dizziness - light-headed     Hyperlipidemia     Hypertension      Past Surgical History:   Procedure Laterality Date    BACK SURGERY  2003    knee replacemnt bilateral  2001    SHOULDER SURGERY  2002     Family History   Problem Relation Age of Onset    Hypertension Mother     Hypertension Father     Hypertension Sister     Hypertension Brother     Heart failure Brother     Heart disease Brother     Heart attack Brother      Social History   Substance Use Topics    Smoking status: Never Smoker    Smokeless tobacco: Never Used    Alcohol use No      Comment: HOLIDAY       No prescriptions prior to admission.     Review of patient's allergies indicates:   Allergen Reactions    Sulfa (sulfonamide antibiotics) Hives        Review of Systems   Constitutional: Negative.    HENT: Negative.    Respiratory: Positive for shortness of breath.    Genitourinary: Negative.    Musculoskeletal: Positive for joint pain.   Skin: Negative.    Neurological: Negative for dizziness and speech change.       Objective:      Vital Signs (Most Recent)  Temp: 97.8 °F (36.6 °C) (12/06/17 1530)  Pulse: 67 (12/06/17 1530)  Resp: 18  (12/06/17 1530)  BP: (!) 141/69 (12/06/17 1530)  SpO2: 99 % (12/06/17 1530)    Vital Signs Range (Last 24H):       Physical Exam   Constitutional: He is oriented to person, place, and time. He appears well-developed and well-nourished.   HENT:   Head: Normocephalic and atraumatic.   Eyes: Conjunctivae are normal.   Neck: Neck supple.   Cardiovascular:   Murmur heard.  Pulmonary/Chest: Effort normal.   Abdominal: Soft.   Neurological: He is alert and oriented to person, place, and time.   Skin: Skin is warm and dry.           Assessment:      Active Hospital Problems    Diagnosis  POA    *S/P AVR [Z95.2]  Not Applicable    CAD (coronary artery disease) [I25.10]  Yes    Pulmonary hypertension [I27.20]  Yes     Chronic    Hyperglycemia, unspecified [R73.9]  Yes    Chronic systolic congestive heart failure [I50.22]  Yes    Nonrheumatic aortic valve stenosis [I35.0]  Yes    Atrial fibrillation [I48.91]  Yes     Chronic    Hypertension [I10]  Yes     Chronic    Hyperlipidemia [E78.5]  Yes     Chronic      Resolved Hospital Problems    Diagnosis Date Resolved POA    XIANG (acute kidney injury) [N17.9] 12/06/2017 No    Hyperkalemia [E87.5] 12/06/2017 No    On mechanically assisted ventilation [Z99.11] 12/01/2017 Not Applicable       Plan:    Plan for aortic valve replacement with a tissue valve and coronary artery bypass grafting x 1 with saphenous vein. Risks and benefits discussed. Patient agrees to proceed.

## 2017-12-18 NOTE — PHYSICIAN QUERY
PT Name: Chung Meneses .  MR #: [id number]    Physician Query Form -Integumentary  Clarification     CDS/: Sahara Duran                 Contact information:[email or web address]    This form is a permanent document in the medical record.     Query Date: December 18, 2017    By submitting this query, we are merely seeking further clarification of documentation. Please utilize your independent clinical judgment when addressing the question(s) below.    The Medical record contains the following:   Indicator  Supporting Clinical Findings Location in Medical Record    Redness     x Decubitus, Pressure Ulcer, etc. Stage 2 pressure injury Coccyx   [facility] consult 12-13    Deep Tissue Injury     x Wound Care Consult Consulted on this 87 y/o M patient for present on admission Stage 2 pressure injury to coccyx. Patient denies pain. He states wound developed after discharging home s/p open heart surgery  2 weeks ago.   [facility] consult 12-13    Medication     x Treatment 1. Cleanse with saline   2. Pat dry   3. Paint intact frankie wound skin with cavilon   4. Apply Duoderm. Change DuoDERM (hydrocolloid) dressing every 5 days (PRN if edges roll)   [facility] consult 12-13    Other       National Pressure Ulcer Advisory Panel (2007) Pressure Ulcer Definitions & Stages:    Stage I:  Intact skin with non-blanchable redness of a localized area usually over a bony prominence.   Stage II:  Partial thickness loss of dermis presenting as a shallow open ulcer with a red pink wound bed, without slough.   Stage III: Full thickness tissue loss. Subcutaneous fat may be visible but bone, tendon or muscle is not exposed. Slough may be present but does not obscure the depth of tissue loss. May include undermining and tunneling.   Stage IV: Full thickness tissue loss with exposed bone, tendon or muscle. Slough or eschar may be present on some parts of the wound bed. Often include undermining and tunneling.   Unstageable:   Full thickness  tissue loss in which the base of the ulcer is covered by slough and/or eschar in the wound bed. Until enough slough and/or eschar is removed to expose the base of the wound, the true depth, and therefore stage, cannot be determined.   Deep Tissue Injury: Purple or maroon localized area of discolored intact skin or blood-filled blister due to damage of underlying soft tissue from  pressure and/or shear.     Provider, please specify the diagnosis or diagnoses associated with above clinical findings.      [  ] Decubitus (Pressure) Ulcer / Deep Tissue Injury (please specify site, laterality & stage)    Site Laterality Stage   [ x ]Coccyx [  ] Right            [  ]Left II   [  ]Other Site (please specify):        [  ] Other Integumentary Diagnosis (please specify): ___________________________________  [  ] Clinically Undetermined    Please document in your progress notes daily for the duration of treatment until resolved and include in your discharge summary.

## 2017-12-20 ENCOUNTER — NURSE TRIAGE (OUTPATIENT)
Dept: ADMINISTRATIVE | Facility: CLINIC | Age: 82
End: 2017-12-20

## 2017-12-20 NOTE — TELEPHONE ENCOUNTER
Reason for Disposition   Caller has NON-URGENT medication question about med that PCP prescribed and triager unable to answer question    Protocols used: ST MEDICATION QUESTION CALL-A-AH    Pt daughter states pt was recently discharged form hospital and she was under the impression that once he finished K-tab he was done with that Rx. [place] bottle [place] mall and she would like to know if he is to continue this medication.   Please call daughter to advise.

## 2017-12-22 ENCOUNTER — OFFICE VISIT (OUTPATIENT)
Dept: CARDIOLOGY | Facility: CLINIC | Age: 82
End: 2017-12-22
Payer: MEDICARE

## 2017-12-22 VITALS
HEART RATE: 72 BPM | SYSTOLIC BLOOD PRESSURE: 104 MMHG | HEIGHT: 69 IN | DIASTOLIC BLOOD PRESSURE: 62 MMHG | BODY MASS INDEX: 26.09 KG/M2 | WEIGHT: 176.13 LBS

## 2017-12-22 DIAGNOSIS — I35.0 NONRHEUMATIC AORTIC VALVE STENOSIS: ICD-10-CM

## 2017-12-22 DIAGNOSIS — E78.5 HYPERLIPIDEMIA, UNSPECIFIED HYPERLIPIDEMIA TYPE: Chronic | ICD-10-CM

## 2017-12-22 DIAGNOSIS — Z95.2 S/P AVR: ICD-10-CM

## 2017-12-22 DIAGNOSIS — I25.10 CORONARY ARTERY DISEASE, ANGINA PRESENCE UNSPECIFIED, UNSPECIFIED VESSEL OR LESION TYPE, UNSPECIFIED WHETHER NATIVE OR TRANSPLANTED HEART: ICD-10-CM

## 2017-12-22 DIAGNOSIS — I50.9 CONGESTIVE HEART FAILURE, UNSPECIFIED CONGESTIVE HEART FAILURE CHRONICITY, UNSPECIFIED CONGESTIVE HEART FAILURE TYPE: ICD-10-CM

## 2017-12-22 DIAGNOSIS — I10 ESSENTIAL HYPERTENSION: Chronic | ICD-10-CM

## 2017-12-22 DIAGNOSIS — I50.22 CHRONIC SYSTOLIC CONGESTIVE HEART FAILURE: Primary | ICD-10-CM

## 2017-12-22 DIAGNOSIS — I48.0 PAROXYSMAL ATRIAL FIBRILLATION: Chronic | ICD-10-CM

## 2017-12-22 PROCEDURE — 99999 PR PBB SHADOW E&M-EST. PATIENT-LVL III: CPT | Mod: PBBFAC,,, | Performed by: INTERNAL MEDICINE

## 2017-12-22 PROCEDURE — 99214 OFFICE O/P EST MOD 30 MIN: CPT | Mod: S$GLB,,, | Performed by: INTERNAL MEDICINE

## 2017-12-22 RX ORDER — FUROSEMIDE 40 MG/1
TABLET ORAL
Qty: 90 TABLET | Refills: 3 | Status: SHIPPED | OUTPATIENT
Start: 2017-12-22 | End: 2018-01-10

## 2017-12-22 RX ORDER — METOPROLOL TARTRATE 50 MG/1
50 TABLET ORAL 2 TIMES DAILY
Qty: 60 TABLET | Refills: 6 | Status: SHIPPED | OUTPATIENT
Start: 2017-12-22 | End: 2018-01-10

## 2017-12-22 RX ORDER — POTASSIUM CHLORIDE 1500 MG/1
20 TABLET, EXTENDED RELEASE ORAL DAILY
Qty: 90 TABLET | Refills: 3 | Status: ON HOLD | OUTPATIENT
Start: 2017-12-22 | End: 2018-02-24

## 2017-12-22 RX ORDER — PRAVASTATIN SODIUM 20 MG/1
20 TABLET ORAL DAILY
Qty: 90 TABLET | Refills: 3 | Status: SHIPPED | OUTPATIENT
Start: 2017-12-22 | End: 2018-01-10 | Stop reason: ALTCHOICE

## 2017-12-22 NOTE — PROGRESS NOTES
Subjective:   Patient ID:  Chung Meneses SrFifi is a 86 y.o. male who presents for cardiac consult of Congestive Heart Failure and Hospital Follow Up      HPI  The patient came in today for cardiac consult of Congestive Heart Failure and Hospital Follow Up    This is an 86 year old male pt with PMHx of CAD/Severe AS s/p recentCABG  x1 and  bioprosthetic AVR performed by Dr. Boateng on 11/30/17, atrial fibrillation, CHF, HTN, and hyperlipidemia who presented to [facility] on 11/30/17 for elective AVR presents for follow up evaluation.     PROCEDURE:  Aortic valve replacements with 23 mm Mosaic valve, coronary artery bypass grafting x1 with aorta to first diagonal.    12/8/17 Visit  Today pt feels more SOB and EISENBERG. Gets SOB with minimal exertional but about the same as before surgery, had some PT/OT but did not do too much. Has some mild chest soreness, but feels ok. Also has some LE swelling but with standing, improves with compression stockings.     12/22/17 Visit  12/11-12/13 Pt admitted to Inpatient status for Acute on chronic heart failure.  Chest xray showed mild pulmonary vascular congestion and low-grade interstitial edema.  Probable trace right pleural effusion mild adjacent right basilar compressive atelectasis.  BNP of 1546 noted.  Elevated troponin likely due to demand ischemia trended.  Pt treated with IV Lasix and verbalized significant improvement of symptoms.  Echo results showed EF 50% and elevated PAP.    Feels good today. Taking lasix daily without issues and K supplements. No leg cramps. Has been doing PT/OT with cardiac rehab.     Patient feels no PND, no palpitation, no dizziness, no syncope, no CNS symptoms.    Patient is compliant with medications.    preop ECHO  CONCLUSIONS     1 - Biatrial enlargement.     2 - Concentric hypertrophy.     3 - No wall motion abnormalities.     4 - Moderately depressed left ventricular systolic function (EF 35-40%).     5 - Right ventricular enlargement with low  normal to mildly depressed systolic function.     6 - Pulmonary hypertension. The estimated PA systolic pressure is greater than 48 mmHg.     7 - Mild to moderate mitral regurgitation.     8 - Mild tricuspid regurgitation.     9 - Severe aortic stenosis, HERNANDEZ = 0.51 cm2, peak velocity = 3.0 m/s, mean gradient = 23 mmHg.     Past Medical History:   Diagnosis Date    Aortic stenosis 8/2/2013    Atrial fibrillation 7/5/2013    CHF (congestive heart failure)     Dizziness - light-headed     Hyperlipidemia     Hypertension        Past Surgical History:   Procedure Laterality Date    BACK SURGERY  2003    knee replacemnt bilateral  2001    SHOULDER SURGERY  2002       Social History   Substance Use Topics    Smoking status: Never Smoker    Smokeless tobacco: Never Used    Alcohol use No      Comment: HOLIDAY       Family History   Problem Relation Age of Onset    Hypertension Mother     Hypertension Father     Hypertension Sister     Hypertension Brother     Heart failure Brother     Heart disease Brother     Heart attack Brother        Patient's Medications   New Prescriptions    No medications on file   Previous Medications    ASPIRIN (ECOTRIN) 81 MG EC TABLET    Take 1 tablet (81 mg total) by mouth once daily.    FISH OIL-OMEGA-3 FATTY ACIDS 300-1,000 MG CAPSULE    Take 1,200 mg by mouth once daily.    HYDROCODONE-ACETAMINOPHEN 5-325MG (NORCO) 5-325 MG PER TABLET    Take 1 tablet by mouth every 6 (six) hours as needed for Pain.    MULTIVITAMIN WITH FOLIC ACID 400 MCG TAB    Take 1 tablet by mouth.    POLYETHYLENE GLYCOL (GLYCOLAX) 17 GRAM PWPK    Take 17 g by mouth once daily.    RANITIDINE (ZANTAC) 75 MG TABLET    Take 75 mg by mouth 2 (two) times daily.     VENTOLIN HFA 90 MCG/ACTUATION INHALER    INHALE 1 TO 2 PUFFS 4 TIMES A DAY AS NEEDED FOR WHEEZING    VITAMIN D 1000 UNITS TAB    Take 1,000 Units by mouth once daily.   Modified Medications    Modified Medication Previous Medication    APIXABAN  2.5 MG TAB apixaban 2.5 mg Tab       Take 1 tablet (2.5 mg total) by mouth 2 (two) times daily.    Take 1 tablet (2.5 mg total) by mouth 2 (two) times daily.    FUROSEMIDE (LASIX) 40 MG TABLET furosemide (LASIX) 40 MG tablet       Take 1 daily    Please take 1 tablet (40 mg) twice daily for the next 3 days then return to daily dose of 1 tablet (40 mg)    METOPROLOL TARTRATE (LOPRESSOR) 50 MG TABLET metoprolol tartrate (LOPRESSOR) 50 MG tablet       Take 1 tablet (50 mg total) by mouth 2 (two) times daily.    Take 1 tablet (50 mg total) by mouth 2 (two) times daily.    POTASSIUM CHLORIDE (K-TAB) 20 MEQ potassium chloride (K-TAB) 20 mEq       Take 1 tablet (20 mEq total) by mouth once daily.    Take 1 tablet (20 mEq total) by mouth once daily.    PRAVASTATIN (PRAVACHOL) 20 MG TABLET pravastatin (PRAVACHOL) 20 MG tablet       Take 1 tablet (20 mg total) by mouth once daily.    Take 1 tablet (20 mg total) by mouth once daily.   Discontinued Medications    No medications on file       Review of Systems   Constitutional: Negative.    HENT: Negative.    Eyes: Negative.    Respiratory: Positive for shortness of breath (improved). Negative for cough and wheezing.    Cardiovascular: Negative for chest pain, palpitations and leg swelling.   Gastrointestinal: Negative.  Negative for heartburn.   Genitourinary: Negative.    Musculoskeletal: Negative.    Skin: Negative.    Neurological: Negative.    Endo/Heme/Allergies: Negative.    Psychiatric/Behavioral: Negative.    All 12 systems otherwise negative.      Wt Readings from Last 3 Encounters:   12/22/17 79.9 kg (176 lb 2.4 oz)   12/13/17 83.5 kg (184 lb 1.4 oz)   12/08/17 88 kg (194 lb 0.1 oz)     Temp Readings from Last 3 Encounters:   12/13/17 97.4 °F (36.3 °C) (Oral)   12/06/17 97.8 °F (36.6 °C) (Oral)   11/28/17 98.1 °F (36.7 °C) (Oral)     BP Readings from Last 3 Encounters:   12/22/17 104/62   12/13/17 132/68   12/08/17 118/68     Pulse Readings from Last 3 Encounters:  "  12/22/17 72   12/13/17 72   12/08/17 80       /62 (BP Location: Left arm, Patient Position: Sitting, BP Method: Medium (Manual))   Pulse 72   Ht 5' 9" (1.753 m)   Wt 79.9 kg (176 lb 2.4 oz)   BMI 26.01 kg/m²     Objective:   Physical Exam   Constitutional: He is oriented to person, place, and time. He appears well-developed and well-nourished. No distress.   HENT:   Head: Normocephalic and atraumatic.   Nose: Nose normal.   Mouth/Throat: Oropharynx is clear and moist.   Eyes: Conjunctivae and EOM are normal. No scleral icterus.   Neck: Normal range of motion. Neck supple. No JVD present. No thyromegaly present.   Cardiovascular: S1 normal and S2 normal.  Exam reveals no gallop, no S3, no S4 and no friction rub.    No murmur heard.  Irregular rate   Pulmonary/Chest: Effort normal and breath sounds normal. No stridor. No respiratory distress. He has no wheezes. He has no rales. He exhibits no tenderness.   Abdominal: Soft. Bowel sounds are normal. He exhibits no distension and no mass. There is no tenderness. There is no rebound.   Genitourinary:   Genitourinary Comments: Deferred   Musculoskeletal: Normal range of motion. He exhibits no edema, tenderness or deformity.   Lymphadenopathy:     He has no cervical adenopathy.   Neurological: He is alert and oriented to person, place, and time. He exhibits normal muscle tone. Coordination normal.   Skin: Skin is warm and dry. No rash noted. He is not diaphoretic. No erythema. No pallor.   Psychiatric: He has a normal mood and affect. His behavior is normal. Judgment and thought content normal.   Nursing note and vitals reviewed.      Lab Results   Component Value Date     12/13/2017    K 3.6 12/13/2017     12/13/2017    CO2 26 12/13/2017    BUN 38 (H) 12/13/2017    CREATININE 1.5 (H) 12/13/2017    GLU 96 12/13/2017    HGBA1C 6.5 (H) 11/30/2017    MG 1.9 12/13/2017    AST 35 12/11/2017    ALT 69 (H) 12/11/2017    ALBUMIN 2.6 (L) 12/13/2017    PROT " 6.8 12/11/2017    BILITOT 0.7 12/11/2017    WBC 8.37 12/13/2017    HGB 8.7 (L) 12/13/2017    HCT 28.0 (L) 12/13/2017    HCT 27 (L) 11/30/2017    MCV 81 (L) 12/13/2017     12/13/2017    INR 1.3 (H) 12/11/2017    TSH 3.780 12/11/2017    CHOL 104 (L) 12/11/2017    HDL 24 (L) 12/11/2017    LDLCALC 57.0 (L) 12/11/2017    TRIG 115 12/11/2017    BNP 1,546 (H) 12/11/2017     Assessment:      1. Chronic systolic congestive heart failure    2. Nonrheumatic aortic valve stenosis    3. Congestive heart failure, unspecified congestive heart failure chronicity, unspecified congestive heart failure type    4. S/P AVR    5. Coronary artery disease, angina presence unspecified, unspecified vessel or lesion type, unspecified whether native or transplanted heart    6. Paroxysmal atrial fibrillation    7. Hyperlipidemia, unspecified hyperlipidemia type    8. Essential hypertension        Plan:   1. CAD s/p CABG x 1  - cont asa and BB  - cont statin    2. Severe AS s/p AVR  - cont A/C    3. Chronic combined systolic/diastolic HF - recent exacerbation  - cont lasix and K supplementation  - cont low salt    4. AF  - cont OAC    5. HTN  - cont meds    6. HLD  - restarted statin    7. LE edema  - improved    Thank you for allowing me to participate in this patient's care. Please do not hesitate to contact me with any questions or concerns. Consult note has been forwarded to the referral physician.

## 2017-12-26 ENCOUNTER — TELEPHONE (OUTPATIENT)
Dept: CARDIOLOGY | Facility: CLINIC | Age: 82
End: 2017-12-26

## 2017-12-26 NOTE — TELEPHONE ENCOUNTER
Received phone call from Nurse Smith regarding symptoms from during Cardiac Rehab.  Patient with lightheadedness and feeling faint with ambulation, PAF with rates in 60-80, 102/54, O2 saturation 100%  Requesting if okay to continue rehab  Current medications Lopressor 50 mg bid, Lasix 40 daily with Potassium, Eliquis 2.5 mg, Aspirin 81mg    Darling Espinoza/Colette- please advise

## 2017-12-26 NOTE — TELEPHONE ENCOUNTER
Received phone call from Mr. Meneses and Home Health Nurse from [facility].  Advised hold Lasix 40 mg for 24 hours and restart with 20 mg daily.  Weight is down from 194 to 172 but says he's dealing with orthostatic b/p drops, concerned Lopressor was increased from 25 mg bid to 50 bid  Will contact Rehab and advise to start on less strenuous activity.  Contacted Sarah with Rehab [phone] also with plan of care

## 2017-12-29 ENCOUNTER — TELEPHONE (OUTPATIENT)
Dept: CARDIOLOGY | Facility: CLINIC | Age: 82
End: 2017-12-29

## 2017-12-29 NOTE — TELEPHONE ENCOUNTER
I called to [employer] to check on the status of patient's order and to inquire of how many days supply is patient paying for--I spoke with Jo, a pharmacy specialist--she states the medication was shipped to patient today for a 90 day supply (180 tablets)

## 2017-12-29 NOTE — TELEPHONE ENCOUNTER
I called and informed patient's daughter Jocelin that patient's apixaban was shipped today for a 90 day supply not 30 days--she states she will inform patient

## 2017-12-29 NOTE — TELEPHONE ENCOUNTER
----- Message from Kaci Baird sent at 12/29/2017  9:30 AM CST -----  Contact: pt  States he wants to find out about his prescription, too see if they were ordered or not. Please call pt at [phone]. Thank you

## 2017-12-29 NOTE — TELEPHONE ENCOUNTER
Called back to patient--patient wanted to know what medication is apixaban and states the cost for medication with [employer] is $400 for 30 day supply--I informed patient that the apixaban is generic name for Eliquis and is for the atrial fibrillation --patient verbalizes understanding

## 2018-01-02 ENCOUNTER — TELEPHONE (OUTPATIENT)
Dept: CARDIOLOGY | Facility: CLINIC | Age: 83
End: 2018-01-02

## 2018-01-02 NOTE — TELEPHONE ENCOUNTER
Called back to Negrita with [facility]--states patient's weight today is 180.5lb and has had an eight pound weight gain in one week--states patient complains of shortness of breath and has some swelling to BLE--patient is currently taking Lasix 40 mg tablet one-half tablet by mouth once daily--please advise

## 2018-01-02 NOTE — TELEPHONE ENCOUNTER
Increase lasix to 40 mg bid for 3 days and back to 40 mg daily.  Please report daily weight in one week.

## 2018-01-02 NOTE — TELEPHONE ENCOUNTER
----- Message from Merlyn Toledo sent at 1/2/2018 12:32 PM CST -----  Contact: [facility].... Negrita  Please call Negrita at [phone] concerning pt 8lb weight gain. Also pt is complaining of shortness of breath and vital sign are stable.

## 2018-01-10 ENCOUNTER — OFFICE VISIT (OUTPATIENT)
Dept: CARDIOLOGY | Facility: CLINIC | Age: 83
End: 2018-01-10
Payer: MEDICARE

## 2018-01-10 VITALS
DIASTOLIC BLOOD PRESSURE: 78 MMHG | HEIGHT: 69 IN | SYSTOLIC BLOOD PRESSURE: 116 MMHG | WEIGHT: 191.13 LBS | HEART RATE: 68 BPM | BODY MASS INDEX: 28.31 KG/M2

## 2018-01-10 DIAGNOSIS — I27.20 PULMONARY HYPERTENSION: Chronic | ICD-10-CM

## 2018-01-10 DIAGNOSIS — I25.10 CORONARY ARTERY DISEASE, ANGINA PRESENCE UNSPECIFIED, UNSPECIFIED VESSEL OR LESION TYPE, UNSPECIFIED WHETHER NATIVE OR TRANSPLANTED HEART: ICD-10-CM

## 2018-01-10 DIAGNOSIS — E78.5 HYPERLIPIDEMIA, UNSPECIFIED HYPERLIPIDEMIA TYPE: Chronic | ICD-10-CM

## 2018-01-10 DIAGNOSIS — R06.2 WHEEZING: ICD-10-CM

## 2018-01-10 DIAGNOSIS — Z95.2 S/P AVR: Primary | ICD-10-CM

## 2018-01-10 DIAGNOSIS — I35.0 NONRHEUMATIC AORTIC VALVE STENOSIS: ICD-10-CM

## 2018-01-10 DIAGNOSIS — I50.9 CONGESTIVE HEART FAILURE, UNSPECIFIED CONGESTIVE HEART FAILURE CHRONICITY, UNSPECIFIED CONGESTIVE HEART FAILURE TYPE: ICD-10-CM

## 2018-01-10 DIAGNOSIS — I48.0 PAROXYSMAL ATRIAL FIBRILLATION: Chronic | ICD-10-CM

## 2018-01-10 DIAGNOSIS — I10 ESSENTIAL HYPERTENSION: Chronic | ICD-10-CM

## 2018-01-10 PROCEDURE — 99214 OFFICE O/P EST MOD 30 MIN: CPT | Mod: S$GLB,,, | Performed by: INTERNAL MEDICINE

## 2018-01-10 PROCEDURE — 99999 PR PBB SHADOW E&M-EST. PATIENT-LVL III: CPT | Mod: PBBFAC,,, | Performed by: INTERNAL MEDICINE

## 2018-01-10 RX ORDER — METHYLPREDNISOLONE 4 MG/1
TABLET ORAL
Qty: 1 PACKAGE | Refills: 0 | Status: SHIPPED | OUTPATIENT
Start: 2018-01-10 | End: 2018-01-26

## 2018-01-10 RX ORDER — METOPROLOL TARTRATE 25 MG/1
25 TABLET, FILM COATED ORAL NIGHTLY
COMMUNITY
Start: 2018-01-05 | End: 2018-02-16

## 2018-01-10 RX ORDER — AZITHROMYCIN 250 MG/1
TABLET, FILM COATED ORAL
Qty: 6 TABLET | Refills: 0 | Status: SHIPPED | OUTPATIENT
Start: 2018-01-10 | End: 2018-01-29

## 2018-01-10 RX ORDER — ATORVASTATIN CALCIUM 40 MG/1
40 TABLET, FILM COATED ORAL
COMMUNITY
Start: 2018-01-05 | End: 2018-02-04

## 2018-01-10 RX ORDER — FUROSEMIDE 20 MG/1
20 TABLET ORAL
Status: ON HOLD | COMMUNITY
Start: 2018-01-05 | End: 2018-01-31

## 2018-01-10 NOTE — PROGRESS NOTES
Subjective:   Patient ID:  Chung Meneses Sr. is a 86 y.o. male who presents for follow up of Atrial Fibrillation and Congestive Heart Failure      HPI  An 87 yo male with h/o avr chf afib cad is here fro f/u [place] he felt better and discharged home . His diuretics dose has been adjusted down to 20 mg po daily and lopressor is 25 mg po bid. He haowever feels cold has runny nose was tested for the flu was negative. He has been coughing wheezing his swelling is improved. Has no chills but feels cold has no syncope near syncope. Has no palpitation he feels weak.  Past Medical History:   Diagnosis Date    Aortic stenosis 8/2/2013    Atrial fibrillation 7/5/2013    CHF (congestive heart failure)     Dizziness - light-headed     Hyperlipidemia     Hypertension        Past Surgical History:   Procedure Laterality Date    BACK SURGERY  2003    knee replacemnt bilateral  2001    SHOULDER SURGERY  2002       Social History   Substance Use Topics    Smoking status: Never Smoker    Smokeless tobacco: Never Used    Alcohol use No      Comment: HOLIDAY       Family History   Problem Relation Age of Onset    Hypertension Mother     Hypertension Father     Hypertension Sister     Hypertension Brother     Heart failure Brother     Heart disease Brother     Heart attack Brother        Current Outpatient Prescriptions   Medication Sig    apixaban 2.5 mg Tab Take 1 tablet (2.5 mg total) by mouth 2 (two) times daily.    aspirin (ECOTRIN) 81 MG EC tablet Take 1 tablet (81 mg total) by mouth once daily.    atorvastatin (LIPITOR) 40 MG tablet Take 40 mg by mouth.    fish oil-omega-3 fatty acids 300-1,000 mg capsule Take 1,200 mg by mouth once daily.    furosemide (LASIX) 20 MG tablet Take 20 mg by mouth.    metoprolol tartrate (LOPRESSOR) 25 MG tablet Take 25 mg by mouth.    multivitamin with folic acid 400 mcg Tab Take 1 tablet by mouth.     potassium chloride (K-TAB) 20 mEq Take 1 tablet (20 mEq total) by mouth once daily.    ranitidine (ZANTAC) 75 MG tablet Take 75 mg by mouth 2 (two) times daily.     VENTOLIN HFA 90 mcg/actuation inhaler INHALE 1 TO 2 PUFFS 4 TIMES A DAY AS NEEDED FOR WHEEZING    vitamin D 1000 units Tab Take 1,000 Units by mouth once daily.     No current facility-administered medications for this visit.      Current Outpatient Prescriptions on File Prior to Visit   Medication Sig    apixaban 2.5 mg Tab Take 1 tablet (2.5 mg total) by mouth 2 (two) times daily.    aspirin (ECOTRIN) 81 MG EC tablet Take 1 tablet (81 mg total) by mouth once daily.    fish oil-omega-3 fatty acids 300-1,000 mg capsule Take 1,200 mg by mouth once daily.    multivitamin with folic acid 400 mcg Tab Take 1 tablet by mouth.    potassium chloride (K-TAB) 20 mEq Take 1 tablet (20 mEq total) by mouth once daily.    ranitidine (ZANTAC) 75 MG tablet Take 75 mg by mouth 2 (two) times daily.     VENTOLIN HFA 90 mcg/actuation inhaler INHALE 1 TO 2 PUFFS 4 TIMES A DAY AS NEEDED FOR WHEEZING    vitamin D 1000 units Tab Take 1,000 Units by mouth once daily.    [DISCONTINUED] furosemide (LASIX) 40 MG tablet Take 1 daily    [DISCONTINUED] hydrocodone-acetaminophen 5-325mg (NORCO) 5-325 mg per tablet Take 1 tablet by mouth every 6 (six) hours as needed for Pain.    [DISCONTINUED] metoprolol tartrate (LOPRESSOR) 50 MG tablet Take 1 tablet (50 mg total) by mouth 2 (two) times daily.    [DISCONTINUED] pravastatin (PRAVACHOL) 20 MG tablet Take 1 tablet (20 mg total) by mouth once daily.     No current facility-administered medications on file prior to visit.      Review of patient's allergies indicates:   Allergen Reactions    Sulfa (sulfonamide antibiotics) Hives       Review of Systems   Constitution: Positive for decreased appetite, weakness, malaise/fatigue and weight loss. Negative for diaphoresis and weight gain.   HENT: Negative for hoarse voice.     Eyes: Negative for double vision and visual disturbance.   Cardiovascular: Positive for dyspnea on exertion and leg swelling. Negative for chest pain, claudication, cyanosis, irregular heartbeat, near-syncope, orthopnea, palpitations, paroxysmal nocturnal dyspnea and syncope.   Respiratory: Positive for shortness of breath and wheezing. Negative for cough, hemoptysis and snoring.    Hematologic/Lymphatic: Negative for bleeding problem. Does not bruise/bleed easily.   Skin: Negative for color change and poor wound healing.   Musculoskeletal: Negative for muscle cramps, muscle weakness and myalgias.   Gastrointestinal: Negative for bloating, abdominal pain, change in bowel habit, diarrhea, heartburn, hematemesis, hematochezia, melena and nausea.   Neurological: Negative for excessive daytime sleepiness, dizziness, headaches, light-headedness, loss of balance and numbness.   Psychiatric/Behavioral: Negative for memory loss. The patient does not have insomnia.    Allergic/Immunologic: Negative for hives.       Objective:   Physical Exam   Constitutional: He is oriented to person, place, and time. He appears well-developed and well-nourished. No distress.   HENT:   Head: Normocephalic and atraumatic.   Eyes: EOM are normal. Pupils are equal, round, and reactive to light. Right eye exhibits no discharge. Left eye exhibits no discharge.   Neck: Neck supple. No JVD present. No thyromegaly present.   Cardiovascular: Normal rate and intact distal pulses.  An irregularly irregular rhythm present. Exam reveals no gallop and no friction rub.    Murmur heard.   Harsh midsystolic murmur is present with a grade of 1/6  at the upper right sternal border radiating to the neck  Pulmonary/Chest: Effort normal. No respiratory distress. He has wheezes. He has no rales. He exhibits no tenderness.   Scar cabg well healed.   Abdominal: Soft. Bowel sounds are normal. He exhibits no distension. There is no tenderness.   Musculoskeletal:  "Normal range of motion. He exhibits edema (trace).   Neurological: He is alert and oriented to person, place, and time. No cranial nerve deficit.   Skin: Skin is warm and dry. No rash noted. He is not diaphoretic. No erythema.   Psychiatric: He has a normal mood and affect. His behavior is normal.   Nursing note and vitals reviewed.    Vitals:    01/10/18 1159   BP: 116/78   Pulse: 68   Weight: 86.7 kg (191 lb 2.2 oz)   Height: 5' 9" (1.753 m)     Lab Results   Component Value Date    CHOL 104 (L) 12/11/2017    CHOL 125 04/19/2017    CHOL 147 10/21/2016     Lab Results   Component Value Date    HDL 24 (L) 12/11/2017    HDL 35 (L) 04/19/2017    HDL 31 (L) 10/21/2016     Lab Results   Component Value Date    LDLCALC 57.0 (L) 12/11/2017    LDLCALC 77.8 04/19/2017    LDLCALC 97.6 10/21/2016     Lab Results   Component Value Date    TRIG 115 12/11/2017    TRIG 61 04/19/2017    TRIG 92 10/21/2016     Lab Results   Component Value Date    CHOLHDL 23.1 12/11/2017    CHOLHDL 28.0 04/19/2017    CHOLHDL 21.1 10/21/2016       Chemistry        Component Value Date/Time     12/13/2017 0344    K 3.6 12/13/2017 0344     12/13/2017 0344    CO2 26 12/13/2017 0344    BUN 38 (H) 12/13/2017 0344    CREATININE 1.5 (H) 12/13/2017 0344    GLU 96 12/13/2017 0344        Component Value Date/Time    CALCIUM 8.6 (L) 12/13/2017 0344    ALKPHOS 133 12/11/2017 1944    AST 35 12/11/2017 1944    ALT 69 (H) 12/11/2017 1944    BILITOT 0.7 12/11/2017 1944    ESTGFRAFRICA 48 (A) 12/13/2017 0344    EGFRNONAA 42 (A) 12/13/2017 0344          Lab Results   Component Value Date    TSH 3.780 12/11/2017     Lab Results   Component Value Date    INR 1.3 (H) 12/11/2017    INR 1.2 11/30/2017    INR 1.6 (H) 11/30/2017     Lab Results   Component Value Date    WBC 8.37 12/13/2017    HGB 8.7 (L) 12/13/2017    HCT 28.0 (L) 12/13/2017    MCV 81 (L) 12/13/2017     12/13/2017     BMP  Sodium   Date Value Ref Range Status   12/13/2017 138 136 - 145 " mmol/L Final     Potassium   Date Value Ref Range Status   12/13/2017 3.6 3.5 - 5.1 mmol/L Final     Chloride   Date Value Ref Range Status   12/13/2017 103 95 - 110 mmol/L Final     CO2   Date Value Ref Range Status   12/13/2017 26 23 - 29 mmol/L Final     BUN, Bld   Date Value Ref Range Status   12/13/2017 38 (H) 8 - 23 mg/dL Final     Creatinine   Date Value Ref Range Status   12/13/2017 1.5 (H) 0.5 - 1.4 mg/dL Final     Calcium   Date Value Ref Range Status   12/13/2017 8.6 (L) 8.7 - 10.5 mg/dL Final     Anion Gap   Date Value Ref Range Status   12/13/2017 9 8 - 16 mmol/L Final     eGFR if    Date Value Ref Range Status   12/13/2017 48 (A) >60 mL/min/1.73 m^2 Final     eGFR if non    Date Value Ref Range Status   12/13/2017 42 (A) >60 mL/min/1.73 m^2 Final     Comment:     Calculation used to obtain the estimated glomerular filtration  rate (eGFR) is the CKD-EPI equation.        CrCl cannot be calculated (Patient's most recent lab result is older than the maximum 7 days allowed.).    Assessment:     1. S/P AVR    2. Essential hypertension    3. Hyperlipidemia, unspecified hyperlipidemia type    4. Paroxysmal atrial fibrillation    5. Pulmonary hypertension    6. Congestive heart failure, unspecified congestive heart failure chronicity, unspecified congestive heart failure type    7. Nonrheumatic aortic valve stenosis    8. Coronary artery disease, angina presence unspecified, unspecified vessel or lesion type, unspecified whether native or transplanted heart      He has a uri with wheezing appears compensated clinically with current dose of lasix. He will benefit from z pack and  Medrol dose pack to help resolve the wheezing. He can use mucinex.   Plan:     zpack   Medrol dose pack   F/u in 1 months.

## 2018-01-26 ENCOUNTER — OFFICE VISIT (OUTPATIENT)
Dept: CARDIOLOGY | Facility: CLINIC | Age: 83
DRG: 291 | End: 2018-01-26
Payer: MEDICARE

## 2018-01-26 ENCOUNTER — HOSPITAL ENCOUNTER (OUTPATIENT)
Dept: RADIOLOGY | Facility: HOSPITAL | Age: 83
Discharge: HOME | DRG: 291 | End: 2018-01-26
Attending: INTERNAL MEDICINE
Payer: MEDICARE

## 2018-01-26 ENCOUNTER — CLINICAL SUPPORT (OUTPATIENT)
Dept: CARDIOLOGY | Facility: CLINIC | Age: 83
DRG: 291 | End: 2018-01-26
Attending: INTERNAL MEDICINE
Payer: MEDICARE

## 2018-01-26 VITALS
SYSTOLIC BLOOD PRESSURE: 96 MMHG | HEIGHT: 69 IN | HEART RATE: 81 BPM | WEIGHT: 196 LBS | DIASTOLIC BLOOD PRESSURE: 50 MMHG | BODY MASS INDEX: 29.03 KG/M2

## 2018-01-26 DIAGNOSIS — Z95.2 S/P AVR: ICD-10-CM

## 2018-01-26 DIAGNOSIS — R55 SYNCOPE, UNSPECIFIED SYNCOPE TYPE: ICD-10-CM

## 2018-01-26 DIAGNOSIS — E78.5 HYPERLIPIDEMIA, UNSPECIFIED HYPERLIPIDEMIA TYPE: Chronic | ICD-10-CM

## 2018-01-26 DIAGNOSIS — I10 ESSENTIAL HYPERTENSION: Primary | Chronic | ICD-10-CM

## 2018-01-26 DIAGNOSIS — I27.20 PULMONARY HYPERTENSION: Chronic | ICD-10-CM

## 2018-01-26 DIAGNOSIS — I25.10 CORONARY ARTERY DISEASE, ANGINA PRESENCE UNSPECIFIED, UNSPECIFIED VESSEL OR LESION TYPE, UNSPECIFIED WHETHER NATIVE OR TRANSPLANTED HEART: ICD-10-CM

## 2018-01-26 DIAGNOSIS — R42 LIGHT HEADED: ICD-10-CM

## 2018-01-26 DIAGNOSIS — I48.21 PERMANENT ATRIAL FIBRILLATION: ICD-10-CM

## 2018-01-26 DIAGNOSIS — I35.0 NONRHEUMATIC AORTIC VALVE STENOSIS: ICD-10-CM

## 2018-01-26 DIAGNOSIS — I48.0 PAROXYSMAL ATRIAL FIBRILLATION: Chronic | ICD-10-CM

## 2018-01-26 DIAGNOSIS — R42 DIZZINESS AND GIDDINESS: ICD-10-CM

## 2018-01-26 DIAGNOSIS — R55 SYNCOPE, UNSPECIFIED SYNCOPE TYPE: Primary | ICD-10-CM

## 2018-01-26 LAB
DIASTOLIC DYSFUNCTION: YES
ESTIMATED PA SYSTOLIC PRESSURE: 41.01
MITRAL VALVE MOBILITY: NORMAL
MITRAL VALVE REGURGITATION: ABNORMAL
RETIRED EF AND QEF - SEE NOTES: 50 (ref 55–65)
TRICUSPID VALVE REGURGITATION: ABNORMAL

## 2018-01-26 PROCEDURE — 71046 X-RAY EXAM CHEST 2 VIEWS: CPT | Mod: TC,FY,PO

## 2018-01-26 PROCEDURE — 71046 X-RAY EXAM CHEST 2 VIEWS: CPT | Mod: 26,,, | Performed by: RADIOLOGY

## 2018-01-26 PROCEDURE — 93000 ELECTROCARDIOGRAM COMPLETE: CPT | Mod: S$GLB,,, | Performed by: INTERNAL MEDICINE

## 2018-01-26 PROCEDURE — 99999 PR PBB SHADOW E&M-EST. PATIENT-LVL III: CPT | Mod: PBBFAC,,, | Performed by: INTERNAL MEDICINE

## 2018-01-26 PROCEDURE — 99214 OFFICE O/P EST MOD 30 MIN: CPT | Mod: S$GLB,,, | Performed by: INTERNAL MEDICINE

## 2018-01-26 PROCEDURE — 93306 TTE W/DOPPLER COMPLETE: CPT | Mod: S$GLB,,, | Performed by: INTERNAL MEDICINE

## 2018-01-26 NOTE — PROGRESS NOTES
Subjective:   Patient ID:  Chung Meneses Sr. is a 86 y.o. male who presents for follow up of Loss of Consciousness      HPI  A 85 yo male with h/o cad s/p I vessel cabg s/p avr is here for f/u he ahs passed twice since last visit . He has been  Keeping his weight at home. He is up 5 lbs since 1/10. He is taking his diuretics. He is describing syncope after standing up walking. He has  Minimal leg swelling he feels bloated in his abdomen and his groin area. he is still coughing improved. With steroids and zithromax.he passed out going to physical therapy. At nite  He raises the foot of the bed. His legs get down in size it fills back while he stands up.has no bleeding no bruising taking his meds. He also feels weak no exercise tolerance can not do much he has no energy with minimal exertion.he is taking his eliquis and asa regularily.  Past Medical History:   Diagnosis Date    Aortic stenosis 8/2/2013    Atrial fibrillation 7/5/2013    CHF (congestive heart failure)     Dizziness - light-headed     Hyperlipidemia     Hypertension     Syncope 1/26/2018       Past Surgical History:   Procedure Laterality Date    BACK SURGERY  2003    knee replacemnt bilateral  2001    SHOULDER SURGERY  2002       Social History   Substance Use Topics    Smoking status: Never Smoker    Smokeless tobacco: Never Used    Alcohol use No      Comment: HOLIDAY       Family History   Problem Relation Age of Onset    Hypertension Mother     Hypertension Father     Hypertension Sister     Hypertension Brother     Heart failure Brother     Heart disease Brother     Heart attack Brother        Current Outpatient Prescriptions   Medication Sig    apixaban (ELIQUIS) 2.5 mg Tab Take 2.5 mg by mouth 2 (two) times daily.    aspirin (ECOTRIN) 81 MG EC tablet Take 1 tablet (81 mg total) by mouth once daily.    atorvastatin (LIPITOR) 40 MG tablet Take 40 mg by mouth.    fish oil-omega-3 fatty acids 300-1,000 mg capsule Take 1,200  mg by mouth once daily.    furosemide (LASIX) 20 MG tablet Take 20 mg by mouth.    metoprolol tartrate (LOPRESSOR) 25 MG tablet Take 25 mg by mouth.    multivitamin with folic acid 400 mcg Tab Take 1 tablet by mouth.    potassium chloride (K-TAB) 20 mEq Take 1 tablet (20 mEq total) by mouth once daily.    ranitidine (ZANTAC) 75 MG tablet Take 75 mg by mouth 2 (two) times daily.     vitamin D 1000 units Tab Take 1,000 Units by mouth once daily.    azithromycin (ZITHROMAX Z-MARLI) 250 MG tablet Take 2 pills first days then one pill daily for 4 days.    VENTOLIN HFA 90 mcg/actuation inhaler INHALE 1 TO 2 PUFFS 4 TIMES A DAY AS NEEDED FOR WHEEZING     No current facility-administered medications for this visit.      Current Outpatient Prescriptions on File Prior to Visit   Medication Sig    aspirin (ECOTRIN) 81 MG EC tablet Take 1 tablet (81 mg total) by mouth once daily.    atorvastatin (LIPITOR) 40 MG tablet Take 40 mg by mouth.    fish oil-omega-3 fatty acids 300-1,000 mg capsule Take 1,200 mg by mouth once daily.    furosemide (LASIX) 20 MG tablet Take 20 mg by mouth.    metoprolol tartrate (LOPRESSOR) 25 MG tablet Take 25 mg by mouth.    multivitamin with folic acid 400 mcg Tab Take 1 tablet by mouth.    potassium chloride (K-TAB) 20 mEq Take 1 tablet (20 mEq total) by mouth once daily.    ranitidine (ZANTAC) 75 MG tablet Take 75 mg by mouth 2 (two) times daily.     vitamin D 1000 units Tab Take 1,000 Units by mouth once daily.    azithromycin (ZITHROMAX Z-MARLI) 250 MG tablet Take 2 pills first days then one pill daily for 4 days.    VENTOLIN HFA 90 mcg/actuation inhaler INHALE 1 TO 2 PUFFS 4 TIMES A DAY AS NEEDED FOR WHEEZING    [DISCONTINUED] methylPREDNISolone (MEDROL, MARLI,) 4 mg tablet use as directed     No current facility-administered medications on file prior to visit.      Review of patient's allergies indicates:   Allergen Reactions    Sulfa (sulfonamide antibiotics) Hives       Review  "of Systems   Constitution: Positive for weakness and malaise/fatigue. Negative for diaphoresis and weight gain.   HENT: Negative for hoarse voice.    Eyes: Negative for double vision and visual disturbance.   Cardiovascular: Positive for dyspnea on exertion, near-syncope and syncope. Negative for chest pain, claudication, cyanosis, irregular heartbeat, orthopnea, palpitations and paroxysmal nocturnal dyspnea. Leg swelling: minimal    Respiratory: Positive for cough, shortness of breath and wheezing. Negative for hemoptysis and snoring.    Hematologic/Lymphatic: Negative for bleeding problem. Does not bruise/bleed easily.   Skin: Negative for color change and poor wound healing.   Musculoskeletal: Negative for muscle cramps, muscle weakness and myalgias.   Gastrointestinal: Negative for bloating, abdominal pain, change in bowel habit, diarrhea, heartburn, hematemesis, hematochezia, melena and nausea.   Neurological: Negative for excessive daytime sleepiness, dizziness, headaches, light-headedness, loss of balance and numbness.   Psychiatric/Behavioral: Negative for memory loss. The patient does not have insomnia.    Allergic/Immunologic: Negative for hives.       Objective:   Physical Exam  Vitals:    01/26/18 0826 01/26/18 0829   BP: (!) 100/50 (!) 96/50   BP Location: Right arm Left arm   Patient Position: Sitting    BP Method: Large (Manual)    Pulse: 81    Weight: 88.9 kg (196 lb)    Height: 5' 9" (1.753 m)    Constitutional: He is oriented to person, place, and time. He appears well-developed and well-nourished. No distress.   HENT:   Head: Normocephalic and atraumatic.   Eyes: EOM are normal. Pupils are equal, round, and reactive to light. Right eye exhibits no discharge. Left eye exhibits no discharge.   Neck: Neck supple.positive  JVD present. No thyromegaly present.   Cardiovascular: Normal rate and intact distal pulses.  An irregularly irregular rhythm present. Exam reveals no gallop and no friction rub.  "   Murmur heard.   Harsh midsystolic murmur is present with a grade of 1/6  at the upper right sternal border radiating to the neck  Pulmonary/Chest: Effort normal. No respiratory distress. He has wheezes. He has no rales but scattered rhonchi/. He exhibits no tenderness.   Scar cabg well healed.   Abdominal: Soft. Bowel sounds are normal. He exhibits mild  distension. There is no tenderness. Pulsatile liver.  Musculoskeletal: Normal range of motion. He exhibits edema (trace).   Neurological: He is alert and oriented to person, place, and time. No cranial nerve deficit.   Skin: Skin is warm and dry. No rash noted. He is not diaphoretic. No erythema.   Psychiatric: He has a normal mood and affect. His behavior is normal.   Nursing note and vitals reviewed.     His orthostatic bp were as follows lying 100/64 sitting 110/62 standing 110/60  Lab Results   Component Value Date    CHOL 104 (L) 12/11/2017    CHOL 125 04/19/2017    CHOL 147 10/21/2016     Lab Results   Component Value Date    HDL 24 (L) 12/11/2017    HDL 35 (L) 04/19/2017    HDL 31 (L) 10/21/2016     Lab Results   Component Value Date    LDLCALC 57.0 (L) 12/11/2017    LDLCALC 77.8 04/19/2017    LDLCALC 97.6 10/21/2016     Lab Results   Component Value Date    TRIG 115 12/11/2017    TRIG 61 04/19/2017    TRIG 92 10/21/2016     Lab Results   Component Value Date    CHOLHDL 23.1 12/11/2017    CHOLHDL 28.0 04/19/2017    CHOLHDL 21.1 10/21/2016       Chemistry        Component Value Date/Time     12/13/2017 0344    K 3.6 12/13/2017 0344     12/13/2017 0344    CO2 26 12/13/2017 0344    BUN 38 (H) 12/13/2017 0344    CREATININE 1.5 (H) 12/13/2017 0344    GLU 96 12/13/2017 0344        Component Value Date/Time    CALCIUM 8.6 (L) 12/13/2017 0344    ALKPHOS 133 12/11/2017 1944    AST 35 12/11/2017 1944    ALT 69 (H) 12/11/2017 1944    BILITOT 0.7 12/11/2017 1944    ESTGFRAFRICA 48 (A) 12/13/2017 0344    EGFRNONAA 42 (A) 12/13/2017 0344          Lab Results    Component Value Date    TSH 3.780 12/11/2017     Lab Results   Component Value Date    INR 1.3 (H) 12/11/2017    INR 1.2 11/30/2017    INR 1.6 (H) 11/30/2017     Lab Results   Component Value Date    WBC 8.37 12/13/2017    HGB 8.7 (L) 12/13/2017    HCT 28.0 (L) 12/13/2017    MCV 81 (L) 12/13/2017     12/13/2017     BMP  Sodium   Date Value Ref Range Status   12/13/2017 138 136 - 145 mmol/L Final     Potassium   Date Value Ref Range Status   12/13/2017 3.6 3.5 - 5.1 mmol/L Final     Chloride   Date Value Ref Range Status   12/13/2017 103 95 - 110 mmol/L Final     CO2   Date Value Ref Range Status   12/13/2017 26 23 - 29 mmol/L Final     BUN, Bld   Date Value Ref Range Status   12/13/2017 38 (H) 8 - 23 mg/dL Final     Creatinine   Date Value Ref Range Status   12/13/2017 1.5 (H) 0.5 - 1.4 mg/dL Final     Calcium   Date Value Ref Range Status   12/13/2017 8.6 (L) 8.7 - 10.5 mg/dL Final     Anion Gap   Date Value Ref Range Status   12/13/2017 9 8 - 16 mmol/L Final     eGFR if    Date Value Ref Range Status   12/13/2017 48 (A) >60 mL/min/1.73 m^2 Final     eGFR if non    Date Value Ref Range Status   12/13/2017 42 (A) >60 mL/min/1.73 m^2 Final     Comment:     Calculation used to obtain the estimated glomerular filtration  rate (eGFR) is the CKD-EPI equation.        CrCl cannot be calculated (Patient's most recent lab result is older than the maximum 7 days allowed.).    Assessment:     1. Essential hypertension    2. Paroxysmal atrial fibrillation    3. Hyperlipidemia, unspecified hyperlipidemia type    4. Light headed    5. Dizziness and giddiness    6. Nonrheumatic aortic valve stenosis    7. S/P AVR    8. Coronary artery disease, angina presence unspecified, unspecified vessel or lesion type, unspecified whether native or transplanted heart    9. Pulmonary hypertension    10. Syncope, unspecified syncope type      Unsure of etiology although is seems orthostatic hypotension by  history but I can not reproduce by bp readings. I need to r/o any rt atrial compression from pericardial fluid because of neck vein elevation and pulsatile liver. He also  Will need repeat labs to make sure there is no anemia and repeat cxr .  Plan:   Echo   Cbc stat   Bmp stat bnp staat.  cxr   Make decision accordingly     Reviewed cxr no over chf   Hematocrit stable  Prerenal with may be volume contraction.  Avoid sudden body movement changes   compression stockings  Switch toprol till the evening   Low salt diet continue 1.5 liter fluid daily  Cardiac rehab and pt   Echo no pericardial effusion.  F/u inh 1 month

## 2018-01-29 ENCOUNTER — TELEPHONE (OUTPATIENT)
Dept: CARDIOLOGY | Facility: CLINIC | Age: 83
End: 2018-01-29

## 2018-01-29 ENCOUNTER — HOSPITAL ENCOUNTER (OUTPATIENT)
Facility: HOSPITAL | Age: 83
Discharge: HOME | DRG: 291 | End: 2018-01-31
Attending: EMERGENCY MEDICINE | Admitting: FAMILY MEDICINE
Payer: MEDICARE

## 2018-01-29 DIAGNOSIS — Z86.79 HISTORY OF PULMONARY HYPERTENSION: ICD-10-CM

## 2018-01-29 DIAGNOSIS — D64.9 CHRONIC ANEMIA: ICD-10-CM

## 2018-01-29 DIAGNOSIS — Z79.01 CHRONIC ANTICOAGULATION: ICD-10-CM

## 2018-01-29 DIAGNOSIS — I50.43 ACUTE ON CHRONIC COMBINED SYSTOLIC AND DIASTOLIC HEART FAILURE: Primary | ICD-10-CM

## 2018-01-29 DIAGNOSIS — N18.30 CHRONIC RENAL INSUFFICIENCY, STAGE 3 (MODERATE): ICD-10-CM

## 2018-01-29 DIAGNOSIS — R79.89 ELEVATED TROPONIN: ICD-10-CM

## 2018-01-29 PROBLEM — N18.9 ACUTE RENAL FAILURE SUPERIMPOSED ON CHRONIC KIDNEY DISEASE: Status: ACTIVE | Noted: 2018-01-29

## 2018-01-29 PROBLEM — N17.9 ACUTE RENAL FAILURE SUPERIMPOSED ON CHRONIC KIDNEY DISEASE: Status: ACTIVE | Noted: 2018-01-29

## 2018-01-29 LAB
ALBUMIN SERPL BCP-MCNC: 3.2 G/DL
ALP SERPL-CCNC: 126 U/L
ALT SERPL W/O P-5'-P-CCNC: 20 U/L
ANION GAP SERPL CALC-SCNC: 8 MMOL/L
ANISOCYTOSIS BLD QL SMEAR: ABNORMAL
APTT BLDCRRT: 31 SEC
AST SERPL-CCNC: 25 U/L
BASOPHILS # BLD AUTO: 0.03 K/UL
BASOPHILS NFR BLD: 0.5 %
BILIRUB SERPL-MCNC: 1.2 MG/DL
BILIRUB UR QL STRIP: NEGATIVE
BNP SERPL-MCNC: 1854 PG/ML
BUN SERPL-MCNC: 35 MG/DL
CALCIUM SERPL-MCNC: 9 MG/DL
CHLORIDE SERPL-SCNC: 108 MMOL/L
CLARITY UR: CLEAR
CO2 SERPL-SCNC: 24 MMOL/L
COLOR UR: YELLOW
CREAT SERPL-MCNC: 1.7 MG/DL
D DIMER PPP IA.FEU-MCNC: 1.9 MG/L FEU
DACRYOCYTES BLD QL SMEAR: ABNORMAL
DIFFERENTIAL METHOD: ABNORMAL
EOSINOPHIL # BLD AUTO: 0.1 K/UL
EOSINOPHIL NFR BLD: 2 %
ERYTHROCYTE [DISTWIDTH] IN BLOOD BY AUTOMATED COUNT: 18.5 %
EST. GFR  (AFRICAN AMERICAN): 41 ML/MIN/1.73 M^2
EST. GFR  (NON AFRICAN AMERICAN): 36 ML/MIN/1.73 M^2
GLUCOSE SERPL-MCNC: 146 MG/DL
GLUCOSE UR QL STRIP: NEGATIVE
HCT VFR BLD AUTO: 29.2 %
HGB BLD-MCNC: 8.6 G/DL
HGB UR QL STRIP: NEGATIVE
HYPOCHROMIA BLD QL SMEAR: ABNORMAL
INR PPP: 1.3
KETONES UR QL STRIP: NEGATIVE
LEUKOCYTE ESTERASE UR QL STRIP: NEGATIVE
LYMPHOCYTES # BLD AUTO: 0.7 K/UL
LYMPHOCYTES NFR BLD: 11.5 %
MAGNESIUM SERPL-MCNC: 1.9 MG/DL
MCH RBC QN AUTO: 22 PG
MCHC RBC AUTO-ENTMCNC: 29.5 G/DL
MCV RBC AUTO: 75 FL
MONOCYTES # BLD AUTO: 0.7 K/UL
MONOCYTES NFR BLD: 11.6 %
NEUTROPHILS # BLD AUTO: 4.6 K/UL
NEUTROPHILS NFR BLD: 74.7 %
NITRITE UR QL STRIP: NEGATIVE
OVALOCYTES BLD QL SMEAR: ABNORMAL
PH UR STRIP: 6 [PH] (ref 5–8)
PHOSPHATE SERPL-MCNC: 3.6 MG/DL
PLATELET # BLD AUTO: 198 K/UL
PLATELET BLD QL SMEAR: ABNORMAL
PMV BLD AUTO: 9.2 FL
POIKILOCYTOSIS BLD QL SMEAR: ABNORMAL
POLYCHROMASIA BLD QL SMEAR: ABNORMAL
POTASSIUM SERPL-SCNC: 4.7 MMOL/L
PROT SERPL-MCNC: 6.5 G/DL
PROT UR QL STRIP: NEGATIVE
PROTHROMBIN TIME: 13.4 SEC
RBC # BLD AUTO: 3.91 M/UL
SCHISTOCYTES BLD QL SMEAR: PRESENT
SODIUM SERPL-SCNC: 140 MMOL/L
SP GR UR STRIP: 1.01 (ref 1–1.03)
SPHEROCYTES BLD QL SMEAR: ABNORMAL
TARGETS BLD QL SMEAR: ABNORMAL
TROPONIN I SERPL DL<=0.01 NG/ML-MCNC: 0.1 NG/ML
TROPONIN I SERPL DL<=0.01 NG/ML-MCNC: 0.12 NG/ML
URN SPEC COLLECT METH UR: NORMAL
UROBILINOGEN UR STRIP-ACNC: 1 EU/DL
WBC # BLD AUTO: 6.11 K/UL

## 2018-01-29 PROCEDURE — 84484 ASSAY OF TROPONIN QUANT: CPT

## 2018-01-29 PROCEDURE — 36415 COLL VENOUS BLD VENIPUNCTURE: CPT

## 2018-01-29 PROCEDURE — 85379 FIBRIN DEGRADATION QUANT: CPT

## 2018-01-29 PROCEDURE — 81003 URINALYSIS AUTO W/O SCOPE: CPT

## 2018-01-29 PROCEDURE — 84100 ASSAY OF PHOSPHORUS: CPT

## 2018-01-29 PROCEDURE — 25000003 PHARM REV CODE 250: Performed by: FAMILY MEDICINE

## 2018-01-29 PROCEDURE — 83880 ASSAY OF NATRIURETIC PEPTIDE: CPT

## 2018-01-29 PROCEDURE — 63600175 PHARM REV CODE 636 W HCPCS: Performed by: EMERGENCY MEDICINE

## 2018-01-29 PROCEDURE — 21400001 HC TELEMETRY ROOM

## 2018-01-29 PROCEDURE — 83735 ASSAY OF MAGNESIUM: CPT

## 2018-01-29 PROCEDURE — 85025 COMPLETE CBC W/AUTO DIFF WBC: CPT

## 2018-01-29 PROCEDURE — 80053 COMPREHEN METABOLIC PANEL: CPT

## 2018-01-29 PROCEDURE — 96374 THER/PROPH/DIAG INJ IV PUSH: CPT

## 2018-01-29 PROCEDURE — 99285 EMERGENCY DEPT VISIT HI MDM: CPT | Mod: 25

## 2018-01-29 PROCEDURE — 85730 THROMBOPLASTIN TIME PARTIAL: CPT

## 2018-01-29 PROCEDURE — G0378 HOSPITAL OBSERVATION PER HR: HCPCS

## 2018-01-29 PROCEDURE — 84484 ASSAY OF TROPONIN QUANT: CPT | Mod: 91

## 2018-01-29 PROCEDURE — 84145 PROCALCITONIN (PCT): CPT

## 2018-01-29 PROCEDURE — 85610 PROTHROMBIN TIME: CPT

## 2018-01-29 PROCEDURE — 93005 ELECTROCARDIOGRAM TRACING: CPT | Mod: 59

## 2018-01-29 PROCEDURE — A4216 STERILE WATER/SALINE, 10 ML: HCPCS | Performed by: FAMILY MEDICINE

## 2018-01-29 RX ORDER — ATORVASTATIN CALCIUM 40 MG/1
40 TABLET, FILM COATED ORAL DAILY
Status: DISCONTINUED | OUTPATIENT
Start: 2018-01-30 | End: 2018-01-31 | Stop reason: HOSPADM

## 2018-01-29 RX ORDER — FUROSEMIDE 10 MG/ML
20 INJECTION INTRAMUSCULAR; INTRAVENOUS 2 TIMES DAILY
Status: DISCONTINUED | OUTPATIENT
Start: 2018-01-30 | End: 2018-01-31 | Stop reason: HOSPADM

## 2018-01-29 RX ORDER — FUROSEMIDE 10 MG/ML
60 INJECTION INTRAMUSCULAR; INTRAVENOUS
Status: COMPLETED | OUTPATIENT
Start: 2018-01-29 | End: 2018-01-29

## 2018-01-29 RX ORDER — METOPROLOL TARTRATE 25 MG/1
25 TABLET, FILM COATED ORAL NIGHTLY
Status: DISCONTINUED | OUTPATIENT
Start: 2018-01-29 | End: 2018-01-31 | Stop reason: HOSPADM

## 2018-01-29 RX ORDER — METOLAZONE 5 MG/1
5 TABLET ORAL DAILY
Status: DISCONTINUED | OUTPATIENT
Start: 2018-01-30 | End: 2018-01-30

## 2018-01-29 RX ORDER — POTASSIUM CHLORIDE 20 MEQ/1
20 TABLET, EXTENDED RELEASE ORAL DAILY
Status: DISCONTINUED | OUTPATIENT
Start: 2018-01-30 | End: 2018-01-31 | Stop reason: HOSPADM

## 2018-01-29 RX ORDER — SODIUM CHLORIDE 0.9 % (FLUSH) 0.9 %
3 SYRINGE (ML) INJECTION EVERY 8 HOURS
Status: DISCONTINUED | OUTPATIENT
Start: 2018-01-29 | End: 2018-01-31 | Stop reason: HOSPADM

## 2018-01-29 RX ORDER — ASPIRIN 81 MG/1
81 TABLET ORAL DAILY
Status: DISCONTINUED | OUTPATIENT
Start: 2018-01-30 | End: 2018-01-31 | Stop reason: HOSPADM

## 2018-01-29 RX ORDER — FAMOTIDINE 20 MG/1
20 TABLET, FILM COATED ORAL DAILY
Status: DISCONTINUED | OUTPATIENT
Start: 2018-01-30 | End: 2018-01-31 | Stop reason: HOSPADM

## 2018-01-29 RX ADMIN — FUROSEMIDE 60 MG: 10 INJECTION, SOLUTION INTRAMUSCULAR; INTRAVENOUS at 04:01

## 2018-01-29 RX ADMIN — APIXABAN 2.5 MG: 2.5 TABLET, FILM COATED ORAL at 10:01

## 2018-01-29 RX ADMIN — SODIUM CHLORIDE, PRESERVATIVE FREE 3 ML: 5 INJECTION INTRAVENOUS at 10:01

## 2018-01-29 RX ADMIN — METOPROLOL TARTRATE 25 MG: 25 TABLET ORAL at 10:01

## 2018-01-29 NOTE — TELEPHONE ENCOUNTER
Returned phone call to daughter after discussing symptoms with Dr. Espinoza and advised to proceed to [facility] ER will notify staff Nurse Shraddha of pending arrival and need for IV diuresis

## 2018-01-29 NOTE — ED PROVIDER NOTES
"SCRIBE #1 NOTE: I, Blanca Tre, am scribing for, and in the presence of, Najma Acosta MD. I have scribed the entire note.      History      Chief Complaint   Patient presents with    Shortness of Breath     sent from Dr. Espinoza office       Review of patient's allergies indicates:   Allergen Reactions    Sulfa (sulfonamide antibiotics) Hives        HPI   HPI    1/29/2018, 4:10 PM   History obtained from the family member and patient      History of Present Illness: Chung Meneses Sr. is a 86 y.o. male patient with HTN, CHF, A-fib who presents to the Emergency Department for worsening SOB and "fluid in my abdomen." He states that he feels SOB with any activity. Patient was sent to ED by Dr. Espinoza (Cardiology) for further evaluation and possible IV diuresis. Sxs are constant and moderate in severity. Associated sx include trace edema to the BLE. Patient reports that he takes a total of 40 mg Lasix a day and has been compliant with his medication. He states that he has been urinating. He only elevates his legs at night and does not wear compression stockings. No mitigating or exacerbating factors reported. Patient denies fever, chills, CP, cough, chest tightness, abd pain, N/V, dizziness, decreased urine output, and all other sxs at this time. Metoprolol dosage was decreased from 25 mg BID, to 25 mg only in the evening. No further complaints or concerns at this time.       Arrival mode: Personal vehicle    PCP: Glenroy Carmichael MD       Past Medical History:  Past Medical History:   Diagnosis Date    Aortic stenosis 8/2/2013    Atrial fibrillation 7/5/2013    CHF (congestive heart failure)     Dizziness - light-headed     Hyperlipidemia     Hypertension     Syncope 1/26/2018       Past Surgical History:  Past Surgical History:   Procedure Laterality Date    BACK SURGERY  2003    knee replacemnt bilateral  2001    SHOULDER SURGERY  2002         Family History:  Family History   Problem Relation Age " of Onset    Hypertension Mother     Hypertension Father     Hypertension Sister     Hypertension Brother     Heart failure Brother     Heart disease Brother     Heart attack Brother        Social History:  Social History     Social History Main Topics    Smoking status: Never Smoker    Smokeless tobacco: Never Used    Alcohol use No      Comment: HOLIDAY    Drug use: No    Sexual activity: No       ROS   Review of Systems   Constitutional: Negative for chills and fever.   HENT: Negative for sore throat.    Respiratory: Positive for shortness of breath. Negative for cough and chest tightness.    Cardiovascular: Positive for leg swelling. Negative for chest pain and palpitations.   Gastrointestinal: Positive for abdominal distention. Negative for abdominal pain, diarrhea, nausea and vomiting.   Genitourinary: Negative for decreased urine volume and dysuria.   Musculoskeletal: Negative for back pain.   Skin: Negative for rash.   Neurological: Negative for dizziness, weakness, light-headedness and headaches.   Hematological: Does not bruise/bleed easily.   All other systems reviewed and are negative.      Physical Exam      Initial Vitals [01/29/18 1544]   BP Pulse Resp Temp SpO2   128/75 81 20 98.1 °F (36.7 °C) 98 %      MAP       92.67          Physical Exam  Nursing Notes and Vital Signs Reviewed.  Constitutional: Patient is in no acute distress. Awake and alert. Well-developed and well-nourished. Non-ill appearing.  Head: Atraumatic. Normocephalic.  Eyes: PERRL. EOM intact. Conjunctivae are not pale. No scleral icterus.  ENT: Mucous membranes are moist. Oropharynx is clear and symmetric.    Neck: Supple. Full ROM. No lymphadenopathy.  Cardiovascular: Regular rate. Irregularly irregular rhythm. No murmurs, rubs, or gallops. Distal pulses are 2+ and symmetric.  Pulmonary/Chest: No respiratory distress. Clear to auscultation bilaterally. No wheezing, rales, or rhonchi.  Abdominal: Soft and non-distended.   "There is no tenderness.  No rebound, guarding, or rigidity.  Good bowel sounds.    Musculoskeletal: Moves all extremities. No obvious deformities. Trace BLE edema. No calf tenderness.  Skin: Warm and dry.  Neurological:  Alert, awake, and appropriate.  Normal speech.  No acute focal neurological deficits are appreciated.  Psychiatric: Normal affect. Good eye contact. Appropriate in content.    ED Course    Procedures  ED Vital Signs:  Vitals:    01/29/18 1544 01/29/18 1634 01/29/18 1648 01/29/18 1650   BP: 128/75 124/61     Pulse: 81 90 79 103   Resp: 20 20 20    Temp: 98.1 °F (36.7 °C)      TempSrc: Oral      SpO2: 98% (!) 93% (!) 88%    Weight: 86.2 kg (190 lb)      Height: 5' 9" (1.753 m)       01/29/18 1702 01/29/18 1716 01/29/18 1731 01/29/18 1800   BP: (!) 165/68  (!) 141/82 (!) 141/94   Pulse: 102  100 94   Resp: 20  (!) 24 17   Temp:       TempSrc:       SpO2: 95% 97% 98% 100%   Weight:       Height:        01/29/18 1801 01/29/18 1802   BP: 131/78 (!) 143/66   Pulse: 93 90   Resp: 20 20   Temp:     TempSrc:     SpO2: 95% (!) 93%   Weight:     Height:         Abnormal Lab Results:  Labs Reviewed   CBC W/ AUTO DIFFERENTIAL - Abnormal; Notable for the following:        Result Value    RBC 3.91 (*)     Hemoglobin 8.6 (*)     Hematocrit 29.2 (*)     MCV 75 (*)     MCH 22.0 (*)     MCHC 29.5 (*)     RDW 18.5 (*)     Lymph # 0.7 (*)     Gran% 74.7 (*)     Lymph% 11.5 (*)     All other components within normal limits   COMPREHENSIVE METABOLIC PANEL - Abnormal; Notable for the following:     Glucose 146 (*)     BUN, Bld 35 (*)     Creatinine 1.7 (*)     Albumin 3.2 (*)     Total Bilirubin 1.2 (*)     eGFR if  41 (*)     eGFR if non  36 (*)     All other components within normal limits   TROPONIN I - Abnormal; Notable for the following:     Troponin I 0.101 (*)     All other components within normal limits   B-TYPE NATRIURETIC PEPTIDE - Abnormal; Notable for the following:     BNP 1,854 " (*)     All other components within normal limits   PROTIME-INR - Abnormal; Notable for the following:     Prothrombin Time 13.4 (*)     INR 1.3 (*)     All other components within normal limits   APTT   URINALYSIS   TROPONIN I   PROCALCITONIN   D DIMER, QUANTITATIVE   MAGNESIUM   PHOSPHORUS        All Lab Results:  Results for orders placed or performed during the hospital encounter of 01/29/18   CBC auto differential   Result Value Ref Range    WBC 6.11 3.90 - 12.70 K/uL    RBC 3.91 (L) 4.60 - 6.20 M/uL    Hemoglobin 8.6 (L) 14.0 - 18.0 g/dL    Hematocrit 29.2 (L) 40.0 - 54.0 %    MCV 75 (L) 82 - 98 fL    MCH 22.0 (L) 27.0 - 31.0 pg    MCHC 29.5 (L) 32.0 - 36.0 g/dL    RDW 18.5 (H) 11.5 - 14.5 %    Platelets 198 150 - 350 K/uL    MPV 9.2 9.2 - 12.9 fL    Gran # (ANC) 4.6 1.8 - 7.7 K/uL    Lymph # 0.7 (L) 1.0 - 4.8 K/uL    Mono # 0.7 0.3 - 1.0 K/uL    Eos # 0.1 0.0 - 0.5 K/uL    Baso # 0.03 0.00 - 0.20 K/uL    Gran% 74.7 (H) 38.0 - 73.0 %    Lymph% 11.5 (L) 18.0 - 48.0 %    Mono% 11.6 4.0 - 15.0 %    Eosinophil% 2.0 0.0 - 8.0 %    Basophil% 0.5 0.0 - 1.9 %    Platelet Estimate Appears normal     Aniso Moderate     Poik Moderate     Poly Occasional     Hypo Moderate     Ovalocytes Occasional     Target Cells Occasional     Tear Drop Cells Occasional     Spherocytes Occasional     Schistocytes Present     Differential Method Automated    Comprehensive metabolic panel   Result Value Ref Range    Sodium 140 136 - 145 mmol/L    Potassium 4.7 3.5 - 5.1 mmol/L    Chloride 108 95 - 110 mmol/L    CO2 24 23 - 29 mmol/L    Glucose 146 (H) 70 - 110 mg/dL    BUN, Bld 35 (H) 8 - 23 mg/dL    Creatinine 1.7 (H) 0.5 - 1.4 mg/dL    Calcium 9.0 8.7 - 10.5 mg/dL    Total Protein 6.5 6.0 - 8.4 g/dL    Albumin 3.2 (L) 3.5 - 5.2 g/dL    Total Bilirubin 1.2 (H) 0.1 - 1.0 mg/dL    Alkaline Phosphatase 126 55 - 135 U/L    AST 25 10 - 40 U/L    ALT 20 10 - 44 U/L    Anion Gap 8 8 - 16 mmol/L    eGFR if African American 41 (A) >60 mL/min/1.73  m^2    eGFR if non African American 36 (A) >60 mL/min/1.73 m^2   Troponin I #1   Result Value Ref Range    Troponin I 0.101 (H) 0.000 - 0.026 ng/mL   B-Type natriuretic peptide (BNP)   Result Value Ref Range    BNP 1,854 (H) 0 - 99 pg/mL   Protime-INR   Result Value Ref Range    Prothrombin Time 13.4 (H) 9.0 - 12.5 sec    INR 1.3 (H) 0.8 - 1.2   APTT   Result Value Ref Range    aPTT 31.0 21.0 - 32.0 sec   Urinalysis Clean Catch   Result Value Ref Range    Specimen UA Urine, Clean Catch     Color, UA Yellow Yellow, Straw, Alina    Appearance, UA Clear Clear    pH, UA 6.0 5.0 - 8.0    Specific Gravity, UA 1.010 1.005 - 1.030    Protein, UA Negative Negative    Glucose, UA Negative Negative    Ketones, UA Negative Negative    Bilirubin (UA) Negative Negative    Occult Blood UA Negative Negative    Nitrite, UA Negative Negative    Urobilinogen, UA 1.0 <2.0 EU/dL    Leukocytes, UA Negative Negative     Imaging Results:  Imaging Results          X-Ray Chest AP Portable (Final result)  Result time 01/29/18 17:00:25    Final result by Sharad Magana MD (01/29/18 17:00:25)                 Impression:       Questionable nondisplaced posterior-lateral left 10th rib fracture. Correlate with point tenderness.        Electronically signed by: SHARAD MAGANA MD  Date:     01/29/18  Time:    17:00              Narrative:    Clinical Data:Chest pain    Comparison:  1/26/18    Findings:  Two view of the chest.   Sternotomy wires are intact. Aorta demonstrates atherosclerotic disease.    Cardiac silhouette is normal.  The lungs demonstrate no evidence of active disease.  No evidence of pleural effusion or pneumothorax.  Bones demonstrate bilateral shoulder replacements without evidence of dislocation. Advanced degenerative changes are noted. Questionable nondisplaced posterior-lateral left 10th rib fracture. Correlate with point tenderness.                             The EKG was ordered, reviewed, and independently interpreted by the  ED provider.  Interpretation time: 15:56  Rate: 91 BPM  Rhythm: atrial fibrillation  Interpretation: Left axis deviation. Low voltage QRS. No STEMI.    The Emergency Provider reviewed the vital signs and test results, which are outlined above.    ED Discussion   6:04 PM: Pt has put out 1 L after IV Lasix 60 mg, still feels poorly. Re-evaluated pt. I have discussed test results, shared treatment plan, and the need for admission with patient and family at bedside. Pt and family express understanding at this time and agree with all information. All questions answered. Pt and family have no further questions or concerns at this time. Pt is ready for admit.    6:12 PM: Discussed case with Becca Mayfield NP ([facility]). Dr. Schreiber agrees with current care and management of pt and accepts admission.   Admitting Service: Hospital medicine   Admitting Physician: Dr. Schreiber  Admit to: Tele    ED Medication(s):  Medications   furosemide injection 20 mg (not administered)   metOLazone tablet 5 mg (not administered)   apixaban tablet 2.5 mg (not administered)   atorvastatin tablet 40 mg (not administered)   metoprolol tartrate (LOPRESSOR) tablet 25 mg (not administered)   potassium chloride SA CR tablet 20 mEq (not administered)   aspirin EC tablet 81 mg (not administered)   famotidine tablet 20 mg (not administered)   sodium chloride 0.9% flush 3 mL (not administered)   furosemide injection 60 mg (60 mg Intravenous Given 1/29/18 9821)       New Prescriptions    No medications on file            Medical Decision Making    Medical Decision Making:   Clinical Tests:   Lab Tests: Ordered and Reviewed  Radiological Study: Ordered and Reviewed  Medical Tests: Ordered and Reviewed           Scribe Attestation:   Scribe #1: I performed the above scribed service and the documentation accurately describes the services I performed. I attest to the accuracy of the note.    Attending:   Physician Attestation Statement for Scribe #1: I,  Najma Acosta MD, personally performed the services described in this documentation, as scribed by Blanca Toledo, in my presence, and it is both accurate and complete.          Clinical Impression       ICD-10-CM ICD-9-CM   1. Acute on chronic combined systolic and diastolic heart failure I50.43 428.43   2. Chronic renal insufficiency, stage 3 (moderate) N18.3 585.3   3. Elevated troponin R74.8 790.6   4. Chronic anticoagulation Z79.01 V58.61   5. History of pulmonary hypertension Z86.79 V12.59   6. Chronic anemia D64.9 285.9       Disposition:   Disposition: Admitted (Tele)  Condition: Fair  .       Najma Acosta MD  01/29/18 1922       Najma Acosta MD  01/29/18 1925

## 2018-01-29 NOTE — TELEPHONE ENCOUNTER
Received phone call from daughter Jocelin with complaints that Dad has bloated abdomen, wheezing and edema.  Advised may need IV medication to assist with fluid retention    Dr. Espinoza -please advise

## 2018-01-30 LAB
ALBUMIN SERPL BCP-MCNC: 3.1 G/DL
ALP SERPL-CCNC: 122 U/L
ALT SERPL W/O P-5'-P-CCNC: 21 U/L
ANION GAP SERPL CALC-SCNC: 8 MMOL/L
ANISOCYTOSIS BLD QL SMEAR: SLIGHT
AST SERPL-CCNC: 23 U/L
BASOPHILS # BLD AUTO: 0.02 K/UL
BASOPHILS NFR BLD: 0.4 %
BILIRUB SERPL-MCNC: 1.3 MG/DL
BUN SERPL-MCNC: 37 MG/DL
BURR CELLS BLD QL SMEAR: ABNORMAL
CALCIUM SERPL-MCNC: 9.1 MG/DL
CHLORIDE SERPL-SCNC: 106 MMOL/L
CO2 SERPL-SCNC: 25 MMOL/L
CREAT SERPL-MCNC: 1.6 MG/DL
DACRYOCYTES BLD QL SMEAR: ABNORMAL
DIFFERENTIAL METHOD: ABNORMAL
EOSINOPHIL # BLD AUTO: 0.1 K/UL
EOSINOPHIL NFR BLD: 2.3 %
ERYTHROCYTE [DISTWIDTH] IN BLOOD BY AUTOMATED COUNT: 18.4 %
EST. GFR  (AFRICAN AMERICAN): 44 ML/MIN/1.73 M^2
EST. GFR  (NON AFRICAN AMERICAN): 38 ML/MIN/1.73 M^2
GIANT PLATELETS BLD QL SMEAR: PRESENT
GLUCOSE SERPL-MCNC: 93 MG/DL
HCT VFR BLD AUTO: 27.2 %
HGB BLD-MCNC: 8 G/DL
HYPOCHROMIA BLD QL SMEAR: ABNORMAL
LYMPHOCYTES # BLD AUTO: 0.8 K/UL
LYMPHOCYTES NFR BLD: 15.3 %
MCH RBC QN AUTO: 21.7 PG
MCHC RBC AUTO-ENTMCNC: 29.4 G/DL
MCV RBC AUTO: 74 FL
MONOCYTES # BLD AUTO: 0.6 K/UL
MONOCYTES NFR BLD: 12.2 %
NEUTROPHILS # BLD AUTO: 3.6 K/UL
NEUTROPHILS NFR BLD: 70 %
OVALOCYTES BLD QL SMEAR: ABNORMAL
PLATELET # BLD AUTO: 200 K/UL
PLATELET BLD QL SMEAR: ABNORMAL
PMV BLD AUTO: 9 FL
POIKILOCYTOSIS BLD QL SMEAR: ABNORMAL
POLYCHROMASIA BLD QL SMEAR: ABNORMAL
POTASSIUM SERPL-SCNC: 4.3 MMOL/L
PROCALCITONIN SERPL IA-MCNC: 0.08 NG/ML
PROT SERPL-MCNC: 6.4 G/DL
RBC # BLD AUTO: 3.68 M/UL
SCHISTOCYTES BLD QL SMEAR: PRESENT
SODIUM SERPL-SCNC: 139 MMOL/L
SPHEROCYTES BLD QL SMEAR: ABNORMAL
STOMATOCYTES BLD QL SMEAR: PRESENT
TARGETS BLD QL SMEAR: ABNORMAL
TROPONIN I SERPL DL<=0.01 NG/ML-MCNC: 0.1 NG/ML
WBC # BLD AUTO: 5.18 K/UL

## 2018-01-30 PROCEDURE — 21400001 HC TELEMETRY ROOM

## 2018-01-30 PROCEDURE — 25000003 PHARM REV CODE 250: Performed by: NURSE PRACTITIONER

## 2018-01-30 PROCEDURE — 84484 ASSAY OF TROPONIN QUANT: CPT

## 2018-01-30 PROCEDURE — G0378 HOSPITAL OBSERVATION PER HR: HCPCS

## 2018-01-30 PROCEDURE — 63600175 PHARM REV CODE 636 W HCPCS: Performed by: FAMILY MEDICINE

## 2018-01-30 PROCEDURE — 99223 1ST HOSP IP/OBS HIGH 75: CPT | Mod: ,,, | Performed by: INTERNAL MEDICINE

## 2018-01-30 PROCEDURE — 36415 COLL VENOUS BLD VENIPUNCTURE: CPT

## 2018-01-30 PROCEDURE — 25000003 PHARM REV CODE 250: Performed by: FAMILY MEDICINE

## 2018-01-30 PROCEDURE — 85025 COMPLETE CBC W/AUTO DIFF WBC: CPT

## 2018-01-30 PROCEDURE — 97802 MEDICAL NUTRITION INDIV IN: CPT

## 2018-01-30 PROCEDURE — 80053 COMPREHEN METABOLIC PANEL: CPT

## 2018-01-30 PROCEDURE — A4216 STERILE WATER/SALINE, 10 ML: HCPCS | Performed by: FAMILY MEDICINE

## 2018-01-30 RX ORDER — ACETAMINOPHEN 325 MG/1
650 TABLET ORAL EVERY 6 HOURS PRN
Status: DISCONTINUED | OUTPATIENT
Start: 2018-01-30 | End: 2018-01-31 | Stop reason: HOSPADM

## 2018-01-30 RX ADMIN — METOLAZONE 5 MG: 5 TABLET ORAL at 09:01

## 2018-01-30 RX ADMIN — ACETAMINOPHEN 650 MG: 325 TABLET, FILM COATED ORAL at 08:01

## 2018-01-30 RX ADMIN — ATORVASTATIN CALCIUM 40 MG: 40 TABLET, FILM COATED ORAL at 09:01

## 2018-01-30 RX ADMIN — APIXABAN 2.5 MG: 2.5 TABLET, FILM COATED ORAL at 08:01

## 2018-01-30 RX ADMIN — METOPROLOL TARTRATE 25 MG: 25 TABLET ORAL at 08:01

## 2018-01-30 RX ADMIN — APIXABAN 2.5 MG: 2.5 TABLET, FILM COATED ORAL at 09:01

## 2018-01-30 RX ADMIN — ASPIRIN 81 MG: 81 TABLET, COATED ORAL at 09:01

## 2018-01-30 RX ADMIN — FAMOTIDINE 20 MG: 20 TABLET, FILM COATED ORAL at 09:01

## 2018-01-30 RX ADMIN — SODIUM CHLORIDE, PRESERVATIVE FREE 3 ML: 5 INJECTION INTRAVENOUS at 06:01

## 2018-01-30 RX ADMIN — FUROSEMIDE 20 MG: 10 INJECTION, SOLUTION INTRAMUSCULAR; INTRAVENOUS at 09:01

## 2018-01-30 RX ADMIN — ACETAMINOPHEN 650 MG: 325 TABLET, FILM COATED ORAL at 04:01

## 2018-01-30 RX ADMIN — POTASSIUM CHLORIDE 20 MEQ: 1500 TABLET, EXTENDED RELEASE ORAL at 09:01

## 2018-01-30 RX ADMIN — FUROSEMIDE 20 MG: 10 INJECTION, SOLUTION INTRAMUSCULAR; INTRAVENOUS at 05:01

## 2018-01-30 RX ADMIN — SODIUM CHLORIDE, PRESERVATIVE FREE 3 ML: 5 INJECTION INTRAVENOUS at 10:01

## 2018-01-30 NOTE — HOSPITAL COURSE
Pt admitted to Inpatient status for Acute on chronic heart failure.  Chest xray showed mild pulmonary vascular congestion and questionable nondisplaced posterior-lateral left 10th rib fracture. BNP of 1854 noted.  Elevated troponin likely due to demand ischemia, will trend.  Pt treated with IV Lasix and verbalized significant improvement of symptoms. Echo results showed EF 50%, DD, and elevated PAP. EKG no acute st change. Atrial fibrillation on controlled on monitor. Patient diuresed well. Symptoms improved. Case discussed with josep Hare to discharge from cardiology standpoint. Home meds reconciled. Lasix changed to 40 mg po daily. Patient to follow-up with PCP in 3-5 days for hospital follow-up. Patient also to follow-up with Dr. Espinoza in 1 week. Patient seen and examined on the date of discharge and found suitable for discharge.

## 2018-01-30 NOTE — HPI
"87 y/o male with PMHx of combined diastolic and systolic heart failure, HTN, and A-fib presented to ED with c/o SOB that onset gradually several days ago. Pt c/o "fluid in my belly". Associated symptoms include fatigue, cough, wheezing, and abdominal bloating. Pt reports he is unable to tie his shoes 2/2 SOB. Pt sees Dr. Espinoza who recommend pt come to ED for evaluation and IV diuresis. Symptoms are constant and moderate in severity. No mitigating or exacerbating factors. Patient denies fever, chills, CP, cough, chest tightness, abd pain, N/V, dizziness, decreased urine output, and all other sxs at this time.   Patient is a partial code - wanting CPR only and his surrogate decision maker is Jocelin Machuca [phone].  "

## 2018-01-30 NOTE — ASSESSMENT & PLAN NOTE
Patient denies CP   Most likely demand ischemia  Troponin 0.101  Serial Troponin  Cardiology consult

## 2018-01-30 NOTE — HPI
85 yo male, came in for CHF exacerbation.  PMH CAD s/p CABGx1 and AVR, normal EF per echo done on 01/26/18, chronic AFIB, moderate MR, HTN and CHFpEF.  Had episode of syncope recently and his orthostatic sign was negative in the Dr. Espinoza's office two days ago.  Has had worsening SOB with leg and abd swelling recently. BNP up to 1500. Added Lasix oral 20 mg daily. Admitted yesterday fror CHF exacerbation.   EKG showed chronic afib with controlled VR. Chronically elevated troponin to 0.1. Recent echo on 1/26 did not show RWMA.

## 2018-01-30 NOTE — ASSESSMENT & PLAN NOTE
CHF pEF exacerbation.  Ok to continue Lasix 20 mg bid IV.   Improved Cr. Continue monitor  DASh and fluid restriction to 50 oz

## 2018-01-30 NOTE — PLAN OF CARE
Problem: Patient Care Overview  Goal: Plan of Care Review  Outcome: Ongoing (interventions implemented as appropriate)  Patient had uneventful shift. Patient awake, alert and oriented x 4. VS stable. Patient denies pain or SOB. Patient on telemetry, afib on the monitor. Patient ambulates with stand by assistance. Fall precautions in place. Bed alarm active and audible. Patient free from fall/injury. Plan of care reviewed with patient. Patient has no questions at this time. Will continue to monitor.

## 2018-01-30 NOTE — HOSPITAL COURSE
After admission, had lasix 60 mg ivp x1 in ER and on lasix 20 mg iv bid. I&O negative 3 liters.   His dyspnea improved a lot..    1/31, dyspnea improved, Good urine output. I&O - 5 l iters. Cr 1.8 stable.

## 2018-01-30 NOTE — ASSESSMENT & PLAN NOTE
Patient denies CP   Most likely due to demand ischemia  Troponin 0.101>0.119  Cardiology consult   Monitor

## 2018-01-30 NOTE — SUBJECTIVE & OBJECTIVE
Past Medical History:   Diagnosis Date    Aortic stenosis 8/2/2013    Atrial fibrillation 7/5/2013    CHF (congestive heart failure)     Dizziness - light-headed     Hyperlipidemia     Hypertension     Syncope 1/26/2018       Past Surgical History:   Procedure Laterality Date    BACK SURGERY  2003    knee replacemnt bilateral  2001    SHOULDER SURGERY  2002       Review of patient's allergies indicates:   Allergen Reactions    Sulfa (sulfonamide antibiotics) Hives       No current facility-administered medications on file prior to encounter.      Current Outpatient Prescriptions on File Prior to Encounter   Medication Sig    apixaban (ELIQUIS) 2.5 mg Tab Take 2.5 mg by mouth 2 (two) times daily.    aspirin (ECOTRIN) 81 MG EC tablet Take 1 tablet (81 mg total) by mouth once daily.    atorvastatin (LIPITOR) 40 MG tablet Take 40 mg by mouth.    fish oil-omega-3 fatty acids 300-1,000 mg capsule Take 1,200 mg by mouth once daily.    furosemide (LASIX) 20 MG tablet Take 20 mg by mouth.    metoprolol tartrate (LOPRESSOR) 25 MG tablet Take 25 mg by mouth every evening.     multivitamin with folic acid 400 mcg Tab Take 1 tablet by mouth.    potassium chloride (K-TAB) 20 mEq Take 1 tablet (20 mEq total) by mouth once daily.    ranitidine (ZANTAC) 75 MG tablet Take 75 mg by mouth 2 (two) times daily.     VENTOLIN HFA 90 mcg/actuation inhaler INHALE 1 TO 2 PUFFS 4 TIMES A DAY AS NEEDED FOR WHEEZING    vitamin D 1000 units Tab Take 1,000 Units by mouth once daily.     Family History     Problem Relation (Age of Onset)    Heart attack Brother    Heart disease Brother    Heart failure Brother    Hypertension Mother, Father, Sister, Brother        Social History Main Topics    Smoking status: Never Smoker    Smokeless tobacco: Never Used    Alcohol use No      Comment: HOLIDAY    Drug use: No    Sexual activity: No     Review of Systems   Constitution: Negative for decreased appetite, diaphoresis, fever,  weakness, malaise/fatigue and night sweats.   HENT: Negative for nosebleeds.    Eyes: Negative for blurred vision and double vision.   Cardiovascular: Positive for dyspnea on exertion and leg swelling. Negative for chest pain, claudication, irregular heartbeat, near-syncope, orthopnea, palpitations, paroxysmal nocturnal dyspnea and syncope.   Respiratory: Negative for cough, shortness of breath, sleep disturbances due to breathing, snoring, sputum production and wheezing.    Endocrine: Negative for cold intolerance and polyuria.   Hematologic/Lymphatic: Does not bruise/bleed easily.   Skin: Negative for rash.   Musculoskeletal: Negative for back pain, falls, joint pain, joint swelling and neck pain.   Gastrointestinal: Positive for bloating. Negative for abdominal pain, heartburn, nausea and vomiting.   Genitourinary: Negative for dysuria, frequency and hematuria.   Neurological: Negative for difficulty with concentration, dizziness, focal weakness, headaches, light-headedness, numbness and seizures.   Psychiatric/Behavioral: Negative for depression, memory loss and substance abuse. The patient does not have insomnia.    Allergic/Immunologic: Negative for HIV exposure and hives.     Objective:     Vital Signs (Most Recent):  Temp: 97.9 °F (36.6 °C) (01/30/18 1205)  Pulse: 84 (01/30/18 1205)  Resp: 18 (01/30/18 1205)  BP: (!) 140/70 (01/30/18 1205)  SpO2: 96 % (01/30/18 1205) Vital Signs (24h Range):  Temp:  [97.5 °F (36.4 °C)-98.1 °F (36.7 °C)] 97.9 °F (36.6 °C)  Pulse:  [] 84  Resp:  [16-24] 18  SpO2:  [88 %-100 %] 96 %  BP: (118-165)/(61-94) 140/70     Weight: 84.9 kg (187 lb 2.7 oz)  Body mass index is 27.64 kg/m².    SpO2: 96 %  O2 Device (Oxygen Therapy): room air      Intake/Output Summary (Last 24 hours) at 01/30/18 1405  Last data filed at 01/30/18 1300   Gross per 24 hour   Intake              480 ml   Output             3380 ml   Net            -2900 ml       Lines/Drains/Airways     Pressure Ulcer                  Pressure Ulcer coccyx -- days         Pressure Ulcer 12/02/17 0705 Left coccyx suspected deep tissue injury 59 days          Peripheral Intravenous Line                 Peripheral IV - Single Lumen 01/29/18 1559 Left Antecubital less than 1 day                Physical Exam   Constitutional: He is oriented to person, place, and time. He appears well-nourished.   HENT:   Head: Normocephalic.   Eyes: Pupils are equal, round, and reactive to light.   Neck: Normal carotid pulses and no JVD present. Carotid bruit is not present. No thyromegaly present.   Cardiovascular: Normal rate, normal heart sounds and normal pulses.  An irregularly irregular rhythm present.  No extrasystoles are present. PMI is not displaced.  Exam reveals no gallop and no S3.    No murmur heard.  Pulmonary/Chest: Breath sounds normal. No stridor. No respiratory distress.   Abdominal: Soft. Bowel sounds are normal. There is no tenderness. There is no rebound.   Musculoskeletal: Normal range of motion. He exhibits edema.   1+ edema   Neurological: He is alert and oriented to person, place, and time.   Skin: Skin is intact. No rash noted.   Psychiatric: His behavior is normal.       Significant Labs:   ABG: No results for input(s): PH, PCO2, HCO3, POCSATURATED, BE in the last 48 hours., Blood Culture: No results for input(s): LABBLOO in the last 48 hours., BMP:   Recent Labs  Lab 01/29/18  1559 01/29/18  1916 01/30/18  0436   *  --  93     --  139   K 4.7  --  4.3     --  106   CO2 24  --  25   BUN 35*  --  37*   CREATININE 1.7*  --  1.6*   CALCIUM 9.0  --  9.1   MG  --  1.9  --    , CMP   Recent Labs  Lab 01/29/18  1559 01/30/18  0436    139   K 4.7 4.3    106   CO2 24 25   * 93   BUN 35* 37*   CREATININE 1.7* 1.6*   CALCIUM 9.0 9.1   PROT 6.5 6.4   ALBUMIN 3.2* 3.1*   BILITOT 1.2* 1.3*   ALKPHOS 126 122   AST 25 23   ALT 20 21   ANIONGAP 8 8   ESTGFRAFRICA 41* 44*   EGFRNONAA 36* 38*   , CBC    Recent Labs  Lab 01/29/18  1559 01/30/18  0436   WBC 6.11 5.18   HGB 8.6* 8.0*   HCT 29.2* 27.2*    200   , INR   Recent Labs  Lab 01/29/18  1559   INR 1.3*   , Lipid Panel No results for input(s): CHOL, HDL, LDLCALC, TRIG, CHOLHDL in the last 48 hours. and Troponin   Recent Labs  Lab 01/29/18  1559 01/29/18  2207 01/30/18  1054   TROPONINI 0.101* 0.119* 0.105*       Significant Imaging: X-Ray: CXR: X-Ray Chest 1 View (CXR): No results found for this visit on 01/29/18.

## 2018-01-30 NOTE — PLAN OF CARE
Met with pt to complete discharge planning assessment. Pt states he lives with his wife. She is currently at a SNF due to a fall, but pt reports she should be coming home soon. Pt's daughter, Jocelin, provides transportation and assistance when needed. Pt does not use any HME. He recently had Home Health services with Vasiliy PADILLA (SN, PT, OT). [facility] is now [facility].   Report given to , Erma CHUNG.     01/30/18 1024   Discharge Assessment   Assessment Type Discharge Planning Assessment   Confirmed/corrected address and phone number on facesheet? Yes   Assessment information obtained from? Patient   Prior to hospitilization cognitive status: Alert/Oriented   Prior to hospitalization functional status: Independent   Current cognitive status: Alert/Oriented   Current Functional Status: Independent   Lives With spouse;child(nina), adult   Able to Return to Prior Arrangements yes   Is patient able to care for self after discharge? Yes   Who are your caregiver(s) and their phone number(s)? Daughter, Jocelin Machuca [phone]   Readmission Within The Last 30 Days no previous admission in last 30 days   Patient currently being followed by outpatient case management? No   Equipment Currently Used at Home none   Do you have any problems affording any of your prescribed medications? No   Does the patient have transportation home? Yes   Transportation Available car;family or friend will provide   Discharge Plan A Home with family   Patient/Family In Agreement With Plan yes

## 2018-01-30 NOTE — CONSULTS
Nutrition Education Note      Reason for Assessment: MD consult for Salt and Fluid restriction counseling  Dx:   1. Acute on chronic combined systolic and diastolic heart failure    2. Chronic renal insufficiency, stage 3 (moderate)    3. Elevated troponin    4. Chronic anticoagulation    5. History of pulmonary hypertension    6. Chronic anemia      Medical Hx:  Past Medical History:   Diagnosis Date    Aortic stenosis 8/2/2013    Atrial fibrillation 7/5/2013    CHF (congestive heart failure)     Dizziness - light-headed     Hyperlipidemia     Hypertension     Syncope 1/26/2018     General Info: Patient with good appetite and intake, consumed 100% of lunch.   Diet/Supplement PTA: Follows LowNa diet at home    Current Diet: Cardiac  % intake of meals: 100%    Ht: 69in  Wt: 85kg  % IBW:117%    Labs: Reviewed  Meds: Reviewed    Diet Education Provided: AND Manual's Heart Failure Nutrition Therapy  Learners: Patient  All questions and concerns answered. Dietitian's contact information provided.   Nutrition Discharge Plan: Home on Cardiac diet    Follow Up: LOS x10 days or as sooner if warranted

## 2018-01-30 NOTE — PROGRESS NOTES
"[facility] Hospital Medicine  Progress Note    Patient Name: Chung Meneses Sr.  MRN: [id number]  Patient Class: IP- Inpatient   Admission Date: 1/29/2018  Length of Stay: 1 days  Attending Physician: Vasquez Schreiber MD  Primary Care Provider: Glenroy Carmichael MD        Subjective:     Principal Problem:Acute on chronic combined systolic and diastolic heart failure    HPI:  87 y/o male with PMHx of combined diastolic and systolic heart failure, HTN, and A-fib presented to ED with c/o SOB that onset gradually several days ago. Pt c/o "fluid in my belly". Associated symptoms include fatigue, cough, wheezing, and abdominal bloating. Pt reports he is unable to tie his shoes 2/2 SOB. Pt sees Dr. Espinoza who recommend pt come to ED for evaluation and IV diuresis. Symptoms are constant and moderate in severity. No mitigating or exacerbating factors. Patient denies fever, chills, CP, cough, chest tightness, abd pain, N/V, dizziness, decreased urine output, and all other sxs at this time.   Patient is a partial code - wanting CPR only and his surrogate decision maker is Jocelin Machuca [phone].    Hospital Course:  Pt admitted to Inpatient status for Acute on chronic heart failure.  Chest xray showed mild pulmonary vascular congestion and questionable nondisplaced posterior-lateral left 10th rib fracture. BNP of 1854 noted.  Elevated troponin likely due to demand ischemia, will trend.  Pt treated with IV Lasix and verbalized significant improvement of symptoms. Echo results showed EF 50%, DD, and elevated PAP. Atrial fibrillation on controlled on monitor.       Interval History: No acute events overnight. Patient reports feeling better but still having some SOB. Will continue to diurese. Cardiology following.     Review of Systems   Constitutional: Positive for activity change and fatigue. Negative for chills and fever.   HENT: Negative for congestion and sore throat.    Eyes: Negative for pain and visual " disturbance.   Respiratory: Positive for cough and shortness of breath.    Cardiovascular: Positive for leg swelling.   Gastrointestinal: Positive for abdominal distention. Negative for abdominal pain, constipation, diarrhea, nausea and vomiting.   Endocrine: Negative for cold intolerance, heat intolerance and polyuria.   Genitourinary: Negative for difficulty urinating, dysuria and hematuria.   Musculoskeletal: Negative for arthralgias, gait problem and myalgias.   Skin: Negative for color change, rash and wound.   Allergic/Immunologic: Negative for environmental allergies and food allergies.   Neurological: Positive for dizziness, syncope and weakness.   Hematological: Negative.    Psychiatric/Behavioral: Negative for agitation, behavioral problems and confusion.     Objective:     Vital Signs (Most Recent):  Temp: 97.5 °F (36.4 °C) (01/30/18 1000)  Pulse: 77 (01/30/18 1000)  Resp: 16 (01/30/18 1000)  BP: 129/72 (01/30/18 1000)  SpO2: 98 % (01/30/18 1000) Vital Signs (24h Range):  Temp:  [97.5 °F (36.4 °C)-98.1 °F (36.7 °C)] 97.5 °F (36.4 °C)  Pulse:  [] 77  Resp:  [16-24] 16  SpO2:  [88 %-100 %] 98 %  BP: (118-165)/(61-94) 129/72     Weight: 84.9 kg (187 lb 2.7 oz)  Body mass index is 27.64 kg/m².    Intake/Output Summary (Last 24 hours) at 01/30/18 1025  Last data filed at 01/30/18 0409   Gross per 24 hour   Intake                0 ml   Output             3080 ml   Net            -3080 ml      Physical Exam   Constitutional: He is oriented to person, place, and time. He appears well-developed and well-nourished. No distress.   HENT:   Head: Normocephalic and atraumatic.   Nose: Nose normal.   Mouth/Throat: Oropharynx is clear and moist.   Eyes: Conjunctivae and EOM are normal. Pupils are equal, round, and reactive to light. Right eye exhibits no discharge. Left eye exhibits no discharge.   Neck: Normal range of motion. Neck supple. No JVD present.   Cardiovascular: Normal rate, regular rhythm, normal heart  sounds and intact distal pulses.  Exam reveals no gallop and no friction rub.    No murmur heard.  Pulmonary/Chest: Breath sounds normal. No respiratory distress.   Abdominal: Soft. Bowel sounds are normal. He exhibits distension (soft ). There is no tenderness.   Genitourinary:   Genitourinary Comments: deferred   Musculoskeletal: Normal range of motion. He exhibits edema (1+ trace sue le's). He exhibits no deformity.   Neurological: He is alert and oriented to person, place, and time. No cranial nerve deficit or sensory deficit.   Skin: Skin is warm and dry. Capillary refill takes 2 to 3 seconds. He is not diaphoretic. No erythema.   Psychiatric: He has a normal mood and affect. His behavior is normal. Judgment and thought content normal.   Nursing note and vitals reviewed.      Significant Labs:   BMP:   Recent Labs  Lab 01/29/18  1916 01/30/18  0436   GLU  --  93   NA  --  139   K  --  4.3   CL  --  106   CO2  --  25   BUN  --  37*   CREATININE  --  1.6*   CALCIUM  --  9.1   MG 1.9  --      CBC:   Recent Labs  Lab 01/29/18  1559 01/30/18  0436   WBC 6.11 5.18   HGB 8.6* 8.0*   HCT 29.2* 27.2*    200     CMP:   Recent Labs  Lab 01/29/18  1559 01/30/18  0436    139   K 4.7 4.3    106   CO2 24 25   * 93   BUN 35* 37*   CREATININE 1.7* 1.6*   CALCIUM 9.0 9.1   PROT 6.5 6.4   ALBUMIN 3.2* 3.1*   BILITOT 1.2* 1.3*   ALKPHOS 126 122   AST 25 23   ALT 20 21   ANIONGAP 8 8   EGFRNONAA 36* 38*     All pertinent labs within the past 24 hours have been reviewed.    Significant Imaging:   Imaging Results          X-Ray Chest AP Portable (Final result)  Result time 01/30/18 08:49:21    Final result by Sharad Magana MD (01/30/18 08:49:21)                 Impression:     No adverse interval change      Electronically signed by: SHARAD MAGANA MD  Date:     01/30/18  Time:    08:49              Narrative:    History: SOB    Comparison: 1/29/18    Result: Single view of the chest.       In comparison to  the prior study, there is no adverse interval changes.                             X-Ray Chest AP Portable (Final result)  Result time 01/29/18 17:00:25    Final result by Sharad Magana MD (01/29/18 17:00:25)                 Impression:       Questionable nondisplaced posterior-lateral left 10th rib fracture. Correlate with point tenderness.        Electronically signed by: SHARAD MAGANA MD  Date:     01/29/18  Time:    17:00              Narrative:    Clinical Data:Chest pain    Comparison:  1/26/18    Findings:  Two view of the chest.   Sternotomy wires are intact. Aorta demonstrates atherosclerotic disease.    Cardiac silhouette is normal.  The lungs demonstrate no evidence of active disease.  No evidence of pleural effusion or pneumothorax.  Bones demonstrate bilateral shoulder replacements without evidence of dislocation. Advanced degenerative changes are noted. Questionable nondisplaced posterior-lateral left 10th rib fracture. Correlate with point tenderness.                            Assessment/Plan:      * Acute on chronic combined systolic and diastolic heart failure    Telemetry monitoring  BNP 1854  CXR shows mild vascular congestion  Diurese with IV Lasix  Resume BB  Daily weights  1.5L fluid restriction, Na restriction   Recent echo showed Low normal to mildly depressed left ventricular systolic function (EF 50-55%), DD, Pulmonary HTN, Severe left atrial enlargement, Moderate mitral regurgitation, Mild to moderate tricuspid regurgitation.   Cardiology consult   Will continue to diurese   Improving             Elevated troponin    Patient denies CP   Most likely due to demand ischemia  Troponin 0.101>0.119  Cardiology consult   Monitor             Acute renal failure superimposed on chronic kidney disease    Most likely cardiorenal   Creatinine 1.7 (Baseline 1.5)  BNP 1854  IV Lasix   Stable   Monitor labs           CAD (coronary artery disease)    Continue current medical management   Cardiology  following          Atrial fibrillation    Heart rate controlled   Resume Eliquis and BB          Hyperlipidemia    Resume Statin           Hypertension    Resume home meds   Monitor              VTE Risk Mitigation         Ordered     Medium Risk of VTE  Once      01/29/18 2127     Place sequential compression device  Until discontinued      01/29/18 2127     apixaban tablet 2.5 mg  2 times daily     Route:  Oral        01/29/18 1915              Ana Maria Cooley NP  Department of Hospital Medicine   [facility]

## 2018-01-30 NOTE — CONSULTS
[facility]   Cardiology  Consult Note    Patient Name: Chung Meneses Sr.  MRN: [id number]  Admission Date: 1/29/2018  Hospital Length of Stay: 1 days  Code Status: Full Code   Attending Provider: Vasquez Schreiber MD   Consulting Provider: Yoko Saab MD  Primary Care Physician: Glenroy Carmichael MD  Principal Problem:Acute on chronic combined systolic and diastolic heart failure    Patient information was obtained from patient and ER records.     Inpatient consult to Cardiology  Consult performed by: YOKO SAAB  Consult ordered by: BRIGID RUSSELL        Subjective:       HPI:   85 yo male, came in for CHF exacerbation.  PMH CAD s/p CABGx1 and AVR, normal EF per echo done on 01/26/18, chronic AFIB, moderate MR, HTN and CHFpEF.  Had episode of syncope recently and his orthostatic sign was negative in the Dr. Espinoza's office two days ago.  Has had worsening SOB with leg and abd swelling recently. BNP up to 1500. Added Lasix oral 20 mg daily. Admitted yesterday fror CHF exacerbation.   EKG showed chronic afib with controlled VR. Chronically elevated troponin to 0.1. Recent echo on 1/26 did not show RWMA.        Past Medical History:   Diagnosis Date    Aortic stenosis 8/2/2013    Atrial fibrillation 7/5/2013    CHF (congestive heart failure)     Dizziness - light-headed     Hyperlipidemia     Hypertension     Syncope 1/26/2018       Past Surgical History:   Procedure Laterality Date    BACK SURGERY  2003    knee replacemnt bilateral  2001    SHOULDER SURGERY  2002       Review of patient's allergies indicates:   Allergen Reactions    Sulfa (sulfonamide antibiotics) Hives       No current facility-administered medications on file prior to encounter.      Current Outpatient Prescriptions on File Prior to Encounter   Medication Sig    apixaban (ELIQUIS) 2.5 mg Tab Take 2.5 mg by mouth 2 (two) times daily.    aspirin (ECOTRIN) 81 MG EC tablet Take 1 tablet (81 mg total) by mouth once daily.     atorvastatin (LIPITOR) 40 MG tablet Take 40 mg by mouth.    fish oil-omega-3 fatty acids 300-1,000 mg capsule Take 1,200 mg by mouth once daily.    furosemide (LASIX) 20 MG tablet Take 20 mg by mouth.    metoprolol tartrate (LOPRESSOR) 25 MG tablet Take 25 mg by mouth every evening.     multivitamin with folic acid 400 mcg Tab Take 1 tablet by mouth.    potassium chloride (K-TAB) 20 mEq Take 1 tablet (20 mEq total) by mouth once daily.    ranitidine (ZANTAC) 75 MG tablet Take 75 mg by mouth 2 (two) times daily.     VENTOLIN HFA 90 mcg/actuation inhaler INHALE 1 TO 2 PUFFS 4 TIMES A DAY AS NEEDED FOR WHEEZING    vitamin D 1000 units Tab Take 1,000 Units by mouth once daily.     Family History     Problem Relation (Age of Onset)    Heart attack Brother    Heart disease Brother    Heart failure Brother    Hypertension Mother, Father, Sister, Brother        Social History Main Topics    Smoking status: Never Smoker    Smokeless tobacco: Never Used    Alcohol use No      Comment: HOLIDAY    Drug use: No    Sexual activity: No     Review of Systems   Constitution: Negative for decreased appetite, diaphoresis, fever, weakness, malaise/fatigue and night sweats.   HENT: Negative for nosebleeds.    Eyes: Negative for blurred vision and double vision.   Cardiovascular: Positive for dyspnea on exertion and leg swelling. Negative for chest pain, claudication, irregular heartbeat, near-syncope, orthopnea, palpitations, paroxysmal nocturnal dyspnea and syncope.   Respiratory: Negative for cough, shortness of breath, sleep disturbances due to breathing, snoring, sputum production and wheezing.    Endocrine: Negative for cold intolerance and polyuria.   Hematologic/Lymphatic: Does not bruise/bleed easily.   Skin: Negative for rash.   Musculoskeletal: Negative for back pain, falls, joint pain, joint swelling and neck pain.   Gastrointestinal: Positive for bloating. Negative for abdominal pain, heartburn, nausea and  vomiting.   Genitourinary: Negative for dysuria, frequency and hematuria.   Neurological: Negative for difficulty with concentration, dizziness, focal weakness, headaches, light-headedness, numbness and seizures.   Psychiatric/Behavioral: Negative for depression, memory loss and substance abuse. The patient does not have insomnia.    Allergic/Immunologic: Negative for HIV exposure and hives.     Objective:     Vital Signs (Most Recent):  Temp: 97.9 °F (36.6 °C) (01/30/18 1205)  Pulse: 84 (01/30/18 1205)  Resp: 18 (01/30/18 1205)  BP: (!) 140/70 (01/30/18 1205)  SpO2: 96 % (01/30/18 1205) Vital Signs (24h Range):  Temp:  [97.5 °F (36.4 °C)-98.1 °F (36.7 °C)] 97.9 °F (36.6 °C)  Pulse:  [] 84  Resp:  [16-24] 18  SpO2:  [88 %-100 %] 96 %  BP: (118-165)/(61-94) 140/70     Weight: 84.9 kg (187 lb 2.7 oz)  Body mass index is 27.64 kg/m².    SpO2: 96 %  O2 Device (Oxygen Therapy): room air      Intake/Output Summary (Last 24 hours) at 01/30/18 1405  Last data filed at 01/30/18 1300   Gross per 24 hour   Intake              480 ml   Output             3380 ml   Net            -2900 ml       Lines/Drains/Airways     Pressure Ulcer                 Pressure Ulcer coccyx -- days         Pressure Ulcer 12/02/17 0705 Left coccyx suspected deep tissue injury 59 days          Peripheral Intravenous Line                 Peripheral IV - Single Lumen 01/29/18 1559 Left Antecubital less than 1 day                Physical Exam   Constitutional: He is oriented to person, place, and time. He appears well-nourished.   HENT:   Head: Normocephalic.   Eyes: Pupils are equal, round, and reactive to light.   Neck: Normal carotid pulses and no JVD present. Carotid bruit is not present. No thyromegaly present.   Cardiovascular: Normal rate, normal heart sounds and normal pulses.  An irregularly irregular rhythm present.  No extrasystoles are present. PMI is not displaced.  Exam reveals no gallop and no S3.    No murmur  heard.  Pulmonary/Chest: Breath sounds normal. No stridor. No respiratory distress.   Abdominal: Soft. Bowel sounds are normal. There is no tenderness. There is no rebound.   Musculoskeletal: Normal range of motion. He exhibits edema.   1+ edema   Neurological: He is alert and oriented to person, place, and time.   Skin: Skin is intact. No rash noted.   Psychiatric: His behavior is normal.       Significant Labs:   ABG: No results for input(s): PH, PCO2, HCO3, POCSATURATED, BE in the last 48 hours., Blood Culture: No results for input(s): LABBLOO in the last 48 hours., BMP:   Recent Labs  Lab 01/29/18  1559 01/29/18  1916 01/30/18  0436   *  --  93     --  139   K 4.7  --  4.3     --  106   CO2 24  --  25   BUN 35*  --  37*   CREATININE 1.7*  --  1.6*   CALCIUM 9.0  --  9.1   MG  --  1.9  --    , CMP   Recent Labs  Lab 01/29/18  1559 01/30/18  0436    139   K 4.7 4.3    106   CO2 24 25   * 93   BUN 35* 37*   CREATININE 1.7* 1.6*   CALCIUM 9.0 9.1   PROT 6.5 6.4   ALBUMIN 3.2* 3.1*   BILITOT 1.2* 1.3*   ALKPHOS 126 122   AST 25 23   ALT 20 21   ANIONGAP 8 8   ESTGFRAFRICA 41* 44*   EGFRNONAA 36* 38*   , CBC   Recent Labs  Lab 01/29/18  1559 01/30/18  0436   WBC 6.11 5.18   HGB 8.6* 8.0*   HCT 29.2* 27.2*    200   , INR   Recent Labs  Lab 01/29/18  1559   INR 1.3*   , Lipid Panel No results for input(s): CHOL, HDL, LDLCALC, TRIG, CHOLHDL in the last 48 hours. and Troponin   Recent Labs  Lab 01/29/18  1559 01/29/18  2207 01/30/18  1054   TROPONINI 0.101* 0.119* 0.105*       Significant Imaging: X-Ray: CXR: X-Ray Chest 1 View (CXR): No results found for this visit on 01/29/18.    Assessment and Plan:     * Acute on chronic combined systolic and diastolic heart failure    CHF pEF exacerbation.  Ok to continue Lasix 20 mg bid IV.   Improved Cr. Continue monitor  DASh and fluid restriction to 50 oz        Elevated troponin    Chronic and flat, likely demand  Recent echo showed  no RWMA  EKG no acute stt change          CAD (coronary artery disease)    Continue ASA, Lipitor, Metoprolol        Atrial fibrillation    Chronic afib  Continue Eliquis 2.5 mg bid and metoprolol        Hypertension    Continue Metorpolol            VTE Risk Mitigation         Ordered     Medium Risk of VTE  Once      01/29/18 2127     Place sequential compression device  Until discontinued      01/29/18 2127     apixaban tablet 2.5 mg  2 times daily     Route:  Oral        01/29/18 1910          Thank you for your consult. I will follow-up with patient. Please contact us if you have any additional questions.    Gus Saab MD  Cardiology   [facility]

## 2018-01-30 NOTE — PLAN OF CARE
Problem: Patient Care Overview  Goal: Plan of Care Review  Outcome: Ongoing (interventions implemented as appropriate)  POC reviewed with patient, verbalized understanding. Pt remains free from falls. Fall precautions in place. Afib on cardiac monitor. VSS. Pt has had signigicant urine output during my shift., and pt stated that the SOB has improved. Only complaints during my shift was that he had a headache. Tyleonol was given to help with the pain.  Call bell w/in reach. Reminded to call for assistance. Urinal at the bedside within reach. Will continue to monitor.

## 2018-01-30 NOTE — SUBJECTIVE & OBJECTIVE
Interval History: No acute events overnight. Patient reports feeling better but still having some SOB. Will continue to diurese. Cardiology following.     Review of Systems   Constitutional: Positive for activity change and fatigue. Negative for chills and fever.   HENT: Negative for congestion and sore throat.    Eyes: Negative for pain and visual disturbance.   Respiratory: Positive for cough and shortness of breath.    Cardiovascular: Positive for leg swelling.   Gastrointestinal: Positive for abdominal distention. Negative for abdominal pain, constipation, diarrhea, nausea and vomiting.   Endocrine: Negative for cold intolerance, heat intolerance and polyuria.   Genitourinary: Negative for difficulty urinating, dysuria and hematuria.   Musculoskeletal: Negative for arthralgias, gait problem and myalgias.   Skin: Negative for color change, rash and wound.   Allergic/Immunologic: Negative for environmental allergies and food allergies.   Neurological: Positive for dizziness, syncope and weakness.   Hematological: Negative.    Psychiatric/Behavioral: Negative for agitation, behavioral problems and confusion.     Objective:     Vital Signs (Most Recent):  Temp: 97.5 °F (36.4 °C) (01/30/18 1000)  Pulse: 77 (01/30/18 1000)  Resp: 16 (01/30/18 1000)  BP: 129/72 (01/30/18 1000)  SpO2: 98 % (01/30/18 1000) Vital Signs (24h Range):  Temp:  [97.5 °F (36.4 °C)-98.1 °F (36.7 °C)] 97.5 °F (36.4 °C)  Pulse:  [] 77  Resp:  [16-24] 16  SpO2:  [88 %-100 %] 98 %  BP: (118-165)/(61-94) 129/72     Weight: 84.9 kg (187 lb 2.7 oz)  Body mass index is 27.64 kg/m².    Intake/Output Summary (Last 24 hours) at 01/30/18 1025  Last data filed at 01/30/18 0409   Gross per 24 hour   Intake                0 ml   Output             3080 ml   Net            -3080 ml      Physical Exam   Constitutional: He is oriented to person, place, and time. He appears well-developed and well-nourished. No distress.   HENT:   Head: Normocephalic and  atraumatic.   Nose: Nose normal.   Mouth/Throat: Oropharynx is clear and moist.   Eyes: Conjunctivae and EOM are normal. Pupils are equal, round, and reactive to light. Right eye exhibits no discharge. Left eye exhibits no discharge.   Neck: Normal range of motion. Neck supple. No JVD present.   Cardiovascular: Normal rate, regular rhythm, normal heart sounds and intact distal pulses.  Exam reveals no gallop and no friction rub.    No murmur heard.  Pulmonary/Chest: Breath sounds normal. No respiratory distress.   Abdominal: Soft. Bowel sounds are normal. He exhibits distension (soft ). There is no tenderness.   Genitourinary:   Genitourinary Comments: deferred   Musculoskeletal: Normal range of motion. He exhibits edema (1+ trace sue le's). He exhibits no deformity.   Neurological: He is alert and oriented to person, place, and time. No cranial nerve deficit or sensory deficit.   Skin: Skin is warm and dry. Capillary refill takes 2 to 3 seconds. He is not diaphoretic. No erythema.   Psychiatric: He has a normal mood and affect. His behavior is normal. Judgment and thought content normal.   Nursing note and vitals reviewed.      Significant Labs:   BMP:   Recent Labs  Lab 01/29/18  1916 01/30/18  0436   GLU  --  93   NA  --  139   K  --  4.3   CL  --  106   CO2  --  25   BUN  --  37*   CREATININE  --  1.6*   CALCIUM  --  9.1   MG 1.9  --      CBC:   Recent Labs  Lab 01/29/18  1559 01/30/18  0436   WBC 6.11 5.18   HGB 8.6* 8.0*   HCT 29.2* 27.2*    200     CMP:   Recent Labs  Lab 01/29/18  1559 01/30/18  0436    139   K 4.7 4.3    106   CO2 24 25   * 93   BUN 35* 37*   CREATININE 1.7* 1.6*   CALCIUM 9.0 9.1   PROT 6.5 6.4   ALBUMIN 3.2* 3.1*   BILITOT 1.2* 1.3*   ALKPHOS 126 122   AST 25 23   ALT 20 21   ANIONGAP 8 8   EGFRNONAA 36* 38*     All pertinent labs within the past 24 hours have been reviewed.    Significant Imaging:   Imaging Results          X-Ray Chest AP Portable (Final result)   Result time 01/30/18 08:49:21    Final result by Sharad Magana MD (01/30/18 08:49:21)                 Impression:     No adverse interval change      Electronically signed by: SHARAD MAGANA MD  Date:     01/30/18  Time:    08:49              Narrative:    History: SOB    Comparison: 1/29/18    Result: Single view of the chest.       In comparison to the prior study, there is no adverse interval changes.                             X-Ray Chest AP Portable (Final result)  Result time 01/29/18 17:00:25    Final result by Sharad Magana MD (01/29/18 17:00:25)                 Impression:       Questionable nondisplaced posterior-lateral left 10th rib fracture. Correlate with point tenderness.        Electronically signed by: SHARAD MAGANA MD  Date:     01/29/18  Time:    17:00              Narrative:    Clinical Data:Chest pain    Comparison:  1/26/18    Findings:  Two view of the chest.   Sternotomy wires are intact. Aorta demonstrates atherosclerotic disease.    Cardiac silhouette is normal.  The lungs demonstrate no evidence of active disease.  No evidence of pleural effusion or pneumothorax.  Bones demonstrate bilateral shoulder replacements without evidence of dislocation. Advanced degenerative changes are noted. Questionable nondisplaced posterior-lateral left 10th rib fracture. Correlate with point tenderness.

## 2018-01-30 NOTE — ED NOTES
Patient resting in bed, able to make needs known. No acute distress noted. Cart in low position, side rails up x 2 and call bell in reach. Report to Eden MORRIS

## 2018-01-30 NOTE — ASSESSMENT & PLAN NOTE
Telemetry monitoring  BNP 1854  CXR shows mild vascular congestion  Diurese with IV Lasix  Resume BB  Daily weights  1.5L fluid restriction, Na restriction   Recent echo showed Low normal to mildly depressed left ventricular systolic function (EF 50-55%), DD, Pulmonary HTN, Severe left atrial enlargement, Moderate mitral regurgitation, Mild to moderate tricuspid regurgitation.   Cardiology consult   Will continue to diurese   Improving

## 2018-01-30 NOTE — ASSESSMENT & PLAN NOTE
Telemetry monitoring  BNP 1854  CXR shows mild vascular congestion  Diurese with IV Lasix  Resume BB  Daily weights  1.5L fluid restriction, Na restriction   Recent echo showed Low normal to mildly depressed left ventricular systolic function (EF 50-55%), DD, Pulmonary HTN, Severe left atrial enlargement, Moderate mitral regurgitation, Mild to moderate tricuspid regurgitation.   Cardiology consult

## 2018-01-30 NOTE — SUBJECTIVE & OBJECTIVE
Past Medical History:   Diagnosis Date    Aortic stenosis 8/2/2013    Atrial fibrillation 7/5/2013    CHF (congestive heart failure)     Dizziness - light-headed     Hyperlipidemia     Hypertension     Syncope 1/26/2018       Past Surgical History:   Procedure Laterality Date    BACK SURGERY  2003    knee replacemnt bilateral  2001    SHOULDER SURGERY  2002       Review of patient's allergies indicates:   Allergen Reactions    Sulfa (sulfonamide antibiotics) Hives       No current facility-administered medications on file prior to encounter.      Current Outpatient Prescriptions on File Prior to Encounter   Medication Sig    apixaban (ELIQUIS) 2.5 mg Tab Take 2.5 mg by mouth 2 (two) times daily.    aspirin (ECOTRIN) 81 MG EC tablet Take 1 tablet (81 mg total) by mouth once daily.    atorvastatin (LIPITOR) 40 MG tablet Take 40 mg by mouth.    fish oil-omega-3 fatty acids 300-1,000 mg capsule Take 1,200 mg by mouth once daily.    furosemide (LASIX) 20 MG tablet Take 20 mg by mouth.    metoprolol tartrate (LOPRESSOR) 25 MG tablet Take 25 mg by mouth every evening.     multivitamin with folic acid 400 mcg Tab Take 1 tablet by mouth.    potassium chloride (K-TAB) 20 mEq Take 1 tablet (20 mEq total) by mouth once daily.    ranitidine (ZANTAC) 75 MG tablet Take 75 mg by mouth 2 (two) times daily.     VENTOLIN HFA 90 mcg/actuation inhaler INHALE 1 TO 2 PUFFS 4 TIMES A DAY AS NEEDED FOR WHEEZING    vitamin D 1000 units Tab Take 1,000 Units by mouth once daily.    [DISCONTINUED] azithromycin (ZITHROMAX Z-MARLI) 250 MG tablet Take 2 pills first days then one pill daily for 4 days.     Family History     Problem Relation (Age of Onset)    Heart attack Brother    Heart disease Brother    Heart failure Brother    Hypertension Mother, Father, Sister, Brother        Social History Main Topics    Smoking status: Never Smoker    Smokeless tobacco: Never Used    Alcohol use No      Comment: HOLIDAY    Drug  use: No    Sexual activity: No     Review of Systems   Constitutional: Positive for activity change and fatigue. Negative for chills and fever.   HENT: Negative for congestion and sore throat.    Eyes: Negative for pain and visual disturbance.   Respiratory: Positive for cough and shortness of breath.    Cardiovascular: Positive for leg swelling.   Gastrointestinal: Positive for abdominal distention. Negative for abdominal pain, constipation, diarrhea, nausea and vomiting.   Endocrine: Negative for cold intolerance, heat intolerance and polyuria.   Genitourinary: Negative for difficulty urinating, dysuria and hematuria.   Musculoskeletal: Negative for arthralgias, gait problem and myalgias.   Skin: Negative for color change, rash and wound.   Allergic/Immunologic: Negative for environmental allergies and food allergies.   Neurological: Positive for dizziness, syncope and weakness.   Hematological: Negative.    Psychiatric/Behavioral: Negative for agitation, behavioral problems and confusion.     Objective:     Vital Signs (Most Recent):  Temp: 98.1 °F (36.7 °C) (01/29/18 1544)  Pulse: 90 (01/29/18 1802)  Resp: 20 (01/29/18 1802)  BP: (!) 143/66 (01/29/18 1802)  SpO2: (!) 93 % (01/29/18 1802) Vital Signs (24h Range):  Temp:  [98.1 °F (36.7 °C)] 98.1 °F (36.7 °C)  Pulse:  [] 90  Resp:  [17-24] 20  SpO2:  [88 %-100 %] 93 %  BP: (124-165)/(61-94) 143/66     Weight: 86.2 kg (190 lb)  Body mass index is 28.06 kg/m².    Physical Exam   Constitutional: He is oriented to person, place, and time. He appears well-developed and well-nourished. No distress.   HENT:   Head: Normocephalic and atraumatic.   Nose: Nose normal.   Mouth/Throat: Oropharynx is clear and moist.   Eyes: Conjunctivae and EOM are normal. Pupils are equal, round, and reactive to light. Right eye exhibits no discharge. Left eye exhibits no discharge.   Neck: Normal range of motion. Neck supple. JVD present.   Cardiovascular: Normal rate, regular  rhythm, normal heart sounds and intact distal pulses.  Exam reveals no gallop and no friction rub.    No murmur heard.  Pulmonary/Chest: Breath sounds normal. He is in respiratory distress (mild).   Abdominal: Soft. Bowel sounds are normal. He exhibits distension. There is no tenderness.   abd distended with fluid   Genitourinary:   Genitourinary Comments: deferred   Musculoskeletal: Normal range of motion. He exhibits no edema or deformity.   Neurological: He is alert and oriented to person, place, and time. No cranial nerve deficit or sensory deficit.   Skin: Skin is warm and dry. Capillary refill takes 2 to 3 seconds. He is not diaphoretic. No erythema.   Psychiatric: He has a normal mood and affect. His behavior is normal. Judgment and thought content normal.   Nursing note and vitals reviewed.        CRANIAL NERVES     CN III, IV, VI   Pupils are equal, round, and reactive to light.  Extraocular motions are normal.        Significant Labs:   Recent Lab Results       01/29/18  1751 01/29/18  1559      Albumin  3.2(L)     Alkaline Phosphatase  126     ALT  20     Anion Gap  8     Aniso  Moderate     Appearance, UA Clear      aPTT  31.0  Comment:  aPTT therapeutic range = 39-69 seconds     AST  25     Baso #  0.03     Basophil%  0.5     Bilirubin (UA) Negative      Total Bilirubin  1.2  Comment:  For infants and newborns, interpretation of results should be based  on gestational age, weight and in agreement with clinical  observations.  Premature Infant recommended reference ranges:  Up to 24 hours.............<8.0 mg/dL  Up to 48 hours............<12.0 mg/dL  3-5 days..................<15.0 mg/dL  6-29 days.................<15.0 mg/dL  (H)     BNP  1,854  Comment:  Values of less than 100 pg/ml are consistent with non-CHF populations.(H)     BUN, Bld  35(H)     Calcium  9.0     Chloride  108     CO2  24     Color, UA Yellow      Creatinine  1.7(H)     Differential Method  Automated     eGFR if    41(A)     eGFR if non   36  Comment:  Calculation used to obtain the estimated glomerular filtration  rate (eGFR) is the CKD-EPI equation.   (A)     Eos #  0.1     Eosinophil%  2.0     Glucose  146(H)     Glucose, UA Negative      Gran # (ANC)  4.6     Gran%  74.7(H)     Hematocrit  29.2(L)     Hemoglobin  8.6(L)     Hypo  Moderate     Coumadin Monitoring INR  1.3  Comment:  Coumadin Therapy:  2.0 - 3.0 for INR for all indicators except mechanical heart valves  and antiphospholipid syndromes which should use 2.5 - 3.5.  (H)     Ketones, UA Negative      Leukocytes, UA Negative      Lymph #  0.7(L)     Lymph%  11.5(L)     MCH  22.0(L)     MCHC  29.5(L)     MCV  75(L)     Mono #  0.7     Mono%  11.6     MPV  9.2     Nitrite, UA Negative      Occult Blood UA Negative      Ovalocytes  Occasional     pH, UA 6.0      Platelet Estimate  Appears normal     Platelets  198     Poik  Moderate     Poly  Occasional     Potassium  4.7     Total Protein  6.5     Protein, UA Negative  Comment:  Recommend a 24 hour urine protein or a urine   protein/creatinine ratio if globulin induced proteinuria is  clinically suspected.        Protime  13.4(H)     RBC  3.91(L)     RDW  18.5(H)     Schistocytes  Present     Sodium  140     Specific Gravity, UA 1.010      Specimen UA Urine, Clean Catch      Spherocytes  Occasional     Target Cells  Occasional     Tear Drop Cells  Occasional     Troponin I  0.101  Comment:  The reference interval for Troponin I represents the 99th percentile   cutoff   for our facility and is consistent with 3rd generation assay   performance.  (H)     Urobilinogen, UA 1.0      WBC  6.11         All pertinent labs within the past 24 hours have been reviewed.    Significant Imaging: I have reviewed all pertinent imaging results/findings within the past 24 hours.   Imaging Results          X-Ray Chest AP Portable (Final result)  Result time 01/29/18 17:00:25    Final result by Sharad Wall MD  (01/29/18 17:00:25)                 Impression:       Questionable nondisplaced posterior-lateral left 10th rib fracture. Correlate with point tenderness.        Electronically signed by: FRIDA MAGANA MD  Date:     01/29/18  Time:    17:00              Narrative:    Clinical Data:Chest pain    Comparison:  1/26/18    Findings:  Two view of the chest.   Sternotomy wires are intact. Aorta demonstrates atherosclerotic disease.    Cardiac silhouette is normal.  The lungs demonstrate no evidence of active disease.  No evidence of pleural effusion or pneumothorax.  Bones demonstrate bilateral shoulder replacements without evidence of dislocation. Advanced degenerative changes are noted. Questionable nondisplaced posterior-lateral left 10th rib fracture. Correlate with point tenderness.

## 2018-01-30 NOTE — H&P
"[facility] Hospital Medicine  History & Physical    Patient Name: Chung Meneses Sr.  MRN: [id number]  Admission Date: 1/29/2018  Attending Physician: Najma Acosta MD   Primary Care Provider: Glenroy Carmichael MD         Patient information was obtained from patient, relative(s), past medical records and ER records.     Subjective:     Principal Problem:Acute on chronic combined systolic and diastolic heart failure    Chief Complaint:   Chief Complaint   Patient presents with    Shortness of Breath     sent from Dr. Espinoza office        HPI: 85 y/o male with PMHx of combined diastolic and systolic heart failure, HTN, and A-fib presented to ED with c/o SOB that onset gradually several days ago. Pt c/o "fluid in my belly". Associated symptoms include fatigue, cough, wheezing, and abdominal bloating. Pt reports he is unable to tie his shoes 2/2 SOB. Pt sees Dr. Espinoza who recommend pt come to ED for evaluation and IV diuresis. Symptoms are constant and moderate in severity. No mitigating or exacerbating factors. Patient denies fever, chills, CP, cough, chest tightness, abd pain, N/V, dizziness, decreased urine output, and all other sxs at this time.   Patient is a partial code - wanting CPR only and his surrogate decision maker is Jocelin Machuca [phone].    Past Medical History:   Diagnosis Date    Aortic stenosis 8/2/2013    Atrial fibrillation 7/5/2013    CHF (congestive heart failure)     Dizziness - light-headed     Hyperlipidemia     Hypertension     Syncope 1/26/2018       Past Surgical History:   Procedure Laterality Date    BACK SURGERY  2003    knee replacemnt bilateral  2001    SHOULDER SURGERY  2002       Review of patient's allergies indicates:   Allergen Reactions    Sulfa (sulfonamide antibiotics) Hives       No current facility-administered medications on file prior to encounter.      Current Outpatient Prescriptions on File Prior to Encounter   Medication Sig    " apixaban (ELIQUIS) 2.5 mg Tab Take 2.5 mg by mouth 2 (two) times daily.    aspirin (ECOTRIN) 81 MG EC tablet Take 1 tablet (81 mg total) by mouth once daily.    atorvastatin (LIPITOR) 40 MG tablet Take 40 mg by mouth.    fish oil-omega-3 fatty acids 300-1,000 mg capsule Take 1,200 mg by mouth once daily.    furosemide (LASIX) 20 MG tablet Take 20 mg by mouth.    metoprolol tartrate (LOPRESSOR) 25 MG tablet Take 25 mg by mouth every evening.     multivitamin with folic acid 400 mcg Tab Take 1 tablet by mouth.    potassium chloride (K-TAB) 20 mEq Take 1 tablet (20 mEq total) by mouth once daily.    ranitidine (ZANTAC) 75 MG tablet Take 75 mg by mouth 2 (two) times daily.     VENTOLIN HFA 90 mcg/actuation inhaler INHALE 1 TO 2 PUFFS 4 TIMES A DAY AS NEEDED FOR WHEEZING    vitamin D 1000 units Tab Take 1,000 Units by mouth once daily.    [DISCONTINUED] azithromycin (ZITHROMAX Z-MARLI) 250 MG tablet Take 2 pills first days then one pill daily for 4 days.     Family History     Problem Relation (Age of Onset)    Heart attack Brother    Heart disease Brother    Heart failure Brother    Hypertension Mother, Father, Sister, Brother        Social History Main Topics    Smoking status: Never Smoker    Smokeless tobacco: Never Used    Alcohol use No      Comment: HOLIDAY    Drug use: No    Sexual activity: No     Review of Systems   Constitutional: Positive for activity change and fatigue. Negative for chills and fever.   HENT: Negative for congestion and sore throat.    Eyes: Negative for pain and visual disturbance.   Respiratory: Positive for cough and shortness of breath.    Cardiovascular: Positive for leg swelling.   Gastrointestinal: Positive for abdominal distention. Negative for abdominal pain, constipation, diarrhea, nausea and vomiting.   Endocrine: Negative for cold intolerance, heat intolerance and polyuria.   Genitourinary: Negative for difficulty urinating, dysuria and hematuria.   Musculoskeletal:  Negative for arthralgias, gait problem and myalgias.   Skin: Negative for color change, rash and wound.   Allergic/Immunologic: Negative for environmental allergies and food allergies.   Neurological: Positive for dizziness, syncope and weakness.   Hematological: Negative.    Psychiatric/Behavioral: Negative for agitation, behavioral problems and confusion.     Objective:     Vital Signs (Most Recent):  Temp: 98.1 °F (36.7 °C) (01/29/18 1544)  Pulse: 90 (01/29/18 1802)  Resp: 20 (01/29/18 1802)  BP: (!) 143/66 (01/29/18 1802)  SpO2: (!) 93 % (01/29/18 1802) Vital Signs (24h Range):  Temp:  [98.1 °F (36.7 °C)] 98.1 °F (36.7 °C)  Pulse:  [] 90  Resp:  [17-24] 20  SpO2:  [88 %-100 %] 93 %  BP: (124-165)/(61-94) 143/66     Weight: 86.2 kg (190 lb)  Body mass index is 28.06 kg/m².    Physical Exam   Constitutional: He is oriented to person, place, and time. He appears well-developed and well-nourished. No distress.   HENT:   Head: Normocephalic and atraumatic.   Nose: Nose normal.   Mouth/Throat: Oropharynx is clear and moist.   Eyes: Conjunctivae and EOM are normal. Pupils are equal, round, and reactive to light. Right eye exhibits no discharge. Left eye exhibits no discharge.   Neck: Normal range of motion. Neck supple. JVD present.   Cardiovascular: Normal rate, regular rhythm, normal heart sounds and intact distal pulses.  Exam reveals no gallop and no friction rub.    No murmur heard.  Pulmonary/Chest: Breath sounds normal. He is in respiratory distress (mild).   Abdominal: Soft. Bowel sounds are normal. He exhibits distension. There is no tenderness.   abd distended with fluid   Genitourinary:   Genitourinary Comments: deferred   Musculoskeletal: Normal range of motion. He exhibits no edema or deformity.   Neurological: He is alert and oriented to person, place, and time. No cranial nerve deficit or sensory deficit.   Skin: Skin is warm and dry. Capillary refill takes 2 to 3 seconds. He is not diaphoretic. No  erythema.   Psychiatric: He has a normal mood and affect. His behavior is normal. Judgment and thought content normal.   Nursing note and vitals reviewed.        CRANIAL NERVES     CN III, IV, VI   Pupils are equal, round, and reactive to light.  Extraocular motions are normal.        Significant Labs:   Recent Lab Results       01/29/18  1751 01/29/18  1559      Albumin  3.2(L)     Alkaline Phosphatase  126     ALT  20     Anion Gap  8     Aniso  Moderate     Appearance, UA Clear      aPTT  31.0  Comment:  aPTT therapeutic range = 39-69 seconds     AST  25     Baso #  0.03     Basophil%  0.5     Bilirubin (UA) Negative      Total Bilirubin  1.2  Comment:  For infants and newborns, interpretation of results should be based  on gestational age, weight and in agreement with clinical  observations.  Premature Infant recommended reference ranges:  Up to 24 hours.............<8.0 mg/dL  Up to 48 hours............<12.0 mg/dL  3-5 days..................<15.0 mg/dL  6-29 days.................<15.0 mg/dL  (H)     BNP  1,854  Comment:  Values of less than 100 pg/ml are consistent with non-CHF populations.(H)     BUN, Bld  35(H)     Calcium  9.0     Chloride  108     CO2  24     Color, UA Yellow      Creatinine  1.7(H)     Differential Method  Automated     eGFR if   41(A)     eGFR if non   36  Comment:  Calculation used to obtain the estimated glomerular filtration  rate (eGFR) is the CKD-EPI equation.   (A)     Eos #  0.1     Eosinophil%  2.0     Glucose  146(H)     Glucose, UA Negative      Gran # (ANC)  4.6     Gran%  74.7(H)     Hematocrit  29.2(L)     Hemoglobin  8.6(L)     Hypo  Moderate     Coumadin Monitoring INR  1.3  Comment:  Coumadin Therapy:  2.0 - 3.0 for INR for all indicators except mechanical heart valves  and antiphospholipid syndromes which should use 2.5 - 3.5.  (H)     Ketones, UA Negative      Leukocytes, UA Negative      Lymph #  0.7(L)     Lymph%  11.5(L)     MCH   22.0(L)     MCHC  29.5(L)     MCV  75(L)     Mono #  0.7     Mono%  11.6     MPV  9.2     Nitrite, UA Negative      Occult Blood UA Negative      Ovalocytes  Occasional     pH, UA 6.0      Platelet Estimate  Appears normal     Platelets  198     Poik  Moderate     Poly  Occasional     Potassium  4.7     Total Protein  6.5     Protein, UA Negative  Comment:  Recommend a 24 hour urine protein or a urine   protein/creatinine ratio if globulin induced proteinuria is  clinically suspected.        Protime  13.4(H)     RBC  3.91(L)     RDW  18.5(H)     Schistocytes  Present     Sodium  140     Specific Gravity, UA 1.010      Specimen UA Urine, Clean Catch      Spherocytes  Occasional     Target Cells  Occasional     Tear Drop Cells  Occasional     Troponin I  0.101  Comment:  The reference interval for Troponin I represents the 99th percentile   cutoff   for our facility and is consistent with 3rd generation assay   performance.  (H)     Urobilinogen, UA 1.0      WBC  6.11         All pertinent labs within the past 24 hours have been reviewed.    Significant Imaging: I have reviewed all pertinent imaging results/findings within the past 24 hours.   Imaging Results          X-Ray Chest AP Portable (Final result)  Result time 01/29/18 17:00:25    Final result by Sharad Magana MD (01/29/18 17:00:25)                 Impression:       Questionable nondisplaced posterior-lateral left 10th rib fracture. Correlate with point tenderness.        Electronically signed by: SAHRAD MAGANA MD  Date:     01/29/18  Time:    17:00              Narrative:    Clinical Data:Chest pain    Comparison:  1/26/18    Findings:  Two view of the chest.   Sternotomy wires are intact. Aorta demonstrates atherosclerotic disease.    Cardiac silhouette is normal.  The lungs demonstrate no evidence of active disease.  No evidence of pleural effusion or pneumothorax.  Bones demonstrate bilateral shoulder replacements without evidence of dislocation.  Advanced degenerative changes are noted. Questionable nondisplaced posterior-lateral left 10th rib fracture. Correlate with point tenderness.                                Assessment/Plan:     * Acute on chronic combined systolic and diastolic heart failure    Telemetry monitoring  BNP 1854  CXR shows mild vascular congestion  Diurese with IV Lasix  Resume BB  Daily weights  1.5L fluid restriction, Na restriction   Recent echo showed Low normal to mildly depressed left ventricular systolic function (EF 50-55%), DD, Pulmonary HTN, Severe left atrial enlargement, Moderate mitral regurgitation, Mild to moderate tricuspid regurgitation.   Cardiology consult             Acute renal failure superimposed on chronic kidney disease    Most likely cardiorenal   Creatinine 1.7 (Baseline 1.5)  BNP 1854  IV Lasix   Monitor labs           Elevated troponin    Patient denies CP   Most likely demand ischemia  Troponin 0.101  Serial Troponin  Cardiology consult             CAD (coronary artery disease)    Continue current medical management   Cardiology following          Atrial fibrillation    Heart rate controlled   Resume Eliquis           Hyperlipidemia    Resume Statin           Hypertension    Resume home meds   Monitor              VTE Risk Mitigation     Eliquis 2.5mg BID             GARRETT Richards-C  Department of Hospital Medicine   [facility]

## 2018-01-30 NOTE — ASSESSMENT & PLAN NOTE
Most likely cardiorenal   Creatinine 1.7 (Baseline 1.5)  BNP 1854  IV Lasix   Stable   Monitor labs

## 2018-01-31 VITALS
WEIGHT: 186.94 LBS | SYSTOLIC BLOOD PRESSURE: 139 MMHG | HEIGHT: 69 IN | DIASTOLIC BLOOD PRESSURE: 74 MMHG | HEART RATE: 69 BPM | OXYGEN SATURATION: 100 % | RESPIRATION RATE: 16 BRPM | BODY MASS INDEX: 27.69 KG/M2 | TEMPERATURE: 98 F

## 2018-01-31 LAB
ALBUMIN SERPL BCP-MCNC: 3 G/DL
ALP SERPL-CCNC: 122 U/L
ALT SERPL W/O P-5'-P-CCNC: 17 U/L
ANION GAP SERPL CALC-SCNC: 10 MMOL/L
ANISOCYTOSIS BLD QL SMEAR: SLIGHT
AST SERPL-CCNC: 22 U/L
BASOPHILS # BLD AUTO: 0.04 K/UL
BASOPHILS NFR BLD: 0.9 %
BILIRUB SERPL-MCNC: 1 MG/DL
BUN SERPL-MCNC: 39 MG/DL
BURR CELLS BLD QL SMEAR: ABNORMAL
CALCIUM SERPL-MCNC: 8.9 MG/DL
CHLORIDE SERPL-SCNC: 104 MMOL/L
CO2 SERPL-SCNC: 24 MMOL/L
CREAT SERPL-MCNC: 1.8 MG/DL
DACRYOCYTES BLD QL SMEAR: ABNORMAL
DIFFERENTIAL METHOD: ABNORMAL
EOSINOPHIL # BLD AUTO: 0.2 K/UL
EOSINOPHIL NFR BLD: 3.2 %
ERYTHROCYTE [DISTWIDTH] IN BLOOD BY AUTOMATED COUNT: 18.6 %
EST. GFR  (AFRICAN AMERICAN): 39 ML/MIN/1.73 M^2
EST. GFR  (NON AFRICAN AMERICAN): 33 ML/MIN/1.73 M^2
GIANT PLATELETS BLD QL SMEAR: PRESENT
GLUCOSE SERPL-MCNC: 128 MG/DL
HCT VFR BLD AUTO: 27.4 %
HGB BLD-MCNC: 8.1 G/DL
HYPOCHROMIA BLD QL SMEAR: ABNORMAL
LYMPHOCYTES # BLD AUTO: 1 K/UL
LYMPHOCYTES NFR BLD: 20.4 %
MAGNESIUM SERPL-MCNC: 1.9 MG/DL
MCH RBC QN AUTO: 21.9 PG
MCHC RBC AUTO-ENTMCNC: 29.6 G/DL
MCV RBC AUTO: 74 FL
MONOCYTES # BLD AUTO: 0.6 K/UL
MONOCYTES NFR BLD: 12.5 %
NEUTROPHILS # BLD AUTO: 2.9 K/UL
NEUTROPHILS NFR BLD: 63 %
OVALOCYTES BLD QL SMEAR: ABNORMAL
PLATELET # BLD AUTO: 196 K/UL
PLATELET BLD QL SMEAR: ABNORMAL
PMV BLD AUTO: 8.6 FL
POIKILOCYTOSIS BLD QL SMEAR: ABNORMAL
POLYCHROMASIA BLD QL SMEAR: ABNORMAL
POTASSIUM SERPL-SCNC: 4.1 MMOL/L
PROT SERPL-MCNC: 6.3 G/DL
RBC # BLD AUTO: 3.7 M/UL
SCHISTOCYTES BLD QL SMEAR: PRESENT
SODIUM SERPL-SCNC: 138 MMOL/L
SPHEROCYTES BLD QL SMEAR: ABNORMAL
STOMATOCYTES BLD QL SMEAR: PRESENT
TARGETS BLD QL SMEAR: ABNORMAL
WBC # BLD AUTO: 4.65 K/UL

## 2018-01-31 PROCEDURE — 36415 COLL VENOUS BLD VENIPUNCTURE: CPT

## 2018-01-31 PROCEDURE — 83735 ASSAY OF MAGNESIUM: CPT

## 2018-01-31 PROCEDURE — 25000003 PHARM REV CODE 250: Performed by: FAMILY MEDICINE

## 2018-01-31 PROCEDURE — 85025 COMPLETE CBC W/AUTO DIFF WBC: CPT

## 2018-01-31 PROCEDURE — G0378 HOSPITAL OBSERVATION PER HR: HCPCS

## 2018-01-31 PROCEDURE — 80053 COMPREHEN METABOLIC PANEL: CPT

## 2018-01-31 PROCEDURE — 63600175 PHARM REV CODE 636 W HCPCS: Performed by: FAMILY MEDICINE

## 2018-01-31 PROCEDURE — 99231 SBSQ HOSP IP/OBS SF/LOW 25: CPT | Mod: ,,, | Performed by: INTERNAL MEDICINE

## 2018-01-31 RX ORDER — FUROSEMIDE 20 MG/1
40 TABLET ORAL DAILY
Qty: 60 TABLET | Refills: 0 | Status: SHIPPED | OUTPATIENT
Start: 2018-01-31 | End: 2018-02-16

## 2018-01-31 RX ADMIN — ASPIRIN 81 MG: 81 TABLET, COATED ORAL at 09:01

## 2018-01-31 RX ADMIN — POTASSIUM CHLORIDE 20 MEQ: 1500 TABLET, EXTENDED RELEASE ORAL at 09:01

## 2018-01-31 RX ADMIN — FUROSEMIDE 20 MG: 10 INJECTION, SOLUTION INTRAMUSCULAR; INTRAVENOUS at 09:01

## 2018-01-31 RX ADMIN — APIXABAN 2.5 MG: 2.5 TABLET, FILM COATED ORAL at 09:01

## 2018-01-31 RX ADMIN — ATORVASTATIN CALCIUM 40 MG: 40 TABLET, FILM COATED ORAL at 09:01

## 2018-01-31 RX ADMIN — FAMOTIDINE 20 MG: 20 TABLET, FILM COATED ORAL at 09:01

## 2018-01-31 NOTE — ASSESSMENT & PLAN NOTE
CHF pEF exacerbation.  Ok to d/c with Lasix 40 mg daily  Continue Metoprolol, ASA, Eliquis and Lipitor.  DASh and fluid restriction to 50 oz

## 2018-01-31 NOTE — DISCHARGE SUMMARY
"[facility] Hospital Medicine  Discharge Summary      Patient Name: Chung Meneses Sr.  MRN: [id number]  Admission Date: 1/29/2018  Hospital Length of Stay: 2 days  Discharge Date and Time:  01/31/2018 10:13 AM  Attending Physician: Vasquez Schreiber MD   Discharging Provider: Ana Maria Cooley NP  Primary Care Provider: Glenroy Carmichael MD      HPI:   87 y/o male with PMHx of combined diastolic and systolic heart failure, HTN, and A-fib presented to ED with c/o SOB that onset gradually several days ago. Pt c/o "fluid in my belly". Associated symptoms include fatigue, cough, wheezing, and abdominal bloating. Pt reports he is unable to tie his shoes 2/2 SOB. Pt sees Dr. Espinoza who recommend pt come to ED for evaluation and IV diuresis. Symptoms are constant and moderate in severity. No mitigating or exacerbating factors. Patient denies fever, chills, CP, cough, chest tightness, abd pain, N/V, dizziness, decreased urine output, and all other sxs at this time.   Patient is a partial code - wanting CPR only and his surrogate decision maker is Jocelin Machuca [phone].    * No surgery found *      Hospital Course:   Pt admitted to Inpatient status for Acute on chronic heart failure.  Chest xray showed mild pulmonary vascular congestion and questionable nondisplaced posterior-lateral left 10th rib fracture. BNP of 1854 noted.  Elevated troponin likely due to demand ischemia, will trend.  Pt treated with IV Lasix and verbalized significant improvement of symptoms. Echo results showed EF 50%, DD, and elevated PAP. EKG no acute st change. Atrial fibrillation on controlled on monitor. Patient diuresed well. Symptoms improved. Case discussed with josep Hare to discharge from cardiology standpoint. Home meds reconciled. Lasix changed to 40 mg po daily. Patient to follow-up with PCP in 3-5 days for hospital follow-up. Patient also to follow-up with Dr. Espinoza in 1 week. Patient seen and examined on the date of " discharge and found suitable for discharge.          Consults:   Consults         Status Ordering Provider     Inpatient consult to Cardiology  Once     Provider:  Gus Saab MD    Completed BRIGID RUSSELL     Inpatient consult to Hospitalist  Once     Provider:  (Not yet assigned)    Acknowledged STEF STUBBS     Inpatient consult to Registered Dietitian/Nutritionist  Once     Provider:  (Not yet assigned)    Completed ELVI COLINDRES          No new Assessment & Plan notes have been filed under this hospital service since the last note was generated.  Service: Hospital Medicine    Final Active Diagnoses:    Diagnosis Date Noted POA    PRINCIPAL PROBLEM:  Acute on chronic combined systolic and diastolic heart failure [I50.43] 01/29/2018 Yes    Elevated troponin [R74.8] 01/29/2018 Yes    Acute renal failure superimposed on chronic kidney disease [N17.9, N18.9] 01/29/2018 Yes    CAD (coronary artery disease) [I25.10] 12/11/2017 Yes    Atrial fibrillation [I48.91] 07/05/2013 Yes     Chronic    Hypertension [I10]  Yes     Chronic    Hyperlipidemia [E78.5]  Yes     Chronic      Problems Resolved During this Admission:    Diagnosis Date Noted Date Resolved POA       Discharged Condition: stable    Disposition: Home or Self Care    Follow Up:  Follow-up Information     Glenroy Carmichael MD In 3 days.    Specialty:  Family Medicine  Why:  hospital follow-up in 3-5 days   Contact information:  [address] [facility]  [facility]  [facility] [address] [phone]             Samuel Espinoza MD In 1 week.    Specialty:  Cardiology  Why:  hospital follow-up   Contact information:  [address]  [place] [address] [phone]                 Patient Instructions:     Activity as tolerated     Notify your health care provider if you experience any of the following:  difficulty breathing or increased cough     Notify your health care provider if you experience any of the  following:  persistent dizziness, light-headedness, or visual disturbances     Notify your health care provider if you experience any of the following:  increased confusion or weakness         Significant Diagnostic Studies: Labs:   BMP:   Recent Labs  Lab 01/29/18  1559 01/29/18  1916 01/30/18  0436 01/31/18  0456   *  --  93 128*     --  139 138   K 4.7  --  4.3 4.1     --  106 104   CO2 24  --  25 24   BUN 35*  --  37* 39*   CREATININE 1.7*  --  1.6* 1.8*   CALCIUM 9.0  --  9.1 8.9   MG  --  1.9  --  1.9   , CMP   Recent Labs  Lab 01/29/18  1559 01/30/18  0436 01/31/18  0456    139 138   K 4.7 4.3 4.1    106 104   CO2 24 25 24   * 93 128*   BUN 35* 37* 39*   CREATININE 1.7* 1.6* 1.8*   CALCIUM 9.0 9.1 8.9   PROT 6.5 6.4 6.3   ALBUMIN 3.2* 3.1* 3.0*   BILITOT 1.2* 1.3* 1.0   ALKPHOS 126 122 122   AST 25 23 22   ALT 20 21 17   ANIONGAP 8 8 10   ESTGFRAFRICA 41* 44* 39*   EGFRNONAA 36* 38* 33*   , CBC   Recent Labs  Lab 01/29/18  1559 01/30/18  0436 01/31/18  0456   WBC 6.11 5.18 4.65   HGB 8.6* 8.0* 8.1*   HCT 29.2* 27.2* 27.4*    200 196    and All labs within the past 24 hours have been reviewed    Pending Diagnostic Studies:     None         Medications:  Reconciled Home Medications:   Current Discharge Medication List      CONTINUE these medications which have CHANGED    Details   furosemide (LASIX) 20 MG tablet Take 2 tablets (40 mg total) by mouth once daily.  Qty: 60 tablet, Refills: 0         CONTINUE these medications which have NOT CHANGED    Details   apixaban (ELIQUIS) 2.5 mg Tab Take 2.5 mg by mouth 2 (two) times daily.      aspirin (ECOTRIN) 81 MG EC tablet Take 1 tablet (81 mg total) by mouth once daily.  Qty: 30 tablet, Refills: 0      atorvastatin (LIPITOR) 40 MG tablet Take 40 mg by mouth.      fish oil-omega-3 fatty acids 300-1,000 mg capsule Take 1,200 mg by mouth once daily.      metoprolol tartrate (LOPRESSOR) 25 MG tablet Take 25 mg by mouth every  evening.       multivitamin with folic acid 400 mcg Tab Take 1 tablet by mouth.      potassium chloride (K-TAB) 20 mEq Take 1 tablet (20 mEq total) by mouth once daily.  Qty: 90 tablet, Refills: 3    Associated Diagnoses: Chronic systolic congestive heart failure      ranitidine (ZANTAC) 75 MG tablet Take 75 mg by mouth 2 (two) times daily.       VENTOLIN HFA 90 mcg/actuation inhaler INHALE 1 TO 2 PUFFS 4 TIMES A DAY AS NEEDED FOR WHEEZING  Refills: 0      vitamin D 1000 units Tab Take 1,000 Units by mouth once daily.             Indwelling Lines/Drains at time of discharge:   Lines/Drains/Airways     Pressure Ulcer                 Pressure Ulcer coccyx -- days         Pressure Ulcer 12/02/17 0705 Left coccyx suspected deep tissue injury 60 days                Time spent on the discharge of patient: 45 minutes  Patient was seen and examined on the date of discharge and determined to be suitable for discharge.         Ana Maria Cooley NP  Department of Hospital Medicine  [facility]

## 2018-01-31 NOTE — PLAN OF CARE
01/31/18 1009   Final Note   Assessment Type Final Discharge Note   Discharge Disposition Home  (Pa;nia currently in Cardiac Rehab at [facility]. )

## 2018-01-31 NOTE — PROGRESS NOTES
[facility]  Cardiology  Progress Note    Patient Name: Chung Meneses Sr.  MRN: [id number]  Admission Date: 1/29/2018  Hospital Length of Stay: 2 days  Code Status: Full Code   Attending Physician: No att. providers found   Primary Care Physician: Glenroy Carmichael MD  Expected Discharge Date: 1/31/2018  Principal Problem:Acute on chronic combined systolic and diastolic heart failure    Subjective:     Hospital Course:   After admission, had lasix 60 mg ivp x1 in ER and on lasix 20 mg iv bid. I&O negative 3 liters.   His dyspnea improved a lot..    1/31, dyspnea improved, Good urine output. I&O - 5 l iters. Cr 1.8 stable.     Past Medical History:   Diagnosis Date    Aortic stenosis 8/2/2013    Atrial fibrillation 7/5/2013    CHF (congestive heart failure)     Dizziness - light-headed     Hyperlipidemia     Hypertension     Syncope 1/26/2018       Past Surgical History:   Procedure Laterality Date    BACK SURGERY  2003    knee replacemnt bilateral  2001    SHOULDER SURGERY  2002       Review of patient's allergies indicates:   Allergen Reactions    Sulfa (sulfonamide antibiotics) Hives       No current facility-administered medications on file prior to encounter.      Current Outpatient Prescriptions on File Prior to Encounter   Medication Sig    apixaban (ELIQUIS) 2.5 mg Tab Take 2.5 mg by mouth 2 (two) times daily.    aspirin (ECOTRIN) 81 MG EC tablet Take 1 tablet (81 mg total) by mouth once daily.    atorvastatin (LIPITOR) 40 MG tablet Take 40 mg by mouth.    fish oil-omega-3 fatty acids 300-1,000 mg capsule Take 1,200 mg by mouth once daily.    metoprolol tartrate (LOPRESSOR) 25 MG tablet Take 25 mg by mouth every evening.     multivitamin with folic acid 400 mcg Tab Take 1 tablet by mouth.    potassium chloride (K-TAB) 20 mEq Take 1 tablet (20 mEq total) by mouth once daily.    ranitidine (ZANTAC) 75 MG tablet Take 75 mg by mouth 2 (two) times daily.     VENTOLIN HFA 90  mcg/actuation inhaler INHALE 1 TO 2 PUFFS 4 TIMES A DAY AS NEEDED FOR WHEEZING    vitamin D 1000 units Tab Take 1,000 Units by mouth once daily.    [DISCONTINUED] furosemide (LASIX) 20 MG tablet Take 20 mg by mouth.     Family History     Problem Relation (Age of Onset)    Heart attack Brother    Heart disease Brother    Heart failure Brother    Hypertension Mother, Father, Sister, Brother        Social History Main Topics    Smoking status: Never Smoker    Smokeless tobacco: Never Used    Alcohol use No      Comment: HOLIDAY    Drug use: No    Sexual activity: No     Review of Systems   Constitution: Negative for decreased appetite, diaphoresis, fever, weakness, malaise/fatigue and night sweats.   HENT: Negative for nosebleeds.    Eyes: Negative for blurred vision and double vision.   Cardiovascular: Positive for dyspnea on exertion and leg swelling. Negative for chest pain, claudication, irregular heartbeat, near-syncope, orthopnea, palpitations, paroxysmal nocturnal dyspnea and syncope.   Respiratory: Negative for cough, shortness of breath, sleep disturbances due to breathing, snoring, sputum production and wheezing.    Endocrine: Negative for cold intolerance and polyuria.   Hematologic/Lymphatic: Does not bruise/bleed easily.   Skin: Negative for rash.   Musculoskeletal: Negative for back pain, falls, joint pain, joint swelling and neck pain.   Gastrointestinal: Positive for bloating. Negative for abdominal pain, heartburn, nausea and vomiting.   Genitourinary: Negative for dysuria, frequency and hematuria.   Neurological: Negative for difficulty with concentration, dizziness, focal weakness, headaches, light-headedness, numbness and seizures.   Psychiatric/Behavioral: Negative for depression, memory loss and substance abuse. The patient does not have insomnia.    Allergic/Immunologic: Negative for HIV exposure and hives.     Objective:     Vital Signs (Most Recent):  Temp: 97.6 °F (36.4 °C) (01/31/18  1016)  Pulse: 69 (01/31/18 1016)  Resp: 16 (01/31/18 1016)  BP: 139/74 (01/31/18 1016)  SpO2: 100 % (01/31/18 1016) Vital Signs (24h Range):  Temp:  [96.5 °F (35.8 °C)-97.6 °F (36.4 °C)] 97.6 °F (36.4 °C)  Pulse:  [67-92] 69  Resp:  [16-20] 16  SpO2:  [96 %-100 %] 100 %  BP: (100-162)/(59-74) 139/74     Weight: 84.8 kg (186 lb 15.2 oz)  Body mass index is 27.61 kg/m².    SpO2: 100 %  O2 Device (Oxygen Therapy): room air      Intake/Output Summary (Last 24 hours) at 01/31/18 1220  Last data filed at 01/31/18 0740   Gross per 24 hour   Intake              920 ml   Output             3175 ml   Net            -2255 ml       Lines/Drains/Airways     Pressure Ulcer                 Pressure Ulcer coccyx -- days         Pressure Ulcer 12/02/17 0705 Left coccyx suspected deep tissue injury 60 days                Physical Exam   Constitutional: He is oriented to person, place, and time. He appears well-nourished.   HENT:   Head: Normocephalic.   Eyes: Pupils are equal, round, and reactive to light.   Neck: Normal carotid pulses and no JVD present. Carotid bruit is not present. No thyromegaly present.   Cardiovascular: Normal rate, normal heart sounds and normal pulses.  An irregularly irregular rhythm present.  No extrasystoles are present. PMI is not displaced.  Exam reveals no gallop and no S3.    No murmur heard.  Pulmonary/Chest: Breath sounds normal. No stridor. No respiratory distress.   Abdominal: Soft. Bowel sounds are normal. There is no tenderness. There is no rebound.   Musculoskeletal: Normal range of motion. He exhibits edema.   Trace edema   Neurological: He is alert and oriented to person, place, and time.   Skin: Skin is intact. No rash noted.   Psychiatric: His behavior is normal.       Significant Labs:   ABG: No results for input(s): PH, PCO2, HCO3, POCSATURATED, BE in the last 48 hours., Blood Culture: No results for input(s): LABBLOO in the last 48 hours., BMP:     Recent Labs  Lab 01/29/18  1004  01/29/18  1916 01/30/18  0436 01/31/18  0456   *  --  93 128*     --  139 138   K 4.7  --  4.3 4.1     --  106 104   CO2 24  --  25 24   BUN 35*  --  37* 39*   CREATININE 1.7*  --  1.6* 1.8*   CALCIUM 9.0  --  9.1 8.9   MG  --  1.9  --  1.9   , CMP     Recent Labs  Lab 01/29/18  1559 01/30/18  0436 01/31/18  0456    139 138   K 4.7 4.3 4.1    106 104   CO2 24 25 24   * 93 128*   BUN 35* 37* 39*   CREATININE 1.7* 1.6* 1.8*   CALCIUM 9.0 9.1 8.9   PROT 6.5 6.4 6.3   ALBUMIN 3.2* 3.1* 3.0*   BILITOT 1.2* 1.3* 1.0   ALKPHOS 126 122 122   AST 25 23 22   ALT 20 21 17   ANIONGAP 8 8 10   ESTGFRAFRICA 41* 44* 39*   EGFRNONAA 36* 38* 33*   , CBC     Recent Labs  Lab 01/29/18  1559 01/30/18  0436 01/31/18  0456   WBC 6.11 5.18 4.65   HGB 8.6* 8.0* 8.1*   HCT 29.2* 27.2* 27.4*    200 196   , INR     Recent Labs  Lab 01/29/18  1559   INR 1.3*   , Lipid Panel No results for input(s): CHOL, HDL, LDLCALC, TRIG, CHOLHDL in the last 48 hours. and Troponin     Recent Labs  Lab 01/29/18  1559 01/29/18  2207 01/30/18  1054   TROPONINI 0.101* 0.119* 0.105*       Significant Imaging: X-Ray: CXR: X-Ray Chest 1 View (CXR): No results found for this visit on 01/29/18.    Assessment and Plan:       * Acute on chronic combined systolic and diastolic heart failure    CHF pEF exacerbation.  Ok to d/c with Lasix 40 mg daily  Continue Metoprolol, ASA, Eliquis and Lipitor.  DASh and fluid restriction to 50 oz        Elevated troponin    Chronic and flat, likely demand  Recent echo showed no RWMA  EKG no acute stt change          CAD (coronary artery disease)    Continue ASA, Lipitor, Metoprolol        Atrial fibrillation    Chronic afib, VR controlled.  Continue Eliquis 2.5 mg bid and metoprolol        Hypertension    Continue Metorpolol            VTE Risk Mitigation         Ordered     Medium Risk of VTE  Once      01/29/18 2127     Place sequential compression device  Until discontinued      01/29/18  2127     apixaban tablet 2.5 mg  2 times daily     Route:  Oral        01/29/18 1915          Gus Saab MD  Cardiology  [facility]

## 2018-01-31 NOTE — SUBJECTIVE & OBJECTIVE
Past Medical History:   Diagnosis Date    Aortic stenosis 8/2/2013    Atrial fibrillation 7/5/2013    CHF (congestive heart failure)     Dizziness - light-headed     Hyperlipidemia     Hypertension     Syncope 1/26/2018       Past Surgical History:   Procedure Laterality Date    BACK SURGERY  2003    knee replacemnt bilateral  2001    SHOULDER SURGERY  2002       Review of patient's allergies indicates:   Allergen Reactions    Sulfa (sulfonamide antibiotics) Hives       No current facility-administered medications on file prior to encounter.      Current Outpatient Prescriptions on File Prior to Encounter   Medication Sig    apixaban (ELIQUIS) 2.5 mg Tab Take 2.5 mg by mouth 2 (two) times daily.    aspirin (ECOTRIN) 81 MG EC tablet Take 1 tablet (81 mg total) by mouth once daily.    atorvastatin (LIPITOR) 40 MG tablet Take 40 mg by mouth.    fish oil-omega-3 fatty acids 300-1,000 mg capsule Take 1,200 mg by mouth once daily.    metoprolol tartrate (LOPRESSOR) 25 MG tablet Take 25 mg by mouth every evening.     multivitamin with folic acid 400 mcg Tab Take 1 tablet by mouth.    potassium chloride (K-TAB) 20 mEq Take 1 tablet (20 mEq total) by mouth once daily.    ranitidine (ZANTAC) 75 MG tablet Take 75 mg by mouth 2 (two) times daily.     VENTOLIN HFA 90 mcg/actuation inhaler INHALE 1 TO 2 PUFFS 4 TIMES A DAY AS NEEDED FOR WHEEZING    vitamin D 1000 units Tab Take 1,000 Units by mouth once daily.    [DISCONTINUED] furosemide (LASIX) 20 MG tablet Take 20 mg by mouth.     Family History     Problem Relation (Age of Onset)    Heart attack Brother    Heart disease Brother    Heart failure Brother    Hypertension Mother, Father, Sister, Brother        Social History Main Topics    Smoking status: Never Smoker    Smokeless tobacco: Never Used    Alcohol use No      Comment: HOLIDAY    Drug use: No    Sexual activity: No     Review of Systems   Constitution: Negative for decreased appetite,  diaphoresis, fever, weakness, malaise/fatigue and night sweats.   HENT: Negative for nosebleeds.    Eyes: Negative for blurred vision and double vision.   Cardiovascular: Positive for dyspnea on exertion and leg swelling. Negative for chest pain, claudication, irregular heartbeat, near-syncope, orthopnea, palpitations, paroxysmal nocturnal dyspnea and syncope.   Respiratory: Negative for cough, shortness of breath, sleep disturbances due to breathing, snoring, sputum production and wheezing.    Endocrine: Negative for cold intolerance and polyuria.   Hematologic/Lymphatic: Does not bruise/bleed easily.   Skin: Negative for rash.   Musculoskeletal: Negative for back pain, falls, joint pain, joint swelling and neck pain.   Gastrointestinal: Positive for bloating. Negative for abdominal pain, heartburn, nausea and vomiting.   Genitourinary: Negative for dysuria, frequency and hematuria.   Neurological: Negative for difficulty with concentration, dizziness, focal weakness, headaches, light-headedness, numbness and seizures.   Psychiatric/Behavioral: Negative for depression, memory loss and substance abuse. The patient does not have insomnia.    Allergic/Immunologic: Negative for HIV exposure and hives.     Objective:     Vital Signs (Most Recent):  Temp: 97.6 °F (36.4 °C) (01/31/18 1016)  Pulse: 69 (01/31/18 1016)  Resp: 16 (01/31/18 1016)  BP: 139/74 (01/31/18 1016)  SpO2: 100 % (01/31/18 1016) Vital Signs (24h Range):  Temp:  [96.5 °F (35.8 °C)-97.6 °F (36.4 °C)] 97.6 °F (36.4 °C)  Pulse:  [67-92] 69  Resp:  [16-20] 16  SpO2:  [96 %-100 %] 100 %  BP: (100-162)/(59-74) 139/74     Weight: 84.8 kg (186 lb 15.2 oz)  Body mass index is 27.61 kg/m².    SpO2: 100 %  O2 Device (Oxygen Therapy): room air      Intake/Output Summary (Last 24 hours) at 01/31/18 1220  Last data filed at 01/31/18 0740   Gross per 24 hour   Intake              920 ml   Output             3175 ml   Net            -2255 ml       Lines/Drains/Airways      Pressure Ulcer                 Pressure Ulcer coccyx -- days         Pressure Ulcer 12/02/17 0705 Left coccyx suspected deep tissue injury 60 days                Physical Exam   Constitutional: He is oriented to person, place, and time. He appears well-nourished.   HENT:   Head: Normocephalic.   Eyes: Pupils are equal, round, and reactive to light.   Neck: Normal carotid pulses and no JVD present. Carotid bruit is not present. No thyromegaly present.   Cardiovascular: Normal rate, normal heart sounds and normal pulses.  An irregularly irregular rhythm present.  No extrasystoles are present. PMI is not displaced.  Exam reveals no gallop and no S3.    No murmur heard.  Pulmonary/Chest: Breath sounds normal. No stridor. No respiratory distress.   Abdominal: Soft. Bowel sounds are normal. There is no tenderness. There is no rebound.   Musculoskeletal: Normal range of motion. He exhibits edema.   Trace edema   Neurological: He is alert and oriented to person, place, and time.   Skin: Skin is intact. No rash noted.   Psychiatric: His behavior is normal.       Significant Labs:   ABG: No results for input(s): PH, PCO2, HCO3, POCSATURATED, BE in the last 48 hours., Blood Culture: No results for input(s): LABBLOO in the last 48 hours., BMP:     Recent Labs  Lab 01/29/18  1559 01/29/18  1916 01/30/18  0436 01/31/18  0456   *  --  93 128*     --  139 138   K 4.7  --  4.3 4.1     --  106 104   CO2 24  --  25 24   BUN 35*  --  37* 39*   CREATININE 1.7*  --  1.6* 1.8*   CALCIUM 9.0  --  9.1 8.9   MG  --  1.9  --  1.9   , CMP     Recent Labs  Lab 01/29/18  1559 01/30/18  0436 01/31/18  0456    139 138   K 4.7 4.3 4.1    106 104   CO2 24 25 24   * 93 128*   BUN 35* 37* 39*   CREATININE 1.7* 1.6* 1.8*   CALCIUM 9.0 9.1 8.9   PROT 6.5 6.4 6.3   ALBUMIN 3.2* 3.1* 3.0*   BILITOT 1.2* 1.3* 1.0   ALKPHOS 126 122 122   AST 25 23 22   ALT 20 21 17   ANIONGAP 8 8 10   ESTGFRAFRICA 41* 44* 39*    EGFRNONAA 36* 38* 33*   , CBC     Recent Labs  Lab 01/29/18  1559 01/30/18  0436 01/31/18  0456   WBC 6.11 5.18 4.65   HGB 8.6* 8.0* 8.1*   HCT 29.2* 27.2* 27.4*    200 196   , INR     Recent Labs  Lab 01/29/18  1559   INR 1.3*   , Lipid Panel No results for input(s): CHOL, HDL, LDLCALC, TRIG, CHOLHDL in the last 48 hours. and Troponin     Recent Labs  Lab 01/29/18  1559 01/29/18  2207 01/30/18  1054   TROPONINI 0.101* 0.119* 0.105*       Significant Imaging: X-Ray: CXR: X-Ray Chest 1 View (CXR): No results found for this visit on 01/29/18.

## 2018-01-31 NOTE — PROGRESS NOTES
Notified pts daughter kwan that pt is discharging. She states she will be on her way shortly. AVS reviewed with the pt at this time, he verbalized understanding of instructions as well as medication reconciliation. Pt denies having further questions and states he will make his own pcp follow up as his dr is with [facility]. Cards follow up in place. Pt denies further need for CHF education at this time as well. PIV removed, catheter remained intact. Tele monitor removed. Pt states he will notify the desk once his daughter arrives so that he may be wheeled down to waiting car

## 2018-01-31 NOTE — PLAN OF CARE
Problem: Patient Care Overview  Goal: Plan of Care Review  Outcome: Ongoing (interventions implemented as appropriate)  POC reviewed with patient, verbalized understanding. Pt remains free from falls. Fall precautions in place. A FIB on cardiac monitor. VSS. No other complaints at this time. Call bell w/in reach. Reminded to call for assistance.

## 2018-01-31 NOTE — PLAN OF CARE
01/31/18 1008   Medicare Message   Important Message from Medicare regarding Discharge Appeal Rights Given to patient/caregiver;Explained to patient/caregiver;Signed/date by patient/caregiver   Date IMM was signed 01/31/18   Time IMM was signed 1000

## 2018-02-01 NOTE — PHYSICIAN QUERY
PT Name: Chung Meneses .  MR #: [id number]     Physician Query Form - Documentation Clarification      CDS/: Sahara Duran               Contact information:[email or web address]    This form is a permanent document in the medical record.     Query Date: February 1, 2018    By submitting this query, we are merely seeking further clarification of documentation. Please utilize your independent clinical judgment when addressing the question(s) below.    The Medical record reflects the following:    Supporting Clinical Findings Location in Medical Record      Acute on chronic combined systolic and diastolic heart failure    Telemetry monitoring   BNP 1854   CXR shows mild vascular congestion   Diurese with IV Lasix   Resume BB   Daily weights   1.5L fluid restriction, Na restriction     Recent echo showed Low normal to mildly depressed left ventricular systolic function (EF 50-55%), DD, Pulmonary HTN, Severe left atrial enlargement, Moderate mitral regurgitation, Mild to moderate tricuspid regurgitation.   Cardiology consult         H&P                                                                                Doctor, Please specify diagnosis or diagnoses associated with above clinical findings.    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

## 2018-02-02 ENCOUNTER — PATIENT OUTREACH (OUTPATIENT)
Dept: ADMINISTRATIVE | Facility: CLINIC | Age: 83
End: 2018-02-02

## 2018-02-02 ENCOUNTER — TELEPHONE (OUTPATIENT)
Dept: CARDIOLOGY | Facility: CLINIC | Age: 83
End: 2018-02-02

## 2018-02-02 NOTE — PATIENT INSTRUCTIONS

## 2018-02-02 NOTE — TELEPHONE ENCOUNTER
----- Message from oS Nielsen RN sent at 2/2/2018  9:58 AM CST -----  Morning,  Just completed a TCC / post discharge follow-up call to Chung Meneses Sr. He is a recent discharge [place] [facility] [place] CHF.  He continues to have a cough and it is interfering with his sleep.  Is there anything that he can take for this?  Please advise.  Thanks.  So Nielsen RN  Care Coordination Call Center

## 2018-02-02 NOTE — TELEPHONE ENCOUNTER
Notified Mr. Meneses to contact his PCP    ----- Message from Patricia Espinoza MD sent at 2/2/2018 12:10 PM CST -----  Cough probably needs to see pcp to evaluate   ----- Message -----  From: Dominique Suero LPN  Sent: 2/2/2018  12:08 PM  To: Patricia Espinoza MD        ----- Message -----  From: So Nielsen RN  Sent: 2/2/2018   9:58 AM  To: Alexis MEJIAS Staff    Morning,  Just completed a TCC / post discharge follow-up call to Chung SHAH Gideon . He is a recent discharge from [facility] with CHF.  He continues to have a cough and it is interfering with his sleep.  Is there anything that he can take for this?  Please advise.  Thanks.  So Nielsen, RN  Care Coordination Call Center

## 2018-02-02 NOTE — TELEPHONE ENCOUNTER
Received phone call from patient stating he needs okay to return to Cardiac Rehab.  Please fax note to [facility] [phone]  Dr. Espinoza please advise if okay to return to Rehab with no restrictions

## 2018-02-16 ENCOUNTER — OFFICE VISIT (OUTPATIENT)
Dept: CARDIOLOGY | Facility: CLINIC | Age: 83
End: 2018-02-16
Payer: MEDICARE

## 2018-02-16 VITALS
HEIGHT: 69 IN | DIASTOLIC BLOOD PRESSURE: 60 MMHG | SYSTOLIC BLOOD PRESSURE: 108 MMHG | HEART RATE: 84 BPM | BODY MASS INDEX: 27.55 KG/M2 | WEIGHT: 186 LBS

## 2018-02-16 DIAGNOSIS — I50.43 ACUTE ON CHRONIC COMBINED SYSTOLIC AND DIASTOLIC HEART FAILURE: Primary | ICD-10-CM

## 2018-02-16 DIAGNOSIS — E78.5 HYPERLIPIDEMIA, UNSPECIFIED HYPERLIPIDEMIA TYPE: Chronic | ICD-10-CM

## 2018-02-16 DIAGNOSIS — I27.20 PULMONARY HYPERTENSION: Chronic | ICD-10-CM

## 2018-02-16 DIAGNOSIS — I10 ESSENTIAL HYPERTENSION: Chronic | ICD-10-CM

## 2018-02-16 DIAGNOSIS — I48.19 PERSISTENT ATRIAL FIBRILLATION: Chronic | ICD-10-CM

## 2018-02-16 DIAGNOSIS — Z95.2 S/P AVR: ICD-10-CM

## 2018-02-16 DIAGNOSIS — I50.9 CONGESTIVE HEART FAILURE, UNSPECIFIED CONGESTIVE HEART FAILURE CHRONICITY, UNSPECIFIED CONGESTIVE HEART FAILURE TYPE: ICD-10-CM

## 2018-02-16 PROCEDURE — 3008F BODY MASS INDEX DOCD: CPT | Mod: S$GLB,,, | Performed by: INTERNAL MEDICINE

## 2018-02-16 PROCEDURE — 1159F MED LIST DOCD IN RCRD: CPT | Mod: S$GLB,,, | Performed by: INTERNAL MEDICINE

## 2018-02-16 PROCEDURE — 99999 PR PBB SHADOW E&M-EST. PATIENT-LVL III: CPT | Mod: PBBFAC,,, | Performed by: INTERNAL MEDICINE

## 2018-02-16 PROCEDURE — 99214 OFFICE O/P EST MOD 30 MIN: CPT | Mod: S$GLB,,, | Performed by: INTERNAL MEDICINE

## 2018-02-16 RX ORDER — CHOLECALCIFEROL (VITAMIN D3) 25 MCG
1000 TABLET ORAL DAILY
COMMUNITY

## 2018-02-16 RX ORDER — FUROSEMIDE 40 MG/1
40 TABLET ORAL DAILY
Qty: 90 TABLET | Refills: 3 | Status: ON HOLD | OUTPATIENT
Start: 2018-02-16 | End: 2018-02-24

## 2018-02-16 RX ORDER — FUROSEMIDE 40 MG/1
40 TABLET ORAL DAILY
Qty: 90 TABLET | Refills: 3 | Status: SHIPPED | OUTPATIENT
Start: 2018-02-16 | End: 2018-02-16 | Stop reason: SDUPTHER

## 2018-02-16 RX ORDER — METOPROLOL SUCCINATE 25 MG/1
25 TABLET, EXTENDED RELEASE ORAL DAILY
Qty: 30 TABLET | Refills: 11 | Status: SHIPPED | OUTPATIENT
Start: 2018-02-16 | End: 2018-03-15 | Stop reason: SDUPTHER

## 2018-02-16 RX ORDER — METOPROLOL SUCCINATE 25 MG/1
25 TABLET, EXTENDED RELEASE ORAL DAILY
Qty: 30 TABLET | Refills: 11 | Status: SHIPPED | OUTPATIENT
Start: 2018-02-16 | End: 2018-02-16 | Stop reason: SDUPTHER

## 2018-02-16 NOTE — PROGRESS NOTES
Subjective:   Patient ID:  Chung Meneses Sr. is a 86 y.o. male who presents for follow up of Congestive Heart Failure; Coronary Artery Disease; Atrial Fibrillation; Hypertension; and Hyperlipidemia      HPI  An 85 yo male with afib permanent s/p avr chf is here for f/u.  He has improved symptoms he still feels bloated has been going to cardiac rehab he is feeling better takes extra alsix when he gains weight. Has no syncope near syncope. Ha sno dizziness lightheadedness. Ha snot been wearing stockings.   Past Medical History:   Diagnosis Date    Aortic stenosis 8/2/2013    Atrial fibrillation 7/5/2013    CHF (congestive heart failure)     Dizziness - light-headed     Hyperlipidemia     Hypertension     Syncope 1/26/2018       Past Surgical History:   Procedure Laterality Date    BACK SURGERY  2003    knee replacemnt bilateral  2001    SHOULDER SURGERY  2002       Social History   Substance Use Topics    Smoking status: Never Smoker    Smokeless tobacco: Never Used    Alcohol use No      Comment: HOLIDAY       Family History   Problem Relation Age of Onset    Hypertension Mother     Hypertension Father     Hypertension Sister     Hypertension Brother     Heart failure Brother     Heart disease Brother     Heart attack Brother        Current Outpatient Prescriptions   Medication Sig    apixaban (ELIQUIS) 2.5 mg Tab Take 2.5 mg by mouth 2 (two) times daily.    aspirin (ECOTRIN) 81 MG EC tablet Take 1 tablet (81 mg total) by mouth once daily.    fish oil-omega-3 fatty acids 300-1,000 mg capsule Take 1,200 mg by mouth once daily.    multivitamin with folic acid 400 mcg Tab Take 1 tablet by mouth.    potassium chloride (K-TAB) 20 mEq Take 1 tablet (20 mEq total) by mouth once daily.    ranitidine (ZANTAC) 75 MG tablet Take 75 mg by mouth 2 (two) times daily.     VENTOLIN HFA 90 mcg/actuation inhaler INHALE 1 TO 2 PUFFS 4 TIMES A DAY AS NEEDED FOR WHEEZING    vitamin D 1000 units Tab Take by  mouth.     No current facility-administered medications for this visit.      Current Outpatient Prescriptions on File Prior to Visit   Medication Sig    apixaban (ELIQUIS) 2.5 mg Tab Take 2.5 mg by mouth 2 (two) times daily.    aspirin (ECOTRIN) 81 MG EC tablet Take 1 tablet (81 mg total) by mouth once daily.    fish oil-omega-3 fatty acids 300-1,000 mg capsule Take 1,200 mg by mouth once daily.    multivitamin with folic acid 400 mcg Tab Take 1 tablet by mouth.    potassium chloride (K-TAB) 20 mEq Take 1 tablet (20 mEq total) by mouth once daily.    ranitidine (ZANTAC) 75 MG tablet Take 75 mg by mouth 2 (two) times daily.     VENTOLIN HFA 90 mcg/actuation inhaler INHALE 1 TO 2 PUFFS 4 TIMES A DAY AS NEEDED FOR WHEEZING    [DISCONTINUED] furosemide (LASIX) 20 MG tablet Take 2 tablets (40 mg total) by mouth once daily.    [DISCONTINUED] metoprolol tartrate (LOPRESSOR) 25 MG tablet Take 25 mg by mouth every evening.     [DISCONTINUED] vitamin D 1000 units Tab Take 1,000 Units by mouth once daily.     No current facility-administered medications on file prior to visit.      Review of patient's allergies indicates:   Allergen Reactions    Sulfa (sulfonamide antibiotics) Hives       Review of Systems   Constitution: Positive for weakness and malaise/fatigue.   Eyes: Negative for blurred vision.   Cardiovascular: Positive for leg swelling. Negative for chest pain, claudication, cyanosis, dyspnea on exertion, irregular heartbeat, near-syncope, orthopnea, palpitations and paroxysmal nocturnal dyspnea.   Respiratory: Positive for shortness of breath. Negative for cough and hemoptysis.    Hematologic/Lymphatic: Negative for bleeding problem. Does not bruise/bleed easily.   Skin: Negative for dry skin and itching.   Musculoskeletal: Negative for falls, muscle weakness and myalgias.   Gastrointestinal: Negative for abdominal pain, diarrhea, heartburn, hematemesis, hematochezia and melena.   Genitourinary: Negative  "for flank pain and hematuria.   Neurological: Negative for dizziness, focal weakness, headaches, light-headedness, numbness, paresthesias and seizures.   Psychiatric/Behavioral: Negative for altered mental status and memory loss. The patient is not nervous/anxious.    Allergic/Immunologic: Negative for hives.       Objective:   Physical Exam  Vitals:    02/16/18 1611   BP: 108/60   Pulse: 84   Weight: 84.4 kg (186 lb)   Height: 5' 9" (1.753 m)   Constitutional: He is oriented to person, place, and time. He appears well-developed and well-nourished. No distress.   HENT:   Head: Normocephalic and atraumatic.   Eyes: EOM are normal. Pupils are equal, round, and reactive to light. Right eye exhibits no discharge. Left eye exhibits no discharge.   Neck: Neck supple.positive  JVD present. No thyromegaly present.   Cardiovascular: Normal rate and intact distal pulses.  An irregularly irregular rhythm present. Exam reveals no gallop and no friction rub.    Murmur heard.   Harsh midsystolic murmur is present with a grade of 1/6  at the upper right sternal border radiating to the neck  Pulmonary/Chest: Effort normal. No respiratory distress. He has no wheezes. He has no rales . He exhibits no tenderness.   Scar cabg well healed.   Abdominal: Soft. Bowel sounds are normal. He exhibits mild  distension. There is no tenderness. Pulsatile liver.  Musculoskeletal: Normal range of motion. He exhibits edema (trace).   Neurological: He is alert and oriented to person, place, and time. No cranial nerve deficit.   Skin: Skin is warm and dry. No rash noted. He is not diaphoretic. No erythema.   Psychiatric: He has a normal mood and affect. His behavior is normal.   Nursing note and vitals reviewed.  Lab Results   Component Value Date    CHOL 104 (L) 12/11/2017    CHOL 125 04/19/2017    CHOL 147 10/21/2016     Lab Results   Component Value Date    HDL 24 (L) 12/11/2017    HDL 35 (L) 04/19/2017    HDL 31 (L) 10/21/2016     Lab Results "   Component Value Date    LDLCALC 57.0 (L) 12/11/2017    LDLCALC 77.8 04/19/2017    LDLCALC 97.6 10/21/2016     Lab Results   Component Value Date    TRIG 115 12/11/2017    TRIG 61 04/19/2017    TRIG 92 10/21/2016     Lab Results   Component Value Date    CHOLHDL 23.1 12/11/2017    CHOLHDL 28.0 04/19/2017    CHOLHDL 21.1 10/21/2016       Chemistry        Component Value Date/Time     01/31/2018 0456    K 4.1 01/31/2018 0456     01/31/2018 0456    CO2 24 01/31/2018 0456    BUN 39 (H) 01/31/2018 0456    CREATININE 1.8 (H) 01/31/2018 0456     (H) 01/31/2018 0456        Component Value Date/Time    CALCIUM 8.9 01/31/2018 0456    ALKPHOS 122 01/31/2018 0456    AST 22 01/31/2018 0456    ALT 17 01/31/2018 0456    BILITOT 1.0 01/31/2018 0456    ESTGFRAFRICA 39 (A) 01/31/2018 0456    EGFRNONAA 33 (A) 01/31/2018 0456          Lab Results   Component Value Date    TSH 3.780 12/11/2017     Lab Results   Component Value Date    INR 1.3 (H) 01/29/2018    INR 1.3 (H) 12/11/2017    INR 1.2 11/30/2017     Lab Results   Component Value Date    WBC 4.65 01/31/2018    HGB 8.1 (L) 01/31/2018    HCT 27.4 (L) 01/31/2018    MCV 74 (L) 01/31/2018     01/31/2018     BMP  Sodium   Date Value Ref Range Status   01/31/2018 138 136 - 145 mmol/L Final     Potassium   Date Value Ref Range Status   01/31/2018 4.1 3.5 - 5.1 mmol/L Final     Chloride   Date Value Ref Range Status   01/31/2018 104 95 - 110 mmol/L Final     CO2   Date Value Ref Range Status   01/31/2018 24 23 - 29 mmol/L Final     BUN, Bld   Date Value Ref Range Status   01/31/2018 39 (H) 8 - 23 mg/dL Final     Creatinine   Date Value Ref Range Status   01/31/2018 1.8 (H) 0.5 - 1.4 mg/dL Final     Calcium   Date Value Ref Range Status   01/31/2018 8.9 8.7 - 10.5 mg/dL Final     Anion Gap   Date Value Ref Range Status   01/31/2018 10 8 - 16 mmol/L Final     eGFR if    Date Value Ref Range Status   01/31/2018 39 (A) >60 mL/min/1.73 m^2 Final      eGFR if non    Date Value Ref Range Status   01/31/2018 33 (A) >60 mL/min/1.73 m^2 Final     Comment:     Calculation used to obtain the estimated glomerular filtration  rate (eGFR) is the CKD-EPI equation.        CrCl cannot be calculated (Patient's most recent lab result is older than the maximum 7 days allowed.).    Assessment:     1. Acute on chronic combined systolic and diastolic heart failure    2. Essential hypertension    3. Hyperlipidemia, unspecified hyperlipidemia type    4. Persistent atrial fibrillation    5. Pulmonary hypertension    6. Congestive heart failure, unspecified congestive heart failure chronicity, unspecified congestive heart failure type    7. S/P AVR      staryted to improve he was advised about furosemide intake.  Switch toprol long acting 25 mg po daily  Compression stockings for the edema.  Plan:   1 months   As per above.  Compression stocking 15-20

## 2018-02-20 ENCOUNTER — TELEPHONE (OUTPATIENT)
Dept: CARDIOLOGY | Facility: CLINIC | Age: 83
End: 2018-02-20

## 2018-02-20 ENCOUNTER — HOSPITAL ENCOUNTER (INPATIENT)
Facility: HOSPITAL | Age: 83
LOS: 4 days | Discharge: HOME HEALTH | DRG: 291 | End: 2018-02-24
Attending: EMERGENCY MEDICINE | Admitting: INTERNAL MEDICINE
Payer: MEDICARE

## 2018-02-20 DIAGNOSIS — I50.22 CHRONIC SYSTOLIC CONGESTIVE HEART FAILURE: ICD-10-CM

## 2018-02-20 DIAGNOSIS — R06.02 SHORTNESS OF BREATH: ICD-10-CM

## 2018-02-20 DIAGNOSIS — I50.9 CONGESTIVE HEART FAILURE, UNSPECIFIED CONGESTIVE HEART FAILURE CHRONICITY, UNSPECIFIED CONGESTIVE HEART FAILURE TYPE: Primary | ICD-10-CM

## 2018-02-20 DIAGNOSIS — I50.33 ACUTE ON CHRONIC DIASTOLIC (CONGESTIVE) HEART FAILURE: ICD-10-CM

## 2018-02-20 DIAGNOSIS — I13.0 CARDIORENAL SYNDROME WITH RENAL FAILURE, STAGE 1-4 OR UNSPECIFIED CHRONIC KIDNEY DISEASE, WITH HEART FAILURE: ICD-10-CM

## 2018-02-20 PROBLEM — R74.01 TRANSAMINITIS: Status: ACTIVE | Noted: 2018-02-20

## 2018-02-20 PROBLEM — R74.01 TRANSAMINITIS: Status: ACTIVE | Noted: 2018-01-29

## 2018-02-20 LAB
ALBUMIN SERPL BCP-MCNC: 3.3 G/DL
ALP SERPL-CCNC: 113 U/L
ALT SERPL W/O P-5'-P-CCNC: 52 U/L
ANION GAP SERPL CALC-SCNC: 10 MMOL/L
AST SERPL-CCNC: 57 U/L
BASOPHILS # BLD AUTO: 0.04 K/UL
BASOPHILS NFR BLD: 0.6 %
BILIRUB SERPL-MCNC: 1.8 MG/DL
BILIRUB UR QL STRIP: NEGATIVE
BNP SERPL-MCNC: 2093 PG/ML
BUN SERPL-MCNC: 65 MG/DL
CALCIUM SERPL-MCNC: 9 MG/DL
CHLORIDE SERPL-SCNC: 103 MMOL/L
CLARITY UR: CLEAR
CO2 SERPL-SCNC: 22 MMOL/L
COLOR UR: YELLOW
CREAT SERPL-MCNC: 2.6 MG/DL
DACRYOCYTES BLD QL SMEAR: ABNORMAL
DIFFERENTIAL METHOD: ABNORMAL
EOSINOPHIL # BLD AUTO: 0.1 K/UL
EOSINOPHIL NFR BLD: 0.9 %
ERYTHROCYTE [DISTWIDTH] IN BLOOD BY AUTOMATED COUNT: 19.1 %
EST. GFR  (AFRICAN AMERICAN): 25 ML/MIN/1.73 M^2
EST. GFR  (NON AFRICAN AMERICAN): 21 ML/MIN/1.73 M^2
GLUCOSE SERPL-MCNC: 109 MG/DL
GLUCOSE UR QL STRIP: NEGATIVE
HCT VFR BLD AUTO: 28.4 %
HGB BLD-MCNC: 8.4 G/DL
HGB UR QL STRIP: NEGATIVE
INR PPP: 1.5
KETONES UR QL STRIP: NEGATIVE
LEUKOCYTE ESTERASE UR QL STRIP: NEGATIVE
LYMPHOCYTES # BLD AUTO: 1 K/UL
LYMPHOCYTES NFR BLD: 14.7 %
MCH RBC QN AUTO: 21.2 PG
MCHC RBC AUTO-ENTMCNC: 29.6 G/DL
MCV RBC AUTO: 72 FL
MONOCYTES # BLD AUTO: 1 K/UL
MONOCYTES NFR BLD: 15.7 %
NEUTROPHILS # BLD AUTO: 4.4 K/UL
NEUTROPHILS NFR BLD: 68.4 %
NITRITE UR QL STRIP: NEGATIVE
OVALOCYTES BLD QL SMEAR: ABNORMAL
PH UR STRIP: 5 [PH] (ref 5–8)
PLATELET # BLD AUTO: 232 K/UL
PMV BLD AUTO: 9 FL
POLYCHROMASIA BLD QL SMEAR: ABNORMAL
POTASSIUM SERPL-SCNC: 5.5 MMOL/L
PROT SERPL-MCNC: 6.5 G/DL
PROT UR QL STRIP: NEGATIVE
PROTHROMBIN TIME: 15.2 SEC
RBC # BLD AUTO: 3.96 M/UL
SCHISTOCYTES BLD QL SMEAR: PRESENT
SODIUM SERPL-SCNC: 135 MMOL/L
SP GR UR STRIP: 1.02 (ref 1–1.03)
TROPONIN I SERPL DL<=0.01 NG/ML-MCNC: 0.12 NG/ML
URN SPEC COLLECT METH UR: NORMAL
UROBILINOGEN UR STRIP-ACNC: 1 EU/DL
WBC # BLD AUTO: 6.45 K/UL

## 2018-02-20 PROCEDURE — 85025 COMPLETE CBC W/AUTO DIFF WBC: CPT

## 2018-02-20 PROCEDURE — 99285 EMERGENCY DEPT VISIT HI MDM: CPT | Mod: 25

## 2018-02-20 PROCEDURE — 93010 ELECTROCARDIOGRAM REPORT: CPT | Mod: ,,, | Performed by: INTERNAL MEDICINE

## 2018-02-20 PROCEDURE — 80053 COMPREHEN METABOLIC PANEL: CPT

## 2018-02-20 PROCEDURE — 21400001 HC TELEMETRY ROOM

## 2018-02-20 PROCEDURE — 81003 URINALYSIS AUTO W/O SCOPE: CPT

## 2018-02-20 PROCEDURE — 83880 ASSAY OF NATRIURETIC PEPTIDE: CPT

## 2018-02-20 PROCEDURE — 63600175 PHARM REV CODE 636 W HCPCS: Performed by: EMERGENCY MEDICINE

## 2018-02-20 PROCEDURE — 84484 ASSAY OF TROPONIN QUANT: CPT

## 2018-02-20 PROCEDURE — 85610 PROTHROMBIN TIME: CPT

## 2018-02-20 PROCEDURE — 93005 ELECTROCARDIOGRAM TRACING: CPT

## 2018-02-20 PROCEDURE — 96374 THER/PROPH/DIAG INJ IV PUSH: CPT

## 2018-02-20 PROCEDURE — 25000003 PHARM REV CODE 250: Performed by: EMERGENCY MEDICINE

## 2018-02-20 RX ORDER — FUROSEMIDE 10 MG/ML
60 INJECTION INTRAMUSCULAR; INTRAVENOUS 2 TIMES DAILY
Status: DISCONTINUED | OUTPATIENT
Start: 2018-02-21 | End: 2018-02-24 | Stop reason: HOSPADM

## 2018-02-20 RX ORDER — FUROSEMIDE 10 MG/ML
60 INJECTION INTRAMUSCULAR; INTRAVENOUS
Status: COMPLETED | OUTPATIENT
Start: 2018-02-20 | End: 2018-02-20

## 2018-02-20 RX ORDER — METOPROLOL SUCCINATE 25 MG/1
25 TABLET, EXTENDED RELEASE ORAL DAILY
Status: DISCONTINUED | OUTPATIENT
Start: 2018-02-21 | End: 2018-02-24 | Stop reason: HOSPADM

## 2018-02-20 RX ORDER — ASPIRIN 81 MG/1
81 TABLET ORAL DAILY
Status: DISCONTINUED | OUTPATIENT
Start: 2018-02-21 | End: 2018-02-24 | Stop reason: HOSPADM

## 2018-02-20 RX ORDER — FAMOTIDINE 20 MG/1
20 TABLET, FILM COATED ORAL DAILY
Status: DISCONTINUED | OUTPATIENT
Start: 2018-02-21 | End: 2018-02-24 | Stop reason: HOSPADM

## 2018-02-20 RX ADMIN — FUROSEMIDE 60 MG: 10 INJECTION, SOLUTION INTRAMUSCULAR; INTRAVENOUS at 07:02

## 2018-02-20 RX ADMIN — APIXABAN 2.5 MG: 2.5 TABLET, FILM COATED ORAL at 09:02

## 2018-02-20 NOTE — TELEPHONE ENCOUNTER
"Received phone call from patient with complaints of fluid build up in groin, abdomen, shortness of breath and not being able to walk no more than 5 feet without stopping, unable to even put on stockings, gained 2-3 pounds  Patient says" I need some relief " advised may need to report to ER if the extra dose of Lasix has/does not help    Dr. Espinoza- please advise further  "

## 2018-02-20 NOTE — TELEPHONE ENCOUNTER
Patient was advised to double up on Lasix for 2-3 days however he says he's in distress and is ready to proceed to ER.  [facility] ER Nurse notified along with Dr. Espinoza

## 2018-02-21 PROBLEM — L89.152 PRESSURE ULCER OF COCCYGEAL REGION, STAGE 2: Status: ACTIVE | Noted: 2018-02-21

## 2018-02-21 PROBLEM — L97.929: Status: ACTIVE | Noted: 2018-02-21

## 2018-02-21 LAB
ALBUMIN SERPL BCP-MCNC: 3.2 G/DL
ALP SERPL-CCNC: 113 U/L
ALT SERPL W/O P-5'-P-CCNC: 51 U/L
ANION GAP SERPL CALC-SCNC: 12 MMOL/L
ANISOCYTOSIS BLD QL SMEAR: SLIGHT
AST SERPL-CCNC: 54 U/L
BASOPHILS # BLD AUTO: 0.06 K/UL
BASOPHILS NFR BLD: 0.9 %
BILIRUB SERPL-MCNC: 1.8 MG/DL
BUN SERPL-MCNC: 66 MG/DL
BURR CELLS BLD QL SMEAR: ABNORMAL
CALCIUM SERPL-MCNC: 8.9 MG/DL
CHLORIDE SERPL-SCNC: 103 MMOL/L
CO2 SERPL-SCNC: 22 MMOL/L
CREAT SERPL-MCNC: 2.4 MG/DL
DACRYOCYTES BLD QL SMEAR: ABNORMAL
DIFFERENTIAL METHOD: ABNORMAL
EOSINOPHIL # BLD AUTO: 0.1 K/UL
EOSINOPHIL NFR BLD: 1.8 %
ERYTHROCYTE [DISTWIDTH] IN BLOOD BY AUTOMATED COUNT: 19.2 %
EST. GFR  (AFRICAN AMERICAN): 27 ML/MIN/1.73 M^2
EST. GFR  (NON AFRICAN AMERICAN): 24 ML/MIN/1.73 M^2
GLUCOSE SERPL-MCNC: 97 MG/DL
HCT VFR BLD AUTO: 27.8 %
HGB BLD-MCNC: 8.3 G/DL
LYMPHOCYTES # BLD AUTO: 1 K/UL
LYMPHOCYTES NFR BLD: 14.5 %
MCH RBC QN AUTO: 21.4 PG
MCHC RBC AUTO-ENTMCNC: 29.9 G/DL
MCV RBC AUTO: 72 FL
MONOCYTES # BLD AUTO: 1 K/UL
MONOCYTES NFR BLD: 15.1 %
NEUTROPHILS # BLD AUTO: 4.5 K/UL
NEUTROPHILS NFR BLD: 68 %
OVALOCYTES BLD QL SMEAR: ABNORMAL
PLATELET # BLD AUTO: 228 K/UL
PLATELET BLD QL SMEAR: ABNORMAL
PMV BLD AUTO: 9.2 FL
POIKILOCYTOSIS BLD QL SMEAR: SLIGHT
POTASSIUM SERPL-SCNC: 4.3 MMOL/L
PROT SERPL-MCNC: 6.3 G/DL
RBC # BLD AUTO: 3.87 M/UL
SCHISTOCYTES BLD QL SMEAR: PRESENT
SODIUM SERPL-SCNC: 137 MMOL/L
SPHEROCYTES BLD QL SMEAR: ABNORMAL
STOMATOCYTES BLD QL SMEAR: PRESENT
TROPONIN I SERPL DL<=0.01 NG/ML-MCNC: 0.1 NG/ML
TROPONIN I SERPL DL<=0.01 NG/ML-MCNC: 0.11 NG/ML
WBC # BLD AUTO: 6.64 K/UL

## 2018-02-21 PROCEDURE — G8978 MOBILITY CURRENT STATUS: HCPCS | Mod: CK

## 2018-02-21 PROCEDURE — G8980 MOBILITY D/C STATUS: HCPCS | Mod: CI

## 2018-02-21 PROCEDURE — 80053 COMPREHEN METABOLIC PANEL: CPT

## 2018-02-21 PROCEDURE — 25000003 PHARM REV CODE 250: Performed by: EMERGENCY MEDICINE

## 2018-02-21 PROCEDURE — G8988 SELF CARE GOAL STATUS: HCPCS | Mod: CJ

## 2018-02-21 PROCEDURE — 84484 ASSAY OF TROPONIN QUANT: CPT

## 2018-02-21 PROCEDURE — 97116 GAIT TRAINING THERAPY: CPT

## 2018-02-21 PROCEDURE — 25000003 PHARM REV CODE 250: Performed by: INTERNAL MEDICINE

## 2018-02-21 PROCEDURE — 63600175 PHARM REV CODE 636 W HCPCS: Performed by: EMERGENCY MEDICINE

## 2018-02-21 PROCEDURE — 97165 OT EVAL LOW COMPLEX 30 MIN: CPT

## 2018-02-21 PROCEDURE — 99223 1ST HOSP IP/OBS HIGH 75: CPT | Mod: ,,, | Performed by: INTERNAL MEDICINE

## 2018-02-21 PROCEDURE — 97535 SELF CARE MNGMENT TRAINING: CPT

## 2018-02-21 PROCEDURE — G8987 SELF CARE CURRENT STATUS: HCPCS | Mod: CK

## 2018-02-21 PROCEDURE — 97162 PT EVAL MOD COMPLEX 30 MIN: CPT

## 2018-02-21 PROCEDURE — 21400001 HC TELEMETRY ROOM

## 2018-02-21 PROCEDURE — 84484 ASSAY OF TROPONIN QUANT: CPT | Mod: 91

## 2018-02-21 PROCEDURE — G8979 MOBILITY GOAL STATUS: HCPCS | Mod: CJ

## 2018-02-21 PROCEDURE — 85025 COMPLETE CBC W/AUTO DIFF WBC: CPT

## 2018-02-21 PROCEDURE — 36415 COLL VENOUS BLD VENIPUNCTURE: CPT

## 2018-02-21 RX ORDER — METOLAZONE 5 MG/1
5 TABLET ORAL ONCE
Status: COMPLETED | OUTPATIENT
Start: 2018-02-21 | End: 2018-02-21

## 2018-02-21 RX ADMIN — METOPROLOL SUCCINATE 25 MG: 25 TABLET, EXTENDED RELEASE ORAL at 09:02

## 2018-02-21 RX ADMIN — FUROSEMIDE 60 MG: 10 INJECTION, SOLUTION INTRAMUSCULAR; INTRAVENOUS at 09:02

## 2018-02-21 RX ADMIN — FAMOTIDINE 20 MG: 20 TABLET, FILM COATED ORAL at 09:02

## 2018-02-21 RX ADMIN — METOLAZONE 5 MG: 5 TABLET ORAL at 09:02

## 2018-02-21 RX ADMIN — ASPIRIN 81 MG: 81 TABLET, COATED ORAL at 09:02

## 2018-02-21 RX ADMIN — APIXABAN 2.5 MG: 2.5 TABLET, FILM COATED ORAL at 09:02

## 2018-02-21 NOTE — PLAN OF CARE
CM met with patient at the bedside to assess for discharge needs.  Patient lives at home with his wife and he is independent.  Patient states that he was extremely short of breath and his cardiologist sent him to the ER from home.  The shortness of breath is the reason for readmission into the hospital.  He states that the fluid restriction is difficult to follow.  He is currently with [facility] for nursing only.  Choice form completed and placed in patient blue folder.  He also is currently in [facility] cardiac rehab.  CM provided patient with a transitional care folder, information on advanced directives, information on pharmacy bedside delivery, and discharge planning begins on admission with contact information for any needs/questions.     02/21/18 0949   Discharge Assessment   Assessment Type Discharge Planning Assessment   Confirmed/corrected address and phone number on facesheet? Yes   Assessment information obtained from? Patient;Medical Record   Expected Length of Stay (days) (TBD)   Communicated expected length of stay with patient/caregiver yes   Prior to hospitilization cognitive status: Alert/Oriented   Prior to hospitalization functional status: Independent   Current cognitive status: Alert/Oriented   Current Functional Status: Independent   Facility Arrived From: home   Lives With spouse   Able to Return to Prior Arrangements yes   Is patient able to care for self after discharge? Yes   Who are your caregiver(s) and their phone number(s)? Patient is independent.  Jocelin Machuca, daughter [phone]   Patient's perception of discharge disposition home health   Readmission Within The Last 30 Days previous discharge plan unsuccessful   If yes, most recent facility name: [facility]   Patient currently being followed by outpatient case management? No   Patient currently receives any other outside agency services? No   Equipment Currently Used at Home none   Do you have any problems  affording any of your prescribed medications? No   Is the patient taking medications as prescribed? yes   Does the patient have transportation home? Yes   Transportation Available family or friend will provide   Dialysis Name and Scheduled days NA   Does the patient receive services at the [facility]mad[facility]? (NA)   Discharge Plan A Home with family;Home Health   Discharge Plan B Home with family   Patient/Family In Agreement With Plan yes   Readmission Questionnaire   At the time of your discharge, did someone talk to you about what your health problems were? Yes   At the time of discharge, did someone talk to you about what to watch out for regarding worsening of your health problem? Yes   At the time of discharge, did someone talk to you about what to do if you experienced worsening of your health problem? Yes   At the time of discharge, did someone talk to you about which medication to take when you left the hospital and which ones to stop taking? Yes   At the time of discharge, did someone talk to you about when and where to follow up with a doctor after you left the hospital? Yes   What do you believe caused you to be sick enough to be re-admitted? Shortness of breath   How often do you need to have someone help you when you read instructions, pamphlets, or other written material from your doctor or pharmacy? Rarely   Do you have problems taking your medications as prescribed? No   Do you have any problems affording any of  your prescribed medications? No   Do you have problems obtaining/receiving your medications? No   Does the patient have transportation to healthcare appointments? Yes   Living Arrangements house   Does the patient have family/friends to help with healtcare needs after discharge? yes   Does your caregiver provide all the help you need? Yes   Are you currently feeling confused? No   Are you currently having problems thinking? No   Are you currently having memory problems? No   Have you felt  down, depressed, or hopeless? 0   Have you felt little interest or pleasure in doing things? 0   In the last 7 days, my sleep quality was: poor

## 2018-02-21 NOTE — ASSESSMENT & PLAN NOTE
- Cardiology consult  - IV diuresis   - Last ECHO on 1/26/18 showed EF 50% with DD  - resume BB   - strict I&O  - daily weight   - low Na diet   - fluid restriction

## 2018-02-21 NOTE — PT/OT/SLP EVAL
Occupational Therapy   Evaluation    Name: Chung Meneses Sr.  MRN: [id number]  Admitting Diagnosis:  Acute on chronic diastolic (congestive) heart failure      Recommendations:     Discharge Recommendations: home health OT  Discharge Equipment Recommendations:  walker, rolling  Barriers to discharge:       History:     Occupational Profile:  Living Environment: lives  Previous level of function: lives with spouse in 1 story house with no steps   Roles and Routines: occupational therapy  Equipment Owned:  none  Assistance upon Discharge: rolling walker    Past Medical History:   Diagnosis Date    Aortic stenosis 8/2/2013    Atrial fibrillation 7/5/2013    CHF (congestive heart failure)     Dizziness - light-headed     Hyperlipidemia     Hypertension     Syncope 1/26/2018       Past Surgical History:   Procedure Laterality Date    BACK SURGERY  2003    knee replacemnt bilateral  2001    SHOULDER SURGERY  2002       Subjective     Chief Complaint: debility and generalized weakness  Patient/Family stated goals:   Communicated with: nurse Crockett and epic chart review prior to session.  Pain/Comfort:  · Pain Rating 1: 0/10    Patients cultural, spiritual, Oriental orthodox conflicts given the current situation:      Objective:     Patient found with: telemetry    General Precautions: Standard, fall   Orthopedic Precautions:N/A   Braces:       Occupational Performance:    Bed Mobility:    · Patient completed Rolling/Turning to Left with  stand by assistance  · Patient completed Scooting/Bridging with stand by assistance  · Patient completed Supine to Sit with stand by assistance   ·     Functional Mobility/Transfers:  · Patient completed Sit <> Stand Transfer with contact guard assistance  with  no assistive device   · Patient completed Bed <> Chair Transfer using Stand Pivot technique with minimum assistance with no assistive device  · Functional Mobility: pt ambulated 20 feet with min a/ cga hand held  "assist    Activities of Daily Living:  · UB Dressing: minimum assistance x1  · LB Dressing: maximal assistance x1    Cognitive/Visual Perceptual:  Cognitive/Psychosocial Skills:     -       Oriented to: Person, Place, Time and Situation   -       Follows Commands/attention:Follows two-step commands  -       Communication: clear/fluent  -       Memory: No Deficits noted  -       Safety awareness/insight to disability: impaired   Visual/Perceptual:      -Intact    Physical Exam:  Upper Extremity Range of Motion:     -       Right Upper Extremity: WFL  -       Left Upper Extremity: WFL  Upper Extremity Strength:    -       Right Upper Extremity: mmt: 3/5 grossly  -       Left Upper Extremity: mmt: 3/5 grossly   Strength:    -       Right Upper Extremity: mmt: 3/5 grossly  -       Left Upper Extremity: mmt: 3/5 grossly    Patient left up in chair with all lines intact, call button in reach and nurse notified    AMPA 6 Click:  AMPA Total Score: 20    Treatment & Education:    Education:    Assessment:     Chung Meneses . is a 86 y.o. male with a medical diagnosis of Acute on chronic diastolic (congestive) heart failure.  He presents with debility and generalized weakness. Pt may benefit from skilled O.T..  Performance deficits affecting function are weakness, impaired self care skills, impaired balance, decreased safety awareness, impaired endurance, impaired functional mobilty, gait instability, decreased lower extremity function, decreased upper extremity function.      Rehab Prognosis:  Fair+; patient would benefit from acute skilled OT services to address these deficits and reach maximum level of function.         Clinical Decision Makin.  OT Low:  "Pt evaluation falls under low complexity for evaluation coding due to performance deficits noted in 1-3 areas as stated above and 0 co-morbities affecting current functional status. Data obtained from problem focused assessments. No modifications or " "assistance was required for completion of evaluation. Only brief occupational profile and history review completed."     Plan:     Patient to be seen 3 x/week to address the above listed problems via therapeutic activities, self-care/home management, therapeutic exercises  · Plan of Care Expires:    · Plan of Care Reviewed with: patient    This Plan of care has been discussed with the patient who was involved in its development and understands and is in agreement with the identified goals and treatment plan    GOALS:    Occupational Therapy Goals        Problem: Occupational Therapy Goal    Goal Priority Disciplines Outcome Interventions   Occupational Therapy Goal     OT, PT/OT     Description:  ot goals to be met by 2-28-18  1 sba with ue dressing  2. Min a with le dressing  3. Pt will tolerate 1 set x 15 reps b ue rom exercise                    Time Tracking:     OT Date of Treatment: 02/21/18  OT Start Time: 0906  OT Stop Time: 0930  OT Total Time (min): 24 min    Billable Minutes:Evaluation 10 minutes  Self Care/Home Management 14 minutes    Kirsty Jama OT  2/21/2018    "

## 2018-02-21 NOTE — ED NOTES
Patient identifiers verified and correct for Chung Meneses Sr..    LOC: The patient is awake, alert and aware of environment with an appropriate affect, the patient is oriented x 3 and speaking appropriately.  APPEARANCE: Patient resting comfortably and in no acute distress, patient is clean and well groomed, patient's clothing is properly fastened.  SKIN: The skin is warm and dry, color consistent with ethnicity, patient has normal skin turgor and moist mucus membranes, skin intact, no breakdown or bruising noted. Swelling to scrotum and bilateral upper extremities.  MUSCULOSKELETAL: Patient moving all extremities spontaneously, no obvious swelling or deformities noted.  RESPIRATORY: Airway is open and patent, respirations are spontaneous, patient has a normal effort and rate, no accessory muscle use noted.  CARDIAC: Patient has a normal rate and regular rhythm, no periphreal edema noted, capillary refill < 3 seconds.  ABDOMEN: Soft and non tender to palpation, no distention noted, normoactive bowel sounds present in all four quadrants.  NEUROLOGIC: PERRL, **mm bilaterally, eyes open spontaneously, behavior appropriate to situation, follows commands, facial expression symmetrical, bilateral hand grasp equal and even, purposeful motor response noted, normal sensation in all extremities when touched with a finger.

## 2018-02-21 NOTE — ASSESSMENT & PLAN NOTE
- Cardiology consult  - IV lasix diuresis   Metolazone given x 1 2/21/18  - Last ECHO on 1/26/18 showed EF 50% with DD  Dietary restrictions re emphasized  - resume BB   - strict I&O  - daily weight   - low Na diet   - fluid restriction

## 2018-02-21 NOTE — ASSESSMENT & PLAN NOTE
- Creat 2.7, baseline 1.7  - Likely 2/2 cardiorenal syndrome   - monitor renal function   - CMP in am   ACE/ARB on hold

## 2018-02-21 NOTE — CONSULTS
[facility]  Cardiology  Consult Note    Patient Name: Chung Meneses Sr.  MRN: [id number]  Admission Date: 2/20/2018  Hospital Length of Stay: 1 days  Code Status: Full Code   Attending Provider: Jake Eastman MD   Consulting Provider: Laura Woodward PA-C  Primary Care Physician: Glenroy Carmichael MD  Principal Problem:Acute on chronic diastolic (congestive) heart failure    Patient information was obtained from patient, past medical records and ER records.     Inpatient consult to Cardiology  Consult performed by: LAURA WOODWARD  Consult ordered by: GERALD KRAUSE        Subjective:     Chief Complaint:  SOB, swelling    HPI:   Mr. Meneses is an 86 year old male patient with a PMHx of diastolic CHF, HTN, atrial fibrillation (on Eliquis), AS s/p AVR and CABG x 1 in 11/17 who presented to [facility] ED yesterday with a chief complaint of progressively worsening SOB over the past 4-5 days. Associated symptoms included lower extremity edema, groin swelling, and weakness. Patient denied any associated chest pain, fever, chills, cough, or abdominal bloating. Initial workup in ED revealed BNP of 2093 and initial troponin of 0.117 and patient subsequently admitted for further evaluation and treatment. Cardiology consulted to assist with management. Patient seen and examined today, resting in bed. Still complains of SOB and lower extremity edema but states it is improving. Remains chest pain free. He reports compliance with his medications and tries to be mindful of his salt and fluid intake. Chart reviewed. Troponin 0.117>0.109>0.097. EKG reviewed and showed atrial fibrillation with RVR, rate 107 bpm. H and H 8.4, 28.4 2D echo 1/18 showed normal EF, DD. Creatinine 2.7 upon admission, 2.4 this AM.    Past Medical History:   Diagnosis Date    Aortic stenosis 8/2/2013    Atrial fibrillation 7/5/2013    CHF (congestive heart failure)     Dizziness - light-headed     Hyperlipidemia     Hypertension      Syncope 1/26/2018       Past Surgical History:   Procedure Laterality Date    BACK SURGERY  2003    knee replacemnt bilateral  2001    SHOULDER SURGERY  2002       Review of patient's allergies indicates:   Allergen Reactions    Sulfa (sulfonamide antibiotics) Hives       No current facility-administered medications on file prior to encounter.      Current Outpatient Prescriptions on File Prior to Encounter   Medication Sig    apixaban (ELIQUIS) 2.5 mg Tab Take 2.5 mg by mouth 2 (two) times daily.    aspirin (ECOTRIN) 81 MG EC tablet Take 1 tablet (81 mg total) by mouth once daily.    fish oil-omega-3 fatty acids 300-1,000 mg capsule Take 1,200 mg by mouth once daily.    furosemide (LASIX) 40 MG tablet Take 1 tablet (40 mg total) by mouth once daily.    metoprolol succinate (TOPROL-XL) 25 MG 24 hr tablet Take 1 tablet (25 mg total) by mouth once daily.    multivitamin with folic acid 400 mcg Tab Take 1 tablet by mouth.    potassium chloride (K-TAB) 20 mEq Take 1 tablet (20 mEq total) by mouth once daily.    ranitidine (ZANTAC) 75 MG tablet Take 75 mg by mouth 2 (two) times daily.     VENTOLIN HFA 90 mcg/actuation inhaler INHALE 1 TO 2 PUFFS 4 TIMES A DAY AS NEEDED FOR WHEEZING    vitamin D 1000 units Tab Take by mouth.     Family History     Problem Relation (Age of Onset)    Heart attack Brother    Heart disease Brother    Heart failure Brother    Hypertension Mother, Father, Sister, Brother        Social History Main Topics    Smoking status: Never Smoker    Smokeless tobacco: Never Used    Alcohol use No      Comment: HOLIDAY    Drug use: No    Sexual activity: No     Review of Systems   Constitution: Positive for weakness and malaise/fatigue.   HENT: Negative.    Eyes: Negative.    Cardiovascular: Positive for dyspnea on exertion and leg swelling.   Respiratory: Positive for shortness of breath.    Endocrine: Negative.    Hematologic/Lymphatic: Negative.    Skin: Negative.     Musculoskeletal: Negative.    Gastrointestinal: Negative.    Genitourinary: Negative.    Psychiatric/Behavioral: Negative.    Allergic/Immunologic: Negative.      Objective:     Vital Signs (Most Recent):  Temp: 97.8 °F (36.6 °C) (02/21/18 1124)  Pulse: (!) 111 (02/21/18 1124)  Resp: 17 (02/21/18 1124)  BP: (!) 141/71 (02/21/18 1124)  SpO2: 97 % (02/21/18 1124) Vital Signs (24h Range):  Temp:  [96.2 °F (35.7 °C)-98.5 °F (36.9 °C)] 97.8 °F (36.6 °C)  Pulse:  [] 111  Resp:  [16-21] 17  SpO2:  [95 %-100 %] 97 %  BP: (115-151)/(63-89) 141/71     Weight: 89.1 kg (196 lb 6.9 oz)  Body mass index is 29.01 kg/m².    SpO2: 97 %  O2 Device (Oxygen Therapy): room air      Intake/Output Summary (Last 24 hours) at 02/21/18 1131  Last data filed at 02/21/18 0946   Gross per 24 hour   Intake              340 ml   Output             2100 ml   Net            -1760 ml       Lines/Drains/Airways     Pressure Ulcer                 Pressure Ulcer coccyx -- days         Pressure Ulcer 12/02/17 0705 Left coccyx suspected deep tissue injury 81 days          Peripheral Intravenous Line                 Peripheral IV - Single Lumen 02/20/18 1911 Left Antecubital less than 1 day                Physical Exam   Constitutional: He is oriented to person, place, and time. He appears well-developed and well-nourished. No distress.   HENT:   Head: Normocephalic and atraumatic.   Eyes: Pupils are equal, round, and reactive to light. Right eye exhibits no discharge. Left eye exhibits no discharge.   Neck: Neck supple. JVD present.   Cardiovascular: S1 normal and S2 normal.  An irregularly irregular rhythm present. Tachycardia present.  PMI is not displaced.  Exam reveals no gallop, no S3, no S4 and no friction rub.    Murmur heard.   Harsh midsystolic murmur is present  at the upper right sternal border radiating to the neck  Pulmonary/Chest: Effort normal and breath sounds normal. No respiratory distress. He has no wheezes. Faint rales at  bases. CABG scar well-healed  Abdominal: Soft. He exhibits no distension. There is no tenderness. There is no rebound.   Musculoskeletal: He exhibits edema (Pitting edema BLE up to thighs/sacrum).   Neurological: He is alert and oriented to person, place, and time.   Skin: Skin is warm and dry. He is not diaphoretic. No erythema. Abrasion noted to left shin area  Psychiatric: He has a normal mood and affect. His behavior is normal. Thought content normal.   Nursing note and vitals reviewed.      Significant Labs:   CMP   Recent Labs  Lab 02/20/18 1911 02/21/18  0536   * 137   K 5.5* 4.3    103   CO2 22* 22*    97   BUN 65* 66*   CREATININE 2.6* 2.4*   CALCIUM 9.0 8.9   PROT 6.5 6.3   ALBUMIN 3.3* 3.2*   BILITOT 1.8* 1.8*   ALKPHOS 113 113   AST 57* 54*   ALT 52* 51*   ANIONGAP 10 12   ESTGFRAFRICA 25* 27*   EGFRNONAA 21* 24*   , CBC   Recent Labs  Lab 02/20/18 1911 02/21/18  0536   WBC 6.45 6.64   HGB 8.4* 8.3*   HCT 28.4* 27.8*    228   , Troponin   Recent Labs  Lab 02/20/18 1911 02/21/18  0005 02/21/18  0536   TROPONINI 0.117* 0.109* 0.097*    and All pertinent lab results from the last 24 hours have been reviewed.    Significant Imaging: Echocardiogram:   2D echo with color flow doppler:   Results for orders placed or performed in visit on 01/26/18   2D Echo w/ Color Flow Doppler   Result Value Ref Range    EF 50 55 - 65    Mitral Valve Regurgitation MODERATE (A)     Diastolic Dysfunction Yes (A)     Est. PA Systolic Pressure 41.01 (A)     Mitral Valve Mobility NORMAL     Tricuspid Valve Regurgitation MILD TO MODERATE      Assessment and Plan:   Patient who presents with acute on chronic decompensated CHF. Needs IV diuresis. Continue home meds. Elevated troponin chronic and secondary to underlying CKD and volume overload. No angina.    * Acute on chronic diastolic (congestive) heart failure    -Decompensated   -Continue IV diuresis  -One time dose of metolazone today  -Strict  I's/O's  -No ACEI/ARB given renal function  -Dietary restrictions re-emphasized  -Echo 1/18 showed normal EF        Transaminitis    -Monitor        Acute renal failure superimposed on chronic kidney disease    -Improving with diuresis  -Likely cardiorenal, continue to monitor        Elevated troponin    -Chronically elevated, secondary to demand ischemia from volume overload and underlying CKD  -Continue ASA, Toprol  -No angina or equivalent        CAD (coronary artery disease)    -No angina  -Continue Toprol XL 25 mg daily, ASA          Atrial fibrillation    -Chronic, continue Toprol XL for rate control  -Continue Eliquis for CVA prophylaxis        Hypertension    -Continue Toprol XL, BP controlled            VTE Risk Mitigation         Ordered     apixaban tablet 2.5 mg  2 times daily     Route:  Oral        02/20/18 2053     Medium Risk of VTE  Once      02/20/18 2053          Thank you for your consult. I will follow-up with patient. Please contact us if you have any additional questions.    Laura Bowens PA-C  Cardiology   [facility]    Chart reviewed. Dr. Mckee examined patient and agrees with plan as outlined above.

## 2018-02-21 NOTE — PT/OT/SLP EVAL
Physical Therapy Evaluation    Patient Name:  Chung Meneses Sr.   MRN:  [id number]    Recommendations:     Discharge Recommendations:  home health PT   Discharge Equipment Recommendations: walker, rolling   Barriers to discharge: None    Assessment:     Chung Meneses Sr. is a 86 y.o. male admitted with a medical diagnosis of Acute on chronic diastolic (congestive) heart failure.  He presents with the following impairments/functional limitations:  weakness, impaired endurance, impaired self care skills, impaired balance, gait instability, impaired functional mobilty, decreased safety awareness, edema, impaired cardiopulmonary response to activity .    Rehab Prognosis:  GOOD; patient would benefit from acute skilled PT services to address these deficits and reach maximum level of function.      Recent Surgery: * No surgery found *      Plan:     During this hospitalization, patient to be seen 5 x/week to address the above listed problems via gait training, therapeutic activities, therapeutic exercises  · Plan of Care Expires:  02/28/18   Plan of Care Reviewed with: patient    Subjective     Communicated with EPIC AND NURSE CRYSTAL prior to session.  Patient found IN SUPINE upon PT entry to room, agreeable to evaluation.      Chief Complaint: EDEMA IN LE'S  Patient comments/goals: TO GET BETTER  Pain/Comfort:  · Pain Rating 1: 0/10    Patients cultural, spiritual, [employer] conflicts given the current situation:      Living Environment:  PT LIVES IN A SINGLE [place]AMELA HOME WITH HIS SPOUSE, NO STEPS TO ENTER.  Prior to admission, patients level of function was INDEPENDENT WITH GAIT AND MOBILITY.  Patient has the following equipment: none.  DME owned (not currently used): none.  Upon discharge, patient will have assistance from WIFE.    Objective:     Patient found with: telemetry     General Precautions: Standard, fall   Orthopedic Precautions:N/A   Braces: N/A     Exams:  · Cognitive Exam:  Patient is oriented to  Person, Place, Time and Situation and follows 100% of ALL commands   · RLE ROM: SLIGHTLY LTD SEC TO EDEMA  · RLE Strength: Deficits: 3+/5  · LLE ROM: LTD SEC EDEMA  · LLE Strength: Deficits: 3+/5    Functional Mobility:  · Bed Mobility:     · Supine to Sit: minimum assistance  · Transfers:     · Bed to Chair: minimum assistance with  no AD  using  Step Transfer  · Gait: 2 X 15 FEET WITH MIN HHA , UNSTEADY AND WEAK, BUT NO LOB  · Balance: FAIR+ IN SITTING AND FAIR IN STANDING    AM-PAC 6 CLICK MOBILITY  Total Score:17       Therapeutic Activities and Exercises:   PT EDUCATION, ROLE OF P.T. AND REHAB PROCESS, SAFETY EDUCATION    Patient left up in chair with all lines intact, call button in reach and NURSE notified.    GOALS:    Physical Therapy Goals     Not on file                History:     Past Medical History:   Diagnosis Date    Aortic stenosis 8/2/2013    Atrial fibrillation 7/5/2013    CHF (congestive heart failure)     Dizziness - light-headed     Hyperlipidemia     Hypertension     Syncope 1/26/2018       Past Surgical History:   Procedure Laterality Date    BACK SURGERY  2003    knee replacemnt bilateral  2001    SHOULDER SURGERY  2002       Clinical Decision Making:     History  Co-morbidities and personal factors that may impact the plan of care Examination  Body Structures and Functions, activity limitations and participation restrictions that may impact the plan of care Clinical Presentation   Decision Making/ Complexity Score   Co-morbidities:   [] Time since onset of injury / illness / exacerbation  [] Status of current condition  []Patient's cognitive status and safety concerns    [] Multiple Medical Problems (see med hx)  Personal Factors:   [] Patient's age  [] Prior Level of function   [] Patient's home situation (environment and family support)  [] Patient's level of motivation  [] Expected progression of patient      HISTORY:(criteria)    [] 37325 - no personal factors/history    []  01602 - has 1-2 personal factor/comorbidity     [] 77886 - has >3 personal factor/comorbidity     Body Regions:  [] Objective examination findings  [] Head     []  Neck  [] Trunk   [] Upper Extremity  [] Lower Extremity    Body Systems:  [] For communication ability, affect, cognition, language, and learning style: the assessment of the ability to make needs known, consciousness, orientation (person, place, and time), expected emotional /behavioral responses, and learning preferences (eg, learning barriers, education  needs)  [] For the neuromuscular system: a general assessment of gross coordinated movement (eg, balance, gait, locomotion, transfers, and transitions) and motor function  (motor control and motor learning)  [] For the musculoskeletal system: the assessment of gross symmetry, gross range of motion, gross strength, height, and weight  [] For the integumentary system: the assessment of pliability(texture), presence of scar formation, skin color, and skin integrity  [] For cardiovascular/pulmonary system: the assessment of heart rate, respiratory rate, blood pressure, and edema     Activity limitations:    [] Patient's cognitive status and saf ety concerns          [] Status of current condition      [] Weight bearing restriction  [] Cardiopulmunary Restriction    Participation Restrictions:   [] Goals and goal agreement with the patient     [] Rehab potential (prognosis) and probable outcome      Examination of Body System: (criteria)    [] 24769 - addressing 1-2 elements    [] 32861 - addressing a total of 3 or more elements     [] 82265 -  Addressing a total of 4 or more elements         Clinical Presentation: (criteria)  Choose one     On examination of body system using standardized tests and measures patient presents with (CHOOSE ONE) elements from any of the following: body structures and functions, activity limitations, and/or participation restrictions.  Leading to a clinical presentation that is  considered (CHOOSE ONE)                              Clinical Decision Making  (Eval Complexity):  Choose One     Time Tracking:     PT Received On: 02/21/18  PT Start Time: 0845     PT Stop Time: 0910  PT Total Time (min): 25 min     Billable Minutes: Evaluation 15 and Gait Training 10      Freya Mcneal PT  02/21/2018

## 2018-02-21 NOTE — ED NOTES
Patient placed on continuous cardiac monitor, automatic blood pressure cuff and continuous pulse oximeter. scrotal swelling bilateral upper leg swelling.

## 2018-02-21 NOTE — HPI
Mr. Meneses is an 86 year old male patient with a PMHx of diastolic CHF, HTN, atrial fibrillation (on Eliquis), AS s/p AVR and CABG x 1 in 11/17 who presented to [facility] ED yesterday with a chief complaint of progressively worsening SOB over the past 4-5 days. Associated symptoms included lower extremity edema, groin swelling, and weakness. Patient denied any associated chest pain, fever, chills, cough, or abdominal bloating. Initial workup in ED revealed BNP of 2093 and initial troponin of 0.117 and patient subsequently admitted for further evaluation and treatment. Cardiology consulted to assist with management. Patient seen and examined today, resting in bed. Still complains of SOB and lower extremity edema but states it is improving. Remains chest pain free. He reports compliance with his medications and tries to be mindful of his salt and fluid intake. Chart reviewed. Troponin 0.117>0.109>0.097. EKG reviewed and showed atrial fibrillation with RVR, rate 107 bpm. H and H 8.4, 28.4 2D echo 1/18 showed normal EF, DD. Creatinine 2.7 upon admission, 2.4 this AM.

## 2018-02-21 NOTE — ASSESSMENT & PLAN NOTE
Flat and chronically elevated due to cardiorenal syndrome and demand ischemia from volume overload  - last ECHO on 1/26/18 showed EF 50% with DD  Continue ASA and Toprol

## 2018-02-21 NOTE — SUBJECTIVE & OBJECTIVE
Interval History: Slight improvement in [place]. Urinating without difficulties.     Review of Systems   Constitutional: Positive for fatigue. Negative for activity change, appetite change, chills, fever and unexpected weight change.   HENT: Negative for congestion, facial swelling, rhinorrhea, sinus pressure, sneezing and sore throat.    Eyes: Negative for discharge, redness and visual disturbance.   Respiratory: Positive for shortness of breath. Negative for apnea, cough, chest tightness, wheezing and stridor.    Cardiovascular: Positive for leg swelling. Negative for chest pain and palpitations.   Gastrointestinal: Negative for abdominal distention, abdominal pain, anal bleeding, blood in stool, constipation, diarrhea, nausea and vomiting.   Genitourinary: Negative for difficulty urinating, discharge, dysuria, frequency and hematuria.   Musculoskeletal: Negative for arthralgias, back pain, gait problem, joint swelling, myalgias, neck pain and neck stiffness.        Groin swelling    Skin: Negative for color change and pallor.   Neurological: Negative for dizziness, tremors, seizures, syncope, facial asymmetry, speech difficulty, weakness, light-headedness, numbness and headaches.   Psychiatric/Behavioral: Negative for behavioral problems, confusion, hallucinations and suicidal ideas. The patient is not nervous/anxious.    All other systems reviewed and are negative.    Objective:     Vital Signs (Most Recent):  Temp: 98 °F (36.7 °C) (02/21/18 1514)  Pulse: 80 (02/21/18 1514)  Resp: 16 (02/21/18 1514)  BP: 132/67 (02/21/18 1514)  SpO2: 98 % (02/21/18 1514) Vital Signs (24h Range):  Temp:  [96.2 °F (35.7 °C)-98.5 °F (36.9 °C)] 98 °F (36.7 °C)  Pulse:  [] 80  Resp:  [16-21] 16  SpO2:  [95 %-100 %] 98 %  BP: (115-151)/(63-89) 132/67     Weight: 89.1 kg (196 lb 6.9 oz)  Body mass index is 29.01 kg/m².    Intake/Output Summary (Last 24 hours) at 02/21/18 2819  Last data filed at 02/21/18 1200   Gross per 24 hour    Intake              400 ml   Output             2700 ml   Net            -2300 ml      Physical Exam   Constitutional: He is oriented to person, place, and time. He appears well-developed and well-nourished.   Elderly appearing    HENT:   Head: Normocephalic and atraumatic.   Eyes: Conjunctivae are normal. Pupils are equal, round, and reactive to light.   Neck: Normal range of motion. Neck supple.   Cardiovascular: Normal rate, regular rhythm, normal heart sounds and intact distal pulses.    No murmur heard.  Pulmonary/Chest: Effort normal and breath sounds normal. No respiratory distress.   Abdominal: Soft. Bowel sounds are normal. He exhibits no distension. There is no tenderness.   Genitourinary:   Genitourinary Comments: Scrotal edema noted    Musculoskeletal: He exhibits edema. He exhibits no tenderness or deformity.   + 3-4 pitting edema to BLE      Neurological: He is alert and oriented to person, place, and time.   Skin: Skin is warm and dry. No rash noted. No erythema.   Burn noted to LLE    Psychiatric: He has a normal mood and affect. His behavior is normal.   Nursing note and vitals reviewed.      Significant Labs:   CBC:   Recent Labs  Lab 02/20/18 1911 02/21/18  0536   WBC 6.45 6.64   HGB 8.4* 8.3*   HCT 28.4* 27.8*    228     CMP:   Recent Labs  Lab 02/20/18 1911 02/21/18  0536   * 137   K 5.5* 4.3    103   CO2 22* 22*    97   BUN 65* 66*   CREATININE 2.6* 2.4*   CALCIUM 9.0 8.9   PROT 6.5 6.3   ALBUMIN 3.3* 3.2*   BILITOT 1.8* 1.8*   ALKPHOS 113 113   AST 57* 54*   ALT 52* 51*   ANIONGAP 10 12   EGFRNONAA 21* 24*     All pertinent labs within the past 24 hours have been reviewed.    Significant Imaging: I have reviewed all pertinent imaging results/findings within the past 24 hours.

## 2018-02-21 NOTE — H&P
"[facility] Hospital Medicine  History & Physical    Patient Name: Chung Meneses .  MRN: [id number]  Admission Date: 2/20/2018  Attending Physician: Jake Eastman MD   Primary Care Provider: Glenroy Carmichael MD         Patient information was obtained from patient and ER records.     Subjective:     Principal Problem:Acute on chronic diastolic (congestive) heart failure    Chief Complaint:   Chief Complaint   Patient presents with    Fatigue     sent from doctor's office for increased weakness        HPI: Chung Meneses is an 87 yo male with a PMH of CHF, HTN and Afib  who was sent to the ER today by Dr. Espinoza for gradually increasing shortness of breath over the last several days. The patient reports that he can only walk "a few steps"  before getting out of breath. Associated symptoms include BLE swelling, swelling to groin and abdomen, generalized weakness. The patient denies any modifying factors. Patient denies fever, chills, CP, cough, pnd, orthopnea, palpitations, diaphoresis, headache, blurred vision, numbness, tingling, dizziness, localized weakness, n/v/d, abdominal pain, blood in stools, melena, hematemesis, urinary frequency, urgency, dysuria or hematuria. In the ER the patient was found to have a BNP of 2093 and troponin of 0.117. The patient is being admitted for treatment of decompensated heart failure.         Past Medical History:   Diagnosis Date    Aortic stenosis 8/2/2013    Atrial fibrillation 7/5/2013    CHF (congestive heart failure)     Dizziness - light-headed     Hyperlipidemia     Hypertension     Syncope 1/26/2018       Past Surgical History:   Procedure Laterality Date    BACK SURGERY  2003    knee replacemnt bilateral  2001    SHOULDER SURGERY  2002       Review of patient's allergies indicates:   Allergen Reactions    Sulfa (sulfonamide antibiotics) Hives       No current facility-administered medications on file prior to encounter.      Current " Outpatient Prescriptions on File Prior to Encounter   Medication Sig    apixaban (ELIQUIS) 2.5 mg Tab Take 2.5 mg by mouth 2 (two) times daily.    aspirin (ECOTRIN) 81 MG EC tablet Take 1 tablet (81 mg total) by mouth once daily.    fish oil-omega-3 fatty acids 300-1,000 mg capsule Take 1,200 mg by mouth once daily.    furosemide (LASIX) 40 MG tablet Take 1 tablet (40 mg total) by mouth once daily.    metoprolol succinate (TOPROL-XL) 25 MG 24 hr tablet Take 1 tablet (25 mg total) by mouth once daily.    multivitamin with folic acid 400 mcg Tab Take 1 tablet by mouth.    potassium chloride (K-TAB) 20 mEq Take 1 tablet (20 mEq total) by mouth once daily.    ranitidine (ZANTAC) 75 MG tablet Take 75 mg by mouth 2 (two) times daily.     VENTOLIN HFA 90 mcg/actuation inhaler INHALE 1 TO 2 PUFFS 4 TIMES A DAY AS NEEDED FOR WHEEZING    vitamin D 1000 units Tab Take by mouth.     Family History     Problem Relation (Age of Onset)    Heart attack Brother    Heart disease Brother    Heart failure Brother    Hypertension Mother, Father, Sister, Brother        Social History Main Topics    Smoking status: Never Smoker    Smokeless tobacco: Never Used    Alcohol use No      Comment: HOLIDAY    Drug use: No    Sexual activity: No     Review of Systems   Constitutional: Positive for fatigue. Negative for activity change, appetite change, chills, fever and unexpected weight change.   HENT: Negative for congestion, facial swelling, rhinorrhea, sinus pressure, sneezing and sore throat.    Eyes: Negative for discharge, redness and visual disturbance.   Respiratory: Positive for shortness of breath. Negative for apnea, cough, chest tightness, wheezing and stridor.    Cardiovascular: Positive for leg swelling. Negative for chest pain and palpitations.   Gastrointestinal: Negative for abdominal distention, abdominal pain, anal bleeding, blood in stool, constipation, diarrhea, nausea and vomiting.   Genitourinary: Negative  for difficulty urinating, discharge, dysuria, frequency and hematuria.   Musculoskeletal: Negative for arthralgias, back pain, gait problem, joint swelling, myalgias, neck pain and neck stiffness.        Groin swelling    Skin: Negative for color change and pallor.   Neurological: Negative for dizziness, tremors, seizures, syncope, facial asymmetry, speech difficulty, weakness, light-headedness, numbness and headaches.   Psychiatric/Behavioral: Negative for behavioral problems, confusion, hallucinations and suicidal ideas. The patient is not nervous/anxious.    All other systems reviewed and are negative.    Objective:     Vital Signs (Most Recent):  Temp: 98.5 °F (36.9 °C) (02/20/18 1806)  Pulse: 87 (02/20/18 2027)  Resp: 18 (02/20/18 2027)  BP: 120/89 (02/20/18 2027)  SpO2: 95 % (02/20/18 2027) Vital Signs (24h Range):  Temp:  [98.5 °F (36.9 °C)] 98.5 °F (36.9 °C)  Pulse:  [] 87  Resp:  [18-21] 18  SpO2:  [95 %-98 %] 95 %  BP: (119-120)/(69-89) 120/89     Weight: 88.7 kg (195 lb 8.8 oz)  Body mass index is 28.88 kg/m².    Physical Exam   Constitutional: He is oriented to person, place, and time. He appears well-developed and well-nourished.   Elderly appearing    HENT:   Head: Normocephalic and atraumatic.   Eyes: Conjunctivae are normal. Pupils are equal, round, and reactive to light.   Neck: Normal range of motion. Neck supple.   Cardiovascular: Normal rate, regular rhythm, normal heart sounds and intact distal pulses.    No murmur heard.  Pulmonary/Chest: Effort normal and breath sounds normal. No respiratory distress.   Abdominal: Soft. Bowel sounds are normal. He exhibits no distension. There is no tenderness.   Genitourinary:   Genitourinary Comments: Scrotal edema noted    Musculoskeletal: He exhibits edema. He exhibits no tenderness or deformity.   + 3-4 pitting edema to BLE      Neurological: He is alert and oriented to person, place, and time.   Skin: Skin is warm and dry. No rash noted. No  erythema.   Burn noted to LLE    Psychiatric: He has a normal mood and affect. His behavior is normal.   Nursing note and vitals reviewed.        CRANIAL NERVES     CN III, IV, VI   Pupils are equal, round, and reactive to light.       Significant Labs: All pertinent labs within the past 24 hours have been reviewed.    Significant Imaging:   Imaging Results          X-Ray Chest AP Portable (Final result)  Result time 02/20/18 19:15:46    Final result by Sharad Eldridge III, MD (02/20/18 19:15:46)                 Impression:     No acute cardiopulmonary abnormality suggested. No detrimental change. No gross evidence of overt failure.      Electronically signed by: SHARAD ELDRIDGE MD  Date:     02/20/18  Time:    19:15              Narrative:    One view chest x-ray.    Clinical indication: Congestive heart failure    Heart size is unchanged with postoperative change along the sternum. Bilateral shoulder prostheses noted. Peripheral lung zones are clear.                                 Assessment/Plan:     * Acute on chronic diastolic (congestive) heart failure    - Cardiology consult  - IV diuresis   - Last ECHO on 1/26/18 showed EF 50% with DD  - resume BB   - strict I&O  - daily weight   - low Na diet   - fluid restriction           Transaminitis    - Likely 2/2 hepatorenal syndrome   - CMP in am           Acute renal failure superimposed on chronic kidney disease    - Creat 2.7, baseline 1.7  - Likely 2/2 hepatorenal syndrome   - monitor renal function   - CMP in am           Elevated troponin    - Likely 2/2 #1   - trend troponin   - last ECHO on 1/26/18 showed EF 50% with DD          CAD (coronary artery disease)    - resume ASA/BB           Atrial fibrillation    - Rate controlled   - resume apixaban/metoprolol   - monitor         Hypertension    - monitor VS   - resume home meds             VTE Risk Mitigation         Ordered     apixaban tablet 2.5 mg  2 times daily     Route:  Oral        02/20/18  2053     Medium Risk of VTE  Once      02/20/18 2053             Khari Esteban NP  Department of Hospital Medicine   [facility]

## 2018-02-21 NOTE — HOSPITAL COURSE
Pt was admitted for decompensated diastolic heart failure, BNP 2093.  He had peripheral/scrotal edema. Serum creatinine mildly elevated due to cardiorenal effects. Diuresis initiated with IV lasix and metolazone. Cardiology saw patient and agreed with POC. Hgb fell from 8.3 >> 8.0, 1 unit PrBCs transfused, there was no active bleeding. As of day 3, pt had diuresed approx 8 L fluid. CHF education reiterated. Renal function improved, Hgb ^ to 8.9 after blood transfusion and edema continued to improve. Has diuresed approx 12 L fluid on day 4. On day 5 he had diuresed over 4 additional L or >16L total.  Pt felt swelling to legs and SOB completely resolved.  No bleeding, heme hold, K+ continued low and repleted, creat continues to improve, cleared to d/c by cards.  Resume [facility] at d/c.      Pt examined on day of d/c and appropriate to d/c to home.

## 2018-02-21 NOTE — ASSESSMENT & PLAN NOTE
-Decompensated   -Continue IV diuresis  -One time dose of metolazone today  -Strict I's/O's  -No ACEI/ARB given renal function  -Dietary restrictions re-emphasized  -Echo 1/18 showed normal EF

## 2018-02-21 NOTE — ASSESSMENT & PLAN NOTE
- Creat 2.7, baseline 1.7  - Likely 2/2 hepatorenal syndrome   - monitor renal function   - CMP in am

## 2018-02-21 NOTE — PROGRESS NOTES
"[facility] Hospital Medicine  Progress Note    Patient Name: Chung Meneses Sr.  MRN: [id number]  Patient Class: IP- Inpatient   Admission Date: 2/20/2018  Length of Stay: 1 days  Attending Physician: Jake Eastman MD  Primary Care Provider: Glenroy Carmichael MD        Subjective:     Principal Problem:Acute on chronic diastolic (congestive) heart failure    HPI:  Chung Meneses is an 85 yo male with a PMH of CHF, HTN and Afib  who was sent to the ER today by Dr. Espinoza for gradually increasing shortness of breath over the last several days. The patient reports that he can only walk "a few steps"  before getting out of breath. Associated symptoms include BLE swelling, swelling to groin and abdomen, generalized weakness. The patient denies any modifying factors. Patient denies fever, chills, CP, cough, pnd, orthopnea, palpitations, diaphoresis, headache, blurred vision, numbness, tingling, dizziness, localized weakness, n/v/d, abdominal pain, blood in stools, melena, hematemesis, urinary frequency, urgency, dysuria or hematuria. In the ER the patient was found to have a BNP of 2093 and troponin of 0.117. The patient is being admitted for treatment of decompensated heart failure.         Hospital Course:  Pt was admitted for decompensated diastolic heart failure, BNP 2093.  Serum creatinine mildly elevated due to cardiorenal effects. Diuresis initiated with IV lasix and metolazone. Cardiology saw patient and agreed with POC.     Interval History: Slight improvement in [place]. Urinating without difficulties.     Review of Systems   Constitutional: Positive for fatigue. Negative for activity change, appetite change, chills, fever and unexpected weight change.   HENT: Negative for congestion, facial swelling, rhinorrhea, sinus pressure, sneezing and sore throat.    Eyes: Negative for discharge, redness and visual disturbance.   Respiratory: Positive for shortness of breath. Negative for apnea, cough, chest " tightness, wheezing and stridor.    Cardiovascular: Positive for leg swelling. Negative for chest pain and palpitations.   Gastrointestinal: Negative for abdominal distention, abdominal pain, anal bleeding, blood in stool, constipation, diarrhea, nausea and vomiting.   Genitourinary: Negative for difficulty urinating, discharge, dysuria, frequency and hematuria.   Musculoskeletal: Negative for arthralgias, back pain, gait problem, joint swelling, myalgias, neck pain and neck stiffness.        Groin swelling    Skin: Negative for color change and pallor.   Neurological: Negative for dizziness, tremors, seizures, syncope, facial asymmetry, speech difficulty, weakness, light-headedness, numbness and headaches.   Psychiatric/Behavioral: Negative for behavioral problems, confusion, hallucinations and suicidal ideas. The patient is not nervous/anxious.    All other systems reviewed and are negative.    Objective:     Vital Signs (Most Recent):  Temp: 98 °F (36.7 °C) (02/21/18 1514)  Pulse: 80 (02/21/18 1514)  Resp: 16 (02/21/18 1514)  BP: 132/67 (02/21/18 1514)  SpO2: 98 % (02/21/18 1514) Vital Signs (24h Range):  Temp:  [96.2 °F (35.7 °C)-98.5 °F (36.9 °C)] 98 °F (36.7 °C)  Pulse:  [] 80  Resp:  [16-21] 16  SpO2:  [95 %-100 %] 98 %  BP: (115-151)/(63-89) 132/67     Weight: 89.1 kg (196 lb 6.9 oz)  Body mass index is 29.01 kg/m².    Intake/Output Summary (Last 24 hours) at 02/21/18 1615  Last data filed at 02/21/18 1200   Gross per 24 hour   Intake              400 ml   Output             2700 ml   Net            -2300 ml      Physical Exam   Constitutional: He is oriented to person, place, and time. He appears well-developed and well-nourished.   Elderly appearing    HENT:   Head: Normocephalic and atraumatic.   Eyes: Conjunctivae are normal. Pupils are equal, round, and reactive to light.   Neck: Normal range of motion. Neck supple.   Cardiovascular: Normal rate, regular rhythm, normal heart sounds and intact  distal pulses.    No murmur heard.  Pulmonary/Chest: Effort normal and breath sounds normal. No respiratory distress.   Abdominal: Soft. Bowel sounds are normal. He exhibits no distension. There is no tenderness.   Genitourinary:   Genitourinary Comments: Scrotal edema noted    Musculoskeletal: He exhibits edema. He exhibits no tenderness or deformity.   + 3-4 pitting edema to BLE      Neurological: He is alert and oriented to person, place, and time.   Skin: Skin is warm and dry. No rash noted. No erythema.   Burn noted to LLE    Psychiatric: He has a normal mood and affect. His behavior is normal.   Nursing note and vitals reviewed.      Significant Labs:   CBC:   Recent Labs  Lab 02/20/18 1911 02/21/18  0536   WBC 6.45 6.64   HGB 8.4* 8.3*   HCT 28.4* 27.8*    228     CMP:   Recent Labs  Lab 02/20/18 1911 02/21/18  0536   * 137   K 5.5* 4.3    103   CO2 22* 22*    97   BUN 65* 66*   CREATININE 2.6* 2.4*   CALCIUM 9.0 8.9   PROT 6.5 6.3   ALBUMIN 3.3* 3.2*   BILITOT 1.8* 1.8*   ALKPHOS 113 113   AST 57* 54*   ALT 52* 51*   ANIONGAP 10 12   EGFRNONAA 21* 24*     All pertinent labs within the past 24 hours have been reviewed.    Significant Imaging: I have reviewed all pertinent imaging results/findings within the past 24 hours.    Assessment/Plan:      * Acute on chronic diastolic (congestive) heart failure    - Cardiology consult  - IV lasix diuresis   Metolazone given x 1 2/21/18  - Last ECHO on 1/26/18 showed EF 50% with DD  Dietary restrictions re emphasized  - resume BB   - strict I&O  - daily weight   - low Na diet   - fluid restriction           Transaminitis    - Likely 2/2 hepatorenal syndrome   - CMP in am           Acute renal failure superimposed on chronic kidney disease    - Creat 2.7, baseline 1.7  - Likely 2/2 cardiorenal syndrome   - monitor renal function   - CMP in am   ACE/ARB on hold          Elevated troponin    Flat and chronically elevated due to cardiorenal  syndrome and demand ischemia from volume overload  - last ECHO on 1/26/18 showed EF 50% with DD  Continue ASA and Toprol          CAD (coronary artery disease)    - resume ASA/BB           Atrial fibrillation    - Rate controlled   - resume apixaban/metoprolol   - monitor         Hypertension    - monitor VS   - resume home meds   ACEI/ARB on hold due to kidney injury            VTE Risk Mitigation         Ordered     apixaban tablet 2.5 mg  2 times daily     Route:  Oral        02/20/18 2053     Medium Risk of VTE  Once      02/20/18 2053              Jo Cunningham NP  Department of Hospital Medicine   [facility]

## 2018-02-21 NOTE — ASSESSMENT & PLAN NOTE
-Chronically elevated, secondary to demand ischemia from volume overload and underlying CKD  -Continue ASA, Toprol  -No angina or equivalent

## 2018-02-21 NOTE — CONSULTS
02/21/18 1124   Oxygen Therapy   SpO2 97 %   O2 Device (Oxygen Therapy) room air   ECG   Pulse (!) 111       Wound 02/21/18 1100 Ulceration posterior Knee   Date First Assessed/Time First Assessed: 02/21/18 1100   Pre-existing: Yes  Wound Type: Ulceration  Side: Left  Orientation: posterior  Location: Knee  Wound Length (cm): 3  Wound Width (cm): 2  Depth (cm): 0.5   Wound WDL ex   Dressing Appearance Open to air   Drainage Amount Scant   Drainage Characteristics/Odor Serous   Appearance Red;Moist;Yellow;Slough   Tissue loss description Full thickness   Red (%), Wound Tissue Color 50 %   Yellow (%), Wound Tissue Color 50 %   Periwound Area Scar tissue   Wound Edges Open   Wound Length (cm) 3   Wound Width (cm) 2   Depth (cm) 0.5   Care Cleansed with:;Sterile normal saline;Applied:;Skin Barrier   Dressing Hydrofiber;Foam   Periwound Care Absorptive dressing applied;Cleansed with pH balanced cleanser;Dry periwound area maintained;Skin barrier film applied   Dressing Change Due 02/24/18       Pressure Injury 02/21/18 1030 Coccyx Stage 2   Date First Assessed/Time First Assessed: 02/21/18 1030   Pressure Injury Present on Admission: yes  Location: Coccyx  Is this injury device related?: No  Staging: Stage 2  Wound Length (cm): 1  Wound Width (cm): 1   Staging 2   Dressing Appearance Open to air   Drainage Amount None   Appearance Red   Tissue loss description Not applicable   Red (%), Wound Tissue Color 100 %   Periwound Area Dry;Intact;Scar tissue   Wound Length (cm) 1   Wound Width (cm) 1   Care Applied:;Skin Barrier   Skin Interventions   Pressure Reduction Devices Pressure-redistributing mattress utilized;Foam padding utilized   Pressure Reduction Techniques frequent weight shift encouraged;heels elevated off bed;positioned off wounds   Skin Protection Adhesive use limited;Incontinence pads;Skin sealant/moisture barrier   Safety Interventions   Patient Rounds bed in low position;call light in reach   Safety  "Promotion/Fall Prevention side rails raised x 2   Positioning   Body Position up in chair   Vitals   BP (!) 141/71     Screening performed on this 85 y/o M patient due to old document coccygeal pressure injury from previous admission 12/17. Patient found sitting up in chair at bedside, awake and alert. He states he has a wound that "comes and goes" but has "flared back up" recently to "tail bone".  Patient stood up from chair and I assessed site. Sacrum and bilateral buttock intact.  Coccyx is prominent/protrudine. Stage 2 pressure injury noted to coccyx with moist pink wound bed, wound edges pale and macerated, and surrounding nonblanchable redness. Area painted with cavilon and left RODRIGUEZ due to anatomy. Spoke with patient at length regarding need for pressure offloading of coccyx, and frequent repositioning while in chair as well as in bed.  Multiple healing/scabbed wounds noted to LLE.  Patient states whenever something hits his leg he gets a sore.  Left posterior knee open wound noted, 50% moist pink, 50% yellow slough  Wound measures 3x2cm, surrounding skin is pale scar tissue.  Patient states he had a wound there for decades, and when he was in the [employer], he had surgery on the site.  He also states he is "a " and that aggravates his wounds. Cleansed with sterile normal saline and patted dry. Intact frankie wound skin painted with cavilon, and aquacel foam pro dressing applied.  Please see below for wound care recommendations:    Coccyx Stage 2 pressure injury  1. Cleanse with easi cleanse foam wipes  2. Pat dry  3. Paint with cavilon every shift  4. Strict pressure offloading while in bed and chair    Ulceration Left posterior knee:  1. Cleanse with sterile normal saline  2. Pat dry  3. Paint intact frankie wound skin with cavilon  4. Apply Aquacel foam pro dressing  5. Change every 3 days  "

## 2018-02-21 NOTE — ED PROVIDER NOTES
"SCRIBE #1 NOTE: I, Tre Parth, am scribing for, and in the presence of, Evert Deluna MD. I have scribed the entire note.      History      Chief Complaint   Patient presents with    Fatigue     sent from doctor's office for increased weakness       Review of patient's allergies indicates:   Allergen Reactions    Sulfa (sulfonamide antibiotics) Hives        HPI   HPI    2/20/2018, 7:00 PM   History obtained from the patient      History of Present Illness: Chung Meneses Sr. is a 86 y.o. male patient w/ PMHx of CHF and Afib and Pshx of CABG and Aortic valve replacement (preformed on 12/30/2017) presents to the Emergency Department, sent by Dr. Espinoza's office, for SOB w/ exertion which onset gradually "a while ago." Pt reports that "I am unable to walk more than 5 feet at a time." Symptoms are constant and moderate in severity.  No mitigating factors reported. Associated sxs include generalized weakness and swelling, located to the BLE, groin, and abdomen. Pt is on Lasix 40 mg, and is compliant w/ the medication. Patient denies any orthopnea, chest pain, fever, n/v/d, focal weakness/ numbness, diaphoresis, palpitations, cough, and all other sxs at this time. No further complaints or concerns at this time.         Arrival mode: Personal vehicle     PCP: Glenroy Carmichael MD       Past Medical History:  Past Medical History:   Diagnosis Date    Aortic stenosis 8/2/2013    Atrial fibrillation 7/5/2013    CHF (congestive heart failure)     Dizziness - light-headed     Hyperlipidemia     Hypertension     Syncope 1/26/2018       Past Surgical History:  Past Surgical History:   Procedure Laterality Date    BACK SURGERY  2003    knee replacemnt bilateral  2001    SHOULDER SURGERY  2002         Family History:  Family History   Problem Relation Age of Onset    Hypertension Mother     Hypertension Father     Hypertension Sister     Hypertension Brother     Heart failure Brother     Heart " disease Brother     Heart attack Brother        Social History:  Social History     Social History Main Topics    Smoking status: Never Smoker    Smokeless tobacco: Never Used    Alcohol use No      Comment: HOLIDAY    Drug use: No    Sexual activity: No       ROS   Review of Systems   Constitutional: Negative for chills, diaphoresis and fever.        + generalized weakness   Respiratory: Positive for shortness of breath (w/ exertion). Negative for cough.         - orthopnea   Cardiovascular: Positive for leg swelling (BLE). Negative for chest pain and palpitations.   Gastrointestinal: Negative for abdominal pain, diarrhea, nausea and vomiting.   Genitourinary: Negative for difficulty urinating and urgency.   Musculoskeletal:        + swelling to groin and abd   Neurological: Negative for dizziness, syncope, weakness, light-headedness, numbness and headaches.   All other systems reviewed and are negative.      Physical Exam      Initial Vitals [02/20/18 1806]   BP Pulse Resp Temp SpO2   119/69 100 (!) 21 98.5 °F (36.9 °C) 98 %      MAP       85.67          Physical Exam  Nursing Notes and Vital Signs Reviewed.  Constitutional: Patient is in no apparent distress. Well-developed and well-nourished.  Head: Atraumatic. Normocephalic.  Eyes: PERRL. EOM intact. Conjunctivae are not pale. No scleral icterus.  ENT: Mucous membranes are moist. Oropharynx is clear and symmetric.    Neck: Supple. Full ROM. No lymphadenopathy.  Cardiovascular: Regular rate. Regular rhythm. No murmurs, rubs, or gallops. Distal pulses are 2+ and symmetric.  Pulmonary/Chest: No respiratory distress. Clear to auscultation bilaterally. No wheezing or rales.  Abdominal: Soft and non-distended.  There is no tenderness.  No rebound, guarding, or rigidity. Good bowel sounds. Abd wall edema. .  : Scrotal edema. No erythema, rash, or lesions. No scrotal, testicular, or epididymal tenderness. No masses or hernias around the scrotum, testicles, or  "inguinal canal.  Musculoskeletal: Moves all extremities. No obvious deformities. 4+ BLE edema. No calf tenderness.  Skin: Warm and dry. Burn to LLE w/ chronic skin changes.   Neurological:  Alert, awake, and appropriate.  Normal speech.  No acute focal neurological deficits are appreciated.  Psychiatric: Normal affect. Good eye contact. Appropriate in content.    ED Course    Procedures  ED Vital Signs:  Vitals:    02/20/18 1806   BP: 119/69   Pulse: 100   Resp: (!) 21   Temp: 98.5 °F (36.9 °C)   TempSrc: Oral   SpO2: 98%   Weight: 88.7 kg (195 lb 8.8 oz)   Height: 5' 9" (1.753 m)       Abnormal Lab Results:  Labs Reviewed   CBC W/ AUTO DIFFERENTIAL - Abnormal; Notable for the following:        Result Value    RBC 3.96 (*)     Hemoglobin 8.4 (*)     Hematocrit 28.4 (*)     MCV 72 (*)     MCH 21.2 (*)     MCHC 29.6 (*)     RDW 19.1 (*)     MPV 9.0 (*)     Lymph% 14.7 (*)     Mono% 15.7 (*)     All other components within normal limits   COMPREHENSIVE METABOLIC PANEL - Abnormal; Notable for the following:     Sodium 135 (*)     Potassium 5.5 (*)     CO2 22 (*)     BUN, Bld 65 (*)     Creatinine 2.6 (*)     Albumin 3.3 (*)     Total Bilirubin 1.8 (*)     AST 57 (*)     ALT 52 (*)     eGFR if  25 (*)     eGFR if non  21 (*)     All other components within normal limits   TROPONIN I - Abnormal; Notable for the following:     Troponin I 0.117 (*)     All other components within normal limits   B-TYPE NATRIURETIC PEPTIDE - Abnormal; Notable for the following:     BNP 2,093 (*)     All other components within normal limits   PROTIME-INR - Abnormal; Notable for the following:     Prothrombin Time 15.2 (*)     INR 1.5 (*)     All other components within normal limits   URINALYSIS        All Lab Results:  Results for orders placed or performed during the hospital encounter of 02/20/18   CBC auto differential   Result Value Ref Range    WBC 6.45 3.90 - 12.70 K/uL    RBC 3.96 (L) 4.60 - 6.20 M/uL "    Hemoglobin 8.4 (L) 14.0 - 18.0 g/dL    Hematocrit 28.4 (L) 40.0 - 54.0 %    MCV 72 (L) 82 - 98 fL    MCH 21.2 (L) 27.0 - 31.0 pg    MCHC 29.6 (L) 32.0 - 36.0 g/dL    RDW 19.1 (H) 11.5 - 14.5 %    Platelets 232 150 - 350 K/uL    MPV 9.0 (L) 9.2 - 12.9 fL    Gran # (ANC) 4.4 1.8 - 7.7 K/uL    Lymph # 1.0 1.0 - 4.8 K/uL    Mono # 1.0 0.3 - 1.0 K/uL    Eos # 0.1 0.0 - 0.5 K/uL    Baso # 0.04 0.00 - 0.20 K/uL    Gran% 68.4 38.0 - 73.0 %    Lymph% 14.7 (L) 18.0 - 48.0 %    Mono% 15.7 (H) 4.0 - 15.0 %    Eosinophil% 0.9 0.0 - 8.0 %    Basophil% 0.6 0.0 - 1.9 %    Poly Occasional     Ovalocytes Occasional     Tear Drop Cells Occasional     Schistocytes Present     Differential Method Automated    Comprehensive metabolic panel   Result Value Ref Range    Sodium 135 (L) 136 - 145 mmol/L    Potassium 5.5 (H) 3.5 - 5.1 mmol/L    Chloride 103 95 - 110 mmol/L    CO2 22 (L) 23 - 29 mmol/L    Glucose 109 70 - 110 mg/dL    BUN, Bld 65 (H) 8 - 23 mg/dL    Creatinine 2.6 (H) 0.5 - 1.4 mg/dL    Calcium 9.0 8.7 - 10.5 mg/dL    Total Protein 6.5 6.0 - 8.4 g/dL    Albumin 3.3 (L) 3.5 - 5.2 g/dL    Total Bilirubin 1.8 (H) 0.1 - 1.0 mg/dL    Alkaline Phosphatase 113 55 - 135 U/L    AST 57 (H) 10 - 40 U/L    ALT 52 (H) 10 - 44 U/L    Anion Gap 10 8 - 16 mmol/L    eGFR if African American 25 (A) >60 mL/min/1.73 m^2    eGFR if non African American 21 (A) >60 mL/min/1.73 m^2   Troponin I   Result Value Ref Range    Troponin I 0.117 (H) 0.000 - 0.026 ng/mL   Brain natriuretic peptide   Result Value Ref Range    BNP 2,093 (H) 0 - 99 pg/mL   Protime-INR   Result Value Ref Range    Prothrombin Time 15.2 (H) 9.0 - 12.5 sec    INR 1.5 (H) 0.8 - 1.2   Urinalysis   Result Value Ref Range    Specimen UA Urine, Clean Catch     Color, UA Yellow Yellow, Straw, Alina    Appearance, UA Clear Clear    pH, UA 5.0 5.0 - 8.0    Specific Gravity, UA 1.020 1.005 - 1.030    Protein, UA Negative Negative    Glucose, UA Negative Negative    Ketones, UA Negative  Negative    Bilirubin (UA) Negative Negative    Occult Blood UA Negative Negative    Nitrite, UA Negative Negative    Urobilinogen, UA 1.0 <2.0 EU/dL    Leukocytes, UA Negative Negative         Imaging Results:  Imaging Results          X-Ray Chest AP Portable (Final result)  Result time 02/20/18 19:15:46    Final result by Sharad Eldridge III, MD (02/20/18 19:15:46)                 Impression:     No acute cardiopulmonary abnormality suggested. No detrimental change. No gross evidence of overt failure.      Electronically signed by: SHARAD ELDRIDGE MD  Date:     02/20/18  Time:    19:15              Narrative:    One view chest x-ray.    Clinical indication: Congestive heart failure    Heart size is unchanged with postoperative change along the sternum. Bilateral shoulder prostheses noted. Peripheral lung zones are clear.                             The EKG was ordered, reviewed, and independently interpreted by the ED provider.  Interpretation time: 19:!3  Rate: 107 BPM  Rhythm: Atrial fibrillation w/ RVR  Interpretation: Left axis deviation. Low voltage QRS. Cannot rule out anterior infarct. No STEMI.         The Emergency Provider reviewed the vital signs and test results, which are outlined above.    ED Discussion     8:18 PM: Discussed case with Dr. Cowart ([facility]). Dr. Cowart agrees with current care and management of pt and accepts admission.   Admitting Service: [facility]   Admitting Physician: Dr. Cowart  Admit to: Tele    8:18 PM: Re-evaluated pt. I have discussed test results, shared treatment plan, and the need for admission with patient and family at bedside. Pt and family express understanding at this time and agree with all information. All questions answered. Pt and family have no further questions or concerns at this time. Pt is ready for admit.      ED Medication(s):  Medications   furosemide injection 60 mg (60 mg Intravenous Given 2/20/18 1915)       New Prescriptions     No medications on file             Medical Decision Making    Medical Decision Making:   Clinical Tests:   Lab Tests: Reviewed and Ordered  Radiological Study: Reviewed and Ordered  Medical Tests: Reviewed and Ordered           Scribe Attestation:   Scribe #1: I performed the above scribed service and the documentation accurately describes the services I performed. I attest to the accuracy of the note.    Attending:   Physician Attestation Statement for Scribe #1: I, Evert Deluna,*, personally performed the services described in this documentation, as scribed by Tre Alba, in my presence, and it is both accurate and complete.          Clinical Impression       ICD-10-CM ICD-9-CM   1. Congestive heart failure, unspecified congestive heart failure chronicity, unspecified congestive heart failure type I50.9 428.0   2. Shortness of breath R06.02 786.05   3. Cardiorenal syndrome with renal failure, stage 1-4 or unspecified chronic kidney disease, with heart failure I13.0 404.91       Disposition:   Disposition: Admitted  Condition: Fair         Evert Deluna MD  02/20/18 2029

## 2018-02-21 NOTE — HPI
"Chung Meneses is an 87 yo male with a PMH of CHF, HTN and Afib who was sent to the ER today by Dr. Espinoza for gradually increasing shortness of breath over the last several days. The patient reports that he can only walk "a few steps"  before getting out of breath. Associated symptoms include BLE swelling, swelling to groin and abdomen, generalized weakness. The patient denies any modifying factors. Patient denies fever, chills, CP, cough, pnd, orthopnea, palpitations, diaphoresis, headache, blurred vision, numbness, tingling, dizziness, localized weakness, n/v/d, abdominal pain, blood in stools, melena, hematemesis, urinary frequency, urgency, dysuria or hematuria. In the ER the patient was found to have a BNP of 2093 and troponin of 0.117. The patient is being admitted for treatment of decompensated heart failure.       "

## 2018-02-21 NOTE — PROGRESS NOTES
Pharmacist Renal Dose Adjustment Note    Chung Meneses Sr. is a 86 y.o. male being treated with the medication famotidine (Pepcid)    Patient Data:    Vital Signs (Most Recent):  Temp: 98.5 °F (36.9 °C) (02/20/18 1806)  Pulse: 87 (02/20/18 2027)  Resp: 18 (02/20/18 2027)  BP: 120/89 (02/20/18 2027)  SpO2: 95 % (02/20/18 2027) Vital Signs (72h Range):  Temp:  [98.5 °F (36.9 °C)]   Pulse:  []   Resp:  [18-21]   BP: (119-120)/(69-89)   SpO2:  [95 %-98 %]        Recent Labs     Lab 02/20/18 1911   CREATININE 2.6*     Serum creatinine: 2.6 mg/dL (H) 02/20/18 1911  Estimated creatinine clearance: 22.5 mL/min (A)    Pepcid 20 mg PO twice daily will be decreased to Pepcid 20 mg PO daily due to CrCl < 50 ml/min.    Pharmacist's Name: Katherine E Mcardle  Pharmacist's Extension: [phone]

## 2018-02-21 NOTE — PLAN OF CARE
Problem: Fall Risk (Adult)  Goal: Identify Related Risk Factors and Signs and Symptoms  Related risk factors and signs and symptoms are identified upon initiation of Human Response Clinical Practice Guideline (CPG)   Outcome: Ongoing (interventions implemented as appropriate)  Reviewed plan of care with patient, verbalized understanding. No falls, incidents this shift. VS within normal limits. Bed in lowest position, call light within reach. Will continue to monitor.

## 2018-02-22 PROBLEM — E87.6 HYPOKALEMIA: Status: ACTIVE | Noted: 2018-02-22

## 2018-02-22 LAB
ABO + RH BLD: NORMAL
ACANTHOCYTES BLD QL SMEAR: PRESENT
ALBUMIN SERPL BCP-MCNC: 3.1 G/DL
ALP SERPL-CCNC: 109 U/L
ALT SERPL W/O P-5'-P-CCNC: 48 U/L
ANION GAP SERPL CALC-SCNC: 12 MMOL/L
ANISOCYTOSIS BLD QL SMEAR: ABNORMAL
AST SERPL-CCNC: 47 U/L
BASOPHILS # BLD AUTO: 0.03 K/UL
BASOPHILS NFR BLD: 0.5 %
BILIRUB SERPL-MCNC: 1.7 MG/DL
BLD GP AB SCN CELLS X3 SERPL QL: NORMAL
BLD PROD TYP BPU: NORMAL
BLOOD UNIT EXPIRATION DATE: NORMAL
BLOOD UNIT TYPE CODE: 6200
BLOOD UNIT TYPE: NORMAL
BNP SERPL-MCNC: 2027 PG/ML
BUN SERPL-MCNC: 60 MG/DL
BURR CELLS BLD QL SMEAR: ABNORMAL
CALCIUM SERPL-MCNC: 8.8 MG/DL
CHLORIDE SERPL-SCNC: 98 MMOL/L
CO2 SERPL-SCNC: 26 MMOL/L
CODING SYSTEM: NORMAL
CREAT SERPL-MCNC: 2.2 MG/DL
DACRYOCYTES BLD QL SMEAR: ABNORMAL
DIFFERENTIAL METHOD: ABNORMAL
DISPENSE STATUS: NORMAL
EOSINOPHIL # BLD AUTO: 0.1 K/UL
EOSINOPHIL NFR BLD: 1.9 %
ERYTHROCYTE [DISTWIDTH] IN BLOOD BY AUTOMATED COUNT: 19.3 %
EST. GFR  (AFRICAN AMERICAN): 30 ML/MIN/1.73 M^2
EST. GFR  (NON AFRICAN AMERICAN): 26 ML/MIN/1.73 M^2
GLUCOSE SERPL-MCNC: 100 MG/DL
HCT VFR BLD AUTO: 26.6 %
HGB BLD-MCNC: 8 G/DL
HYPOCHROMIA BLD QL SMEAR: ABNORMAL
LYMPHOCYTES # BLD AUTO: 0.9 K/UL
LYMPHOCYTES NFR BLD: 14.3 %
MCH RBC QN AUTO: 21.4 PG
MCHC RBC AUTO-ENTMCNC: 30.1 G/DL
MCV RBC AUTO: 71 FL
MONOCYTES # BLD AUTO: 0.8 K/UL
MONOCYTES NFR BLD: 12.3 %
NEUTROPHILS # BLD AUTO: 4.4 K/UL
NEUTROPHILS NFR BLD: 71.3 %
NUM UNITS TRANS PACKED RBC: NORMAL
OVALOCYTES BLD QL SMEAR: ABNORMAL
PLATELET # BLD AUTO: 208 K/UL
PLATELET BLD QL SMEAR: ABNORMAL
PMV BLD AUTO: 9.1 FL
POIKILOCYTOSIS BLD QL SMEAR: SLIGHT
POLYCHROMASIA BLD QL SMEAR: ABNORMAL
POTASSIUM SERPL-SCNC: 3.1 MMOL/L
PROT SERPL-MCNC: 6.1 G/DL
RBC # BLD AUTO: 3.74 M/UL
SCHISTOCYTES BLD QL SMEAR: PRESENT
SODIUM SERPL-SCNC: 136 MMOL/L
SPHEROCYTES BLD QL SMEAR: ABNORMAL
STOMATOCYTES BLD QL SMEAR: PRESENT
TARGETS BLD QL SMEAR: ABNORMAL
WBC # BLD AUTO: 6.24 K/UL

## 2018-02-22 PROCEDURE — 85025 COMPLETE CBC W/AUTO DIFF WBC: CPT

## 2018-02-22 PROCEDURE — 21400001 HC TELEMETRY ROOM

## 2018-02-22 PROCEDURE — 97530 THERAPEUTIC ACTIVITIES: CPT

## 2018-02-22 PROCEDURE — 97116 GAIT TRAINING THERAPY: CPT

## 2018-02-22 PROCEDURE — 83880 ASSAY OF NATRIURETIC PEPTIDE: CPT

## 2018-02-22 PROCEDURE — 97535 SELF CARE MNGMENT TRAINING: CPT

## 2018-02-22 PROCEDURE — 36415 COLL VENOUS BLD VENIPUNCTURE: CPT

## 2018-02-22 PROCEDURE — 99233 SBSQ HOSP IP/OBS HIGH 50: CPT | Mod: ,,, | Performed by: INTERNAL MEDICINE

## 2018-02-22 PROCEDURE — 86901 BLOOD TYPING SEROLOGIC RH(D): CPT

## 2018-02-22 PROCEDURE — 80053 COMPREHEN METABOLIC PANEL: CPT

## 2018-02-22 PROCEDURE — 86920 COMPATIBILITY TEST SPIN: CPT

## 2018-02-22 PROCEDURE — P9016 RBC LEUKOCYTES REDUCED: HCPCS

## 2018-02-22 PROCEDURE — 63600175 PHARM REV CODE 636 W HCPCS: Performed by: EMERGENCY MEDICINE

## 2018-02-22 PROCEDURE — 25000003 PHARM REV CODE 250: Performed by: EMERGENCY MEDICINE

## 2018-02-22 PROCEDURE — 25000003 PHARM REV CODE 250: Performed by: INTERNAL MEDICINE

## 2018-02-22 PROCEDURE — 36430 TRANSFUSION BLD/BLD COMPNT: CPT

## 2018-02-22 RX ORDER — FUROSEMIDE 10 MG/ML
40 INJECTION INTRAMUSCULAR; INTRAVENOUS ONCE
Status: DISCONTINUED | OUTPATIENT
Start: 2018-02-22 | End: 2018-02-23

## 2018-02-22 RX ORDER — POTASSIUM CHLORIDE 20 MEQ/1
20 TABLET, EXTENDED RELEASE ORAL 2 TIMES DAILY
Status: DISCONTINUED | OUTPATIENT
Start: 2018-02-22 | End: 2018-02-23

## 2018-02-22 RX ORDER — METOLAZONE 5 MG/1
5 TABLET ORAL ONCE
Status: COMPLETED | OUTPATIENT
Start: 2018-02-22 | End: 2018-02-22

## 2018-02-22 RX ORDER — HYDROCODONE BITARTRATE AND ACETAMINOPHEN 500; 5 MG/1; MG/1
TABLET ORAL
Status: DISCONTINUED | OUTPATIENT
Start: 2018-02-22 | End: 2018-02-24 | Stop reason: HOSPADM

## 2018-02-22 RX ADMIN — FUROSEMIDE 60 MG: 10 INJECTION, SOLUTION INTRAMUSCULAR; INTRAVENOUS at 08:02

## 2018-02-22 RX ADMIN — FAMOTIDINE 20 MG: 20 TABLET, FILM COATED ORAL at 08:02

## 2018-02-22 RX ADMIN — ASPIRIN 81 MG: 81 TABLET, COATED ORAL at 08:02

## 2018-02-22 RX ADMIN — FUROSEMIDE 60 MG: 10 INJECTION, SOLUTION INTRAMUSCULAR; INTRAVENOUS at 06:02

## 2018-02-22 RX ADMIN — METOLAZONE 5 MG: 5 TABLET ORAL at 10:02

## 2018-02-22 RX ADMIN — APIXABAN 2.5 MG: 2.5 TABLET, FILM COATED ORAL at 08:02

## 2018-02-22 RX ADMIN — POTASSIUM CHLORIDE 20 MEQ: 1500 TABLET, EXTENDED RELEASE ORAL at 08:02

## 2018-02-22 RX ADMIN — METOPROLOL SUCCINATE 25 MG: 25 TABLET, EXTENDED RELEASE ORAL at 08:02

## 2018-02-22 RX ADMIN — POTASSIUM CHLORIDE 20 MEQ: 1500 TABLET, EXTENDED RELEASE ORAL at 10:02

## 2018-02-22 NOTE — PROGRESS NOTES
[facility]  Cardiology  Progress Note    Patient Name: Chung Meneses Sr.  MRN: [id number]  Admission Date: 2/20/2018  Hospital Length of Stay: 2 days  Code Status: Full Code   Attending Physician: Jake Eastman MD   Primary Care Physician: Glenroy Carmichael MD  Expected Discharge Date:   Principal Problem:Acute on chronic diastolic (congestive) heart failure    Subjective:   HPI:  Mr. Meneses is an 86 year old male patient with a PMHx of diastolic CHF, HTN, atrial fibrillation (on Eliquis), AS s/p AVR and CABG x 1 in 11/17 who presented to [facility] ED yesterday with a chief complaint of progressively worsening SOB over the past 4-5 days. Associated symptoms included lower extremity edema, groin swelling, and weakness. Patient denied any associated chest pain, fever, chills, cough, or abdominal bloating. Initial workup in ED revealed BNP of 2093 and initial troponin of 0.117 and patient subsequently admitted for further evaluation and treatment. Cardiology consulted to assist with management. Patient seen and examined today, resting in bed. Still complains of SOB and lower extremity edema but states it is improving. Remains chest pain free. He reports compliance with his medications and tries to be mindful of his salt and fluid intake. Chart reviewed. Troponin 0.117>0.109>0.097. EKG reviewed and showed atrial fibrillation with RVR, rate 107 bpm. H and H 8.4, 28.4 2D echo 1/18 showed normal EF, DD. Creatinine 2.7 upon admission, 2.4 this AM.    Hospital Course:   2/22/18-Patient seen and examined today, resting in bed. States he feels better. SOB and swelling are improving. Diuresed well overnight. Labs reviewed. Creatinine 2.2. H and H low.         Review of Systems   Constitution: Positive for malaise/fatigue.   HENT: Negative.    Eyes: Negative.    Cardiovascular: Positive for dyspnea on exertion and leg swelling.   Respiratory: Positive for shortness of breath.    Endocrine: Negative.     Hematologic/Lymphatic: Negative.    Skin: Negative.    Genitourinary: Negative.    Neurological: Negative.    Psychiatric/Behavioral: Negative.    Allergic/Immunologic: Negative.      Objective:     Vital Signs (Most Recent):  Temp: 98 °F (36.7 °C) (02/22/18 1209)  Pulse: 107 (02/22/18 1209)  Resp: 18 (02/22/18 1209)  BP: (!) 127/58 (02/22/18 1209)  SpO2: 99 % (02/22/18 1209) Vital Signs (24h Range):  Temp:  [96.5 °F (35.8 °C)-98 °F (36.7 °C)] 98 °F (36.7 °C)  Pulse:  [] 107  Resp:  [16-20] 18  SpO2:  [96 %-99 %] 99 %  BP: (119-149)/(58-81) 127/58     Weight: 89.1 kg (196 lb 6.9 oz)  Body mass index is 29.01 kg/m².     SpO2: 99 %  O2 Device (Oxygen Therapy): room air      Intake/Output Summary (Last 24 hours) at 02/22/18 1231  Last data filed at 02/22/18 1000   Gross per 24 hour   Intake              120 ml   Output             5400 ml   Net            -5280 ml       Lines/Drains/Airways     Pressure Ulcer                 Pressure Injury 02/21/18 1030 Coccyx Stage 2 1 day          Peripheral Intravenous Line                 Peripheral IV - Single Lumen 02/20/18 1911 Left Antecubital 1 day                Physical Exam   Constitutional: He is oriented to person, place, and time. He appears well-developed and well-nourished. No distress.   HENT:   Head: Normocephalic and atraumatic.   Eyes: Pupils are equal, round, and reactive to light. Right eye exhibits no discharge. Left eye exhibits no discharge.   Neck: Neck supple. No JVD present.   Cardiovascular: Normal rate, S1 normal and S2 normal.  An irregularly irregular rhythm present. Exam reveals no gallop, no S3, no S4 and no friction rub.    Murmur heard.   Harsh midsystolic murmur is present  at the upper right sternal border radiating to the neck  Pulmonary/Chest: Effort normal and breath sounds normal. No respiratory distress.   Diminished at bases    Sternotomy site well-healed   Abdominal: Soft. He exhibits no distension. There is no tenderness. There is  no rebound.   Musculoskeletal: He exhibits edema (BLE extending up to thighs and sacral area).   Neurological: He is alert and oriented to person, place, and time.   Skin: Skin is warm and dry. He is not diaphoretic. No erythema.   Psychiatric: He has a normal mood and affect. His behavior is normal. Thought content normal.   Nursing note and vitals reviewed.      Significant Labs:   CMP   Recent Labs  Lab 02/20/18 1911 02/21/18  0536 02/22/18  0602   * 137 136   K 5.5* 4.3 3.1*    103 98   CO2 22* 22* 26    97 100   BUN 65* 66* 60*   CREATININE 2.6* 2.4* 2.2*   CALCIUM 9.0 8.9 8.8   PROT 6.5 6.3 6.1   ALBUMIN 3.3* 3.2* 3.1*   BILITOT 1.8* 1.8* 1.7*   ALKPHOS 113 113 109   AST 57* 54* 47*   ALT 52* 51* 48*   ANIONGAP 10 12 12   ESTGFRAFRICA 25* 27* 30*   EGFRNONAA 21* 24* 26*   , CBC   Recent Labs  Lab 02/20/18 1911 02/21/18  0536 02/22/18  0602   WBC 6.45 6.64 6.24   HGB 8.4* 8.3* 8.0*   HCT 28.4* 27.8* 26.6*    228 208   , Troponin   Recent Labs  Lab 02/20/18 1911 02/21/18  0005 02/21/18  0536   TROPONINI 0.117* 0.109* 0.097*    and All pertinent lab results from the last 24 hours have been reviewed.    Significant Imaging: Echocardiogram:   2D echo with color flow doppler:   Results for orders placed or performed in visit on 01/26/18   2D Echo w/ Color Flow Doppler   Result Value Ref Range    EF 50 55 - 65    Mitral Valve Regurgitation MODERATE (A)     Diastolic Dysfunction Yes (A)     Est. PA Systolic Pressure 41.01 (A)     Mitral Valve Mobility NORMAL     Tricuspid Valve Regurgitation MILD TO MODERATE     and X-Ray: CXR: X-Ray Chest 1 View (CXR): No results found for this visit on 02/20/18. and X-Ray Chest PA and Lateral (CXR): No results found for this visit on 02/20/18.    Assessment and Plan:   Patient who presents with acute on chronic decompensated CHF. Improving but still needs additional diuresis. H and H continues to drop, would benefit from transfusion, discussed with  hospital medicine.     * Acute on chronic diastolic (congestive) heart failure    -Improved but still in need of diuresis  -Continue IV diuresis  -Add additional dose of metolazone today  -Strict I's/O's  -No ACEI/ARB given renal function  -Dietary restrictions re-emphasized  -Echo 1/18 showed normal EF        Transaminitis    -Monitor        Acute renal failure superimposed on chronic kidney disease    -Improving with diuresis  -Likely cardiorenal, continue to monitor        Elevated troponin    -Chronically elevated, secondary to demand ischemia from volume overload and underlying CKD  -Continue ASA, Toprol  -No angina or equivalent        CAD (coronary artery disease)    -No angina  -Continue Toprol XL 25 mg daily, ASA          Atrial fibrillation    -Chronic, continue Toprol XL for rate control  -Continue Eliquis for CVA prophylaxis        Hypertension    -Continue Toprol XL, BP controlled            VTE Risk Mitigation         Ordered     apixaban tablet 2.5 mg  2 times daily     Route:  Oral        02/20/18 2053     Medium Risk of VTE  Once      02/20/18 2053          Laura Bowens PA-C  Cardiology  [facility]    Chart reviewed. Dr. Mckee examined patient and agrees with plan as outlined above.

## 2018-02-22 NOTE — HOSPITAL COURSE
2/22/18-Patient seen and examined today, resting in bed. States he feels better. SOB and swelling are improving. Diuresed well overnight. Labs reviewed. Creatinine 2.2. H and H low.     2/23/18-Patient seen and examined today. Continues to feel better. SOB nearly resolved. Still has some edema. Labs stable. H and H improved s/p transfusion.     2/24/18:  PT FEELS BETTER, LESS ANASARCA.  LABS STABLE,  POTASSIUM REMAINS LOW BUT STABLE.  DENIES CP.  LESS DYSPNEA.

## 2018-02-22 NOTE — ASSESSMENT & PLAN NOTE
Improving 2.6 >>>> 2.2   Creat 2.7, baseline 1.7  - Likely 2/2 cardiorenal syndrome   - monitor renal function   - CMP in am   ACE/ARB on hold

## 2018-02-22 NOTE — PT/OT/SLP PROGRESS
Physical Therapy  Treatment    Chung Meneses Sr.   MRN: [id number]   Admitting Diagnosis: Acute on chronic diastolic (congestive) heart failure    PT Received On: 02/22/18  PT Start Time: 1030     PT Stop Time: 1055    PT Total Time (min): 25 min       Billable Minutes:  Gait Training 15 and Therapeutic Activity 10    Treatment Type: Treatment  PT/PTA: PT             General Precautions: Standard, fall  Orthopedic Precautions: N/A   Braces: N/A         Subjective:  Communicated with EPIC AND NURSE OCASIO prior to session.  PT AGREES TO THERAPY    Pain/Comfort  Pain Rating 1: 0/10    Objective:   Patient found with: telemetry    Functional Mobility:  Bed Mobility: MARY       Transfers: MARY TO CGA    Gait: AMBULATED 2 X 35 FEET WITH MIN HHA X1, WITH NARROW BASE OF SUPPORT, SOME SCISSORING AT TIMES, NO LOB.  PT DID REPORT A LITTLE LIGHTHEADEDNESS        Therapeutic Activities and Exercises:  PT EDUCATION, SAT ON SIDE OF BED 7 MINS TO ARCHANA ROBE AND NON-SKID SOCKS, SAFETY INSTRUCTION     AM-PAC 6 CLICK MOBILITY  How much help from another person does this patient currently need?   1 = Unable, Total/Dependent Assistance  2 = A lot, Maximum/Moderate Assistance  3 = A little, Minimum/Contact Guard/Supervision  4 = None, Modified [place]/Independent    Turning over in bed (including adjusting bedclothes, sheets and blankets)?: 3  Sitting down on and standing up from a chair with arms (e.g., wheelchair, bedside commode, etc.): 3  Moving from lying on back to sitting on the side of the bed?: 3  Moving to and from a bed to a chair (including a wheelchair)?: 3  Need to walk in hospital room?: 3  Climbing 3-5 steps with a railing?: 1  Total Score: 16    AM-PAC Raw Score CMS G-Code Modifier Level of Impairment Assistance   6 % Total / Unable   7 - 9 CM 80 - 100% Maximal Assist   10 - 14 CL 60 - 80% Moderate Assist   15 - 19 CK 40 - 60% Moderate Assist   20 - 22 CJ 20 - 40% Minimal Assist   23 CI 1-20% SBA / CGA   24 CH  0% Independent/ Mod I     Patient left up in chair with all lines intact, call button in reach and NURSE AMARJIT present.    Assessment:  Chung Meneses Sr. is a 86 y.o. male with a medical diagnosis of Acute on chronic diastolic (congestive) heart failure and presents with IMPAIRED MOBILITY AND WILL NEED CONT P.T.  PT IS GETTING A PINT OF BLOOD TODAY.    Rehab identified problem list/impairments: Rehab identified problem list/impairments: weakness, impaired endurance, gait instability, impaired functional mobilty, decreased safety awareness, edema    Rehab potential is good.    Activity tolerance: Good    Discharge recommendations: Discharge Facility/Level Of Care Needs: home health PT     Barriers to discharge:  NONE    Equipment recommendations: Equipment Needed After Discharge: walker, rolling     GOALS:    Physical Therapy Goals        Problem: Physical Therapy Goal    Goal Priority Disciplines Outcome Goal Variances Interventions   Physical Therapy Goal     PT/OT, PT      Description:  Goals to be met by:      Patient will increase functional independence with mobility by performin. Supine to sit with Stand-by Assistance  2. Bed to chair transfer with Stand-by Assistance   3. Gait  x 100 feet with Stand-by Assistance 4  4. Lower extremity exercise program x20 reps per handout, with supervision                      PLAN:    Patient to be seen 5 x/week  to address the above listed problems via gait training, therapeutic activities, therapeutic exercises  Plan of Care expires: 18  Plan of Care reviewed with: patient         Freya Elizabet, PT  2018

## 2018-02-22 NOTE — ASSESSMENT & PLAN NOTE
- Cardiology consult  - IV lasix diuresis   Metolazone given x 1 2/21/18  - Last ECHO on 1/26/18 showed EF 50% with DD  Dietary restrictions re emphasized  - resume BB   - strict I&O  - daily weight   - low Na diet   - fluid restriction   Dispo - Home Health - Nursing, PT/OT   CHF monitoring

## 2018-02-22 NOTE — NURSING
Report received from Shital MORRIS, pt AAOx4, denies any complaints. Patient A fib on cardiac monitor. Urinal at bedside. Bed low, wheels locked, call light in reach. Will continue to monitor.

## 2018-02-22 NOTE — PLAN OF CARE
02/22/18 1138   Medicare Message   Important Message from Medicare regarding Discharge Appeal Rights Given to patient/caregiver;Explained to patient/caregiver;Signed/date by patient/caregiver   Date IMM was signed 02/22/18   Time IMM was signed 1107

## 2018-02-22 NOTE — PLAN OF CARE
Problem: Patient Care Overview  Goal: Plan of Care Review  POC discussed w/patient, verbalized understanding. AFib on monitor. VSS. Voids per urinal. Patient turns independently in bed. Discussed hourly rounding with patient and advised to call at any time between rounding, as needed. Patient denies needs at this time.

## 2018-02-22 NOTE — SUBJECTIVE & OBJECTIVE
Review of Systems   Constitution: Positive for malaise/fatigue.   HENT: Negative.    Eyes: Negative.    Cardiovascular: Positive for dyspnea on exertion and leg swelling.   Respiratory: Positive for shortness of breath.    Endocrine: Negative.    Hematologic/Lymphatic: Negative.    Skin: Negative.    Genitourinary: Negative.    Neurological: Negative.    Psychiatric/Behavioral: Negative.    Allergic/Immunologic: Negative.      Objective:     Vital Signs (Most Recent):  Temp: 98 °F (36.7 °C) (02/22/18 1209)  Pulse: 107 (02/22/18 1209)  Resp: 18 (02/22/18 1209)  BP: (!) 127/58 (02/22/18 1209)  SpO2: 99 % (02/22/18 1209) Vital Signs (24h Range):  Temp:  [96.5 °F (35.8 °C)-98 °F (36.7 °C)] 98 °F (36.7 °C)  Pulse:  [] 107  Resp:  [16-20] 18  SpO2:  [96 %-99 %] 99 %  BP: (119-149)/(58-81) 127/58     Weight: 89.1 kg (196 lb 6.9 oz)  Body mass index is 29.01 kg/m².     SpO2: 99 %  O2 Device (Oxygen Therapy): room air      Intake/Output Summary (Last 24 hours) at 02/22/18 1231  Last data filed at 02/22/18 1000   Gross per 24 hour   Intake              120 ml   Output             5400 ml   Net            -5280 ml       Lines/Drains/Airways     Pressure Ulcer                 Pressure Injury 02/21/18 1030 Coccyx Stage 2 1 day          Peripheral Intravenous Line                 Peripheral IV - Single Lumen 02/20/18 1911 Left Antecubital 1 day                Physical Exam   Constitutional: He is oriented to person, place, and time. He appears well-developed and well-nourished. No distress.   HENT:   Head: Normocephalic and atraumatic.   Eyes: Pupils are equal, round, and reactive to light. Right eye exhibits no discharge. Left eye exhibits no discharge.   Neck: Neck supple. No JVD present.   Cardiovascular: Normal rate, S1 normal and S2 normal.  An irregularly irregular rhythm present. Exam reveals no gallop, no S3, no S4 and no friction rub.    Murmur heard.   Harsh midsystolic murmur is present  at the upper right  sternal border radiating to the neck  Pulmonary/Chest: Effort normal and breath sounds normal. No respiratory distress.   Diminished at bases    Sternotomy site well-healed   Abdominal: Soft. He exhibits no distension. There is no tenderness. There is no rebound.   Musculoskeletal: He exhibits edema (BLE extending up to thighs and sacral area).   Neurological: He is alert and oriented to person, place, and time.   Skin: Skin is warm and dry. He is not diaphoretic. No erythema.   Psychiatric: He has a normal mood and affect. His behavior is normal. Thought content normal.   Nursing note and vitals reviewed.      Significant Labs:   CMP   Recent Labs  Lab 02/20/18 1911 02/21/18  0536 02/22/18  0602   * 137 136   K 5.5* 4.3 3.1*    103 98   CO2 22* 22* 26    97 100   BUN 65* 66* 60*   CREATININE 2.6* 2.4* 2.2*   CALCIUM 9.0 8.9 8.8   PROT 6.5 6.3 6.1   ALBUMIN 3.3* 3.2* 3.1*   BILITOT 1.8* 1.8* 1.7*   ALKPHOS 113 113 109   AST 57* 54* 47*   ALT 52* 51* 48*   ANIONGAP 10 12 12   ESTGFRAFRICA 25* 27* 30*   EGFRNONAA 21* 24* 26*   , CBC   Recent Labs  Lab 02/20/18 1911 02/21/18  0536 02/22/18  0602   WBC 6.45 6.64 6.24   HGB 8.4* 8.3* 8.0*   HCT 28.4* 27.8* 26.6*    228 208   , Troponin   Recent Labs  Lab 02/20/18 1911 02/21/18  0005 02/21/18  0536   TROPONINI 0.117* 0.109* 0.097*    and All pertinent lab results from the last 24 hours have been reviewed.    Significant Imaging: Echocardiogram:   2D echo with color flow doppler:   Results for orders placed or performed in visit on 01/26/18   2D Echo w/ Color Flow Doppler   Result Value Ref Range    EF 50 55 - 65    Mitral Valve Regurgitation MODERATE (A)     Diastolic Dysfunction Yes (A)     Est. PA Systolic Pressure 41.01 (A)     Mitral Valve Mobility NORMAL     Tricuspid Valve Regurgitation MILD TO MODERATE     and X-Ray: CXR: X-Ray Chest 1 View (CXR): No results found for this visit on 02/20/18. and X-Ray Chest PA and Lateral (CXR): No  results found for this visit on 02/20/18.

## 2018-02-22 NOTE — PROGRESS NOTES
"[facility] Hospital Medicine  Progress Note    Patient Name: Chung Meneses Sr.  MRN: [id number]  Patient Class: IP- Inpatient   Admission Date: 2/20/2018  Length of Stay: 2 days  Attending Physician: Jake Eastman MD  Primary Care Provider: Glenroy Carmichael MD        Subjective:     Principal Problem:Acute on chronic diastolic (congestive) heart failure    HPI:  Chung Meneses is an 85 yo male with a PMH of CHF, HTN and Afib  who was sent to the ER today by Dr. Espinoza for gradually increasing shortness of breath over the last several days. The patient reports that he can only walk "a few steps"  before getting out of breath. Associated symptoms include BLE swelling, swelling to groin and abdomen, generalized weakness. The patient denies any modifying factors. Patient denies fever, chills, CP, cough, pnd, orthopnea, palpitations, diaphoresis, headache, blurred vision, numbness, tingling, dizziness, localized weakness, n/v/d, abdominal pain, blood in stools, melena, hematemesis, urinary frequency, urgency, dysuria or hematuria. In the ER the patient was found to have a BNP of 2093 and troponin of 0.117. The patient is being admitted for treatment of decompensated heart failure.         Hospital Course:  Pt was admitted for decompensated diastolic heart failure, BNP 2093.  He had peripheral/scrotal edema. Serum creatinine mildly elevated due to cardiorenal effects. Diuresis initiated with IV lasix and metolazone. Cardiology saw patient and agreed with POC. Hgb fell from 8.3 >> 8.0, 1 unit PrBCs transfused, there was no active bleeding. As of day 3, pt had diuresed approx 8 L fluid. CHF education reiterated.     Interval History: Pt reports improvement in peripheral/scrotal edema and EISENBERG.     Review of Systems   Constitutional: Positive for fatigue. Negative for activity change, appetite change, chills, fever and unexpected weight change.   HENT: Negative for congestion, facial swelling, rhinorrhea, " sinus pressure, sneezing and sore throat.    Eyes: Negative for discharge, redness and visual disturbance.   Respiratory: Positive for shortness of breath. Negative for apnea, cough, chest tightness, wheezing and stridor.    Cardiovascular: Positive for leg swelling. Negative for chest pain and palpitations.   Gastrointestinal: Negative for abdominal distention, abdominal pain, anal bleeding, blood in stool, constipation, diarrhea, nausea and vomiting.   Genitourinary: Negative for difficulty urinating, discharge, dysuria, frequency and hematuria.   Musculoskeletal: Negative for arthralgias, back pain, gait problem, joint swelling, myalgias, neck pain and neck stiffness.        Groin swelling    Skin: Negative for color change and pallor.   Neurological: Negative for dizziness, tremors, seizures, syncope, facial asymmetry, speech difficulty, weakness, light-headedness, numbness and headaches.   Psychiatric/Behavioral: Negative for behavioral problems, confusion, hallucinations and suicidal ideas. The patient is not nervous/anxious.    All other systems reviewed and are negative.    Objective:     Vital Signs (Most Recent):  Temp: 97.5 °F (36.4 °C) (02/22/18 1418)  Pulse: 78 (02/22/18 1418)  Resp: 18 (02/22/18 1209)  BP: 124/62 (02/22/18 1418)  SpO2: 99 % (02/22/18 1418) Vital Signs (24h Range):  Temp:  [96.5 °F (35.8 °C)-98 °F (36.7 °C)] 97.5 °F (36.4 °C)  Pulse:  [] 78  Resp:  [16-20] 18  SpO2:  [96 %-100 %] 99 %  BP: (118-149)/(57-81) 124/62     Weight: 89.1 kg (196 lb 6.9 oz)  Body mass index is 29.01 kg/m².    Intake/Output Summary (Last 24 hours) at 02/22/18 1509  Last data filed at 02/22/18 1359   Gross per 24 hour   Intake              120 ml   Output             6150 ml   Net            -6030 ml      Physical Exam   Constitutional: He is oriented to person, place, and time. He appears well-developed and well-nourished.   Elderly appearing    HENT:   Head: Normocephalic and atraumatic.   Eyes:  Conjunctivae are normal.   Neck: Normal range of motion. Neck supple.   Cardiovascular: Normal rate, normal heart sounds and intact distal pulses.  An irregular rhythm present.   No murmur heard.  Pulmonary/Chest: Effort normal and breath sounds normal. No respiratory distress.   Abdominal: Soft. Bowel sounds are normal. He exhibits no distension. There is no tenderness.   Genitourinary:   Genitourinary Comments: Scrotal edema noted    Musculoskeletal: He exhibits edema. He exhibits no tenderness or deformity.   + 2 edema to lower legs, scrotal edema improving     Neurological: He is alert and oriented to person, place, and time.   Skin: Skin is warm and dry. No rash noted. No erythema.   Burn noted to LLE    Psychiatric: He has a normal mood and affect. His behavior is normal.   Nursing note and vitals reviewed.      Significant Labs:   CBC:   Recent Labs  Lab 02/20/18 1911 02/21/18  0536 02/22/18  0602   WBC 6.45 6.64 6.24   HGB 8.4* 8.3* 8.0*   HCT 28.4* 27.8* 26.6*    228 208     CMP:   Recent Labs  Lab 02/20/18 1911 02/21/18  0536 02/22/18  0602   * 137 136   K 5.5* 4.3 3.1*    103 98   CO2 22* 22* 26    97 100   BUN 65* 66* 60*   CREATININE 2.6* 2.4* 2.2*   CALCIUM 9.0 8.9 8.8   PROT 6.5 6.3 6.1   ALBUMIN 3.3* 3.2* 3.1*   BILITOT 1.8* 1.8* 1.7*   ALKPHOS 113 113 109   AST 57* 54* 47*   ALT 52* 51* 48*   ANIONGAP 10 12 12   EGFRNONAA 21* 24* 26*     All pertinent labs within the past 24 hours have been reviewed.    Significant Imaging: I have reviewed all pertinent imaging results/findings within the past 24 hours.    Assessment/Plan:      * Acute on chronic diastolic (congestive) heart failure    - Cardiology consult  - IV lasix diuresis   Metolazone given x 1 2/21/18  - Last ECHO on 1/26/18 showed EF 50% with DD  Dietary restrictions re emphasized  - resume BB   - strict I&O  - daily weight   - low Na diet   - fluid restriction   Dispo - Home Health - Nursing, PT/OT   CHF  monitoring          Hypokalemia      Replete and monitor        Transaminitis    - Likely 2/2 hepatorenal syndrome   - CMP in am   Trending down to normal          Acute renal failure superimposed on chronic kidney disease    Improving 2.6 >>>> 2.2   Creat 2.7, baseline 1.7  - Likely 2/2 cardiorenal syndrome   - monitor renal function   - CMP in am   ACE/ARB on hold          Elevated troponin    Flat and chronically elevated due to cardiorenal syndrome and demand ischemia from volume overload  - last ECHO on 1/26/18 showed EF 50% with DD  Continue ASA and Toprol          CAD (coronary artery disease)    - resume ASA/BB           Atrial fibrillation    - Rate controlled   - resume apixaban/metoprolol   - monitor         Hypertension    - monitor VS   - resume home meds   ACEI/ARB on hold due to kidney injury            VTE Risk Mitigation         Ordered     apixaban tablet 2.5 mg  2 times daily     Route:  Oral        02/20/18 2053     Medium Risk of VTE  Once      02/20/18 2053              Jo Cunningham NP  Department of Hospital Medicine   [facility]

## 2018-02-22 NOTE — ASSESSMENT & PLAN NOTE
-Improved but still in need of diuresis  -Continue IV diuresis  -Add additional dose of metolazone today  -Strict I's/O's  -No ACEI/ARB given renal function  -Dietary restrictions re-emphasized  -Echo 1/18 showed normal EF

## 2018-02-22 NOTE — SUBJECTIVE & OBJECTIVE
Interval History: Pt reports improvement in peripheral/scrotal edema and EISENBERG.     Review of Systems   Constitutional: Positive for fatigue. Negative for activity change, appetite change, chills, fever and unexpected weight change.   HENT: Negative for congestion, facial swelling, rhinorrhea, sinus pressure, sneezing and sore throat.    Eyes: Negative for discharge, redness and visual disturbance.   Respiratory: Positive for shortness of breath. Negative for apnea, cough, chest tightness, wheezing and stridor.    Cardiovascular: Positive for leg swelling. Negative for chest pain and palpitations.   Gastrointestinal: Negative for abdominal distention, abdominal pain, anal bleeding, blood in stool, constipation, diarrhea, nausea and vomiting.   Genitourinary: Negative for difficulty urinating, discharge, dysuria, frequency and hematuria.   Musculoskeletal: Negative for arthralgias, back pain, gait problem, joint swelling, myalgias, neck pain and neck stiffness.        Groin swelling    Skin: Negative for color change and pallor.   Neurological: Negative for dizziness, tremors, seizures, syncope, facial asymmetry, speech difficulty, weakness, light-headedness, numbness and headaches.   Psychiatric/Behavioral: Negative for behavioral problems, confusion, hallucinations and suicidal ideas. The patient is not nervous/anxious.    All other systems reviewed and are negative.    Objective:     Vital Signs (Most Recent):  Temp: 97.5 °F (36.4 °C) (02/22/18 1418)  Pulse: 78 (02/22/18 1418)  Resp: 18 (02/22/18 1209)  BP: 124/62 (02/22/18 1418)  SpO2: 99 % (02/22/18 1418) Vital Signs (24h Range):  Temp:  [96.5 °F (35.8 °C)-98 °F (36.7 °C)] 97.5 °F (36.4 °C)  Pulse:  [] 78  Resp:  [16-20] 18  SpO2:  [96 %-100 %] 99 %  BP: (118-149)/(57-81) 124/62     Weight: 89.1 kg (196 lb 6.9 oz)  Body mass index is 29.01 kg/m².    Intake/Output Summary (Last 24 hours) at 02/22/18 1509  Last data filed at 02/22/18 1359   Gross per 24 hour    Intake              120 ml   Output             6150 ml   Net            -6030 ml      Physical Exam   Constitutional: He is oriented to person, place, and time. He appears well-developed and well-nourished.   Elderly appearing    HENT:   Head: Normocephalic and atraumatic.   Eyes: Conjunctivae are normal.   Neck: Normal range of motion. Neck supple.   Cardiovascular: Normal rate, normal heart sounds and intact distal pulses.  An irregular rhythm present.   No murmur heard.  Pulmonary/Chest: Effort normal and breath sounds normal. No respiratory distress.   Abdominal: Soft. Bowel sounds are normal. He exhibits no distension. There is no tenderness.   Genitourinary:   Genitourinary Comments: Scrotal edema noted    Musculoskeletal: He exhibits edema. He exhibits no tenderness or deformity.   + 2 edema to lower legs, scrotal edema improving     Neurological: He is alert and oriented to person, place, and time.   Skin: Skin is warm and dry. No rash noted. No erythema.   Burn noted to LLE    Psychiatric: He has a normal mood and affect. His behavior is normal.   Nursing note and vitals reviewed.      Significant Labs:   CBC:   Recent Labs  Lab 02/20/18 1911 02/21/18  0536 02/22/18  0602   WBC 6.45 6.64 6.24   HGB 8.4* 8.3* 8.0*   HCT 28.4* 27.8* 26.6*    228 208     CMP:   Recent Labs  Lab 02/20/18 1911 02/21/18  0536 02/22/18  0602   * 137 136   K 5.5* 4.3 3.1*    103 98   CO2 22* 22* 26    97 100   BUN 65* 66* 60*   CREATININE 2.6* 2.4* 2.2*   CALCIUM 9.0 8.9 8.8   PROT 6.5 6.3 6.1   ALBUMIN 3.3* 3.2* 3.1*   BILITOT 1.8* 1.8* 1.7*   ALKPHOS 113 113 109   AST 57* 54* 47*   ALT 52* 51* 48*   ANIONGAP 10 12 12   EGFRNONAA 21* 24* 26*     All pertinent labs within the past 24 hours have been reviewed.    Significant Imaging: I have reviewed all pertinent imaging results/findings within the past 24 hours.

## 2018-02-22 NOTE — PT/OT/SLP PROGRESS
Occupational Therapy  Treatment    Chung Meneses .   MRN: [id number]   Admitting Diagnosis: Acute on chronic diastolic (congestive) heart failure    OT Date of Treatment: 02/22/18   OT Start Time: 1040  OT Stop Time: 1105  OT Total Time (min): 25 min    Billable Minutes:  Self Care/Home Management 10 minutes and Therapeutic Activity 15 minutes    General Precautions: Standard, fall  Orthopedic Precautions: N/A  Braces:           Subjective:  Communicated with nurse hill and epic chart review prior to session.    Pain/Comfort  Pain Rating 1: 0/10    Objective:  Patient found with: telemetry     Functional Mobility:  Bed Mobility:       Transfers:        Functional Ambulation: pt ambulated 40 feet x 2  With cga and rw    Activities of Daily Living:     Feeding adaptive equipment: na     UE adaptive equipment: min a     LE adaptive equipment: mod a with donning socks                    Bathing adaptive equipment: na    Balance:   Static Sit: GOOD-: Takes MODERATE challenges from all directions but inconsistently  Dynamic Sit: GOOD-: Maintains balance through MODERATE excursions of active trunk movement,     Static Stand: FAIR+: Takes MINIMAL challenges from all directions  Dynamic stand: FAIR: Needs CONTACT GUARD during gait    Therapeutic Activities and Exercises:  Pt found in room supine. Pt req min a with supine>sit  And req mod a with donning socks . Pt req min a with ue dressing and ambulated 40 feet with cga.    AM-PAC 6 CLICK ADL   How much help from another person does this patient currently need?   1 = Unable, Total/Dependent Assistance  2 = A lot, Maximum/Moderate Assistance  3 = A little, Minimum/Contact Guard/Supervision  4 = None, Modified [place]/Independent    Putting on and taking off regular lower body clothing? : 3  Bathing (including washing, rinsing, drying)?: 3  Toileting, which includes using toilet, bedpan, or urinal? : 3  Putting on and taking off regular upper body clothing?: 3  Taking  "care of personal grooming such as brushing teeth?: 4  Eating meals?: 4  Total Score: 20     AM-PAC Raw Score CMS "G-Code Modifier Level of Impairment Assistance   6 % Total / Unable   7 - 8 CM 80 - 100% Maximal Assist   9-13 CL 60 - 80% Moderate Assist   14 - 19 CK 40 - 60% Moderate Assist   20 - 22 CJ 20 - 40% Minimal Assist   23 CI 1-20% SBA / CGA   24 CH 0% Independent/ Mod I       Patient left up in chair with all lines intact, call button in reach, nurse notified and nurse liz present    ASSESSMENT:  Chung Meneses Sr. is a 86 y.o. male with a medical diagnosis of Acute on chronic diastolic (congestive) heart failure and presents with debility and generalized weakness. Pt may benefit from skilled o,t.  Rehab identified problem list/impairments: Rehab identified problem list/impairments: weakness, impaired self care skills, impaired balance, impaired endurance, impaired functional mobilty, gait instability    Rehab potential is good.    Activity tolerance: Fair    Discharge recommendations: Discharge Facility/Level Of Care Needs: home health OT     Barriers to discharge:      Equipment recommendations: walker, rolling     GOALS:    Occupational Therapy Goals        Problem: Occupational Therapy Goal    Goal Priority Disciplines Outcome Interventions   Occupational Therapy Goal     OT, PT/OT     Description:  ot goals to be met by 2-28-18  1 sba with ue dressing  2. Min a with le dressing  3. Pt will tolerate 1 set x 15 reps b ue rom exercise           Problem: Occupational Therapy Goal    Goal Priority Disciplines Outcome Interventions   Occupational Therapy Goal     OT, PT/OT                     Plan:  Patient to be seen 3 x/week to address the above listed problems via self-care/home management, therapeutic activities, therapeutic exercises  Plan of Care expires:    Plan of Care reviewed with: patient         Kirsty Jama, OT  02/22/2018  "

## 2018-02-23 PROBLEM — R79.89 ELEVATED TROPONIN: Status: RESOLVED | Noted: 2018-01-29 | Resolved: 2018-02-23

## 2018-02-23 PROBLEM — R74.01 TRANSAMINITIS: Status: RESOLVED | Noted: 2018-02-20 | Resolved: 2018-02-23

## 2018-02-23 LAB
ACANTHOCYTES BLD QL SMEAR: PRESENT
ALBUMIN SERPL BCP-MCNC: 3.1 G/DL
ALP SERPL-CCNC: 108 U/L
ALT SERPL W/O P-5'-P-CCNC: 38 U/L
ANION GAP SERPL CALC-SCNC: 12 MMOL/L
ANISOCYTOSIS BLD QL SMEAR: ABNORMAL
AST SERPL-CCNC: 35 U/L
BASOPHILS # BLD AUTO: 0.03 K/UL
BASOPHILS NFR BLD: 0.6 %
BILIRUB SERPL-MCNC: 1.8 MG/DL
BUN SERPL-MCNC: 54 MG/DL
BURR CELLS BLD QL SMEAR: ABNORMAL
CALCIUM SERPL-MCNC: 8.8 MG/DL
CHLORIDE SERPL-SCNC: 94 MMOL/L
CO2 SERPL-SCNC: 30 MMOL/L
CREAT SERPL-MCNC: 1.9 MG/DL
DACRYOCYTES BLD QL SMEAR: ABNORMAL
DIFFERENTIAL METHOD: ABNORMAL
EOSINOPHIL # BLD AUTO: 0.1 K/UL
EOSINOPHIL NFR BLD: 2.3 %
ERYTHROCYTE [DISTWIDTH] IN BLOOD BY AUTOMATED COUNT: 19.4 %
EST. GFR  (AFRICAN AMERICAN): 36 ML/MIN/1.73 M^2
EST. GFR  (NON AFRICAN AMERICAN): 31 ML/MIN/1.73 M^2
GLUCOSE SERPL-MCNC: 91 MG/DL
HCT VFR BLD AUTO: 29.2 %
HGB BLD-MCNC: 8.9 G/DL
HYPOCHROMIA BLD QL SMEAR: ABNORMAL
LYMPHOCYTES # BLD AUTO: 0.7 K/UL
LYMPHOCYTES NFR BLD: 13.9 %
MCH RBC QN AUTO: 21.8 PG
MCHC RBC AUTO-ENTMCNC: 30.5 G/DL
MCV RBC AUTO: 72 FL
MONOCYTES # BLD AUTO: 0.7 K/UL
MONOCYTES NFR BLD: 13.1 %
NEUTROPHILS # BLD AUTO: 3.7 K/UL
NEUTROPHILS NFR BLD: 70.1 %
PLATELET # BLD AUTO: 207 K/UL
PLATELET BLD QL SMEAR: ABNORMAL
PMV BLD AUTO: 9.1 FL
POIKILOCYTOSIS BLD QL SMEAR: SLIGHT
POLYCHROMASIA BLD QL SMEAR: ABNORMAL
POTASSIUM SERPL-SCNC: 2.9 MMOL/L
PROT SERPL-MCNC: 6.2 G/DL
RBC # BLD AUTO: 4.08 M/UL
SCHISTOCYTES BLD QL SMEAR: PRESENT
SODIUM SERPL-SCNC: 136 MMOL/L
STOMATOCYTES BLD QL SMEAR: PRESENT
WBC # BLD AUTO: 5.33 K/UL

## 2018-02-23 PROCEDURE — 99232 SBSQ HOSP IP/OBS MODERATE 35: CPT | Mod: ,,, | Performed by: INTERNAL MEDICINE

## 2018-02-23 PROCEDURE — 97110 THERAPEUTIC EXERCISES: CPT

## 2018-02-23 PROCEDURE — 85025 COMPLETE CBC W/AUTO DIFF WBC: CPT

## 2018-02-23 PROCEDURE — 97116 GAIT TRAINING THERAPY: CPT

## 2018-02-23 PROCEDURE — 25000003 PHARM REV CODE 250: Performed by: NURSE PRACTITIONER

## 2018-02-23 PROCEDURE — 36415 COLL VENOUS BLD VENIPUNCTURE: CPT

## 2018-02-23 PROCEDURE — 63600175 PHARM REV CODE 636 W HCPCS: Performed by: EMERGENCY MEDICINE

## 2018-02-23 PROCEDURE — 21400001 HC TELEMETRY ROOM

## 2018-02-23 PROCEDURE — 80053 COMPREHEN METABOLIC PANEL: CPT

## 2018-02-23 PROCEDURE — 25000003 PHARM REV CODE 250: Performed by: EMERGENCY MEDICINE

## 2018-02-23 RX ORDER — ACETAMINOPHEN 325 MG/1
650 TABLET ORAL EVERY 6 HOURS PRN
Status: DISCONTINUED | OUTPATIENT
Start: 2018-02-23 | End: 2018-02-24 | Stop reason: HOSPADM

## 2018-02-23 RX ORDER — POTASSIUM CHLORIDE 20 MEQ/1
60 TABLET, EXTENDED RELEASE ORAL ONCE
Status: COMPLETED | OUTPATIENT
Start: 2018-02-23 | End: 2018-02-23

## 2018-02-23 RX ADMIN — POTASSIUM CHLORIDE 60 MEQ: 1500 TABLET, EXTENDED RELEASE ORAL at 09:02

## 2018-02-23 RX ADMIN — FUROSEMIDE 60 MG: 10 INJECTION, SOLUTION INTRAMUSCULAR; INTRAVENOUS at 09:02

## 2018-02-23 RX ADMIN — APIXABAN 2.5 MG: 2.5 TABLET, FILM COATED ORAL at 09:02

## 2018-02-23 RX ADMIN — ACETAMINOPHEN 650 MG: 325 TABLET ORAL at 12:02

## 2018-02-23 RX ADMIN — METOPROLOL SUCCINATE 25 MG: 25 TABLET, EXTENDED RELEASE ORAL at 09:02

## 2018-02-23 RX ADMIN — FUROSEMIDE 60 MG: 10 INJECTION, SOLUTION INTRAMUSCULAR; INTRAVENOUS at 05:02

## 2018-02-23 RX ADMIN — APIXABAN 2.5 MG: 2.5 TABLET, FILM COATED ORAL at 08:02

## 2018-02-23 RX ADMIN — ASPIRIN 81 MG: 81 TABLET, COATED ORAL at 09:02

## 2018-02-23 RX ADMIN — FAMOTIDINE 20 MG: 20 TABLET, FILM COATED ORAL at 09:02

## 2018-02-23 NOTE — PT/OT/SLP PROGRESS
Physical Therapy  Treatment    Chung Meneses Sr.   MRN: [id number]   Admitting Diagnosis: Acute on chronic diastolic (congestive) heart failure    PT Received On: 02/23/18  PT Start Time: 0925     PT Stop Time: 0940    PT Total Time (min): 15 min       Billable Minutes:  Gait Training 15 and Therapeutic Exercise 10    Treatment Type: Treatment  PT/PTA: PTA     PTA Visit Number: 1       General Precautions: Standard, fall  Orthopedic Precautions: N/A   Braces: N/A         Subjective:  Communicated with NURSE, AMARJIT JERRY prior to session.  PATIENT AGREE TO TX NOW.    Pain/Comfort  Pain Rating 1: 0/10    Objective:   Patient found with: telemetry    Functional Mobility:  Bed Mobility:    SUPINE TO SIT, SIT TO SUPINE AT CGA X1    Transfers:   SIT TO STAND, STAND TO SIT AT CGAX1    Gait:    ADAMANTLY DECLINE USE OF RW FOR GT ACTIVITY. PATIENT WITH GT AT 50'X2 , UNSTEADY SEVERAL LOB CORRECTING AT CGAX1.    Stairs:  N/A    Balance:   Static Sit: FAIR+: Able to take MINIMAL challenges from all directions  Dynamic Sit: FAIR+: Maintains balance through MINIMAL excursions of active trunk motion  Static Stand: POOR+: Needs MINIMAL assist to maintain  Dynamic stand: POOR: N/A     Therapeutic Activities and Exercises:  AYO LE SHORTSEATED THERAPEUTIC EXERCISES, GT INTO HALLWAY .    AM-PAC 6 CLICK MOBILITY  How much help from another person does this patient currently need?   1 = Unable, Total/Dependent Assistance  2 = A lot, Maximum/Moderate Assistance  3 = A little, Minimum/Contact Guard/Supervision  4 = None, Modified Industry/Independent    Turning over in bed (including adjusting bedclothes, sheets and blankets)?: 3  Sitting down on and standing up from a chair with arms (e.g., wheelchair, bedside commode, etc.): 3  Moving from lying on back to sitting on the side of the bed?: 3  Moving to and from a bed to a chair (including a wheelchair)?: 3  Need to walk in hospital room?: 3  Climbing 3-5 steps with a railing?:  (NOT  ATTEMPTED)    AM-PAC Raw Score CMS G-Code Modifier Level of Impairment Assistance   6 % Total / Unable   7 - 9 CM 80 - 100% Maximal Assist   10 - 14 CL 60 - 80% Moderate Assist   15 - 19 CK 40 - 60% Moderate Assist   20 - 22 CJ 20 - 40% Minimal Assist   23 CI 1-20% SBA / CGA   24 CH 0% Independent/ Mod I     Patient left up in chair with all lines intact and call button in reach, NURSING AWARE.    Assessment:  Chung Meneses Sr. is a 86 y.o. male with a medical diagnosis of Acute on chronic diastolic (congestive) heart failure .    Rehab identified problem list/impairments: Rehab identified problem list/impairments: weakness, impaired self care skills, impaired balance, impaired endurance, impaired functional mobilty, gait instability    Rehab potential is good.    Activity tolerance: Good    Discharge recommendations: Discharge Facility/Level Of Care Needs: home health PT, home health OT     Barriers to discharge:      Equipment recommendations: Equipment Needed After Discharge: walker, rolling     GOALS:    Physical Therapy Goals        Problem: Physical Therapy Goal    Goal Priority Disciplines Outcome Goal Variances Interventions   Physical Therapy Goal     PT/OT, PT      Description:  Goals to be met by:      Patient will increase functional independence with mobility by performin. Supine to sit with Stand-by Assistance  2. Bed to chair transfer with Stand-by Assistance   3. Gait  x 100 feet with Stand-by Assistance 4  4. Lower extremity exercise program x20 reps per handout, with supervision                      PLAN:    Patient to be seen 5 x/week  to address the above listed problems via gait training, therapeutic activities, therapeutic exercises  Plan of Care expires: 18  Plan of Care reviewed with: patient         Liezt [place], PTA  2018

## 2018-02-23 NOTE — ASSESSMENT & PLAN NOTE
-Continues to improve  -Would benefit from additional day of IV diuresis, Lasix dropped to 40 mg IV BID  -Strict I's/O's  -No ACEI/ARB given renal function  -Dietary restrictions re-emphasized  -Echo 1/18 showed normal EF

## 2018-02-23 NOTE — PROGRESS NOTES
[facility]  Cardiology  Progress Note    Patient Name: Chung Meneses Sr.  MRN: [id number]  Admission Date: 2/20/2018  Hospital Length of Stay: 3 days  Code Status: Full Code   Attending Physician: Jake Eastman MD   Primary Care Physician: Glenroy Carmichael MD  Expected Discharge Date:   Principal Problem:Acute on chronic diastolic (congestive) heart failure    Subjective:   HPI:  Mr. Meneses is an 86 year old male patient with a PMHx of diastolic CHF, HTN, atrial fibrillation (on Eliquis), AS s/p AVR and CABG x 1 in 11/17 who presented to [facility] ED yesterday with a chief complaint of progressively worsening SOB over the past 4-5 days. Associated symptoms included lower extremity edema, groin swelling, and weakness. Patient denied any associated chest pain, fever, chills, cough, or abdominal bloating. Initial workup in ED revealed BNP of 2093 and initial troponin of 0.117 and patient subsequently admitted for further evaluation and treatment. Cardiology consulted to assist with management. Patient seen and examined today, resting in bed. Still complains of SOB and lower extremity edema but states it is improving. Remains chest pain free. He reports compliance with his medications and tries to be mindful of his salt and fluid intake. Chart reviewed. Troponin 0.117>0.109>0.097. EKG reviewed and showed atrial fibrillation with RVR, rate 107 bpm. H and H 8.4, 28.4 2D echo 1/18 showed normal EF, DD. Creatinine 2.7 upon admission, 2.4 this AM.    Hospital Course:   2/22/18-Patient seen and examined today, resting in bed. States he feels better. SOB and swelling are improving. Diuresed well overnight. Labs reviewed. Creatinine 2.2. H and H low.     2/23/18-Patient seen and examined today. Continues to feel better. SOB nearly resolved. Still has some edema. Labs stable. H and H improved s/p transfusion.         Review of Systems   Constitution: Positive for weakness.   HENT: Negative.    Cardiovascular:  Positive for leg swelling.   Respiratory: Positive for shortness of breath.    Endocrine: Negative.    Skin: Negative.    Musculoskeletal: Negative.    Gastrointestinal: Negative.    Psychiatric/Behavioral: Negative.    Allergic/Immunologic: Negative.      Objective:     Vital Signs (Most Recent):  Temp: 98.7 °F (37.1 °C) (02/23/18 1125)  Pulse: 89 (02/23/18 1125)  Resp: 16 (02/23/18 0751)  BP: 115/67 (02/23/18 1125)  SpO2: 100 % (02/23/18 1125) Vital Signs (24h Range):  Temp:  [97.3 °F (36.3 °C)-98.7 °F (37.1 °C)] 98.7 °F (37.1 °C)  Pulse:  [] 89  Resp:  [16-19] 16  SpO2:  [97 %-100 %] 100 %  BP: (115-137)/(57-72) 115/67     Weight: 81.3 kg (179 lb 3.7 oz) (Simultaneous filing. User may not have seen previous data.)  Body mass index is 26.47 kg/m².     SpO2: 100 %  O2 Device (Oxygen Therapy): room air      Intake/Output Summary (Last 24 hours) at 02/23/18 1130  Last data filed at 02/23/18 1100   Gross per 24 hour   Intake                0 ml   Output             4925 ml   Net            -4925 ml       Lines/Drains/Airways     Pressure Ulcer                 Pressure Injury 02/21/18 1030 Coccyx Stage 2 2 days          Peripheral Intravenous Line                 Peripheral IV - Single Lumen 02/22/18 1255 Right Forearm less than 1 day                Physical Exam   Constitutional: He is oriented to person, place, and time. He appears well-developed and well-nourished. No distress.   HENT:   Head: Normocephalic and atraumatic.   Eyes: Pupils are equal, round, and reactive to light. Right eye exhibits no discharge. Left eye exhibits no discharge.   Neck: Neck supple. JVD present. No tracheal deviation present.   Cardiovascular: Normal rate, S1 normal and S2 normal.  An irregularly irregular rhythm present. PMI is not displaced.  Exam reveals no gallop, no S3, no S4 and no friction rub.    Murmur heard.   Harsh midsystolic murmur is present  at the upper right sternal border radiating to the neck  Pulmonary/Chest:  Effort normal and breath sounds normal. No respiratory distress. He has no wheezes. He has no rales.   Sternotomy site well-healed   Abdominal: Soft. He exhibits no distension. There is no tenderness. There is no rebound.   Musculoskeletal: He exhibits edema (BLE extending up to thighs and sacral area (improved)).   Neurological: He is alert and oriented to person, place, and time.   Skin: Skin is warm and dry. He is not diaphoretic. No erythema.   Psychiatric: He has a normal mood and affect. His behavior is normal. Thought content normal.   Nursing note and vitals reviewed.      Significant Labs:   CMP   Recent Labs  Lab 02/22/18  0602 02/23/18  0539    136   K 3.1* 2.9*   CL 98 94*   CO2 26 30*    91   BUN 60* 54*   CREATININE 2.2* 1.9*   CALCIUM 8.8 8.8   PROT 6.1 6.2   ALBUMIN 3.1* 3.1*   BILITOT 1.7* 1.8*   ALKPHOS 109 108   AST 47* 35   ALT 48* 38   ANIONGAP 12 12   ESTGFRAFRICA 30* 36*   EGFRNONAA 26* 31*   , CBC   Recent Labs  Lab 02/22/18  0602 02/23/18  0539   WBC 6.24 5.33   HGB 8.0* 8.9*   HCT 26.6* 29.2*    207   , Troponin No results for input(s): TROPONINI in the last 48 hours. and All pertinent lab results from the last 24 hours have been reviewed.    Significant Imaging: Echocardiogram:   2D echo with color flow doppler:   Results for orders placed or performed in visit on 01/26/18   2D Echo w/ Color Flow Doppler   Result Value Ref Range    EF 50 55 - 65    Mitral Valve Regurgitation MODERATE (A)     Diastolic Dysfunction Yes (A)     Est. PA Systolic Pressure 41.01 (A)     Mitral Valve Mobility NORMAL     Tricuspid Valve Regurgitation MILD TO MODERATE     and X-Ray: CXR: X-Ray Chest 1 View (CXR): No results found for this visit on 02/20/18. and X-Ray Chest PA and Lateral (CXR): No results found for this visit on 02/20/18.    Assessment and Plan:   Patient who presents with acute on chronic decompensated CHF. Clinically improving but still needs more diuresis. Continue current  mgmt.     * Acute on chronic diastolic (congestive) heart failure    -Continues to improve  -Would benefit from additional day of IV diuresis  -Strict I's/O's  -No ACEI/ARB given renal function  -Dietary restrictions re-emphasized  -Echo 1/18 showed normal EF        Transaminitis    -Monitor, has normalized with diuresis        Acute renal failure superimposed on chronic kidney disease    -Cardiorenal, has improved with diuresis          Elevated troponin    -Chronically elevated, secondary to demand ischemia from volume overload and underlying CKD  -Continue ASA, Toprol  -No angina or equivalent        CAD (coronary artery disease)    -No angina  -Continue Toprol XL 25 mg daily, ASA          Atrial fibrillation    -Chronic, continue Toprol XL for rate control  -Continue Eliquis for CVA prophylaxis        Hypertension    -Continue Toprol XL, BP controlled            VTE Risk Mitigation         Ordered     apixaban tablet 2.5 mg  2 times daily     Route:  Oral        02/20/18 2053     Medium Risk of VTE  Once      02/20/18 2053          Laura Bowens PA-C  Cardiology  [facility]     Chart reviewed. Dr. Mckee examined patient and agrees with plan as outlined above.

## 2018-02-23 NOTE — PLAN OF CARE
Problem: Patient Care Overview  Goal: Plan of Care Review  Outcome: Ongoing (interventions implemented as appropriate)  PATIENT WITH GREAT EFFORTS FOR ALL THERAPEUTIC ACTIVITIES. PATIENT ADAMANTLY DECLINED USE OF RW OR CANE. PATIENT UNSTEADY ON HIS FEET DURING GT INTO HALLWAY WITH LATERAL SWAY AT TIMES.50'X2 AT HHAX1, 2ND ASSIST FOR SAFETY.

## 2018-02-23 NOTE — PLAN OF CARE
Problem: Patient Care Overview  Goal: Plan of Care Review  Outcome: Ongoing (interventions implemented as appropriate)  Plan of care discussed with patient, and patient verbalized understanding.  Patient remained AOx4, room air, and a fib on the monitor.  Patient received one unit of blood during the day shift, and tolerated well.  Patient received IVP lasix, and has had no pain during the day shift.. Patient remained free of falls, accidents, and trama during the day shift.  Bed is in the lowest position and call light is within reach - Continue to monitor.

## 2018-02-23 NOTE — SUBJECTIVE & OBJECTIVE
Review of Systems   Constitution: Positive for weakness.   HENT: Negative.    Cardiovascular: Positive for leg swelling.   Respiratory: Positive for shortness of breath.    Endocrine: Negative.    Skin: Negative.    Musculoskeletal: Negative.    Gastrointestinal: Negative.    Psychiatric/Behavioral: Negative.    Allergic/Immunologic: Negative.      Objective:     Vital Signs (Most Recent):  Temp: 98.7 °F (37.1 °C) (02/23/18 1125)  Pulse: 89 (02/23/18 1125)  Resp: 16 (02/23/18 0751)  BP: 115/67 (02/23/18 1125)  SpO2: 100 % (02/23/18 1125) Vital Signs (24h Range):  Temp:  [97.3 °F (36.3 °C)-98.7 °F (37.1 °C)] 98.7 °F (37.1 °C)  Pulse:  [] 89  Resp:  [16-19] 16  SpO2:  [97 %-100 %] 100 %  BP: (115-137)/(57-72) 115/67     Weight: 81.3 kg (179 lb 3.7 oz) (Simultaneous filing. User may not have seen previous data.)  Body mass index is 26.47 kg/m².     SpO2: 100 %  O2 Device (Oxygen Therapy): room air      Intake/Output Summary (Last 24 hours) at 02/23/18 1130  Last data filed at 02/23/18 1100   Gross per 24 hour   Intake                0 ml   Output             4925 ml   Net            -4925 ml       Lines/Drains/Airways     Pressure Ulcer                 Pressure Injury 02/21/18 1030 Coccyx Stage 2 2 days          Peripheral Intravenous Line                 Peripheral IV - Single Lumen 02/22/18 1255 Right Forearm less than 1 day                Physical Exam   Constitutional: He is oriented to person, place, and time. He appears well-developed and well-nourished. No distress.   HENT:   Head: Normocephalic and atraumatic.   Eyes: Pupils are equal, round, and reactive to light. Right eye exhibits no discharge. Left eye exhibits no discharge.   Neck: Neck supple. JVD present. No tracheal deviation present.   Cardiovascular: Normal rate, S1 normal and S2 normal.  An irregularly irregular rhythm present. PMI is not displaced.  Exam reveals no gallop, no S3, no S4 and no friction rub.    Murmur heard.   Harsh  midsystolic murmur is present  at the upper right sternal border radiating to the neck  Pulmonary/Chest: Effort normal and breath sounds normal. No respiratory distress. He has no wheezes. He has no rales.   Sternotomy site well-healed   Abdominal: Soft. He exhibits no distension. There is no tenderness. There is no rebound.   Musculoskeletal: He exhibits edema (BLE extending up to thighs and sacral area (improved)).   Neurological: He is alert and oriented to person, place, and time.   Skin: Skin is warm and dry. He is not diaphoretic. No erythema.   Psychiatric: He has a normal mood and affect. His behavior is normal. Thought content normal.   Nursing note and vitals reviewed.      Significant Labs:   CMP   Recent Labs  Lab 02/22/18  0602 02/23/18  0539    136   K 3.1* 2.9*   CL 98 94*   CO2 26 30*    91   BUN 60* 54*   CREATININE 2.2* 1.9*   CALCIUM 8.8 8.8   PROT 6.1 6.2   ALBUMIN 3.1* 3.1*   BILITOT 1.7* 1.8*   ALKPHOS 109 108   AST 47* 35   ALT 48* 38   ANIONGAP 12 12   ESTGFRAFRICA 30* 36*   EGFRNONAA 26* 31*   , CBC   Recent Labs  Lab 02/22/18  0602 02/23/18  0539   WBC 6.24 5.33   HGB 8.0* 8.9*   HCT 26.6* 29.2*    207   , Troponin No results for input(s): TROPONINI in the last 48 hours. and All pertinent lab results from the last 24 hours have been reviewed.    Significant Imaging: Echocardiogram:   2D echo with color flow doppler:   Results for orders placed or performed in visit on 01/26/18   2D Echo w/ Color Flow Doppler   Result Value Ref Range    EF 50 55 - 65    Mitral Valve Regurgitation MODERATE (A)     Diastolic Dysfunction Yes (A)     Est. PA Systolic Pressure 41.01 (A)     Mitral Valve Mobility NORMAL     Tricuspid Valve Regurgitation MILD TO MODERATE     and X-Ray: CXR: X-Ray Chest 1 View (CXR): No results found for this visit on 02/20/18. and X-Ray Chest PA and Lateral (CXR): No results found for this visit on 02/20/18.

## 2018-02-23 NOTE — PROGRESS NOTES
"[facility] Hospital Medicine  Progress Note    Patient Name: Chung Meneses Sr.  MRN: [id number]  Patient Class: IP- Inpatient   Admission Date: 2/20/2018  Length of Stay: 3 days  Attending Physician: Jake Eastman MD  Primary Care Provider: Glenroy Carmichael MD        Subjective:     Principal Problem:Acute on chronic diastolic (congestive) heart failure    HPI:  Chung Meneses is an 87 yo male with a PMH of CHF, HTN and Afib  who was sent to the ER today by Dr. Espinoza for gradually increasing shortness of breath over the last several days. The patient reports that he can only walk "a few steps"  before getting out of breath. Associated symptoms include BLE swelling, swelling to groin and abdomen, generalized weakness. The patient denies any modifying factors. Patient denies fever, chills, CP, cough, pnd, orthopnea, palpitations, diaphoresis, headache, blurred vision, numbness, tingling, dizziness, localized weakness, n/v/d, abdominal pain, blood in stools, melena, hematemesis, urinary frequency, urgency, dysuria or hematuria. In the ER the patient was found to have a BNP of 2093 and troponin of 0.117. The patient is being admitted for treatment of decompensated heart failure.         Hospital Course:  Pt was admitted for decompensated diastolic heart failure, BNP 2093.  He had peripheral/scrotal edema. Serum creatinine mildly elevated due to cardiorenal effects. Diuresis initiated with IV lasix and metolazone. Cardiology saw patient and agreed with POC. Hgb fell from 8.3 >> 8.0, 1 unit PrBCs transfused, there was no active bleeding. As of day 3, pt had diuresed approx 8 L fluid. CHF education reiterated. Renal function improved, Hgb ^ to 8.9 after blood transfusion and edema continued to improve. Has diuresed approx 12 L fluid on day 4.         Review of Systems   Constitutional: Positive for fatigue. Negative for activity change, appetite change, chills, fever and unexpected weight change. "   HENT: Negative for congestion, facial swelling, rhinorrhea, sinus pressure, sneezing and sore throat.    Eyes: Negative for discharge, redness and visual disturbance.   Respiratory: Positive for shortness of breath (improving). Negative for apnea, cough, chest tightness, wheezing and stridor.    Cardiovascular: Positive for leg swelling. Negative for chest pain and palpitations.   Gastrointestinal: Negative for abdominal distention, abdominal pain, anal bleeding, blood in stool, constipation, diarrhea, nausea and vomiting.   Genitourinary: Negative for difficulty urinating, discharge, dysuria, frequency and hematuria.   Musculoskeletal: Negative for arthralgias, back pain, gait problem, joint swelling, myalgias, neck pain and neck stiffness.        Groin swelling    Skin: Negative for color change and pallor.   Neurological: Negative for dizziness, tremors, seizures, syncope, facial asymmetry, speech difficulty, weakness, light-headedness, numbness and headaches.   Psychiatric/Behavioral: Negative for behavioral problems, confusion, hallucinations and suicidal ideas. The patient is not nervous/anxious.    All other systems reviewed and are negative.    Objective:     Vital Signs (Most Recent):  Temp: 98.7 °F (37.1 °C) (02/23/18 1125)  Pulse: 89 (02/23/18 1125)  Resp: 16 (02/23/18 0751)  BP: 115/67 (02/23/18 1125)  SpO2: 100 % (02/23/18 1125) Vital Signs (24h Range):  Temp:  [97.3 °F (36.3 °C)-98.7 °F (37.1 °C)] 98.7 °F (37.1 °C)  Pulse:  [67-89] 89  Resp:  [16-19] 16  SpO2:  [97 %-100 %] 100 %  BP: (115-134)/(57-72) 115/67     Weight: 81.3 kg (179 lb 3.7 oz) (Simultaneous filing. User may not have seen previous data.)  Body mass index is 26.47 kg/m².    Intake/Output Summary (Last 24 hours) at 02/23/18 1307  Last data filed at 02/23/18 1100   Gross per 24 hour   Intake                0 ml   Output             4575 ml   Net            -4575 ml      Physical Exam   Constitutional: He is oriented to person, place,  and time. He appears well-developed and well-nourished.   Elderly appearing    HENT:   Head: Normocephalic and atraumatic.   Eyes: Conjunctivae are normal.   Neck: Normal range of motion. Neck supple.   Cardiovascular: Normal rate, normal heart sounds and intact distal pulses.  An irregular rhythm present.   No murmur heard.  Pulmonary/Chest: Effort normal and breath sounds normal. No respiratory distress.   Abdominal: Soft. Bowel sounds are normal. He exhibits no distension. There is no tenderness.   Genitourinary:   Genitourinary Comments: Scrotal edema noted    Musculoskeletal: He exhibits edema. He exhibits no tenderness or deformity.   + 2 edema to lower legs, scrotal edema improving     Neurological: He is alert and oriented to person, place, and time.   Skin: Skin is warm and dry. No rash noted. No erythema.   Burn noted to LLE    Psychiatric: He has a normal mood and affect. His behavior is normal.   Nursing note and vitals reviewed.      Significant Labs:   CBC:   Recent Labs  Lab 02/22/18  0602 02/23/18  0539   WBC 6.24 5.33   HGB 8.0* 8.9*   HCT 26.6* 29.2*    207     CMP:   Recent Labs  Lab 02/22/18  0602 02/23/18  0539    136   K 3.1* 2.9*   CL 98 94*   CO2 26 30*    91   BUN 60* 54*   CREATININE 2.2* 1.9*   CALCIUM 8.8 8.8   PROT 6.1 6.2   ALBUMIN 3.1* 3.1*   BILITOT 1.7* 1.8*   ALKPHOS 109 108   AST 47* 35   ALT 48* 38   ANIONGAP 12 12   EGFRNONAA 26* 31*     All pertinent labs within the past 24 hours have been reviewed.    Significant Imaging: I have reviewed all pertinent imaging results/findings within the past 24 hours.    Assessment/Plan:      * Acute on chronic diastolic (congestive) heart failure    - Cardiology consult  - IV lasix diuresis   Metolazone given x 1 2/21/18  - Last ECHO on 1/26/18 showed EF 50% with DD  Dietary restrictions re emphasized  - resume BB   - strict I&O  - daily weight   - low Na diet   - fluid restriction   Dispo - Home Health - Nursing, PT/OT    CHF monitoring          Hypokalemia      Replete and monitor        Acute renal failure superimposed on chronic kidney disease    Returning to baseline   Creat 2.7, baseline 1.7  - Likely 2/2 cardiorenal syndrome   - monitor renal function   - CMP in am   ACE/ARB on hold          CAD (coronary artery disease)    - resume ASA/BB           Atrial fibrillation    - Rate controlled   - resume apixaban/metoprolol   - monitor         Hypertension    - monitor VS   - resume home meds   ACEI/ARB on hold due to kidney injury            VTE Risk Mitigation         Ordered     apixaban tablet 2.5 mg  2 times daily     Route:  Oral        02/20/18 2053     Medium Risk of VTE  Once      02/20/18 2053              Jo Cunningham NP  Department of Hospital Medicine   [facility]

## 2018-02-23 NOTE — SUBJECTIVE & OBJECTIVE
Review of Systems   Constitutional: Positive for fatigue. Negative for activity change, appetite change, chills, fever and unexpected weight change.   HENT: Negative for congestion, facial swelling, rhinorrhea, sinus pressure, sneezing and sore throat.    Eyes: Negative for discharge, redness and visual disturbance.   Respiratory: Positive for shortness of breath (improving). Negative for apnea, cough, chest tightness, wheezing and stridor.    Cardiovascular: Positive for leg swelling. Negative for chest pain and palpitations.   Gastrointestinal: Negative for abdominal distention, abdominal pain, anal bleeding, blood in stool, constipation, diarrhea, nausea and vomiting.   Genitourinary: Negative for difficulty urinating, discharge, dysuria, frequency and hematuria.   Musculoskeletal: Negative for arthralgias, back pain, gait problem, joint swelling, myalgias, neck pain and neck stiffness.        Groin swelling    Skin: Negative for color change and pallor.   Neurological: Negative for dizziness, tremors, seizures, syncope, facial asymmetry, speech difficulty, weakness, light-headedness, numbness and headaches.   Psychiatric/Behavioral: Negative for behavioral problems, confusion, hallucinations and suicidal ideas. The patient is not nervous/anxious.    All other systems reviewed and are negative.    Objective:     Vital Signs (Most Recent):  Temp: 98.7 °F (37.1 °C) (02/23/18 1125)  Pulse: 89 (02/23/18 1125)  Resp: 16 (02/23/18 0751)  BP: 115/67 (02/23/18 1125)  SpO2: 100 % (02/23/18 1125) Vital Signs (24h Range):  Temp:  [97.3 °F (36.3 °C)-98.7 °F (37.1 °C)] 98.7 °F (37.1 °C)  Pulse:  [67-89] 89  Resp:  [16-19] 16  SpO2:  [97 %-100 %] 100 %  BP: (115-134)/(57-72) 115/67     Weight: 81.3 kg (179 lb 3.7 oz) (Simultaneous filing. User may not have seen previous data.)  Body mass index is 26.47 kg/m².    Intake/Output Summary (Last 24 hours) at 02/23/18 1307  Last data filed at 02/23/18 1100   Gross per 24 hour    Intake                0 ml   Output             4575 ml   Net            -4575 ml      Physical Exam   Constitutional: He is oriented to person, place, and time. He appears well-developed and well-nourished.   Elderly appearing    HENT:   Head: Normocephalic and atraumatic.   Eyes: Conjunctivae are normal.   Neck: Normal range of motion. Neck supple.   Cardiovascular: Normal rate, normal heart sounds and intact distal pulses.  An irregular rhythm present.   No murmur heard.  Pulmonary/Chest: Effort normal and breath sounds normal. No respiratory distress.   Abdominal: Soft. Bowel sounds are normal. He exhibits no distension. There is no tenderness.   Genitourinary:   Genitourinary Comments: Scrotal edema noted    Musculoskeletal: He exhibits edema. He exhibits no tenderness or deformity.   + 2 edema to lower legs, scrotal edema improving     Neurological: He is alert and oriented to person, place, and time.   Skin: Skin is warm and dry. No rash noted. No erythema.   Burn noted to LLE    Psychiatric: He has a normal mood and affect. His behavior is normal.   Nursing note and vitals reviewed.      Significant Labs:   CBC:   Recent Labs  Lab 02/22/18  0602 02/23/18  0539   WBC 6.24 5.33   HGB 8.0* 8.9*   HCT 26.6* 29.2*    207     CMP:   Recent Labs  Lab 02/22/18  0602 02/23/18  0539    136   K 3.1* 2.9*   CL 98 94*   CO2 26 30*    91   BUN 60* 54*   CREATININE 2.2* 1.9*   CALCIUM 8.8 8.8   PROT 6.1 6.2   ALBUMIN 3.1* 3.1*   BILITOT 1.7* 1.8*   ALKPHOS 109 108   AST 47* 35   ALT 48* 38   ANIONGAP 12 12   EGFRNONAA 26* 31*     All pertinent labs within the past 24 hours have been reviewed.    Significant Imaging: I have reviewed all pertinent imaging results/findings within the past 24 hours.

## 2018-02-23 NOTE — PLAN OF CARE
Problem: Patient Care Overview  Goal: Plan of Care Review  Outcome: Ongoing (interventions implemented as appropriate)  Patient stable. Plan of care reviewed. Patient verbalizes understanding.Urine output monitored. Bed low, wheels locked, bed alarm on, call light in reach. Patient instructed to call for assistance. Will continue to monitor.

## 2018-02-23 NOTE — ASSESSMENT & PLAN NOTE
Returning to baseline   Creat 2.7, baseline 1.7  - Likely 2/2 cardiorenal syndrome   - monitor renal function   - CMP in am   ACE/ARB on hold

## 2018-02-24 VITALS
BODY MASS INDEX: 26.32 KG/M2 | OXYGEN SATURATION: 100 % | TEMPERATURE: 97 F | RESPIRATION RATE: 18 BRPM | SYSTOLIC BLOOD PRESSURE: 139 MMHG | HEART RATE: 72 BPM | DIASTOLIC BLOOD PRESSURE: 67 MMHG | HEIGHT: 69 IN | WEIGHT: 177.69 LBS

## 2018-02-24 PROBLEM — N18.9 ACUTE RENAL FAILURE SUPERIMPOSED ON CHRONIC KIDNEY DISEASE: Status: RESOLVED | Noted: 2018-01-29 | Resolved: 2018-02-24

## 2018-02-24 PROBLEM — N17.9 ACUTE RENAL FAILURE SUPERIMPOSED ON CHRONIC KIDNEY DISEASE: Status: RESOLVED | Noted: 2018-01-29 | Resolved: 2018-02-24

## 2018-02-24 LAB
ACANTHOCYTES BLD QL SMEAR: PRESENT
ALBUMIN SERPL BCP-MCNC: 3.1 G/DL
ALP SERPL-CCNC: 105 U/L
ALT SERPL W/O P-5'-P-CCNC: 33 U/L
ANION GAP SERPL CALC-SCNC: 12 MMOL/L
ANISOCYTOSIS BLD QL SMEAR: ABNORMAL
AST SERPL-CCNC: 30 U/L
BASOPHILS # BLD AUTO: 0.02 K/UL
BASOPHILS NFR BLD: 0.5 %
BILIRUB SERPL-MCNC: 1.6 MG/DL
BUN SERPL-MCNC: 51 MG/DL
CALCIUM SERPL-MCNC: 8.9 MG/DL
CHLORIDE SERPL-SCNC: 92 MMOL/L
CO2 SERPL-SCNC: 32 MMOL/L
CREAT SERPL-MCNC: 1.7 MG/DL
DACRYOCYTES BLD QL SMEAR: ABNORMAL
DIFFERENTIAL METHOD: ABNORMAL
EOSINOPHIL # BLD AUTO: 0.2 K/UL
EOSINOPHIL NFR BLD: 4.2 %
ERYTHROCYTE [DISTWIDTH] IN BLOOD BY AUTOMATED COUNT: 19.6 %
EST. GFR  (AFRICAN AMERICAN): 41 ML/MIN/1.73 M^2
EST. GFR  (NON AFRICAN AMERICAN): 36 ML/MIN/1.73 M^2
GLUCOSE SERPL-MCNC: 89 MG/DL
HCT VFR BLD AUTO: 30.2 %
HGB BLD-MCNC: 9.2 G/DL
HYPOCHROMIA BLD QL SMEAR: ABNORMAL
LYMPHOCYTES # BLD AUTO: 0.9 K/UL
LYMPHOCYTES NFR BLD: 21.6 %
MCH RBC QN AUTO: 21.9 PG
MCHC RBC AUTO-ENTMCNC: 30.5 G/DL
MCV RBC AUTO: 72 FL
MONOCYTES # BLD AUTO: 0.6 K/UL
MONOCYTES NFR BLD: 13.8 %
NEUTROPHILS # BLD AUTO: 2.4 K/UL
NEUTROPHILS NFR BLD: 59.9 %
OVALOCYTES BLD QL SMEAR: ABNORMAL
PLATELET # BLD AUTO: 208 K/UL
PLATELET BLD QL SMEAR: ABNORMAL
PMV BLD AUTO: 9.1 FL
POIKILOCYTOSIS BLD QL SMEAR: SLIGHT
POLYCHROMASIA BLD QL SMEAR: ABNORMAL
POTASSIUM SERPL-SCNC: 3 MMOL/L
PROT SERPL-MCNC: 6.3 G/DL
RBC # BLD AUTO: 4.21 M/UL
SCHISTOCYTES BLD QL SMEAR: PRESENT
SODIUM SERPL-SCNC: 136 MMOL/L
STOMATOCYTES BLD QL SMEAR: PRESENT
WBC # BLD AUTO: 4.07 K/UL

## 2018-02-24 PROCEDURE — 63600175 PHARM REV CODE 636 W HCPCS: Performed by: EMERGENCY MEDICINE

## 2018-02-24 PROCEDURE — 99232 SBSQ HOSP IP/OBS MODERATE 35: CPT | Mod: ,,, | Performed by: INTERNAL MEDICINE

## 2018-02-24 PROCEDURE — 25000003 PHARM REV CODE 250: Performed by: INTERNAL MEDICINE

## 2018-02-24 PROCEDURE — 36415 COLL VENOUS BLD VENIPUNCTURE: CPT

## 2018-02-24 PROCEDURE — 85025 COMPLETE CBC W/AUTO DIFF WBC: CPT

## 2018-02-24 PROCEDURE — 80053 COMPREHEN METABOLIC PANEL: CPT

## 2018-02-24 PROCEDURE — 25000003 PHARM REV CODE 250: Performed by: EMERGENCY MEDICINE

## 2018-02-24 RX ORDER — POTASSIUM CHLORIDE 20 MEQ/1
40 TABLET, EXTENDED RELEASE ORAL ONCE
Status: COMPLETED | OUTPATIENT
Start: 2018-02-24 | End: 2018-02-24

## 2018-02-24 RX ORDER — FUROSEMIDE 40 MG/1
40 TABLET ORAL 2 TIMES DAILY
Qty: 60 TABLET | Refills: 0 | Status: ON HOLD | OUTPATIENT
Start: 2018-02-24 | End: 2018-03-07 | Stop reason: HOSPADM

## 2018-02-24 RX ORDER — POTASSIUM CHLORIDE 1500 MG/1
20 TABLET, EXTENDED RELEASE ORAL EVERY MORNING
Qty: 30 TABLET | Refills: 0 | Status: SHIPPED | OUTPATIENT
Start: 2018-02-24 | End: 2018-03-02 | Stop reason: SDUPTHER

## 2018-02-24 RX ORDER — POTASSIUM CHLORIDE 750 MG/1
10 TABLET, EXTENDED RELEASE ORAL NIGHTLY
Qty: 30 TABLET | Refills: 0 | Status: SHIPPED | OUTPATIENT
Start: 2018-02-24 | End: 2018-03-02 | Stop reason: SDUPTHER

## 2018-02-24 RX ADMIN — APIXABAN 2.5 MG: 2.5 TABLET, FILM COATED ORAL at 09:02

## 2018-02-24 RX ADMIN — POTASSIUM CHLORIDE 40 MEQ: 1500 TABLET, EXTENDED RELEASE ORAL at 09:02

## 2018-02-24 RX ADMIN — METOPROLOL SUCCINATE 25 MG: 25 TABLET, EXTENDED RELEASE ORAL at 09:02

## 2018-02-24 RX ADMIN — ASPIRIN 81 MG: 81 TABLET, COATED ORAL at 09:02

## 2018-02-24 RX ADMIN — FAMOTIDINE 20 MG: 20 TABLET, FILM COATED ORAL at 09:02

## 2018-02-24 RX ADMIN — FUROSEMIDE 60 MG: 10 INJECTION, SOLUTION INTRAMUSCULAR; INTRAVENOUS at 09:02

## 2018-02-24 NOTE — SUBJECTIVE & OBJECTIVE
Review of Systems   Constitution: Positive for weakness.   HENT: Negative.    Eyes: Negative.    Cardiovascular: Positive for dyspnea on exertion and leg swelling.   Respiratory: Positive for shortness of breath.    Endocrine: Negative.    Hematologic/Lymphatic: Negative.    Skin: Negative.    Musculoskeletal: Positive for arthritis.   Gastrointestinal: Negative.    Genitourinary: Negative.    Psychiatric/Behavioral: Negative.    Allergic/Immunologic: Negative.      Objective:     Vital Signs (Most Recent):  Temp: 97.7 °F (36.5 °C) (02/24/18 0427)  Pulse: 79 (02/24/18 0730)  Resp: 18 (02/24/18 0427)  BP: 127/73 (02/24/18 0427)  SpO2: 100 % (02/24/18 0427) Vital Signs (24h Range):  Temp:  [97.3 °F (36.3 °C)-98.7 °F (37.1 °C)] 97.7 °F (36.5 °C)  Pulse:  [70-89] 79  Resp:  [18] 18  SpO2:  [96 %-100 %] 100 %  BP: (108-129)/(58-73) 127/73     Weight: 80.6 kg (177 lb 11.1 oz)  Body mass index is 26.24 kg/m².     SpO2: 100 %  O2 Device (Oxygen Therapy): room air      Intake/Output Summary (Last 24 hours) at 02/24/18 1058  Last data filed at 02/24/18 0908   Gross per 24 hour   Intake                0 ml   Output             4565 ml   Net            -4565 ml       Lines/Drains/Airways     Pressure Ulcer                 Pressure Injury 02/21/18 1030 Coccyx Stage 2 3 days          Peripheral Intravenous Line                 Peripheral IV - Single Lumen 02/22/18 1255 Right Forearm 1 day                Physical Exam   Constitutional: He is oriented to person, place, and time. He appears well-developed and well-nourished.   HENT:   Head: Normocephalic.   Neck: Normal range of motion. Neck supple. Normal carotid pulses present. Carotid bruit is not present. No thyromegaly present.   Cardiovascular: Normal rate, S1 normal and S2 normal.  An irregularly irregular rhythm present. PMI is not displaced.  Exam reveals no S3, no S4, no distant heart sounds, no friction rub, no midsystolic click and no opening snap.    No murmur  heard.  Pulses:       Radial pulses are 2+ on the right side, and 2+ on the left side.   Pulmonary/Chest: Effort normal. He has no wheezes. He has rales in the right lower field and the left lower field.   Abdominal: Soft. Bowel sounds are normal. He exhibits no distension, no abdominal bruit, no ascites and no mass. There is no tenderness.   Musculoskeletal: He exhibits edema.   Neurological: He is alert and oriented to person, place, and time.   Skin: Skin is warm.   Psychiatric: He has a normal mood and affect. His behavior is normal.   Nursing note and vitals reviewed.      Significant Labs:   CMP   Recent Labs  Lab 02/23/18  0539 02/24/18  0546    136   K 2.9* 3.0*   CL 94* 92*   CO2 30* 32*   GLU 91 89   BUN 54* 51*   CREATININE 1.9* 1.7*   CALCIUM 8.8 8.9   PROT 6.2 6.3   ALBUMIN 3.1* 3.1*   BILITOT 1.8* 1.6*   ALKPHOS 108 105   AST 35 30   ALT 38 33   ANIONGAP 12 12   ESTGFRAFRICA 36* 41*   EGFRNONAA 31* 36*   , CBC   Recent Labs  Lab 02/23/18  0539 02/24/18  0546   WBC 5.33 4.07   HGB 8.9* 9.2*   HCT 29.2* 30.2*    208    and Troponin No results for input(s): TROPONINI in the last 48 hours.    Significant Imaging: Echocardiogram:   2D echo with color flow doppler:   Results for orders placed or performed in visit on 01/26/18   2D Echo w/ Color Flow Doppler   Result Value Ref Range    EF 50 55 - 65    Mitral Valve Regurgitation MODERATE (A)     Diastolic Dysfunction Yes (A)     Est. PA Systolic Pressure 41.01 (A)     Mitral Valve Mobility NORMAL     Tricuspid Valve Regurgitation MILD TO MODERATE

## 2018-02-24 NOTE — PLAN OF CARE
Problem: Patient Care Overview  Goal: Plan of Care Review  Outcome: Ongoing (interventions implemented as appropriate)  Plan of care discussed with patient, and patient verbalized understanding.  Patient remained AOx4, room air, and controlled a-fib during the day shift.  Patient had a headache today - realized by acetaminophen.  Patient will be a possible d/c in the Am.  Patient continues to diuresis - continue to monitor UOP.  Patient has BLE edema 2+.  Patient remained free of falls, accidents, and trama during the day shift.  Bed is in the lowest position and call light is within reach - Continue to monitor.

## 2018-02-24 NOTE — PLAN OF CARE
Problem: Patient Care Overview  Goal: Plan of Care Review  Outcome: Ongoing (interventions implemented as appropriate)  POC reviewed, verbalized understanding. Pt remained free from falls, fall precautions in place. Pt is Afib on monitor, 70s. VSS. No other c/o at this time. PIV intact. Urinal at bedside, output monitored. Turns independently. Call bell and personal belongings within reach. Hourly rounding complete. Reminded to call for assistance. Will continue to monitor.

## 2018-02-24 NOTE — NURSING
Discharge instructions discussed with patient and daughter.  Pt. Verbalized understanding.  Pt. Has no complaints at this time and is eager to go home.  IV and telemetry DC'd.  Hemostasis achieved. Pt. Will be brought to exit via wheelchair and brought home by his daughter.

## 2018-02-24 NOTE — DISCHARGE SUMMARY
"[facility] Hospital Medicine  Discharge Summary      Patient Name: Chung Meneses Sr.  MRN: [id number]  Admission Date: 2/20/2018  Hospital Length of Stay: 4 days  Discharge Date and Time: 2/24/2018  3:26 PM  Attending Physician: No att. providers found   Discharging Provider: Vasquez Schreiber MD  Primary Care Provider: Glenroy Carmichael MD      HPI:   Chung Meneses is an 87 yo male with a PMH of CHF, HTN and Afib  who was sent to the ER today by Dr. Espinoza for gradually increasing shortness of breath over the last several days. The patient reports that he can only walk "a few steps"  before getting out of breath. Associated symptoms include BLE swelling, swelling to groin and abdomen, generalized weakness. The patient denies any modifying factors. Patient denies fever, chills, CP, cough, pnd, orthopnea, palpitations, diaphoresis, headache, blurred vision, numbness, tingling, dizziness, localized weakness, n/v/d, abdominal pain, blood in stools, melena, hematemesis, urinary frequency, urgency, dysuria or hematuria. In the ER the patient was found to have a BNP of 2093 and troponin of 0.117. The patient is being admitted for treatment of decompensated heart failure.         * No surgery found *      Hospital Course:   Pt was admitted for decompensated diastolic heart failure, BNP 2093.  He had peripheral/scrotal edema. Serum creatinine mildly elevated due to cardiorenal effects. Diuresis initiated with IV lasix and metolazone. Cardiology saw patient and agreed with POC. Hgb fell from 8.3 >> 8.0, 1 unit PrBCs transfused, there was no active bleeding. As of day 3, pt had diuresed approx 8 L fluid. CHF education reiterated. Renal function improved, Hgb ^ to 8.9 after blood transfusion and edema continued to improve. Has diuresed approx 12 L fluid on day 4. On day 5 he had diuresed over 4 additional L or >16L total.  Pt felt swelling to legs and SOB completely resolved.  No bleeding, heme hold, K+ continued " low and repleted, creat continues to improve, cleared to d/c by cards.  Resume [facility] at d/c.      Pt examined on day of d/c and appropriate to d/c to home.       Consults:   Consults         Status Ordering Provider     Inpatient consult to Cardiology  Once     Provider:  Vasquez Mckee MD    Completed GERALD KRAUSE     Inpatient consult to Internal Medicine  Once     Provider:  (Not yet assigned)    Acknowledged RITA GALLEGOS          No new Assessment & Plan notes have been filed under this hospital service since the last note was generated.  Service: Hospital Medicine    Final Active Diagnoses:    Diagnosis Date Noted POA    PRINCIPAL PROBLEM:  Acute on chronic diastolic (congestive) heart failure [I50.33] 01/29/2018 Yes    Hypokalemia [E87.6] 02/22/2018 No    Pressure ulcer of coccygeal region, stage 2 [L89.152] 02/21/2018 Yes    Chronic ulcer of left lower extremity [L97.929] 02/21/2018 Yes    CAD (coronary artery disease) [I25.10] 12/11/2017 Yes    Atrial fibrillation [I48.91] 07/05/2013 Yes     Chronic    Hypertension [I10]  Yes     Chronic      Problems Resolved During this Admission:    Diagnosis Date Noted Date Resolved POA    Transaminitis [R74.0] 02/20/2018 02/23/2018 Unknown    Elevated troponin [R74.8] 01/29/2018 02/23/2018 Yes    Acute renal failure superimposed on chronic kidney disease [N17.9, N18.9] 01/29/2018 02/24/2018 Yes       Discharged Condition: stable    Disposition: Home or Self Care    Follow Up:  Follow-up Information     Call Glenroy Carmichael MD.    Specialty:  Family Medicine  Why:  make appointment to be seen in 2-3 days, see pt instructions (need heme, K+ and creat)  Contact information:  [address] [address] [facility]  [address]  [facility]  [facility] [address] [phone]             Jonathan Alvarenga Md, MD. Call today.    Specialties:  Cardiology, Internal Medicine  Why:  make appt to be seen in 1-2 week, see pt instructions for issues  "to address (ask if your kidney function has improved and if you need to be started on and ACEi)  Contact information:  [address] [place] [address] [phone]             [facility].    Specialty:  Home Health Services  Why:  Home Health:  SN, PT, OT  Contact information:  [address]  [place] [address]  [phone]                 Patient Instructions:     WALKER FOR HOME USE   Order Specific Question Answer Comments   Type of Walker: Adult (5'4"-6'6")    With wheels? Yes    Height: 5' 9" (1.753 m)    Weight: 89.1 kg (196 lb 6.9 oz)    Length of need (1-99 months): 99    Does patient have medical equipment at home? none    Please check all that apply: Patient is unable to safely ambulate without equipment.      Activity as tolerated     Notify your health care provider if you experience any of the following:  temperature >100.4     Notify your health care provider if you experience any of the following:  persistent nausea and vomiting or diarrhea     Notify your health care provider if you experience any of the following:  severe uncontrolled pain     Notify your health care provider if you experience any of the following:  difficulty breathing or increased cough     Notify your health care provider if you experience any of the following:  severe persistent headache     Notify your health care provider if you experience any of the following:  worsening rash     Notify your health care provider if you experience any of the following:  persistent dizziness, light-headedness, or visual disturbances     Notify your health care provider if you experience any of the following:  increased confusion or weakness     Notify your health care provider if you experience any of the following:         Significant Diagnostic Studies: Labs:   BMP:   Recent Labs  Lab 02/23/18  0539 02/24/18  0546   GLU 91 89    136   K 2.9* 3.0*   CL 94* 92*   CO2 30* 32*   BUN 54* 51*   CREATININE 1.9* " 1.7*   CALCIUM 8.8 8.9   , CMP   Recent Labs  Lab 02/23/18  0539 02/24/18  0546    136   K 2.9* 3.0*   CL 94* 92*   CO2 30* 32*   GLU 91 89   BUN 54* 51*   CREATININE 1.9* 1.7*   CALCIUM 8.8 8.9   PROT 6.2 6.3   ALBUMIN 3.1* 3.1*   BILITOT 1.8* 1.6*   ALKPHOS 108 105   AST 35 30   ALT 38 33   ANIONGAP 12 12   ESTGFRAFRICA 36* 41*   EGFRNONAA 31* 36*   , CBC   Recent Labs  Lab 02/23/18  0539 02/24/18  0546   WBC 5.33 4.07   HGB 8.9* 9.2*   HCT 29.2* 30.2*    208   , INR   Lab Results   Component Value Date    INR 1.5 (H) 02/20/2018    INR 1.3 (H) 01/29/2018    INR 1.3 (H) 12/11/2017   , Lipid Panel   Lab Results   Component Value Date    CHOL 104 (L) 12/11/2017    HDL 24 (L) 12/11/2017    LDLCALC 57.0 (L) 12/11/2017    TRIG 115 12/11/2017    CHOLHDL 23.1 12/11/2017   , Troponin   Recent Labs  Lab 02/21/18  0536   TROPONINI 0.097*   , A1C:   Recent Labs  Lab 11/30/17  0549   HGBA1C 6.5*    and All labs within the past 24 hours have been reviewed  Imaging Results          X-Ray Chest AP Portable (Final result)  Result time 02/20/18 19:15:46    Final result by Sharad Eldridge III, MD (02/20/18 19:15:46)                 Impression:     No acute cardiopulmonary abnormality suggested. No detrimental change. No gross evidence of overt failure.      Electronically signed by: SHARAD ELDRIDGE MD  Date:     02/20/18  Time:    19:15              Narrative:    One view chest x-ray.    Clinical indication: Congestive heart failure    Heart size is unchanged with postoperative change along the sternum. Bilateral shoulder prostheses noted. Peripheral lung zones are clear.                            Echo 1/26 (prior to admit) EF 50 - 55%, grade 2 diastolic dysfunction, and + pulm HTN.      Pending Diagnostic Studies:     None         Medications:  Reconciled Home Medications:   Discharge Medication List as of 2/24/2018  2:51 PM      CONTINUE these medications which have CHANGED    Details   furosemide (LASIX) 40  MG tablet Take 1 tablet (40 mg total) by mouth 2 (two) times daily., Starting Sat 2/24/2018, Until Mon 3/26/2018, Normal      !! potassium chloride (K-TAB) 20 mEq Take 1 tablet (20 mEq total) by mouth every morning., Starting Sat 2/24/2018, Normal      !! potassium chloride (KLOR-CON) 10 MEQ TbSR Take 1 tablet (10 mEq total) by mouth every evening., Starting Sat 2/24/2018, Until Mon 3/26/2018, Normal       !! - Potential duplicate medications found. Please discuss with provider.      CONTINUE these medications which have NOT CHANGED    Details   apixaban (ELIQUIS) 2.5 mg Tab Take 2.5 mg by mouth 2 (two) times daily., Historical Med      aspirin (ECOTRIN) 81 MG EC tablet Take 1 tablet (81 mg total) by mouth once daily., Starting u 12/7/2017, Until Fri 12/7/2018, Normal      fish oil-omega-3 fatty acids 300-1,000 mg capsule Take 1,200 mg by mouth once daily., Until Discontinued, Historical Med      metoprolol succinate (TOPROL-XL) 25 MG 24 hr tablet Take 1 tablet (25 mg total) by mouth once daily., Starting Fri 2/16/2018, Normal      multivitamin with folic acid 400 mcg Tab Take 1 tablet by mouth., Historical Med      ranitidine (ZANTAC) 75 MG tablet Take 75 mg by mouth 2 (two) times daily. , Historical Med      VENTOLIN HFA 90 mcg/actuation inhaler INHALE 1 TO 2 PUFFS 4 TIMES A DAY AS NEEDED FOR WHEEZING, Historical Med      vitamin D 1000 units Tab Take by mouth., Historical Med             Indwelling Lines/Drains at time of discharge:   Lines/Drains/Airways     Pressure Ulcer                 Pressure Injury 02/21/18 1030 Coccyx Stage 2 3 days                Time spent on the discharge of patient: 54 minutes  Patient was seen and examined on the date of discharge and determined to be suitable for discharge.         Vasquez Schreiber MD  Department of Hospital Medicine  [facility]

## 2018-02-24 NOTE — PLAN OF CARE
Hospitalist indicates that patient is discharging home today with  services via [place].  Contacted Brenda at [facility] and notified her [facility] and that D/C documentation was faxed by this , via [facility], to [place] to complete HH referral.         02/24/18 1356   Final Note   Assessment Type Final Discharge Note   Discharge Disposition Home-Health  ( SN, PT, OT via [place] )   What phone number can be called within the next 1-3 days to see how you are doing after discharge? [phone]   [facility] Referral Info   Post Acute Recommendation Home-care   Referral Type HH SN, PT, OT   Facility Name [address] [address]

## 2018-02-24 NOTE — DISCHARGE INSTRUCTIONS
You are on a blood thinner which can make anemia worse.  Follow up with primary care for further testing for anemia 2-3 days after hospital discharge.  Inform this doctor that potassium has been hard to control and hospital doctor requesting them to check it along with kidney function.     Also limit fluid intake to 1 - 1.5L per day and limit salt to 1.5g.  Keep up with weight gain and leg swelling and address this with heart doctor.  Be seen by heart doc in next 1-2 weeks

## 2018-02-24 NOTE — PROGRESS NOTES
[facility]  Cardiology  Progress Note    Patient Name: Chung Meneses Sr.  MRN: [id number]  Admission Date: 2/20/2018  Hospital Length of Stay: 4 days  Code Status: Full Code   Attending Physician: Vasquez Schreiber MD   Primary Care Physician: Glenroy Carmichael MD  Expected Discharge Date:   Principal Problem:Acute on chronic diastolic (congestive) heart failure    Subjective:     HPI:    Mr. Meneses is an 86 year old male patient with a PMHx of diastolic CHF, HTN, atrial fibrillation (on Eliquis), AS s/p AVR and CABG x 1 in 11/17 who presented to [facility] ED yesterday with a chief complaint of progressively worsening SOB over the past 4-5 days. Associated symptoms included lower extremity edema, groin swelling, and weakness. Patient denied any associated chest pain, fever, chills, cough, or abdominal bloating. Initial workup in ED revealed BNP of 2093 and initial troponin of 0.117 and patient subsequently admitted for further evaluation and treatment. Cardiology consulted to assist with management. Patient seen and examined today, resting in bed. Still complains of SOB and lower extremity edema but states it is improving. Remains chest pain free. He reports compliance with his medications and tries to be mindful of his salt and fluid intake. Chart reviewed. Troponin 0.117>0.109>0.097. EKG reviewed and showed atrial fibrillation with RVR, rate 107 bpm. H and H 8.4, 28.4 2D echo 1/18 showed normal EF, DD. Creatinine 2.7 upon admission, 2.4 this AM.    Hospital Course:   2/22/18-Patient seen and examined today, resting in bed. States he feels better. SOB and swelling are improving. Diuresed well overnight. Labs reviewed. Creatinine 2.2. H and H low.     2/23/18-Patient seen and examined today. Continues to feel better. SOB nearly resolved. Still has some edema. Labs stable. H and H improved s/p transfusion.     2/24/18:  PT FEELS BETTER, LESS ANASARCA.  LABS STABLE,  POTASSIUM REMAINS LOW BUT STABLE.  DENIES  CP.  LESS DYSPNEA.         Review of Systems   Constitution: Positive for weakness.   HENT: Negative.    Eyes: Negative.    Cardiovascular: Positive for dyspnea on exertion and leg swelling.   Respiratory: Positive for shortness of breath.    Endocrine: Negative.    Hematologic/Lymphatic: Negative.    Skin: Negative.    Musculoskeletal: Positive for arthritis.   Gastrointestinal: Negative.    Genitourinary: Negative.    Psychiatric/Behavioral: Negative.    Allergic/Immunologic: Negative.      Objective:     Vital Signs (Most Recent):  Temp: 97.7 °F (36.5 °C) (02/24/18 0427)  Pulse: 79 (02/24/18 0730)  Resp: 18 (02/24/18 0427)  BP: 127/73 (02/24/18 0427)  SpO2: 100 % (02/24/18 0427) Vital Signs (24h Range):  Temp:  [97.3 °F (36.3 °C)-98.7 °F (37.1 °C)] 97.7 °F (36.5 °C)  Pulse:  [70-89] 79  Resp:  [18] 18  SpO2:  [96 %-100 %] 100 %  BP: (108-129)/(58-73) 127/73     Weight: 80.6 kg (177 lb 11.1 oz)  Body mass index is 26.24 kg/m².     SpO2: 100 %  O2 Device (Oxygen Therapy): room air      Intake/Output Summary (Last 24 hours) at 02/24/18 1058  Last data filed at 02/24/18 0908   Gross per 24 hour   Intake                0 ml   Output             4565 ml   Net            -4565 ml       Lines/Drains/Airways     Pressure Ulcer                 Pressure Injury 02/21/18 1030 Coccyx Stage 2 3 days          Peripheral Intravenous Line                 Peripheral IV - Single Lumen 02/22/18 1255 Right Forearm 1 day                Physical Exam   Constitutional: He is oriented to person, place, and time. He appears well-developed and well-nourished.   HENT:   Head: Normocephalic.   Neck: Normal range of motion. Neck supple. Normal carotid pulses present. Carotid bruit is not present. No thyromegaly present.   Cardiovascular: Normal rate, S1 normal and S2 normal.  An irregularly irregular rhythm present. PMI is not displaced.  Exam reveals no S3, no S4, no distant heart sounds, no friction rub, no midsystolic click and no opening  snap.    No murmur heard.  Pulses:       Radial pulses are 2+ on the right side, and 2+ on the left side.   Pulmonary/Chest: Effort normal. He has no wheezes. He has rales in the right lower field and the left lower field.   Abdominal: Soft. Bowel sounds are normal. He exhibits no distension, no abdominal bruit, no ascites and no mass. There is no tenderness.   Musculoskeletal: He exhibits edema.   Neurological: He is alert and oriented to person, place, and time.   Skin: Skin is warm.   Psychiatric: He has a normal mood and affect. His behavior is normal.   Nursing note and vitals reviewed.      Significant Labs:   CMP   Recent Labs  Lab 02/23/18  0539 02/24/18  0546    136   K 2.9* 3.0*   CL 94* 92*   CO2 30* 32*   GLU 91 89   BUN 54* 51*   CREATININE 1.9* 1.7*   CALCIUM 8.8 8.9   PROT 6.2 6.3   ALBUMIN 3.1* 3.1*   BILITOT 1.8* 1.6*   ALKPHOS 108 105   AST 35 30   ALT 38 33   ANIONGAP 12 12   ESTGFRAFRICA 36* 41*   EGFRNONAA 31* 36*   , CBC   Recent Labs  Lab 02/23/18  0539 02/24/18  0546   WBC 5.33 4.07   HGB 8.9* 9.2*   HCT 29.2* 30.2*    208    and Troponin No results for input(s): TROPONINI in the last 48 hours.    Significant Imaging: Echocardiogram:   2D echo with color flow doppler:   Results for orders placed or performed in visit on 01/26/18   2D Echo w/ Color Flow Doppler   Result Value Ref Range    EF 50 55 - 65    Mitral Valve Regurgitation MODERATE (A)     Diastolic Dysfunction Yes (A)     Est. PA Systolic Pressure 41.01 (A)     Mitral Valve Mobility NORMAL     Tricuspid Valve Regurgitation MILD TO MODERATE      Assessment and Plan:     CHF EXACERBATION IMPROVED.  PT MAY BE D/C HOME FROM CARDIOLOGY STANDPOINT.  INCREASE LASIX TO 40 MG BID AT TIME OF DISCHARGE.  NEEDS POTASSIUM REPLETION -- PER  HOSPITAL MED TEAM.  CONTINUE OTHER CV MEDS.  F/U CHF CLINIC THIS WEEK.      * Acute on chronic diastolic (congestive) heart failure    See management plan noted above.          Hypokalemia    See  management plan noted above.          Acute renal failure superimposed on chronic kidney disease    -Cardiorenal, has improved with diuresis          CAD (coronary artery disease)    -No angina  -Continue Toprol XL 25 mg daily, ASA          Atrial fibrillation    -Chronic, continue Toprol XL for rate control  -Continue Eliquis for CVA prophylaxis        Hypertension    See management plan noted above.              VTE Risk Mitigation         Ordered     apixaban tablet 2.5 mg  2 times daily     Route:  Oral        02/20/18 2053     Medium Risk of VTE  Once      02/20/18 2053          Vasquez Mckee MD  Cardiology  [facility]

## 2018-02-26 NOTE — PHYSICIAN QUERY
PT Name: Chung Meneses .  MR #: [id number]  Physician Query Form - CKD Clarification     CDS/: Michell Rick               Contact information: [email or web address]    This form is a permanent document in the medical record.     Query Date: February 26, 2018    By submitting this query, we are merely seeking further clarification of documentation. Please utilize your independent clinical judgment when addressing the question(s) below.    The Medical record contains the following:     Indicators   Supporting Clinical Findings   Location in Medical Record   X CKD or Chronic Kidney (Renal) Failure / Disease elevated troponin chronic and secondary to underlying CKD     Acute renal failure superimposed on chronic kidney disease    -Improving with diuresis  -Likely cardiorenal, continue to monitor      Acute renal failure superimposed on chronic kidney disease    - Creat 2.7, baseline 1.7  - Likely 2/2 cardiorenal syndrome   - monitor renal function   - CMP in am   ACE/ARB on hold     Cardiology Consult         Cardiology consult                    progress note 2/21   X BUN/Creatinine                          GFR Bun - 66-51  Creatinine- 1.7-2.6  GFR  21-36 Labs 2/20-2/24    Dehydration      Nausea / Vomiting      Dialysis / CRRT      Medication      Treatment      Other Chronic Conditions      Other       Provider, please further specify the stage of CKD.      [  ] Chronic Kidney Disease (CKD) (please specify stage* below)       [employer] Definitions  Stage Description  eGFR (mL/min)   [  ]     III Moderately reduced kidney function 30-59   [ x ]     IV Severely reduced kidney function 15-29     [  ] Other (please specify): ________________________  [  ] Clinically Undetermined    Please document in your progress notes daily for the duration of treatment until resolved and include in your discharge summary.

## 2018-02-27 ENCOUNTER — PATIENT OUTREACH (OUTPATIENT)
Dept: ADMINISTRATIVE | Facility: CLINIC | Age: 83
End: 2018-02-27

## 2018-02-27 NOTE — PATIENT INSTRUCTIONS

## 2018-02-27 NOTE — PROGRESS NOTES
Discharge Information     Discharge Date:  2/24/18          Primary Discharge Diagnosis: Acute on chronic diastolic (congestive) heart failure     I attempted to contact patient. No answer. The following message was left for the patient to return the call:  Good morning, I am a nurse calling on behalf of [facility] from the [facility].  This is a Transitional Care Call for Chung Meneses Sr.. When you have a moment please contact us at [phone] or [phone] Monday through Friday, between the hours of 8 am to 4 pm. We look forward to speaking with you. On behalf of [facility] have a nice day.    The patient has a scheduled FU appointment with Dr. Alvarenga in Cardiology on 3/2/18.  No HOSPFU appt with PCP and I'm unable to route message to staff - PCP is a non [facility] provider.

## 2018-03-02 ENCOUNTER — LAB VISIT (OUTPATIENT)
Dept: LAB | Facility: HOSPITAL | Age: 83
End: 2018-03-02
Attending: INTERNAL MEDICINE
Payer: MEDICARE

## 2018-03-02 ENCOUNTER — OFFICE VISIT (OUTPATIENT)
Dept: CARDIOLOGY | Facility: CLINIC | Age: 83
End: 2018-03-02
Payer: MEDICARE

## 2018-03-02 VITALS
BODY MASS INDEX: 25.73 KG/M2 | DIASTOLIC BLOOD PRESSURE: 74 MMHG | SYSTOLIC BLOOD PRESSURE: 120 MMHG | HEIGHT: 69 IN | WEIGHT: 173.75 LBS | HEART RATE: 76 BPM

## 2018-03-02 DIAGNOSIS — I50.33 ACUTE ON CHRONIC DIASTOLIC (CONGESTIVE) HEART FAILURE: Primary | ICD-10-CM

## 2018-03-02 DIAGNOSIS — N18.9 CHRONIC KIDNEY DISEASE, UNSPECIFIED CKD STAGE: ICD-10-CM

## 2018-03-02 DIAGNOSIS — I50.33 ACUTE ON CHRONIC DIASTOLIC (CONGESTIVE) HEART FAILURE: ICD-10-CM

## 2018-03-02 DIAGNOSIS — I25.10 CORONARY ARTERY DISEASE, ANGINA PRESENCE UNSPECIFIED, UNSPECIFIED VESSEL OR LESION TYPE, UNSPECIFIED WHETHER NATIVE OR TRANSPLANTED HEART: ICD-10-CM

## 2018-03-02 DIAGNOSIS — I35.0 NONRHEUMATIC AORTIC VALVE STENOSIS: ICD-10-CM

## 2018-03-02 DIAGNOSIS — I48.19 PERSISTENT ATRIAL FIBRILLATION: Chronic | ICD-10-CM

## 2018-03-02 DIAGNOSIS — Z95.2 S/P AVR: ICD-10-CM

## 2018-03-02 DIAGNOSIS — I10 ESSENTIAL HYPERTENSION: Chronic | ICD-10-CM

## 2018-03-02 DIAGNOSIS — Z86.39 H/O HYPOKALEMIA: Primary | ICD-10-CM

## 2018-03-02 DIAGNOSIS — E78.5 HYPERLIPIDEMIA, UNSPECIFIED HYPERLIPIDEMIA TYPE: Chronic | ICD-10-CM

## 2018-03-02 DIAGNOSIS — I50.22 CHRONIC SYSTOLIC CONGESTIVE HEART FAILURE: ICD-10-CM

## 2018-03-02 LAB
ANION GAP SERPL CALC-SCNC: 12 MMOL/L
BASOPHILS # BLD AUTO: 0.03 K/UL
BASOPHILS NFR BLD: 0.6 %
BUN SERPL-MCNC: 68 MG/DL
CALCIUM SERPL-MCNC: 9.4 MG/DL
CHLORIDE SERPL-SCNC: 98 MMOL/L
CO2 SERPL-SCNC: 27 MMOL/L
CREAT SERPL-MCNC: 2.1 MG/DL
DIFFERENTIAL METHOD: ABNORMAL
EOSINOPHIL # BLD AUTO: 0.1 K/UL
EOSINOPHIL NFR BLD: 2.1 %
ERYTHROCYTE [DISTWIDTH] IN BLOOD BY AUTOMATED COUNT: 21.6 %
EST. GFR  (AFRICAN AMERICAN): 32 ML/MIN/1.73 M^2
EST. GFR  (NON AFRICAN AMERICAN): 27.7 ML/MIN/1.73 M^2
GLUCOSE SERPL-MCNC: 120 MG/DL
HCT VFR BLD AUTO: 31.4 %
HGB BLD-MCNC: 9 G/DL
IMM GRANULOCYTES # BLD AUTO: 0.02 K/UL
IMM GRANULOCYTES NFR BLD AUTO: 0.4 %
LYMPHOCYTES # BLD AUTO: 0.6 K/UL
LYMPHOCYTES NFR BLD: 11.5 %
MCH RBC QN AUTO: 20.8 PG
MCHC RBC AUTO-ENTMCNC: 28.7 G/DL
MCV RBC AUTO: 73 FL
MONOCYTES # BLD AUTO: 0.7 K/UL
MONOCYTES NFR BLD: 15.3 %
NEUTROPHILS # BLD AUTO: 3.4 K/UL
NEUTROPHILS NFR BLD: 70.1 %
NRBC BLD-RTO: 0 /100 WBC
PLATELET # BLD AUTO: 252 K/UL
PMV BLD AUTO: 10.1 FL
POTASSIUM SERPL-SCNC: 4.5 MMOL/L
RBC # BLD AUTO: 4.32 M/UL
SODIUM SERPL-SCNC: 137 MMOL/L
WBC # BLD AUTO: 4.78 K/UL

## 2018-03-02 PROCEDURE — 85025 COMPLETE CBC W/AUTO DIFF WBC: CPT

## 2018-03-02 PROCEDURE — 80048 BASIC METABOLIC PNL TOTAL CA: CPT

## 2018-03-02 PROCEDURE — 99214 OFFICE O/P EST MOD 30 MIN: CPT | Mod: S$GLB,,, | Performed by: INTERNAL MEDICINE

## 2018-03-02 PROCEDURE — 99999 PR PBB SHADOW E&M-EST. PATIENT-LVL III: CPT | Mod: PBBFAC,,, | Performed by: INTERNAL MEDICINE

## 2018-03-02 PROCEDURE — 36415 COLL VENOUS BLD VENIPUNCTURE: CPT | Mod: PO

## 2018-03-02 RX ORDER — POTASSIUM CHLORIDE 750 MG/1
10 TABLET, EXTENDED RELEASE ORAL NIGHTLY
Qty: 30 TABLET | Refills: 3 | Status: SHIPPED | OUTPATIENT
Start: 2018-03-02 | End: 2018-04-01

## 2018-03-02 RX ORDER — POTASSIUM CHLORIDE 1500 MG/1
20 TABLET, EXTENDED RELEASE ORAL EVERY MORNING
Qty: 30 TABLET | Refills: 3 | Status: SHIPPED | OUTPATIENT
Start: 2018-03-02 | End: 2018-07-30 | Stop reason: CLARIF

## 2018-03-02 NOTE — PROGRESS NOTES
Subjective:   Patient ID:  Chung Meneses Sr. is a 86 y.o. male who presents for cardiac consult of Congestive Heart Failure (hosp f/u)      HPI  The patient came in today for cardiac consult of Congestive Heart Failure (hosp f/u)    This is an 86 year old male pt with PMHx of CAD/Severe AS s/p recentCABG  x1 and  bioprosthetic AVR performed by Dr. Boateng on 11/30/17, atrial fibrillation, CHF, HTN, and hyperlipidemia who presented to [facility] on 11/30/17 for elective AVR presents for follow up evaluation.     PROCEDURE:  Aortic valve replacements with 23 mm Mosaic valve, coronary artery bypass grafting x1 with aorta to first diagonal.    12/8/17 Visit  Today pt feels more SOB and EISENBERG. Gets SOB with minimal exertional but about the same as before surgery, had some PT/OT but did not do too much. Has some mild chest soreness, but feels ok. Also has some LE swelling but with standing, improves with compression stockings.     12/22/17 Visit  12/11-12/13 Pt admitted to Inpatient status for Acute on chronic heart failure.  Chest xray showed mild pulmonary vascular congestion and low-grade interstitial edema.  Probable trace right pleural effusion mild adjacent right basilar compressive atelectasis.  BNP of 1546 noted.  Elevated troponin likely due to demand ischemia trended.  Pt treated with IV Lasix and verbalized significant improvement of symptoms.  Echo results showed EF 50% and elevated PAP.  Feels good today. Taking lasix daily without issues and K supplements. No leg cramps. Has been doing PT/OT with cardiac rehab.     3/2/18  2/20-2/24 pt was admitted to [facility]. Pt was admitted for decompensated diastolic heart failure, BNP 2093.  He had peripheral/scrotal edema. Serum creatinine mildly elevated due to cardiorenal effects. Diuresis initiated with IV lasix and metolazone. Hgb fell from 8.3 >> 8.0, 1 unit PrBCs transfused, there was no active bleeding. As of day 3, pt had diuresed approx 8 L fluid. CHF education  reiterated. Renal function improved, Hgb ^ to 8.9 after blood transfusion and edema continued to improve. Has diuresed approx 12 L fluid on day 4. On day 5 he had diuresed over 4 additional L or >16L total.  Pt felt swelling to legs and SOB completely resolved.  No bleeding, heme hold, K+ continued low and repleted,  Resumed [facility] at d/c.      Today pt feels well, denies any CP/SOB/palpitations.     Patient feels no PND, no palpitation, no dizziness, no syncope, no CNS symptoms.    Patient is compliant with medications.    preop ECHO  CONCLUSIONS     1 - Biatrial enlargement.     2 - Concentric hypertrophy.     3 - No wall motion abnormalities.     4 - Moderately depressed left ventricular systolic function (EF 35-40%).     5 - Right ventricular enlargement with low normal to mildly depressed systolic function.     6 - Pulmonary hypertension. The estimated PA systolic pressure is greater than 48 mmHg.     7 - Mild to moderate mitral regurgitation.     8 - Mild tricuspid regurgitation.     9 - Severe aortic stenosis, HERNANDEZ = 0.51 cm2, peak velocity = 3.0 m/s, mean gradient = 23 mmHg.     Past Medical History:   Diagnosis Date    Aortic stenosis 8/2/2013    Atrial fibrillation 7/5/2013    CHF (congestive heart failure)     Coronary artery disease     Dizziness - light-headed     Hyperlipidemia     Hypertension     Syncope 1/26/2018       Past Surgical History:   Procedure Laterality Date    BACK SURGERY  2003    CARDIAC VALVE SURGERY      CORONARY ARTERY BYPASS GRAFT      knee replacemnt bilateral  2001    SHOULDER SURGERY  2002       Social History   Substance Use Topics    Smoking status: Never Smoker    Smokeless tobacco: Never Used    Alcohol use No      Comment: HOLIDAY       Family History   Problem Relation Age of Onset    Hypertension Mother     Hypertension Father     Hypertension Sister     Hypertension Brother     Heart failure Brother     Heart disease Brother     Heart  attack Brother        Patient's Medications   New Prescriptions    No medications on file   Previous Medications    APIXABAN (ELIQUIS) 2.5 MG TAB    Take 1 tablet (2.5 mg total) by mouth 2 (two) times daily.    ASPIRIN (ECOTRIN) 81 MG EC TABLET    Take 1 tablet (81 mg total) by mouth once daily.    FISH OIL-OMEGA-3 FATTY ACIDS 300-1,000 MG CAPSULE    Take 1,200 mg by mouth once daily.    FUROSEMIDE (LASIX) 40 MG TABLET    Take 1 tablet (40 mg total) by mouth 2 (two) times daily.    METOPROLOL SUCCINATE (TOPROL-XL) 25 MG 24 HR TABLET    Take 1 tablet (25 mg total) by mouth once daily.    MULTIVITAMIN WITH FOLIC ACID 400 MCG TAB    Take 1 tablet by mouth.    POTASSIUM CHLORIDE (K-TAB) 20 MEQ    Take 1 tablet (20 mEq total) by mouth every morning.    POTASSIUM CHLORIDE (KLOR-CON) 10 MEQ TBSR    Take 1 tablet (10 mEq total) by mouth every evening.    RANITIDINE (ZANTAC) 75 MG TABLET    Take 75 mg by mouth nightly.     VITAMIN D 1000 UNITS TAB    Take 1,000 Units by mouth once daily.    Modified Medications    No medications on file   Discontinued Medications    No medications on file       Review of Systems   Constitutional: Negative.    HENT: Negative.    Eyes: Negative.    Respiratory: Negative for cough and wheezing.    Cardiovascular: Negative for leg swelling.   Gastrointestinal: Negative.  Negative for heartburn.   Genitourinary: Negative.    Musculoskeletal: Negative.    Skin: Negative.    Neurological: Negative.    Endo/Heme/Allergies: Negative.    Psychiatric/Behavioral: Negative.    All 12 systems otherwise negative.      Wt Readings from Last 3 Encounters:   03/02/18 78.8 kg (173 lb 11.6 oz)   02/24/18 80.6 kg (177 lb 11.1 oz)   02/16/18 84.4 kg (186 lb)     Temp Readings from Last 3 Encounters:   02/24/18 97.4 °F (36.3 °C) (Oral)   01/31/18 97.6 °F (36.4 °C)   12/13/17 97.4 °F (36.3 °C) (Oral)     BP Readings from Last 3 Encounters:   03/02/18 120/74   02/24/18 139/67   02/16/18 108/60     Pulse Readings  "from Last 3 Encounters:   03/02/18 76   02/24/18 72   02/16/18 84       /74 (BP Method: Small (Manual))   Pulse 76   Ht 5' 9" (1.753 m)   Wt 78.8 kg (173 lb 11.6 oz)   BMI 25.65 kg/m²     Objective:   Physical Exam   Constitutional: He is oriented to person, place, and time. He appears well-developed and well-nourished. No distress.   HENT:   Head: Normocephalic and atraumatic.   Nose: Nose normal.   Mouth/Throat: Oropharynx is clear and moist.   Eyes: Conjunctivae and EOM are normal. No scleral icterus.   Neck: Normal range of motion. Neck supple. No JVD present. No thyromegaly present.   Cardiovascular: S1 normal and S2 normal.  Exam reveals no gallop, no S3, no S4 and no friction rub.    No murmur heard.  Irregular rate   Pulmonary/Chest: Effort normal and breath sounds normal. No stridor. No respiratory distress. He has no wheezes. He has no rales. He exhibits no tenderness.   Abdominal: Soft. Bowel sounds are normal. He exhibits no distension and no mass. There is no tenderness. There is no rebound.   Genitourinary:   Genitourinary Comments: Deferred   Musculoskeletal: Normal range of motion. He exhibits no edema, tenderness or deformity.   Lymphadenopathy:     He has no cervical adenopathy.   Neurological: He is alert and oriented to person, place, and time. He exhibits normal muscle tone. Coordination normal.   Skin: Skin is warm and dry. No rash noted. He is not diaphoretic. No erythema. No pallor.   Psychiatric: He has a normal mood and affect. His behavior is normal. Judgment and thought content normal.   Nursing note and vitals reviewed.      Lab Results   Component Value Date     02/24/2018    K 3.0 (L) 02/24/2018    CL 92 (L) 02/24/2018    CO2 32 (H) 02/24/2018    BUN 51 (H) 02/24/2018    CREATININE 1.7 (H) 02/24/2018    GLU 89 02/24/2018    HGBA1C 6.5 (H) 11/30/2017    MG 1.9 01/31/2018    AST 30 02/24/2018    ALT 33 02/24/2018    ALBUMIN 3.1 (L) 02/24/2018    PROT 6.3 02/24/2018    " BILITOT 1.6 (H) 02/24/2018    WBC 4.07 02/24/2018    HGB 9.2 (L) 02/24/2018    HCT 30.2 (L) 02/24/2018    HCT 27 (L) 11/30/2017    MCV 72 (L) 02/24/2018     02/24/2018    INR 1.5 (H) 02/20/2018    TSH 3.780 12/11/2017    CHOL 104 (L) 12/11/2017    HDL 24 (L) 12/11/2017    LDLCALC 57.0 (L) 12/11/2017    TRIG 115 12/11/2017    BNP 2,027 (H) 02/22/2018     Assessment:      1. Acute on chronic diastolic (congestive) heart failure    2. Coronary artery disease, angina presence unspecified, unspecified vessel or lesion type, unspecified whether native or transplanted heart    3. S/P AVR    4. Essential hypertension    5. Hyperlipidemia, unspecified hyperlipidemia type    6. Nonrheumatic aortic valve stenosis    7. Persistent atrial fibrillation        Plan:   1. CAD s/p CABG x 1  - cont asa and BB  - cont statin    2. Severe AS s/p AVR  - asymptomatic     3. Chronic combined systolic/diastolic HF - recent exacerbation - diuresed > 16L  - cont lasix and K supplementation; will check BMP today  - cont low salt and fluid diet  - discussed to weigh daily, extra PRN lasix,    4. AF - rate controlled  - cont Toprol  - cont Eliquis 2.5mg BID    5. HTN  - cont meds    6. HLD  - restarted statin    7. LE edema  - improved with diuresis    8. Anemia  - will order CBC    9. CKD  - monitor, will check BMP    Thank you for allowing me to participate in this patient's care. Please do not hesitate to contact me with any questions or concerns. Consult note has been forwarded to the referral physician.

## 2018-03-06 ENCOUNTER — HOSPITAL ENCOUNTER (OUTPATIENT)
Facility: HOSPITAL | Age: 83
Discharge: HOME | End: 2018-03-07
Attending: EMERGENCY MEDICINE | Admitting: INTERNAL MEDICINE
Payer: MEDICARE

## 2018-03-06 DIAGNOSIS — R40.20 LOSS OF CONSCIOUSNESS: ICD-10-CM

## 2018-03-06 DIAGNOSIS — I48.20 CHRONIC ATRIAL FIBRILLATION: ICD-10-CM

## 2018-03-06 DIAGNOSIS — N18.30 CHRONIC RENAL FAILURE, STAGE 3 (MODERATE): ICD-10-CM

## 2018-03-06 DIAGNOSIS — R55 SYNCOPE AND COLLAPSE: ICD-10-CM

## 2018-03-06 DIAGNOSIS — D64.9 CHRONIC ANEMIA: ICD-10-CM

## 2018-03-06 DIAGNOSIS — S01.311A EAR LOBE LACERATION, RIGHT, INITIAL ENCOUNTER: ICD-10-CM

## 2018-03-06 DIAGNOSIS — Z79.01 CHRONIC ANTICOAGULATION: ICD-10-CM

## 2018-03-06 DIAGNOSIS — R55 SYNCOPE, UNSPECIFIED SYNCOPE TYPE: Primary | ICD-10-CM

## 2018-03-06 LAB
ACANTHOCYTES BLD QL SMEAR: PRESENT
ALBUMIN SERPL BCP-MCNC: 3.6 G/DL
ALP SERPL-CCNC: 123 U/L
ALT SERPL W/O P-5'-P-CCNC: 19 U/L
ANION GAP SERPL CALC-SCNC: 12 MMOL/L
ANISOCYTOSIS BLD QL SMEAR: SLIGHT
APTT BLDCRRT: 30.6 SEC
AST SERPL-CCNC: 24 U/L
BASOPHILS # BLD AUTO: 0.03 K/UL
BASOPHILS NFR BLD: 0.5 %
BILIRUB SERPL-MCNC: 1.6 MG/DL
BILIRUB UR QL STRIP: NEGATIVE
BUN SERPL-MCNC: 64 MG/DL
BURR CELLS BLD QL SMEAR: ABNORMAL
CALCIUM SERPL-MCNC: 9.6 MG/DL
CHLORIDE SERPL-SCNC: 100 MMOL/L
CLARITY UR: CLEAR
CO2 SERPL-SCNC: 25 MMOL/L
COLOR UR: YELLOW
CREAT SERPL-MCNC: 2.5 MG/DL
DACRYOCYTES BLD QL SMEAR: ABNORMAL
DIFFERENTIAL METHOD: ABNORMAL
EOSINOPHIL # BLD AUTO: 0.1 K/UL
EOSINOPHIL NFR BLD: 1.3 %
ERYTHROCYTE [DISTWIDTH] IN BLOOD BY AUTOMATED COUNT: 20.6 %
EST. GFR  (AFRICAN AMERICAN): 26 ML/MIN/1.73 M^2
EST. GFR  (NON AFRICAN AMERICAN): 22 ML/MIN/1.73 M^2
GLUCOSE SERPL-MCNC: 100 MG/DL
GLUCOSE UR QL STRIP: NEGATIVE
HCT VFR BLD AUTO: 30.4 %
HGB BLD-MCNC: 9 G/DL
HGB UR QL STRIP: NEGATIVE
HYPOCHROMIA BLD QL SMEAR: ABNORMAL
INR PPP: 1.3
KETONES UR QL STRIP: NEGATIVE
LEUKOCYTE ESTERASE UR QL STRIP: NEGATIVE
LYMPHOCYTES # BLD AUTO: 0.5 K/UL
LYMPHOCYTES NFR BLD: 9 %
MAGNESIUM SERPL-MCNC: 2.4 MG/DL
MCH RBC QN AUTO: 21.4 PG
MCHC RBC AUTO-ENTMCNC: 29.6 G/DL
MCV RBC AUTO: 72 FL
MONOCYTES # BLD AUTO: 0.9 K/UL
MONOCYTES NFR BLD: 14.1 %
NEUTROPHILS # BLD AUTO: 4.5 K/UL
NEUTROPHILS NFR BLD: 75.4 %
NITRITE UR QL STRIP: NEGATIVE
OVALOCYTES BLD QL SMEAR: ABNORMAL
PH UR STRIP: 6 [PH] (ref 5–8)
PLATELET # BLD AUTO: 235 K/UL
PLATELET BLD QL SMEAR: ABNORMAL
PMV BLD AUTO: 8.8 FL
POIKILOCYTOSIS BLD QL SMEAR: SLIGHT
POLYCHROMASIA BLD QL SMEAR: ABNORMAL
POTASSIUM SERPL-SCNC: 4.3 MMOL/L
PROT SERPL-MCNC: 7.2 G/DL
PROT UR QL STRIP: ABNORMAL
PROTHROMBIN TIME: 13.2 SEC
RBC # BLD AUTO: 4.2 M/UL
SCHISTOCYTES BLD QL SMEAR: PRESENT
SODIUM SERPL-SCNC: 137 MMOL/L
SP GR UR STRIP: 1.01 (ref 1–1.03)
STOMATOCYTES BLD QL SMEAR: PRESENT
TARGETS BLD QL SMEAR: ABNORMAL
TROPONIN I SERPL DL<=0.01 NG/ML-MCNC: 0.1 NG/ML
TROPONIN I SERPL DL<=0.01 NG/ML-MCNC: 0.12 NG/ML
URN SPEC COLLECT METH UR: ABNORMAL
UROBILINOGEN UR STRIP-ACNC: 1 EU/DL
WBC # BLD AUTO: 6.02 K/UL

## 2018-03-06 PROCEDURE — 93010 ELECTROCARDIOGRAM REPORT: CPT | Mod: ,,, | Performed by: INTERNAL MEDICINE

## 2018-03-06 PROCEDURE — 25000003 PHARM REV CODE 250: Performed by: NURSE PRACTITIONER

## 2018-03-06 PROCEDURE — 99285 EMERGENCY DEPT VISIT HI MDM: CPT | Mod: 25

## 2018-03-06 PROCEDURE — 84484 ASSAY OF TROPONIN QUANT: CPT | Mod: 91

## 2018-03-06 PROCEDURE — 85610 PROTHROMBIN TIME: CPT

## 2018-03-06 PROCEDURE — 83735 ASSAY OF MAGNESIUM: CPT

## 2018-03-06 PROCEDURE — 81003 URINALYSIS AUTO W/O SCOPE: CPT

## 2018-03-06 PROCEDURE — G0378 HOSPITAL OBSERVATION PER HR: HCPCS

## 2018-03-06 PROCEDURE — 25000003 PHARM REV CODE 250: Performed by: EMERGENCY MEDICINE

## 2018-03-06 PROCEDURE — 12052 INTMD RPR FACE/MM 2.6-5.0 CM: CPT

## 2018-03-06 PROCEDURE — 85730 THROMBOPLASTIN TIME PARTIAL: CPT

## 2018-03-06 PROCEDURE — 93005 ELECTROCARDIOGRAM TRACING: CPT

## 2018-03-06 PROCEDURE — 99214 OFFICE O/P EST MOD 30 MIN: CPT | Mod: ,,, | Performed by: INTERNAL MEDICINE

## 2018-03-06 PROCEDURE — 36415 COLL VENOUS BLD VENIPUNCTURE: CPT

## 2018-03-06 PROCEDURE — 80053 COMPREHEN METABOLIC PANEL: CPT

## 2018-03-06 PROCEDURE — 85025 COMPLETE CBC W/AUTO DIFF WBC: CPT

## 2018-03-06 RX ORDER — IBUPROFEN 200 MG
24 TABLET ORAL
Status: DISCONTINUED | OUTPATIENT
Start: 2018-03-06 | End: 2018-03-07 | Stop reason: HOSPADM

## 2018-03-06 RX ORDER — GLUCAGON 1 MG
1 KIT INJECTION
Status: DISCONTINUED | OUTPATIENT
Start: 2018-03-06 | End: 2018-03-07 | Stop reason: HOSPADM

## 2018-03-06 RX ORDER — IBUPROFEN 200 MG
16 TABLET ORAL
Status: DISCONTINUED | OUTPATIENT
Start: 2018-03-06 | End: 2018-03-07 | Stop reason: HOSPADM

## 2018-03-06 RX ORDER — METOPROLOL SUCCINATE 25 MG/1
25 TABLET, EXTENDED RELEASE ORAL DAILY
Status: DISCONTINUED | OUTPATIENT
Start: 2018-03-06 | End: 2018-03-07 | Stop reason: HOSPADM

## 2018-03-06 RX ORDER — SODIUM CHLORIDE 9 MG/ML
INJECTION, SOLUTION INTRAVENOUS CONTINUOUS
Status: DISCONTINUED | OUTPATIENT
Start: 2018-03-06 | End: 2018-03-07

## 2018-03-06 RX ORDER — SODIUM CHLORIDE 0.9 % (FLUSH) 0.9 %
5 SYRINGE (ML) INJECTION
Status: DISCONTINUED | OUTPATIENT
Start: 2018-03-06 | End: 2018-03-07 | Stop reason: HOSPADM

## 2018-03-06 RX ORDER — ONDANSETRON 8 MG/1
8 TABLET, ORALLY DISINTEGRATING ORAL EVERY 8 HOURS PRN
Status: DISCONTINUED | OUTPATIENT
Start: 2018-03-06 | End: 2018-03-07 | Stop reason: HOSPADM

## 2018-03-06 RX ORDER — ONDANSETRON 2 MG/ML
4 INJECTION INTRAMUSCULAR; INTRAVENOUS EVERY 8 HOURS PRN
Status: DISCONTINUED | OUTPATIENT
Start: 2018-03-06 | End: 2018-03-07 | Stop reason: HOSPADM

## 2018-03-06 RX ORDER — LIDOCAINE HYDROCHLORIDE 10 MG/ML
10 INJECTION INFILTRATION; PERINEURAL
Status: DISPENSED | OUTPATIENT
Start: 2018-03-06 | End: 2018-03-06

## 2018-03-06 RX ORDER — LIDOCAINE HYDROCHLORIDE 10 MG/ML
10 INJECTION INFILTRATION; PERINEURAL
Status: COMPLETED | OUTPATIENT
Start: 2018-03-06 | End: 2018-03-06

## 2018-03-06 RX ADMIN — LIDOCAINE HYDROCHLORIDE 10 ML: 10 INJECTION, SOLUTION INFILTRATION; PERINEURAL at 10:03

## 2018-03-06 RX ADMIN — METOPROLOL SUCCINATE 25 MG: 25 TABLET, EXTENDED RELEASE ORAL at 03:03

## 2018-03-06 RX ADMIN — SODIUM CHLORIDE: 0.9 INJECTION, SOLUTION INTRAVENOUS at 03:03

## 2018-03-06 NOTE — SUBJECTIVE & OBJECTIVE
Past Medical History:   Diagnosis Date    Aortic stenosis 8/2/2013    Atrial fibrillation 7/5/2013    CHF (congestive heart failure)     Coronary artery disease     Dizziness - light-headed     Hyperlipidemia     Hypertension     Syncope 1/26/2018       Past Surgical History:   Procedure Laterality Date    BACK SURGERY  2003    CARDIAC VALVE SURGERY      CORONARY ARTERY BYPASS GRAFT      knee replacemnt bilateral  2001    SHOULDER SURGERY  2002       Review of patient's allergies indicates:   Allergen Reactions    Sulfa (sulfonamide antibiotics) Hives       No current facility-administered medications on file prior to encounter.      Current Outpatient Prescriptions on File Prior to Encounter   Medication Sig    apixaban (ELIQUIS) 2.5 mg Tab Take 1 tablet (2.5 mg total) by mouth 2 (two) times daily.    aspirin (ECOTRIN) 81 MG EC tablet Take 1 tablet (81 mg total) by mouth once daily.    fish oil-omega-3 fatty acids 300-1,000 mg capsule Take 1,200 mg by mouth once daily.    furosemide (LASIX) 40 MG tablet Take 1 tablet (40 mg total) by mouth 2 (two) times daily.    metoprolol succinate (TOPROL-XL) 25 MG 24 hr tablet Take 1 tablet (25 mg total) by mouth once daily.    multivitamin with folic acid 400 mcg Tab Take 1 tablet by mouth.    potassium chloride (K-TAB) 20 mEq Take 1 tablet (20 mEq total) by mouth every morning. Takes one tablet (20 meq) in the morning and half tablet (10 meq) in the evening    potassium chloride (KLOR-CON) 10 MEQ TbSR Take 1 tablet (10 mEq total) by mouth every evening.    ranitidine (ZANTAC) 75 MG tablet Take 75 mg by mouth nightly.     vitamin D 1000 units Tab Take 1,000 Units by mouth once daily.      Family History     Problem Relation (Age of Onset)    Heart attack Brother    Heart disease Brother    Heart failure Brother    Hypertension Mother, Father, Sister, Brother        Social History Main Topics    Smoking status: Never Smoker    Smokeless tobacco: Never  Used    Alcohol use No      Comment: HOLIDAY    Drug use: No    Sexual activity: No     Review of Systems   Constitution: Positive for malaise/fatigue.   HENT: Positive for ear pain (right ear).    Eyes: Negative.    Cardiovascular: Positive for syncope.   Endocrine: Negative.    Hematologic/Lymphatic: Bruises/bleeds easily.   Musculoskeletal: Negative.    Gastrointestinal: Negative.    Neurological: Positive for dizziness and light-headedness.   Psychiatric/Behavioral: Negative.    Allergic/Immunologic: Negative.      Objective:     Vital Signs (Most Recent):  Temp: 97.6 °F (36.4 °C) (03/06/18 1501)  Pulse: 64 (03/06/18 1505)  Resp: 17 (03/06/18 1501)  BP: 127/85 (03/06/18 1505)  SpO2: 95 % (03/06/18 1501) Vital Signs (24h Range):  Temp:  [97.6 °F (36.4 °C)-99.6 °F (37.6 °C)] 97.6 °F (36.4 °C)  Pulse:  [] 64  Resp:  [9-20] 17  SpO2:  [95 %-97 %] 95 %  BP: (115-128)/(56-87) 127/85     Weight: 78.8 kg (173 lb 9.8 oz)  Body mass index is 25.64 kg/m².    SpO2: 95 %  O2 Device (Oxygen Therapy): room air    No intake or output data in the 24 hours ending 03/06/18 1543    Lines/Drains/Airways     Pressure Ulcer                 Pressure Injury 02/21/18 1030 Coccyx Stage 2 13 days          Peripheral Intravenous Line                 Peripheral IV - Single Lumen 03/06/18 1437 Right Antecubital less than 1 day                Physical Exam   Constitutional: He is oriented to person, place, and time. He appears well-developed and well-nourished. No distress.   HENT:   Head: Normocephalic and atraumatic.   Right ear lac with sutures   Neck: Neck supple. No JVD present.   Cardiovascular: S1 normal and S2 normal.  An irregularly irregular rhythm present. Exam reveals no gallop and no S4.    Murmur heard.   Harsh midsystolic murmur is present  at the upper right sternal border radiating to the neck  Pulmonary/Chest: Effort normal and breath sounds normal. No respiratory distress. He has no wheezes. He has no rales.    Abdominal: Soft. Bowel sounds are normal. He exhibits no distension. There is no tenderness. There is no rebound.   Musculoskeletal: He exhibits no edema.   Neurological: He is alert and oriented to person, place, and time.   Skin: Skin is warm and dry. He is not diaphoretic.   Psychiatric: He has a normal mood and affect. His behavior is normal. Thought content normal.   Nursing note and vitals reviewed.      Significant Labs:   CMP   Recent Labs  Lab 03/06/18  1159      K 4.3      CO2 25      BUN 64*   CREATININE 2.5*   CALCIUM 9.6   PROT 7.2   ALBUMIN 3.6   BILITOT 1.6*   ALKPHOS 123   AST 24   ALT 19   ANIONGAP 12   ESTGFRAFRICA 26*   EGFRNONAA 22*   , CBC   Recent Labs  Lab 03/06/18  1159   WBC 6.02   HGB 9.0*   HCT 30.4*      , Troponin   Recent Labs  Lab 03/06/18  1159   TROPONINI 0.121*    and All pertinent lab results from the last 24 hours have been reviewed.    Significant Imaging: Echocardiogram:   2D echo with color flow doppler:   Results for orders placed or performed in visit on 01/26/18   2D Echo w/ Color Flow Doppler   Result Value Ref Range    EF 50 55 - 65    Mitral Valve Regurgitation MODERATE (A)     Diastolic Dysfunction Yes (A)     Est. PA Systolic Pressure 41.01 (A)     Mitral Valve Mobility NORMAL     Tricuspid Valve Regurgitation MILD TO MODERATE    , EKG: Reviewed and X-Ray: CXR: X-Ray Chest 1 View (CXR): No results found for this visit on 03/06/18. and X-Ray Chest PA and Lateral (CXR): No results found for this visit on 03/06/18.

## 2018-03-06 NOTE — ASSESSMENT & PLAN NOTE
- Currently rate controlled  - Continue Toprol XL  - Reports compliance with Toprol XL, ASA, and Eliquis at home  - Concerns for continuing Eliquis in a high fall risk patient, will d/w cardiology before resuming  - Cardiology consulted, awaiting recs  - Tele monitoring

## 2018-03-06 NOTE — ED PROVIDER NOTES
"SCRIBE #1 NOTE: I, Gareth Shook, am scribing for, and in the presence of, Najma Acosta MD. I have scribed the entire note.      History      Chief Complaint   Patient presents with    Loss of Consciousness     Pt states, "I was leaning over to get the newspaper and I passed out and hit the pavement, and I cut my ear wher it connects to my face."       Review of patient's allergies indicates:   Allergen Reactions    Sulfa (sulfonamide antibiotics) Hives        HPI   HPI    3/6/2018, 10:22 AM   History obtained from the patient and daughter      History of Present Illness: Chung Meneses Sr. is a 86 y.o. male patient who presents to the Emergency Department for syncope which onset suddenly PTA while bending over to pickup newspaper this morning. Pt fell forward and hit head on ground. Pt has laceration to right ear lobe. Sxs are constant and moderate in severity. Pt reports he was feeling weak and lightheaded prior to syncope. There are no mitigating or exacerbating factors noted.  Pt denies any fever, N/V/D, back pain, neck pain, numbness, unilateral weakness, CP, ABD pain, SOB, HA, and all other sxs at this time. Pt takes Eliquis and ASA daily. Pt tetanus UTD. No further complaints or concerns at this time.       Arrival mode: Personal vehicle     PCP: Glenroy Carmichael MD       Past Medical History:  Past Medical History:   Diagnosis Date    Aortic stenosis 8/2/2013    Atrial fibrillation 7/5/2013    CHF (congestive heart failure)     Coronary artery disease     Dizziness - light-headed     Hyperlipidemia     Hypertension     Syncope 1/26/2018       Past Surgical History:  Past Surgical History:   Procedure Laterality Date    BACK SURGERY  2003    CARDIAC VALVE SURGERY      CORONARY ARTERY BYPASS GRAFT      knee replacemnt bilateral  2001    SHOULDER SURGERY  2002         Family History:  Family History   Problem Relation Age of Onset    Hypertension Mother     Hypertension Father  "    Hypertension Sister     Hypertension Brother     Heart failure Brother     Heart disease Brother     Heart attack Brother        Social History:  Social History     Social History Main Topics    Smoking status: Never Smoker    Smokeless tobacco: Never Used    Alcohol use No      Comment: HOLIDAY    Drug use: No    Sexual activity: No       ROS   Review of Systems   Constitutional: Positive for fatigue. Negative for fever.   HENT: Negative for sore throat.    Respiratory: Negative for shortness of breath.    Cardiovascular: Negative for chest pain.   Gastrointestinal: Negative for abdominal pain, diarrhea, nausea and vomiting.   Genitourinary: Negative for dysuria.   Musculoskeletal: Negative for back pain and neck pain.   Skin: Positive for wound (right earlobe laceration). Negative for rash.   Neurological: Positive for syncope and light-headedness. Negative for seizures, speech difficulty, weakness, numbness and headaches.   Hematological: Does not bruise/bleed easily.     Physical Exam      Initial Vitals [03/06/18 1008]   BP Pulse Resp Temp SpO2   125/64 100 20 99.6 °F (37.6 °C) 96 %      MAP       84.33          Physical Exam  Nursing Notes and Vital Signs Reviewed.  Constitutional: Patient is in no distress. Awake and alert. Appropriate for age.   Head: No facial instability or step-offs. Small contusion to posterior scalp.  Eyes: PERRL. EOM normal. Conjunctivae normal.   HENT: Moist mucous membranes. No epistaxis. Patent airway.   Neck: No midline bony tenderness, deformities, or step-offs   Cardiovascular: Regular rate. Irregularly irregular rhythm. Heart sounds are normal. Intact distal pulses   Pulmonary/Chest: No respiratory distress. Breath sounds are normal. No decreased breath sounds. Chest wall is stable.   Abdominal: Soft and non-distended. Non-tender.   Back: No abrasions or ecchymosis. No midline bony tenderness to the T-spine or L-spine. No deformities or step-offs.   Musculoskeletal:  Full range of motion in bilateral extremities. No obvious deformities.   Skin: Normal color. No cyanosis. No abrasions. 5 cm horizontal lac to right lower inner lobe, no cartilage involvement. 0.25 cm superficial lac to posterior aspect of right ear lobe. 5 cm lac to where right earlobe attaches to face. Mild bruising to face where right earlobe attaches.  Neurological: Awake and alert. Appropriate for age. GCS 15. Normal speech. Motor strength is normal at 5/5 bilaterally. Non-focal neurological examination.    ED Course    Lac Repair  Date/Time: 3/6/2018 12:10 PM  Performed by: BRIAN HECK  Authorized by: STEF STUBBS   Body area: head/neck  Location details: right ear  Laceration length: 5 cm  Foreign bodies: no foreign bodies  Tendon involvement: none  Nerve involvement: none  Vascular damage: no  Anesthesia: local infiltration    Anesthesia:  Local Anesthetic: lidocaine 1% without epinephrine  Preparation: Patient was prepped and draped in the usual sterile fashion.  Irrigation solution: saline  Irrigation method: syringe  Amount of cleaning: extensive  Debridement: none  Degree of undermining: minimal  Skin closure: 3-0 nylon and Ethilon  Number of sutures: 25  Technique: simple  Approximation: close  Approximation difficulty: complex  Dressing: 4x4 sterile gauze  Patient tolerance: Patient tolerated the procedure well with no immediate complications    Lac Repair  Date/Time: 3/6/2018 12:11 PM  Performed by: BRIAN HECK  Authorized by: STEF STUBBS   Body area: head/neck  Location details: right ear  Laceration length: 5 cm  Foreign bodies: no foreign bodies  Tendon involvement: none  Nerve involvement: none  Vascular damage: no  Anesthesia: local infiltration    Anesthesia:  Local Anesthetic: lidocaine 1% without epinephrine  Preparation: Patient was prepped and draped in the usual sterile fashion.  Irrigation solution: saline  Irrigation method: jet lavage  Amount of  "cleaning: standard  Debridement: none  Degree of undermining: none  Subcutaneous closure: 4-0 Vicryl  Number of sutures: 6  Technique: simple  Approximation difficulty: complex  Dressing: 4x4 sterile gauze  Patient tolerance: Patient tolerated the procedure well with no immediate complications        ED Vital Signs:  Vitals:    03/06/18 1008 03/06/18 1147 03/06/18 1148 03/06/18 1149   BP: 125/64 127/69 (!) 127/58 128/63   Pulse: 100 (!) 59 72 71   Resp: 20 18 17 18   Temp: 99.6 °F (37.6 °C)      TempSrc: Oral      SpO2: 96%      Weight: 78.8 kg (173 lb 9.8 oz)      Height: 5' 9" (1.753 m)       03/06/18 1150 03/06/18 1237 03/06/18 1346   BP: 128/63  122/87   Pulse: 72  72   Resp: 16 (!) 9 17   Temp:      TempSrc:      SpO2: 97%  97%   Weight:      Height:          Abnormal Lab Results:  Labs Reviewed   CBC W/ AUTO DIFFERENTIAL - Abnormal; Notable for the following:        Result Value    RBC 4.20 (*)     Hemoglobin 9.0 (*)     Hematocrit 30.4 (*)     MCV 72 (*)     MCH 21.4 (*)     MCHC 29.6 (*)     RDW 20.6 (*)     MPV 8.8 (*)     Lymph # 0.5 (*)     Gran% 75.4 (*)     Lymph% 9.0 (*)     All other components within normal limits   COMPREHENSIVE METABOLIC PANEL - Abnormal; Notable for the following:     BUN, Bld 64 (*)     Creatinine 2.5 (*)     Total Bilirubin 1.6 (*)     eGFR if  26 (*)     eGFR if non  22 (*)     All other components within normal limits   TROPONIN I - Abnormal; Notable for the following:     Troponin I 0.121 (*)     All other components within normal limits   PROTIME-INR - Abnormal; Notable for the following:     Prothrombin Time 13.2 (*)     INR 1.3 (*)     All other components within normal limits   APTT   MAGNESIUM   MAGNESIUM   URINALYSIS        All Lab Results:  Results for orders placed or performed during the hospital encounter of 03/06/18   CBC auto differential   Result Value Ref Range    WBC 6.02 3.90 - 12.70 K/uL    RBC 4.20 (L) 4.60 - 6.20 M/uL    " Hemoglobin 9.0 (L) 14.0 - 18.0 g/dL    Hematocrit 30.4 (L) 40.0 - 54.0 %    MCV 72 (L) 82 - 98 fL    MCH 21.4 (L) 27.0 - 31.0 pg    MCHC 29.6 (L) 32.0 - 36.0 g/dL    RDW 20.6 (H) 11.5 - 14.5 %    Platelets 235 150 - 350 K/uL    MPV 8.8 (L) 9.2 - 12.9 fL    Gran # (ANC) 4.5 1.8 - 7.7 K/uL    Lymph # 0.5 (L) 1.0 - 4.8 K/uL    Mono # 0.9 0.3 - 1.0 K/uL    Eos # 0.1 0.0 - 0.5 K/uL    Baso # 0.03 0.00 - 0.20 K/uL    Gran% 75.4 (H) 38.0 - 73.0 %    Lymph% 9.0 (L) 18.0 - 48.0 %    Mono% 14.1 4.0 - 15.0 %    Eosinophil% 1.3 0.0 - 8.0 %    Basophil% 0.5 0.0 - 1.9 %    Platelet Estimate Appears normal     Aniso Slight     Poik Slight     Poly Occasional     Hypo Occasional     Ovalocytes Occasional     Target Cells Occasional     Tear Drop Cells Occasional     Poyen Cells Occasional     Stomatocytes Present     Acanthocytes Present     Schistocytes Present     Differential Method Automated    Comprehensive metabolic panel   Result Value Ref Range    Sodium 137 136 - 145 mmol/L    Potassium 4.3 3.5 - 5.1 mmol/L    Chloride 100 95 - 110 mmol/L    CO2 25 23 - 29 mmol/L    Glucose 100 70 - 110 mg/dL    BUN, Bld 64 (H) 8 - 23 mg/dL    Creatinine 2.5 (H) 0.5 - 1.4 mg/dL    Calcium 9.6 8.7 - 10.5 mg/dL    Total Protein 7.2 6.0 - 8.4 g/dL    Albumin 3.6 3.5 - 5.2 g/dL    Total Bilirubin 1.6 (H) 0.1 - 1.0 mg/dL    Alkaline Phosphatase 123 55 - 135 U/L    AST 24 10 - 40 U/L    ALT 19 10 - 44 U/L    Anion Gap 12 8 - 16 mmol/L    eGFR if African American 26 (A) >60 mL/min/1.73 m^2    eGFR if non African American 22 (A) >60 mL/min/1.73 m^2   Troponin I #1   Result Value Ref Range    Troponin I 0.121 (H) 0.000 - 0.026 ng/mL   Protime-INR   Result Value Ref Range    Prothrombin Time 13.2 (H) 9.0 - 12.5 sec    INR 1.3 (H) 0.8 - 1.2   APTT   Result Value Ref Range    aPTT 30.6 21.0 - 32.0 sec   Magnesium   Result Value Ref Range    Magnesium 2.4 1.6 - 2.6 mg/dL         Imaging Results:  Imaging Results          US Carotid Bilateral (In  process)                CT Head Without Contrast (Final result)  Result time 03/06/18 12:37:34    Final result by Antelmo Guzmán MD (03/06/18 12:37:34)                 Impression:     No acute intracranial process noted.    All CT scans at this facility use dose modulation, iterative reconstruction, and/or weight-based dosing when appropriate to reduce radiation dose to as low as reasonably achievable.      Electronically signed by: ANTELMO GUZMÁN MD  Date:     03/06/18  Time:    12:37              Narrative:    Exam: CT of the head without contrast.    History: ams     Findings:     Diffuse parenchymal atrophy noted, with changes of small vessel disease, within normal limits for the patient's age. No hemorrhage, mass displacement, acute cortical infarct, or abnormal extra-axial fluid collection is identified. Visualized paranasal sinuses and mastoids are clear.                             X-Ray Chest AP Portable (Final result)  Result time 03/06/18 11:58:06    Final result by PAULA Mckinney Sr., MD (03/06/18 11:58:06)                 Impression:        1. There has been interval development of a subtle amount of haziness in the lateral aspect of the base of the right lung. This is characteristic of atelectasis or subtle pneumonia.  2. Surgical changes        Electronically signed by: PAULA MCKINNEY MD  Date:     03/06/18  Time:    11:58              Narrative:    One-view chest x-ray    Clinical History: Chest pain    Finding: Comparison was made to a prior examination performed on 2/20/2018. The size of the heart is normal. There has been interval development of a subtle amount of haziness in the lateral aspect of the base of the right lung. There is no focal pulmonary infiltrate visualized in the left lung. There is no pneumothorax.  The costophrenic angles are sharp. There are multiple sternotomy wires in place. There are prosthetic humeral heads in both shoulders.                                      The EKG  was ordered, reviewed, and independently interpreted by the ED provider.  Interpretation time: 10:30  Rate: 69 BPM  Rhythm: atrial fibrillation  Interpretation: LAD. No STEMI.  When compared to EKG performed 1/26/18, there are no significant changes.    The Emergency Provider reviewed the vital signs and test results, which are outlined above.    ED Discussion     1:07 PM: Discussed case with REYNALDO Vargas ([facility]). Gosia agrees with current care and management of pt and accepts admission.   Admitting Service: [facility] medicine   Admitting Physician: Dr. Eastman  Admit to: Tele/observation    1:11 PM: Re-evaluated pt. I have discussed test results, shared treatment plan, and the need for admission with patient and family at bedside. Pt and family express understanding at this time and agree with all information. All questions answered. Pt and family have no further questions or concerns at this time. Pt is ready for admit.    ED Medication(s):  Medications   lidocaine HCL 10 mg/ml (1%) injection 10 mL (not administered)   sodium chloride 0.9% flush 5 mL (not administered)   glucose chewable tablet 16 g (not administered)   glucose chewable tablet 24 g (not administered)   dextrose 50% injection 12.5 g (not administered)   dextrose 50% injection 25 g (not administered)   glucagon (human recombinant) injection 1 mg (not administered)   ondansetron disintegrating tablet 8 mg (not administered)   ondansetron injection 4 mg (not administered)   0.9%  NaCl infusion (not administered)   lidocaine HCL 10 mg/ml (1%) injection 10 mL (10 mLs Infiltration Given 3/6/18 1030)       New Prescriptions    No medications on file             Medical Decision Making    Medical Decision Making:   Clinical Tests:   Lab Tests: Ordered and Reviewed  Radiological Study: Reviewed and Ordered  Medical Tests: Reviewed and Ordered           Scribe Attestation:   Scribe #1: I performed the above scribed service and the  documentation accurately describes the services I performed. I attest to the accuracy of the note.    Attending:   Physician Attestation Statement for Scribe #1: I, Najma Acosta MD, personally performed the services described in this documentation, as scribed by Gareth Shook, in my presence, and it is both accurate and complete.          Clinical Impression       ICD-10-CM ICD-9-CM   1. Syncope, unspecified syncope type R55 780.2   2. Loss of consciousness R40.20 780.09   3. Chronic anticoagulation Z79.01 V58.61   4. Ear lobe laceration, right, initial encounter S01.311A 872.01   5. Chronic anemia D64.9 285.9   6. Chronic renal failure, stage 3 (moderate) N18.3 585.3   7. Chronic atrial fibrillation I48.2 427.31       Disposition:   Disposition: Admitted  Condition: Fair         Najma Acosta MD  03/06/18 3788

## 2018-03-06 NOTE — ASSESSMENT & PLAN NOTE
- Secondary to fall  - Sutured in the Er  - Will need outpatient f/u with ENT  - Continue local site care  - Hold ASA today  - Monitor

## 2018-03-06 NOTE — PLAN OF CARE
Problem: Patient Care Overview  Goal: Plan of Care Review  Outcome: Ongoing (interventions implemented as appropriate)  POC discussed w/patient, verbalized understanding. A fib on monitor. VSS. Voids per urinal. No c/o pain frequent weight change encouraged. IV fluids infusing. Patient turns independently in bed. Fall precautions in place, bed alarm on. Patient denies needs at this time.

## 2018-03-06 NOTE — SUBJECTIVE & OBJECTIVE
Past Medical History:   Diagnosis Date    Aortic stenosis 8/2/2013    Atrial fibrillation 7/5/2013    CHF (congestive heart failure)     Coronary artery disease     Dizziness - light-headed     Hyperlipidemia     Hypertension     Syncope 1/26/2018       Past Surgical History:   Procedure Laterality Date    BACK SURGERY  2003    CARDIAC VALVE SURGERY      CORONARY ARTERY BYPASS GRAFT      knee replacemnt bilateral  2001    SHOULDER SURGERY  2002       Review of patient's allergies indicates:   Allergen Reactions    Sulfa (sulfonamide antibiotics) Hives       No current facility-administered medications on file prior to encounter.      Current Outpatient Prescriptions on File Prior to Encounter   Medication Sig    apixaban (ELIQUIS) 2.5 mg Tab Take 1 tablet (2.5 mg total) by mouth 2 (two) times daily.    aspirin (ECOTRIN) 81 MG EC tablet Take 1 tablet (81 mg total) by mouth once daily.    fish oil-omega-3 fatty acids 300-1,000 mg capsule Take 1,200 mg by mouth once daily.    furosemide (LASIX) 40 MG tablet Take 1 tablet (40 mg total) by mouth 2 (two) times daily.    metoprolol succinate (TOPROL-XL) 25 MG 24 hr tablet Take 1 tablet (25 mg total) by mouth once daily.    multivitamin with folic acid 400 mcg Tab Take 1 tablet by mouth.    potassium chloride (K-TAB) 20 mEq Take 1 tablet (20 mEq total) by mouth every morning. Takes one tablet (20 meq) in the morning and half tablet (10 meq) in the evening    potassium chloride (KLOR-CON) 10 MEQ TbSR Take 1 tablet (10 mEq total) by mouth every evening.    ranitidine (ZANTAC) 75 MG tablet Take 75 mg by mouth nightly.     vitamin D 1000 units Tab Take 1,000 Units by mouth once daily.      Family History     Problem Relation (Age of Onset)    Heart attack Brother    Heart disease Brother    Heart failure Brother    Hypertension Mother, Father, Sister, Brother        Social History Main Topics    Smoking status: Never Smoker    Smokeless tobacco: Never  Used    Alcohol use No      Comment: HOLIDAY    Drug use: No    Sexual activity: No     Review of Systems   Constitutional: Negative for chills, diaphoresis, fatigue and fever.   HENT: Positive for ear pain. Negative for hearing loss, mouth sores, sore throat, tinnitus and trouble swallowing.         Right ear laceration   Eyes: Negative for pain, discharge and redness.   Respiratory: Negative for apnea, cough, choking, chest tightness, shortness of breath, wheezing and stridor.    Cardiovascular: Negative for chest pain, palpitations and leg swelling.   Gastrointestinal: Negative for abdominal distention, abdominal pain, blood in stool, constipation, diarrhea, nausea, rectal pain and vomiting.   Endocrine: Negative for cold intolerance, heat intolerance, polydipsia, polyphagia and polyuria.   Genitourinary: Negative for difficulty urinating, dysuria, flank pain, frequency, hematuria and urgency.   Musculoskeletal: Positive for gait problem. Negative for arthralgias, back pain, joint swelling, neck pain and neck stiffness.   Skin: Positive for wound. Negative for color change and rash.        Right ear laceration   Allergic/Immunologic: Negative for food allergies.   Neurological: Positive for dizziness, syncope and light-headedness. Negative for tremors, seizures, speech difficulty and headaches.   Hematological: Negative for adenopathy. Bruises/bleeds easily.        Bruising to RUE   Psychiatric/Behavioral: Negative for agitation and confusion. The patient is not nervous/anxious.    All other systems reviewed and are negative.    Objective:     Vital Signs (Most Recent):  Temp: 99.6 °F (37.6 °C) (03/06/18 1008)  Pulse: 72 (03/06/18 1346)  Resp: 17 (03/06/18 1346)  BP: 122/87 (03/06/18 1346)  SpO2: 97 % (03/06/18 1346) Vital Signs (24h Range):  Temp:  [99.6 °F (37.6 °C)] 99.6 °F (37.6 °C)  Pulse:  [] 72  Resp:  [9-20] 17  SpO2:  [96 %-97 %] 97 %  BP: (122-128)/(58-87) 122/87     Weight: 78.8 kg (173 lb 9.8  oz)  Body mass index is 25.64 kg/m².    Physical Exam   Constitutional: He is oriented to person, place, and time. He appears well-developed and well-nourished. No distress.   HENT:   Head: Normocephalic and atraumatic.   Right Ear: Right ear exhibits lacerations. There is swelling.   Mouth/Throat: Oropharynx is clear and moist.   Right ear sutures CDI   Eyes: Conjunctivae and EOM are normal. Pupils are equal, round, and reactive to light.   Neck: Normal range of motion. Neck supple. No JVD present.   Cardiovascular: Normal rate, normal heart sounds and intact distal pulses.  An irregular rhythm present. Exam reveals no gallop and no friction rub.    No murmur heard.  Pulmonary/Chest: Effort normal and breath sounds normal. No stridor. No respiratory distress. He has no wheezes. He has no rales. He exhibits no tenderness.   Comfortable on RA   Abdominal: Soft. Bowel sounds are normal. He exhibits no distension and no mass. There is no tenderness. There is no rebound and no guarding.   Musculoskeletal: Normal range of motion. He exhibits no edema or tenderness.   Neurological: He is alert and oriented to person, place, and time. No cranial nerve deficit.   Skin: Skin is warm and dry. No rash noted. He is not diaphoretic. No erythema.   + Ecchymosis to RUE   Psychiatric: He has a normal mood and affect. His behavior is normal. Judgment and thought content normal.   Nursing note and vitals reviewed.        CRANIAL NERVES     CN III, IV, VI   Pupils are equal, round, and reactive to light.  Extraocular motions are normal.        Significant Labs:   CBC:   Recent Labs  Lab 03/06/18  1159   WBC 6.02   HGB 9.0*   HCT 30.4*        CMP:   Recent Labs  Lab 03/06/18  1159      K 4.3      CO2 25      BUN 64*   CREATININE 2.5*   CALCIUM 9.6   PROT 7.2   ALBUMIN 3.6   BILITOT 1.6*   ALKPHOS 123   AST 24   ALT 19   ANIONGAP 12   EGFRNONAA 22*     Cardiac Markers: No results for input(s): CKMB, MYOGLOBIN,  BNP, TROPISTAT in the last 48 hours.  Coagulation:   Recent Labs  Lab 03/06/18  1159   INR 1.3*   APTT 30.6     Magnesium:   Recent Labs  Lab 03/06/18  1159   MG 2.4     Troponin:   Recent Labs  Lab 03/06/18  1159   TROPONINI 0.121*     Urine Studies:   Recent Labs  Lab 03/06/18  1422   COLORU Yellow   APPEARANCEUA Clear   PHUR 6.0   SPECGRAV 1.010   PROTEINUA Trace*   GLUCUA Negative   KETONESU Negative   BILIRUBINUA Negative   OCCULTUA Negative   NITRITE Negative   UROBILINOGEN 1.0   LEUKOCYTESUR Negative       Significant Imaging: I have reviewed all pertinent imaging results/findings within the past 24 hours.   Imaging Results          US Carotid Bilateral (Final result)  Result time 03/06/18 14:23:38    Final result by Aubree Villanueva MD (03/06/18 14:23:38)                 Impression:    Minor internal carotid artery bulb plaque bilaterally.  However, no hemodynamically significant stenosis.        Electronically signed by: AUBREE VILLANUEVA MD  Date:     03/06/18  Time:    14:23              Narrative:    Bilateral carotid Doppler ultrasound with duplex and color flow imaging, 03/06/18 14:18:43    History:  TIA,  Syncope     The right common carotid artery bifurcation reveals minor noncalcified plaque. The right common carotid artery peak systolic velocity is 57.5cm/sec and the right internal carotid artery peak systolic velocity is 63.7cm/sec. The right internal to common carotid artery ratio is 1.1.    The left common carotid artery bifurcation reveals minor noncalcified plaque. The left common carotid artery velocity is 61.0cm/sec and the left internal carotid artery velocity is 58.7cm/sec. The left internal to common carotid artery ratio is 1.0.    There is normal antegrade vertebral artery flow bilaterally.    Validated velocity measurements are extrapolated from diameter data as defined by the Society of Radiologists in Ultrasound Conference Radiology 2003; 229;340-346.                             CT  Head Without Contrast (Final result)  Result time 03/06/18 12:37:34    Final result by Antelmo Guzmán MD (03/06/18 12:37:34)                 Impression:     No acute intracranial process noted.    All CT scans at this facility use dose modulation, iterative reconstruction, and/or weight-based dosing when appropriate to reduce radiation dose to as low as reasonably achievable.      Electronically signed by: ANTELMO GUZMÁN MD  Date:     03/06/18  Time:    12:37              Narrative:    Exam: CT of the head without contrast.    History: ams     Findings:     Diffuse parenchymal atrophy noted, with changes of small vessel disease, within normal limits for the patient's age. No hemorrhage, mass displacement, acute cortical infarct, or abnormal extra-axial fluid collection is identified. Visualized paranasal sinuses and mastoids are clear.                             X-Ray Chest AP Portable (Final result)  Result time 03/06/18 11:58:06    Final result by PAULA Mckinney Sr., MD (03/06/18 11:58:06)                 Impression:        1. There has been interval development of a subtle amount of haziness in the lateral aspect of the base of the right lung. This is characteristic of atelectasis or subtle pneumonia.  2. Surgical changes        Electronically signed by: PAULA MCKINNEY MD  Date:     03/06/18  Time:    11:58              Narrative:    One-view chest x-ray    Clinical History: Chest pain    Finding: Comparison was made to a prior examination performed on 2/20/2018. The size of the heart is normal. There has been interval development of a subtle amount of haziness in the lateral aspect of the base of the right lung. There is no focal pulmonary infiltrate visualized in the left lung. There is no pneumothorax.  The costophrenic angles are sharp. There are multiple sternotomy wires in place. There are prosthetic humeral heads in both shoulders.

## 2018-03-06 NOTE — HPI
"Mr. Wilkes is an 86 year old male patient with a PMHx of atrial fibrillation (on Eliquis), diastolic CHF, CAD, AS s/p CABG x 1 and AVR on 12/30/17, HTN, and CKD stage III who presented to [facility] ED today s/p syncopal episode. Patient was reportedly bending down to  the newspaper this AM when he became lightheaded and dizzy. He subsequently "passed out" and awakened a few minutes later and noticed blood on the ground. He denied any associated chest pain, SOB, palpitations, nausea, vomiting, diaphoresis, blurred vision, slurred speech, or focal weakness. Initial workup in ED revealed creatinine of 2.5 and initial troponin of 0.121 and patient subsequently admitted for further evaluation and treatment. Cardiology consulted to assist with management. Patient seen and examined today while in ED. States he feels ok. No cardiac complaints. Remains chest pain free. He reports compliance with his medications. He was recently discharged on 2/24/18 after being treated for a CHF exacerbation (diuresed 16 L). Chart reviewed. Repeat troponin pending. 2D echo 1/26/18 showed normal EF, moderate MR. EKG reviewed and shows chronic atrial fibrillation, rate controlled.    "

## 2018-03-06 NOTE — ASSESSMENT & PLAN NOTE
- Currently appears compensated, and on dry side  - ECHO on 1/26/2018 showed EF of 55-60% with grade 2 diastolic dysfunction, Moderate mitral regurgitation, Mild to moderate tricuspid regurgitation, and PAH with an estimated PA systolic pressure of 41.    - Recently discharged on 2/24 after being hospitalized for decompensated CHF diuresing over 16L at this time.  - Continue Toprol XL  - Hold home Lasix for now given need for gentle IVFs  - Cardiac, low Na diet  - Daily weights  - Strict I and O  - Tele monitoring

## 2018-03-06 NOTE — ASSESSMENT & PLAN NOTE
- Admit to OBS  - Not orthostatic in the Er  - Likely secondary to dehydration (recently discharged on 2/24 for decompensated CHF diuresing over 16L), Cr elevated to 2.5, appears dry  - CT head negative for acute process  - Carotid US showed Minor internal carotid artery bulb plaque bilaterally.  However, no hemodynamically significant stenosis.  - ECHO in January showed EF of 55-60%, grade 2 diastolic dysfunction, Moderate mitral regurgitation, Mild to moderate tricuspid regurgitation, and PAH with an estimated PA systolic pressure of 41.    - Cardiology consulted as patient is well known to them (concern for continuation of Eliquis in a high fall risk patient)  - Neuro checks q 4 hours  - Orthostatic VS  - Gentle IVFs  - Daily labs  - Monitor

## 2018-03-06 NOTE — HPI
"Chung Meneses Sr. is a 86 y.o. male patient with a PMH of Afib on Eliquis, CHF, CAD and AS s/p CABG x1 and AVR on 12/30/2017, HTN, CKD stage 3, Syncope who presented to the Emergency Department for syncope which onset suddenly PTA after bending over to [employer] newspaper this morning.  Pt fell forward and hit head on ground.  LOC was not witnessed.  Patient stated "I woke up on the ground and saw blood".  Pt has laceration to right ear lobe.  Sxs are constant and moderate in severity.  Pt reports he was feeling weak and lightheaded prior to syncope.  There are no mitigating or exacerbating factors noted.  Pt denies any fever, N/V/D, back pain, neck pain, numbness, unilateral weakness, CP, ABD pain, SOB, HA, and all other sxs at this time.  Pt takes Eliquis and ASA daily.  Pt tetanus UTD.  No further complaints or concerns at this time.  ED workup showed:  12 Lead EKG showed Afib with no acute changes.  ECHO on 1/26/2018 showed EF of 55-60% with grade 2 diastolic dysfunction, Moderate mitral regurgitation, Mild to moderate tricuspid regurgitation, and PAH with an estimated PA systolic pressure of 41.  CT Head showed No acute intracranial process.  CXR showed subtle amount of haziness in the lateral aspect of the base of the right lung.  This is characteristic of atelectasis or subtle pneumonia.  Afebrile with no leukocytosis.  Cr 2.5.  Baseline appears to be 1.4-1.8.  .  Initial troponin 0.121.  Patient not orthostatic in the ER.  Carotid US showed Minor internal carotid artery bulb plaque bilaterally.  However, no hemodynamically significant stenosis.  He was recently discharged on 2/24 after being hospitalized for decompensated CHF diuresing over 16L at this time.  He appears dehydrated now, which is likely the cause of his syncope.  Gentle IVFs started.  Cardiology consulted, awaiting recs.  He will be admitted to OBS under the care of Hospital Medicine.   "

## 2018-03-06 NOTE — PROGRESS NOTES
"Pt arrived to the floor.Transferred to bed .Patient is aaox4. No signs of acute distress noted. Report received from ALEXANDRA Booker /85 (BP Location: Left arm, Patient Position: Standing)   Pulse 64   Temp 97.6 °F (36.4 °C) (Oral)   Resp 17   Ht 5' 9" (1.753 m)   Wt 78.8 kg (173 lb 9.8 oz)   SpO2 95%   BMI 25.64 kg/m²    Patient has been orientated to room, call light, fall precautions, rounding sheet, menu, and board. Heart monitor in place, no questions or concerns at this time.   "

## 2018-03-06 NOTE — H&P
"[facility] Hospital Medicine  History & Physical    Patient Name: Chung Meneses Sr.  MRN: [id number]  Admission Date: 3/6/2018  Attending Physician: Jake Eastman MD   Primary Care Provider: Glenroy Carmichael MD         Patient information was obtained from patient, past medical records and ER records.     Subjective:     Principal Problem:Syncope and collapse    Chief Complaint:   Chief Complaint   Patient presents with    Loss of Consciousness     Pt states, "I was leaning over to get the newspaper and I passed out and hit the pavement, and I cut my ear wher it connects to my face."        HPI: Chung Meneses Sr. is a 86 y.o. male patient  with a PMH of Afib on Eliquis, CHF, CAD and AS s/p CABG x1 and AVR on 12/30/2017, HTN, CKD stage 3, Syncope who presented to the Emergency Department for syncope which onset suddenly PTA after bending over to pickup newspaper this morning.  Pt fell forward and hit head on ground.  LOC was not witnessed.  Patient stated "I woke up on the ground and saw blood".  Pt has laceration to right ear lobe.  Sxs are constant and moderate in severity.  Pt reports he was feeling weak and lightheaded prior to syncope.  There are no mitigating or exacerbating factors noted.  Pt denies any fever, N/V/D, back pain, neck pain, numbness, unilateral weakness, CP, ABD pain, SOB, HA, and all other sxs at this time.  Pt takes Eliquis and ASA daily.  Pt tetanus UTD.  No further complaints or concerns at this time.  ED workup showed:  12 Lead EKG showed Afib with no acute changes.  ECHO on 1/26/2018 showed EF of 55-60% with grade 2 diastolic dysfunction, Moderate mitral regurgitation, Mild to moderate tricuspid regurgitation, and PAH with an estimated PA systolic pressure of 41.  CT Head showed No acute intracranial process.  CXR showed subtle amount of haziness in the lateral aspect of the base of the right lung.  This is characteristic of atelectasis or subtle pneumonia.  " Afebrile with no leukocytosis.  Cr 2.5.  Baseline appears to be 1.4-1.8.  .  Initial troponin 0.121.  Patient not orthostatic in the ER.  Carotid US showed Minor internal carotid artery bulb plaque bilaterally.  However, no hemodynamically significant stenosis.  He was recently discharged on 2/24 after being hospitalized for decompensated CHF diuresing over 16L at this time.  He appears dehydrated now, which is likely the cause of his syncope.  Gentle IVFs started.  Cardiology consulted, awaiting recs.  He will be admitted to OBS under the care of Hospital Medicine.     Past Medical History:   Diagnosis Date    Aortic stenosis 8/2/2013    Atrial fibrillation 7/5/2013    CHF (congestive heart failure)     Coronary artery disease     Dizziness - light-headed     Hyperlipidemia     Hypertension     Syncope 1/26/2018       Past Surgical History:   Procedure Laterality Date    BACK SURGERY  2003    CARDIAC VALVE SURGERY      CORONARY ARTERY BYPASS GRAFT      knee replacemnt bilateral  2001    SHOULDER SURGERY  2002       Review of patient's allergies indicates:   Allergen Reactions    Sulfa (sulfonamide antibiotics) Hives       No current facility-administered medications on file prior to encounter.      Current Outpatient Prescriptions on File Prior to Encounter   Medication Sig    apixaban (ELIQUIS) 2.5 mg Tab Take 1 tablet (2.5 mg total) by mouth 2 (two) times daily.    aspirin (ECOTRIN) 81 MG EC tablet Take 1 tablet (81 mg total) by mouth once daily.    fish oil-omega-3 fatty acids 300-1,000 mg capsule Take 1,200 mg by mouth once daily.    furosemide (LASIX) 40 MG tablet Take 1 tablet (40 mg total) by mouth 2 (two) times daily.    metoprolol succinate (TOPROL-XL) 25 MG 24 hr tablet Take 1 tablet (25 mg total) by mouth once daily.    multivitamin with folic acid 400 mcg Tab Take 1 tablet by mouth.    potassium chloride (K-TAB) 20 mEq Take 1 tablet (20 mEq total) by mouth every morning. Takes  one tablet (20 meq) in the morning and half tablet (10 meq) in the evening    potassium chloride (KLOR-CON) 10 MEQ TbSR Take 1 tablet (10 mEq total) by mouth every evening.    ranitidine (ZANTAC) 75 MG tablet Take 75 mg by mouth nightly.     vitamin D 1000 units Tab Take 1,000 Units by mouth once daily.      Family History     Problem Relation (Age of Onset)    Heart attack Brother    Heart disease Brother    Heart failure Brother    Hypertension Mother, Father, Sister, Brother        Social History Main Topics    Smoking status: Never Smoker    Smokeless tobacco: Never Used    Alcohol use No      Comment: HOLIDAY    Drug use: No    Sexual activity: No     Review of Systems   Constitutional: Negative for chills, diaphoresis, fatigue and fever.   HENT: Positive for ear pain. Negative for hearing loss, mouth sores, sore throat, tinnitus and trouble swallowing.         Right ear laceration   Eyes: Negative for pain, discharge and redness.   Respiratory: Negative for apnea, cough, choking, chest tightness, shortness of breath, wheezing and stridor.    Cardiovascular: Negative for chest pain, palpitations and leg swelling.   Gastrointestinal: Negative for abdominal distention, abdominal pain, blood in stool, constipation, diarrhea, nausea, rectal pain and vomiting.   Endocrine: Negative for cold intolerance, heat intolerance, polydipsia, polyphagia and polyuria.   Genitourinary: Negative for difficulty urinating, dysuria, flank pain, frequency, hematuria and urgency.   Musculoskeletal: Positive for gait problem. Negative for arthralgias, back pain, joint swelling, neck pain and neck stiffness.   Skin: Positive for wound. Negative for color change and rash.        Right ear laceration   Allergic/Immunologic: Negative for food allergies.   Neurological: Positive for dizziness, syncope and light-headedness. Negative for tremors, seizures, speech difficulty and headaches.   Hematological: Negative for adenopathy.  Bruises/bleeds easily.        Bruising to RUE   Psychiatric/Behavioral: Negative for agitation and confusion. The patient is not nervous/anxious.    All other systems reviewed and are negative.    Objective:     Vital Signs (Most Recent):  Temp: 99.6 °F (37.6 °C) (03/06/18 1008)  Pulse: 72 (03/06/18 1346)  Resp: 17 (03/06/18 1346)  BP: 122/87 (03/06/18 1346)  SpO2: 97 % (03/06/18 1346) Vital Signs (24h Range):  Temp:  [99.6 °F (37.6 °C)] 99.6 °F (37.6 °C)  Pulse:  [] 72  Resp:  [9-20] 17  SpO2:  [96 %-97 %] 97 %  BP: (122-128)/(58-87) 122/87     Weight: 78.8 kg (173 lb 9.8 oz)  Body mass index is 25.64 kg/m².    Physical Exam   Constitutional: He is oriented to person, place, and time. He appears well-developed and well-nourished. No distress.   HENT:   Head: Normocephalic and atraumatic.   Right Ear: Right ear exhibits lacerations. There is swelling.   Mouth/Throat: Oropharynx is clear and moist.   Right ear sutures CDI   Eyes: Conjunctivae and EOM are normal. Pupils are equal, round, and reactive to light.   Neck: Normal range of motion. Neck supple. No JVD present.   Cardiovascular: Normal rate, normal heart sounds and intact distal pulses.  An irregular rhythm present. Exam reveals no gallop and no friction rub.    No murmur heard.  Pulmonary/Chest: Effort normal and breath sounds normal. No stridor. No respiratory distress. He has no wheezes. He has no rales. He exhibits no tenderness.   Comfortable on RA   Abdominal: Soft. Bowel sounds are normal. He exhibits no distension and no mass. There is no tenderness. There is no rebound and no guarding.   Musculoskeletal: Normal range of motion. He exhibits no edema or tenderness.   Neurological: He is alert and oriented to person, place, and time. No cranial nerve deficit.   Skin: Skin is warm and dry. No rash noted. He is not diaphoretic. No erythema.   + Ecchymosis to RUE   Psychiatric: He has a normal mood and affect. His behavior is normal. Judgment and  thought content normal.   Nursing note and vitals reviewed.        CRANIAL NERVES     CN III, IV, VI   Pupils are equal, round, and reactive to light.  Extraocular motions are normal.              Significant Labs:   CBC:   Recent Labs  Lab 03/06/18  1159   WBC 6.02   HGB 9.0*   HCT 30.4*        CMP:   Recent Labs  Lab 03/06/18  1159      K 4.3      CO2 25      BUN 64*   CREATININE 2.5*   CALCIUM 9.6   PROT 7.2   ALBUMIN 3.6   BILITOT 1.6*   ALKPHOS 123   AST 24   ALT 19   ANIONGAP 12   EGFRNONAA 22*       Coagulation:   Recent Labs  Lab 03/06/18  1159   INR 1.3*   APTT 30.6     Magnesium:   Recent Labs  Lab 03/06/18  1159   MG 2.4     Troponin:   Recent Labs  Lab 03/06/18  1159   TROPONINI 0.121*     Urine Studies:   Recent Labs  Lab 03/06/18  1422   COLORU Yellow   APPEARANCEUA Clear   PHUR 6.0   SPECGRAV 1.010   PROTEINUA Trace*   GLUCUA Negative   KETONESU Negative   BILIRUBINUA Negative   OCCULTUA Negative   NITRITE Negative   UROBILINOGEN 1.0   LEUKOCYTESUR Negative       Significant Imaging: I have reviewed all pertinent imaging results/findings within the past 24 hours.   Imaging Results          US Carotid Bilateral (Final result)  Result time 03/06/18 14:23:38    Final result by Aubree Villanueva MD (03/06/18 14:23:38)                 Impression:    Minor internal carotid artery bulb plaque bilaterally.  However, no hemodynamically significant stenosis.        Electronically signed by: AUBREE VILLANUEVA MD  Date:     03/06/18  Time:    14:23              Narrative:    Bilateral carotid Doppler ultrasound with duplex and color flow imaging, 03/06/18 14:18:43    History:  TIA,  Syncope     The right common carotid artery bifurcation reveals minor noncalcified plaque. The right common carotid artery peak systolic velocity is 57.5cm/sec and the right internal carotid artery peak systolic velocity is 63.7cm/sec. The right internal to common carotid artery ratio is 1.1.    The left  common carotid artery bifurcation reveals minor noncalcified plaque. The left common carotid artery velocity is 61.0cm/sec and the left internal carotid artery velocity is 58.7cm/sec. The left internal to common carotid artery ratio is 1.0.    There is normal antegrade vertebral artery flow bilaterally.    Validated velocity measurements are extrapolated from diameter data as defined by the Society of Radiologists in Ultrasound Conference Radiology 2003; 229;340-346.                             CT Head Without Contrast (Final result)  Result time 03/06/18 12:37:34    Final result by Antelmo Guzmán MD (03/06/18 12:37:34)                 Impression:     No acute intracranial process noted.    All CT scans at this facility use dose modulation, iterative reconstruction, and/or weight-based dosing when appropriate to reduce radiation dose to as low as reasonably achievable.      Electronically signed by: ANTELMO GUZMÁN MD  Date:     03/06/18  Time:    12:37              Narrative:    Exam: CT of the head without contrast.    History: ams     Findings:     Diffuse parenchymal atrophy noted, with changes of small vessel disease, within normal limits for the patient's age. No hemorrhage, mass displacement, acute cortical infarct, or abnormal extra-axial fluid collection is identified. Visualized paranasal sinuses and mastoids are clear.                             X-Ray Chest AP Portable (Final result)  Result time 03/06/18 11:58:06    Final result by PAULA Mckinney Sr., MD (03/06/18 11:58:06)                 Impression:        1. There has been interval development of a subtle amount of haziness in the lateral aspect of the base of the right lung. This is characteristic of atelectasis or subtle pneumonia.  2. Surgical changes        Electronically signed by: PAULA MCKINNEY MD  Date:     03/06/18  Time:    11:58              Narrative:    One-view chest x-ray    Clinical History: Chest pain    Finding: Comparison was made  to a prior examination performed on 2/20/2018. The size of the heart is normal. There has been interval development of a subtle amount of haziness in the lateral aspect of the base of the right lung. There is no focal pulmonary infiltrate visualized in the left lung. There is no pneumothorax.  The costophrenic angles are sharp. There are multiple sternotomy wires in place. There are prosthetic humeral heads in both shoulders.                            Assessment/Plan:     * Syncope and collapse    - Admit to OBS  - Not orthostatic in the Er  - Likely secondary to dehydration (recently discharged on 2/24 for decompensated CHF diuresing over 16L), Cr elevated to 2.5, appears dry  - CT head negative for acute process  - Carotid US showed Minor internal carotid artery bulb plaque bilaterally.  However, no hemodynamically significant stenosis.  - ECHO in January showed EF of 55-60%, grade 2 diastolic dysfunction, Moderate mitral regurgitation, Mild to moderate tricuspid regurgitation, and PAH with an estimated PA systolic pressure of 41.    - Cardiology consulted as patient is well known to them (concern for continuation of Eliquis in a high fall risk patient)  - Neuro checks q 4 hours  - Orthostatic VS  - Gentle IVFs  - Daily labs  - Monitor          Laceration of right ear    - Secondary to fall  - Sutured in the Er  - Will need outpatient f/u with ENT  - Continue local site care  - Hold ASA today  - Monitor          Atrial fibrillation    - Currently rate controlled  - Continue Toprol XL  - Reports compliance with Toprol XL, ASA, and Eliquis at home  - Concerns for continuing Eliquis in a high fall risk patient, will d/w cardiology before resuming  - Cardiology consulted, awaiting recs  - Tele monitoring          Congestive heart failure    - Currently appears compensated, and on dry side  - ECHO on 1/26/2018 showed EF of 55-60% with grade 2 diastolic dysfunction, Moderate mitral regurgitation, Mild to moderate  tricuspid regurgitation, and PAH with an estimated PA systolic pressure of 41.    - Recently discharged on 2/24 after being hospitalized for decompensated CHF diuresing over 16L at this time.  - Continue Toprol XL  - Hold home Lasix for now given need for gentle IVFs  - Cardiac, low Na diet  - Daily weights  - Strict I and O  - Tele monitoring          CAD (coronary artery disease)    - Asymptomatic s/p CABG x 1 with AVR on 12/30/2017  - Initial troponin 0.121, will trend  - Resume home Toprol XL  - Resume ASA in AM if ear laceration stable  - Tele monitoring          Hypertension    - BP well controlled  - Continue home Toprol XL   - Monitor          Anemia    - Microcytic/hypochromic   - Hgb stable at 9.0  - Iron studies pending  - Daily CBC  - Monitor            VTE Risk Mitigation         Ordered     Medium Risk of VTE  Once      03/06/18 1340     Place sequential compression device  Until discontinued      03/06/18 1340     Reason for No Pharmacological VTE Prophylaxis  Once     Hold Eliquis today given ear laceration.  Will need to discuss resuming with cardiology given high fall risk.  03/06/18 1340             Tammie Solomon DNP, ACNP-BC  Department of Hospital Medicine   [facility]

## 2018-03-06 NOTE — ASSESSMENT & PLAN NOTE
-Initial troponin 0.121  -Likely secondary to demand ischemia  -Continue to trend  -Continue Toprol XL, ASA

## 2018-03-06 NOTE — ASSESSMENT & PLAN NOTE
- Asymptomatic s/p CABG x 1 with AVR on 12/30/2017  - Initial troponin 0.121, will trend  - Resume home Toprol XL  - Resume ASA in AM if ear laceration stable  - Tele monitoring

## 2018-03-06 NOTE — ASSESSMENT & PLAN NOTE
-Denies chest pain or SOB  -Continue Toprol XL  -Continue ASA  -Not on statins due to side effects

## 2018-03-06 NOTE — CONSULTS
"[facility]  Cardiology  Consult Note    Patient Name: Chung Meneses Sr.  MRN: [id number]  Admission Date: 3/6/2018  Hospital Length of Stay: 0 days  Code Status: Full Code   Attending Provider: aJke Eastman MD   Consulting Provider: Laura Woodward PA-C  Primary Care Physician: Glenroy Carmichael MD  Principal Problem:Syncope and collapse    Patient information was obtained from patient, past medical records and ER records.     Inpatient consult to Cardiology  Consult performed by: LAURA WOODWARD  Consult ordered by: GUERLINE MCINTOSH        Subjective:     Chief Complaint:  Syncope     HPI:   Mr. Wilkes is an 86 year old male patient with a PMHx of atrial fibrillation (on Eliquis), diastolic CHF, CAD, AS s/p CABG x 1 and AVR on 12/30/17, HTN, and CKD stage III who presented to [facility] ED today s/p syncopal episode. Patient was reportedly bending down to  the newspaper this AM when he became lightheaded and dizzy. He subsequently "passed out" and awakened a few minutes later and noticed blood on the ground. He denied any associated chest pain, SOB, palpitations, nausea, vomiting, diaphoresis, blurred vision, slurred speech, or focal weakness. Initial workup in ED revealed creatinine of 2.5 and initial troponin of 0.121 and patient subsequently admitted for further evaluation and treatment. Cardiology consulted to assist with management. Patient seen and examined today while in ED. States he feels ok. No cardiac complaints. Remains chest pain free. He reports compliance with his medications. He was recently discharged on 2/24/18 after being treated for a CHF exacerbation (diuresed 16 L). Chart reviewed. Repeat troponin pending. 2D echo 1/26/18 showed normal EF, moderate MR. EKG reviewed and shows chronic atrial fibrillation, rate controlled.      Past Medical History:   Diagnosis Date    Aortic stenosis 8/2/2013    Atrial fibrillation 7/5/2013    CHF (congestive heart failure)  "    Coronary artery disease     Dizziness - light-headed     Hyperlipidemia     Hypertension     Syncope 1/26/2018       Past Surgical History:   Procedure Laterality Date    BACK SURGERY  2003    CARDIAC VALVE SURGERY      CORONARY ARTERY BYPASS GRAFT      knee replacemnt bilateral  2001    SHOULDER SURGERY  2002       Review of patient's allergies indicates:   Allergen Reactions    Sulfa (sulfonamide antibiotics) Hives       No current facility-administered medications on file prior to encounter.      Current Outpatient Prescriptions on File Prior to Encounter   Medication Sig    apixaban (ELIQUIS) 2.5 mg Tab Take 1 tablet (2.5 mg total) by mouth 2 (two) times daily.    aspirin (ECOTRIN) 81 MG EC tablet Take 1 tablet (81 mg total) by mouth once daily.    fish oil-omega-3 fatty acids 300-1,000 mg capsule Take 1,200 mg by mouth once daily.    furosemide (LASIX) 40 MG tablet Take 1 tablet (40 mg total) by mouth 2 (two) times daily.    metoprolol succinate (TOPROL-XL) 25 MG 24 hr tablet Take 1 tablet (25 mg total) by mouth once daily.    multivitamin with folic acid 400 mcg Tab Take 1 tablet by mouth.    potassium chloride (K-TAB) 20 mEq Take 1 tablet (20 mEq total) by mouth every morning. Takes one tablet (20 meq) in the morning and half tablet (10 meq) in the evening    potassium chloride (KLOR-CON) 10 MEQ TbSR Take 1 tablet (10 mEq total) by mouth every evening.    ranitidine (ZANTAC) 75 MG tablet Take 75 mg by mouth nightly.     vitamin D 1000 units Tab Take 1,000 Units by mouth once daily.      Family History     Problem Relation (Age of Onset)    Heart attack Brother    Heart disease Brother    Heart failure Brother    Hypertension Mother, Father, Sister, Brother        Social History Main Topics    Smoking status: Never Smoker    Smokeless tobacco: Never Used    Alcohol use No      Comment: HOLIDAY    Drug use: No    Sexual activity: No     Review of Systems   Constitution: Positive for  malaise/fatigue.   HENT: Positive for ear pain (right ear).    Eyes: Negative.    Cardiovascular: Positive for syncope.   Endocrine: Negative.    Hematologic/Lymphatic: Bruises/bleeds easily.   Musculoskeletal: Negative.    Gastrointestinal: Negative.    Neurological: Positive for dizziness and light-headedness.   Psychiatric/Behavioral: Negative.    Allergic/Immunologic: Negative.      Objective:     Vital Signs (Most Recent):  Temp: 97.6 °F (36.4 °C) (03/06/18 1501)  Pulse: 64 (03/06/18 1505)  Resp: 17 (03/06/18 1501)  BP: 127/85 (03/06/18 1505)  SpO2: 95 % (03/06/18 1501) Vital Signs (24h Range):  Temp:  [97.6 °F (36.4 °C)-99.6 °F (37.6 °C)] 97.6 °F (36.4 °C)  Pulse:  [] 64  Resp:  [9-20] 17  SpO2:  [95 %-97 %] 95 %  BP: (115-128)/(56-87) 127/85     Weight: 78.8 kg (173 lb 9.8 oz)  Body mass index is 25.64 kg/m².    SpO2: 95 %  O2 Device (Oxygen Therapy): room air    No intake or output data in the 24 hours ending 03/06/18 1543    Lines/Drains/Airways     Pressure Ulcer                 Pressure Injury 02/21/18 1030 Coccyx Stage 2 13 days          Peripheral Intravenous Line                 Peripheral IV - Single Lumen 03/06/18 1437 Right Antecubital less than 1 day                Physical Exam   Constitutional: He is oriented to person, place, and time. He appears well-developed and well-nourished. No distress.   HENT:   Head: Normocephalic and atraumatic.   Right ear lac with sutures   Neck: Neck supple. No JVD present.   Cardiovascular: S1 normal and S2 normal.  An irregularly irregular rhythm present. Exam reveals no gallop and no S4.    Murmur heard.   Harsh midsystolic murmur is present  at the upper right sternal border radiating to the neck  Pulmonary/Chest: Effort normal and breath sounds normal. No respiratory distress. He has no wheezes. He has no rales.   Abdominal: Soft. Bowel sounds are normal. He exhibits no distension. There is no tenderness. There is no rebound.   Musculoskeletal: He  exhibits no edema.   Neurological: He is alert and oriented to person, place, and time.   Skin: Skin is warm and dry. He is not diaphoretic.   Psychiatric: He has a normal mood and affect. His behavior is normal. Thought content normal.   Nursing note and vitals reviewed.      Significant Labs:   CMP   Recent Labs  Lab 03/06/18  1159      K 4.3      CO2 25      BUN 64*   CREATININE 2.5*   CALCIUM 9.6   PROT 7.2   ALBUMIN 3.6   BILITOT 1.6*   ALKPHOS 123   AST 24   ALT 19   ANIONGAP 12   ESTGFRAFRICA 26*   EGFRNONAA 22*   , CBC   Recent Labs  Lab 03/06/18  1159   WBC 6.02   HGB 9.0*   HCT 30.4*      , Troponin   Recent Labs  Lab 03/06/18  1159   TROPONINI 0.121*    and All pertinent lab results from the last 24 hours have been reviewed.    Significant Imaging: Echocardiogram:   2D echo with color flow doppler:   Results for orders placed or performed in visit on 01/26/18   2D Echo w/ Color Flow Doppler   Result Value Ref Range    EF 50 55 - 65    Mitral Valve Regurgitation MODERATE (A)     Diastolic Dysfunction Yes (A)     Est. PA Systolic Pressure 41.01 (A)     Mitral Valve Mobility NORMAL     Tricuspid Valve Regurgitation MILD TO MODERATE    , EKG: Reviewed and X-Ray: CXR: X-Ray Chest 1 View (CXR): No results found for this visit on 03/06/18. and X-Ray Chest PA and Lateral (CXR): No results found for this visit on 03/06/18.    Assessment and Plan:   Patient who presents s/p syncopal episode that seems to have been triggered by orthostasis and dehydration. Hold Lasix. Agree with gentle IV fluids. Elevated troponin noted and likely secondary to demand ischemia. Continue to trend. Observe overnight.     * Syncope and collapse    -Likely triggered by orthostasis and volume depletion  -Gentle IV fluids  -Hold Lasix for now  -Compression socks        Elevated troponin    -Initial troponin 0.121  -Likely secondary to demand ischemia  -Continue to trend  -Continue Toprol XL, ASA          CAD  (coronary artery disease)    -Denies chest pain or SOB  -Continue Toprol XL  -Continue ASA  -Not on statins due to side effects        Atrial fibrillation    -Chronic, rate-controlled  -Continue Toprol XL  -Continue Eliquis        Hypertension    -Continue Toprol XL  -Monitor BP  -Hold Lasix for now            VTE Risk Mitigation         Ordered     Medium Risk of VTE  Once      03/06/18 1340     Place sequential compression device  Until discontinued      03/06/18 1340     Reason for No Pharmacological VTE Prophylaxis  Once      03/06/18 1340          Thank you for your consult. I will follow-up with patient. Please contact us if you have any additional questions.    Laura Bowens PA-C  Cardiology   [facility]    Chart reviewed. Dr. Espinoza examined patient and agrees with plan as outlined above.

## 2018-03-07 VITALS
SYSTOLIC BLOOD PRESSURE: 145 MMHG | OXYGEN SATURATION: 98 % | RESPIRATION RATE: 18 BRPM | TEMPERATURE: 99 F | WEIGHT: 180.13 LBS | DIASTOLIC BLOOD PRESSURE: 73 MMHG | HEIGHT: 69 IN | BODY MASS INDEX: 26.68 KG/M2 | HEART RATE: 77 BPM

## 2018-03-07 PROBLEM — R55 SYNCOPE AND COLLAPSE: Status: RESOLVED | Noted: 2018-01-26 | Resolved: 2018-03-07

## 2018-03-07 LAB
ANION GAP SERPL CALC-SCNC: 11 MMOL/L
BASOPHILS # BLD AUTO: 0.03 K/UL
BASOPHILS NFR BLD: 0.6 %
BUN SERPL-MCNC: 58 MG/DL
CALCIUM SERPL-MCNC: 9 MG/DL
CHLORIDE SERPL-SCNC: 100 MMOL/L
CO2 SERPL-SCNC: 24 MMOL/L
CREAT SERPL-MCNC: 2.1 MG/DL
DIFFERENTIAL METHOD: ABNORMAL
EOSINOPHIL # BLD AUTO: 0.1 K/UL
EOSINOPHIL NFR BLD: 1.5 %
ERYTHROCYTE [DISTWIDTH] IN BLOOD BY AUTOMATED COUNT: 20.4 %
EST. GFR  (AFRICAN AMERICAN): 32 ML/MIN/1.73 M^2
EST. GFR  (NON AFRICAN AMERICAN): 28 ML/MIN/1.73 M^2
FERRITIN SERPL-MCNC: 64 NG/ML
GLUCOSE SERPL-MCNC: 105 MG/DL
HCT VFR BLD AUTO: 29 %
HGB BLD-MCNC: 8.5 G/DL
IRON SERPL-MCNC: 16 UG/DL
LYMPHOCYTES # BLD AUTO: 0.8 K/UL
LYMPHOCYTES NFR BLD: 14.1 %
MCH RBC QN AUTO: 21.3 PG
MCHC RBC AUTO-ENTMCNC: 29.3 G/DL
MCV RBC AUTO: 73 FL
MONOCYTES # BLD AUTO: 0.6 K/UL
MONOCYTES NFR BLD: 10.2 %
NEUTROPHILS # BLD AUTO: 4 K/UL
NEUTROPHILS NFR BLD: 73.6 %
PLATELET # BLD AUTO: 263 K/UL
PMV BLD AUTO: 10 FL
POTASSIUM SERPL-SCNC: 4.3 MMOL/L
RBC # BLD AUTO: 3.99 M/UL
SATURATED IRON: 4 %
SODIUM SERPL-SCNC: 135 MMOL/L
TOTAL IRON BINDING CAPACITY: 429 UG/DL
TRANSFERRIN SERPL-MCNC: 290 MG/DL
TROPONIN I SERPL DL<=0.01 NG/ML-MCNC: 0.1 NG/ML
WBC # BLD AUTO: 5.4 K/UL

## 2018-03-07 PROCEDURE — G8987 SELF CARE CURRENT STATUS: HCPCS | Mod: CK

## 2018-03-07 PROCEDURE — 99213 OFFICE O/P EST LOW 20 MIN: CPT | Mod: ,,, | Performed by: INTERNAL MEDICINE

## 2018-03-07 PROCEDURE — 63600175 PHARM REV CODE 636 W HCPCS: Performed by: PHYSICIAN ASSISTANT

## 2018-03-07 PROCEDURE — G8989 SELF CARE D/C STATUS: HCPCS | Mod: CJ

## 2018-03-07 PROCEDURE — 96374 THER/PROPH/DIAG INJ IV PUSH: CPT

## 2018-03-07 PROCEDURE — 97530 THERAPEUTIC ACTIVITIES: CPT

## 2018-03-07 PROCEDURE — 97161 PT EVAL LOW COMPLEX 20 MIN: CPT

## 2018-03-07 PROCEDURE — 83540 ASSAY OF IRON: CPT

## 2018-03-07 PROCEDURE — 25000003 PHARM REV CODE 250: Performed by: NURSE PRACTITIONER

## 2018-03-07 PROCEDURE — 82728 ASSAY OF FERRITIN: CPT

## 2018-03-07 PROCEDURE — G8979 MOBILITY GOAL STATUS: HCPCS | Mod: CH

## 2018-03-07 PROCEDURE — 85025 COMPLETE CBC W/AUTO DIFF WBC: CPT

## 2018-03-07 PROCEDURE — G0378 HOSPITAL OBSERVATION PER HR: HCPCS

## 2018-03-07 PROCEDURE — 97116 GAIT TRAINING THERAPY: CPT | Mod: 59

## 2018-03-07 PROCEDURE — 80048 BASIC METABOLIC PNL TOTAL CA: CPT

## 2018-03-07 PROCEDURE — G8978 MOBILITY CURRENT STATUS: HCPCS | Mod: CJ

## 2018-03-07 PROCEDURE — 97165 OT EVAL LOW COMPLEX 30 MIN: CPT

## 2018-03-07 PROCEDURE — 36415 COLL VENOUS BLD VENIPUNCTURE: CPT

## 2018-03-07 RX ORDER — FUROSEMIDE 10 MG/ML
20 INJECTION INTRAMUSCULAR; INTRAVENOUS ONCE
Status: COMPLETED | OUTPATIENT
Start: 2018-03-07 | End: 2018-03-07

## 2018-03-07 RX ORDER — ASPIRIN 81 MG/1
81 TABLET ORAL DAILY
Status: DISCONTINUED | OUTPATIENT
Start: 2018-03-07 | End: 2018-03-07 | Stop reason: HOSPADM

## 2018-03-07 RX ORDER — FUROSEMIDE 40 MG/1
40 TABLET ORAL DAILY
Qty: 30 TABLET | Refills: 1 | Status: SHIPPED | OUTPATIENT
Start: 2018-03-08 | End: 2018-04-03

## 2018-03-07 RX ORDER — FUROSEMIDE 40 MG/1
40 TABLET ORAL DAILY
Status: DISCONTINUED | OUTPATIENT
Start: 2018-03-08 | End: 2018-03-07 | Stop reason: HOSPADM

## 2018-03-07 RX ADMIN — APIXABAN 2.5 MG: 2.5 TABLET, FILM COATED ORAL at 10:03

## 2018-03-07 RX ADMIN — METOPROLOL SUCCINATE 25 MG: 25 TABLET, EXTENDED RELEASE ORAL at 08:03

## 2018-03-07 RX ADMIN — FUROSEMIDE 20 MG: 10 INJECTION, SOLUTION INTRAMUSCULAR; INTRAVENOUS at 10:03

## 2018-03-07 RX ADMIN — ASPIRIN 81 MG: 81 TABLET, COATED ORAL at 10:03

## 2018-03-07 NOTE — PT/OT/SLP EVAL
Occupational Therapy   Evaluation    Name: Chung Meneses Sr.  MRN: [id number]  Admitting Diagnosis:  Syncope and collapse      Recommendations:     Discharge Recommendations: home health OT (with 24 hour care)  Discharge Equipment Recommendations:     Barriers to discharge:       History:     Occupational Profile:  Living Environment: lives alone in 1 story house with   Previous level of function: (I) WITH ADL'S AND FUNCTIONAL MOBILITY  Roles and Routines: OCCUPATIONAL THERAPY  Equipment Owned:     Assistance upon Discharge:     Past Medical History:   Diagnosis Date    Aortic stenosis 8/2/2013    Atrial fibrillation 7/5/2013    CHF (congestive heart failure)     Coronary artery disease     Dizziness - light-headed     Hyperlipidemia     Hypertension     Syncope 1/26/2018       Past Surgical History:   Procedure Laterality Date    BACK SURGERY  2003    CARDIAC VALVE SURGERY      CORONARY ARTERY BYPASS GRAFT      knee replacemnt bilateral  2001    SHOULDER SURGERY  2002       Subjective     Chief Complaint: DEBILITY AND GENERALIZED WEAKNESS  Patient/Family stated goals:   Communicated with: NURSE NOMI WILEY prior to session.  Pain/Comfort:  · Pain Rating 1: 0/10    Patients cultural, spiritual, [employer] conflicts given the current situation:      Objective:     Patient found with:      General Precautions: Standard, fall   Orthopedic Precautions:N/A   Braces:       Occupational Performance:    Bed Mobility:    · Patient completed Rolling/Turning to Right with minimum assistance  · Patient completed Scooting/Bridging with minimum assistance  · Patient completed Supine to Sit with minimum assistance    Functional Mobility/Transfers:  · Patient completed Sit <> Stand Transfer with minimum assistance  with  no assistive device   · Functional Mobility: pt ambulated 150 feet with min a with lob.     Activities of Daily Living:  · UB Dressing: minimum assistance x1  · LB Dressing: minimum assistance  "x1    Cognitive/Visual Perceptual:  Cognitive/Psychosocial Skills:     -       Oriented to: Person, Place, Time and Situation   -       Follows Commands/attention:Follows two-step commands  -       Communication: clear/fluent  -       Memory: No Deficits noted  -       Safety awareness/insight to disability: intact   Visual/Perceptual:      -Intact    Physical Exam:  Upper Extremity Range of Motion:     -       Right Upper Extremity: WFL  -       Left Upper Extremity: WFL  Upper Extremity Strength:    -       Right Upper Extremity: mmt: 3/5 grossly  -       Left Upper Extremity: mmt: 3/5 grossly   Strength:    -       Right Upper Extremity: mmt: 4/5 grossly  -       Left Upper Extremity: mmt: 4/5 grossly    Patient left sitting eob with all lines intact, call button in reach, restraints reapplied at end of session, nurse kerry notified and nurse kerry present    [facility] 6 Click:  [facility] Total Score: 20    Treatment & Education:    Education:    Assessment:     Chung Meneses Sr. is a 86 y.o. male with a medical diagnosis of Syncope and collapse.  He presents with debility and generalized weakness.  Performance deficits affecting function are weakness, impaired self care skills, impaired balance, decreased safety awareness, impaired endurance, impaired functional mobilty, gait instability.      Rehab Prognosis:  Fair+; patient would benefit from acute skilled OT services to address these deficits and reach maximum level of function.         Clinical Decision Makin.  OT Low:  "Pt evaluation falls under low complexity for evaluation coding due to performance deficits noted in 1-3 areas as stated above and 0 co-morbities affecting current functional status. Data obtained from problem focused assessments. No modifications or assistance was required for completion of evaluation. Only brief occupational profile and history review completed."     Plan:     Patient to be seen 3 x/week to address the above listed " problems via self-care/home management, therapeutic activities, therapeutic exercises  · Plan of Care Expires:    · Plan of Care Reviewed with: patient    This Plan of care has been discussed with the patient who was involved in its development and understands and is in agreement with the identified goals and treatment plan    GOALS:    Occupational Therapy Goals        Problem: Occupational Therapy Goal    Goal Priority Disciplines Outcome Interventions   Occupational Therapy Goal     OT, PT/OT     Description:  ot goals to be met by 3-15-18  1. s with ue dressing  2. s with le dressing  3. Pt will tolerate 1 set x 15 reps b ue rom exercise with min resistance                    Time Tracking:     OT Date of Treatment: 03/07/18  OT Start Time: 1030  OT Stop Time: 1055  OT Total Time (min): 25 min    Billable Minutes:Evaluation 10 minutes  Therapeutic Activity 15 minutes    Kirsty Jama OT  3/7/2018

## 2018-03-07 NOTE — SUBJECTIVE & OBJECTIVE
Review of Systems   Constitution: Positive for malaise/fatigue.   HENT: Negative.    Cardiovascular: Positive for leg swelling.   Respiratory: Negative.    Endocrine: Negative.    Hematologic/Lymphatic: Negative.    Skin: Negative.    Musculoskeletal: Negative.    Gastrointestinal: Negative.    Genitourinary: Negative.    Neurological: Negative.    Psychiatric/Behavioral: Negative.    Allergic/Immunologic: Negative.      Objective:     Vital Signs (Most Recent):  Temp: 98.7 °F (37.1 °C) (03/07/18 0744)  Pulse: 64 (03/07/18 0744)  Resp: 18 (03/07/18 0403)  BP: (!) 123/59 (03/07/18 0744)  SpO2: 97 % (03/07/18 0744) Vital Signs (24h Range):  Temp:  [97.6 °F (36.4 °C)-98.7 °F (37.1 °C)] 98.7 °F (37.1 °C)  Pulse:  [] 64  Resp:  [9-18] 18  SpO2:  [95 %-99 %] 97 %  BP: (115-132)/(55-87) 123/59     Weight: 81.7 kg (180 lb 1.9 oz)  Body mass index is 26.6 kg/m².     SpO2: 97 %  O2 Device (Oxygen Therapy): room air      Intake/Output Summary (Last 24 hours) at 03/07/18 1029  Last data filed at 03/07/18 0600   Gross per 24 hour   Intake              690 ml   Output              525 ml   Net              165 ml       Lines/Drains/Airways     Pressure Ulcer                 Pressure Injury 02/21/18 1030 Coccyx Stage 2 13 days         Pressure Injury 03/06/18 1501 Coccyx Stage 1 less than 1 day          Peripheral Intravenous Line                 Peripheral IV - Single Lumen 03/06/18 1437 Right Antecubital less than 1 day                Physical Exam   Constitutional: He is oriented to person, place, and time. He appears well-developed and well-nourished. No distress.   HENT:   Head: Normocephalic and atraumatic.   Eyes: Pupils are equal, round, and reactive to light.   Neck: Neck supple. No JVD present.   Cardiovascular: Normal rate, regular rhythm, normal heart sounds and intact distal pulses.  PMI is not displaced.  Exam reveals no gallop, no S3, no S4 and no friction rub.    No murmur heard.  Pulmonary/Chest: Effort  normal and breath sounds normal. No respiratory distress. He has no wheezes. He has no rales.   Abdominal: Soft. He exhibits no distension. There is no tenderness. There is no rebound.   Musculoskeletal: He exhibits edema (BLE).   Neurological: He is alert and oriented to person, place, and time.   Skin: Skin is warm and dry. He is not diaphoretic. No erythema.   Psychiatric: He has a normal mood and affect. His behavior is normal. Thought content normal.   Nursing note and vitals reviewed.      Significant Labs:   CMP   Recent Labs  Lab 03/06/18  1159 03/07/18  0336    135*   K 4.3 4.3    100   CO2 25 24    105   BUN 64* 58*   CREATININE 2.5* 2.1*   CALCIUM 9.6 9.0   PROT 7.2  --    ALBUMIN 3.6  --    BILITOT 1.6*  --    ALKPHOS 123  --    AST 24  --    ALT 19  --    ANIONGAP 12 11   ESTGFRAFRICA 26* 32*   EGFRNONAA 22* 28*   , CBC   Recent Labs  Lab 03/06/18  1159 03/07/18  0336   WBC 6.02 5.40   HGB 9.0* 8.5*   HCT 30.4* 29.0*    263    and All pertinent lab results from the last 24 hours have been reviewed.    Significant Imaging: Echocardiogram:   2D echo with color flow doppler:   Results for orders placed or performed in visit on 01/26/18   2D Echo w/ Color Flow Doppler   Result Value Ref Range    EF 50 55 - 65    Mitral Valve Regurgitation MODERATE (A)     Diastolic Dysfunction Yes (A)     Est. PA Systolic Pressure 41.01 (A)     Mitral Valve Mobility NORMAL     Tricuspid Valve Regurgitation MILD TO MODERATE     and X-Ray: CXR: X-Ray Chest 1 View (CXR): No results found for this visit on 03/06/18. and X-Ray Chest PA and Lateral (CXR): No results found for this visit on 03/06/18.

## 2018-03-07 NOTE — PLAN OF CARE
03/07/18 1117   NOEL Message   Medicare Outpatient and Observation Notification regarding financial responsibility Given to patient/caregiver;Explained to patient/caregiver;Signed/date by patient/caregiver   Date NEOL was signed 03/06/18   Time NOEL was signed 3100

## 2018-03-07 NOTE — PLAN OF CARE
Problem: Patient Care Overview  Goal: Plan of Care Review  Outcome: Ongoing (interventions implemented as appropriate)  No falls or injury this shift. Pt denies any lightheadedness or dizziness.  No changes in mental status. No complaint of pain or discomfort.  A fib noted on tele monitor.   Orthostatic BP completed q 4 hours as ordered.  IVF infusing @ 50/hr to RAC..  Bed low, call bell placed within reach with needed personal belongings.  POC discussed, verbalized understanding. Will continue to monitor.

## 2018-03-07 NOTE — PROGRESS NOTES
"[facility]  Cardiology  Progress Note    Patient Name: Chung Meneses Sr.  MRN: [id number]  Admission Date: 3/6/2018  Hospital Length of Stay: 0 days  Code Status: Full Code   Attending Physician: Jake Eastman MD   Primary Care Physician: Glenroy Carmichael MD  Expected Discharge Date:   Principal Problem:Syncope and collapse    Subjective:   HPI:  Mr. Wilkes is an 86 year old male patient with a PMHx of atrial fibrillation (on Eliquis), diastolic CHF, CAD, AS s/p CABG x 1 and AVR on 12/30/17, HTN, and CKD stage III who presented to [facility] ED today s/p syncopal episode. Patient was reportedly bending down to  the newspaper this AM when he became lightheaded and dizzy. He subsequently "passed out" and awakened a few minutes later and noticed blood on the ground. He denied any associated chest pain, SOB, palpitations, nausea, vomiting, diaphoresis, blurred vision, slurred speech, or focal weakness. Initial workup in ED revealed creatinine of 2.5 and initial troponin of 0.121 and patient subsequently admitted for further evaluation and treatment. Cardiology consulted to assist with management. Patient seen and examined today while in ED. States he feels ok. No cardiac complaints. Remains chest pain free. He reports compliance with his medications. He was recently discharged on 2/24/18 after being treated for a CHF exacerbation (diuresed 16 L). Chart reviewed. Repeat troponin pending. 2D echo 1/26/18 showed normal EF, moderate MR. EKG reviewed and shows chronic atrial fibrillation, rate controlled.      Hospital Course:   3/7/18-Patient seen and examined today, lying in bed. Feels ok. No chest pain or SOB. Mild lower extremity edema noted. Labs reviewed. Creatinine improved to 2.1.         Review of Systems   Constitution: Positive for malaise/fatigue.   HENT: Negative.    Cardiovascular: Positive for leg swelling.   Respiratory: Negative.    Endocrine: Negative.    Hematologic/Lymphatic: " Negative.    Skin: Negative.    Musculoskeletal: Negative.    Gastrointestinal: Negative.    Genitourinary: Negative.    Neurological: Negative.    Psychiatric/Behavioral: Negative.    Allergic/Immunologic: Negative.      Objective:     Vital Signs (Most Recent):  Temp: 98.7 °F (37.1 °C) (03/07/18 0744)  Pulse: 64 (03/07/18 0744)  Resp: 18 (03/07/18 0403)  BP: (!) 123/59 (03/07/18 0744)  SpO2: 97 % (03/07/18 0744) Vital Signs (24h Range):  Temp:  [97.6 °F (36.4 °C)-98.7 °F (37.1 °C)] 98.7 °F (37.1 °C)  Pulse:  [] 64  Resp:  [9-18] 18  SpO2:  [95 %-99 %] 97 %  BP: (115-132)/(55-87) 123/59     Weight: 81.7 kg (180 lb 1.9 oz)  Body mass index is 26.6 kg/m².     SpO2: 97 %  O2 Device (Oxygen Therapy): room air      Intake/Output Summary (Last 24 hours) at 03/07/18 1029  Last data filed at 03/07/18 0600   Gross per 24 hour   Intake              690 ml   Output              525 ml   Net              165 ml       Lines/Drains/Airways     Pressure Ulcer                 Pressure Injury 02/21/18 1030 Coccyx Stage 2 13 days         Pressure Injury 03/06/18 1501 Coccyx Stage 1 less than 1 day          Peripheral Intravenous Line                 Peripheral IV - Single Lumen 03/06/18 1437 Right Antecubital less than 1 day                Physical Exam   Constitutional: He is oriented to person, place, and time. He appears well-developed and well-nourished. No distress.   HENT:   Head: Normocephalic and atraumatic.   Eyes: Pupils are equal, round, and reactive to light.   Neck: Neck supple. No JVD present.   Cardiovascular: Normal rate, regular rhythm, normal heart sounds and intact distal pulses.  PMI is not displaced.  Exam reveals no gallop, no S3, no S4 and no friction rub.    No murmur heard.  Pulmonary/Chest: Effort normal and breath sounds normal. No respiratory distress. He has no wheezes. He has no rales.   Abdominal: Soft. He exhibits no distension. There is no tenderness. There is no rebound.   Musculoskeletal: He  exhibits edema (BLE).   Neurological: He is alert and oriented to person, place, and time.   Skin: Skin is warm and dry. He is not diaphoretic. No erythema.   Psychiatric: He has a normal mood and affect. His behavior is normal. Thought content normal.   Nursing note and vitals reviewed.      Significant Labs:   CMP   Recent Labs  Lab 03/06/18  1159 03/07/18  0336    135*   K 4.3 4.3    100   CO2 25 24    105   BUN 64* 58*   CREATININE 2.5* 2.1*   CALCIUM 9.6 9.0   PROT 7.2  --    ALBUMIN 3.6  --    BILITOT 1.6*  --    ALKPHOS 123  --    AST 24  --    ALT 19  --    ANIONGAP 12 11   ESTGFRAFRICA 26* 32*   EGFRNONAA 22* 28*   , CBC   Recent Labs  Lab 03/06/18  1159 03/07/18  0336   WBC 6.02 5.40   HGB 9.0* 8.5*   HCT 30.4* 29.0*    263    and All pertinent lab results from the last 24 hours have been reviewed.    Significant Imaging: Echocardiogram:   2D echo with color flow doppler:   Results for orders placed or performed in visit on 01/26/18   2D Echo w/ Color Flow Doppler   Result Value Ref Range    EF 50 55 - 65    Mitral Valve Regurgitation MODERATE (A)     Diastolic Dysfunction Yes (A)     Est. PA Systolic Pressure 41.01 (A)     Mitral Valve Mobility NORMAL     Tricuspid Valve Regurgitation MILD TO MODERATE     and X-Ray: CXR: X-Ray Chest 1 View (CXR): No results found for this visit on 03/06/18. and X-Ray Chest PA and Lateral (CXR): No results found for this visit on 03/06/18.    Assessment and Plan:   Patient who presents s/p fall secondary to orthostasis/volume depletion. Improved this AM. Will give low dose of IV Lasix and check BLE U/S to rule out DVT given edema. Needs to ambulate.     Congestive heart failure    -Mild lower extremity edema noted s/p fluid administration  -One time dose of IV Lasix  -Stop IV fluids  -Check BLE U/S to rule out DVT        Elevated troponin    -Initial troponin 0.121  -Likely secondary to demand ischemia, in similar ranges as previous  admission  -Trending down  -Continue Toprol XL, ASA          CAD (coronary artery disease)    -Denies chest pain or SOB  -Continue Toprol XL  -Continue ASA  -Not on statins due to side effects        Atrial fibrillation    -Chronic, rate-controlled  -Continue Toprol XL  -Continue Eliquis        Hypertension    -Continue Toprol XL  -Monitor BP       Syncope and collapse     -Likely triggered by orthostasis and volume depletion  -Received IV hydration  -Compression socks            VTE Risk Mitigation         Ordered     apixaban tablet 2.5 mg  2 times daily     Route:  Oral        03/07/18 0923     Place GRIS hose  Until discontinued      03/07/18 0954     Medium Risk of VTE  Once      03/06/18 1340     Place sequential compression device  Until discontinued      03/06/18 1340     Reason for No Pharmacological VTE Prophylaxis  Once      03/06/18 1340          Laura Bowens PA-C  Cardiology  [facility]    Chart reviewed. Dr. Espinoza examined patient and agrees with plan as outlined above.

## 2018-03-07 NOTE — HOSPITAL COURSE
3/7/18-Patient seen and examined today, lying in bed. Feels ok. No chest pain or SOB. Mild lower extremity edema noted. Labs reviewed. Creatinine improved to 2.1.

## 2018-03-07 NOTE — NURSING
Patient discharged from observation after treatment for syncope. Reviewed discharge instructions and medications. Expressed the need for follow up appointments per physicians recommendations. Patient was given the opportunity for questions and all were answered to their satisfaction. Patient discharged in no acute distress.   IV removed from Rt AC without incident. Compression dressing applied and patient instructed to leave on no longer than 30 minutes.   Telemetry monitor removed and returned to monitor room.

## 2018-03-07 NOTE — ASSESSMENT & PLAN NOTE
-Initial troponin 0.121  -Likely secondary to demand ischemia, in similar ranges as previous admission  -Trending down  -Continue Toprol XL, ASA

## 2018-03-07 NOTE — CONSULTS
Met with patient. Patient is current with [facility]. Signed patient preference form placed on chart. Order and discharge summary sent to [facility] through [employer].

## 2018-03-07 NOTE — CONSULTS
03/07/18 1005   Handoff Report   Given To Lydia   Pain/Comfort Assessments   Pain Assessment Performed Yes       Number Scale   Presence of Pain denies   Skin   Skin WDL ex   Skin Color Pale   Skin Temperature warm   Skin Moisture Dry   Skin Elasticity Sluggish   Shabbir Risk Assessment   Sensory Perception 3-->slightly limited   Moisture 4-->rarely moist   Activity 3-->walks occasionally   Mobility 3-->slightly limited   Nutrition 3-->adequate   Friction and Shear 2-->potential problem   Shabbir Score 18       Wound 03/06/18 1531 Laceration lower Ear   Date First Assessed/Time First Assessed: 03/06/18 1531   Pre-existing: Yes  Wound Type: Laceration  Side: Right  Orientation: lower  Location: Ear   Wound Image    Wound WDL ex   Dressing Appearance Dry;Intact   Drainage Amount Scant   Drainage Characteristics/Odor Serosanguineous   Appearance Sutures intact   Periwound Area Swelling   Wound Edges Approximated       Wound 03/06/18 1501 Abrasion(s) anterior Leg   Date First Assessed/Time First Assessed: 03/06/18 1501   Pre-existing: Yes  Wound Type: Abrasion(s)  Side: Left  Orientation: anterior  Location: Leg   Wound Image    Wound WDL ex   Dressing Appearance Dry;Intact   Drainage Amount None   Appearance Tan;Eschar;Dry   Periwound Area Dry;Intact;Scar tissue   Dressing Foam       Wound 03/07/18 Ulceration posterior Knee   Date First Assessed: 03/07/18   Pre-existing: Yes  Wound Type: Ulceration  Side: Left  Orientation: posterior  Location: Knee  Wound Length (cm): 6  Wound Width (cm): 2  Depth (cm): 0.25   Wound Image    Wound WDL ex   Dressing Appearance Open to air   Drainage Amount None   Appearance Yellow;Moist   Tissue loss description Full thickness   Periwound Area Scar tissue   Wound Length (cm) 6   Wound Width (cm) 2   Depth (cm) 0.25   Care Cleansed with:;Sterile normal saline;Applied:;Skin Barrier   Dressing Applied;Sodium chloride impregnated;Transparent film   Periwound Care Cleansed with pH  "balanced cleanser;Dry periwound area maintained;Skin barrier film applied   Dressing Change Due 03/10/18       Pressure Injury 03/06/18 1501 Coccyx Stage 1   Date First Assessed/Time First Assessed: 03/06/18 1501   Pressure Injury Present on Admission: yes  Location: Coccyx  Is this injury device related?: No  Staging: Stage 1   Staging 1   Dressing Appearance Dry;Intact   Drainage Amount None   Appearance Red   Periwound Area Dry;Intact   Dressing Foam     Consulted on this 87 y/o M patient for multiple present on admission wounds. He was seen on a recent admission at which time he had a stage 2 pressure injury to coccyx.  On this visit, skin to sacrum/coccyx/bilateral buttock intact with small area nonblanchable redness noted to coccyx, and light pink scar tissue also noted. This stage 1 pressure injury to coccyx painted with cavilon and foam dressing applied.    Multiple healing/scabbed wounds noted to LLE.  Patient states whenever something hits his leg he gets a sore. Multiple eschar covered ulcers noted to Left shin.   Left posterior knee open wound noted, yellow moist fibrinous tissue covered wound bed.   Wound measures 6x2cm, surrounding skin is pale scar tissue and eschar.  Patient states he had a wound there for decades, and when he was in the [employer], he had surgery on the site.  He also states he is "a " and that aggravates his wounds. Cleansed with sterile normal saline and patted dry. Intact frankie wound skin painted with cavilon. Mesalt gauze applied to wound bed and secured with tegaderm.  Please see below for wound care recommendations:    Skin Care Precautions / Pressure Injury Prevention:  1. Follow "Guidelines for Prevention of Pressure Ulcers in At Risk Patients"  These guidelines can be found on the [facility] Intr[employer] by searching "Wound Care / Ostomy Resources"  2. Document wound assessment in [facility] using guidelines in [employer] "Assessment : Wound" procedure  3. Limit the amount of linen/underpad " between patient and mattress surface to ONE fitted sheet and ONE covidien underpad - NO draw sheet/briefs.  4. Obtain Easi Cleans Foam Wipes for providing frankie care - avoid the use of wash cloths to areas affected by IAD.  5. Apply Clear Barrier Ointment to perineal / perirectal areas in a thin even layer to clean dry skin BID and after each episode of pericare  6. Apply sween 24 moisturizer cream to all dry skin after daily bath and prn  7. Obtain foam wedge from materials management to assist with maintaining proper position changes at least q 2hours and document actual position in EPIC q 2hours  8. Elevate heels off mattress on 2 separate pillows placed lengthwise under each leg supporting the leg from knee to ankle.  Document in EPIC flow sheet every 2 hours.  9. .Do NOT elevate HOB greater than 30 degrees unless contraindicated.  10. Remove SCD/Plexi Pulses/GRIS's every 12 hours for 30 minutes and assess skin underneath these devices for breakdown      Coccyx Stage 1 pressure injury  1. Cleanse with easi cleanse foam wipes  2. Pat dry  3. Paint with cavilon every shift  4. Apply sacral foam dressing. Gently peel back dressing at least once per 12 hour shift for skin/wound assessment and carefully reapply dressing over wound. Change every 5 days and prn if soiled (strikethrough reaches 3/4 edges of dressing).  5. Strict pressure offloading with foam wedge.     Ulceration Left posterior knee:  1. Cleanse with sterile normal saline  2. Pat dry  3. Paint intact frankie wound skin with cavilon  4. Apply Mesalt gauze to site  5. Secure with tegaderm  6. Change every 3 days

## 2018-03-07 NOTE — ASSESSMENT & PLAN NOTE
-Mild lower extremity edema noted s/p fluid administration  -One time dose of IV Lasix  -Stop IV fluids  -Check BLE U/S to rule out DVT

## 2018-03-07 NOTE — HOSPITAL COURSE
On 3/6 patient was admitted to OBS secondary to Syncope and Collapse.  CT head negative for acute process.  Carotid US showed Minor internal carotid artery bulb plaque bilaterally.  However, no hemodynamically significant stenosis.  ECHO in January showed EF of 55-60%, grade 2 diastolic dysfunction, Moderate mitral regurgitation, Mild to moderate tricuspid regurgitation, PAH with an estimated PA systolic pressure of 41, and wall motion abnormality contributed to recent surgery per cards.  Cardiology consulted as patient is well known to them.  Patient recently discharged on 2/24 after being here for acute decompensated CHF and diuresing >16L.  Syncope and collapse thought to be r/t dehydration given increase in Cr to 2.5 (baseline 1.4-1.8), and BUN of 64.  Home Lasix was held and he was started on gently IV hydration with improvement in his Cr to 2.1.  VS have remained stable overnight with no orthostasis noted.  Troponin has remained flat and mild elevation thought to be r/t demand ischemia as patient is asymptomatic.  Denies dizziness and lightheadedness.  Right ear laceration with sutures CDI, mild swelling noted, but no active bleeding or s/s of infection.  Case d/w Cards given h/o Afib on Eliquis with a recent fall and concern for future falls and potential for bleeding.  Per cardiology, continue Eliquis and ASA for now.  Hgb stable at 8.5 with no active bleeding.  Iron studies pending.  PT/OT consulted and recommended to DC home with , which will be arranged prior to DC today. Cards dc'd IVFs and gave a 1 time dose of Lasix on 3/7.  Robert hose placed.  BLE US negative for DVT.  Case d/w REYNALDO Hopson with cardiology.  Ok to DC home today from cardiology standpoint to resume Lasix tomorrow at 40 mg daily with outpatient f/u in clinic next week.  Patient was instructed to f/u with PCP within 3 days to review iron studies and for post hospital f/u, and with ENT upon DC to monitor Right ear laceration and  verbalized + understanding.  Also instructed to f/u with cardiology clinic next week.  Medications reconciled for DC home.  Case d/w Dr. Eastman.  Patient seen and examined and deemed medically stable to DC home today.

## 2018-03-07 NOTE — PT/OT/SLP EVAL
Physical Therapy Evaluation    Patient Name:  Chung Meneses Sr.   MRN:  [id number]    Recommendations:     Discharge Recommendations:  home health PT (24 HOUR S)   Discharge Equipment Recommendations: none   Barriers to discharge: None    Assessment:     Chung Meneses Sr. is a 86 y.o. male admitted with a medical diagnosis of Syncope and collapse.  He presents with the following impairments/functional limitations:  impaired balance, impaired endurance .    Rehab Prognosis:  EXCELLENT; patient would benefit from acute skilled PT services to address these deficits and reach maximum level of function.      Recent Surgery: * No surgery found *      Plan:     During this hospitalization, patient to be seen   to address the above listed problems via gait training, therapeutic activities  · Plan of Care Expires:  03/14/18   Plan of Care Reviewed with: patient    Subjective     Communicated with NURSE AND EPIC CHART REVIEW prior to session.  Patient found SUP IN BED  upon PT entry to room, agreeable to evaluation.      Chief Complaint: DIZZINESS  Patient comments/goals: INC MOBILITY  Pain/Comfort:  · Pain Rating 1: 0/10  · Pain Rating Post-Intervention 1: 0/10    Patients cultural, spiritual, Anabaptism conflicts given the current situation:      Living Environment:   PT LIVES AT HOME WITH WIFE AND THEY HAVE 24 HOUR CARE[facility]IVERS  Prior to admission, patients level of function was IND AT HOME AND DRIVES.  Patient has the following equipment: none.  DME owned (not currently used): none.  Upon discharge, patient will have assistance from [employer].    Objective:     Patient found with: peripheral IV (BANDAGE OF HEAD AND R EAR)     General Precautions: Standard, fall   Orthopedic Precautions:N/A   Braces: N/A     Exams:  · RLE ROM: WNL  · RLE Strength: WNL  · LLE ROM: WNL  · LLE Strength: WNL    Functional Mobility:  · PT SUP>SIT EOB WITH S. P.T. CHECK PT BP SEATED 122/73 AND STANDING 130/70/  PT STOOD WITH NO AD AND GT TRAINED  WITH CGA X 150' HOWEVER REPORTED SOB AND RETURNED TO RM LOOSING BALANCE AND LEANING INTO THE WALL. P.T. ASSIST TO EOB AND SEATED FOR BP AND O2 SATS CHECK. PT @ 96% ON ROOM AIR AND /73    AM-PAC 6 CLICK MOBILITY  Total Score:19    Patient left SEATED EOB WITH NURSE PRESENT with call button in reach.    GOALS:    Physical Therapy Goals        Problem: Physical Therapy Goal    Goal Priority Disciplines Outcome Goal Variances Interventions   Physical Therapy Goal     PT/OT, PT      Description:  PT WILL BE SEEN FOR P.T. FOR A MIN OF 5 OUT OF 7 DAYS A WEEK  LTG: 3/14/18  1. PT WILL BE IND WITH BED MOBILITY   2. PT WILL T/F TO CHAIR IND.  3. PT WILL GT TRAIN WITH SBA X 150'  4. PT WILL COMPLETE B LE TE STANDING FOR BALANCE TRAINING.                     History:     Past Medical History:   Diagnosis Date    Aortic stenosis 8/2/2013    Atrial fibrillation 7/5/2013    CHF (congestive heart failure)     Coronary artery disease     Dizziness - light-headed     Hyperlipidemia     Hypertension     Syncope 1/26/2018       Past Surgical History:   Procedure Laterality Date    BACK SURGERY  2003    CARDIAC VALVE SURGERY      CORONARY ARTERY BYPASS GRAFT      knee replacemnt bilateral  2001    SHOULDER SURGERY  2002       Clinical Decision Making:     History  Co-morbidities and personal factors that may impact the plan of care Examination  Body Structures and Functions, activity limitations and participation restrictions that may impact the plan of care Clinical Presentation   Decision Making/ Complexity Score   Co-morbidities:   [] Time since onset of injury / illness / exacerbation  [] Status of current condition  []Patient's cognitive status and safety concerns    [] Multiple Medical Problems (see med hx)  Personal Factors:   [] Patient's age  [] Prior Level of function   [] Patient's home situation (environment and family support)  [] Patient's level of motivation  [] Expected progression of  patient      HISTORY:(criteria)    [] 33781 - no personal factors/history    [] 04216 - has 1-2 personal factor/comorbidity     [] 55685 - has >3 personal factor/comorbidity     Body Regions:  [] Objective examination findings  [] Head     []  Neck  [] Trunk   [] Upper Extremity  [] Lower Extremity    Body Systems:  [] For communication ability, affect, cognition, language, and learning style: the assessment of the ability to make needs known, consciousness, orientation (person, place, and time), expected emotional /behavioral responses, and learning preferences (eg, learning barriers, education  needs)  [] For the neuromuscular system: a general assessment of gross coordinated movement (eg, balance, gait, locomotion, transfers, and transitions) and motor function  (motor control and motor learning)  [] For the musculoskeletal system: the assessment of gross symmetry, gross range of motion, gross strength, height, and weight  [] For the integumentary system: the assessment of pliability(texture), presence of scar formation, skin color, and skin integrity  [] For cardiovascular/pulmonary system: the assessment of heart rate, respiratory rate, blood pressure, and edema     Activity limitations:    [] Patient's cognitive status and saf ety concerns          [] Status of current condition      [] Weight bearing restriction  [] Cardiopulmunary Restriction    Participation Restrictions:   [] Goals and goal agreement with the patient     [] Rehab potential (prognosis) and probable outcome      Examination of Body System: (criteria)    [] 87615 - addressing 1-2 elements    [] 76734 - addressing a total of 3 or more elements     [] 68032 -  Addressing a total of 4 or more elements         Clinical Presentation: (criteria)  Choose one     On examination of body system using standardized tests and measures patient presents with (CHOOSE ONE) elements from any of the following: body structures and functions, activity  limitations, and/or participation restrictions.  Leading to a clinical presentation that is considered (CHOOSE ONE)                              Clinical Decision Making  (Eval Complexity):  Choose One     Time Tracking:     PT Received On: 03/07/18  PT Start Time: 1046     PT Stop Time: 1110  PT Total Time (min): 24 min     Billable Minutes: Evaluation 14 and Gait Training 10      Dulce Maria Okeefe, PT  03/07/2018

## 2018-03-07 NOTE — DISCHARGE SUMMARY
"[facility] Hospital Medicine  Discharge Summary      Patient Name: Chung Meneses Sr.  MRN: [id number]  Admission Date: 3/6/2018  Hospital Length of Stay: 0 days  Discharge Date and Time:  03/07/2018 9:54 AM  Attending Physician: Jake Eastman MD   Discharging Provider: Tammie Solomon NP  Primary Care Provider: Glenroy Carmichael MD      HPI:   Chung Meneses Sr. is a 86 y.o. male patient  with a PMH of Afib on Eliquis, CHF, CAD and AS s/p CABG x1 and AVR on 12/30/2017, HTN, CKD stage 3, Syncope who presented to the Emergency Department for syncope which onset suddenly PTA after bending over to [employer] this morning.  Pt fell forward and hit head on ground.  LOC was not witnessed.  Patient stated "I woke up on the ground and saw blood".  Pt has laceration to right ear lobe.  Sxs are constant and moderate in severity.  Pt reports he was feeling weak and lightheaded prior to syncope.  There are no mitigating or exacerbating factors noted.  Pt denies any fever, N/V/D, back pain, neck pain, numbness, unilateral weakness, CP, ABD pain, SOB, HA, and all other sxs at this time.  Pt takes Eliquis and ASA daily.  Pt tetanus UTD.  No further complaints or concerns at this time.  ED workup showed:  12 Lead EKG showed Afib with no acute changes.  ECHO on 1/26/2018 showed EF of 55-60% with grade 2 diastolic dysfunction, Moderate mitral regurgitation, Mild to moderate tricuspid regurgitation, and PAH with an estimated PA systolic pressure of 41.  CT Head showed No acute intracranial process.  CXR showed subtle amount of haziness in the lateral aspect of the base of the right lung.  This is characteristic of atelectasis or subtle pneumonia.  Afebrile with no leukocytosis.  Cr 2.5.  Baseline appears to be 1.4-1.8.  .  Initial troponin 0.121.  Patient not orthostatic in the ER.  Carotid US showed Minor internal carotid artery bulb plaque bilaterally.  However, no hemodynamically significant " stenosis.  He was recently discharged on 2/24 after being hospitalized for decompensated CHF diuresing over 16L at this time.  He appears dehydrated now, which is likely the cause of his syncope.  Gentle IVFs started.  Cardiology consulted, awaiting recs.  He will be admitted to OBS under the care of Hospital Medicine.     * No surgery found *      Hospital Course:   On 3/6 patient was admitted to OBS secondary to Syncope and Collapse.  CT head negative for acute process.  Carotid US showed Minor internal carotid artery bulb plaque bilaterally.  However, no hemodynamically significant stenosis.  ECHO in January showed EF of 55-60%, grade 2 diastolic dysfunction, Moderate mitral regurgitation, Mild to moderate tricuspid regurgitation, PAH with an estimated PA systolic pressure of 41, and wall motion abnormality contributed to recent surgery per cards.   Cardiology consulted as patient is well known to them.  Patient recently discharged on 2/24 after being here for acute decompensated CHF and diuresing >16L.  Syncope and collapse thought to be r/t dehydration given increase in Cr to 2.5 (baseline 1.4-1.8), and BUN of 64.  Home Lasix was held and he was started on gently IV hydration with improvement in his Cr to 2.1.  VS have remained stable overnight with no orthostasis noted.  Troponin has remained flat and mild elevation thought to be r/t demand ischemia as patient is asymptomatic.  Denies dizziness and lightheadedness.  Right ear laceration with sutures CDI, mild swelling noted, but no active bleeding or s/s of infection.  Case d/w Cards given h/o Afib on Eliquis with a recent fall and concern for future falls and potential for bleeding.  Per cardiology, continue Eliquis and ASA for now.  Hgb stable at 8.5 with no active bleeding.  Iron studies pending.  PT/OT consulted and recommended to DC home with , which will be arranged prior to DC today. Cards dc'd IVFs and gave a 1 time dose of Lasix on 3/7.  Robert hose  placed.  BLE US negative for DVT.  Case d/w REYNALDO Hopson with cardiology.  Ok to DC home today from cardiology standpoint to resume Lasix tomorrow at 40 mg daily with outpatient f/u in clinic next week.  Patient was instructed to f/u with PCP within 3 days to review iron studies and for post hospital f/u, and with ENT upon DC to monitor Right ear laceration and verbalized + understanding.  Also instructed to f/u with cardiology clinic next week.  Medications reconciled for DC home.  Case d/w Dr. Eastman.  Patient seen and examined and deemed medically stable to DC home today.       Consults:   Consults         Status Ordering Provider     Inpatient consult to Cardiology  Once     Provider:  Patricia Espinoza MD    Completed GUERLINE MCINTOSH     Inpatient consult to Hospitalist  Once     Provider:  (Not yet assigned)    STEF Harrington          No new Assessment & Plan notes have been filed under this hospital service since the last note was generated.  Service: Hospital Medicine    Final Active Diagnoses:    Diagnosis Date Noted POA    Laceration of right ear [S01.311A] 03/06/2018 Unknown    Atrial fibrillation [I48.91] 07/05/2013 Yes     Chronic    Congestive heart failure [I50.9] 02/20/2018 Yes    CAD (coronary artery disease) [I25.10] 12/11/2017 Yes    Hypertension [I10]  Yes     Chronic    Anemia [D64.9] 03/06/2018 Unknown    Elevated troponin [R74.8] 01/29/2018 Yes      Problems Resolved During this Admission:    Diagnosis Date Noted Date Resolved POA    PRINCIPAL PROBLEM:  Syncope and collapse [R55] 01/26/2018 03/07/2018 Yes       Discharged Condition: stable    Disposition: Home or Self Care    Follow Up:  Follow-up Information     Glenroy Carmichael MD In 3 days.    Specialty:  Family Medicine  Contact information:  [address]  [facility]  [facility] [address] [phone]             Padmini Juan MD In 3 days.    Specialty:   Otolaryngology  Why:  F/U with ENT to evaluate Right ear laceration  Contact information:  [address] [place] [address]  [phone]             Laura Bowens PA-C In 1 week.    Specialty:  Cardiology  Why:  F/U with cardiology next week, call office to make appointment  Contact information:  [address] [place] [address]  [phone]                 Patient Instructions:     Ambulatory referral to Home Health   Referral Priority: Routine Referral Type: Home Health   Referral Reason: Specialty Services Required    Requested Specialty: Home Health Services    Number of Visits Requested: 1      Diet Cardiac     Diet renal     Activity as tolerated     Notify your health care provider if you experience any of the following:  temperature >100.4     Notify your health care provider if you experience any of the following:  severe uncontrolled pain     Notify your health care provider if you experience any of the following:  redness, tenderness, or signs of infection (pain, swelling, redness, odor or green/yellow discharge around incision site)     Notify your health care provider if you experience any of the following:  difficulty breathing or increased cough     Notify your health care provider if you experience any of the following:  severe persistent headache     Notify your health care provider if you experience any of the following:  persistent dizziness, light-headedness, or visual disturbances     Notify your health care provider if you experience any of the following:  increased confusion or weakness         Significant Diagnostic Studies: Labs:   CMP     Recent Labs  Lab 03/06/18  1159 03/07/18  0336    135*   K 4.3 4.3    100   CO2 25 24    105   BUN 64* 58*   CREATININE 2.5* 2.1*   CALCIUM 9.6 9.0   PROT 7.2  --    ALBUMIN 3.6  --    BILITOT 1.6*  --    ALKPHOS 123  --    AST 24  --    ALT 19  --    ANIONGAP 12 11   ESTGFRAFRICA 26* 32*   EGFRNONAA 22*  28*   , CBC     Recent Labs  Lab 03/06/18  1159 03/07/18  0336   WBC 6.02 5.40   HGB 9.0* 8.5*   HCT 30.4* 29.0*    263    and Troponin     Recent Labs  Lab 03/06/18  2359   TROPONINI 0.100*       Imaging Results          US Carotid Bilateral (Final result)  Result time 03/06/18 14:23:38    Final result by Aubree Villanueva MD (03/06/18 14:23:38)                 Impression:    Minor internal carotid artery bulb plaque bilaterally.  However, no hemodynamically significant stenosis.        Electronically signed by: AUBREE VILLANUEVA MD  Date:     03/06/18  Time:    14:23              Narrative:    Bilateral carotid Doppler ultrasound with duplex and color flow imaging, 03/06/18 14:18:43    History:  TIA,  Syncope     The right common carotid artery bifurcation reveals minor noncalcified plaque. The right common carotid artery peak systolic velocity is 57.5cm/sec and the right internal carotid artery peak systolic velocity is 63.7cm/sec. The right internal to common carotid artery ratio is 1.1.    The left common carotid artery bifurcation reveals minor noncalcified plaque. The left common carotid artery velocity is 61.0cm/sec and the left internal carotid artery velocity is 58.7cm/sec. The left internal to common carotid artery ratio is 1.0.    There is normal antegrade vertebral artery flow bilaterally.    Validated velocity measurements are extrapolated from diameter data as defined by the Society of Radiologists in Ultrasound Conference Radiology 2003; 229;340-346.                             CT Head Without Contrast (Final result)  Result time 03/06/18 12:37:34    Final result by Antelmo Guzmán MD (03/06/18 12:37:34)                 Impression:     No acute intracranial process noted.    All CT scans at this facility use dose modulation, iterative reconstruction, and/or weight-based dosing when appropriate to reduce radiation dose to as low as reasonably achievable.      Electronically signed  by: KIMBERLY BESS MD  Date:     03/06/18  Time:    12:37              Narrative:    Exam: CT of the head without contrast.    History: ams     Findings:     Diffuse parenchymal atrophy noted, with changes of small vessel disease, within normal limits for the patient's age. No hemorrhage, mass displacement, acute cortical infarct, or abnormal extra-axial fluid collection is identified. Visualized paranasal sinuses and mastoids are clear.                             X-Ray Chest AP Portable (Final result)  Result time 03/06/18 11:58:06    Final result by PAULA Mckinney Sr., MD (03/06/18 11:58:06)                 Impression:        1. There has been interval development of a subtle amount of haziness in the lateral aspect of the base of the right lung. This is characteristic of atelectasis or subtle pneumonia.  2. Surgical changes        Electronically signed by: PAULA MCKINNEY MD  Date:     03/06/18  Time:    11:58              Narrative:    One-view chest x-ray    Clinical History: Chest pain    Finding: Comparison was made to a prior examination performed on 2/20/2018. The size of the heart is normal. There has been interval development of a subtle amount of haziness in the lateral aspect of the base of the right lung. There is no focal pulmonary infiltrate visualized in the left lung. There is no pneumothorax.  The costophrenic angles are sharp. There are multiple sternotomy wires in place. There are prosthetic humeral heads in both shoulders.                              Pending Diagnostic Studies:     Procedure Component Value Units Date/Time    Ferritin [[id number]] Collected:  03/07/18 0336    Order Status:  Sent Lab Status:  In process Updated:  03/07/18 0336    Specimen:  Blood from Blood     Iron and TIBC [[id number]] Collected:  03/07/18 0336    Order Status:  Sent Lab Status:  In process Updated:  03/07/18 0336    Specimen:  Blood from Blood          Medications:  Reconciled Home Medications:   Current  Discharge Medication List      CONTINUE these medications which have CHANGED    Details   furosemide (LASIX) 40 MG tablet Take 1 tablet (40 mg total) by mouth once daily.  Qty: 30 tablet, Refills: 1         CONTINUE these medications which have NOT CHANGED    Details   apixaban (ELIQUIS) 2.5 mg Tab Take 1 tablet (2.5 mg total) by mouth 2 (two) times daily.  Qty: 180 tablet, Refills: 3      aspirin (ECOTRIN) 81 MG EC tablet Take 1 tablet (81 mg total) by mouth once daily.  Qty: 30 tablet, Refills: 0      fish oil-omega-3 fatty acids 300-1,000 mg capsule Take 1,200 mg by mouth once daily.      metoprolol succinate (TOPROL-XL) 25 MG 24 hr tablet Take 1 tablet (25 mg total) by mouth once daily.  Qty: 30 tablet, Refills: 11    Associated Diagnoses: Congestive heart failure, unspecified congestive heart failure chronicity, unspecified congestive heart failure type      multivitamin with folic acid 400 mcg Tab Take 1 tablet by mouth.      !! potassium chloride (K-TAB) 20 mEq Take 1 tablet (20 mEq total) by mouth every morning. Takes one tablet (20 meq) in the morning and half tablet (10 meq) in the evening  Qty: 30 tablet, Refills: 3    Associated Diagnoses: Chronic systolic congestive heart failure; H/O hypokalemia      !! potassium chloride (KLOR-CON) 10 MEQ TbSR Take 1 tablet (10 mEq total) by mouth every evening.  Qty: 30 tablet, Refills: 3    Associated Diagnoses: Chronic systolic congestive heart failure; H/O hypokalemia      ranitidine (ZANTAC) 75 MG tablet Take 75 mg by mouth nightly.       vitamin D 1000 units Tab Take 1,000 Units by mouth once daily.        !! - Potential duplicate medications found. Please discuss with provider.          Indwelling Lines/Drains at time of discharge:   Lines/Drains/Airways     Pressure Ulcer                 Pressure Injury 02/21/18 1030 Coccyx Stage 2 13 days         Pressure Injury 03/06/18 1501 Coccyx Stage 1 less than 1 day                Time spent on the discharge of  patient: 45 minutes  Patient was seen and examined on the date of discharge and determined to be suitable for discharge.         Tammie Solomon DNP, ACNP-BC  Department of Hospital Medicine  [facility]

## 2018-03-08 ENCOUNTER — TELEPHONE (OUTPATIENT)
Dept: CARDIOLOGY | Facility: CLINIC | Age: 83
End: 2018-03-08

## 2018-03-08 NOTE — TELEPHONE ENCOUNTER
Called to patient to give results of labs--left message with family member to call our office back at [phone].

## 2018-03-08 NOTE — TELEPHONE ENCOUNTER
----- Message from Jonathan Alvarenga MD sent at 3/2/2018  7:58 PM CST -----  Please call the patient regarding his abnormal result. Stable hemoglobin.

## 2018-03-13 ENCOUNTER — TELEPHONE (OUTPATIENT)
Dept: CARDIOLOGY | Facility: CLINIC | Age: 83
End: 2018-03-13

## 2018-03-13 ENCOUNTER — OFFICE VISIT (OUTPATIENT)
Dept: CARDIOLOGY | Facility: CLINIC | Age: 83
End: 2018-03-13
Payer: MEDICARE

## 2018-03-13 ENCOUNTER — LAB VISIT (OUTPATIENT)
Dept: LAB | Facility: HOSPITAL | Age: 83
End: 2018-03-13
Attending: PHYSICIAN ASSISTANT
Payer: MEDICARE

## 2018-03-13 ENCOUNTER — OFFICE VISIT (OUTPATIENT)
Dept: OTOLARYNGOLOGY | Facility: CLINIC | Age: 83
End: 2018-03-13
Payer: MEDICARE

## 2018-03-13 VITALS
RESPIRATION RATE: 20 BRPM | WEIGHT: 182.56 LBS | SYSTOLIC BLOOD PRESSURE: 92 MMHG | BODY MASS INDEX: 27.04 KG/M2 | TEMPERATURE: 97 F | HEIGHT: 69 IN | DIASTOLIC BLOOD PRESSURE: 56 MMHG | HEART RATE: 78 BPM

## 2018-03-13 VITALS
BODY MASS INDEX: 27.24 KG/M2 | WEIGHT: 183.88 LBS | SYSTOLIC BLOOD PRESSURE: 118 MMHG | HEART RATE: 78 BPM | DIASTOLIC BLOOD PRESSURE: 58 MMHG | HEIGHT: 69 IN

## 2018-03-13 DIAGNOSIS — I25.10 CORONARY ARTERY DISEASE, ANGINA PRESENCE UNSPECIFIED, UNSPECIFIED VESSEL OR LESION TYPE, UNSPECIFIED WHETHER NATIVE OR TRANSPLANTED HEART: ICD-10-CM

## 2018-03-13 DIAGNOSIS — E78.5 HYPERLIPIDEMIA, UNSPECIFIED HYPERLIPIDEMIA TYPE: Chronic | ICD-10-CM

## 2018-03-13 DIAGNOSIS — Z79.01 ON ANTICOAGULANT THERAPY: ICD-10-CM

## 2018-03-13 DIAGNOSIS — R53.83 FATIGUE, UNSPECIFIED TYPE: ICD-10-CM

## 2018-03-13 DIAGNOSIS — S01.311A LACERATION OF RIGHT EAR LOBE, INITIAL ENCOUNTER: Primary | ICD-10-CM

## 2018-03-13 DIAGNOSIS — I50.33 ACUTE ON CHRONIC DIASTOLIC (CONGESTIVE) HEART FAILURE: ICD-10-CM

## 2018-03-13 DIAGNOSIS — I48.19 PERSISTENT ATRIAL FIBRILLATION: Chronic | ICD-10-CM

## 2018-03-13 DIAGNOSIS — I10 ESSENTIAL HYPERTENSION: Chronic | ICD-10-CM

## 2018-03-13 DIAGNOSIS — Z95.2 S/P AVR: ICD-10-CM

## 2018-03-13 DIAGNOSIS — I27.20 PULMONARY HYPERTENSION: Chronic | ICD-10-CM

## 2018-03-13 DIAGNOSIS — I50.32 CHRONIC DIASTOLIC CONGESTIVE HEART FAILURE: Primary | ICD-10-CM

## 2018-03-13 DIAGNOSIS — E87.5 HYPERKALEMIA: Primary | ICD-10-CM

## 2018-03-13 LAB
ALBUMIN SERPL BCP-MCNC: 3.4 G/DL
ALP SERPL-CCNC: 137 U/L
ALT SERPL W/O P-5'-P-CCNC: 20 U/L
ANION GAP SERPL CALC-SCNC: 7 MMOL/L
ANISOCYTOSIS BLD QL SMEAR: SLIGHT
AST SERPL-CCNC: 24 U/L
BASOPHILS # BLD AUTO: 0.04 K/UL
BASOPHILS NFR BLD: 0.7 %
BILIRUB SERPL-MCNC: 1.5 MG/DL
BNP SERPL-MCNC: 1836 PG/ML
BUN SERPL-MCNC: 54 MG/DL
CALCIUM SERPL-MCNC: 9.4 MG/DL
CHLORIDE SERPL-SCNC: 104 MMOL/L
CO2 SERPL-SCNC: 25 MMOL/L
CREAT SERPL-MCNC: 2.2 MG/DL
DACRYOCYTES BLD QL SMEAR: ABNORMAL
DIFFERENTIAL METHOD: ABNORMAL
EOSINOPHIL # BLD AUTO: 0.1 K/UL
EOSINOPHIL NFR BLD: 1.2 %
ERYTHROCYTE [DISTWIDTH] IN BLOOD BY AUTOMATED COUNT: 21.4 %
EST. GFR  (AFRICAN AMERICAN): 30 ML/MIN/1.73 M^2
EST. GFR  (NON AFRICAN AMERICAN): 26 ML/MIN/1.73 M^2
GIANT PLATELETS BLD QL SMEAR: PRESENT
GLUCOSE SERPL-MCNC: 105 MG/DL
HCT VFR BLD AUTO: 29.2 %
HGB BLD-MCNC: 8.7 G/DL
HYPOCHROMIA BLD QL SMEAR: ABNORMAL
LYMPHOCYTES # BLD AUTO: 1.3 K/UL
LYMPHOCYTES NFR BLD: 23 %
MCH RBC QN AUTO: 21.8 PG
MCHC RBC AUTO-ENTMCNC: 29.8 G/DL
MCV RBC AUTO: 73 FL
MONOCYTES # BLD AUTO: 0 K/UL
MONOCYTES NFR BLD: 0.2 %
NEUTROPHILS # BLD AUTO: 4.3 K/UL
NEUTROPHILS NFR BLD: 75.1 %
OVALOCYTES BLD QL SMEAR: ABNORMAL
PLATELET # BLD AUTO: 231 K/UL
PLATELET BLD QL SMEAR: ABNORMAL
PMV BLD AUTO: 8.8 FL
POIKILOCYTOSIS BLD QL SMEAR: SLIGHT
POLYCHROMASIA BLD QL SMEAR: ABNORMAL
POTASSIUM SERPL-SCNC: 5.7 MMOL/L
PROT SERPL-MCNC: 7 G/DL
RBC # BLD AUTO: 3.99 M/UL
SODIUM SERPL-SCNC: 136 MMOL/L
WBC # BLD AUTO: 5.78 K/UL

## 2018-03-13 PROCEDURE — 36415 COLL VENOUS BLD VENIPUNCTURE: CPT | Mod: PO

## 2018-03-13 PROCEDURE — 99214 OFFICE O/P EST MOD 30 MIN: CPT | Mod: S$GLB,,, | Performed by: PHYSICIAN ASSISTANT

## 2018-03-13 PROCEDURE — 99999 PR PBB SHADOW E&M-EST. PATIENT-LVL IV: CPT | Mod: PBBFAC,,, | Performed by: ORTHOPAEDIC SURGERY

## 2018-03-13 PROCEDURE — 99999 PR PBB SHADOW E&M-EST. PATIENT-LVL III: CPT | Mod: PBBFAC,,, | Performed by: PHYSICIAN ASSISTANT

## 2018-03-13 PROCEDURE — 99203 OFFICE O/P NEW LOW 30 MIN: CPT | Mod: S$GLB,,, | Performed by: ORTHOPAEDIC SURGERY

## 2018-03-13 PROCEDURE — 83880 ASSAY OF NATRIURETIC PEPTIDE: CPT

## 2018-03-13 PROCEDURE — 80053 COMPREHEN METABOLIC PANEL: CPT | Mod: PO

## 2018-03-13 PROCEDURE — 85025 COMPLETE CBC W/AUTO DIFF WBC: CPT | Mod: PO

## 2018-03-13 RX ORDER — TRAZODONE HYDROCHLORIDE 50 MG/1
50 TABLET ORAL NIGHTLY
COMMUNITY
Start: 2018-03-02 | End: 2018-04-17 | Stop reason: ALTCHOICE

## 2018-03-13 NOTE — PROGRESS NOTES
Subjective:      Patient ID: Chung Meneses Sr. is a 86 y.o. male.    Chief Complaint: Check sutures in right ear; Ear Fullness; and Dizziness    Patient is a very pleasant 86 year old gentleman here to see me today for the first time for evaluation of his right ear.  He fell last Monday (is having issues with syncope following heart surgery) and hit his right ear.  He says that he had a good bit of bleeding at that time, is on ASA and eliquis.  He has not been on any antibiotics following the injury.  He had some pain initially, but none since he has left the hospital.  He has not had any fevers.  He has not noted any change in his hearing following the fall.  He denies any numbness of his ear lobe.          Review of Systems   Constitutional: Positive for fatigue and unexpected weight change. Negative for fever.   HENT: Positive for hearing loss. Negative for congestion, ear discharge, ear pain (resolved), facial swelling (resolved), nosebleeds, postnasal drip, rhinorrhea, sinus pressure, sneezing, sore throat, tinnitus, trouble swallowing and voice change.    Eyes: Negative for discharge, redness and itching.   Respiratory: Positive for cough, shortness of breath and wheezing. Negative for choking.    Cardiovascular: Negative for chest pain and palpitations.   Gastrointestinal: Negative for abdominal pain.        No reflux.   Musculoskeletal: Positive for neck pain (From recent fall).   Neurological: Positive for dizziness, light-headedness and headaches. Negative for facial asymmetry.   Hematological: Negative for adenopathy. Bruises/bleeds easily.   Psychiatric/Behavioral: Negative for agitation, behavioral problems, confusion and decreased concentration.       Objective:       Physical Exam   Constitutional: He is oriented to person, place, and time. Vital signs are normal. He appears well-developed and well-nourished. No distress.   HENT:   Head: Normocephalic and atraumatic.   Right Ear: Hearing, tympanic  membrane, external ear and ear canal normal.   Left Ear: Hearing, tympanic membrane, external ear and ear canal normal.   Nose: Nose normal. No mucosal edema, rhinorrhea, nasal deformity or septal deviation.   Mouth/Throat: Uvula is midline, oropharynx is clear and moist and mucous membranes are normal. No trismus in the jaw. Normal dentition. No uvula swelling. No oropharyngeal exudate or posterior oropharyngeal edema.   Laceration right ear lobe and posterior to right auricle at postauricular crease, the majority of the sutures were removed without difficulty, but at the anterior portion of the ear lobe at the junction with the face the wound was not fully closed.  Therefore some of the sutures were left in place, dermabond was place over that location as well as steristrips.   Eyes: Conjunctivae and EOM are normal. Pupils are equal, round, and reactive to light. Right eye exhibits no chemosis. Left eye exhibits no chemosis. Right conjunctiva is not injected. Left conjunctiva is not injected. No scleral icterus.   Neck: Trachea normal and phonation normal. No tracheal tenderness present. No tracheal deviation present. No thyroid mass and no thyromegaly present.   Cardiovascular: Intact distal pulses.    Pulmonary/Chest: Effort normal. No accessory muscle usage or stridor. No respiratory distress.   Lymphadenopathy:        Head (right side): No submental, no submandibular, no preauricular and no posterior auricular adenopathy present.        Head (left side): No submental, no submandibular, no preauricular and no posterior auricular adenopathy present.     He has no cervical adenopathy.        Right cervical: No superficial cervical and no deep cervical adenopathy present.       Left cervical: No superficial cervical and no deep cervical adenopathy present.   Neurological: He is alert and oriented to person, place, and time. No cranial nerve deficit.   Skin: Skin is warm and dry. No rash noted. No erythema.    Psychiatric: He has a normal mood and affect. His behavior is normal. Thought content normal.       Assessment:       1. Laceration of right ear lobe, initial encounter        Plan:     Laceration of right ear lobe, initial encounter    Majority of sutures removed without difficulty, but there was some dehiscence of the laceration at the junction of the ear lobe and the face.  Dermabond placed over area of dehiscence, will have him return on Monday to complete suture removal (residual sutures also posterior to the ear lobe).

## 2018-03-13 NOTE — PROGRESS NOTES
Subjective:    Patient ID:  Chung Meneses Sr. is a 86 y.o. male who presents for follow-up of Loss of Consciousness  hospital follow-up    HPI   Mr. Meneses is an 86 year old male patient with a PMHx of CAD/severe AS s/p CABG x 1 and bioprosthetic AVR, permanent atrial fibrillation, CHF, HTN, and hyperlipidemia who presents today for hospital follow-up. Patient recently admitted to [facility] s/p syncopal episode. Symptoms felt to be secondary to orthostasis and mild volume depletion. His medications were adjusted and he was subsequently discharged. He returns today and states he is doing ok. Still complains of fatigue, states he has no energy. He also complains of increased SOB over the past few days. Associated symptoms included abdominal bloating, insomnia, and weight gain. No PND, orthopnea, or chest pain. No recurrence of syncope since discharge. He reports compliance with his medications, has been taking Lasix 40 mg daily since discharge. He reports compliance with dietary restrictions.       Review of Systems   Constitution: Negative for chills, decreased appetite, fever, weakness and malaise/fatigue.   HENT: Negative for congestion, hoarse voice and sore throat.    Eyes: Negative for blurred vision and discharge.   Cardiovascular: Positive for dyspnea on exertion and leg swelling. Negative for chest pain, claudication, cyanosis, irregular heartbeat, near-syncope, orthopnea, palpitations and paroxysmal nocturnal dyspnea.   Respiratory: Positive for shortness of breath. Negative for cough, hemoptysis, snoring, sputum production and wheezing.    Endocrine: Negative for cold intolerance and heat intolerance.   Hematologic/Lymphatic: Negative for bleeding problem. Does not bruise/bleed easily.   Skin: Negative for rash.   Musculoskeletal: Positive for arthritis and back pain. Negative for joint pain, joint swelling, muscle cramps, muscle weakness and myalgias.   Gastrointestinal: Negative for abdominal pain,  "constipation, diarrhea, heartburn, melena and nausea.   Genitourinary: Negative for hematuria.   Neurological: Positive for dizziness. Negative for focal weakness, headaches, light-headedness, loss of balance, numbness, paresthesias and seizures.   Psychiatric/Behavioral: Negative for memory loss. The patient does not have insomnia.    Allergic/Immunologic: Negative for hives.       BP (!) 118/58 (BP Location: Right arm, Patient Position: Sitting, BP Method: Medium (Manual))   Pulse 78   Ht 5' 9" (1.753 m)   Wt 83.4 kg (183 lb 13.8 oz)   BMI 27.15 kg/m²     Objective:    Physical Exam   Constitutional: He is oriented to person, place, and time. He appears well-developed and well-nourished. No distress.   HENT:   Head: Normocephalic and atraumatic.   Eyes: Pupils are equal, round, and reactive to light.   Neck: Neck supple. JVD present.   Cardiovascular: Normal rate, S1 normal and S2 normal.  An irregularly irregular rhythm present. Exam reveals no gallop, no S3, no S4 and no friction rub.    Murmur heard.   Harsh midsystolic murmur is present  at the upper right sternal border radiating to the neck  Pulmonary/Chest: Effort normal and breath sounds normal. No respiratory distress. He has no wheezes. He has no rales.   Sternotomy site well-healed   Abdominal: Soft. He exhibits distension. There is no tenderness. There is no rebound.   Musculoskeletal: He exhibits edema (BLE).   Neurological: He is alert and oriented to person, place, and time.   Skin: Skin is warm and dry. He is not diaphoretic. No erythema.   Abrasion/sore noted to left shin area   Psychiatric: He has a normal mood and affect. His behavior is normal. Thought content normal.   Nursing note and vitals reviewed.          2D Echo CONCLUSIONS     1 - Low normal to mildly depressed left ventricular systolic function (EF 50-55%).     2 - Impaired LV relaxation, elevated LAP (grade 2 diastolic dysfunction).     3 - Wall motion abnormalities.     4 - Low " normal right ventricular systolic function .     5 - Severe left atrial enlargement.     6 - Concentric hypertrophy.     7 - Pulmonary hypertension. The estimated PA systolic pressure is 41 mmHg.     8 - Aortic valve prosthesis, mean gradient = 11 mmHg.     9 - Moderate mitral regurgitation.     10 - Mild to moderate tricuspid regurgitation.     11 - Increased central venous pressure.   Assessment:       1. Chronic diastolic congestive heart failure    2. Essential hypertension    3. Acute on chronic diastolic (congestive) heart failure    4. Pulmonary hypertension    5. Persistent atrial fibrillation    6. Coronary artery disease, angina presence unspecified, unspecified vessel or lesion type, unspecified whether native or transplanted heart    7. Hyperlipidemia, unspecified hyperlipidemia type    8. S/P AVR       Doing ok s/p hospital discharge. Still having positional dizziness. Seems to have fluid on board now as well. Will check CBC, CMP, and BNP today. Ok to take extra 1/2 lasix today. Further rec's pending labs. Counseled about changing positions slowly.  Plan:   -CBC, CMP, BNP today with phone review  -Continue current medical management and risk factor modification  -Cardiac, low salt diet  -Change positions slowly  -Follow-up TBA    Chart reviewed. Dr. Espinoza agrees with plan as outlined above.

## 2018-03-13 NOTE — TELEPHONE ENCOUNTER
Tried reaching pt no answer left message for pt to call back.      I called ENT spoke with Briana to let pt know to go have lab work done after appt with Dr. Juan.

## 2018-03-13 NOTE — TELEPHONE ENCOUNTER
I spoke with patient's wife, instructed to hold K supplement for now. Please make sure patient is aware as well    Thanks

## 2018-03-14 ENCOUNTER — TELEPHONE (OUTPATIENT)
Dept: CARDIOLOGY | Facility: CLINIC | Age: 83
End: 2018-03-14

## 2018-03-14 NOTE — TELEPHONE ENCOUNTER
Please phone patient. Labs reviewed. K is high, he needs to stop K supplement for 2 days. BNP elevated. Can take 40 mg of Lasix in AM and 20 mg in PM x 2 days.    Phone review Friday

## 2018-03-15 DIAGNOSIS — I50.9 CONGESTIVE HEART FAILURE, UNSPECIFIED CONGESTIVE HEART FAILURE CHRONICITY, UNSPECIFIED CONGESTIVE HEART FAILURE TYPE: ICD-10-CM

## 2018-03-15 RX ORDER — METOPROLOL SUCCINATE 25 MG/1
25 TABLET, EXTENDED RELEASE ORAL DAILY
Qty: 90 TABLET | Refills: 3 | Status: SHIPPED | OUTPATIENT
Start: 2018-03-15 | End: 2018-05-25 | Stop reason: DRUGHIGH

## 2018-03-16 ENCOUNTER — TELEPHONE (OUTPATIENT)
Dept: CARDIOLOGY | Facility: CLINIC | Age: 83
End: 2018-03-16

## 2018-03-16 NOTE — TELEPHONE ENCOUNTER
Pt states he is using [facility]. I called [facility] spoke with Bettina orders have been called in BMP Bettina voiced understanding repeated orders back.     Pt states he is feeling ok denies any other symptoms.

## 2018-03-16 NOTE — TELEPHONE ENCOUNTER
Reviewed BMP from , K-4.9, creatinine 2.0; Na 134    Spoke with patient, he is feeling somewhat better than when I saw him in clinic. Advised to continue Lasix 40 mg daily and resume KCl 10 mEQ daily beginning Sunday    Will do phone review on Monday and speak with Dr. Espinoza about follow-up and additional rec's

## 2018-03-16 NOTE — TELEPHONE ENCOUNTER
Phone review of symptoms. Does patient have HH? Would like a stat BMP today so we can check potassium before the weekend.

## 2018-03-16 NOTE — TELEPHONE ENCOUNTER
----- Message from Laura Bowens PA-C sent at 3/16/2018 10:45 AM CDT -----  Phone review, does patient have HH, would like a stat BMP today    How is he feeling

## 2018-03-19 ENCOUNTER — OFFICE VISIT (OUTPATIENT)
Dept: OTOLARYNGOLOGY | Facility: CLINIC | Age: 83
End: 2018-03-19
Payer: MEDICARE

## 2018-03-19 ENCOUNTER — TELEPHONE (OUTPATIENT)
Dept: CARDIOLOGY | Facility: HOSPITAL | Age: 83
End: 2018-03-19

## 2018-03-19 VITALS — BODY MASS INDEX: 27.44 KG/M2 | WEIGHT: 185.88 LBS

## 2018-03-19 DIAGNOSIS — S01.311A LACERATION OF RIGHT EAR LOBE, INITIAL ENCOUNTER: Primary | ICD-10-CM

## 2018-03-19 PROCEDURE — 99213 OFFICE O/P EST LOW 20 MIN: CPT | Mod: 25,S$GLB,, | Performed by: PHYSICIAN ASSISTANT

## 2018-03-19 PROCEDURE — 99999 PR PBB SHADOW E&M-EST. PATIENT-LVL III: CPT | Mod: PBBFAC,,, | Performed by: PHYSICIAN ASSISTANT

## 2018-03-19 NOTE — TELEPHONE ENCOUNTER
Pt states sob and edema have improved a little states he still experiencing edema in both knees and stomach. Pt denies any other symptoms.

## 2018-03-19 NOTE — PROGRESS NOTES
Subjective:       Patient ID: Chung Meneses Sr. is a 86 y.o. male.    Chief Complaint: No chief complaint on file.    Patient is a very pleasant 86 year old gentleman here to see me today for follow up  of his right ear laceration.  He fell 2 weeks ago (is having issues with syncope following heart surgery) and hit his right ear.  He says that he had a good bit of bleeding at that time, is on ASA and eliquis.  He has not been on any antibiotics following the injury.  He had some pain initially, but none since he has left the hospital.  He has not had any fevers.  He has not noted any change in his hearing following the fall.  He denies any numbness of his ear lobe.      Review of Systems   Constitutional: Positive for fatigue and unexpected weight change. Negative for fever.   HENT: Positive for hearing loss. Negative for congestion, ear discharge, ear pain (resolved), facial swelling (resolved), nosebleeds, postnasal drip, rhinorrhea, sinus pressure, sneezing, sore throat, tinnitus, trouble swallowing and voice change.    Eyes: Negative for discharge, redness and itching.   Respiratory: Positive for cough, shortness of breath and wheezing. Negative for choking.    Cardiovascular: Negative for chest pain and palpitations.   Gastrointestinal: Negative for abdominal pain.        No reflux.   Musculoskeletal: Positive for neck pain (From recent fall).   Neurological: Positive for dizziness, light-headedness and headaches. Negative for facial asymmetry.   Hematological: Negative for adenopathy. Bruises/bleeds easily.   Psychiatric/Behavioral: Negative for agitation, behavioral problems, confusion and decreased concentration.       Objective:      Physical Exam   Constitutional: He is oriented to person, place, and time. Vital signs are normal. He appears well-developed and well-nourished. No distress.   HENT:   Head: Normocephalic and atraumatic.   Right Ear: Hearing, tympanic membrane, external ear and ear canal  normal.   Left Ear: Hearing, tympanic membrane, external ear and ear canal normal.   Nose: Nose normal. No mucosal edema, rhinorrhea, nasal deformity or septal deviation.   Mouth/Throat: Uvula is midline, oropharynx is clear and moist and mucous membranes are normal. No trismus in the jaw. Normal dentition. No uvula swelling. No oropharyngeal exudate or posterior oropharyngeal edema.   Laceration right ear lobe and posterior to right auricle at postauricular crease, the majority of the sutures were removed without difficulty, but at the anterior portion of the ear lobe at the junction with the face the wound was not fully closed.  Therefore some of the sutures were left in place, dermabond was place over that location as well as steristrips.   Eyes: Conjunctivae and EOM are normal. Pupils are equal, round, and reactive to light. Right eye exhibits no chemosis. Left eye exhibits no chemosis. Right conjunctiva is not injected. Left conjunctiva is not injected. No scleral icterus.   Neck: Trachea normal and phonation normal. No tracheal tenderness present. No tracheal deviation present. No thyroid mass and no thyromegaly present.   Cardiovascular: Intact distal pulses.    Pulmonary/Chest: Effort normal. No accessory muscle usage or stridor. No respiratory distress.   Lymphadenopathy:        Head (right side): No submental, no submandibular, no preauricular and no posterior auricular adenopathy present.        Head (left side): No submental, no submandibular, no preauricular and no posterior auricular adenopathy present.     He has no cervical adenopathy.        Right cervical: No superficial cervical and no deep cervical adenopathy present.       Left cervical: No superficial cervical and no deep cervical adenopathy present.   Neurological: He is alert and oriented to person, place, and time. No cranial nerve deficit.   Skin: Skin is warm and dry. No rash noted. No erythema.   Psychiatric: He has a normal mood and  affect. His behavior is normal. Thought content normal.         PROCEDURE NOTE: SUTURE REMOVAL    Area was cleaned and 5 sutures removed. Pt tolerated procedure well. Healing laceration with no signs of infection  Assessment:       1. Laceration of right ear lobe, initial encounter        Plan:       1. Laceration of ear: sutures removed and wound cleaned. Healing well. RTC as needed.

## 2018-03-20 ENCOUNTER — TELEPHONE (OUTPATIENT)
Dept: CARDIOLOGY | Facility: HOSPITAL | Age: 83
End: 2018-03-20

## 2018-03-20 NOTE — TELEPHONE ENCOUNTER
Please have him seen by Kayode in CHF clinic early this week, he may need more diuretics. I discussed with Dr. Espinoza

## 2018-03-21 ENCOUNTER — OFFICE VISIT (OUTPATIENT)
Dept: CARDIOLOGY | Facility: CLINIC | Age: 83
End: 2018-03-21
Payer: MEDICARE

## 2018-03-21 ENCOUNTER — LAB VISIT (OUTPATIENT)
Dept: LAB | Facility: HOSPITAL | Age: 83
End: 2018-03-21
Attending: INTERNAL MEDICINE
Payer: MEDICARE

## 2018-03-21 VITALS
HEART RATE: 68 BPM | BODY MASS INDEX: 31.02 KG/M2 | DIASTOLIC BLOOD PRESSURE: 64 MMHG | SYSTOLIC BLOOD PRESSURE: 110 MMHG | WEIGHT: 209.44 LBS | HEIGHT: 69 IN

## 2018-03-21 DIAGNOSIS — I50.32 CHRONIC DIASTOLIC CONGESTIVE HEART FAILURE: ICD-10-CM

## 2018-03-21 DIAGNOSIS — I48.19 PERSISTENT ATRIAL FIBRILLATION: Chronic | ICD-10-CM

## 2018-03-21 DIAGNOSIS — I10 ESSENTIAL HYPERTENSION: Chronic | ICD-10-CM

## 2018-03-21 DIAGNOSIS — Z95.2 S/P AVR: ICD-10-CM

## 2018-03-21 DIAGNOSIS — E78.5 HYPERLIPIDEMIA, UNSPECIFIED HYPERLIPIDEMIA TYPE: Chronic | ICD-10-CM

## 2018-03-21 DIAGNOSIS — I35.0 NONRHEUMATIC AORTIC VALVE STENOSIS: ICD-10-CM

## 2018-03-21 DIAGNOSIS — I50.32 CHRONIC DIASTOLIC CONGESTIVE HEART FAILURE: Primary | ICD-10-CM

## 2018-03-21 DIAGNOSIS — I27.20 PULMONARY HYPERTENSION: Chronic | ICD-10-CM

## 2018-03-21 DIAGNOSIS — E87.6 HYPOKALEMIA: ICD-10-CM

## 2018-03-21 DIAGNOSIS — I25.10 CORONARY ARTERY DISEASE, ANGINA PRESENCE UNSPECIFIED, UNSPECIFIED VESSEL OR LESION TYPE, UNSPECIFIED WHETHER NATIVE OR TRANSPLANTED HEART: ICD-10-CM

## 2018-03-21 DIAGNOSIS — I50.9 CONGESTIVE HEART FAILURE, UNSPECIFIED CONGESTIVE HEART FAILURE CHRONICITY, UNSPECIFIED CONGESTIVE HEART FAILURE TYPE: ICD-10-CM

## 2018-03-21 LAB
ANION GAP SERPL CALC-SCNC: 8 MMOL/L
BUN SERPL-MCNC: 67 MG/DL
CALCIUM SERPL-MCNC: 9.1 MG/DL
CHLORIDE SERPL-SCNC: 104 MMOL/L
CO2 SERPL-SCNC: 24 MMOL/L
CREAT SERPL-MCNC: 2.4 MG/DL
EST. GFR  (AFRICAN AMERICAN): 27 ML/MIN/1.73 M^2
EST. GFR  (NON AFRICAN AMERICAN): 24 ML/MIN/1.73 M^2
GLUCOSE SERPL-MCNC: 120 MG/DL
POTASSIUM SERPL-SCNC: 4.8 MMOL/L
SODIUM SERPL-SCNC: 136 MMOL/L

## 2018-03-21 PROCEDURE — 36415 COLL VENOUS BLD VENIPUNCTURE: CPT | Mod: PO

## 2018-03-21 PROCEDURE — 99214 OFFICE O/P EST MOD 30 MIN: CPT | Mod: S$GLB,,, | Performed by: INTERNAL MEDICINE

## 2018-03-21 PROCEDURE — 80048 BASIC METABOLIC PNL TOTAL CA: CPT | Mod: PO

## 2018-03-21 PROCEDURE — 99999 PR PBB SHADOW E&M-EST. PATIENT-LVL III: CPT | Mod: PBBFAC,,, | Performed by: INTERNAL MEDICINE

## 2018-03-21 RX ORDER — METOLAZONE 2.5 MG/1
2.5 TABLET ORAL DAILY
Qty: 30 TABLET | Refills: 11 | Status: SHIPPED | OUTPATIENT
Start: 2018-03-21 | End: 2018-04-03

## 2018-03-21 RX ORDER — POTASSIUM CHLORIDE 750 MG/1
10 TABLET, EXTENDED RELEASE ORAL NIGHTLY
COMMUNITY
End: 2018-06-26 | Stop reason: SDUPTHER

## 2018-03-21 NOTE — PROGRESS NOTES
Subjective:   Patient ID:  Chung Meneses Sr. is a 86 y.o. male who presents for follow up of Congestive Heart Failure and Dizziness      HPI  An 85 yo male with afib s/p avr is here for f/u. He has had issues with fluid balance withr ecurrent admits to the hospital with dehydration volume overload . He saw TREVER last week he bumped his diuretic to 60 mg po daily he is short of breath with minimal exertion he has gained weight he has abdominal swelling and bloating. He is drinking close to 1.5 liters daily claims absolute compliance with salt.he sleeps on one pillow at nite. His elg swelling is worse at the end of the day.he is wearing compression stockings.  Past Medical History:   Diagnosis Date    Aortic stenosis 8/2/2013    Atrial fibrillation 7/5/2013    CHF (congestive heart failure)     Coronary artery disease     Dizziness - light-headed     Hyperlipidemia     Hypertension     Syncope 1/26/2018       Past Surgical History:   Procedure Laterality Date    BACK SURGERY  2003    CARDIAC VALVE SURGERY      CORONARY ARTERY BYPASS GRAFT      knee replacemnt bilateral  2001    SHOULDER SURGERY  2002       Social History   Substance Use Topics    Smoking status: Never Smoker    Smokeless tobacco: Never Used    Alcohol use No      Comment: HOLIDAY       Family History   Problem Relation Age of Onset    Hypertension Mother     Hypertension Father     Hypertension Sister     Hypertension Brother     Heart failure Brother     Heart disease Brother     Heart attack Brother        Current Outpatient Prescriptions   Medication Sig    apixaban (ELIQUIS) 2.5 mg Tab Take 1 tablet (2.5 mg total) by mouth 2 (two) times daily.    aspirin (ECOTRIN) 81 MG EC tablet Take 1 tablet (81 mg total) by mouth once daily.    fish oil-omega-3 fatty acids 300-1,000 mg capsule Take 1,200 mg by mouth once daily.    furosemide (LASIX) 40 MG tablet Take 1 tablet (40 mg total) by mouth once daily.    metoprolol  succinate (TOPROL-XL) 25 MG 24 hr tablet Take 1 tablet (25 mg total) by mouth once daily.    multivitamin with folic acid 400 mcg Tab Take 1 tablet by mouth.    potassium chloride (K-TAB) 20 mEq Take 1 tablet (20 mEq total) by mouth every morning. Takes one tablet (20 meq) in the morning and half tablet (10 meq) in the evening    potassium chloride SA (K-DUR,KLOR-CON) 10 MEQ tablet Take 10 mEq by mouth every evening.    ranitidine (ZANTAC) 75 MG tablet Take 75 mg by mouth nightly.     traZODone (DESYREL) 50 MG tablet Take 50 mg by mouth every evening.    vitamin D 1000 units Tab Take 1,000 Units by mouth once daily.     potassium chloride (KLOR-CON) 10 MEQ TbSR Take 1 tablet (10 mEq total) by mouth every evening.     No current facility-administered medications for this visit.      Current Outpatient Prescriptions on File Prior to Visit   Medication Sig    apixaban (ELIQUIS) 2.5 mg Tab Take 1 tablet (2.5 mg total) by mouth 2 (two) times daily.    aspirin (ECOTRIN) 81 MG EC tablet Take 1 tablet (81 mg total) by mouth once daily.    fish oil-omega-3 fatty acids 300-1,000 mg capsule Take 1,200 mg by mouth once daily.    furosemide (LASIX) 40 MG tablet Take 1 tablet (40 mg total) by mouth once daily.    metoprolol succinate (TOPROL-XL) 25 MG 24 hr tablet Take 1 tablet (25 mg total) by mouth once daily.    multivitamin with folic acid 400 mcg Tab Take 1 tablet by mouth.    potassium chloride (K-TAB) 20 mEq Take 1 tablet (20 mEq total) by mouth every morning. Takes one tablet (20 meq) in the morning and half tablet (10 meq) in the evening    ranitidine (ZANTAC) 75 MG tablet Take 75 mg by mouth nightly.     traZODone (DESYREL) 50 MG tablet Take 50 mg by mouth every evening.    vitamin D 1000 units Tab Take 1,000 Units by mouth once daily.     potassium chloride (KLOR-CON) 10 MEQ TbSR Take 1 tablet (10 mEq total) by mouth every evening.     No current facility-administered medications on file prior to  "visit.      Review of patient's allergies indicates:   Allergen Reactions    Sulfa (sulfonamide antibiotics) Hives       Review of Systems   Constitution: Positive for weight gain. Negative for weakness and malaise/fatigue.   Eyes: Negative for blurred vision.   Cardiovascular: Positive for dyspnea on exertion. Negative for chest pain, claudication, cyanosis, irregular heartbeat, leg swelling, near-syncope, orthopnea, palpitations and paroxysmal nocturnal dyspnea.   Respiratory: Positive for shortness of breath and snoring. Negative for cough and hemoptysis.    Hematologic/Lymphatic: Negative for bleeding problem. Does not bruise/bleed easily.   Skin: Negative for dry skin and itching.   Musculoskeletal: Negative for falls, muscle weakness and myalgias.   Gastrointestinal: Positive for bloating. Negative for abdominal pain, diarrhea, heartburn, hematemesis, hematochezia and melena.   Genitourinary: Negative for flank pain and hematuria.   Neurological: Negative for dizziness, focal weakness, headaches, light-headedness, numbness, paresthesias and seizures.   Psychiatric/Behavioral: Negative for altered mental status and memory loss. The patient is not nervous/anxious.    Allergic/Immunologic: Negative for hives.       Objective:   Physical Exam  Vitals:    03/21/18 1408   BP: 110/64   BP Method: Small (Manual)   Pulse: 68   Weight: 95 kg (209 lb 7 oz)   Height: 5' 9" (1.753 m)   Constitutional: He is oriented to person, place, and time. He appears well-developed and well-nourished. No distress.   HENT:   Head: Normocephalic and atraumatic.   Eyes: EOM are normal. Pupils are equal, round, and reactive to light. Right eye exhibits no discharge. Left eye exhibits no discharge.   Neck: Neck supple.positive  JVD present. No thyromegaly present.   Cardiovascular: Normal rate and intact distal pulses.  An irregularly irregular rhythm present. Exam reveals no gallop and no friction rub. "    Murmur heard.   Harsh midsystolic murmur is present with a grade of 1/6  at the upper right sternal border radiating to the neck  Pulmonary/Chest: Effort normal. No respiratory distress. He has wheezes. He has no rales but scattered rhonchi/. He exhibits no tenderness.   Scar cabg well healed.   Abdominal: Soft. Bowel sounds are normal. He exhibits mild  distension. There is no tenderness. Pulsatile liver.  Musculoskeletal: Normal range of motion. He exhibits edema 1+ all the way to his hip.stockings in place..   Neurological: He is alert and oriented to person, place, and time. No cranial nerve deficit.   Skin: Skin is warm and dry. No rash noted. He is not diaphoretic. No erythema.   Psychiatric: He has a normal mood and affect. His behavior is normal.   Nursing note and vitals reviewed.  Lab Results   Component Value Date    CHOL 104 (L) 12/11/2017    CHOL 125 04/19/2017    CHOL 147 10/21/2016     Lab Results   Component Value Date    HDL 24 (L) 12/11/2017    HDL 35 (L) 04/19/2017    HDL 31 (L) 10/21/2016     Lab Results   Component Value Date    LDLCALC 57.0 (L) 12/11/2017    LDLCALC 77.8 04/19/2017    LDLCALC 97.6 10/21/2016     Lab Results   Component Value Date    TRIG 115 12/11/2017    TRIG 61 04/19/2017    TRIG 92 10/21/2016     Lab Results   Component Value Date    CHOLHDL 23.1 12/11/2017    CHOLHDL 28.0 04/19/2017    CHOLHDL 21.1 10/21/2016       Chemistry        Component Value Date/Time     03/13/2018 1115    K 5.3 (H) 03/13/2018 1500     03/13/2018 1115    CO2 25 03/13/2018 1115    BUN 54 (H) 03/13/2018 1115    CREATININE 2.2 (H) 03/13/2018 1115     03/13/2018 1115        Component Value Date/Time    CALCIUM 9.4 03/13/2018 1115    ALKPHOS 137 (H) 03/13/2018 1115    AST 24 03/13/2018 1115    ALT 20 03/13/2018 1115    BILITOT 1.5 (H) 03/13/2018 1115    ESTGFRAFRICA 30 (A) 03/13/2018 1115    EGFRNONAA 26 (A) 03/13/2018 1115          Lab Results   Component Value Date    TSH 3.780  12/11/2017     Lab Results   Component Value Date    INR 1.3 (H) 03/06/2018    INR 1.5 (H) 02/20/2018    INR 1.3 (H) 01/29/2018     Lab Results   Component Value Date    WBC 5.78 03/13/2018    HGB 8.7 (L) 03/13/2018    HCT 29.2 (L) 03/13/2018    MCV 73 (L) 03/13/2018     03/13/2018     BMP  Sodium   Date Value Ref Range Status   03/13/2018 136 136 - 145 mmol/L Final     Potassium   Date Value Ref Range Status   03/13/2018 5.3 (H) 3.5 - 5.1 mmol/L Final     Chloride   Date Value Ref Range Status   03/13/2018 104 95 - 110 mmol/L Final     CO2   Date Value Ref Range Status   03/13/2018 25 23 - 29 mmol/L Final     BUN, Bld   Date Value Ref Range Status   03/13/2018 54 (H) 8 - 23 mg/dL Final     Creatinine   Date Value Ref Range Status   03/13/2018 2.2 (H) 0.5 - 1.4 mg/dL Final     Calcium   Date Value Ref Range Status   03/13/2018 9.4 8.7 - 10.5 mg/dL Final     Anion Gap   Date Value Ref Range Status   03/13/2018 7 (L) 8 - 16 mmol/L Final     eGFR if    Date Value Ref Range Status   03/13/2018 30 (A) >60 mL/min/1.73 m^2 Final     eGFR if non    Date Value Ref Range Status   03/13/2018 26 (A) >60 mL/min/1.73 m^2 Final     Comment:     Calculation used to obtain the estimated glomerular filtration  rate (eGFR) is the CKD-EPI equation.        CrCl cannot be calculated (Patient's most recent lab result is older than the maximum 7 days allowed.).    Assessment:     1. Chronic diastolic congestive heart failure    2. Essential hypertension    3. Hyperlipidemia, unspecified hyperlipidemia type    4. Persistent atrial fibrillation    5. Pulmonary hypertension    6. Nonrheumatic aortic valve stenosis    7. S/P AVR    8. Coronary artery disease, angina presence unspecified, unspecified vessel or lesion type, unspecified whether native or transplanted heart    9. Congestive heart failure, unspecified congestive heart failure chronicity, unspecified congestive heart failure type    10.  Hypokalemia      He is overloaded again w/o obvious cause. Will place on zaroxolyn 2.5 every other day   Plan:     Lasix 40 mg every morning and 20 mg in the evniong.  Zaroxolyn 2.5 mg po every other day with 40 mg lasix  alternating with the 40/20 lasix combination.  I will not adjust the potassium intake until I see his bmop today.  F/u next week in chf clinic with mid level.  I will send him to [facility].  SLEEP CONSULT.

## 2018-03-26 ENCOUNTER — TELEPHONE (OUTPATIENT)
Dept: CARDIOLOGY | Facility: CLINIC | Age: 83
End: 2018-03-26

## 2018-03-26 NOTE — TELEPHONE ENCOUNTER
----- Message from Dionne Ellis sent at 3/26/2018  1:44 PM CDT -----  Contact: [facility]/ Pat ([facility])  Caller states she needs to clarify pt's Laski orders and Metolazone. ..Request call  [phone]

## 2018-03-27 ENCOUNTER — HOSPITAL ENCOUNTER (OUTPATIENT)
Facility: HOSPITAL | Age: 83
Discharge: HOME | End: 2018-03-30
Attending: EMERGENCY MEDICINE | Admitting: HOSPITALIST
Payer: MEDICARE

## 2018-03-27 ENCOUNTER — TELEPHONE (OUTPATIENT)
Dept: CARDIOLOGY | Facility: CLINIC | Age: 83
End: 2018-03-27

## 2018-03-27 ENCOUNTER — OFFICE VISIT (OUTPATIENT)
Dept: CARDIOLOGY | Facility: CLINIC | Age: 83
End: 2018-03-27
Payer: MEDICARE

## 2018-03-27 VITALS
WEIGHT: 188.5 LBS | HEART RATE: 72 BPM | SYSTOLIC BLOOD PRESSURE: 112 MMHG | HEIGHT: 69 IN | DIASTOLIC BLOOD PRESSURE: 66 MMHG | BODY MASS INDEX: 27.92 KG/M2

## 2018-03-27 DIAGNOSIS — I25.10 CORONARY ARTERY DISEASE, ANGINA PRESENCE UNSPECIFIED, UNSPECIFIED VESSEL OR LESION TYPE, UNSPECIFIED WHETHER NATIVE OR TRANSPLANTED HEART: ICD-10-CM

## 2018-03-27 DIAGNOSIS — I50.32 CHRONIC DIASTOLIC CONGESTIVE HEART FAILURE: ICD-10-CM

## 2018-03-27 DIAGNOSIS — I50.9 ACUTE ON CHRONIC CONGESTIVE HEART FAILURE: ICD-10-CM

## 2018-03-27 DIAGNOSIS — R06.09 DYSPNEA ON EXERTION: ICD-10-CM

## 2018-03-27 DIAGNOSIS — I50.33 ACUTE ON CHRONIC DIASTOLIC (CONGESTIVE) HEART FAILURE: ICD-10-CM

## 2018-03-27 DIAGNOSIS — E78.5 HYPERLIPIDEMIA, UNSPECIFIED HYPERLIPIDEMIA TYPE: Chronic | ICD-10-CM

## 2018-03-27 DIAGNOSIS — R79.89 ELEVATED TROPONIN: ICD-10-CM

## 2018-03-27 DIAGNOSIS — R60.1 ANASARCA: Primary | ICD-10-CM

## 2018-03-27 DIAGNOSIS — I48.19 PERSISTENT ATRIAL FIBRILLATION: Chronic | ICD-10-CM

## 2018-03-27 DIAGNOSIS — I50.33 ACUTE ON CHRONIC DIASTOLIC (CONGESTIVE) HEART FAILURE: Primary | ICD-10-CM

## 2018-03-27 DIAGNOSIS — Z95.2 S/P AVR: ICD-10-CM

## 2018-03-27 DIAGNOSIS — I10 ESSENTIAL HYPERTENSION: Chronic | ICD-10-CM

## 2018-03-27 DIAGNOSIS — I50.33 ACUTE ON CHRONIC DIASTOLIC HEART FAILURE: ICD-10-CM

## 2018-03-27 LAB
ALBUMIN SERPL BCP-MCNC: 3.4 G/DL
ALP SERPL-CCNC: 130 U/L
ALT SERPL W/O P-5'-P-CCNC: 13 U/L
ANION GAP SERPL CALC-SCNC: 11 MMOL/L
ANISOCYTOSIS BLD QL SMEAR: SLIGHT
AST SERPL-CCNC: 23 U/L
BASOPHILS # BLD AUTO: 0.03 K/UL
BASOPHILS NFR BLD: 0.6 %
BILIRUB SERPL-MCNC: 1.9 MG/DL
BNP SERPL-MCNC: 2135 PG/ML
BUN SERPL-MCNC: 72 MG/DL
CALCIUM SERPL-MCNC: 9 MG/DL
CHLORIDE SERPL-SCNC: 102 MMOL/L
CK MB SERPL-MCNC: 2.1 NG/ML
CK MB SERPL-RTO: 2.3 %
CK SERPL-CCNC: 91 U/L
CK SERPL-CCNC: 91 U/L
CO2 SERPL-SCNC: 21 MMOL/L
CREAT SERPL-MCNC: 2.2 MG/DL
DACRYOCYTES BLD QL SMEAR: ABNORMAL
DIFFERENTIAL METHOD: ABNORMAL
EOSINOPHIL # BLD AUTO: 0.1 K/UL
EOSINOPHIL NFR BLD: 2 %
ERYTHROCYTE [DISTWIDTH] IN BLOOD BY AUTOMATED COUNT: 20.6 %
EST. GFR  (AFRICAN AMERICAN): 30 ML/MIN/1.73 M^2
EST. GFR  (NON AFRICAN AMERICAN): 26 ML/MIN/1.73 M^2
GIANT PLATELETS BLD QL SMEAR: PRESENT
GLUCOSE SERPL-MCNC: 176 MG/DL
HCT VFR BLD AUTO: 28.8 %
HGB BLD-MCNC: 8.4 G/DL
HYPOCHROMIA BLD QL SMEAR: ABNORMAL
INR PPP: 1.4
LYMPHOCYTES # BLD AUTO: 0.6 K/UL
LYMPHOCYTES NFR BLD: 11 %
MAGNESIUM SERPL-MCNC: 2.1 MG/DL
MCH RBC QN AUTO: 20.8 PG
MCHC RBC AUTO-ENTMCNC: 29.2 G/DL
MCV RBC AUTO: 72 FL
MONOCYTES # BLD AUTO: 0.8 K/UL
MONOCYTES NFR BLD: 15.1 %
NEUTROPHILS # BLD AUTO: 3.6 K/UL
NEUTROPHILS NFR BLD: 71.7 %
OVALOCYTES BLD QL SMEAR: ABNORMAL
PLATELET # BLD AUTO: 233 K/UL
PLATELET BLD QL SMEAR: ABNORMAL
PMV BLD AUTO: 8.8 FL
POIKILOCYTOSIS BLD QL SMEAR: SLIGHT
POTASSIUM SERPL-SCNC: 3.8 MMOL/L
PROT SERPL-MCNC: 6.8 G/DL
PROTHROMBIN TIME: 14 SEC
RBC # BLD AUTO: 4.03 M/UL
SCHISTOCYTES BLD QL SMEAR: PRESENT
SODIUM SERPL-SCNC: 134 MMOL/L
TARGETS BLD QL SMEAR: ABNORMAL
TROPONIN I SERPL DL<=0.01 NG/ML-MCNC: 0.13 NG/ML
WBC # BLD AUTO: 5.09 K/UL

## 2018-03-27 PROCEDURE — 85025 COMPLETE CBC W/AUTO DIFF WBC: CPT

## 2018-03-27 PROCEDURE — 85610 PROTHROMBIN TIME: CPT

## 2018-03-27 PROCEDURE — 83735 ASSAY OF MAGNESIUM: CPT

## 2018-03-27 PROCEDURE — 99284 EMERGENCY DEPT VISIT MOD MDM: CPT | Mod: 25

## 2018-03-27 PROCEDURE — 96374 THER/PROPH/DIAG INJ IV PUSH: CPT

## 2018-03-27 PROCEDURE — 82553 CREATINE MB FRACTION: CPT

## 2018-03-27 PROCEDURE — G0378 HOSPITAL OBSERVATION PER HR: HCPCS

## 2018-03-27 PROCEDURE — 84484 ASSAY OF TROPONIN QUANT: CPT

## 2018-03-27 PROCEDURE — 80053 COMPREHEN METABOLIC PANEL: CPT

## 2018-03-27 PROCEDURE — 99214 OFFICE O/P EST MOD 30 MIN: CPT | Mod: S$GLB,,, | Performed by: PHYSICIAN ASSISTANT

## 2018-03-27 PROCEDURE — 63600175 PHARM REV CODE 636 W HCPCS: Performed by: EMERGENCY MEDICINE

## 2018-03-27 PROCEDURE — 99999 PR PBB SHADOW E&M-EST. PATIENT-LVL III: CPT | Mod: PBBFAC,,, | Performed by: PHYSICIAN ASSISTANT

## 2018-03-27 PROCEDURE — 83880 ASSAY OF NATRIURETIC PEPTIDE: CPT

## 2018-03-27 RX ORDER — FUROSEMIDE 10 MG/ML
80 INJECTION INTRAMUSCULAR; INTRAVENOUS
Status: COMPLETED | OUTPATIENT
Start: 2018-03-27 | End: 2018-03-27

## 2018-03-27 RX ADMIN — FUROSEMIDE 80 MG: 10 INJECTION, SOLUTION INTRAMUSCULAR; INTRAVENOUS at 06:03

## 2018-03-27 NOTE — ED PROVIDER NOTES
SCRIBE #1 NOTE: I, Manuel Boaz, am scribing for, and in the presence of, Maday Acosta MD. I have scribed the entire note.      History      Chief Complaint   Patient presents with    Shortness of Breath     Pt sent by Dr. Espinoza for admission for CHF       Review of patient's allergies indicates:   Allergen Reactions    Sulfa (sulfonamide antibiotics) Hives        HPI   HPI    3/27/2018, 5:31 PM   History obtained from the patient      History of Present Illness: Chung Meneses Sr. is a 86 y.o. male patient with a hx of Afib, CHF, HTN, and HLP presents to the Emergency Department for an evaluation of SOB which onset gradually a few days ago. Pt was reportedly evaluated by Dr. Bowens (Cardiology) who evaluated pt for a f/u after being admitted to [facility] following a syncopal episode believed to sissy secondary to orthostasis and mild volume depletion. PT was still c/o worsening SOB of the last few days, fatigue, weight gain, and abdominal distention which prompted Dr. Bowens to contact Dr. Espinoza who decided to have pt brought to this ED for admission for IV diuresis and medication optimization. Pt reports his is complaint with his dietary restrictions and has been taking Lasix 40mg daily since having his medications changed upon discharge for hospital a few days ago. Symptoms are constant and moderate in severity. Exacerbated by exertion and relieved by lying down. Associated sxs include fatigue, weight gain, leg swelling, scrotal swelling, and abdominal distention. Patient denies any fever, chills, CP, diaphoresis, recent travel/long car trips, N/V, dysuria, hematuria, and all other sxs at this time. Pt denies tx PTA. No further complaints or concerns at this time.       Arrival mode: Personal vehicle    PCP: Glenroy Carmichael MD       Past Medical History:  Past Medical History:   Diagnosis Date    Aortic stenosis 8/2/2013    Atrial fibrillation 7/5/2013    CHF (congestive heart failure)      Coronary artery disease     Dizziness - light-headed     Hyperlipidemia     Hypertension     Syncope 1/26/2018       Past Surgical History:  Past Surgical History:   Procedure Laterality Date    BACK SURGERY  2003    CARDIAC VALVE SURGERY      CORONARY ARTERY BYPASS GRAFT      knee replacemnt bilateral  2001    SHOULDER SURGERY  2002         Family History:  Family History   Problem Relation Age of Onset    Hypertension Mother     Hypertension Father     Hypertension Sister     Hypertension Brother     Heart failure Brother     Heart disease Brother     Heart attack Brother        Social History:  Social History     Social History Main Topics    Smoking status: Never Smoker    Smokeless tobacco: Never Used    Alcohol use No      Comment: HOLIDAY    Drug use: No    Sexual activity: No       ROS   Review of Systems   Constitutional: Positive for fatigue. Negative for chills and fever.   HENT: Negative for congestion and sore throat.    Respiratory: Positive for shortness of breath. Negative for chest tightness and wheezing.    Cardiovascular: Positive for leg swelling. Negative for chest pain.   Gastrointestinal: Positive for abdominal distention. Negative for abdominal pain, nausea and vomiting.   Genitourinary: Positive for scrotal swelling. Negative for discharge, dysuria, frequency, hematuria, penile swelling, testicular pain and urgency.   Musculoskeletal: Negative for back pain and gait problem.   Skin: Negative for rash.   Neurological: Negative for weakness, light-headedness and headaches.   Hematological: Does not bruise/bleed easily.     Physical Exam      Initial Vitals [03/27/18 1610]   BP Pulse Resp Temp SpO2   (!) 141/71 70 16 -- 95 %      MAP       94.33          Physical Exam  Nursing Notes and Vital Signs Reviewed.  Constitutional: Patient is in no acute distress. Well-developed and well-nourished.  Head: Atraumatic. Normocephalic.  Eyes: PERRL. EOM intact. Conjunctivae are  "not pale. No scleral icterus.  ENT: Mucous membranes are moist. Oropharynx is clear and symmetric.    Neck: Supple. Full ROM. No lymphadenopathy.  Cardiovascular: Regular rate. Regular rhythm.  Pulmonary/Chest: No respiratory distress. Clear to auscultation bilaterally. No wheezing or rales.  Abdominal: Soft. Distended abdomen.  There is no tenderness.  Genitourinary: No CVA tenderness  Musculoskeletal: Moves all extremities. No obvious deformities. Edema to BLE from feet to lower abdomen. No calf tenderness.  Skin: Warm and dry.  Neurological:  Alert, awake, and appropriate.  Normal speech.  No acute focal neurological deficits are appreciated.  Psychiatric: Normal affect. Good eye contact. Appropriate in content.    ED Course    Procedures  ED Vital Signs:  Vitals:    03/27/18 1610 03/27/18 1831 03/27/18 1900 03/27/18 2240   BP: (!) 141/71 130/66 138/78 (!) 158/78   Pulse: 70 64 69 67   Resp: 16 14 18 20   Temp:   98.7 °F (37.1 °C)    TempSrc: Oral  Oral    SpO2: 95% 96% 98% 100%   Weight: 84.6 kg (186 lb 8.2 oz)      Height:        03/28/18 0020 03/28/18 0022   BP: 126/69    Pulse: 80 80   Resp: 19    Temp: 98.1 °F (36.7 °C)    TempSrc: Oral    SpO2: 99%    Weight: 81.8 kg (180 lb 5.4 oz) 81.8 kg (180 lb 5.4 oz)   Height:  5' 9" (1.753 m)       Abnormal Lab Results:  Labs Reviewed   TROPONIN I - Abnormal; Notable for the following:        Result Value    Troponin I 0.129 (*)     All other components within normal limits   CBC W/ AUTO DIFFERENTIAL - Abnormal; Notable for the following:     RBC 4.03 (*)     Hemoglobin 8.4 (*)     Hematocrit 28.8 (*)     MCV 72 (*)     MCH 20.8 (*)     MCHC 29.2 (*)     RDW 20.6 (*)     MPV 8.8 (*)     Lymph # 0.6 (*)     Lymph% 11.0 (*)     Mono% 15.1 (*)     All other components within normal limits   COMPREHENSIVE METABOLIC PANEL - Abnormal; Notable for the following:     Sodium 134 (*)     CO2 21 (*)     Glucose 176 (*)     BUN, Bld 72 (*)     Creatinine 2.2 (*)     Albumin 3.4 " (*)     Total Bilirubin 1.9 (*)     eGFR if  30 (*)     eGFR if non  26 (*)     All other components within normal limits   B-TYPE NATRIURETIC PEPTIDE - Abnormal; Notable for the following:     BNP 2,135 (*)     All other components within normal limits   PROTIME-INR - Abnormal; Notable for the following:     Prothrombin Time 14.0 (*)     INR 1.4 (*)     All other components within normal limits   MAGNESIUM   CK-MB   CK        All Lab Results:  Results for orders placed or performed during the hospital encounter of 03/27/18   Magnesium   Result Value Ref Range    Magnesium 2.1 1.6 - 2.6 mg/dL   Troponin I   Result Value Ref Range    Troponin I 0.129 (H) 0.000 - 0.026 ng/mL   CK-MB   Result Value Ref Range    CPK 91 20 - 200 U/L    CPK MB 2.1 0.1 - 6.5 ng/mL    MB% 2.3 0.0 - 5.0 %   CK   Result Value Ref Range    CPK 91 20 - 200 U/L   CBC auto differential   Result Value Ref Range    WBC 5.09 3.90 - 12.70 K/uL    RBC 4.03 (L) 4.60 - 6.20 M/uL    Hemoglobin 8.4 (L) 14.0 - 18.0 g/dL    Hematocrit 28.8 (L) 40.0 - 54.0 %    MCV 72 (L) 82 - 98 fL    MCH 20.8 (L) 27.0 - 31.0 pg    MCHC 29.2 (L) 32.0 - 36.0 g/dL    RDW 20.6 (H) 11.5 - 14.5 %    Platelets 233 150 - 350 K/uL    MPV 8.8 (L) 9.2 - 12.9 fL    Gran # (ANC) 3.6 1.8 - 7.7 K/uL    Lymph # 0.6 (L) 1.0 - 4.8 K/uL    Mono # 0.8 0.3 - 1.0 K/uL    Eos # 0.1 0.0 - 0.5 K/uL    Baso # 0.03 0.00 - 0.20 K/uL    Gran% 71.7 38.0 - 73.0 %    Lymph% 11.0 (L) 18.0 - 48.0 %    Mono% 15.1 (H) 4.0 - 15.0 %    Eosinophil% 2.0 0.0 - 8.0 %    Basophil% 0.6 0.0 - 1.9 %    Platelet Estimate Appears normal     Aniso Slight     Poik Slight     Hypo Moderate     Ovalocytes Occasional     Target Cells Occasional     Tear Drop Cells Occasional     Schistocytes Present     Large/Giant Platelets Present     Differential Method Automated    Comprehensive metabolic panel   Result Value Ref Range    Sodium 134 (L) 136 - 145 mmol/L    Potassium 3.8 3.5 - 5.1 mmol/L     Chloride 102 95 - 110 mmol/L    CO2 21 (L) 23 - 29 mmol/L    Glucose 176 (H) 70 - 110 mg/dL    BUN, Bld 72 (H) 8 - 23 mg/dL    Creatinine 2.2 (H) 0.5 - 1.4 mg/dL    Calcium 9.0 8.7 - 10.5 mg/dL    Total Protein 6.8 6.0 - 8.4 g/dL    Albumin 3.4 (L) 3.5 - 5.2 g/dL    Total Bilirubin 1.9 (H) 0.1 - 1.0 mg/dL    Alkaline Phosphatase 130 55 - 135 U/L    AST 23 10 - 40 U/L    ALT 13 10 - 44 U/L    Anion Gap 11 8 - 16 mmol/L    eGFR if African American 30 (A) >60 mL/min/1.73 m^2    eGFR if non African American 26 (A) >60 mL/min/1.73 m^2   Brain natriuretic peptide   Result Value Ref Range    BNP 2,135 (H) 0 - 99 pg/mL   Protime-INR   Result Value Ref Range    Prothrombin Time 14.0 (H) 9.0 - 12.5 sec    INR 1.4 (H) 0.8 - 1.2         Imaging Results:  Imaging Results          X-Ray Chest 1 View (Final result)  Result time 03/27/18 17:59:55    Final result by Marycruz Hawk MD (03/27/18 17:59:55)                 Impression:      Mild enlargement of the heart.  No acute findings.      Electronically signed by: MARYCRUZ HAWK MD  Date:     03/27/18  Time:    17:59              Narrative:    Exam: XR CHEST 1 VIEW,    Date:  03/27/18 17:46:49    History: Heart failure    Comparison:  03/06/2018    Findings: There is mild enlargement of the heart.  There are sternal sutures from previous heart surgery.  The lungs are clear.  Bilateral shoulder surgery is noted.                                      The Emergency Provider reviewed the vital signs and test results, which are outlined above.    ED Discussion     7:13 PM: Discussed case with IMAN Grijalva (Hospital Medicine) who agrees with current care and management of pt and accepts admission.   Admitting Service: Hospital medicine   Admitting Physician: Dr. Gomez  Admit to: Observation    7:13 PM: Re-evaluated pt. I have discussed test results, shared treatment plan, and the need for admission with patient and family at bedside. Pt and family express understanding at this time and  agree with all information. All questions answered. Pt and family have no further questions or concerns at this time. Pt is ready for admit.      ED Medication(s):  Medications   sodium chloride 0.9% flush 3 mL (not administered)   furosemide injection 40 mg (not administered)   metOLazone tablet 2.5 mg (not administered)   potassium chloride SA CR tablet 20 mEq (not administered)   metoprolol succinate (TOPROL-XL) 24 hr tablet 25 mg (not administered)   apixaban tablet 2.5 mg (2.5 mg Oral Given 3/28/18 0041)   aspirin EC tablet 81 mg (not administered)   famotidine tablet 20 mg (not administered)   traZODone tablet 50 mg (not administered)   furosemide injection 80 mg (80 mg Intravenous Given 3/27/18 1815)       Current Discharge Medication List                Medical Decision Making    Medical Decision Making:   Clinical Tests:   Lab Tests: Ordered and Reviewed  Radiological Study: Ordered and Reviewed           Scribe Attestation:   Scribe #1: I performed the above scribed service and the documentation accurately describes the services I performed. I attest to the accuracy of the note.    Attending:   Physician Attestation Statement for Scribe #1: I, Maday Acosta MD, personally performed the services described in this documentation, as scribed by Manuel Sandra, in my presence, and it is both accurate and complete.          Clinical Impression       ICD-10-CM ICD-9-CM   1. Anasarca R60.1 782.3   2. Acute on chronic congestive heart failure I50.9 428.0   3. Dyspnea on exertion R06.09 786.09       Disposition:   Disposition: Placed in Observation  Condition: Stable         Maday Acosta MD  03/28/18 2340

## 2018-03-27 NOTE — ED NOTES
Dr. Acosta informed of elevated troponin level. No further orders received. Plan of care continued.

## 2018-03-27 NOTE — PROGRESS NOTES
Subjective:    Patient ID:  Chung Meneses Sr. is a 86 y.o. male who presents for follow-up of Congestive Heart Failure      HPI  Mr. Meneses is an 86 year old male patient with a PMHx of CAD/severe AS s/p CABG x 1 and bioprosthetic AVR, permanent atrial fibrillation (on Eliquis), CHF, HTN, and hyperlipidemia who presents today for follow-up. Patient previously seen by Dr. Espinoza last week and had Lasix dose increased and metolazone added to his medication regimen due to worsening CHF symptoms. He returns today and states he is feeling poorly. Complains of worsening SOB over the past few days. Associated symptoms include abdominal bloating, leg swelling, scrotal edema, and orthopnea. Denies any chest pain or tightness. No recent episodes of near syncope or syncope. Patient reports compliance with his medications and dietary restrictions. Fluid balance has been difficult to control, patient has had several admissions for decompensated CHF and one previous admission for orthostasis/syncope.     Review of Systems   Constitution: Positive for decreased appetite, weakness and malaise/fatigue. Negative for chills and fever.   HENT: Negative for congestion, hoarse voice and sore throat.    Eyes: Negative for blurred vision and discharge.   Cardiovascular: Positive for dyspnea on exertion, leg swelling and orthopnea. Negative for chest pain, claudication, cyanosis, irregular heartbeat, near-syncope, palpitations and paroxysmal nocturnal dyspnea.   Respiratory: Positive for shortness of breath. Negative for cough, hemoptysis, snoring, sputum production and wheezing.    Endocrine: Negative for cold intolerance and heat intolerance.   Hematologic/Lymphatic: Negative for bleeding problem. Does not bruise/bleed easily.   Skin: Negative for rash.   Musculoskeletal: Negative for arthritis, back pain, joint pain, joint swelling, muscle cramps, muscle weakness and myalgias.   Gastrointestinal: Positive for bloating. Negative for  "abdominal pain, constipation, diarrhea, heartburn, melena and nausea.   Genitourinary: Negative for hematuria.        Scrotal edema     Neurological: Negative for dizziness, focal weakness, headaches, light-headedness, loss of balance, numbness, paresthesias and seizures.   Psychiatric/Behavioral: Negative for memory loss. The patient does not have insomnia.    Allergic/Immunologic: Negative for hives.       /66   Pulse 72   Ht 5' 9" (1.753 m)   Wt 85.5 kg (188 lb 7.9 oz)   BMI 27.84 kg/m²     Objective:    Physical Exam   Constitutional: He is oriented to person, place, and time. He appears well-developed and well-nourished. No distress.   HENT:   Head: Normocephalic and atraumatic.   Eyes: Pupils are equal, round, and reactive to light. Right eye exhibits no discharge. Left eye exhibits no discharge.   Neck: Neck supple. JVD present.   Cardiovascular: Normal rate, S1 normal and S2 normal.  An irregularly irregular rhythm present. Exam reveals no gallop, no S3, no S4 and no friction rub.    Murmur heard.   Harsh midsystolic murmur is present  at the upper right sternal border radiating to the neck  Pulmonary/Chest: Effort normal. No respiratory distress. He has no wheezes. He has rales (faint bibasilar).   Abdominal: He exhibits distension. There is no tenderness. There is no rebound.   Musculoskeletal: He exhibits edema (extending up to thighs and scrotum).   Neurological: He is alert and oriented to person, place, and time.   Skin: Skin is warm and dry. He is not diaphoretic. No erythema.   Psychiatric: He has a normal mood and affect. His behavior is normal. Thought content normal.   Nursing note and vitals reviewed.      2D Echo CONCLUSIONS     1 - Low normal to mildly depressed left ventricular systolic function (EF 50-55%).     2 - Impaired LV relaxation, elevated LAP (grade 2 diastolic dysfunction).     3 - Wall motion abnormalities.     4 - Low normal right ventricular systolic function .     5 - " Severe left atrial enlargement.     6 - Concentric hypertrophy.     7 - Pulmonary hypertension. The estimated PA systolic pressure is 41 mmHg.     8 - Aortic valve prosthesis, mean gradient = 11 mmHg.     9 - Moderate mitral regurgitation.     10 - Mild to moderate tricuspid regurgitation.     11 - Increased central venous pressure.   Assessment:       1. Acute on chronic diastolic (congestive) heart failure    2. Persistent atrial fibrillation    3. Coronary artery disease, angina presence unspecified, unspecified vessel or lesion type, unspecified whether native or transplanted heart    4. Chronic diastolic congestive heart failure    5. S/P AVR    6. Hyperlipidemia, unspecified hyperlipidemia type    7. Essential hypertension    8. Acute on chronic diastolic heart failure       Presents for f/u. Still overloaded despite medication increases/addition of Metolazone. Needs IV diuresis. Will need higher dose diuretic and possibly metolazone 3x weekly upon d/c. Consider addition of midodrine given orthostasis  Plan:     -To [facility] ED for admission for IV diuresis/med optimization; discussed with Dr. Bush

## 2018-03-27 NOTE — PROGRESS NOTES
Subjective:    Patient ID:  Chung Meneses Sr. is a 86 y.o. male who presents for follow-up of Congestive Heart Failure      HPI  Mr. Meneses is an 86 year old male patient with a PMHx of CAD/severe AS s/p CABG x 1 and bioprosthetic AVR, permanent atrial fibrillation, CHF, HTN, and hyperlipidemia who presents today for hospital follow-up. Patient recently admitted to [facility] s/p syncopal episode. Symptoms felt to be secondary to orthostasis and mild volume depletion. His medications were adjusted and he was subsequently discharged. He returns today and states he is doing ok. Still complains of fatigue, states he has no energy. He also complains of increased SOB over the past few days. Associated symptoms included abdominal bloating, insomnia, and weight gain. No PND, orthopnea, or chest pain. No recurrence of syncope since discharge. He reports compliance with his medications, has been taking Lasix 40 mg daily since discharge. He reports compliance with dietary restrictions.        Review of Systems   Constitution: Negative for chills, decreased appetite, fever, weakness and malaise/fatigue.   HENT: Negative for congestion, hoarse voice and sore throat.    Eyes: Negative for blurred vision and discharge.   Cardiovascular: Negative for chest pain, claudication, cyanosis, dyspnea on exertion, irregular heartbeat, leg swelling, near-syncope, orthopnea, palpitations and paroxysmal nocturnal dyspnea.   Respiratory: Negative for cough, hemoptysis, shortness of breath, snoring, sputum production and wheezing.    Endocrine: Negative for cold intolerance and heat intolerance.   Hematologic/Lymphatic: Negative for bleeding problem. Does not bruise/bleed easily.   Skin: Negative for rash.   Musculoskeletal: Negative for arthritis, back pain, joint pain, joint swelling, muscle cramps, muscle weakness and myalgias.   Gastrointestinal: Negative for abdominal pain, constipation, diarrhea, heartburn, melena and nausea.    Genitourinary: Negative for hematuria.   Neurological: Negative for dizziness, focal weakness, headaches, light-headedness, loss of balance, numbness, paresthesias and seizures.   Psychiatric/Behavioral: Negative for memory loss. The patient does not have insomnia.    Allergic/Immunologic: Negative for hives.        Objective:    Physical Exam   Constitutional: He is oriented to person, place, and time. He appears well-developed and well-nourished. No distress.   HENT:   Head: Normocephalic and atraumatic.   Eyes: Pupils are equal, round, and reactive to light. Right eye exhibits no discharge. Left eye exhibits no discharge.   Neck: Neck supple. No JVD present. No tracheal deviation present. No thyromegaly present.   Cardiovascular: Normal rate, regular rhythm, normal heart sounds and intact distal pulses.  PMI is not displaced.  Exam reveals no gallop, no S3, no S4 and no friction rub.    No murmur heard.  Pulmonary/Chest: Effort normal and breath sounds normal. No respiratory distress. He has no wheezes. He has no rales.   Abdominal: He exhibits no distension. There is no tenderness. There is no rebound.   Musculoskeletal: He exhibits no edema.   Neurological: He is alert and oriented to person, place, and time.   Skin: Skin is warm and dry. He is not diaphoretic. No erythema.   Psychiatric: He has a normal mood and affect. His behavior is normal.   Nursing note and vitals reviewed.        Assessment:       1. Acute on chronic diastolic (congestive) heart failure    2. Persistent atrial fibrillation    3. Coronary artery disease, angina presence unspecified, unspecified vessel or lesion type, unspecified whether native or transplanted heart    4. Chronic diastolic congestive heart failure    5. S/P AVR    6. Hyperlipidemia, unspecified hyperlipidemia type    7. Essential hypertension    8. Acute on chronic diastolic heart failure         Plan:

## 2018-03-27 NOTE — TELEPHONE ENCOUNTER
"Received phone call from patient complaining of groin pain and shortness of breath.  Stated he thinks he's been taking medications correct but he can't get rid of fluid; "every other day Metolazone thirty minutes after Lasix 40 and Lasix 40 with 20 mg thirty minutes after " questioned was he taking Lasix 40 in the morning every day and 20 mg every evening ? ( seemed confused)  Scheduled 1 week follow up with Ericka JACOBS today at 3PM  "

## 2018-03-28 PROBLEM — I25.10 CAD (CORONARY ARTERY DISEASE): Chronic | Status: ACTIVE | Noted: 2017-12-11

## 2018-03-28 PROBLEM — N18.30 STAGE 3 CHRONIC KIDNEY DISEASE: Status: ACTIVE | Noted: 2018-03-28

## 2018-03-28 PROBLEM — D64.9 ANEMIA: Chronic | Status: ACTIVE | Noted: 2018-03-06

## 2018-03-28 PROBLEM — L97.929 LEG ULCER, LEFT: Status: ACTIVE | Noted: 2018-03-28

## 2018-03-28 LAB
ALBUMIN SERPL BCP-MCNC: 3.2 G/DL
ALP SERPL-CCNC: 131 U/L
ALT SERPL W/O P-5'-P-CCNC: 14 U/L
ANION GAP SERPL CALC-SCNC: 12 MMOL/L
ANISOCYTOSIS BLD QL SMEAR: SLIGHT
AST SERPL-CCNC: 24 U/L
BASOPHILS # BLD AUTO: 0.03 K/UL
BASOPHILS NFR BLD: 0.6 %
BILIRUB SERPL-MCNC: 1.8 MG/DL
BUN SERPL-MCNC: 72 MG/DL
BURR CELLS BLD QL SMEAR: ABNORMAL
CALCIUM SERPL-MCNC: 9 MG/DL
CHLORIDE SERPL-SCNC: 100 MMOL/L
CO2 SERPL-SCNC: 23 MMOL/L
CREAT SERPL-MCNC: 2 MG/DL
DACRYOCYTES BLD QL SMEAR: ABNORMAL
DIFFERENTIAL METHOD: ABNORMAL
EOSINOPHIL # BLD AUTO: 0.2 K/UL
EOSINOPHIL NFR BLD: 3.4 %
ERYTHROCYTE [DISTWIDTH] IN BLOOD BY AUTOMATED COUNT: 20.7 %
EST. GFR  (AFRICAN AMERICAN): 34 ML/MIN/1.73 M^2
EST. GFR  (NON AFRICAN AMERICAN): 29 ML/MIN/1.73 M^2
GLUCOSE SERPL-MCNC: 120 MG/DL
HCT VFR BLD AUTO: 28.5 %
HGB BLD-MCNC: 8.6 G/DL
HYPOCHROMIA BLD QL SMEAR: ABNORMAL
LYMPHOCYTES # BLD AUTO: 0.4 K/UL
LYMPHOCYTES NFR BLD: 8.7 %
MAGNESIUM SERPL-MCNC: 2 MG/DL
MCH RBC QN AUTO: 21.2 PG
MCHC RBC AUTO-ENTMCNC: 30.2 G/DL
MCV RBC AUTO: 70 FL
MONOCYTES # BLD AUTO: 0.6 K/UL
MONOCYTES NFR BLD: 12.7 %
NEUTROPHILS # BLD AUTO: 3.8 K/UL
NEUTROPHILS NFR BLD: 74.6 %
OVALOCYTES BLD QL SMEAR: ABNORMAL
PHOSPHATE SERPL-MCNC: 4.2 MG/DL
PLATELET # BLD AUTO: 225 K/UL
PLATELET BLD QL SMEAR: ABNORMAL
PMV BLD AUTO: 8.7 FL
POIKILOCYTOSIS BLD QL SMEAR: SLIGHT
POLYCHROMASIA BLD QL SMEAR: ABNORMAL
POTASSIUM SERPL-SCNC: 3.4 MMOL/L
PROT SERPL-MCNC: 6.7 G/DL
RBC # BLD AUTO: 4.05 M/UL
SCHISTOCYTES BLD QL SMEAR: PRESENT
SODIUM SERPL-SCNC: 135 MMOL/L
STOMATOCYTES BLD QL SMEAR: PRESENT
TARGETS BLD QL SMEAR: ABNORMAL
TROPONIN I SERPL DL<=0.01 NG/ML-MCNC: 0.11 NG/ML
TROPONIN I SERPL DL<=0.01 NG/ML-MCNC: 0.13 NG/ML
WBC # BLD AUTO: 5.04 K/UL

## 2018-03-28 PROCEDURE — 80053 COMPREHEN METABOLIC PANEL: CPT

## 2018-03-28 PROCEDURE — 25000003 PHARM REV CODE 250: Performed by: INTERNAL MEDICINE

## 2018-03-28 PROCEDURE — A4216 STERILE WATER/SALINE, 10 ML: HCPCS | Performed by: EMERGENCY MEDICINE

## 2018-03-28 PROCEDURE — 83735 ASSAY OF MAGNESIUM: CPT

## 2018-03-28 PROCEDURE — 25000003 PHARM REV CODE 250: Performed by: NURSE PRACTITIONER

## 2018-03-28 PROCEDURE — G0378 HOSPITAL OBSERVATION PER HR: HCPCS

## 2018-03-28 PROCEDURE — 63600175 PHARM REV CODE 636 W HCPCS: Performed by: NURSE PRACTITIONER

## 2018-03-28 PROCEDURE — 93010 ELECTROCARDIOGRAM REPORT: CPT | Mod: ,,, | Performed by: INTERNAL MEDICINE

## 2018-03-28 PROCEDURE — 84484 ASSAY OF TROPONIN QUANT: CPT | Mod: 91

## 2018-03-28 PROCEDURE — 84100 ASSAY OF PHOSPHORUS: CPT

## 2018-03-28 PROCEDURE — 85025 COMPLETE CBC W/AUTO DIFF WBC: CPT

## 2018-03-28 PROCEDURE — 25000003 PHARM REV CODE 250: Performed by: EMERGENCY MEDICINE

## 2018-03-28 PROCEDURE — 36415 COLL VENOUS BLD VENIPUNCTURE: CPT

## 2018-03-28 PROCEDURE — 93005 ELECTROCARDIOGRAM TRACING: CPT

## 2018-03-28 PROCEDURE — 99214 OFFICE O/P EST MOD 30 MIN: CPT | Mod: ,,, | Performed by: INTERNAL MEDICINE

## 2018-03-28 RX ORDER — POTASSIUM CHLORIDE 20 MEQ/1
40 TABLET, EXTENDED RELEASE ORAL ONCE
Status: COMPLETED | OUTPATIENT
Start: 2018-03-28 | End: 2018-03-28

## 2018-03-28 RX ORDER — METOLAZONE 2.5 MG/1
2.5 TABLET ORAL DAILY
Status: DISCONTINUED | OUTPATIENT
Start: 2018-03-28 | End: 2018-03-30 | Stop reason: HOSPADM

## 2018-03-28 RX ORDER — FERROUS SULFATE 325(65) MG
325 TABLET, DELAYED RELEASE (ENTERIC COATED) ORAL DAILY
Status: DISCONTINUED | OUTPATIENT
Start: 2018-03-28 | End: 2018-03-30 | Stop reason: HOSPADM

## 2018-03-28 RX ORDER — FAMOTIDINE 20 MG/1
20 TABLET, FILM COATED ORAL 2 TIMES DAILY
Status: DISCONTINUED | OUTPATIENT
Start: 2018-03-28 | End: 2018-03-28

## 2018-03-28 RX ORDER — TRAZODONE HYDROCHLORIDE 50 MG/1
50 TABLET ORAL NIGHTLY PRN
Status: DISCONTINUED | OUTPATIENT
Start: 2018-03-28 | End: 2018-03-30 | Stop reason: HOSPADM

## 2018-03-28 RX ORDER — ACETAMINOPHEN 325 MG/1
650 TABLET ORAL EVERY 6 HOURS PRN
Status: DISCONTINUED | OUTPATIENT
Start: 2018-03-28 | End: 2018-03-30 | Stop reason: HOSPADM

## 2018-03-28 RX ORDER — POTASSIUM CHLORIDE 20 MEQ/1
20 TABLET, EXTENDED RELEASE ORAL 2 TIMES DAILY
Status: DISCONTINUED | OUTPATIENT
Start: 2018-03-28 | End: 2018-03-30 | Stop reason: HOSPADM

## 2018-03-28 RX ORDER — METOPROLOL SUCCINATE 25 MG/1
25 TABLET, EXTENDED RELEASE ORAL DAILY
Status: DISCONTINUED | OUTPATIENT
Start: 2018-03-28 | End: 2018-03-30 | Stop reason: HOSPADM

## 2018-03-28 RX ORDER — SODIUM CHLORIDE 0.9 % (FLUSH) 0.9 %
3 SYRINGE (ML) INJECTION EVERY 8 HOURS
Status: DISCONTINUED | OUTPATIENT
Start: 2018-03-28 | End: 2018-03-30 | Stop reason: HOSPADM

## 2018-03-28 RX ORDER — FUROSEMIDE 10 MG/ML
60 INJECTION INTRAMUSCULAR; INTRAVENOUS 2 TIMES DAILY
Status: DISCONTINUED | OUTPATIENT
Start: 2018-03-28 | End: 2018-03-30 | Stop reason: HOSPADM

## 2018-03-28 RX ORDER — FAMOTIDINE 20 MG/1
20 TABLET, FILM COATED ORAL DAILY
Status: DISCONTINUED | OUTPATIENT
Start: 2018-03-29 | End: 2018-03-30 | Stop reason: HOSPADM

## 2018-03-28 RX ORDER — FUROSEMIDE 10 MG/ML
40 INJECTION INTRAMUSCULAR; INTRAVENOUS 2 TIMES DAILY
Status: DISCONTINUED | OUTPATIENT
Start: 2018-03-28 | End: 2018-03-28

## 2018-03-28 RX ORDER — ONDANSETRON 2 MG/ML
4 INJECTION INTRAMUSCULAR; INTRAVENOUS EVERY 6 HOURS PRN
Status: DISCONTINUED | OUTPATIENT
Start: 2018-03-28 | End: 2018-03-30 | Stop reason: HOSPADM

## 2018-03-28 RX ORDER — ASPIRIN 81 MG/1
81 TABLET ORAL DAILY
Status: DISCONTINUED | OUTPATIENT
Start: 2018-03-28 | End: 2018-03-30 | Stop reason: HOSPADM

## 2018-03-28 RX ADMIN — ASPIRIN 81 MG: 81 TABLET, COATED ORAL at 08:03

## 2018-03-28 RX ADMIN — POTASSIUM CHLORIDE 20 MEQ: 1500 TABLET, EXTENDED RELEASE ORAL at 08:03

## 2018-03-28 RX ADMIN — METOLAZONE 2.5 MG: 2.5 TABLET ORAL at 09:03

## 2018-03-28 RX ADMIN — FUROSEMIDE 60 MG: 10 INJECTION, SOLUTION INTRAMUSCULAR; INTRAVENOUS at 08:03

## 2018-03-28 RX ADMIN — SODIUM CHLORIDE, PRESERVATIVE FREE 3 ML: 5 INJECTION INTRAVENOUS at 10:03

## 2018-03-28 RX ADMIN — APIXABAN 2.5 MG: 2.5 TABLET, FILM COATED ORAL at 12:03

## 2018-03-28 RX ADMIN — APIXABAN 2.5 MG: 2.5 TABLET, FILM COATED ORAL at 08:03

## 2018-03-28 RX ADMIN — FAMOTIDINE 20 MG: 20 TABLET ORAL at 08:03

## 2018-03-28 RX ADMIN — POTASSIUM CHLORIDE 20 MEQ: 1500 TABLET, EXTENDED RELEASE ORAL at 10:03

## 2018-03-28 RX ADMIN — FUROSEMIDE 60 MG: 10 INJECTION, SOLUTION INTRAMUSCULAR; INTRAVENOUS at 05:03

## 2018-03-28 RX ADMIN — APIXABAN 2.5 MG: 2.5 TABLET, FILM COATED ORAL at 10:03

## 2018-03-28 RX ADMIN — SODIUM CHLORIDE, PRESERVATIVE FREE 3 ML: 5 INJECTION INTRAVENOUS at 05:03

## 2018-03-28 RX ADMIN — SODIUM CHLORIDE, PRESERVATIVE FREE 3 ML: 5 INJECTION INTRAVENOUS at 06:03

## 2018-03-28 RX ADMIN — METOPROLOL SUCCINATE 25 MG: 25 TABLET, EXTENDED RELEASE ORAL at 08:03

## 2018-03-28 RX ADMIN — POTASSIUM CHLORIDE 40 MEQ: 1500 TABLET, EXTENDED RELEASE ORAL at 08:03

## 2018-03-28 RX ADMIN — FERROUS SULFATE TAB EC 325 MG (65 MG FE EQUIVALENT) 325 MG: 325 (65 FE) TABLET DELAYED RESPONSE at 08:03

## 2018-03-28 NOTE — PROGRESS NOTES
Pharmacist Renal Dose Adjustment Note    Chung Meneses Sr. is a 86 y.o. male being treated with the medication famotidine.     Patient Data:    Vital Signs (Most Recent):  Temp: 97.9 °F (36.6 °C) (03/28/18 1141)  Pulse: 70 (03/28/18 1309)  Resp: 17 (03/28/18 0852)  BP: (!) 144/68 (03/28/18 1141)  SpO2: 98 % (03/28/18 1141)   Vital Signs (72h Range):  Temp:  [97.5 °F (36.4 °C)-98.7 °F (37.1 °C)]   Pulse:  [64-80]   Resp:  [14-20]   BP: (112-158)/(63-78)   SpO2:  [95 %-100 %]        Recent Labs     Lab 03/21/18  1510 03/27/18  1759 03/28/18  0433   CREATININE 2.4* 2.2* 2.0*     Serum creatinine: 2 mg/dL (H) 03/28/18 0433  Estimated creatinine clearance: 26.5 mL/min (A)    Medication dose will be changed to 20 mg PO daily.   Pharmacist's Name: Alena Gardner  Pharmacist's Extension: [phone]

## 2018-03-28 NOTE — PROGRESS NOTES
[facility] Hospital Medicine  Progress Note    Patient Name: Chung Meneses Sr.  MRN: [id number]  Patient Class: OP- Observation   Admission Date: 3/27/2018  Length of Stay: 0 days  Attending Physician: Holland Feliciano MD  Primary Care Provider: Glenroy Carmcihael MD        Subjective:     Principal Problem:Acute on chronic diastolic (congestive) heart failure    HPI:  Mr. Meneses is an 86 year old male patient with a PMHx of CAD with remote CABG x 1V 12/2017, severe AS with remote bioprosthetic AVR 12/2017, permanent AF on Eliquis, chronic diastolic HFpEF of 50-55%, HTN, and CKD stage III, who was sent to ED by [facility]iology [facility] for acute decompensated HF with c/o SOB that has progressively worsened over the past 2-3 days.  Associated ABD bloating, weight gain (6.7 kg increase from dry weight), edema to BLE, and insomnia.  Denies any PND, orthopnea, cough, CP, palpitations, ABD pain, N/V/D, dysuria, hematuria, lightheadedness, dizziness, syncope, or fever.  He reports compliance with diuretic and diet.  He was recently admitted on 3/7 for IVVD, and DC with decreased dose of Lasix.  He reports excess fluid quickly accumulated, and continued to worsen since DC.  Initial work-up in ED resulted BNP 2135, troponin 0.129, cr. 2.2, BUN 72.  Patient received Lasix IV 80mg x 1 dose in ED and responded well with ~2L diuresed.  Patient was admitted for acute decompensated HF with anasarca under Hospital Medicine services.    Interval History: Patient was kept for observation with acute on chronic CHF under the care of Hospital Medicine. He was given IV furosemide and has had 2800cc output since admit. He remains SOB with talking and has edema to scrotum, abd and bilat legs. He will need at least 2 midnights to continue to diurese and get the fluid off so that he can breath.     Review of Systems   Constitutional: Positive for activity change and fatigue. Negative for chills, diaphoresis and fever.   HENT:  "Negative for congestion, rhinorrhea and sore throat.    Eyes: Negative for pain and visual disturbance.   Respiratory: Positive for shortness of breath (EISENBERG). Negative for cough and wheezing.    Cardiovascular: Positive for leg swelling. Negative for chest pain and palpitations.   Gastrointestinal: Positive for abdominal distention ("bloating"). Negative for abdominal pain, constipation, diarrhea, nausea and vomiting.   Endocrine: Negative for polyphagia and polyuria.   Genitourinary: Positive for scrotal swelling. Negative for difficulty urinating, dysuria, hematuria, penile pain, penile swelling and testicular pain.   Musculoskeletal: Positive for gait problem ( 2/2 leg and scrotal edema ). Negative for arthralgias and myalgias.   Skin: Negative for color change and wound.   Allergic/Immunologic: Negative.    Neurological: Negative for dizziness, seizures, syncope, facial asymmetry, speech difficulty, weakness, light-headedness, numbness and headaches.   Hematological: Negative.    Psychiatric/Behavioral: Negative for agitation, behavioral problems and confusion. The patient is not nervous/anxious.    All other systems reviewed and are negative.    Objective:     Vital Signs (Most Recent):  Temp: 98.1 °F (36.7 °C) (03/28/18 0852)  Pulse: 70 (03/28/18 0916)  Resp: 17 (03/28/18 0852)  BP: 137/65 (03/28/18 0852)  SpO2: 98 % (03/28/18 0852) Vital Signs (24h Range):  Temp:  [97.5 °F (36.4 °C)-98.7 °F (37.1 °C)] 98.1 °F (36.7 °C)  Pulse:  [64-80] 70  Resp:  [14-20] 17  SpO2:  [95 %-100 %] 98 %  BP: (112-158)/(63-78) 137/65     Weight: 81.8 kg (180 lb 5.4 oz)  Body mass index is 26.63 kg/m².    Intake/Output Summary (Last 24 hours) at 03/28/18 1129  Last data filed at 03/28/18 1000   Gross per 24 hour   Intake                0 ml   Output             2800 ml   Net            -2800 ml      Physical Exam   Constitutional: He is oriented to person, place, and time. He appears well-developed and well-nourished. No distress. "   HENT:   Head: Normocephalic and atraumatic.   Nose: Nose normal.   Mouth/Throat: Oropharynx is clear and moist.   Eyes: Conjunctivae and EOM are normal. Pupils are equal, round, and reactive to light.   Neck: Normal range of motion. Neck supple. No JVD present.   Cardiovascular: Normal rate, regular rhythm, S1 normal, S2 normal, normal heart sounds and intact distal pulses.   No extrasystoles are present. Exam reveals no gallop.    No murmur heard.  Pulmonary/Chest: Effort normal and breath sounds normal. No accessory muscle usage. No tachypnea. No respiratory distress. He has no wheezes. He has no rhonchi. He has no rales.   Abdominal: Soft. Bowel sounds are normal. He exhibits distension. There is no tenderness. There is no CVA tenderness.   Genitourinary: Right testis shows swelling. Left testis shows swelling.   Genitourinary Comments: deferred   Musculoskeletal: Normal range of motion. He exhibits edema (2+ to BLE from feet to lower ABD, including scrotum ). He exhibits no deformity.   Neurological: He is alert and oriented to person, place, and time. No cranial nerve deficit.   Skin: Skin is warm, dry and intact. Capillary refill takes 2 to 3 seconds. He is not diaphoretic. No cyanosis or erythema.   Psychiatric: He has a normal mood and affect. His speech is normal and behavior is normal. Cognition and memory are normal.   Nursing note and vitals reviewed.      Significant Labs:   Recent Lab Results       03/28/18  0443 03/28/18  0433 03/27/18  1759      Albumin  3.2(L) 3.4(L)     Alkaline Phosphatase  131 130     ALT  14 13     Anion Gap  12 11     Aniso   Slight     AST  24 23     Baso #   0.03     Basophil%   0.6     Total Bilirubin  1.8  Comment:  For infants and newborns, interpretation of results should be based  on gestational age, weight and in agreement with clinical  observations.  Premature Infant recommended reference ranges:  Up to 24 hours.............<8.0 mg/dL  Up to 48  "hours............<12.0 mg/dL  3-5 days..................<15.0 mg/dL  6-29 days.................<15.0 mg/dL  (H) 1.9  Comment:  For infants and newborns, interpretation of results should be based  on gestational age, weight and in agreement with clinical  observations.  Premature Infant recommended reference ranges:  Up to 24 hours.............<8.0 mg/dL  Up to 48 hours............<12.0 mg/dL  3-5 days..................<15.0 mg/dL  6-29 days.................<15.0 mg/dL  (H)     BNP   2,135  Comment:  Values of less than 100 pg/ml are consistent with non-CHF populations.(H)     BUN, Bld  72(H) 72(H)     Calcium  9.0 9.0     Chloride  100 102     CO2  23 21(L)     CPK   91        91     CPK MB   2.1     Creatinine  2.0(H) 2.2(H)     Differential Method   Automated     eGFR if   34(A) 30(A)     eGFR if non   29  Comment:  Calculation used to obtain the estimated glomerular filtration  rate (eGFR) is the CKD-EPI equation.   (A) 26  Comment:  Calculation used to obtain the estimated glomerular filtration  rate (eGFR) is the CKD-EPI equation.   (A)     Eos #   0.1     Eosinophil%   2.0     Large/Giant Platelets   Present     Glucose  120(H) 176(H)     Gran # (ANC)   3.6     Gran%   71.7     Hematocrit   28.8(L)     Hemoglobin   8.4(L)     Hypo   Moderate     Coumadin Monitoring INR   1.4  Comment:  Coumadin Therapy:  2.0 - 3.0 for INR for all indicators except mechanical heart valves  and antiphospholipid syndromes which should use 2.5 - 3.5.  (H)     Lymph #   0.6(L)     Lymph%   11.0(L)     Magnesium  2.0 2.1     MB%   2.3  Comment:  To be positive, the MB% must be greater than 5% AND the CK-MB  greater than 6.5 ng/mL. Values not in the reference interval,   but not qualifying as positive, should be considered "trace".       MCH   20.8(L)     MCHC   29.2(L)     MCV   72(L)     Mono #   0.8     Mono%   15.1(H)     MPV   8.8(L)     Ovalocytes   Occasional     Phosphorus  4.2      Platelet " Estimate   Appears normal     Platelets   233     Poik   Slight     Potassium  3.4(L) 3.8     Total Protein  6.7 6.8     Protime   14.0(H)     RBC   4.03(L)     RDW   20.6(H)     Schistocytes   Present     Sodium  135(L) 134(L)     Target Cells   Occasional     Tear Drop Cells   Occasional     Troponin I 0.112  Comment:  The reference interval for Troponin I represents the 99th percentile   cutoff   for our facility and is consistent with 3rd generation assay   performance.  (H)  0.129  Comment:  The reference interval for Troponin I represents the 99th percentile   cutoff   for our facility and is consistent with 3rd generation assay   performance.  (H)     WBC   5.09         All pertinent labs within the past 24 hours have been reviewed.    Significant Imaging: I have reviewed all pertinent imaging results/findings within the past 24 hours.    Assessment/Plan:      * Acute on chronic diastolic HFpEF of 50-55% with anasarca    - Likely due to recent decrease in home diuretic.  - Aggressive diuresis with IV Lasix 60mg BID.  - Continue home metolazone.  - Strict I&O's, daily weights, Na/fluid restriction.  - ? add spironolactone.   - Cardiology consult in AM.     3/28:  --needs additional diuresis -   --IV furosemide and PO metolazone  --strict I&O  --daily weights  --cardiac diet with fluid restrictions  --cardiology following        XIANG on CKD stage III    - Likely secondary to cardiorenal syndrome.  - Baseline cr. 1.6-1.8.  - Diuresis as above.  - Strict I&O's.  - Follow daily BMP.    3/28:  --BMP stable, Cr slightly improved to 2.0  --monitor BMP  --continue to diurese            Anemia of chronic disease with iron deficiency    - H&H stable at baseline, monitor and will transfuse as needed.  - Iron supplementation.         Hypokalemia    3/28:  --K 3.4  --replete and monitor          Elevated troponin    - Likely demand due to #1.  - No angina.  - Trend serial CE's; Check EKG.  - Continue ASA and BB.  -  Cardiology consult in Am.    3/28:  --likely 2/2 demand ischemia  --daily ASA and BB  --cardiology following  --troponin elevated but flat        Chronic atrial fibrillation    - Stable.  - Continue BB and Eliquis.  - Monitor telemetry.         Essential hypertension    - BP stable.  - Continue BB and diuretics.  Monitor BP trends.    3/28:  --stable  --continue current meds  --cardiac diet with fluid restrictions          VTE Risk Mitigation         Ordered     apixaban tablet 2.5 mg  2 times daily     Route:  Oral        03/28/18 0021              ANAMARIA Richards  Department of Hospital Medicine   [facility]

## 2018-03-28 NOTE — PLAN OF CARE
"Met with patient at bedside for Discharge Assessment.  Patient has [employer] Medicare.  Patient reports that he is here "due to having excess fluid."  Patient states that his wife just returned from SNF placement at NH and is doing better.  Patient reports being independent with ADLs and states that his daughter drives him and his wife to/from MD appointments.  Patient reports using and needing no HME at home.  Patient indicates that only post-hospitalization needs that he (patient) has is resumption of HH services via [facility].  Patient Preference Form signed reflecting this HH preference; and signed form placed in patient's blue folder.      Contacted Charmaine with [facility] and notified of patient being in OBS status.  Charmaine stating that patient has been receiving HH SN services.      PLAN:  Will need to obtain  order for SN services should patient transition into inpatient status and make referral to [facility] via [facility]       03/28/18 7434   Discharge Assessment   Assessment Type Discharge Planning Assessment   Confirmed/corrected address and phone number on facesheet? Yes   Assessment information obtained from? Patient;Medical Record   Expected Length of Stay (days) (TBD)   Communicated expected length of stay with patient/caregiver no   Prior to hospitilization cognitive status: Alert/Oriented   Prior to hospitalization functional status: Independent   Current cognitive status: Alert/Oriented   Current Functional Status: Independent   Facility Arrived From: Home   Lives With spouse   Able to Return to Prior Arrangements yes   Is patient able to care for self after discharge? Yes   Who are your caregiver(s) and their phone number(s)? Jocelin Machuca, Daughter:  [phone]   Patient's perception of discharge disposition admitted as an inpatient;home health   Patient currently receives any other outside agency services? No   Equipment Currently Used at Home none   Do you have any problems " affording any of your prescribed medications? No   Is the patient taking medications as prescribed? yes   Does the patient have transportation home? Yes   Transportation Available family or friend will provide   Discharge Plan A Home with family;Home Health  (HH SN, PT, OT via [place] )   Discharge Plan B Home with family;Home Health   Patient/Family In Agreement With Plan yes

## 2018-03-28 NOTE — HPI
Mr. Meneses is an 86 year old male patient with a PMHx of CAD with remote CABG x 1V 12/2017, severe AS with remote bioprosthetic AVR 12/2017, permanent AF on Eliquis, chronic diastolic HFpEF of 50-55%, HTN, and CKD stage III, who was sent to ED by [facility]iology Clinic for acute decompensated HF with c/o SOB that has progressively worsened over the past 2-3 days.  Associated ABD bloating, weight gain (6.7 kg increase from dry weight), edema to BLE, and insomnia.  Denies any PND, orthopnea, cough, CP, palpitations, ABD pain, N/V/D, dysuria, hematuria, lightheadedness, dizziness, syncope, or fever.  He reports compliance with diuretic and diet.  He was recently admitted on 3/7 for IVVD, and DC with decreased dose of Lasix.  He reports excess fluid quickly accumulated, and continued to worsen since DC.  Initial work-up in ED resulted BNP 2135, troponin 0.129, cr. 2.2, BUN 72.  Patient received Lasix IV 80mg x 1 dose in ED and responded well with ~2L diuresed.  Continues to diurese with Lasix 60mg BID.  Patient was admitted for acute decompensated HF with anasarca .

## 2018-03-28 NOTE — SUBJECTIVE & OBJECTIVE
Past Medical History:   Diagnosis Date    Aortic stenosis 8/2/2013    Atrial fibrillation 7/5/2013    CHF (congestive heart failure)     Coronary artery disease     Dizziness - light-headed     Hyperlipidemia     Hypertension     Syncope 1/26/2018       Past Surgical History:   Procedure Laterality Date    BACK SURGERY  2003    CARDIAC VALVE SURGERY      CORONARY ARTERY BYPASS GRAFT      knee replacemnt bilateral  2001    SHOULDER SURGERY  2002       Review of patient's allergies indicates:   Allergen Reactions    Sulfa (sulfonamide antibiotics) Hives       No current facility-administered medications on file prior to encounter.      Current Outpatient Prescriptions on File Prior to Encounter   Medication Sig    apixaban (ELIQUIS) 2.5 mg Tab Take 1 tablet (2.5 mg total) by mouth 2 (two) times daily.    aspirin (ECOTRIN) 81 MG EC tablet Take 1 tablet (81 mg total) by mouth once daily.    fish oil-omega-3 fatty acids 300-1,000 mg capsule Take 1,200 mg by mouth once daily.    furosemide (LASIX) 40 MG tablet Take 1 tablet (40 mg total) by mouth once daily.    metOLazone (ZAROXOLYN) 2.5 MG tablet Take 1 tablet (2.5 mg total) by mouth once daily.    metoprolol succinate (TOPROL-XL) 25 MG 24 hr tablet Take 1 tablet (25 mg total) by mouth once daily.    potassium chloride SA (K-DUR,KLOR-CON) 10 MEQ tablet Take 10 mEq by mouth every evening.    multivitamin with folic acid 400 mcg Tab Take 1 tablet by mouth.    potassium chloride (K-TAB) 20 mEq Take 1 tablet (20 mEq total) by mouth every morning. Takes one tablet (20 meq) in the morning and half tablet (10 meq) in the evening    potassium chloride (KLOR-CON) 10 MEQ TbSR Take 1 tablet (10 mEq total) by mouth every evening.    ranitidine (ZANTAC) 75 MG tablet Take 75 mg by mouth nightly.     traZODone (DESYREL) 50 MG tablet Take 50 mg by mouth every evening.    vitamin D 1000 units Tab Take 1,000 Units by mouth once daily.      Family History      Problem Relation (Age of Onset)    Heart attack Brother    Heart disease Brother    Heart failure Brother    Hypertension Mother, Father, Sister, Brother        Social History Main Topics    Smoking status: Never Smoker    Smokeless tobacco: Never Used    Alcohol use No      Comment: HOLIDAY    Drug use: No    Sexual activity: No     Review of Systems   Constitution: Positive for malaise/fatigue. Negative for diaphoresis, weakness, weight gain and weight loss.   HENT: Negative for congestion and nosebleeds.    Cardiovascular: Positive for dyspnea on exertion. Negative for chest pain, claudication, cyanosis, irregular heartbeat, leg swelling, near-syncope, orthopnea, palpitations, paroxysmal nocturnal dyspnea and syncope.   Respiratory: Positive for shortness of breath and sleep disturbances due to breathing. Negative for cough, hemoptysis, snoring, sputum production and wheezing.    Hematologic/Lymphatic: Negative for bleeding problem. Does not bruise/bleed easily.   Skin: Negative for rash.   Musculoskeletal: Negative for arthritis, back pain, falls, joint pain, muscle cramps and muscle weakness.   Gastrointestinal: Positive for bloating. Negative for abdominal pain, constipation, diarrhea, heartburn, hematemesis, hematochezia, melena and nausea.   Genitourinary: Negative for dysuria, hematuria and nocturia.        Scrotal swelling     Neurological: Negative for excessive daytime sleepiness, dizziness, headaches, light-headedness, loss of balance, numbness and vertigo.     Objective:     Vital Signs (Most Recent):  Temp: 98.1 °F (36.7 °C) (03/28/18 0852)  Pulse: 77 (03/28/18 0852)  Resp: 17 (03/28/18 0852)  BP: 137/65 (03/28/18 0852)  SpO2: 98 % (03/28/18 0852) Vital Signs (24h Range):  Temp:  [97.5 °F (36.4 °C)-98.7 °F (37.1 °C)] 98.1 °F (36.7 °C)  Pulse:  [64-80] 77  Resp:  [14-20] 17  SpO2:  [95 %-100 %] 98 %  BP: (112-158)/(63-78) 137/65     Weight: 81.8 kg (180 lb 5.4 oz)  Body mass index is 26.63  kg/m².    SpO2: 98 %  O2 Device (Oxygen Therapy): room air      Intake/Output Summary (Last 24 hours) at 03/28/18 0954  Last data filed at 03/28/18 0415   Gross per 24 hour   Intake                0 ml   Output             2600 ml   Net            -2600 ml       Lines/Drains/Airways     Pressure Ulcer                 Pressure Injury 03/28/18 0022 Buttocks Unstageable less than 1 day          Peripheral Intravenous Line                 Peripheral IV - Single Lumen 03/27/18 1759 Left Antecubital less than 1 day                Physical Exam   Constitutional: He is oriented to person, place, and time. He appears well-developed and well-nourished.   Neck: Neck supple. JVD present.   Cardiovascular: Normal rate, regular rhythm, normal heart sounds and normal pulses.  Exam reveals no friction rub.    No murmur heard.  Pulmonary/Chest: Effort normal and breath sounds normal. No respiratory distress. He has no wheezes. He has no rales.   Abdominal: Soft. Bowel sounds are normal. He exhibits distension.   Genitourinary:   Genitourinary Comments: Scrotal edema    Musculoskeletal: He exhibits edema ( BLE that extends into thighs and scrotum ). He exhibits no tenderness.   Neurological: He is alert and oriented to person, place, and time.   Skin: Skin is warm and dry. No rash noted.   Psychiatric: He has a normal mood and affect. His behavior is normal.   Nursing note and vitals reviewed.      Significant Labs:   All pertinent lab results from the last 24 hours have been reviewed. and   Recent Lab Results       03/28/18  0443 03/28/18  0433 03/27/18  1759      Albumin  3.2(L) 3.4(L)     Alkaline Phosphatase  131 130     ALT  14 13     Anion Gap  12 11     Aniso   Slight     AST  24 23     Baso #   0.03     Basophil%   0.6     Total Bilirubin  1.8  Comment:  For infants and newborns, interpretation of results should be based  on gestational age, weight and in agreement with clinical  observations.  Premature Infant recommended  "reference ranges:  Up to 24 hours.............<8.0 mg/dL  Up to 48 hours............<12.0 mg/dL  3-5 days..................<15.0 mg/dL  6-29 days.................<15.0 mg/dL  (H) 1.9  Comment:  For infants and newborns, interpretation of results should be based  on gestational age, weight and in agreement with clinical  observations.  Premature Infant recommended reference ranges:  Up to 24 hours.............<8.0 mg/dL  Up to 48 hours............<12.0 mg/dL  3-5 days..................<15.0 mg/dL  6-29 days.................<15.0 mg/dL  (H)     BNP   2,135  Comment:  Values of less than 100 pg/ml are consistent with non-CHF populations.(H)     BUN, Bld  72(H) 72(H)     Calcium  9.0 9.0     Chloride  100 102     CO2  23 21(L)     CPK   91        91     CPK MB   2.1     Creatinine  2.0(H) 2.2(H)     Differential Method   Automated     eGFR if   34(A) 30(A)     eGFR if non   29  Comment:  Calculation used to obtain the estimated glomerular filtration  rate (eGFR) is the CKD-EPI equation.   (A) 26  Comment:  Calculation used to obtain the estimated glomerular filtration  rate (eGFR) is the CKD-EPI equation.   (A)     Eos #   0.1     Eosinophil%   2.0     Large/Giant Platelets   Present     Glucose  120(H) 176(H)     Gran # (ANC)   3.6     Gran%   71.7     Hematocrit   28.8(L)     Hemoglobin   8.4(L)     Hypo   Moderate     Coumadin Monitoring INR   1.4  Comment:  Coumadin Therapy:  2.0 - 3.0 for INR for all indicators except mechanical heart valves  and antiphospholipid syndromes which should use 2.5 - 3.5.  (H)     Lymph #   0.6(L)     Lymph%   11.0(L)     Magnesium  2.0 2.1     MB%   2.3  Comment:  To be positive, the MB% must be greater than 5% AND the CK-MB  greater than 6.5 ng/mL. Values not in the reference interval,   but not qualifying as positive, should be considered "trace".       MCH   20.8(L)     MCHC   29.2(L)     MCV   72(L)     Mono #   0.8     Mono%   15.1(H)     MPV   " 8.8(L)     Ovalocytes   Occasional     Phosphorus  4.2      Platelet Estimate   Appears normal     Platelets   233     Poik   Slight     Potassium  3.4(L) 3.8     Total Protein  6.7 6.8     Protime   14.0(H)     RBC   4.03(L)     RDW   20.6(H)     Schistocytes   Present     Sodium  135(L) 134(L)     Target Cells   Occasional     Tear Drop Cells   Occasional     Troponin I 0.112  Comment:  The reference interval for Troponin I represents the 99th percentile   cutoff   for our facility and is consistent with 3rd generation assay   performance.  (H)  0.129  Comment:  The reference interval for Troponin I represents the 99th percentile   cutoff   for our facility and is consistent with 3rd generation assay   performance.  (H)     WBC   5.09           Significant Imaging: Echocardiogram:   2D echo with color flow doppler:   Results for orders placed or performed in visit on 01/26/18   2D Echo w/ Color Flow Doppler   Result Value Ref Range    EF 50 55 - 65    Mitral Valve Regurgitation MODERATE (A)     Diastolic Dysfunction Yes (A)     Est. PA Systolic Pressure 41.01 (A)     Mitral Valve Mobility NORMAL     Tricuspid Valve Regurgitation MILD TO MODERATE     and X-Ray: CXR: X-Ray Chest 1 View (CXR):   Results for orders placed or performed during the hospital encounter of 03/27/18   X-Ray Chest 1 View    Narrative    Exam: XR CHEST 1 VIEW,    Date:  03/27/18 17:46:49    History: Heart failure    Comparison:  03/06/2018    Findings: There is mild enlargement of the heart.  There are sternal sutures from previous heart surgery.  The lungs are clear.  Bilateral shoulder surgery is noted.    Impression      Mild enlargement of the heart.  No acute findings.      Electronically signed by: MARYCRUZ HAWK MD  Date:     03/27/18  Time:    17:59     and X-Ray Chest PA and Lateral (CXR): No results found for this visit on 03/27/18.

## 2018-03-28 NOTE — H&P
[facility] Hospital Medicine  History & Physical    Patient Name: Chung Meneses Sr.  MRN: [id number]  Admission Date: 3/27/2018  Attending Physician: Augustin Gomez MD   Primary Care Provider: Glenroy Carmichael MD         Patient information was obtained from patient, past medical records and ER records.     Subjective:     Principal Problem:Acute on chronic diastolic (congestive) heart failure    Chief Complaint:   Chief Complaint   Patient presents with    Shortness of Breath     Pt sent by Dr. Espinoza for admission for CHF        HPI: Mr. Meneses is an 86 year old male patient with a PMHx of CAD with remote CABG x 1V 12/2017, severe AS with remote bioprosthetic AVR 12/2017, permanent AF on Eliquis, chronic diastolic HFpEF of 50-55%, HTN, and CKD stage III, who was sent to ED by Cardiology Clinic for acute decompensated HF with c/o SOB that has progressively worsened over the past 2-3 days.  Associated ABD bloating, weight gain (6.7 kg increase from dry weight), edema to BLE, and insomnia.  Denies any PND, orthopnea, cough, CP, palpitations, ABD pain, N/V/D, dysuria, hematuria, lightheadedness, dizziness, syncope, or fever.  He reports compliance with diuretic and diet.  He was recently admitted on 3/7 for IVVD, and DC with decreased dose of Lasix.  He reports excess fluid quickly accumulated, and continued to worsen since DC.  Initial work-up in ED resulted BNP 2135, troponin 0.129, cr. 2.2, BUN 72.  Patient received Lasix IV 80mg x 1 dose in ED and responded well with ~2L diuresed.  Patient was admitted for acute decompensated HF with anasarca under Hospital Medicine services.    Past Medical History:   Diagnosis Date    Aortic stenosis 8/2/2013    Atrial fibrillation 7/5/2013    CHF (congestive heart failure)     Coronary artery disease     Dizziness - light-headed     Hyperlipidemia     Hypertension     Syncope 1/26/2018       Past Surgical History:   Procedure Laterality Date    BACK  SURGERY  2003    CARDIAC VALVE SURGERY      CORONARY ARTERY BYPASS GRAFT      knee replacemnt bilateral  2001    SHOULDER SURGERY  2002       Review of patient's allergies indicates:   Allergen Reactions    Sulfa (sulfonamide antibiotics) Hives       No current facility-administered medications on file prior to encounter.      Current Outpatient Prescriptions on File Prior to Encounter   Medication Sig    apixaban (ELIQUIS) 2.5 mg Tab Take 1 tablet (2.5 mg total) by mouth 2 (two) times daily.    aspirin (ECOTRIN) 81 MG EC tablet Take 1 tablet (81 mg total) by mouth once daily.    fish oil-omega-3 fatty acids 300-1,000 mg capsule Take 1,200 mg by mouth once daily.    furosemide (LASIX) 40 MG tablet Take 1 tablet (40 mg total) by mouth once daily.    metOLazone (ZAROXOLYN) 2.5 MG tablet Take 1 tablet (2.5 mg total) by mouth once daily.    metoprolol succinate (TOPROL-XL) 25 MG 24 hr tablet Take 1 tablet (25 mg total) by mouth once daily.    potassium chloride SA (K-DUR,KLOR-CON) 10 MEQ tablet Take 10 mEq by mouth every evening.    multivitamin with folic acid 400 mcg Tab Take 1 tablet by mouth.    potassium chloride (K-TAB) 20 mEq Take 1 tablet (20 mEq total) by mouth every morning. Takes one tablet (20 meq) in the morning and half tablet (10 meq) in the evening    potassium chloride (KLOR-CON) 10 MEQ TbSR Take 1 tablet (10 mEq total) by mouth every evening.    ranitidine (ZANTAC) 75 MG tablet Take 75 mg by mouth nightly.     traZODone (DESYREL) 50 MG tablet Take 50 mg by mouth every evening.    vitamin D 1000 units Tab Take 1,000 Units by mouth once daily.      Family History     Problem Relation (Age of Onset)    Heart attack Brother    Heart disease Brother    Heart failure Brother    Hypertension Mother, Father, Sister, Brother        Social History Main Topics    Smoking status: Never Smoker    Smokeless tobacco: Never Used    Alcohol use No      Comment: HOLIDAY    Drug use: No    Sexual  "activity: No     Review of Systems   Constitutional: Positive for fatigue. Negative for activity change, chills, diaphoresis, fever and unexpected weight change.   HENT: Negative for congestion, postnasal drip, rhinorrhea, sore throat and trouble swallowing.    Eyes: Negative for photophobia and visual disturbance.   Respiratory: Positive for shortness of breath (EISENBERG). Negative for apnea, cough, chest tightness and wheezing.    Cardiovascular: Positive for leg swelling. Negative for chest pain and palpitations.   Gastrointestinal: Positive for abdominal distention ("bloating"). Negative for abdominal pain, anal bleeding, blood in stool, constipation, diarrhea, nausea and vomiting.   Endocrine: Negative for polydipsia, polyphagia and polyuria.   Genitourinary: Positive for scrotal swelling. Negative for decreased urine volume, difficulty urinating, dysuria, frequency, hematuria, penile pain, penile swelling, testicular pain and urgency.   Musculoskeletal: Negative for arthralgias, back pain, gait problem, joint swelling and myalgias.   Skin: Negative for pallor, rash and wound.   Neurological: Negative for dizziness, seizures, syncope, facial asymmetry, speech difficulty, weakness, light-headedness, numbness and headaches.   Psychiatric/Behavioral: Positive for sleep disturbance. Negative for confusion. The patient is not nervous/anxious.    All other systems reviewed and are negative.    Objective:     Vital Signs (Most Recent):  Temp: 98.7 °F (37.1 °C) (03/27/18 1900)  Pulse: 67 (03/27/18 2240)  Resp: 20 (03/27/18 2240)  BP: (!) 158/78 (03/27/18 2240)  SpO2: 100 % (03/27/18 2240) Vital Signs (24h Range):  Temp:  [98.7 °F (37.1 °C)] 98.7 °F (37.1 °C)  Pulse:  [64-72] 67  Resp:  [14-20] 20  SpO2:  [95 %-100 %] 100 %  BP: (112-158)/(66-78) 158/78     Weight: 84.6 kg (186 lb 8.2 oz)  Body mass index is 27.54 kg/m².    Physical Exam   Constitutional: He is oriented to person, place, and time. He appears well-developed " and well-nourished. No distress.   HENT:   Head: Normocephalic and atraumatic.   Eyes: Conjunctivae are normal.   PERRL; EOM intact.   Neck: Normal range of motion. Neck supple. No JVD present.   Cardiovascular: Normal rate, regular rhythm, S1 normal, S2 normal and intact distal pulses.   No extrasystoles are present. Exam reveals no gallop.    No murmur heard.  Pulses:       Radial pulses are 2+ on the right side, and 2+ on the left side.        Dorsalis pedis pulses are 2+ on the right side, and 2+ on the left side.        Posterior tibial pulses are 2+ on the right side, and 2+ on the left side.   Pulmonary/Chest: Effort normal and breath sounds normal. No accessory muscle usage. No tachypnea. No respiratory distress. He has no wheezes. He has no rhonchi. He has no rales.   Abdominal: Soft. Bowel sounds are normal. He exhibits distension. There is no tenderness. There is no rebound, no guarding and no CVA tenderness.   Genitourinary: Right testis shows swelling. Left testis shows swelling.   Musculoskeletal: Normal range of motion. He exhibits edema (2+ to BLE from feet to lower ABD, including scrotum). He exhibits no tenderness or deformity.   Neurological: He is alert and oriented to person, place, and time. No cranial nerve deficit or sensory deficit.   Skin: Skin is warm, dry and intact. No rash noted. He is not diaphoretic. No cyanosis or erythema.   Psychiatric: He has a normal mood and affect. His speech is normal and behavior is normal. Cognition and memory are normal.   Nursing note and vitals reviewed.          Significant Labs:   Results for orders placed or performed during the hospital encounter of 03/27/18   Magnesium   Result Value Ref Range    Magnesium 2.1 1.6 - 2.6 mg/dL   Troponin I   Result Value Ref Range    Troponin I 0.129 (H) 0.000 - 0.026 ng/mL   CK-MB   Result Value Ref Range    CPK 91 20 - 200 U/L    CPK MB 2.1 0.1 - 6.5 ng/mL    MB% 2.3 0.0 - 5.0 %   CK   Result Value Ref Range    CPK  91 20 - 200 U/L   CBC auto differential   Result Value Ref Range    WBC 5.09 3.90 - 12.70 K/uL    RBC 4.03 (L) 4.60 - 6.20 M/uL    Hemoglobin 8.4 (L) 14.0 - 18.0 g/dL    Hematocrit 28.8 (L) 40.0 - 54.0 %    MCV 72 (L) 82 - 98 fL    MCH 20.8 (L) 27.0 - 31.0 pg    MCHC 29.2 (L) 32.0 - 36.0 g/dL    RDW 20.6 (H) 11.5 - 14.5 %    Platelets 233 150 - 350 K/uL    MPV 8.8 (L) 9.2 - 12.9 fL    Gran # (ANC) 3.6 1.8 - 7.7 K/uL    Lymph # 0.6 (L) 1.0 - 4.8 K/uL    Mono # 0.8 0.3 - 1.0 K/uL    Eos # 0.1 0.0 - 0.5 K/uL    Baso # 0.03 0.00 - 0.20 K/uL    Gran% 71.7 38.0 - 73.0 %    Lymph% 11.0 (L) 18.0 - 48.0 %    Mono% 15.1 (H) 4.0 - 15.0 %    Eosinophil% 2.0 0.0 - 8.0 %    Basophil% 0.6 0.0 - 1.9 %    Platelet Estimate Appears normal     Aniso Slight     Poik Slight     Hypo Moderate     Ovalocytes Occasional     Target Cells Occasional     Tear Drop Cells Occasional     Schistocytes Present     Large/Giant Platelets Present     Differential Method Automated    Comprehensive metabolic panel   Result Value Ref Range    Sodium 134 (L) 136 - 145 mmol/L    Potassium 3.8 3.5 - 5.1 mmol/L    Chloride 102 95 - 110 mmol/L    CO2 21 (L) 23 - 29 mmol/L    Glucose 176 (H) 70 - 110 mg/dL    BUN, Bld 72 (H) 8 - 23 mg/dL    Creatinine 2.2 (H) 0.5 - 1.4 mg/dL    Calcium 9.0 8.7 - 10.5 mg/dL    Total Protein 6.8 6.0 - 8.4 g/dL    Albumin 3.4 (L) 3.5 - 5.2 g/dL    Total Bilirubin 1.9 (H) 0.1 - 1.0 mg/dL    Alkaline Phosphatase 130 55 - 135 U/L    AST 23 10 - 40 U/L    ALT 13 10 - 44 U/L    Anion Gap 11 8 - 16 mmol/L    eGFR if African American 30 (A) >60 mL/min/1.73 m^2    eGFR if non African American 26 (A) >60 mL/min/1.73 m^2   Brain natriuretic peptide   Result Value Ref Range    BNP 2,135 (H) 0 - 99 pg/mL   Protime-INR   Result Value Ref Range    Prothrombin Time 14.0 (H) 9.0 - 12.5 sec    INR 1.4 (H) 0.8 - 1.2      All pertinent labs within the past 24 hours have been reviewed.    Significant Imaging:   Imaging Results          X-Ray Chest  1 View (Final result)  Result time 03/27/18 17:59:55    Final result by Marycruz Hawk MD (03/27/18 17:59:55)                 Impression:      Mild enlargement of the heart.  No acute findings.      Electronically signed by: MARYCRUZ HAWK MD  Date:     03/27/18  Time:    17:59              Narrative:    Exam: XR CHEST 1 VIEW,    Date:  03/27/18 17:46:49    History: Heart failure    Comparison:  03/06/2018    Findings: There is mild enlargement of the heart.  There are sternal sutures from previous heart surgery.  The lungs are clear.  Bilateral shoulder surgery is noted.                             I have reviewed all pertinent imaging results/findings within the past 24 hours.        Assessment/Plan:     * Acute on chronic diastolic HFpEF of 50-55% with anasarca    - Likely due to recent decrease in home diuretic.  - Aggressive diuresis with IV Lasix 60mg BID.  - Continue home metolazone.  - Strict I&O's, daily weights, Na/fluid restriction.  - ? add spironolactone.   - Cardiology consult in AM.         XIANG on CKD stage III    - Likely secondary to cardiorenal syndrome.  - Baseline cr. 1.6-1.8.  - Diuresis as above.  - Strict I&O's.  - Follow daily BMP.        Elevated troponin    - Likely demand due to #1.  - No angina.  - Trend serial CE's; Check EKG.  - Continue ASA and BB.  - Cardiology consult in AM.        Anemia of chronic disease with iron deficiency    - H&H stable at baseline, monitor and will transfuse as needed.  - Iron supplementation.         Chronic atrial fibrillation    - Stable.  - Continue BB and Eliquis.  - Monitor telemetry.         Essential hypertension    - BP stable.  - Continue BB and diuretics.  Monitor BP trends.          VTE Risk Mitigation         Ordered     apixaban tablet 2.5 mg  2 times daily     Route:  Oral        03/28/18 0021             GARRETT Garcia  Department of Hospital Medicine   [facility]

## 2018-03-28 NOTE — PLAN OF CARE
Problem: Patient Care Overview  Goal: Plan of Care Review  Outcome: Ongoing (interventions implemented as appropriate)  POC discussed and review with patient and family, verbal understanding received.   Intervention ongoing for CHF and anasarca   Pt denies pain or SOB during my shift . VSS   Pt is up to the bathroom with assist or using the urinal . Bed alarm in use due to pt having a hx of falls.   Yellow non skid socks in place.   Pt is strict I&O  Multiple wounds on pt LLE wound care done to open areas on the top of shin and the medial calf and on the back of pt knee. Cleansed with soap and water and applied Betadine and then a meplilex to one of the scabs since pt is at risk for bleeding. Will put in wound care consult

## 2018-03-28 NOTE — SUBJECTIVE & OBJECTIVE
"Interval History: Patient was kept for observation with acute on chronic CHF under the care of Hospital Medicine. He was given IV furosemide and has had 2800cc output since admit. He remains SOB with talking and has edema to scrotum, abd and bilat legs. He will need at least 2 midnights to continue to diurese and get the fluid off so that he can breath.     Review of Systems   Constitutional: Positive for activity change and fatigue. Negative for chills, diaphoresis and fever.   HENT: Negative for congestion, rhinorrhea and sore throat.    Eyes: Negative for pain and visual disturbance.   Respiratory: Positive for shortness of breath ([place]). Negative for cough and wheezing.    Cardiovascular: Positive for leg swelling. Negative for chest pain and palpitations.   Gastrointestinal: Positive for abdominal distention ("bloating"). Negative for abdominal pain, constipation, diarrhea, nausea and vomiting.   Endocrine: Negative for polyphagia and polyuria.   Genitourinary: Positive for scrotal swelling. Negative for difficulty urinating, dysuria, hematuria, penile pain, penile swelling and testicular pain.   Musculoskeletal: Positive for gait problem ( 2/2 leg and scrotal edema ). Negative for arthralgias and myalgias.   Skin: Negative for color change and wound.   Allergic/Immunologic: Negative.    Neurological: Negative for dizziness, seizures, syncope, facial asymmetry, speech difficulty, weakness, light-headedness, numbness and headaches.   Hematological: Negative.    Psychiatric/Behavioral: Negative for agitation, behavioral problems and confusion. The patient is not nervous/anxious.    All other systems reviewed and are negative.    Objective:     Vital Signs (Most Recent):  Temp: 98.1 °F (36.7 °C) (03/28/18 0852)  Pulse: 70 (03/28/18 0916)  Resp: 17 (03/28/18 0852)  BP: 137/65 (03/28/18 0852)  SpO2: 98 % (03/28/18 0852) Vital Signs (24h Range):  Temp:  [97.5 °F (36.4 °C)-98.7 °F (37.1 °C)] 98.1 °F (36.7 °C)  Pulse:  " [64-80] 70  Resp:  [14-20] 17  SpO2:  [95 %-100 %] 98 %  BP: (112-158)/(63-78) 137/65     Weight: 81.8 kg (180 lb 5.4 oz)  Body mass index is 26.63 kg/m².    Intake/Output Summary (Last 24 hours) at 03/28/18 1129  Last data filed at 03/28/18 1000   Gross per 24 hour   Intake                0 ml   Output             2800 ml   Net            -2800 ml      Physical Exam   Constitutional: He is oriented to person, place, and time. He appears well-developed and well-nourished. No distress.   HENT:   Head: Normocephalic and atraumatic.   Nose: Nose normal.   Mouth/Throat: Oropharynx is clear and moist.   Eyes: Conjunctivae and EOM are normal. Pupils are equal, round, and reactive to light.   Neck: Normal range of motion. Neck supple. No JVD present.   Cardiovascular: Normal rate, regular rhythm, S1 normal, S2 normal, normal heart sounds and intact distal pulses.   No extrasystoles are present. Exam reveals no gallop.    No murmur heard.  Pulmonary/Chest: Effort normal and breath sounds normal. No accessory muscle usage. No tachypnea. No respiratory distress. He has no wheezes. He has no rhonchi. He has no rales.   Abdominal: Soft. Bowel sounds are normal. He exhibits distension. There is no tenderness. There is no CVA tenderness.   Genitourinary: Right testis shows swelling. Left testis shows swelling.   Genitourinary Comments: deferred   Musculoskeletal: Normal range of motion. He exhibits edema (2+ to BLE from feet to lower ABD, including scrotum ). He exhibits no deformity.   Neurological: He is alert and oriented to person, place, and time. No cranial nerve deficit.   Skin: Skin is warm, dry and intact. Capillary refill takes 2 to 3 seconds. He is not diaphoretic. No cyanosis or erythema.   Psychiatric: He has a normal mood and affect. His speech is normal and behavior is normal. Cognition and memory are normal.   Nursing note and vitals reviewed.      Significant Labs:   Recent Lab Results       03/28/18  5829  03/28/18  0433 03/27/18  1759      Albumin  3.2(L) 3.4(L)     Alkaline Phosphatase  131 130     ALT  14 13     Anion Gap  12 11     Aniso   Slight     AST  24 23     Baso #   0.03     Basophil%   0.6     Total Bilirubin  1.8  Comment:  For infants and newborns, interpretation of results should be based  on gestational age, weight and in agreement with clinical  observations.  Premature Infant recommended reference ranges:  Up to 24 hours.............<8.0 mg/dL  Up to 48 hours............<12.0 mg/dL  3-5 days..................<15.0 mg/dL  6-29 days.................<15.0 mg/dL  (H) 1.9  Comment:  For infants and newborns, interpretation of results should be based  on gestational age, weight and in agreement with clinical  observations.  Premature Infant recommended reference ranges:  Up to 24 hours.............<8.0 mg/dL  Up to 48 hours............<12.0 mg/dL  3-5 days..................<15.0 mg/dL  6-29 days.................<15.0 mg/dL  (H)     BNP   2,135  Comment:  Values of less than 100 pg/ml are consistent with non-CHF populations.(H)     BUN, Bld  72(H) 72(H)     Calcium  9.0 9.0     Chloride  100 102     CO2  23 21(L)     CPK   91        91     CPK MB   2.1     Creatinine  2.0(H) 2.2(H)     Differential Method   Automated     eGFR if   34(A) 30(A)     eGFR if non   29  Comment:  Calculation used to obtain the estimated glomerular filtration  rate (eGFR) is the CKD-EPI equation.   (A) 26  Comment:  Calculation used to obtain the estimated glomerular filtration  rate (eGFR) is the CKD-EPI equation.   (A)     Eos #   0.1     Eosinophil%   2.0     Large/Giant Platelets   Present     Glucose  120(H) 176(H)     Gran # (ANC)   3.6     Gran%   71.7     Hematocrit   28.8(L)     Hemoglobin   8.4(L)     Hypo   Moderate     Coumadin Monitoring INR   1.4  Comment:  Coumadin Therapy:  2.0 - 3.0 for INR for all indicators except mechanical heart valves  and antiphospholipid syndromes which  "should use 2.5 - 3.5.  (H)     Lymph #   0.6(L)     Lymph%   11.0(L)     Magnesium  2.0 2.1     MB%   2.3  Comment:  To be positive, the MB% must be greater than 5% AND the CK-MB  greater than 6.5 ng/mL. Values not in the reference interval,   but not qualifying as positive, should be considered "trace".       MCH   20.8(L)     MCHC   29.2(L)     MCV   72(L)     Mono #   0.8     Mono%   15.1(H)     MPV   8.8(L)     Ovalocytes   Occasional     Phosphorus  4.2      Platelet Estimate   Appears normal     Platelets   233     Poik   Slight     Potassium  3.4(L) 3.8     Total Protein  6.7 6.8     Protime   14.0(H)     RBC   4.03(L)     RDW   20.6(H)     Schistocytes   Present     Sodium  135(L) 134(L)     Target Cells   Occasional     Tear Drop Cells   Occasional     Troponin I 0.112  Comment:  The reference interval for Troponin I represents the 99th percentile   cutoff   for our facility and is consistent with 3rd generation assay   performance.  (H)  0.129  Comment:  The reference interval for Troponin I represents the 99th percentile   cutoff   for our facility and is consistent with 3rd generation assay   performance.  (H)     WBC   5.09         All pertinent labs within the past 24 hours have been reviewed.    Significant Imaging: I have reviewed all pertinent imaging results/findings within the past 24 hours.  "

## 2018-03-28 NOTE — ASSESSMENT & PLAN NOTE
-Chronic A-fib with controlled rate  -Continue BB for rate control and Eliquis for CVA prophylaxis

## 2018-03-28 NOTE — ASSESSMENT & PLAN NOTE
-Patient admitted with decompensated HF and anasarca  -BNP 2k Is responding well to IV Lasix 60mg BID and metolazone.   -Edema is improving.  -Patient states that he has been compliant with diet and meds  -Currently takes Lasix 40mg daily   -Was instructed to take Metolazone, but hasn't been taking   -Continue to diurese with IV Lasix   Will likely need to increase home Lasix to 40mgn BID with metolazone every other day  -Needs to follow closely in CHF clinic   Heart healthy diet  Limit fluid intake 50-60 oz   Daily weights and to notify clinic if weight increases by more than 3 lbs in 1 day or 5 lbs in 1 week.   Exercise routine as tolerated

## 2018-03-28 NOTE — CONSULTS
[facility]  Cardiology  Consult Note    Patient Name: Chung Meneses Sr.  MRN: [id number]  Admission Date: 3/27/2018  Hospital Length of Stay: 0 days  Code Status: Full Code   Attending Provider: Holland Feliciano MD   Consulting Provider: GARRETT Martinez  Primary Care Physician: Glenroy Carmichael MD  Principal Problem:Acute on chronic diastolic (congestive) heart failure    Patient information was obtained from patient and ER records.     Inpatient consult to Cardiology  Consult performed by: JULIET HERNANDEZ  Consult ordered by: OLVELY ALVAREZ  Reason for consult: CHF        Subjective:     Chief Complaint:  Edema      HPI:   Mr. Meneses is an 86 year old male patient with a PMHx of CAD with remote CABG x 1V 12/2017, severe AS with remote bioprosthetic AVR 12/2017, permanent AF on Eliquis, chronic diastolic HFpEF of 50-55%, HTN, and CKD stage III, who was sent to ED by Cardiology Clinic for acute decompensated HF with c/o SOB that has progressively worsened over the past 2-3 days.  Associated ABD bloating, weight gain (6.7 kg increase from dry weight), edema to BLE, and insomnia.  Denies any PND, orthopnea, cough, CP, palpitations, ABD pain, N/V/D, dysuria, hematuria, lightheadedness, dizziness, syncope, or fever.  He reports compliance with diuretic and diet.  He was recently admitted on 3/7 for IVVD, and DC with decreased dose of Lasix.  He reports excess fluid quickly accumulated, and continued to worsen since DC.  Initial work-up in ED resulted BNP 2135, troponin 0.129, cr. 2.2, BUN 72.  Patient received Lasix IV 80mg x 1 dose in ED and responded well with ~2L diuresed.  Continues to diurese with Lasix 60mg BID.  Patient was admitted for acute decompensated HF with anasarca .     Past Medical History:   Diagnosis Date    Aortic stenosis 8/2/2013    Atrial fibrillation 7/5/2013    CHF (congestive heart failure)     Coronary artery disease     Dizziness - light-headed      Hyperlipidemia     Hypertension     Syncope 1/26/2018       Past Surgical History:   Procedure Laterality Date    BACK SURGERY  2003    CARDIAC VALVE SURGERY      CORONARY ARTERY BYPASS GRAFT      knee replacemnt bilateral  2001    SHOULDER SURGERY  2002       Review of patient's allergies indicates:   Allergen Reactions    Sulfa (sulfonamide antibiotics) Hives       No current facility-administered medications on file prior to encounter.      Current Outpatient Prescriptions on File Prior to Encounter   Medication Sig    apixaban (ELIQUIS) 2.5 mg Tab Take 1 tablet (2.5 mg total) by mouth 2 (two) times daily.    aspirin (ECOTRIN) 81 MG EC tablet Take 1 tablet (81 mg total) by mouth once daily.    fish oil-omega-3 fatty acids 300-1,000 mg capsule Take 1,200 mg by mouth once daily.    furosemide (LASIX) 40 MG tablet Take 1 tablet (40 mg total) by mouth once daily.    metOLazone (ZAROXOLYN) 2.5 MG tablet Take 1 tablet (2.5 mg total) by mouth once daily.    metoprolol succinate (TOPROL-XL) 25 MG 24 hr tablet Take 1 tablet (25 mg total) by mouth once daily.    potassium chloride SA (K-DUR,KLOR-CON) 10 MEQ tablet Take 10 mEq by mouth every evening.    multivitamin with folic acid 400 mcg Tab Take 1 tablet by mouth.    potassium chloride (K-TAB) 20 mEq Take 1 tablet (20 mEq total) by mouth every morning. Takes one tablet (20 meq) in the morning and half tablet (10 meq) in the evening    potassium chloride (KLOR-CON) 10 MEQ TbSR Take 1 tablet (10 mEq total) by mouth every evening.    ranitidine (ZANTAC) 75 MG tablet Take 75 mg by mouth nightly.     traZODone (DESYREL) 50 MG tablet Take 50 mg by mouth every evening.    vitamin D 1000 units Tab Take 1,000 Units by mouth once daily.      Family History     Problem Relation (Age of Onset)    Heart attack Brother    Heart disease Brother    Heart failure Brother    Hypertension Mother, Father, Sister, Brother        Social History Main Topics    Smoking  status: Never Smoker    Smokeless tobacco: Never Used    Alcohol use No      Comment: HOLIDAY    Drug use: No    Sexual activity: No     Review of Systems   Constitution: Positive for malaise/fatigue. Negative for diaphoresis, weakness, weight gain and weight loss.   HENT: Negative for congestion and nosebleeds.    Cardiovascular: Positive for dyspnea on exertion. Negative for chest pain, claudication, cyanosis, irregular heartbeat, leg swelling, near-syncope, orthopnea, palpitations, paroxysmal nocturnal dyspnea and syncope.   Respiratory: Positive for shortness of breath and sleep disturbances due to breathing. Negative for cough, hemoptysis, snoring, sputum production and wheezing.    Hematologic/Lymphatic: Negative for bleeding problem. Does not bruise/bleed easily.   Skin: Negative for rash.   Musculoskeletal: Negative for arthritis, back pain, falls, joint pain, muscle cramps and muscle weakness.   Gastrointestinal: Positive for bloating. Negative for abdominal pain, constipation, diarrhea, heartburn, hematemesis, hematochezia, melena and nausea.   Genitourinary: Negative for dysuria, hematuria and nocturia.        Scrotal swelling     Neurological: Negative for excessive daytime sleepiness, dizziness, headaches, light-headedness, loss of balance, numbness and vertigo.     Objective:     Vital Signs (Most Recent):  Temp: 98.1 °F (36.7 °C) (03/28/18 0852)  Pulse: 77 (03/28/18 0852)  Resp: 17 (03/28/18 0852)  BP: 137/65 (03/28/18 0852)  SpO2: 98 % (03/28/18 0852) Vital Signs (24h Range):  Temp:  [97.5 °F (36.4 °C)-98.7 °F (37.1 °C)] 98.1 °F (36.7 °C)  Pulse:  [64-80] 77  Resp:  [14-20] 17  SpO2:  [95 %-100 %] 98 %  BP: (112-158)/(63-78) 137/65     Weight: 81.8 kg (180 lb 5.4 oz)  Body mass index is 26.63 kg/m².    SpO2: 98 %  O2 Device (Oxygen Therapy): room air      Intake/Output Summary (Last 24 hours) at 03/28/18 0946  Last data filed at 03/28/18 0411   Gross per 24 hour   Intake                0 ml    Output             2600 ml   Net            -2600 ml       Lines/Drains/Airways     Pressure Ulcer                 Pressure Injury 03/28/18 0022 Buttocks Unstageable less than 1 day          Peripheral Intravenous Line                 Peripheral IV - Single Lumen 03/27/18 1759 Left Antecubital less than 1 day                Physical Exam   Constitutional: He is oriented to person, place, and time. He appears well-developed and well-nourished.   Neck: Neck supple. JVD present.   Cardiovascular: Normal rate, regular rhythm, normal heart sounds and normal pulses.  Exam reveals no friction rub.    No murmur heard.  Pulmonary/Chest: Effort normal and breath sounds normal. No respiratory distress. He has no wheezes. He has no rales.   Abdominal: Soft. Bowel sounds are normal. He exhibits distension.   Genitourinary:   Genitourinary Comments: Scrotal edema    Musculoskeletal: He exhibits edema ( BLE that extends into thighs and scrotum ). He exhibits no tenderness.   Neurological: He is alert and oriented to person, place, and time.   Skin: Skin is warm and dry. No rash noted.   Psychiatric: He has a normal mood and affect. His behavior is normal.   Nursing note and vitals reviewed.      Significant Labs:   All pertinent lab results from the last 24 hours have been reviewed. and   Recent Lab Results       03/28/18  0443 03/28/18  0433 03/27/18  1759      Albumin  3.2(L) 3.4(L)     Alkaline Phosphatase  131 130     ALT  14 13     Anion Gap  12 11     Aniso   Slight     AST  24 23     Baso #   0.03     Basophil%   0.6     Total Bilirubin  1.8  Comment:  For infants and newborns, interpretation of results should be based  on gestational age, weight and in agreement with clinical  observations.  Premature Infant recommended reference ranges:  Up to 24 hours.............<8.0 mg/dL  Up to 48 hours............<12.0 mg/dL  3-5 days..................<15.0 mg/dL  6-29 days.................<15.0 mg/dL  (H) 1.9  Comment:  For  "infants and newborns, interpretation of results should be based  on gestational age, weight and in agreement with clinical  observations.  Premature Infant recommended reference ranges:  Up to 24 hours.............<8.0 mg/dL  Up to 48 hours............<12.0 mg/dL  3-5 days..................<15.0 mg/dL  6-29 days.................<15.0 mg/dL  (H)     BNP   2,135  Comment:  Values of less than 100 pg/ml are consistent with non-CHF populations.(H)     BUN, Bld  72(H) 72(H)     Calcium  9.0 9.0     Chloride  100 102     CO2  23 21(L)     CPK   91        91     CPK MB   2.1     Creatinine  2.0(H) 2.2(H)     Differential Method   Automated     eGFR if   34(A) 30(A)     eGFR if non   29  Comment:  Calculation used to obtain the estimated glomerular filtration  rate (eGFR) is the CKD-EPI equation.   (A) 26  Comment:  Calculation used to obtain the estimated glomerular filtration  rate (eGFR) is the CKD-EPI equation.   (A)     Eos #   0.1     Eosinophil%   2.0     Large/Giant Platelets   Present     Glucose  120(H) 176(H)     Gran # (ANC)   3.6     Gran%   71.7     Hematocrit   28.8(L)     Hemoglobin   8.4(L)     Hypo   Moderate     Coumadin Monitoring INR   1.4  Comment:  Coumadin Therapy:  2.0 - 3.0 for INR for all indicators except mechanical heart valves  and antiphospholipid syndromes which should use 2.5 - 3.5.  (H)     Lymph #   0.6(L)     Lymph%   11.0(L)     Magnesium  2.0 2.1     MB%   2.3  Comment:  To be positive, the MB% must be greater than 5% AND the CK-MB  greater than 6.5 ng/mL. Values not in the reference interval,   but not qualifying as positive, should be considered "trace".       MCH   20.8(L)     MCHC   29.2(L)     MCV   72(L)     Mono #   0.8     Mono%   15.1(H)     MPV   8.8(L)     Ovalocytes   Occasional     Phosphorus  4.2      Platelet Estimate   Appears normal     Platelets   233     Poik   Slight     Potassium  3.4(L) 3.8     Total Protein  6.7 6.8     Protime   " 14.0(H)     RBC   4.03(L)     RDW   20.6(H)     Schistocytes   Present     Sodium  135(L) 134(L)     Target Cells   Occasional     Tear Drop Cells   Occasional     Troponin I 0.112  Comment:  The reference interval for Troponin I represents the 99th percentile   cutoff   for our facility and is consistent with 3rd generation assay   performance.  (H)  0.129  Comment:  The reference interval for Troponin I represents the 99th percentile   cutoff   for our facility and is consistent with 3rd generation assay   performance.  (H)     WBC   5.09           Significant Imaging: Echocardiogram:   2D echo with color flow doppler:   Results for orders placed or performed in visit on 01/26/18   2D Echo w/ Color Flow Doppler   Result Value Ref Range    EF 50 55 - 65    Mitral Valve Regurgitation MODERATE (A)     Diastolic Dysfunction Yes (A)     Est. PA Systolic Pressure 41.01 (A)     Mitral Valve Mobility NORMAL     Tricuspid Valve Regurgitation MILD TO MODERATE     and X-Ray: CXR: X-Ray Chest 1 View (CXR):   Results for orders placed or performed during the hospital encounter of 03/27/18   X-Ray Chest 1 View    Narrative    Exam: XR CHEST 1 VIEW,    Date:  03/27/18 17:46:49    History: Heart failure    Comparison:  03/06/2018    Findings: There is mild enlargement of the heart.  There are sternal sutures from previous heart surgery.  The lungs are clear.  Bilateral shoulder surgery is noted.    Impression      Mild enlargement of the heart.  No acute findings.      Electronically signed by: MARYCRUZ HAWK MD  Date:     03/27/18  Time:    17:59     and X-Ray Chest PA and Lateral (CXR): No results found for this visit on 03/27/18.    Assessment and Plan:     * Acute on chronic diastolic HFpEF of 50-55% with anasarca    -Patient admitted with decompensated HF and anasarca  -BNP 2k Is responding well to IV Lasix 60mg BID and metolazone.   -Edema is improving.  -Patient states that he has been compliant with diet and  meds  -Currently takes Lasix 40mg daily   -Was instructed to take Metolazone, but hasn't been taking   -Continue to diurese with IV Lasix   Will likely need to increase home Lasix to 40mgn BID with metolazone every other day  -Needs to follow closely in CHF clinic   Heart healthy diet  Limit fluid intake 50-60 oz   Daily weights and to notify clinic if weight increases by more than 3 lbs in 1 day or 5 lbs in 1 week.   Exercise routine as tolerated            XIANG on CKD stage III    -Renal function stable   -Monitor renal function with IV diuresis         Elevated troponin    -Patient has chronically elevated troponin   -Is likely from decompensated HF  -No plans for ischemic workup.  -Patient is post CABG in Dec 2017         Chronic atrial fibrillation    -Chronic A-fib with controlled rate  -Continue BB for rate control and Eliquis for CVA prophylaxis               VTE Risk Mitigation         Ordered     apixaban tablet 2.5 mg  2 times daily     Route:  Oral        03/28/18 0021      Chart reviewed. Patient examined by Dr. Palomares and agrees with plan that has been outlined.       Thank you for your consult. I will follow-up with patient. Please contact us if you have any additional questions.    GARRETT Martinez  Cardiology   [facility]

## 2018-03-28 NOTE — ASSESSMENT & PLAN NOTE
- Likely secondary to cardiorenal syndrome.  - Baseline cr. 1.6-1.8.  - Diuresis as above.  - Strict I&O's.  - Follow daily BMP.

## 2018-03-28 NOTE — PLAN OF CARE
03/28/18 1459   NOEL Message   Medicare Outpatient and Observation Notification regarding financial responsibility Given to patient/caregiver;Explained to patient/caregiver;Signed/date by patient/caregiver   Date NOEL was signed 03/28/18   Time NOEL was signed 1151

## 2018-03-28 NOTE — SUBJECTIVE & OBJECTIVE
Past Medical History:   Diagnosis Date    Aortic stenosis 8/2/2013    Atrial fibrillation 7/5/2013    CHF (congestive heart failure)     Coronary artery disease     Dizziness - light-headed     Hyperlipidemia     Hypertension     Syncope 1/26/2018       Past Surgical History:   Procedure Laterality Date    BACK SURGERY  2003    CARDIAC VALVE SURGERY      CORONARY ARTERY BYPASS GRAFT      knee replacemnt bilateral  2001    SHOULDER SURGERY  2002       Review of patient's allergies indicates:   Allergen Reactions    Sulfa (sulfonamide antibiotics) Hives       No current facility-administered medications on file prior to encounter.      Current Outpatient Prescriptions on File Prior to Encounter   Medication Sig    apixaban (ELIQUIS) 2.5 mg Tab Take 1 tablet (2.5 mg total) by mouth 2 (two) times daily.    aspirin (ECOTRIN) 81 MG EC tablet Take 1 tablet (81 mg total) by mouth once daily.    fish oil-omega-3 fatty acids 300-1,000 mg capsule Take 1,200 mg by mouth once daily.    furosemide (LASIX) 40 MG tablet Take 1 tablet (40 mg total) by mouth once daily.    metOLazone (ZAROXOLYN) 2.5 MG tablet Take 1 tablet (2.5 mg total) by mouth once daily.    metoprolol succinate (TOPROL-XL) 25 MG 24 hr tablet Take 1 tablet (25 mg total) by mouth once daily.    potassium chloride SA (K-DUR,KLOR-CON) 10 MEQ tablet Take 10 mEq by mouth every evening.    multivitamin with folic acid 400 mcg Tab Take 1 tablet by mouth.    potassium chloride (K-TAB) 20 mEq Take 1 tablet (20 mEq total) by mouth every morning. Takes one tablet (20 meq) in the morning and half tablet (10 meq) in the evening    potassium chloride (KLOR-CON) 10 MEQ TbSR Take 1 tablet (10 mEq total) by mouth every evening.    ranitidine (ZANTAC) 75 MG tablet Take 75 mg by mouth nightly.     traZODone (DESYREL) 50 MG tablet Take 50 mg by mouth every evening.    vitamin D 1000 units Tab Take 1,000 Units by mouth once daily.      Family History      "Problem Relation (Age of Onset)    Heart attack Brother    Heart disease Brother    Heart failure Brother    Hypertension Mother, Father, Sister, Brother        Social History Main Topics    Smoking status: Never Smoker    Smokeless tobacco: Never Used    Alcohol use No      Comment: HOLIDAY    Drug use: No    Sexual activity: No     Review of Systems   Constitutional: Positive for fatigue. Negative for activity change, chills, diaphoresis, fever and unexpected weight change.   HENT: Negative for congestion, postnasal drip, rhinorrhea, sore throat and trouble swallowing.    Eyes: Negative for photophobia and visual disturbance.   Respiratory: Positive for shortness of breath (EISENBERG). Negative for apnea, cough, chest tightness and wheezing.    Cardiovascular: Positive for leg swelling. Negative for chest pain and palpitations.   Gastrointestinal: Positive for abdominal distention ("bloating"). Negative for abdominal pain, anal bleeding, blood in stool, constipation, diarrhea, nausea and vomiting.   Endocrine: Negative for polydipsia, polyphagia and polyuria.   Genitourinary: Positive for scrotal swelling. Negative for decreased urine volume, difficulty urinating, dysuria, frequency, hematuria, penile pain, penile swelling, testicular pain and urgency.   Musculoskeletal: Negative for arthralgias, back pain, gait problem, joint swelling and myalgias.   Skin: Negative for pallor, rash and wound.   Neurological: Negative for dizziness, seizures, syncope, facial asymmetry, speech difficulty, weakness, light-headedness, numbness and headaches.   Psychiatric/Behavioral: Positive for sleep disturbance. Negative for confusion. The patient is not nervous/anxious.    All other systems reviewed and are negative.    Objective:     Vital Signs (Most Recent):  Temp: 98.7 °F (37.1 °C) (03/27/18 1900)  Pulse: 67 (03/27/18 2240)  Resp: 20 (03/27/18 2240)  BP: (!) 158/78 (03/27/18 2240)  SpO2: 100 % (03/27/18 2240) Vital Signs (24h " Range):  Temp:  [98.7 °F (37.1 °C)] 98.7 °F (37.1 °C)  Pulse:  [64-72] 67  Resp:  [14-20] 20  SpO2:  [95 %-100 %] 100 %  BP: (112-158)/(66-78) 158/78     Weight: 84.6 kg (186 lb 8.2 oz)  Body mass index is 27.54 kg/m².    Physical Exam   Constitutional: He is oriented to person, place, and time. He appears well-developed and well-nourished. No distress.   HENT:   Head: Normocephalic and atraumatic.   Eyes: Conjunctivae are normal.   PERRL; EOM intact.   Neck: Normal range of motion. Neck supple. No JVD present.   Cardiovascular: Normal rate, regular rhythm, S1 normal, S2 normal and intact distal pulses.   No extrasystoles are present. Exam reveals no gallop.    No murmur heard.  Pulses:       Radial pulses are 2+ on the right side, and 2+ on the left side.        Dorsalis pedis pulses are 2+ on the right side, and 2+ on the left side.        Posterior tibial pulses are 2+ on the right side, and 2+ on the left side.   Pulmonary/Chest: Effort normal and breath sounds normal. No accessory muscle usage. No tachypnea. No respiratory distress. He has no wheezes. He has no rhonchi. He has no rales.   Abdominal: Soft. Bowel sounds are normal. He exhibits distension. There is no tenderness. There is no rebound, no guarding and no CVA tenderness.   Genitourinary: Right testis shows swelling. Left testis shows swelling.   Musculoskeletal: Normal range of motion. He exhibits edema (2+ to BLE from feet to lower ABD, including scrotum). He exhibits no tenderness or deformity.   Neurological: He is alert and oriented to person, place, and time. No cranial nerve deficit or sensory deficit.   Skin: Skin is warm, dry and intact. No rash noted. He is not diaphoretic. No cyanosis or erythema.   Psychiatric: He has a normal mood and affect. His speech is normal and behavior is normal. Cognition and memory are normal.   Nursing note and vitals reviewed.          Significant Labs:   Results for orders placed or performed during the  hospital encounter of 03/27/18   Magnesium   Result Value Ref Range    Magnesium 2.1 1.6 - 2.6 mg/dL   Troponin I   Result Value Ref Range    Troponin I 0.129 (H) 0.000 - 0.026 ng/mL   CK-MB   Result Value Ref Range    CPK 91 20 - 200 U/L    CPK MB 2.1 0.1 - 6.5 ng/mL    MB% 2.3 0.0 - 5.0 %   CK   Result Value Ref Range    CPK 91 20 - 200 U/L   CBC auto differential   Result Value Ref Range    WBC 5.09 3.90 - 12.70 K/uL    RBC 4.03 (L) 4.60 - 6.20 M/uL    Hemoglobin 8.4 (L) 14.0 - 18.0 g/dL    Hematocrit 28.8 (L) 40.0 - 54.0 %    MCV 72 (L) 82 - 98 fL    MCH 20.8 (L) 27.0 - 31.0 pg    MCHC 29.2 (L) 32.0 - 36.0 g/dL    RDW 20.6 (H) 11.5 - 14.5 %    Platelets 233 150 - 350 K/uL    MPV 8.8 (L) 9.2 - 12.9 fL    Gran # (ANC) 3.6 1.8 - 7.7 K/uL    Lymph # 0.6 (L) 1.0 - 4.8 K/uL    Mono # 0.8 0.3 - 1.0 K/uL    Eos # 0.1 0.0 - 0.5 K/uL    Baso # 0.03 0.00 - 0.20 K/uL    Gran% 71.7 38.0 - 73.0 %    Lymph% 11.0 (L) 18.0 - 48.0 %    Mono% 15.1 (H) 4.0 - 15.0 %    Eosinophil% 2.0 0.0 - 8.0 %    Basophil% 0.6 0.0 - 1.9 %    Platelet Estimate Appears normal     Aniso Slight     Poik Slight     Hypo Moderate     Ovalocytes Occasional     Target Cells Occasional     Tear Drop Cells Occasional     Schistocytes Present     Large/Giant Platelets Present     Differential Method Automated    Comprehensive metabolic panel   Result Value Ref Range    Sodium 134 (L) 136 - 145 mmol/L    Potassium 3.8 3.5 - 5.1 mmol/L    Chloride 102 95 - 110 mmol/L    CO2 21 (L) 23 - 29 mmol/L    Glucose 176 (H) 70 - 110 mg/dL    BUN, Bld 72 (H) 8 - 23 mg/dL    Creatinine 2.2 (H) 0.5 - 1.4 mg/dL    Calcium 9.0 8.7 - 10.5 mg/dL    Total Protein 6.8 6.0 - 8.4 g/dL    Albumin 3.4 (L) 3.5 - 5.2 g/dL    Total Bilirubin 1.9 (H) 0.1 - 1.0 mg/dL    Alkaline Phosphatase 130 55 - 135 U/L    AST 23 10 - 40 U/L    ALT 13 10 - 44 U/L    Anion Gap 11 8 - 16 mmol/L    eGFR if African American 30 (A) >60 mL/min/1.73 m^2    eGFR if non  26 (A) >60  mL/min/1.73 m^2   Brain natriuretic peptide   Result Value Ref Range    BNP 2,135 (H) 0 - 99 pg/mL   Protime-INR   Result Value Ref Range    Prothrombin Time 14.0 (H) 9.0 - 12.5 sec    INR 1.4 (H) 0.8 - 1.2      All pertinent labs within the past 24 hours have been reviewed.    Significant Imaging:   Imaging Results          X-Ray Chest 1 View (Final result)  Result time 03/27/18 17:59:55    Final result by Marycruz Hawk MD (03/27/18 17:59:55)                 Impression:      Mild enlargement of the heart.  No acute findings.      Electronically signed by: MARYCRUZ HAWK MD  Date:     03/27/18  Time:    17:59              Narrative:    Exam: XR CHEST 1 VIEW,    Date:  03/27/18 17:46:49    History: Heart failure    Comparison:  03/06/2018    Findings: There is mild enlargement of the heart.  There are sternal sutures from previous heart surgery.  The lungs are clear.  Bilateral shoulder surgery is noted.                             I have reviewed all pertinent imaging results/findings within the past 24 hours.

## 2018-03-28 NOTE — PROGRESS NOTES
Kandi MORRIS gave me report. Pt was eating and stated he wanted to be moved after. PCT is aware and will move him after he is done eating.

## 2018-03-28 NOTE — PROGRESS NOTES
Patient resting in bed, talking on telephone. He is A,A, &OX3 - continues to diurese but still has significant edema to abd and scrotum. Pt reports he is feeling better but not back to baseline yet. Will continue current plan

## 2018-03-28 NOTE — ED NOTES
[place] served with x2 4.0 oz juice provided to patient. Pt. Consumed meal. Plan of care continued.

## 2018-03-28 NOTE — ASSESSMENT & PLAN NOTE
- BP stable.  - Continue BB and diuretics.  Monitor BP trends.    3/28:  --stable  --continue current meds  --cardiac diet with fluid restrictions

## 2018-03-28 NOTE — PROGRESS NOTES
Pt brought over by w/c. Cardiac monitor still in place. VSS. Meds given. Bed alarm on. Urinal at bedside. Head to toe done. Wounds, blister and abrasions to sue lower legs. WOC consulted. Laceration to right ear from fall. Call light in reach.

## 2018-03-28 NOTE — ASSESSMENT & PLAN NOTE
- Likely demand due to #1.  - No angina.  - Trend serial CE's; Check EKG.  - Continue ASA and BB.  - Cardiology consult in Am.    3/28:  --likely 2/2 demand ischemia  --daily ASA and BB  --cardiology following  --troponin elevated but flat

## 2018-03-28 NOTE — PLAN OF CARE
Problem: Patient Care Overview  Goal: Plan of Care Review  Outcome: Ongoing (interventions implemented as appropriate)  Pt has been free of falls. PIV intact. Fluids infusing. Afib on the monitor. Pt is on RA. Pt has no c/o pain. Iv lasix given when ordered.  Call light in reach and hourly rounding made.

## 2018-03-28 NOTE — ASSESSMENT & PLAN NOTE
- Likely due to recent decrease in home diuretic.  - Aggressive diuresis with IV Lasix 60mg BID.  - Continue home metolazone.  - Strict I&O's, daily weights, Na/fluid restriction.  - ? add spironolactone.

## 2018-03-28 NOTE — ED NOTES
Pt. Noted lying in bed resting to comfort, opens eyes to verbal stimuli, AAOX3 during assessment, pt. Denies any concerns at this time. Respirations even and unlabored, BBS CTA, pt. Remains on continuous monitor, skin warm and dry, intact, no edema noted throughout. Pt. Noted with delayed wound healing of multiple wounds noted to LLE. Pt. Describes as chronic and is noted with chronic edema. Red circular wounds noted with yellow tissue noted to wound bed. No drainage or odor noted to wounds. Pedal pulses present with palpation, equal bilaterally. Pt. Positioned to comfort. Awaiting bed placement. Personal belongings at bedside, non skid yellow socks applied to patient for comfort and safety, urinal at bedside, plan of care continued.

## 2018-03-28 NOTE — ASSESSMENT & PLAN NOTE
- Likely secondary to cardiorenal syndrome.  - Baseline cr. 1.6-1.8.  - Diuresis as above.  - Strict I&O's.  - Follow daily BMP.    3/28:  --BMP stable, Cr slightly improved to 2.0  --monitor BMP  --continue to diurese

## 2018-03-28 NOTE — HPI
Mr. Meneses is an 86 year old male patient with a PMHx of CAD with remote CABG x 1V 12/2017, severe AS with remote bioprosthetic AVR 12/2017, permanent AF on Eliquis, chronic diastolic HFpEF of 50-55%, HTN, and CKD stage III, who was sent to ED by [facility]iology [facility] for acute decompensated HF with c/o SOB that has progressively worsened over the past 2-3 days.  Associated ABD bloating, weight gain (6.7 kg increase from dry weight), edema to BLE, and insomnia.  Denies any PND, orthopnea, cough, CP, palpitations, ABD pain, N/V/D, dysuria, hematuria, lightheadedness, dizziness, syncope, or fever.  He reports compliance with diuretic and diet.  He was recently admitted on 3/7 for IVVD, and DC with decreased dose of Lasix.  He reports excess fluid quickly accumulated, and continued to worsen since DC.  Initial work-up in ED resulted BNP 2135, troponin 0.129, cr. 2.2, BUN 72.  Patient received Lasix IV 80mg x 1 dose in ED and responded well with ~2L diuresed.  Patient was admitted for acute decompensated HF with anasarca under Hospital Medicine services.

## 2018-03-28 NOTE — ASSESSMENT & PLAN NOTE
- Likely due to recent decrease in home diuretic.  - Aggressive diuresis with IV Lasix 60mg BID.  - Continue home metolazone.  - Strict I&O's, daily weights, Na/fluid restriction.  - ? add spironolactone.   - Cardiology consult in AM.     3/28:  --needs additional diuresis -   --IV furosemide and PO metolazone  --strict I&O  --daily weights  --cardiac diet with fluid restrictions  --cardiology following

## 2018-03-28 NOTE — ASSESSMENT & PLAN NOTE
-Patient has chronically elevated troponin   -Is likely from decompensated HF  -No plans for ischemic workup.  -Patient is post CABG in Dec 2017

## 2018-03-29 LAB
ANION GAP SERPL CALC-SCNC: 10 MMOL/L
BASOPHILS # BLD AUTO: 0.02 K/UL
BASOPHILS NFR BLD: 0.4 %
BUN SERPL-MCNC: 65 MG/DL
CALCIUM SERPL-MCNC: 8.9 MG/DL
CHLORIDE SERPL-SCNC: 97 MMOL/L
CO2 SERPL-SCNC: 27 MMOL/L
CREAT SERPL-MCNC: 1.9 MG/DL
DIFFERENTIAL METHOD: ABNORMAL
EOSINOPHIL # BLD AUTO: 0.1 K/UL
EOSINOPHIL NFR BLD: 2.6 %
ERYTHROCYTE [DISTWIDTH] IN BLOOD BY AUTOMATED COUNT: 20.6 %
EST. GFR  (AFRICAN AMERICAN): 36 ML/MIN/1.73 M^2
EST. GFR  (NON AFRICAN AMERICAN): 31 ML/MIN/1.73 M^2
GLUCOSE SERPL-MCNC: 109 MG/DL
HCT VFR BLD AUTO: 27.2 %
HGB BLD-MCNC: 8.1 G/DL
LYMPHOCYTES # BLD AUTO: 0.7 K/UL
LYMPHOCYTES NFR BLD: 13.2 %
MAGNESIUM SERPL-MCNC: 1.7 MG/DL
MCH RBC QN AUTO: 20.9 PG
MCHC RBC AUTO-ENTMCNC: 29.8 G/DL
MCV RBC AUTO: 70 FL
MONOCYTES # BLD AUTO: 0.6 K/UL
MONOCYTES NFR BLD: 11.4 %
NEUTROPHILS # BLD AUTO: 3.6 K/UL
NEUTROPHILS NFR BLD: 72.4 %
PHOSPHATE SERPL-MCNC: 3.9 MG/DL
PLATELET # BLD AUTO: 223 K/UL
PMV BLD AUTO: 8.5 FL
POTASSIUM SERPL-SCNC: 3.4 MMOL/L
RBC # BLD AUTO: 3.88 M/UL
SODIUM SERPL-SCNC: 134 MMOL/L
WBC # BLD AUTO: 4.93 K/UL

## 2018-03-29 PROCEDURE — 99213 OFFICE O/P EST LOW 20 MIN: CPT | Mod: ,,, | Performed by: INTERNAL MEDICINE

## 2018-03-29 PROCEDURE — 25000003 PHARM REV CODE 250: Performed by: HOSPITALIST

## 2018-03-29 PROCEDURE — 84100 ASSAY OF PHOSPHORUS: CPT

## 2018-03-29 PROCEDURE — 25000003 PHARM REV CODE 250: Performed by: EMERGENCY MEDICINE

## 2018-03-29 PROCEDURE — 85025 COMPLETE CBC W/AUTO DIFF WBC: CPT

## 2018-03-29 PROCEDURE — 25000003 PHARM REV CODE 250: Performed by: INTERNAL MEDICINE

## 2018-03-29 PROCEDURE — 63600175 PHARM REV CODE 636 W HCPCS: Performed by: NURSE PRACTITIONER

## 2018-03-29 PROCEDURE — 25000003 PHARM REV CODE 250: Performed by: NURSE PRACTITIONER

## 2018-03-29 PROCEDURE — G0378 HOSPITAL OBSERVATION PER HR: HCPCS

## 2018-03-29 PROCEDURE — 36415 COLL VENOUS BLD VENIPUNCTURE: CPT

## 2018-03-29 PROCEDURE — A4216 STERILE WATER/SALINE, 10 ML: HCPCS | Performed by: EMERGENCY MEDICINE

## 2018-03-29 PROCEDURE — 83735 ASSAY OF MAGNESIUM: CPT

## 2018-03-29 PROCEDURE — 80048 BASIC METABOLIC PNL TOTAL CA: CPT

## 2018-03-29 RX ORDER — POTASSIUM CHLORIDE 20 MEQ/1
40 TABLET, EXTENDED RELEASE ORAL ONCE
Status: COMPLETED | OUTPATIENT
Start: 2018-03-29 | End: 2018-03-29

## 2018-03-29 RX ORDER — MUPIROCIN 20 MG/G
OINTMENT TOPICAL DAILY
Status: DISCONTINUED | OUTPATIENT
Start: 2018-03-29 | End: 2018-03-30 | Stop reason: HOSPADM

## 2018-03-29 RX ORDER — LANOLIN ALCOHOL/MO/W.PET/CERES
400 CREAM (GRAM) TOPICAL ONCE
Status: COMPLETED | OUTPATIENT
Start: 2018-03-29 | End: 2018-03-29

## 2018-03-29 RX ADMIN — MAGNESIUM OXIDE TAB 400 MG (241.3 MG ELEMENTAL MG) 400 MG: 400 (241.3 MG) TAB at 03:03

## 2018-03-29 RX ADMIN — POTASSIUM CHLORIDE 40 MEQ: 1500 TABLET, EXTENDED RELEASE ORAL at 03:03

## 2018-03-29 RX ADMIN — SODIUM CHLORIDE, PRESERVATIVE FREE 3 ML: 5 INJECTION INTRAVENOUS at 03:03

## 2018-03-29 RX ADMIN — APIXABAN 2.5 MG: 2.5 TABLET, FILM COATED ORAL at 09:03

## 2018-03-29 RX ADMIN — FUROSEMIDE 60 MG: 10 INJECTION, SOLUTION INTRAMUSCULAR; INTRAVENOUS at 06:03

## 2018-03-29 RX ADMIN — ACETAMINOPHEN 650 MG: 325 TABLET ORAL at 09:03

## 2018-03-29 RX ADMIN — METOPROLOL SUCCINATE 25 MG: 25 TABLET, EXTENDED RELEASE ORAL at 09:03

## 2018-03-29 RX ADMIN — POTASSIUM CHLORIDE 20 MEQ: 1500 TABLET, EXTENDED RELEASE ORAL at 09:03

## 2018-03-29 RX ADMIN — ACETAMINOPHEN 650 MG: 325 TABLET ORAL at 11:03

## 2018-03-29 RX ADMIN — FAMOTIDINE 20 MG: 20 TABLET ORAL at 10:03

## 2018-03-29 RX ADMIN — FERROUS SULFATE TAB EC 325 MG (65 MG FE EQUIVALENT) 325 MG: 325 (65 FE) TABLET DELAYED RESPONSE at 09:03

## 2018-03-29 RX ADMIN — METOLAZONE 2.5 MG: 2.5 TABLET ORAL at 09:03

## 2018-03-29 RX ADMIN — TRAZODONE HYDROCHLORIDE 50 MG: 50 TABLET ORAL at 11:03

## 2018-03-29 RX ADMIN — MUPIROCIN: 20 OINTMENT TOPICAL at 06:03

## 2018-03-29 RX ADMIN — ASPIRIN 81 MG: 81 TABLET, COATED ORAL at 09:03

## 2018-03-29 RX ADMIN — SODIUM CHLORIDE, PRESERVATIVE FREE 3 ML: 5 INJECTION INTRAVENOUS at 09:03

## 2018-03-29 RX ADMIN — FUROSEMIDE 60 MG: 10 INJECTION, SOLUTION INTRAMUSCULAR; INTRAVENOUS at 09:03

## 2018-03-29 RX ADMIN — SODIUM CHLORIDE, PRESERVATIVE FREE 3 ML: 5 INJECTION INTRAVENOUS at 06:03

## 2018-03-29 NOTE — PROGRESS NOTES
[facility] Medicine  Progress Note    Patient Name: Chung Meneses Sr.  MRN: [id number]  Patient Class: OP- Observation   Admission Date: 3/27/2018  Length of Stay: 0 days  Attending Physician: Holland Feliciano MD  Primary Care Provider: Glenroy Carmichael MD        Subjective:     Principal Problem:Acute on chronic diastolic (congestive) heart failure    HPI:  Mr. Meneses is an 86 year old male patient with a PMHx of CAD with remote CABG x 1V 12/2017, severe AS with remote bioprosthetic AVR 12/2017, permanent AF on Eliquis, chronic diastolic HFpEF of 50-55%, HTN, and CKD stage III, who was sent to ED by Cardiology Clinic for acute decompensated HF with c/o SOB that has progressively worsened over the past 2-3 days.  Associated ABD bloating, weight gain (6.7 kg increase from dry weight), edema to BLE, and insomnia.  Denies any PND, orthopnea, cough, CP, palpitations, ABD pain, N/V/D, dysuria, hematuria, lightheadedness, dizziness, syncope, or fever.  He reports compliance with diuretic and diet.  He was recently admitted on 3/7 for IVVD, and DC with decreased dose of Lasix.  He reports excess fluid quickly accumulated, and continued to worsen since DC.  Initial work-up in ED resulted BNP 2135, troponin 0.129, cr. 2.2, BUN 72.  Patient received Lasix IV 80mg x 1 dose in ED and responded well with ~2L diuresed.  Patient was admitted for acute decompensated HF with anasarca under Hospital Medicine services.    Hospital Course:  Patient was kept for observation with acute on chronic CHF under the care of Hospital Medicine. He was given IV furosemide and has had 2800cc output since admit. He remains SOB with talking and has edema to scrotum, abd and bilat legs. He will need at least 2 midnights to continue to diurese and remove fluid.          Interval History: Patient continues to diurese - has lost 11lbs since admit. Continues to have fluid in abd, scrotum, and ble. Pt reports he is feeling  "better - oxygen sat 98% on Ra. Cardiology following. Will continue to diurese and likely dc home tomorrow.    Review of Systems   Constitutional: Positive for activity change and fatigue. Negative for chills, diaphoresis and fever.   HENT: Negative for congestion, rhinorrhea and sore throat.    Eyes: Negative for pain and visual disturbance.   Respiratory: Positive for shortness of breath (EISENBERG). Negative for cough and wheezing.    Cardiovascular: Positive for leg swelling. Negative for chest pain and palpitations.   Gastrointestinal: Positive for abdominal distention ("bloating"). Negative for abdominal pain, constipation, diarrhea, nausea and vomiting.   Endocrine: Negative for polyphagia and polyuria.   Genitourinary: Positive for scrotal swelling. Negative for difficulty urinating, dysuria, hematuria, penile pain, penile swelling and testicular pain.   Musculoskeletal: Positive for gait problem ( 2/2 leg and scrotal edema ). Negative for arthralgias and myalgias.   Skin: Negative for color change and wound.   Allergic/Immunologic: Negative.    Neurological: Negative for dizziness, speech difficulty and weakness.   Hematological: Negative.    Psychiatric/Behavioral: Negative for agitation, behavioral problems and confusion. The patient is not nervous/anxious.    All other systems reviewed and are negative.    Objective:     Vital Signs (Most Recent):  Temp: 97.7 °F (36.5 °C) (03/29/18 1211)  Pulse: 76 (03/29/18 1211)  Resp: 16 (03/29/18 1211)  BP: 110/68 (03/29/18 1211)  SpO2: 98 % (03/29/18 1211) Vital Signs (24h Range):  Temp:  [97.6 °F (36.4 °C)-98.5 °F (36.9 °C)] 97.7 °F (36.5 °C)  Pulse:  [66-80] 76  Resp:  [16-20] 16  SpO2:  [94 %-99 %] 98 %  BP: (110-146)/(59-68) 110/68     Weight: 79.4 kg (175 lb 0.7 oz)  Body mass index is 25.85 kg/m².    Intake/Output Summary (Last 24 hours) at 03/29/18 1315  Last data filed at 03/29/18 1200   Gross per 24 hour   Intake              480 ml   Output             4010 ml "   Net            -3530 ml      Physical Exam   Constitutional: He is oriented to person, place, and time. He appears well-developed and well-nourished. No distress.   HENT:   Head: Normocephalic and atraumatic.   Nose: Nose normal.   Mouth/Throat: Oropharynx is clear and moist.   Eyes: Conjunctivae and EOM are normal. Pupils are equal, round, and reactive to light.   Neck: Normal range of motion. Neck supple. No JVD present.   Cardiovascular: Normal rate, regular rhythm, S1 normal, S2 normal, normal heart sounds and intact distal pulses.   No extrasystoles are present. Exam reveals no gallop.    No murmur heard.  Pulmonary/Chest: Effort normal and breath sounds normal. No accessory muscle usage. No tachypnea. No respiratory distress. He has no wheezes. He has no rhonchi. He has no rales.   Abdominal: Soft. Bowel sounds are normal. He exhibits distension. There is no tenderness. There is no CVA tenderness.   Genitourinary: Right testis shows swelling. Left testis shows swelling.   Genitourinary Comments: deferred   Musculoskeletal: Normal range of motion. He exhibits edema (2+ to BLE from feet to lower ABD, including scrotum ). He exhibits no deformity.   Neurological: He is alert and oriented to person, place, and time. No cranial nerve deficit.   Skin: Skin is warm, dry and intact. Capillary refill takes 2 to 3 seconds. He is not diaphoretic. No cyanosis or erythema.   Psychiatric: He has a normal mood and affect. His speech is normal and behavior is normal. Cognition and memory are normal.   Nursing note and vitals reviewed.      Significant Labs:   Recent Lab Results       03/29/18  0434 03/29/18  0433 03/28/18  1521      Anion Gap  10      Aniso   Slight     Baso # 0.02  0.03     Basophil% 0.4  0.6     BUN, Bld  65(H)      [place] Cells   Occasional     Calcium  8.9      Chloride  97      CO2  27      Creatinine  1.9(H)      Differential Method Automated  Automated     eGFR if   36(A)      eGFR if  non   31  Comment:  Calculation used to obtain the estimated glomerular filtration  rate (eGFR) is the CKD-EPI equation.   (A)      Eos # 0.1  0.2     Eosinophil% 2.6  3.4     Glucose  109      Gran # (ANC) 3.6  3.8     Gran% 72.4  74.6(H)     Hematocrit 27.2(L)  28.5(L)     Hemoglobin 8.1(L)  8.6(L)     Hypo   Moderate     Lymph # 0.7(L)  0.4(L)     Lymph% 13.2(L)  8.7(L)     Magnesium  1.7      MCH 20.9(L)  21.2(L)     MCHC 29.8(L)  30.2(L)     MCV 70(L)  70(L)     Mono # 0.6  0.6     Mono% 11.4  12.7     MPV 8.5(L)  8.7(L)     Ovalocytes   Occasional     Phosphorus  3.9      Platelet Estimate   Appears normal     Platelets 223  225     Poik   Slight     Poly   Occasional     Potassium  3.4(L)      RBC 3.88(L)  4.05(L)     RDW 20.6(H)  20.7(H)     Schistocytes   Present     Sodium  134(L)      Stomatocytes   Present     Target Cells   Occasional     Tear Drop Cells   Occasional     WBC 4.93  5.04         All pertinent labs within the past 24 hours have been reviewed.    Significant Imaging: I have reviewed all pertinent imaging results/findings within the past 24 hours.    Assessment/Plan:      * Acute on chronic diastolic HFpEF of 50-55% with anasarca    - Likely due to recent decrease in home diuretic.  - Aggressive diuresis with IV Lasix 60mg BID.  - Continue home metolazone.  - Strict I&O's, daily weights, Na/fluid restriction.  - ? add spironolactone.   - Cardiology consult in AM.     3/28:  --needs additional diuresis -   --IV furosemide and PO metolazone  --strict I&O  --daily weights  --cardiac diet with fluid restrictions  --cardiology following    3/29:  --needs additional diuresis  --continue IV furosemide and PO metolazone  --strict I&O with daily weights  --cardiology following  --cardiac diet with fluid restrictions        XIANG on CKD stage III    - Likely secondary to cardiorenal syndrome.  - Baseline cr. 1.6-1.8.  - Diuresis as above.  - Strict I&O's.  - Follow daily  BMP.    3/28:  --BMP stable, Cr slightly improved to 2.0  --monitor BMP  --continue to diurese    3/29:  --continues to slowly improve  --cr 1.9 today            Anemia of chronic disease with iron deficiency    - H&H stable at baseline, monitor and will transfuse as needed.  - Iron supplementation.     3/29:  --H/H 8.1/27.2  --continue iron supplementation  --monitor and transfuse as needed        Hypokalemia    3/28:  --K 3.4  --replete and monitor    3/29:  --K 3.4  --replete and monitor          Elevated troponin    - Likely demand due to #1.  - No angina.  - Trend serial CE's; Check EKG.  - Continue ASA and BB.  - Cardiology consult in Am.    3/28:  --likely 2/2 demand ischemia  --daily ASA and BB  --cardiology following  --troponin elevated but flat        Chronic atrial fibrillation    - Stable.  - Continue BB and Eliquis.  - Monitor telemetry.         Essential hypertension    - BP stable.  - Continue BB and diuretics.  Monitor BP trends.    3/28:  --stable  --continue current meds  --cardiac diet with fluid restrictions          VTE Risk Mitigation         Ordered     apixaban tablet 2.5 mg  2 times daily     Route:  Oral        03/28/18 0021              ANAMARIA Richards  Department of Hospital Medicine   [facility]

## 2018-03-29 NOTE — ASSESSMENT & PLAN NOTE
- H&H stable at baseline, monitor and will transfuse as needed.  - Iron supplementation.     3/29:  --H/H 8.1/27.2  --continue iron supplementation  --monitor and transfuse as needed

## 2018-03-29 NOTE — PROGRESS NOTES
Dressing changed to lle. Ulcer to top part of shin, with small wound above. Old open dry areas behind knee. Cleansed. Betadine and dressing applied per wound care. Pt family requested wound care upon discharge. Notified maxine torres np.

## 2018-03-29 NOTE — PROGRESS NOTES
[facility]  Cardiology  Progress Note    Patient Name: Chung Meneses Sr.  MRN: [id number]  Admission Date: 3/27/2018  Hospital Length of Stay: 0 days  Code Status: Full Code   Attending Physician: Holland Feliciano MD   Primary Care Physician: Glenroy Carmichael MD  Expected Discharge Date:   Principal Problem:Acute on chronic diastolic (congestive) heart failure    Subjective:   Brief HPI:    Mr. Meneses is an 86 year old male patient with a PMHx of CAD with remote CABG x 1V 12/2017, severe AS with remote bioprosthetic AVR 12/2017, permanent AF on Eliquis, chronic diastolic HFpEF of 50-55%, HTN, and CKD stage III, who was sent to ED by Cardiology Clinic for acute decompensated HF with c/o SOB that has progressively worsened over the past 2-3 days.  Associated ABD bloating, weight gain (6.7 kg increase from dry weight), edema to BLE, and insomnia.  Denies any PND, orthopnea, cough, CP, palpitations, ABD pain, N/V/D, dysuria, hematuria, lightheadedness, dizziness, syncope, or fever.  He reports compliance with diuretic and diet.  He was recently admitted on 3/7 for IVVD, and DC with decreased dose of Lasix.  He reports excess fluid quickly accumulated, and continued to worsen since DC.  Initial work-up in ED resulted BNP 2135, troponin 0.129, cr. 2.2, BUN 72.  Patient received Lasix IV 80mg x 1 dose in ED and responded well with ~2L diuresed.  Continues to diurese with Lasix 60mg BID.  Patient was admitted for acute decompensated HF with anasarca .       Hospital Course:   3/29/18- Patient looks and feels better today. Has diuresed approximate 6,400cc since admit. Lower extremity and scrotal edema continue to improve. Mg of 1.7 on morning labs. Did have drop in H/H to 8.1/27.2. His K+ is 3.4. Creatine stable. Currently on Lasix 60mg BID and Metolazone 2.5mg daily       Review of Systems   Constitution: Negative for diaphoresis, weakness, malaise/fatigue, weight gain and weight loss.   HENT: Negative for  congestion and nosebleeds.    Cardiovascular: Positive for leg swelling (improving). Negative for chest pain, claudication, cyanosis, dyspnea on exertion, irregular heartbeat, near-syncope, orthopnea, palpitations, paroxysmal nocturnal dyspnea and syncope.   Respiratory: Negative for cough, hemoptysis, shortness of breath, sleep disturbances due to breathing, snoring, sputum production and wheezing.    Hematologic/Lymphatic: Negative for bleeding problem. Does not bruise/bleed easily.   Skin: Negative for rash.   Musculoskeletal: Negative for arthritis, back pain, falls, joint pain, muscle cramps and muscle weakness.   Gastrointestinal: Positive for bloating. Negative for abdominal pain, constipation, diarrhea, heartburn, hematemesis, hematochezia, melena and nausea.   Genitourinary: Negative for dysuria, hematuria and nocturia.        Improving scrotal edema    Neurological: Negative for excessive daytime sleepiness, dizziness, headaches, light-headedness, loss of balance, numbness and vertigo.     Objective:     Vital Signs (Most Recent):  Temp: 97.7 °F (36.5 °C) (03/29/18 1211)  Pulse: 76 (03/29/18 1211)  Resp: 16 (03/29/18 1211)  BP: 110/68 (03/29/18 1211)  SpO2: 98 % (03/29/18 1211) Vital Signs (24h Range):  Temp:  [97.6 °F (36.4 °C)-98.5 °F (36.9 °C)] 97.7 °F (36.5 °C)  Pulse:  [66-80] 76  Resp:  [16-20] 16  SpO2:  [94 %-99 %] 98 %  BP: (110-146)/(59-68) 110/68     Weight: 79.4 kg (175 lb 0.7 oz)  Body mass index is 25.85 kg/m².     SpO2: 98 %  O2 Device (Oxygen Therapy): room air      Intake/Output Summary (Last 24 hours) at 03/29/18 1217  Last data filed at 03/29/18 1200   Gross per 24 hour   Intake              720 ml   Output             4410 ml   Net            -3690 ml       Lines/Drains/Airways     Peripheral Intravenous Line                 Peripheral IV - Single Lumen 03/27/18 1759 Left Antecubital 1 day                Physical Exam   Constitutional: He is oriented to person, place, and time. He  appears well-developed and well-nourished.   Neck: Neck supple. No JVD present.   Cardiovascular: Normal rate, regular rhythm, normal heart sounds and normal pulses.  Exam reveals no friction rub.    No murmur heard.  Pulmonary/Chest: Effort normal and breath sounds normal. No respiratory distress. He has no wheezes. He has no rales.   Abdominal: Soft. Bowel sounds are normal. He exhibits no distension.   Genitourinary:   Genitourinary Comments: Mild scrotal edema    Musculoskeletal: He exhibits edema. He exhibits no tenderness.   Neurological: He is alert and oriented to person, place, and time.   Skin: Skin is warm and dry. No rash noted.   Psychiatric: He has a normal mood and affect. His behavior is normal.   Nursing note and vitals reviewed.      Significant Labs:   All pertinent lab results from the last 24 hours have been reviewed. and   Recent Lab Results       03/29/18  0434 03/29/18  0433 03/28/18  1521      Anion Gap  10      Aniso   Slight     Baso # 0.02  0.03     Basophil% 0.4  0.6     BUN, Bld  65(H)      [place] Cells   Occasional     Calcium  8.9      Chloride  97      CO2  27      Creatinine  1.9(H)      Differential Method Automated  Automated     eGFR if   36(A)      eGFR if non   31  Comment:  Calculation used to obtain the estimated glomerular filtration  rate (eGFR) is the CKD-EPI equation.   (A)      Eos # 0.1  0.2     Eosinophil% 2.6  3.4     Glucose  109      Gran # (ANC) 3.6  3.8     Gran% 72.4  74.6(H)     Hematocrit 27.2(L)  28.5(L)     Hemoglobin 8.1(L)  8.6(L)     Hypo   Moderate     Lymph # 0.7(L)  0.4(L)     Lymph% 13.2(L)  8.7(L)     Magnesium  1.7      MCH 20.9(L)  21.2(L)     MCHC 29.8(L)  30.2(L)     MCV 70(L)  70(L)     Mono # 0.6  0.6     Mono% 11.4  12.7     MPV 8.5(L)  8.7(L)     Ovalocytes   Occasional     Phosphorus  3.9      Platelet Estimate   Appears normal     Platelets 223  225     Poik   Slight     Poly   Occasional     Potassium  3.4(L)       RBC 3.88(L)  4.05(L)     RDW 20.6(H)  20.7(H)     Schistocytes   Present     Sodium  134(L)      Stomatocytes   Present     Target Cells   Occasional     Tear Drop Cells   Occasional     WBC 4.93  5.04           Significant Imaging: Echocardiogram:   2D echo with color flow doppler:   Results for orders placed or performed in visit on 01/26/18   2D Echo w/ Color Flow Doppler   Result Value Ref Range    EF 50 55 - 65    Mitral Valve Regurgitation MODERATE (A)     Diastolic Dysfunction Yes (A)     Est. PA Systolic Pressure 41.01 (A)     Mitral Valve Mobility NORMAL     Tricuspid Valve Regurgitation MILD TO MODERATE     and X-Ray: CXR: X-Ray Chest 1 View (CXR):   Results for orders placed or performed during the hospital encounter of 03/27/18   X-Ray Chest 1 View    Narrative    Exam: XR CHEST 1 VIEW,    Date:  03/27/18 17:46:49    History: Heart failure    Comparison:  03/06/2018    Findings: There is mild enlargement of the heart.  There are sternal sutures from previous heart surgery.  The lungs are clear.  Bilateral shoulder surgery is noted.    Impression      Mild enlargement of the heart.  No acute findings.      Electronically signed by: MARYCRUZ HAWK MD  Date:     03/27/18  Time:    17:59     and X-Ray Chest PA and Lateral (CXR): No results found for this visit on 03/27/18.    Assessment and Plan:     * Acute on chronic diastolic HFpEF of 50-55% with anasarca    -Patient admitted with decompensated HF and anasarca, but improving since admit   -BNP 2k Is responding well to IV Lasix 60mg BID and metolazone.   -Edema continues to improve   -Continue to diurese with IV Lasix and metolazone  -Likely needs additional day of IV diuresis   Will likely need to increase home Lasix to 40mg BID with metolazone every other day  -Needs to follow closely in CHF clinic   -Heart healthy diet  -Limit fluid intake 50-60 oz   -Daily weights and to notify clinic if weight increases by more than 3 lbs in 1 day or 5 lbs in 1  week.   -Exercise routine as tolerated            XIANG on CKD stage III    -Renal function remains stable         Elevated troponin    -Patient has chronically elevated troponin   -Serial troponin has remained flat   -Is likely from decompensated HF  -No plans for ischemic workup.  -Patient is post CABG in Dec 2017         Chronic atrial fibrillation    -Chronic A-fib with controlled rate  -Continue BB for rate control and Eliquis for CVA prophylaxis               VTE Risk Mitigation         Ordered     apixaban tablet 2.5 mg  2 times daily     Route:  Oral        03/28/18 0021        Chart reviewed. Patient examined by Dr. Palomares and agrees with plan that has been outlined.     Isak Arredondo, GARRETT  Cardiology  [facility]

## 2018-03-29 NOTE — ASSESSMENT & PLAN NOTE
-Patient has chronically elevated troponin   -Serial troponin has remained flat   -Is likely from decompensated HF  -No plans for ischemic workup.  -Patient is post CABG in Dec 2017

## 2018-03-29 NOTE — ASSESSMENT & PLAN NOTE
- Likely due to recent decrease in home diuretic.  - Aggressive diuresis with IV Lasix 60mg BID.  - Continue home metolazone.  - Strict I&O's, daily weights, Na/fluid restriction.  - ? add spironolactone.   - Cardiology consult in AM.     3/28:  --needs additional diuresis -   --IV furosemide and PO metolazone  --strict I&O  --daily weights  --cardiac diet with fluid restrictions  --cardiology following    3/29:  --needs additional diuresis  --continue IV furosemide and PO metolazone  --strict I&O with daily weights  --cardiology following  --cardiac diet with fluid restrictions

## 2018-03-29 NOTE — HOSPITAL COURSE
3/29/18- Patient looks and feels better today. Has diuresed approximate 6,400cc since admit. Lower extremity and scrotal edema continue to improve. Mg of 1.7 on morning labs. Did have drop in H/H to 8.1/27.2. His K+ is 3.4. Creatine stable. Currently on Lasix 60mg BID and Metolazone 2.5mg daily

## 2018-03-29 NOTE — SUBJECTIVE & OBJECTIVE
Review of Systems   Constitution: Negative for diaphoresis, weakness, malaise/fatigue, weight gain and weight loss.   HENT: Negative for congestion and nosebleeds.    Cardiovascular: Positive for leg swelling (improving). Negative for chest pain, claudication, cyanosis, dyspnea on exertion, irregular heartbeat, near-syncope, orthopnea, palpitations, paroxysmal nocturnal dyspnea and syncope.   Respiratory: Negative for cough, hemoptysis, shortness of breath, sleep disturbances due to breathing, snoring, sputum production and wheezing.    Hematologic/Lymphatic: Negative for bleeding problem. Does not bruise/bleed easily.   Skin: Negative for rash.   Musculoskeletal: Negative for arthritis, back pain, falls, joint pain, muscle cramps and muscle weakness.   Gastrointestinal: Positive for bloating. Negative for abdominal pain, constipation, diarrhea, heartburn, hematemesis, hematochezia, melena and nausea.   Genitourinary: Negative for dysuria, hematuria and nocturia.        Improving scrotal edema    Neurological: Negative for excessive daytime sleepiness, dizziness, headaches, light-headedness, loss of balance, numbness and vertigo.     Objective:     Vital Signs (Most Recent):  Temp: 97.7 °F (36.5 °C) (03/29/18 1211)  Pulse: 76 (03/29/18 1211)  Resp: 16 (03/29/18 1211)  BP: 110/68 (03/29/18 1211)  SpO2: 98 % (03/29/18 1211) Vital Signs (24h Range):  Temp:  [97.6 °F (36.4 °C)-98.5 °F (36.9 °C)] 97.7 °F (36.5 °C)  Pulse:  [66-80] 76  Resp:  [16-20] 16  SpO2:  [94 %-99 %] 98 %  BP: (110-146)/(59-68) 110/68     Weight: 79.4 kg (175 lb 0.7 oz)  Body mass index is 25.85 kg/m².     SpO2: 98 %  O2 Device (Oxygen Therapy): room air      Intake/Output Summary (Last 24 hours) at 03/29/18 1217  Last data filed at 03/29/18 1200   Gross per 24 hour   Intake              720 ml   Output             4410 ml   Net            -3690 ml       Lines/Drains/Airways     Peripheral Intravenous Line                 Peripheral IV - Single  Lumen 03/27/18 1759 Left Antecubital 1 day                Physical Exam   Constitutional: He is oriented to person, place, and time. He appears well-developed and well-nourished.   Neck: Neck supple. No JVD present.   Cardiovascular: Normal rate, regular rhythm, normal heart sounds and normal pulses.  Exam reveals no friction rub.    No murmur heard.  Pulmonary/Chest: Effort normal and breath sounds normal. No respiratory distress. He has no wheezes. He has no rales.   Abdominal: Soft. Bowel sounds are normal. He exhibits no distension.   Genitourinary:   Genitourinary Comments: Mild scrotal edema    Musculoskeletal: He exhibits edema. He exhibits no tenderness.   Neurological: He is alert and oriented to person, place, and time.   Skin: Skin is warm and dry. No rash noted.   Psychiatric: He has a normal mood and affect. His behavior is normal.   Nursing note and vitals reviewed.      Significant Labs:   All pertinent lab results from the last 24 hours have been reviewed. and   Recent Lab Results       03/29/18  0434 03/29/18  0433 03/28/18  1521      Anion Gap  10      Aniso   Slight     Baso # 0.02  0.03     Basophil% 0.4  0.6     BUN, Bld  65(H)      Bill Cells   Occasional     Calcium  8.9      Chloride  97      CO2  27      Creatinine  1.9(H)      Differential Method Automated  Automated     eGFR if   36(A)      eGFR if non   31  Comment:  Calculation used to obtain the estimated glomerular filtration  rate (eGFR) is the CKD-EPI equation.   (A)      Eos # 0.1  0.2     Eosinophil% 2.6  3.4     Glucose  109      Gran # (ANC) 3.6  3.8     Gran% 72.4  74.6(H)     Hematocrit 27.2(L)  28.5(L)     Hemoglobin 8.1(L)  8.6(L)     Hypo   Moderate     Lymph # 0.7(L)  0.4(L)     Lymph% 13.2(L)  8.7(L)     Magnesium  1.7      MCH 20.9(L)  21.2(L)     MCHC 29.8(L)  30.2(L)     MCV 70(L)  70(L)     Mono # 0.6  0.6     Mono% 11.4  12.7     MPV 8.5(L)  8.7(L)     Ovalocytes   Occasional      Phosphorus  3.9      Platelet Estimate   Appears normal     Platelets 223  225     Poik   Slight     Poly   Occasional     Potassium  3.4(L)      RBC 3.88(L)  4.05(L)     RDW 20.6(H)  20.7(H)     Schistocytes   Present     Sodium  134(L)      Stomatocytes   Present     Target Cells   Occasional     Tear Drop Cells   Occasional     WBC 4.93  5.04           Significant Imaging: Echocardiogram:   2D echo with color flow doppler:   Results for orders placed or performed in visit on 01/26/18   2D Echo w/ Color Flow Doppler   Result Value Ref Range    EF 50 55 - 65    Mitral Valve Regurgitation MODERATE (A)     Diastolic Dysfunction Yes (A)     Est. PA Systolic Pressure 41.01 (A)     Mitral Valve Mobility NORMAL     Tricuspid Valve Regurgitation MILD TO MODERATE     and X-Ray: CXR: X-Ray Chest 1 View (CXR):   Results for orders placed or performed during the hospital encounter of 03/27/18   X-Ray Chest 1 View    Narrative    Exam: XR CHEST 1 VIEW,    Date:  03/27/18 17:46:49    History: Heart failure    Comparison:  03/06/2018    Findings: There is mild enlargement of the heart.  There are sternal sutures from previous heart surgery.  The lungs are clear.  Bilateral shoulder surgery is noted.    Impression      Mild enlargement of the heart.  No acute findings.      Electronically signed by: MARYCRUZ HAWK MD  Date:     03/27/18  Time:    17:59     and X-Ray Chest PA and Lateral (CXR): No results found for this visit on 03/27/18.

## 2018-03-29 NOTE — PROGRESS NOTES
Patient continues to diurese - says he thinks he will be able to go home in the morning. Denies Cp, SOB, and leg cramps.

## 2018-03-29 NOTE — SUBJECTIVE & OBJECTIVE
"Interval History: Patient continues to diurese - has lost 11lbs since admit. Continues to have fluid in abd, scrotum, and ble. Pt reports he is feeling better - oxygen sat 98% on Ra. Cardiology following. Will continue to diurese and likely dc home tomorrow.    Review of Systems   Constitutional: Positive for activity change and fatigue. Negative for chills, diaphoresis and fever.   HENT: Negative for congestion, rhinorrhea and sore throat.    Eyes: Negative for pain and visual disturbance.   Respiratory: Positive for shortness of breath (EISENBERG). Negative for cough and wheezing.    Cardiovascular: Positive for leg swelling. Negative for chest pain and palpitations.   Gastrointestinal: Positive for abdominal distention ("bloating"). Negative for abdominal pain, constipation, diarrhea, nausea and vomiting.   Endocrine: Negative for polyphagia and polyuria.   Genitourinary: Positive for scrotal swelling. Negative for difficulty urinating, dysuria, hematuria, penile pain, penile swelling and testicular pain.   Musculoskeletal: Positive for gait problem ( 2/2 leg and scrotal edema ). Negative for arthralgias and myalgias.   Skin: Negative for color change and wound.   Allergic/Immunologic: Negative.    Neurological: Negative for dizziness, speech difficulty and weakness.   Hematological: Negative.    Psychiatric/Behavioral: Negative for agitation, behavioral problems and confusion. The patient is not nervous/anxious.    All other systems reviewed and are negative.    Objective:     Vital Signs (Most Recent):  Temp: 97.7 °F (36.5 °C) (03/29/18 1211)  Pulse: 76 (03/29/18 1211)  Resp: 16 (03/29/18 1211)  BP: 110/68 (03/29/18 1211)  SpO2: 98 % (03/29/18 1211) Vital Signs (24h Range):  Temp:  [97.6 °F (36.4 °C)-98.5 °F (36.9 °C)] 97.7 °F (36.5 °C)  Pulse:  [66-80] 76  Resp:  [16-20] 16  SpO2:  [94 %-99 %] 98 %  BP: (110-146)/(59-68) 110/68     Weight: 79.4 kg (175 lb 0.7 oz)  Body mass index is 25.85 kg/m².    Intake/Output " Summary (Last 24 hours) at 03/29/18 1315  Last data filed at 03/29/18 1200   Gross per 24 hour   Intake              480 ml   Output             4010 ml   Net            -3530 ml      Physical Exam   Constitutional: He is oriented to person, place, and time. He appears well-developed and well-nourished. No distress.   HENT:   Head: Normocephalic and atraumatic.   Nose: Nose normal.   Mouth/Throat: Oropharynx is clear and moist.   Eyes: Conjunctivae and EOM are normal. Pupils are equal, round, and reactive to light.   Neck: Normal range of motion. Neck supple. No JVD present.   Cardiovascular: Normal rate, regular rhythm, S1 normal, S2 normal, normal heart sounds and intact distal pulses.   No extrasystoles are present. Exam reveals no gallop.    No murmur heard.  Pulmonary/Chest: Effort normal and breath sounds normal. No accessory muscle usage. No tachypnea. No respiratory distress. He has no wheezes. He has no rhonchi. He has no rales.   Abdominal: Soft. Bowel sounds are normal. He exhibits distension. There is no tenderness. There is no CVA tenderness.   Genitourinary: Right testis shows swelling. Left testis shows swelling.   Genitourinary Comments: deferred   Musculoskeletal: Normal range of motion. He exhibits edema (2+ to BLE from feet to lower ABD, including scrotum ). He exhibits no deformity.   Neurological: He is alert and oriented to person, place, and time. No cranial nerve deficit.   Skin: Skin is warm, dry and intact. Capillary refill takes 2 to 3 seconds. He is not diaphoretic. No cyanosis or erythema.   Psychiatric: He has a normal mood and affect. His speech is normal and behavior is normal. Cognition and memory are normal.   Nursing note and vitals reviewed.      Significant Labs:   Recent Lab Results       03/29/18  0434 03/29/18  0433 03/28/18  1521      Anion Gap  10      Aniso   Slight     Baso # 0.02  0.03     Basophil% 0.4  0.6     BUN, Bld  65(H)      [place] Cells   Occasional     Calcium   8.9      Chloride  97      CO2  27      Creatinine  1.9(H)      Differential Method Automated  Automated     eGFR if   36(A)      eGFR if non   31  Comment:  Calculation used to obtain the estimated glomerular filtration  rate (eGFR) is the CKD-EPI equation.   (A)      Eos # 0.1  0.2     Eosinophil% 2.6  3.4     Glucose  109      Gran # (ANC) 3.6  3.8     Gran% 72.4  74.6(H)     Hematocrit 27.2(L)  28.5(L)     Hemoglobin 8.1(L)  8.6(L)     Hypo   Moderate     Lymph # 0.7(L)  0.4(L)     Lymph% 13.2(L)  8.7(L)     Magnesium  1.7      MCH 20.9(L)  21.2(L)     MCHC 29.8(L)  30.2(L)     MCV 70(L)  70(L)     Mono # 0.6  0.6     Mono% 11.4  12.7     MPV 8.5(L)  8.7(L)     Ovalocytes   Occasional     Phosphorus  3.9      Platelet Estimate   Appears normal     Platelets 223  225     Poik   Slight     Poly   Occasional     Potassium  3.4(L)      RBC 3.88(L)  4.05(L)     RDW 20.6(H)  20.7(H)     Schistocytes   Present     Sodium  134(L)      Stomatocytes   Present     Target Cells   Occasional     Tear Drop Cells   Occasional     WBC 4.93  5.04         All pertinent labs within the past 24 hours have been reviewed.    Significant Imaging: I have reviewed all pertinent imaging results/findings within the past 24 hours.

## 2018-03-29 NOTE — PLAN OF CARE
Problem: Patient Care Overview  Goal: Plan of Care Review  Outcome: Ongoing (interventions implemented as appropriate)  Dx chf  poc instructed on dbc 10 x q1h wa,. Instructed on turn q2h. Instructed on iv site care. Instructed on fall risk and precautions. No falls. Bed alarm on. Instructed on medication. Will monitor. Instructed on fluid restrictions.

## 2018-03-29 NOTE — ASSESSMENT & PLAN NOTE
-Patient admitted with decompensated HF and anasarca, but improving since admit   -BNP 2k Is responding well to IV Lasix 60mg BID and metolazone.   -Edema continues to improve   -Continue to diurese with IV Lasix and metolazone  -Likely needs additional day of IV diuresis   Will likely need to increase home Lasix to 40mg BID with metolazone every other day  -Needs to follow closely in CHF clinic   -Heart healthy diet  -Limit fluid intake 50-60 oz   -Daily weights and to notify clinic if weight increases by more than 3 lbs in 1 day or 5 lbs in 1 week.   -Exercise routine as tolerated

## 2018-03-29 NOTE — ASSESSMENT & PLAN NOTE
- Likely secondary to cardiorenal syndrome.  - Baseline cr. 1.6-1.8.  - Diuresis as above.  - Strict I&O's.  - Follow daily BMP.    3/28:  --BMP stable, Cr slightly improved to 2.0  --monitor BMP  --continue to diurese    3/29:  --continues to slowly improve  --cr 1.9 today

## 2018-03-29 NOTE — HOSPITAL COURSE
Patient was kept for observation with acute on chronic CHF under the care of Hospital Medicine. He was given IV furosemide and has had 2800cc output since admit. He remains SOB with talking and has edema to scrotum, abd and bilat legs. He will need at least 2 midnights to continue to diurese and remove fluid.   3/29: Patient continues to diurese - has lost 11lbs since admit. Continues to have fluid in abd, scrotum, and ble. Pt reports he is feeling better - oxygen sat 98% on Ra. Cardiology following. Will continue to diurese and likely dc home tomorrow.   3/30: patient diuresed approx 7500 during his stay and his breathing improved dramatically. Patient will weight self daily and continue PO lasix and metolazone and follow up with cardiology and wound care. He also has [facility]. Patient was seen and examined today and deemed stable for discharge home.

## 2018-03-30 VITALS
HEART RATE: 74 BPM | DIASTOLIC BLOOD PRESSURE: 58 MMHG | SYSTOLIC BLOOD PRESSURE: 121 MMHG | WEIGHT: 173.75 LBS | RESPIRATION RATE: 16 BRPM | OXYGEN SATURATION: 97 % | TEMPERATURE: 98 F | BODY MASS INDEX: 25.73 KG/M2 | HEIGHT: 69 IN

## 2018-03-30 LAB
ANION GAP SERPL CALC-SCNC: 12 MMOL/L
BASOPHILS # BLD AUTO: 0.04 K/UL
BASOPHILS NFR BLD: 1 %
BUN SERPL-MCNC: 67 MG/DL
CALCIUM SERPL-MCNC: 8.9 MG/DL
CHLORIDE SERPL-SCNC: 98 MMOL/L
CO2 SERPL-SCNC: 27 MMOL/L
CREAT SERPL-MCNC: 2 MG/DL
DIFFERENTIAL METHOD: ABNORMAL
EOSINOPHIL # BLD AUTO: 0.2 K/UL
EOSINOPHIL NFR BLD: 3.8 %
ERYTHROCYTE [DISTWIDTH] IN BLOOD BY AUTOMATED COUNT: 20.6 %
EST. GFR  (AFRICAN AMERICAN): 34 ML/MIN/1.73 M^2
EST. GFR  (NON AFRICAN AMERICAN): 29 ML/MIN/1.73 M^2
GLUCOSE SERPL-MCNC: 90 MG/DL
HCT VFR BLD AUTO: 26.6 %
HGB BLD-MCNC: 8 G/DL
LYMPHOCYTES # BLD AUTO: 0.6 K/UL
LYMPHOCYTES NFR BLD: 15.5 %
MAGNESIUM SERPL-MCNC: 1.8 MG/DL
MCH RBC QN AUTO: 21.2 PG
MCHC RBC AUTO-ENTMCNC: 30.1 G/DL
MCV RBC AUTO: 71 FL
MONOCYTES # BLD AUTO: 0.6 K/UL
MONOCYTES NFR BLD: 15.3 %
NEUTROPHILS # BLD AUTO: 2.6 K/UL
NEUTROPHILS NFR BLD: 64.4 %
PHOSPHATE SERPL-MCNC: 4.4 MG/DL
PLATELET # BLD AUTO: 224 K/UL
PMV BLD AUTO: 8.9 FL
POTASSIUM SERPL-SCNC: 3.6 MMOL/L
RBC # BLD AUTO: 3.77 M/UL
SODIUM SERPL-SCNC: 137 MMOL/L
WBC # BLD AUTO: 4 K/UL

## 2018-03-30 PROCEDURE — 85025 COMPLETE CBC W/AUTO DIFF WBC: CPT

## 2018-03-30 PROCEDURE — 63600175 PHARM REV CODE 636 W HCPCS: Performed by: NURSE PRACTITIONER

## 2018-03-30 PROCEDURE — 25000003 PHARM REV CODE 250: Performed by: INTERNAL MEDICINE

## 2018-03-30 PROCEDURE — 96376 TX/PRO/DX INJ SAME DRUG ADON: CPT

## 2018-03-30 PROCEDURE — 25000003 PHARM REV CODE 250: Performed by: NURSE PRACTITIONER

## 2018-03-30 PROCEDURE — 96374 THER/PROPH/DIAG INJ IV PUSH: CPT

## 2018-03-30 PROCEDURE — 36415 COLL VENOUS BLD VENIPUNCTURE: CPT

## 2018-03-30 PROCEDURE — 83735 ASSAY OF MAGNESIUM: CPT

## 2018-03-30 PROCEDURE — 96375 TX/PRO/DX INJ NEW DRUG ADDON: CPT

## 2018-03-30 PROCEDURE — 84100 ASSAY OF PHOSPHORUS: CPT

## 2018-03-30 PROCEDURE — 80048 BASIC METABOLIC PNL TOTAL CA: CPT

## 2018-03-30 PROCEDURE — G0378 HOSPITAL OBSERVATION PER HR: HCPCS

## 2018-03-30 RX ADMIN — POTASSIUM CHLORIDE 20 MEQ: 1500 TABLET, EXTENDED RELEASE ORAL at 09:03

## 2018-03-30 RX ADMIN — FAMOTIDINE 20 MG: 20 TABLET ORAL at 09:03

## 2018-03-30 RX ADMIN — METOLAZONE 2.5 MG: 2.5 TABLET ORAL at 09:03

## 2018-03-30 RX ADMIN — FERROUS SULFATE TAB EC 325 MG (65 MG FE EQUIVALENT) 325 MG: 325 (65 FE) TABLET DELAYED RESPONSE at 09:03

## 2018-03-30 RX ADMIN — METOPROLOL SUCCINATE 25 MG: 25 TABLET, EXTENDED RELEASE ORAL at 09:03

## 2018-03-30 RX ADMIN — APIXABAN 2.5 MG: 2.5 TABLET, FILM COATED ORAL at 09:03

## 2018-03-30 RX ADMIN — ASPIRIN 81 MG: 81 TABLET, COATED ORAL at 09:03

## 2018-03-30 RX ADMIN — MUPIROCIN: 20 OINTMENT TOPICAL at 09:03

## 2018-03-30 RX ADMIN — FUROSEMIDE 60 MG: 10 INJECTION, SOLUTION INTRAMUSCULAR; INTRAVENOUS at 09:03

## 2018-03-30 NOTE — NURSING
Patient discharged from observation after treatment for CHF. Reviewed discharge instructions and medications. Expressed the need for follow up appointments per physicians recommendations. Patient was given the opportunity for questions and all were answered to their satisfaction. Patient discharged in no acute distress.   IV removed from Lt AC without incident. Compression dressing applied and patient instructed to leave on no longer than 30 minutes.   Telemetry monitor removed and returned to monitor room.

## 2018-03-30 NOTE — DISCHARGE SUMMARY
[facility] Medicine  Discharge Summary      Patient Name: Chung Meneses Sr.  MRN: [id number]  Admission Date: 3/27/2018  Hospital Length of Stay: 0 days  Discharge Date and Time:  03/30/2018 4:10 PM  Attending Physician: No att. providers found   Discharging Provider: ANAMARIA Richards  Primary Care Provider: Glenroy Carmichael MD      HPI:   Mr. Meneses is an 86 year old male patient with a PMHx of CAD with remote CABG x 1V 12/2017, severe AS with remote bioprosthetic AVR 12/2017, permanent AF on Eliquis, chronic diastolic HFpEF of 50-55%, HTN, and CKD stage III, who was sent to ED by Cardiology Clinic for acute decompensated HF with c/o SOB that has progressively worsened over the past 2-3 days.  Associated ABD bloating, weight gain (6.7 kg increase from dry weight), edema to BLE, and insomnia.  Denies any PND, orthopnea, cough, CP, palpitations, ABD pain, N/V/D, dysuria, hematuria, lightheadedness, dizziness, syncope, or fever.  He reports compliance with diuretic and diet.  He was recently admitted on 3/7 for IVVD, and DC with decreased dose of Lasix.  He reports excess fluid quickly accumulated, and continued to worsen since DC.  Initial work-up in ED resulted BNP 2135, troponin 0.129, cr. 2.2, BUN 72.  Patient received Lasix IV 80mg x 1 dose in ED and responded well with ~2L diuresed.  Patient was admitted for acute decompensated HF with anasarca under Hospital Medicine services.    * No surgery found *      Hospital Course:   Patient was kept for observation with acute on chronic CHF under the care of Hospital Medicine. He was given IV furosemide and has had 2800cc output since admit. He remains SOB with talking and has edema to scrotum, abd and bilat legs. He will need at least 2 midnights to continue to diurese and remove fluid.   3/29: Patient continues to diurese - has lost 11lbs since admit. Continues to have fluid in abd, scrotum, and ble. Pt reports he is feeling better -  oxygen sat 98% on Ra. Cardiology following. Will continue to diurese and likely dc home tomorrow.   3/30: patient diuresed approx 7500 during his stay and his breathing improved dramatically. Patient will weight self daily and continue PO lasix and metolazone and follow up with cardiology and wound care. He also has [facility]. Patient was seen and examined today and deemed stable for discharge home.           Consults:   Consults         Status Ordering Provider     Inpatient consult to Cardiology  Once     Provider:  MD Aaliyah Saha NICKLES P.          No new Assessment & Plan notes have been filed under this hospital service since the last note was generated.  Service: Hospital Medicine    Final Active Diagnoses:    Diagnosis Date Noted POA    PRINCIPAL PROBLEM:  Acute on chronic diastolic HFpEF of 50-55% with anasarca [I50.33] 01/29/2018 Yes    XIANG on CKD stage III [N18.3] 03/28/2018 Yes    Leg ulcer, left [L97.929] 03/28/2018 Yes    Anemia of chronic disease with iron deficiency [D64.9] 03/06/2018 Yes     Chronic    Hypokalemia [E87.6] 02/22/2018 Yes    Elevated troponin [R74.8] 01/29/2018 Yes    Chronic atrial fibrillation [I48.2] 07/05/2013 Yes     Chronic    Essential hypertension [I10]  Yes     Chronic      Problems Resolved During this Admission:    Diagnosis Date Noted Date Resolved POA       Discharged Condition: stable    Disposition: Home or Self Care    Follow Up:  Follow-up Information     [facility] In 3 days.    Why:  f/u wound care  Contact information:  [facility]  [place] [address]  [phone]             [facility] In 1 week.    Specialty:  Cardiology  Why:  hospital follow up  Contact information:  [address] [place] [address]  [phone]             [facility] .    Specialty:  Home Health Services  Contact information:  [address]  [place]  [address]  [phone]                 Patient Instructions:     Ambulatory referral to Home Health   Referral Priority: Routine Referral Type: Home Health   Referral Reason: Specialty Services Required    Referred to Provider: [facility] Requested Specialty: Home Health Services   Number of Visits Requested: 1      Diet Cardiac     Activity as tolerated     Notify your health care provider if you experience any of the following:  difficulty breathing or increased cough         Significant Diagnostic Studies: Labs:   BMP:   Recent Labs  Lab 03/29/18 0433 03/30/18 0434    90   * 137   K 3.4* 3.6   CL 97 98   CO2 27 27   BUN 65* 67*   CREATININE 1.9* 2.0*   CALCIUM 8.9 8.9   MG 1.7 1.8   , CMP   Recent Labs  Lab 03/29/18  0433 03/30/18  0434   * 137   K 3.4* 3.6   CL 97 98   CO2 27 27    90   BUN 65* 67*   CREATININE 1.9* 2.0*   CALCIUM 8.9 8.9   ANIONGAP 10 12   ESTGFRAFRICA 36* 34*   EGFRNONAA 31* 29*    and CBC   Recent Labs  Lab 03/29/18 0434 03/30/18  0434   WBC 4.93 4.00   HGB 8.1* 8.0*   HCT 27.2* 26.6*    224       Pending Diagnostic Studies:     None         Medications:  Reconciled Home Medications:      Medication List      CONTINUE taking these medications    apixaban 2.5 mg Tab  Commonly known as:  ELIQUIS  Take 1 tablet (2.5 mg total) by mouth 2 (two) times daily.     aspirin 81 MG EC tablet  Commonly known as:  ECOTRIN  Take 1 tablet (81 mg total) by mouth once daily.     fish oil-omega-3 fatty acids 300-1,000 mg capsule     furosemide 40 MG tablet  Commonly known as:  LASIX  Take 1 tablet (40 mg total) by mouth once daily.     metOLazone 2.5 MG tablet  Commonly known as:  ZAROXOLYN  Take 1 tablet (2.5 mg total) by mouth once daily.     metoprolol succinate 25 MG 24 hr tablet  Commonly known as:  TOPROL-XL  Take 1 tablet (25 mg total) by mouth once daily.     multivitamin with folic acid 400 mcg Tab     * potassium chloride SA 10 MEQ tablet  Commonly known as:   K-DUR,KLOR-CON     * potassium chloride 20 mEq  Commonly known as:  K-TAB  Take 1 tablet (20 mEq total) by mouth every morning. Takes one tablet (20 meq) in the morning and half tablet (10 meq) in the evening     * potassium chloride 10 MEQ Tbsr  Commonly known as:  KLOR-CON  Take 1 tablet (10 mEq total) by mouth every evening.     ranitidine 75 MG tablet  Commonly known as:  ZANTAC     traZODone 50 MG tablet  Commonly known as:  DESYREL     vitamin D 1000 units Tab        * This list has 3 medication(s) that are the same as other medications prescribed for you. Read the directions carefully, and ask your doctor or other care provider to review them with you.                Indwelling Lines/Drains at time of discharge:   Lines/Drains/Airways          No matching active lines, drains, or airways          Time spent on the discharge of patient: 60 minutes  Patient was seen and examined on the date of discharge and determined to be suitable for discharge.         GARRETT Richards-C  Department of Hospital Medicine  [facility] -

## 2018-03-30 NOTE — PROGRESS NOTES
Patient lying in bed, reports he has put out urine many times last night. He reports he is ready to go home. Will consult with cardiology. Pt has lost another Kg overnight. Will continue to monitor

## 2018-04-02 ENCOUNTER — TELEPHONE (OUTPATIENT)
Dept: CARDIOLOGY | Facility: CLINIC | Age: 83
End: 2018-04-02

## 2018-04-02 NOTE — PROGRESS NOTES
Patient called indicating that [facility] wound care center info was given to patient due to incorrect phone number. [phone]

## 2018-04-02 NOTE — TELEPHONE ENCOUNTER
Received phone call from patient stating he was instructed after discharge to contact Wound Care [phone]  in [place] for left leg and that this is not a working number.  Contacted [facility] and faxed demographics and discharge summary [phone]  Spoke with [facility] Nurse Jennifer [phone] also regarding wound care and copy of discharge summary faxed to [phone].  Advised patient to expect a phone call to confirm appointment

## 2018-04-03 ENCOUNTER — INITIAL CONSULT (OUTPATIENT)
Dept: TRANSPLANT | Facility: CLINIC | Age: 83
End: 2018-04-03
Payer: MEDICARE

## 2018-04-03 ENCOUNTER — LAB VISIT (OUTPATIENT)
Dept: LAB | Facility: HOSPITAL | Age: 83
End: 2018-04-03
Attending: INTERNAL MEDICINE
Payer: MEDICARE

## 2018-04-03 VITALS
WEIGHT: 174.81 LBS | DIASTOLIC BLOOD PRESSURE: 66 MMHG | BODY MASS INDEX: 25.89 KG/M2 | HEART RATE: 66 BPM | SYSTOLIC BLOOD PRESSURE: 124 MMHG | HEIGHT: 69 IN

## 2018-04-03 DIAGNOSIS — I50.33 ACUTE ON CHRONIC DIASTOLIC HEART FAILURE: ICD-10-CM

## 2018-04-03 DIAGNOSIS — N18.30 STAGE 3 CHRONIC KIDNEY DISEASE: ICD-10-CM

## 2018-04-03 DIAGNOSIS — I50.33 ACUTE ON CHRONIC DIASTOLIC (CONGESTIVE) HEART FAILURE: ICD-10-CM

## 2018-04-03 DIAGNOSIS — I10 ESSENTIAL HYPERTENSION: Chronic | ICD-10-CM

## 2018-04-03 DIAGNOSIS — I50.9 ACUTE DECOMPENSATED HEART FAILURE: ICD-10-CM

## 2018-04-03 DIAGNOSIS — I25.10 CORONARY ARTERY DISEASE, ANGINA PRESENCE UNSPECIFIED, UNSPECIFIED VESSEL OR LESION TYPE, UNSPECIFIED WHETHER NATIVE OR TRANSPLANTED HEART: Chronic | ICD-10-CM

## 2018-04-03 DIAGNOSIS — I50.33 ACUTE ON CHRONIC DIASTOLIC HEART FAILURE: Primary | ICD-10-CM

## 2018-04-03 LAB
ANION GAP SERPL CALC-SCNC: 9 MMOL/L
BNP SERPL-MCNC: 1815 PG/ML
BUN SERPL-MCNC: 69 MG/DL
CALCIUM SERPL-MCNC: 9.5 MG/DL
CHLORIDE SERPL-SCNC: 98 MMOL/L
CO2 SERPL-SCNC: 30 MMOL/L
CREAT SERPL-MCNC: 2 MG/DL
EST. GFR  (AFRICAN AMERICAN): 33.9 ML/MIN/1.73 M^2
EST. GFR  (NON AFRICAN AMERICAN): 29.3 ML/MIN/1.73 M^2
GLUCOSE SERPL-MCNC: 115 MG/DL
POTASSIUM SERPL-SCNC: 4.7 MMOL/L
SODIUM SERPL-SCNC: 137 MMOL/L

## 2018-04-03 PROCEDURE — 83880 ASSAY OF NATRIURETIC PEPTIDE: CPT

## 2018-04-03 PROCEDURE — 99999 PR PBB SHADOW E&M-EST. PATIENT-LVL IV: CPT | Mod: PBBFAC,,, | Performed by: INTERNAL MEDICINE

## 2018-04-03 PROCEDURE — 80048 BASIC METABOLIC PNL TOTAL CA: CPT

## 2018-04-03 PROCEDURE — 99205 OFFICE O/P NEW HI 60 MIN: CPT | Mod: S$GLB,,, | Performed by: INTERNAL MEDICINE

## 2018-04-03 PROCEDURE — 36415 COLL VENOUS BLD VENIPUNCTURE: CPT

## 2018-04-03 RX ORDER — TORSEMIDE 20 MG/1
40 TABLET ORAL DAILY
Qty: 60 TABLET | Refills: 11 | Status: SHIPPED | OUTPATIENT
Start: 2018-04-03 | End: 2018-04-03 | Stop reason: SDUPTHER

## 2018-04-03 RX ORDER — TORSEMIDE 20 MG/1
40 TABLET ORAL DAILY
Qty: 60 TABLET | Refills: 11 | Status: SHIPPED | OUTPATIENT
Start: 2018-04-03 | End: 2018-04-17 | Stop reason: ALTCHOICE

## 2018-04-03 NOTE — PROGRESS NOTES
Orders called in to [facility] for PT/OT per Dr Heard. Nurse Charmaine repeated orders back correctly. Orders faxed as well.

## 2018-04-03 NOTE — PATIENT INSTRUCTIONS
Please drink about 40-48 ounces of fluid a day.    CHANGE LASIX: stop LASIX    START DEMADEX (torsemide) 40mg every morning.     STOP zaroxolyn until further notice.     Repeat lab today and in 1 week when you come back.     I would like to add aldactone (spironolactone) but want to make sure your kidney function is stable with the new water pill.     Please talk to your stepdaughter about seeing a blood doctor at her hospital- you look like you have iron deficiency anemia and could benefit from possibly iron infusion to help with anemia (low blood count) in the setting of heart failure.

## 2018-04-05 DIAGNOSIS — M79.605 LOWER LIMB PAIN, ANTERIOR, LEFT: ICD-10-CM

## 2018-04-05 DIAGNOSIS — S81.802A OPEN WOUND OF LOWER LIMB, LEFT, INITIAL ENCOUNTER: Primary | ICD-10-CM

## 2018-04-06 ENCOUNTER — CLINICAL SUPPORT (OUTPATIENT)
Dept: CARDIOLOGY | Facility: CLINIC | Age: 83
End: 2018-04-06
Payer: MEDICARE

## 2018-04-06 ENCOUNTER — PATIENT MESSAGE (OUTPATIENT)
Dept: TRANSPLANT | Facility: CLINIC | Age: 83
End: 2018-04-06

## 2018-04-06 ENCOUNTER — TELEPHONE (OUTPATIENT)
Dept: CARDIOLOGY | Facility: CLINIC | Age: 83
End: 2018-04-06

## 2018-04-06 ENCOUNTER — LAB VISIT (OUTPATIENT)
Dept: LAB | Facility: HOSPITAL | Age: 83
End: 2018-04-06
Attending: NURSE PRACTITIONER
Payer: MEDICARE

## 2018-04-06 DIAGNOSIS — M79.605 LOWER LIMB PAIN, ANTERIOR, LEFT: ICD-10-CM

## 2018-04-06 DIAGNOSIS — I50.33 ACUTE ON CHRONIC DIASTOLIC HEART FAILURE: ICD-10-CM

## 2018-04-06 DIAGNOSIS — D50.9 IRON DEFICIENCY ANEMIA, UNSPECIFIED IRON DEFICIENCY ANEMIA TYPE: Primary | ICD-10-CM

## 2018-04-06 DIAGNOSIS — S81.802A OPEN WOUND OF LOWER LIMB, LEFT, INITIAL ENCOUNTER: ICD-10-CM

## 2018-04-06 DIAGNOSIS — I50.9 ACUTE DECOMPENSATED HEART FAILURE: ICD-10-CM

## 2018-04-06 LAB
ANION GAP SERPL CALC-SCNC: 13 MMOL/L
BNP SERPL-MCNC: 1595 PG/ML
BUN SERPL-MCNC: 83 MG/DL
CALCIUM SERPL-MCNC: 9.6 MG/DL
CHLORIDE SERPL-SCNC: 97 MMOL/L
CO2 SERPL-SCNC: 28 MMOL/L
CREAT SERPL-MCNC: 2.4 MG/DL
EST. GFR  (AFRICAN AMERICAN): 27.2 ML/MIN/1.73 M^2
EST. GFR  (NON AFRICAN AMERICAN): 23.5 ML/MIN/1.73 M^2
GLUCOSE SERPL-MCNC: 126 MG/DL
POTASSIUM SERPL-SCNC: 4.2 MMOL/L
SODIUM SERPL-SCNC: 138 MMOL/L
VASCULAR ANKLE BRACHIAL INDEX (ABI) LEFT: 1.26 (ref 0.9–1.2)

## 2018-04-06 PROCEDURE — 83880 ASSAY OF NATRIURETIC PEPTIDE: CPT

## 2018-04-06 PROCEDURE — 93922 UPR/L XTREMITY ART 2 LEVELS: CPT | Mod: S$GLB,,, | Performed by: INTERNAL MEDICINE

## 2018-04-06 PROCEDURE — 36415 COLL VENOUS BLD VENIPUNCTURE: CPT | Mod: PO

## 2018-04-06 PROCEDURE — 80048 BASIC METABOLIC PNL TOTAL CA: CPT

## 2018-04-06 PROCEDURE — 93925 LOWER EXTREMITY STUDY: CPT | Mod: S$GLB,,, | Performed by: INTERNAL MEDICINE

## 2018-04-06 NOTE — TELEPHONE ENCOUNTER
"Returned phone call and advised to begin driving short distances no marathon driving.  Patient repeated back advice and said " I just wanted to do stuff locally"  "

## 2018-04-06 NOTE — TELEPHONE ENCOUNTER
Patient in Cardiology Lobby requesting to check with Dr. Espinoza if okay to start driving again.  Completed lower extremity studies for Wound Care Clinic today and he stated no longer having episodes of lightheadedness or dizziness.  Dr. Espinoza - please advise

## 2018-04-08 NOTE — PROGRESS NOTES
Subjective:    Patient ID:  Chung Meneses Sr. is a 86 y.o. male who presents for follow-up of No chief complaint on file.      HPI  Mr. Meneses is an 86 year old male patient with a PMHx of CAD/severe AS s/p CABG x 1 and bioprosthetic AVR, permanent atrial fibrillation (on Eliquis), CHF, HTN, and hyperlipidemia who presents today for evaluation for ongoing heart failure. Patient with presumed diastolic heart failure, leading to multiple heart failure exacerbations after undergoing CABG and bioprosthetic AVR. Patient underwent CABG x 1 vessel plus AVR 12/17 additionally with HFpEF 50-55%, permanent AF on eliquis, CKD stage 3, who has had several admissions following surgery. Patient most recently admitted and discharged 03/30/18 with ascites, ADHF, increased edema to thighs, and weight gain. Patient is accompanied by his stepdaughter who states the symptoms have been very difficult to manage. Patient was not this symptomatic before surgery unfortunately. Most recent regimen has been qod metolazone with lasix 40mg a day. On the day he doesn't do metolazone, he does lasix 40mg in AM and 20mg in PM. He states his strength has not been great, denies N/V/F/C, lightheadedness, dizziness, although had an admission for hypotension a couple of admits ago. Has been on hold with driving due to this. Has HH, not doing PT/Ot at home, states he wants to drive, but stepdaughter admits he is still somewhat weak with the multiple admissions. NYHA FC III.      Review of Systems   Constitution: Positive for decreased appetite, weakness and malaise/fatigue. Negative for chills and fever.   HENT: Negative for congestion, hoarse voice and sore throat.    Eyes: Negative for blurred vision and discharge.   Cardiovascular: Positive for dyspnea on exertion, leg swelling and orthopnea. Negative for chest pain, claudication, cyanosis, irregular heartbeat, near-syncope, palpitations and paroxysmal nocturnal dyspnea.   Respiratory: Positive for  "shortness of breath. Negative for cough, hemoptysis, snoring, sputum production and wheezing.    Endocrine: Negative for cold intolerance and heat intolerance.   Hematologic/Lymphatic: Negative for bleeding problem. Does not bruise/bleed easily.   Skin: Negative for rash.   Musculoskeletal: Negative for arthritis, back pain, joint pain, joint swelling, muscle cramps, muscle weakness and myalgias.   Gastrointestinal: Positive for bloating. Negative for abdominal pain, constipation, diarrhea, heartburn, melena and nausea.   Genitourinary: Negative for hematuria.        Scrotal edema- improved today     Neurological: Negative for dizziness, focal weakness, headaches, light-headedness, loss of balance, numbness, paresthesias and seizures.   Psychiatric/Behavioral: Negative for memory loss. The patient does not have insomnia.    Allergic/Immunologic: Negative for hives.       /66   Pulse 66 Comment: radial  Ht 5' 9" (1.753 m)   Wt 79.3 kg (174 lb 13.2 oz)   BMI 25.82 kg/m²     Objective:    Physical Exam   Constitutional: He is oriented to person, place, and time. He appears well-developed and well-nourished. No distress.   HENT:   Head: Normocephalic and atraumatic.   Eyes: Pupils are equal, round, and reactive to light. Right eye exhibits no discharge. Left eye exhibits no discharge.   Neck: Neck supple. JVD (15cm H2O, large v wave) present.   Cardiovascular: Normal rate, S1 normal and S2 normal.  An irregularly irregular rhythm present. Exam reveals no gallop, no S3, no S4 and no friction rub.    Murmur heard.   Harsh midsystolic murmur is present  at the upper right sternal border radiating to the neck  Pulmonary/Chest: Effort normal. No respiratory distress. He has no wheezes. He has no rales.   Abdominal: He exhibits distension. There is no tenderness. There is no rebound.   Musculoskeletal: He exhibits edema (extending up to thighs, 1+).   Neurological: He is alert and oriented to person, place, and " time.   Skin: Skin is warm and dry. He is not diaphoretic. No erythema.   Psychiatric: He has a normal mood and affect. His behavior is normal. Thought content normal.   Nursing note and vitals reviewed.      2D Echo CONCLUSIONS     1 - Low normal to mildly depressed left ventricular systolic function (EF 50-55%).     2 - Impaired LV relaxation, elevated LAP (grade 2 diastolic dysfunction).     3 - Wall motion abnormalities.     4 - Low normal right ventricular systolic function .     5 - Severe left atrial enlargement.     6 - Concentric hypertrophy.     7 - Pulmonary hypertension. The estimated PA systolic pressure is 41 mmHg.     8 - Aortic valve prosthesis, mean gradient = 11 mmHg.     9 - Moderate mitral regurgitation.     10 - Mild to moderate tricuspid regurgitation.     11 - Increased central venous pressure.   Assessment:       1. Acute on chronic diastolic heart failure    2. Acute decompensated heart failure    3. Acute on chronic diastolic HFpEF of 50-55% with anasarca    4. Coronary artery disease, angina presence unspecified, unspecified vessel or lesion type, unspecified whether native or transplanted heart    5. Essential hypertension    6. XIANG on CKD stage III        Plan:       Please drink about 40-48 ounces of fluid a day.  We will stop lasix and see if demadex will be more effective at diuresis, especially in the setting of abdominal swelling.   Will have him hold zaroxolyn for now, will get repeat labs next week.  Do think he would do well with aldacotne, however waiting to see what his responses is to demadexx first.   Of note, patient with iron deficiency anemia, would benefit from IV iron therapy, daughter states her sister works in an infusion suite however this is at [facility], recommended she follow up with there and see if they can get a consult by hematology.   RTC 1 week, to see Kayode or Laura, with CT chest.  Do wonder if there is some constrictive physiology going on, which took  place since his surgery, leading to the symptoms he is having. May need RHC/LHC simultaneously to evaluate for this.

## 2018-04-09 ENCOUNTER — TELEPHONE (OUTPATIENT)
Dept: CARDIOLOGY | Facility: CLINIC | Age: 83
End: 2018-04-09

## 2018-04-09 NOTE — TELEPHONE ENCOUNTER
----- Message from Jessie Heard MD sent at 4/7/2018  4:23 PM CDT -----  Gabrielle Rudolph and Missy, will you please set this patient up to have repeat cmp and bnp next Wednesday or Thursday? At [facility], not sure if summa or barry, thank you.

## 2018-04-10 ENCOUNTER — LAB VISIT (OUTPATIENT)
Dept: LAB | Facility: HOSPITAL | Age: 83
End: 2018-04-10
Attending: INTERNAL MEDICINE
Payer: MEDICARE

## 2018-04-10 ENCOUNTER — OFFICE VISIT (OUTPATIENT)
Dept: CARDIOLOGY | Facility: CLINIC | Age: 83
End: 2018-04-10
Payer: MEDICARE

## 2018-04-10 VITALS
WEIGHT: 169.56 LBS | HEART RATE: 70 BPM | SYSTOLIC BLOOD PRESSURE: 106 MMHG | HEIGHT: 69 IN | DIASTOLIC BLOOD PRESSURE: 60 MMHG | BODY MASS INDEX: 25.11 KG/M2

## 2018-04-10 DIAGNOSIS — L97.929 CHRONIC ULCER OF LOWER EXTREMITY, LEFT, WITH UNSPECIFIED SEVERITY: ICD-10-CM

## 2018-04-10 DIAGNOSIS — I50.33 ACUTE ON CHRONIC DIASTOLIC (CONGESTIVE) HEART FAILURE: Primary | ICD-10-CM

## 2018-04-10 DIAGNOSIS — E78.5 HYPERLIPIDEMIA, UNSPECIFIED HYPERLIPIDEMIA TYPE: ICD-10-CM

## 2018-04-10 DIAGNOSIS — I50.9 ACUTE DECOMPENSATED HEART FAILURE: ICD-10-CM

## 2018-04-10 DIAGNOSIS — I27.20 PULMONARY HYPERTENSION: Chronic | ICD-10-CM

## 2018-04-10 DIAGNOSIS — I50.33 ACUTE ON CHRONIC DIASTOLIC HEART FAILURE: ICD-10-CM

## 2018-04-10 DIAGNOSIS — E78.5 HYPERLIPIDEMIA, UNSPECIFIED HYPERLIPIDEMIA TYPE: Chronic | ICD-10-CM

## 2018-04-10 DIAGNOSIS — I25.10 CORONARY ARTERY DISEASE, ANGINA PRESENCE UNSPECIFIED, UNSPECIFIED VESSEL OR LESION TYPE, UNSPECIFIED WHETHER NATIVE OR TRANSPLANTED HEART: Chronic | ICD-10-CM

## 2018-04-10 DIAGNOSIS — Z95.2 S/P AVR: ICD-10-CM

## 2018-04-10 DIAGNOSIS — I10 ESSENTIAL HYPERTENSION: Chronic | ICD-10-CM

## 2018-04-10 LAB
ALBUMIN SERPL BCP-MCNC: 3.5 G/DL
ALP SERPL-CCNC: 153 U/L
ALT SERPL W/O P-5'-P-CCNC: 18 U/L
ANION GAP SERPL CALC-SCNC: 12 MMOL/L
AST SERPL-CCNC: 26 U/L
BILIRUB SERPL-MCNC: 1.8 MG/DL
BNP SERPL-MCNC: 1556 PG/ML
BUN SERPL-MCNC: 73 MG/DL
CALCIUM SERPL-MCNC: 9.4 MG/DL
CHLORIDE SERPL-SCNC: 95 MMOL/L
CO2 SERPL-SCNC: 28 MMOL/L
CREAT SERPL-MCNC: 2.3 MG/DL
EST. GFR  (AFRICAN AMERICAN): 28.7 ML/MIN/1.73 M^2
EST. GFR  (NON AFRICAN AMERICAN): 24.8 ML/MIN/1.73 M^2
GLUCOSE SERPL-MCNC: 143 MG/DL
POTASSIUM SERPL-SCNC: 3.8 MMOL/L
PROT SERPL-MCNC: 7.3 G/DL
SODIUM SERPL-SCNC: 135 MMOL/L

## 2018-04-10 PROCEDURE — 99999 PR PBB SHADOW E&M-EST. PATIENT-LVL IV: CPT | Mod: PBBFAC,,, | Performed by: PHYSICIAN ASSISTANT

## 2018-04-10 PROCEDURE — 99214 OFFICE O/P EST MOD 30 MIN: CPT | Mod: S$GLB,,, | Performed by: PHYSICIAN ASSISTANT

## 2018-04-10 PROCEDURE — 83880 ASSAY OF NATRIURETIC PEPTIDE: CPT

## 2018-04-10 PROCEDURE — 80053 COMPREHEN METABOLIC PANEL: CPT

## 2018-04-10 PROCEDURE — 36415 COLL VENOUS BLD VENIPUNCTURE: CPT | Mod: PO

## 2018-04-10 NOTE — PROGRESS NOTES
Subjective:    Patient ID:  Chung Meneses Sr. is a 86 y.o. male who presents for follow-up of CHF    HPI  Mr. Meneses is an 86 year old male patient with a PMHx of CAD/severe AS s/p CABG x 1 and bioprosthetic AVR, permanent atrial fibrillation (on Eliquis), CHF, HTN, and hyperlipidemia who presents today for CHF follow-up.Patient previously seen by Dr. Heard and had his diuretics changed to Torsemide to 40 mg daily. He returns today and states he is doing well. Complains of fatigue, lack of stamina and not sleeping well. Requesting something for sleep. CHF wise, seems stable. Reports SOB has greatly improved. States lower extremity edema has resolved. Still has some mild abdominal bloating. Denies orthopnea, PND, or weight gain. Weight is actually down about 20 lbs since I last saw him in clinic. He reports compliance with his medications. Feels Torsemide is providing better diuresis. Being mindful of his salt intake. Followed by [facility] care at [facility] for leg ulcers. Labs from today pending.    Note from Dr. Heard reviewed-? Constrictive component s/p surgery. CT of chest ordered but has not been scheduled.     Review of Systems   Constitution: Positive for weakness and malaise/fatigue. Negative for chills, decreased appetite and fever.   HENT: Negative for congestion, hoarse voice and sore throat.    Eyes: Negative for blurred vision and discharge.   Cardiovascular: Positive for dyspnea on exertion. Negative for chest pain, claudication, cyanosis, irregular heartbeat, leg swelling, near-syncope, orthopnea, palpitations and paroxysmal nocturnal dyspnea.   Respiratory: Negative for cough, hemoptysis, shortness of breath, snoring, sputum production and wheezing.    Endocrine: Negative for cold intolerance and heat intolerance.   Hematologic/Lymphatic: Negative for bleeding problem. Does not bruise/bleed easily.   Skin: Negative for rash.   Musculoskeletal: Negative for arthritis, back pain, joint pain, joint swelling,  "muscle cramps, muscle weakness and myalgias.   Gastrointestinal: Negative for abdominal pain, constipation, diarrhea, heartburn, melena and nausea.   Genitourinary: Negative for hematuria.   Neurological: Negative for dizziness, focal weakness, headaches, light-headedness, loss of balance, numbness, paresthesias and seizures.   Psychiatric/Behavioral: Negative for memory loss. The patient does not have insomnia.    Allergic/Immunologic: Negative for hives.       /60   Pulse 70   Ht 5' 9" (1.753 m)   Wt 76.9 kg (169 lb 8.5 oz)   BMI 25.04 kg/m²     Objective:    Physical Exam   Constitutional: He is oriented to person, place, and time. He appears well-developed and well-nourished. No distress.   HENT:   Head: Normocephalic and atraumatic.   Eyes: Pupils are equal, round, and reactive to light. Right eye exhibits no discharge. Left eye exhibits no discharge.   Neck: Neck supple. JVD present.   Cardiovascular: Normal rate, S1 normal and S2 normal.  An irregularly irregular rhythm present.   Murmur heard.   Harsh midsystolic murmur is present  at the upper right sternal border radiating to the neck  Pulmonary/Chest: Effort normal and breath sounds normal. No respiratory distress. He has no wheezes. He has no rales.   cabg site well-healed   Abdominal: Soft. Bowel sounds are normal. He exhibits distension. There is no tenderness. There is no rebound.   Musculoskeletal: He exhibits no edema.   Neurological: He is alert and oriented to person, place, and time.   Skin: Skin is warm and dry. He is not diaphoretic. No erythema.   Psychiatric: He has a normal mood and affect. His behavior is normal. Thought content normal.   Nursing note and vitals reviewed.     2D Echo CONCLUSIONS     1 - Low normal to mildly depressed left ventricular systolic function (EF 50-55%).     2 - Impaired LV relaxation, elevated LAP (grade 2 diastolic dysfunction).     3 - Wall motion abnormalities.     4 - Low normal right ventricular " systolic function .     5 - Severe left atrial enlargement.     6 - Concentric hypertrophy.     7 - Pulmonary hypertension. The estimated PA systolic pressure is 41 mmHg.     8 - Aortic valve prosthesis, mean gradient = 11 mmHg.     9 - Moderate mitral regurgitation.     10 - Mild to moderate tricuspid regurgitation.     11 - Increased central venous pressure.       Chemistry        Component Value Date/Time     04/06/2018 1115    K 4.2 04/06/2018 1115    CL 97 04/06/2018 1115    CO2 28 04/06/2018 1115    BUN 83 (H) 04/06/2018 1115    CREATININE 2.4 (H) 04/06/2018 1115     (H) 04/06/2018 1115        Component Value Date/Time    CALCIUM 9.6 04/06/2018 1115    ALKPHOS 131 03/28/2018 0433    AST 24 03/28/2018 0433    ALT 14 03/28/2018 0433    BILITOT 1.8 (H) 03/28/2018 0433    ESTGFRAFRICA 27.2 (A) 04/06/2018 1115    EGFRNONAA 23.5 (A) 04/06/2018 1115          Assessment:       1. Acute on chronic diastolic HFpEF of 50-55% with anasarca    2. Pulmonary hypertension    3. Coronary artery disease, angina presence unspecified, unspecified vessel or lesion type, unspecified whether native or transplanted heart    4. S/P AVR    5. Essential hypertension    6. Hyperlipidemia, unspecified hyperlipidemia type    7. Chronic ulcer of lower extremity, left, with unspecified severity       Doing clinically well. Volume status seems greatly improved since last visit. Repeat labs pending. Tolerating meds. Will continue same mgmt for now. Clearance to attend cardiac rehab given. Further rec's pending review of labs.   Plan:   -Continue current medical management and risk factor modification  -Cardiac, low salt diet  -Labs pending, further instructions and follow-up TBD  -Appreciate Dr. Heard's assistance    Chart reviewed. Dr. Espinoza agrees with plan as outlined above.     No contraindications to compression therapy

## 2018-04-11 ENCOUNTER — TELEPHONE (OUTPATIENT)
Dept: CARDIOLOGY | Facility: CLINIC | Age: 83
End: 2018-04-11

## 2018-04-11 NOTE — TELEPHONE ENCOUNTER
The patient has been notified of this information and all questions answered.    Faxed with confirmation

## 2018-04-11 NOTE — TELEPHONE ENCOUNTER
Please phone patient. Labs reviewed and look stable. Would continue same diuretic regimen for now    Please fax [facility] wound clinic copy of my latest note, ok to have compression therapy    Can send a copy of leg studies as well    Thanks

## 2018-04-16 ENCOUNTER — TELEPHONE (OUTPATIENT)
Dept: CARDIOLOGY | Facility: CLINIC | Age: 83
End: 2018-04-16

## 2018-04-16 NOTE — TELEPHONE ENCOUNTER
----- Message from Denisha Bowles sent at 4/11/2018  1:28 PM CDT -----  Contact: [facility] - danni [phone]  States she's calling to get results from [facility]'s done and faxed to [phone] attn: Danni  and can be reached at [phone]//thanks/dbw

## 2018-04-16 NOTE — TELEPHONE ENCOUNTER
Release to return to Cardiac Rehab faxed to Gianna [facility] at [phone].  Telephoned patient to notify all okay

## 2018-04-16 NOTE — TELEPHONE ENCOUNTER
----- Message from Michelle Henry LPN sent at 4/16/2018 10:41 AM CDT -----  Contact: patient      ----- Message -----  From: Kasia Cr  Sent: 4/16/2018  10:02 AM  To: Alexis MEJIAS Staff    Calling concerning the status of starting back with rehab. Please Dominique call patient ASAP @ [phone]. Thanks, selena

## 2018-04-17 ENCOUNTER — INITIAL CONSULT (OUTPATIENT)
Dept: HEMATOLOGY/ONCOLOGY | Facility: CLINIC | Age: 83
End: 2018-04-17
Payer: MEDICARE

## 2018-04-17 ENCOUNTER — LAB VISIT (OUTPATIENT)
Dept: LAB | Facility: HOSPITAL | Age: 83
End: 2018-04-17
Attending: NURSE PRACTITIONER
Payer: MEDICARE

## 2018-04-17 VITALS
DIASTOLIC BLOOD PRESSURE: 63 MMHG | SYSTOLIC BLOOD PRESSURE: 118 MMHG | HEIGHT: 69 IN | BODY MASS INDEX: 25.73 KG/M2 | TEMPERATURE: 98 F | WEIGHT: 173.75 LBS | OXYGEN SATURATION: 99 % | HEART RATE: 87 BPM

## 2018-04-17 DIAGNOSIS — D51.8 OTHER VITAMIN B12 DEFICIENCY ANEMIA: ICD-10-CM

## 2018-04-17 DIAGNOSIS — E78.5 HYPERLIPIDEMIA, UNSPECIFIED HYPERLIPIDEMIA TYPE: Chronic | ICD-10-CM

## 2018-04-17 DIAGNOSIS — R73.9 HYPERGLYCEMIA, UNSPECIFIED: ICD-10-CM

## 2018-04-17 DIAGNOSIS — N18.30 STAGE 3 CHRONIC KIDNEY DISEASE: ICD-10-CM

## 2018-04-17 DIAGNOSIS — D50.8 IRON DEFICIENCY ANEMIA SECONDARY TO INADEQUATE DIETARY IRON INTAKE: ICD-10-CM

## 2018-04-17 DIAGNOSIS — I50.32 CHRONIC DIASTOLIC CONGESTIVE HEART FAILURE: ICD-10-CM

## 2018-04-17 DIAGNOSIS — D50.8 IRON DEFICIENCY ANEMIA SECONDARY TO INADEQUATE DIETARY IRON INTAKE: Primary | ICD-10-CM

## 2018-04-17 DIAGNOSIS — I10 ESSENTIAL HYPERTENSION: Chronic | ICD-10-CM

## 2018-04-17 PROBLEM — D51.9 VITAMIN B12 DEFICIENCY ANEMIA: Status: ACTIVE | Noted: 2018-04-17

## 2018-04-17 LAB
ALBUMIN SERPL BCP-MCNC: 3.4 G/DL
ALP SERPL-CCNC: 143 U/L
ALT SERPL W/O P-5'-P-CCNC: 15 U/L
ANION GAP SERPL CALC-SCNC: 11 MMOL/L
AST SERPL-CCNC: 24 U/L
BASOPHILS # BLD AUTO: 0.06 K/UL
BASOPHILS NFR BLD: 1.1 %
BILIRUB SERPL-MCNC: 1.7 MG/DL
BUN SERPL-MCNC: 71 MG/DL
CALCIUM SERPL-MCNC: 9.7 MG/DL
CHLORIDE SERPL-SCNC: 100 MMOL/L
CO2 SERPL-SCNC: 27 MMOL/L
CREAT SERPL-MCNC: 2 MG/DL
DIFFERENTIAL METHOD: ABNORMAL
EOSINOPHIL # BLD AUTO: 0.1 K/UL
EOSINOPHIL NFR BLD: 2.4 %
ERYTHROCYTE [DISTWIDTH] IN BLOOD BY AUTOMATED COUNT: 21.6 %
EST. GFR  (AFRICAN AMERICAN): 33.9 ML/MIN/1.73 M^2
EST. GFR  (NON AFRICAN AMERICAN): 29.3 ML/MIN/1.73 M^2
FERRITIN SERPL-MCNC: 56 NG/ML
GLUCOSE SERPL-MCNC: 107 MG/DL
HAPTOGLOB SERPL-MCNC: 137 MG/DL
HCT VFR BLD AUTO: 29.2 %
HGB BLD-MCNC: 8.3 G/DL
IMM GRANULOCYTES # BLD AUTO: 0.02 K/UL
IMM GRANULOCYTES NFR BLD AUTO: 0.4 %
IRON SERPL-MCNC: 17 UG/DL
LDH SERPL L TO P-CCNC: 232 U/L
LYMPHOCYTES # BLD AUTO: 0.7 K/UL
LYMPHOCYTES NFR BLD: 13.5 %
MCH RBC QN AUTO: 20.6 PG
MCHC RBC AUTO-ENTMCNC: 28.4 G/DL
MCV RBC AUTO: 73 FL
MONOCYTES # BLD AUTO: 0.7 K/UL
MONOCYTES NFR BLD: 13.5 %
NEUTROPHILS # BLD AUTO: 3.8 K/UL
NEUTROPHILS NFR BLD: 69.1 %
NRBC BLD-RTO: 0 /100 WBC
PLATELET # BLD AUTO: 274 K/UL
PMV BLD AUTO: 9.4 FL
POTASSIUM SERPL-SCNC: 4.6 MMOL/L
PROT SERPL-MCNC: 7.2 G/DL
RBC # BLD AUTO: 4.03 M/UL
SATURATED IRON: 4 %
SODIUM SERPL-SCNC: 138 MMOL/L
TOTAL IRON BINDING CAPACITY: 472 UG/DL
TRANSFERRIN SERPL-MCNC: 319 MG/DL
VIT B12 SERPL-MCNC: 808 PG/ML
WBC # BLD AUTO: 5.42 K/UL

## 2018-04-17 PROCEDURE — 36415 COLL VENOUS BLD VENIPUNCTURE: CPT

## 2018-04-17 PROCEDURE — 80053 COMPREHEN METABOLIC PANEL: CPT

## 2018-04-17 PROCEDURE — 83615 LACTATE (LD) (LDH) ENZYME: CPT

## 2018-04-17 PROCEDURE — 83520 IMMUNOASSAY QUANT NOS NONAB: CPT

## 2018-04-17 PROCEDURE — 99205 OFFICE O/P NEW HI 60 MIN: CPT | Mod: S$GLB,,, | Performed by: NURSE PRACTITIONER

## 2018-04-17 PROCEDURE — 99999 PR PBB SHADOW E&M-EST. PATIENT-LVL III: CPT | Mod: PBBFAC,,, | Performed by: NURSE PRACTITIONER

## 2018-04-17 PROCEDURE — 83540 ASSAY OF IRON: CPT

## 2018-04-17 PROCEDURE — 85025 COMPLETE CBC W/AUTO DIFF WBC: CPT

## 2018-04-17 PROCEDURE — 82728 ASSAY OF FERRITIN: CPT

## 2018-04-17 PROCEDURE — 82668 ASSAY OF ERYTHROPOIETIN: CPT

## 2018-04-17 PROCEDURE — 84165 PROTEIN E-PHORESIS SERUM: CPT

## 2018-04-17 PROCEDURE — 82607 VITAMIN B-12: CPT

## 2018-04-17 PROCEDURE — 84165 PROTEIN E-PHORESIS SERUM: CPT | Mod: 26,,, | Performed by: PATHOLOGY

## 2018-04-17 PROCEDURE — 83010 ASSAY OF HAPTOGLOBIN QUANT: CPT

## 2018-04-17 RX ORDER — HEPARIN 100 UNIT/ML
500 SYRINGE INTRAVENOUS
Status: CANCELLED | OUTPATIENT
Start: 2018-04-25

## 2018-04-17 RX ORDER — SODIUM CHLORIDE 0.9 % (FLUSH) 0.9 %
10 SYRINGE (ML) INJECTION
Status: CANCELLED | OUTPATIENT
Start: 2018-04-25

## 2018-04-17 NOTE — LETTER
April 17, 2018      Jessie Heard MD  [address] [facility] [address]           [facility]  [id number] [place] [address]  Phone: [phone]  Fax: [phone]          Patient: Chung Meneses Sr.   MR Number: [id number]   YOB: 1931   Date of Visit: 4/17/2018       Dear Dr. Jessie Heard:    Thank you for referring Chung Meneses to me for evaluation. Attached you will find relevant portions of my assessment and plan of care.    If you have questions, please do not hesitate to call me. I look forward to following Chung Meneses along with you.    Sincerely,    Veronique Vides NP    Enclosure  CC:  No Recipients    If you would like to receive this communication electronically, please contact [email or web address] or [phone] to request more information on [employer] access.    For providers and/or their staff who would like to refer a patient to [facility], please contact us through our one-stop-shop provider referral line, [facility] , at [phone].    If you feel you have received this communication in error or would no longer like to receive these types of communications, please e-mail [email or web address]

## 2018-04-17 NOTE — PATIENT INSTRUCTIONS

## 2018-04-17 NOTE — PROGRESS NOTES
Subjective:       Patient ID: Chung Meneses Sr. is a 86 y.o. male.    Chief Complaint: Anemia    86 year old male, presents to clinic for evaluation for anemia. He had coronary artery bypass surgery in November and has had significant anemia since the procedure. According to the records available he has had documented anemia since November 2017 that has worsened since that time. He is negative for dark, tarry stool, blood in stool, epistaxis. He does not recall the date of his last colonoscopy but states he has had polyps removed in the past. He is on Eliquis since his bypass surgery and this is managed by Cardiology. Other PMHx includes: HTN, hyperlipidemia, CKD III. He does not recall being anemic prior to this time. Associated symptoms include: fatigue, dyspnea with exertion.       Anemia   Symptoms include pallor. There has been no abdominal pain, bruising/bleeding easily, confusion, fever, light-headedness or palpitations.     Review of Systems   Constitutional: Positive for activity change and fatigue. Negative for appetite change, chills, diaphoresis, fever and unexpected weight change.   HENT: Negative for congestion, hearing loss, mouth sores, nosebleeds and trouble swallowing.    Eyes: Negative for discharge and visual disturbance.   Respiratory: Positive for shortness of breath. Negative for cough and chest tightness.    Cardiovascular: Negative for chest pain, palpitations and leg swelling.   Gastrointestinal: Negative for abdominal distention, abdominal pain, anal bleeding, blood in stool, constipation, diarrhea, nausea and vomiting.   Endocrine: Negative for cold intolerance and heat intolerance.   Genitourinary: Negative for difficulty urinating, dysuria, flank pain and hematuria.   Musculoskeletal: Positive for arthralgias. Negative for back pain and myalgias.   Skin: Positive for pallor.   Neurological: Positive for weakness. Negative for dizziness, light-headedness and headaches.   Hematological:  Negative for adenopathy. Does not bruise/bleed easily.   Psychiatric/Behavioral: Negative for agitation, behavioral problems and confusion. The patient is nervous/anxious.        Objective:      Physical Exam   Constitutional: He is oriented to person, place, and time. He appears well-developed and well-nourished. No distress.   HENT:   Head: Normocephalic and atraumatic.   Right Ear: Hearing and external ear normal.   Left Ear: Hearing and external ear normal.   Nose: Nose normal. No mucosal edema or rhinorrhea. No epistaxis.   Mouth/Throat: Uvula is midline, oropharynx is clear and moist and mucous membranes are normal.   Eyes: Conjunctivae and EOM are normal. Pupils are equal, round, and reactive to light. Right eye exhibits no chemosis and no discharge. Left eye exhibits no chemosis and no discharge.   Neck: Trachea normal and normal range of motion. Neck supple. No thyroid mass and no thyromegaly present.   Cardiovascular: Normal rate, regular rhythm, normal heart sounds and intact distal pulses.    No murmur heard.  Pulses:       Dorsalis pedis pulses are 2+ on the right side, and 2+ on the left side.   Pulmonary/Chest: Effort normal and breath sounds normal. No respiratory distress. He has no decreased breath sounds. He has no wheezes.   Abdominal: Soft. Bowel sounds are normal. He exhibits no distension. There is no tenderness.   Musculoskeletal: Normal range of motion. He exhibits no edema.   Lymphadenopathy:     He has no cervical adenopathy.     He has no axillary adenopathy.        Right: No supraclavicular adenopathy present.        Left: No supraclavicular adenopathy present.   Neurological: He is alert and oriented to person, place, and time. He has normal strength.   Skin: Skin is warm, dry and intact. Capillary refill takes less than 2 seconds. No rash noted. He is not diaphoretic. There is pallor.   Psychiatric: His speech is normal and behavior is normal. Judgment and thought content normal. His  mood appears anxious. Cognition and memory are normal.   Vitals reviewed.      Assessment:       1. Iron deficiency anemia secondary to inadequate dietary iron intake-  Feraheme Infusion weekly x2    2. Other vitamin B12 deficiency anemia -  B12 level drawn today, will review in 1 week with patient   3. Chronic diastolic congestive heart failure -  Followed by Cardiology, Stable   4. Essential hypertension -  Followed by Cardiology, Stable   5. Hyperlipidemia, unspecified hyperlipidemia type -  Followed by Cardiology, Stable   6. XIANG on CKD stage III -  Followed by PCP, Stable   7. Hyperglycemia, unspecified -  Followed by PCP, Stable       Plan:     1. Labs today: CBC, CMP, Iron, Ferritin, B12    2. Feraheme infusion weekly x2    3. Return to clinic in 1 week to review labs and have iron infusion

## 2018-04-18 LAB
ALBUMIN SERPL ELPH-MCNC: 3.58 G/DL
ALPHA1 GLOB SERPL ELPH-MCNC: 0.4 G/DL
ALPHA2 GLOB SERPL ELPH-MCNC: 0.61 G/DL
B-GLOBULIN SERPL ELPH-MCNC: 0.86 G/DL
GAMMA GLOB SERPL ELPH-MCNC: 1.35 G/DL
KAPPA LC SER QL IA: 7.38 MG/DL
KAPPA LC/LAMBDA SER IA: 1.93
LAMBDA LC SER QL IA: 3.82 MG/DL
PROT SERPL-MCNC: 6.8 G/DL

## 2018-04-19 LAB
ERYTHROPOIETIN: 89.9 MIU/ML
PATHOLOGIST INTERPRETATION SPE: NORMAL

## 2018-04-27 ENCOUNTER — INFUSION (OUTPATIENT)
Dept: INFUSION THERAPY | Facility: HOSPITAL | Age: 83
End: 2018-04-27
Attending: NURSE PRACTITIONER
Payer: MEDICARE

## 2018-04-27 ENCOUNTER — OFFICE VISIT (OUTPATIENT)
Dept: HEMATOLOGY/ONCOLOGY | Facility: CLINIC | Age: 83
End: 2018-04-27
Payer: MEDICARE

## 2018-04-27 ENCOUNTER — TELEPHONE (OUTPATIENT)
Dept: CARDIOLOGY | Facility: CLINIC | Age: 83
End: 2018-04-27

## 2018-04-27 VITALS
TEMPERATURE: 97 F | SYSTOLIC BLOOD PRESSURE: 126 MMHG | RESPIRATION RATE: 16 BRPM | HEART RATE: 67 BPM | WEIGHT: 173.06 LBS | HEIGHT: 69 IN | DIASTOLIC BLOOD PRESSURE: 64 MMHG | BODY MASS INDEX: 25.63 KG/M2

## 2018-04-27 VITALS
DIASTOLIC BLOOD PRESSURE: 59 MMHG | OXYGEN SATURATION: 100 % | SYSTOLIC BLOOD PRESSURE: 96 MMHG | BODY MASS INDEX: 25.63 KG/M2 | TEMPERATURE: 97 F | WEIGHT: 173.06 LBS | HEART RATE: 76 BPM | HEIGHT: 69 IN

## 2018-04-27 DIAGNOSIS — I50.33 ACUTE ON CHRONIC DIASTOLIC HEART FAILURE: ICD-10-CM

## 2018-04-27 DIAGNOSIS — D50.8 IRON DEFICIENCY ANEMIA SECONDARY TO INADEQUATE DIETARY IRON INTAKE: Primary | ICD-10-CM

## 2018-04-27 DIAGNOSIS — I10 ESSENTIAL HYPERTENSION: Chronic | ICD-10-CM

## 2018-04-27 DIAGNOSIS — R73.9 HYPERGLYCEMIA, UNSPECIFIED: ICD-10-CM

## 2018-04-27 PROCEDURE — 25000003 PHARM REV CODE 250: Performed by: NURSE PRACTITIONER

## 2018-04-27 PROCEDURE — 63600175 PHARM REV CODE 636 W HCPCS: Mod: JG | Performed by: NURSE PRACTITIONER

## 2018-04-27 PROCEDURE — 96365 THER/PROPH/DIAG IV INF INIT: CPT

## 2018-04-27 PROCEDURE — 99999 PR PBB SHADOW E&M-EST. PATIENT-LVL III: CPT | Mod: PBBFAC,,, | Performed by: NURSE PRACTITIONER

## 2018-04-27 PROCEDURE — 99214 OFFICE O/P EST MOD 30 MIN: CPT | Mod: S$GLB,,, | Performed by: NURSE PRACTITIONER

## 2018-04-27 RX ORDER — TORSEMIDE 20 MG/1
20 TABLET ORAL DAILY
COMMUNITY

## 2018-04-27 RX ORDER — HEPARIN 100 UNIT/ML
500 SYRINGE INTRAVENOUS
Status: CANCELLED | OUTPATIENT
Start: 2018-04-27

## 2018-04-27 RX ORDER — SODIUM CHLORIDE 0.9 % (FLUSH) 0.9 %
10 SYRINGE (ML) INJECTION
Status: CANCELLED | OUTPATIENT
Start: 2018-04-27

## 2018-04-27 RX ADMIN — FERUMOXYTOL 510 MG: 510 INJECTION INTRAVENOUS at 02:04

## 2018-04-27 RX ADMIN — SODIUM CHLORIDE: 9 INJECTION, SOLUTION INTRAVENOUS at 02:04

## 2018-04-27 NOTE — PATIENT INSTRUCTIONS
[facility]  [address]  [address] [address]  [phone] phone     [phone] fax  Hours of Operation: Monday- Friday 8:00am- 5:00pm  After hours phone  [phone]  Hematology / Oncology Physicians on call      Dr. Rj Lr                        Please call with any concerns regarding your appointment today.      ANEMIA, Iron Deficiency [Adult]  Anemia is a condition where the size or number of red blood cells in the body is reduced. Iron is needed in the diet to make red cells. The red blood cells carry oxygen to all parts of the body. Anemia limits the delivery of oxygen to where it is needed. This causes a feeling of being tired and run down. When anemia becomes severe, the skin becomes pale and there is shortness of breath with exertion, headaches, dizziness, drowsiness and fatigue.  The cause of your anemia is lack of iron in your body. This may occur due to blood loss (for example, heavy menstrual periods or bleeding from the stomach or intestines) or a poor diet (not eating enough iron-containing foods), inability to absorb iron from your diet, or pregnancy.  If the blood count is low enough, an IRON SUPPLEMENT will be prescribed. It usually takes about 2-3 months of treatment with iron supplements to correct an anemia. Severe cases of anemia requires a blood transfusion to rapidly correct symptoms and deliver more oxygen to the cells.  HOME CARE:  1) Increase the iron stores in your body by eating foods high in iron content. This is a natural way of building your blood cells back up again. Beef, liver, spinach and other dark green leafy vegetables, whole grain products, beans and nuts are all natural sources of iron.  2) If you are having symptoms of anemia listed above:  -- Do not overexert yourself.  -- Talk to your doctor before flying on an airplane or travelling to high altitudes.  FOLLOW UP with your doctor in 2  months for a repeat red blood cell count, or as recommended by our staff, to be sure that the anemia has been corrected.  GET PROMPT MEDICAL ATTENTION if any of the following occur or worsen:  -- Shortness of breath or chest pain  -- Dizziness or fainting  -- Vomiting blood or passing red or black-colored stool  © 2000-2011 [address] [address]. All rights reserved. This information is not intended as a substitute for professional medical care. Always follow your healthcare professional's instructions.    FALL PREVENTION   Falls often occur due to slipping, tripping or losing your balance. Here are ways to reduce your risk of falling again.   Was there anything that caused your fall that can be fixed, removed or replaced?   Make your home safe by keeping walkways clear of objects you may trip over.   Use non-slip pads under rugs.   Do not walk in poorly lit areas.   Do not stand on chairs or wobbly ladders.   Use caution when reaching overhead or looking upward. This position can cause a loss of balance.   Be sure your shoes fit properly, have non-slip bottoms and are in good condition.   Be cautious when going up and down stairs, curbs, and when walking on uneven sidewalks.   If your balance is poor, consider using a cane or walker.   If your fall was related to alcohol use, stop or limit alcohol intake.   If your fall was related to use of sleeping medicines, talk to your doctor about this. You may need to reduce your dosage at bedtime if you awaken during the night to go to the bathroom.   To reduce the need for nighttime bathroom trips:   Avoid drinking fluids for several hours before going to bed   Empty your bladder before going to bed   Men can keep a urinal at the bedside   © 2000-2011 [facility], [address] [place] [address]. All rights reserved. This information is not intended as a substitute for professional medical care. Always follow your  healthcare professional's instructions.

## 2018-04-27 NOTE — PROGRESS NOTES
Subjective:       Patient ID: Chung Meneses Sr. is a 86 y.o. male.    Chief Complaint: Results and Anemia    86 year old male, presents to clinic for evaluation for anemia. He had coronary artery bypass surgery in November and has had significant anemia since the procedure. According to the records available he has had documented anemia since November 2017 that has worsened since that time. He is negative for dark, tarry stool, blood in stool, epistaxis. He does not recall the date of his last colonoscopy but states he has had polyps removed in the past. He is on Eliquis since his bypass surgery and this is managed by Cardiology. Other PMHx includes: HTN, hyperlipidemia, CKD III. He does not recall being anemic prior to this time. Associated symptoms include: fatigue, dyspnea with exertion.       Anemia   Symptoms include pallor. There has been no abdominal pain, bruising/bleeding easily, confusion, fever, light-headedness or palpitations.     Review of Systems   Constitutional: Positive for activity change and fatigue. Negative for appetite change, chills, diaphoresis, fever and unexpected weight change.   HENT: Negative for congestion, hearing loss, mouth sores, nosebleeds and trouble swallowing.    Eyes: Negative for discharge and visual disturbance.   Respiratory: Positive for shortness of breath. Negative for cough and chest tightness.    Cardiovascular: Negative for chest pain, palpitations and leg swelling.   Gastrointestinal: Negative for abdominal distention, abdominal pain, anal bleeding, blood in stool, constipation, diarrhea, nausea and vomiting.   Endocrine: Negative for cold intolerance and heat intolerance.   Genitourinary: Negative for difficulty urinating, dysuria, flank pain and hematuria.   Musculoskeletal: Positive for arthralgias. Negative for back pain and myalgias.   Skin: Positive for pallor.   Neurological: Positive for weakness. Negative for dizziness, light-headedness and headaches.    Hematological: Negative for adenopathy. Does not bruise/bleed easily.   Psychiatric/Behavioral: Negative for agitation, behavioral problems and confusion. The patient is nervous/anxious.        Objective:      Physical Exam   Constitutional: He is oriented to person, place, and time. He appears well-developed and well-nourished. No distress.   HENT:   Head: Normocephalic and atraumatic.   Right Ear: Hearing and external ear normal.   Left Ear: Hearing and external ear normal.   Nose: Nose normal. No mucosal edema or rhinorrhea. No epistaxis.   Mouth/Throat: Uvula is midline, oropharynx is clear and moist and mucous membranes are normal.   Eyes: Conjunctivae and EOM are normal. Pupils are equal, round, and reactive to light. Right eye exhibits no chemosis and no discharge. Left eye exhibits no chemosis and no discharge.   Neck: Trachea normal and normal range of motion. Neck supple. No thyroid mass and no thyromegaly present.   Cardiovascular: Normal rate, regular rhythm, normal heart sounds and intact distal pulses.    No murmur heard.  Pulses:       Dorsalis pedis pulses are 2+ on the right side, and 2+ on the left side.   Pulmonary/Chest: Effort normal and breath sounds normal. No respiratory distress. He has no decreased breath sounds. He has no wheezes.   Abdominal: Soft. Bowel sounds are normal. He exhibits no distension. There is no tenderness.   Musculoskeletal: Normal range of motion. He exhibits no edema.   Lymphadenopathy:     He has no cervical adenopathy.     He has no axillary adenopathy.        Right: No supraclavicular adenopathy present.        Left: No supraclavicular adenopathy present.   Neurological: He is alert and oriented to person, place, and time. He has normal strength.   Skin: Skin is warm, dry and intact. Capillary refill takes less than 2 seconds. No rash noted. He is not diaphoretic. There is pallor.   Psychiatric: His speech is normal and behavior is normal. Judgment and thought  content normal. His mood appears anxious. Cognition and memory are normal.   Vitals reviewed.      Assessment:       1. Iron deficiency anemia secondary to inadequate dietary iron intake-  Feraheme Infusion weekly x2    2. Other vitamin B12 deficiency anemia -  B12 level drawn today, will review in 1 week with patient   3. Chronic diastolic congestive heart failure -  Followed by Cardiology, Stable   4. Essential hypertension -  Followed by Cardiology, Stable   5. Hyperlipidemia, unspecified hyperlipidemia type -  Followed by Cardiology, Stable   6. XIANG on CKD stage III -  Followed by PCP, Stable   7. Hyperglycemia, unspecified -  Followed by PCP, Stable       Plan:     1. Labs in 6 weeks: CBC, CMP, Iron    2. Feraheme infusion weekly x2, first infusion is today    3. Return to clinic in 6 weeks with labs.    4. Free LT chains ratio is elevated. Will need to evaluate further.

## 2018-04-27 NOTE — PATIENT INSTRUCTIONS

## 2018-04-27 NOTE — TELEPHONE ENCOUNTER
----- Message from Nicolasa Gates MA sent at 4/27/2018  8:11 AM CDT -----      ----- Message -----  From: Laura Bowens PA-C  Sent: 4/27/2018   8:04 AM  To: Nicolasa Gates MA    Please make sure he has appt in 4-6 weeks with either myself or Dr. Espinoza    I reviewed hematology's note, CMP was stable on 4/17

## 2018-05-01 ENCOUNTER — TELEPHONE (OUTPATIENT)
Dept: CARDIOLOGY | Facility: CLINIC | Age: 83
End: 2018-05-01

## 2018-05-04 ENCOUNTER — INFUSION (OUTPATIENT)
Dept: INFUSION THERAPY | Facility: HOSPITAL | Age: 83
End: 2018-05-04
Attending: NURSE PRACTITIONER
Payer: MEDICARE

## 2018-05-04 VITALS
RESPIRATION RATE: 18 BRPM | HEART RATE: 71 BPM | OXYGEN SATURATION: 100 % | TEMPERATURE: 98 F | DIASTOLIC BLOOD PRESSURE: 65 MMHG | SYSTOLIC BLOOD PRESSURE: 115 MMHG

## 2018-05-04 DIAGNOSIS — D50.8 IRON DEFICIENCY ANEMIA SECONDARY TO INADEQUATE DIETARY IRON INTAKE: Primary | ICD-10-CM

## 2018-05-04 PROCEDURE — 63600175 PHARM REV CODE 636 W HCPCS: Mod: JG,PO | Performed by: NURSE PRACTITIONER

## 2018-05-04 PROCEDURE — A4216 STERILE WATER/SALINE, 10 ML: HCPCS | Mod: PO | Performed by: NURSE PRACTITIONER

## 2018-05-04 PROCEDURE — 96374 THER/PROPH/DIAG INJ IV PUSH: CPT | Mod: PO

## 2018-05-04 PROCEDURE — 25000003 PHARM REV CODE 250: Mod: PO | Performed by: NURSE PRACTITIONER

## 2018-05-04 RX ORDER — HEPARIN 100 UNIT/ML
500 SYRINGE INTRAVENOUS
Status: CANCELLED | OUTPATIENT
Start: 2018-05-04

## 2018-05-04 RX ORDER — SODIUM CHLORIDE 0.9 % (FLUSH) 0.9 %
10 SYRINGE (ML) INJECTION
Status: CANCELLED | OUTPATIENT
Start: 2018-05-04

## 2018-05-04 RX ORDER — SODIUM CHLORIDE 0.9 % (FLUSH) 0.9 %
10 SYRINGE (ML) INJECTION
Status: DISCONTINUED | OUTPATIENT
Start: 2018-05-04 | End: 2018-05-04 | Stop reason: HOSPADM

## 2018-05-04 RX ADMIN — SODIUM CHLORIDE, PRESERVATIVE FREE 10 ML: 5 INJECTION INTRAVENOUS at 01:05

## 2018-05-04 RX ADMIN — FERUMOXYTOL 510 MG: 510 INJECTION INTRAVENOUS at 01:05

## 2018-05-15 NOTE — PLAN OF CARE
Problem: Patient Care Overview  Goal: Plan of Care Review  Outcome: Ongoing (interventions implemented as appropriate)  Pt states feeling tired, weak, and SOB with exertion.

## 2018-05-22 ENCOUNTER — LAB VISIT (OUTPATIENT)
Dept: LAB | Facility: HOSPITAL | Age: 83
End: 2018-05-22
Attending: PHYSICIAN ASSISTANT
Payer: MEDICARE

## 2018-05-22 ENCOUNTER — OFFICE VISIT (OUTPATIENT)
Dept: CARDIOLOGY | Facility: CLINIC | Age: 83
End: 2018-05-22
Payer: MEDICARE

## 2018-05-22 VITALS
HEIGHT: 74 IN | BODY MASS INDEX: 21.76 KG/M2 | WEIGHT: 169.56 LBS | SYSTOLIC BLOOD PRESSURE: 110 MMHG | DIASTOLIC BLOOD PRESSURE: 54 MMHG | HEART RATE: 58 BPM

## 2018-05-22 DIAGNOSIS — I48.20 CHRONIC ATRIAL FIBRILLATION: Chronic | ICD-10-CM

## 2018-05-22 DIAGNOSIS — I25.10 CORONARY ARTERY DISEASE, ANGINA PRESENCE UNSPECIFIED, UNSPECIFIED VESSEL OR LESION TYPE, UNSPECIFIED WHETHER NATIVE OR TRANSPLANTED HEART: Chronic | ICD-10-CM

## 2018-05-22 DIAGNOSIS — I50.32 CHRONIC DIASTOLIC CONGESTIVE HEART FAILURE: Primary | ICD-10-CM

## 2018-05-22 DIAGNOSIS — I35.0 NONRHEUMATIC AORTIC VALVE STENOSIS: ICD-10-CM

## 2018-05-22 DIAGNOSIS — I10 ESSENTIAL HYPERTENSION: Chronic | ICD-10-CM

## 2018-05-22 DIAGNOSIS — E78.5 HYPERLIPIDEMIA, UNSPECIFIED HYPERLIPIDEMIA TYPE: Chronic | ICD-10-CM

## 2018-05-22 DIAGNOSIS — Z95.2 S/P AVR: ICD-10-CM

## 2018-05-22 DIAGNOSIS — R79.89 ELEVATED TROPONIN: ICD-10-CM

## 2018-05-22 DIAGNOSIS — I50.32 CHRONIC DIASTOLIC CONGESTIVE HEART FAILURE: ICD-10-CM

## 2018-05-22 DIAGNOSIS — I27.20 PULMONARY HYPERTENSION: Chronic | ICD-10-CM

## 2018-05-22 LAB
ALBUMIN SERPL BCP-MCNC: 3.3 G/DL
ALP SERPL-CCNC: 167 U/L
ALT SERPL W/O P-5'-P-CCNC: 22 U/L
ANION GAP SERPL CALC-SCNC: 7 MMOL/L
AST SERPL-CCNC: 36 U/L
BILIRUB SERPL-MCNC: 1.9 MG/DL
BNP SERPL-MCNC: 2112 PG/ML
BUN SERPL-MCNC: 48 MG/DL
CALCIUM SERPL-MCNC: 9.6 MG/DL
CHLORIDE SERPL-SCNC: 97 MMOL/L
CO2 SERPL-SCNC: 31 MMOL/L
CREAT SERPL-MCNC: 2 MG/DL
EST. GFR  (AFRICAN AMERICAN): 33.9 ML/MIN/1.73 M^2
EST. GFR  (NON AFRICAN AMERICAN): 29.3 ML/MIN/1.73 M^2
GLUCOSE SERPL-MCNC: 83 MG/DL
POTASSIUM SERPL-SCNC: 4.8 MMOL/L
PROT SERPL-MCNC: 7.1 G/DL
SODIUM SERPL-SCNC: 135 MMOL/L

## 2018-05-22 PROCEDURE — 99999 PR PBB SHADOW E&M-EST. PATIENT-LVL III: CPT | Mod: PBBFAC,,, | Performed by: PHYSICIAN ASSISTANT

## 2018-05-22 PROCEDURE — 36415 COLL VENOUS BLD VENIPUNCTURE: CPT | Mod: PO

## 2018-05-22 PROCEDURE — 83880 ASSAY OF NATRIURETIC PEPTIDE: CPT

## 2018-05-22 PROCEDURE — 99214 OFFICE O/P EST MOD 30 MIN: CPT | Mod: S$GLB,,, | Performed by: PHYSICIAN ASSISTANT

## 2018-05-22 PROCEDURE — 80053 COMPREHEN METABOLIC PANEL: CPT

## 2018-05-22 RX ORDER — AMOXICILLIN AND CLAVULANATE POTASSIUM 875; 125 MG/1; MG/1
1 TABLET, FILM COATED ORAL 2 TIMES DAILY
Refills: 0 | COMMUNITY
Start: 2018-05-18 | End: 2018-06-26

## 2018-05-22 RX ORDER — TEMAZEPAM 22.5 MG/1
22.5 CAPSULE ORAL
COMMUNITY
Start: 2018-05-18 | End: 2018-06-12

## 2018-05-22 NOTE — PROGRESS NOTES
Subjective:    Patient ID:  Chung Meneses Sr. is a 86 y.o. male who presents for follow-up of HCF    HPI  Mr. Meneses is an 86 year old male patient with a PMHx of CAD/severe AS s/p CABG x 1 and bioprosthetic AVR, permanent atrial fibrillation (on Eliquis), CHF, HTN, [place], and hyperlipidemia who presents today for CHF follow-up. He returns today and states he is doing ok. He still complains of lightheadedness/dizziness mostly when changing positions. No near syncope or syncope. CHF wise, seems to be stable. Main complaint is abdominal bloating. Still complains of EISENBERG, but states it may be slightly improved. No PND or orthopnea. Weight is stable at 169 lbs. Patient reports compliance with his medications, feels he has good UOP. Remains on Eliquis. No bleeding issues. Being followed by heme/onc and receiving iron infusions. CT of chest previously ordered by Dr. Heard but never scheduled, will discuss further with Dr. Espinoza. Labs pending.     Review of Systems   Constitution: Negative for chills, decreased appetite, fever, weakness and malaise/fatigue.   HENT: Negative for congestion, hoarse voice and sore throat.    Eyes: Negative for blurred vision and discharge.   Cardiovascular: Negative for chest pain, claudication, cyanosis, dyspnea on exertion, irregular heartbeat, leg swelling, near-syncope, orthopnea, palpitations and paroxysmal nocturnal dyspnea.   Respiratory: Negative for cough, hemoptysis, shortness of breath, snoring, sputum production and wheezing.    Endocrine: Negative for cold intolerance and heat intolerance.   Hematologic/Lymphatic: Negative for bleeding problem. Does not bruise/bleed easily.   Skin: Negative for rash.   Musculoskeletal: Negative for arthritis, back pain, joint pain, joint swelling, muscle cramps, muscle weakness and myalgias.   Gastrointestinal: Positive for bloating. Negative for abdominal pain, constipation, diarrhea, heartburn, melena and nausea.   Genitourinary: Negative for  "hematuria.   Neurological: Positive for dizziness and light-headedness. Negative for focal weakness, headaches, loss of balance, numbness, paresthesias and seizures.   Psychiatric/Behavioral: Negative for memory loss. The patient does not have insomnia.    Allergic/Immunologic: Negative for hives.         BP (!) 110/54   Pulse (!) 58   Ht 6' 1.5" (1.867 m)   Wt 76.9 kg (169 lb 8.5 oz)   BMI 22.06 kg/m²     Objective:    Physical Exam   Constitutional: He is oriented to person, place, and time. He appears well-developed and well-nourished. No distress.   HENT:   Head: Normocephalic and atraumatic.   Eyes: Pupils are equal, round, and reactive to light. Right eye exhibits no discharge. Left eye exhibits no discharge.   Neck: Neck supple. No JVD present.   Cardiovascular: S1 normal, S2 normal and intact distal pulses.  An irregularly irregular rhythm present. Bradycardia present.  Exam reveals no gallop, no S3, no S4 and no friction rub.    Murmur heard.   Harsh midsystolic murmur is present  at the upper right sternal border radiating to the neck  Pulmonary/Chest: Effort normal and breath sounds normal. No respiratory distress.   Coarse BS at bases  CABG scar well-healed   Abdominal: Soft. He exhibits distension. There is no tenderness. There is no rebound.   +HJR     Musculoskeletal: He exhibits edema (BLE).   Neurological: He is alert and oriented to person, place, and time.   Skin: Skin is warm and dry. He is not diaphoretic. No erythema.   Left leg dressed   Psychiatric: He has a normal mood and affect. His behavior is normal.   Nursing note and vitals reviewed.       2D Echo CONCLUSIONS     1 - Low normal to mildly depressed left ventricular systolic function (EF 50-55%).     2 - Impaired LV relaxation, elevated LAP (grade 2 diastolic dysfunction).     3 - Wall motion abnormalities.     4 - Low normal right ventricular systolic function .     5 - Severe left atrial enlargement.     6 - Concentric hypertrophy. "     7 - Pulmonary hypertension. The estimated PA systolic pressure is 41 mmHg.     8 - Aortic valve prosthesis, mean gradient = 11 mmHg.     9 - Moderate mitral regurgitation.     10 - Mild to moderate tricuspid regurgitation.     11 - Increased central venous pressure  Assessment:       1. Chronic diastolic congestive heart failure    2. S/P AVR    3. Nonrheumatic aortic valve stenosis    4. Hyperlipidemia, unspecified hyperlipidemia type    5. Elevated troponin    6. Essential hypertension    7. Chronic atrial fibrillation    8. Coronary artery disease, angina presence unspecified, unspecified vessel or lesion type, unspecified whether native or transplanted heart    9. Pulmonary hypertension       Clinically doing ok but still appears volume overloaded. Check CMP, BNP today for further evaluation. Jony discuss CT of chest with Dr. Espinoza. Continue same mgmt for now.  Plan:   -CMP, BNP today with phone review  -Continue current medical management and risk factor modification  -Cardiac, low salt diet  -Change positions slowly, fall precautions discused  -Follow-up TBA      Chart reviewed. Dr. Espinoza agrees with plan as outlined above.

## 2018-05-23 ENCOUNTER — TELEPHONE (OUTPATIENT)
Dept: CARDIOLOGY | Facility: CLINIC | Age: 83
End: 2018-05-23

## 2018-05-23 NOTE — TELEPHONE ENCOUNTER
Please phone patient. Labs reviewed. BNP remains elevated but overall stable. BMP stable, creatinine 2.0. Would like to proceed with CT of chest without contrast---and then f/u with Dr. Heard    Can schedule 4 week f/u with me     Thanks

## 2018-05-24 ENCOUNTER — TELEPHONE (OUTPATIENT)
Dept: CARDIOLOGY | Facility: CLINIC | Age: 83
End: 2018-05-24

## 2018-05-24 NOTE — TELEPHONE ENCOUNTER
Returned pt call CT has been scheduled for tomorrow 5/25/18 [facility] pt notified to fast for 4 hours prior to appt. I scheduled pt to see Dr. Heard soonest available appt with Dr. Heard 6/26/18 I scheduled pt and put pt on waiting list for an earlier appt.    I scheduled pt for 4 week f/u with Laura on 6/22/18 all appt letters have been mailed to pt.

## 2018-05-24 NOTE — TELEPHONE ENCOUNTER
Returned phone call to [facility]  Nurse wanted to notify of patient's recent heart rate during Rehab  Chronic A-fib,  asymptomatic but fast will fax copy of tracings for Dr. Espinoza's review.        ----- Message from Adrianne Cordova sent at 5/24/2018  5:00 PM CDT -----  Contact: Yonatan with [facility]  Yonatan with [facility] is requesting a call back in regards to pt heart rate. Yonatan has concerns and would like to speak with someone at their earliest convenience         Yonatan with [facility] can be contacted at [phone]

## 2018-05-24 NOTE — TELEPHONE ENCOUNTER
----- Message from Kasia Cr sent at 5/24/2018  8:13 AM CDT -----  Contact: patient  Returning your call. Please call patient ASAP @ [phone]. Thanks, selena

## 2018-05-25 RX ORDER — METOPROLOL SUCCINATE 25 MG/1
25 TABLET, EXTENDED RELEASE ORAL 2 TIMES DAILY
COMMUNITY

## 2018-05-25 RX ORDER — METOPROLOL SUCCINATE 25 MG/1
25 TABLET, EXTENDED RELEASE ORAL 2 TIMES DAILY
Qty: 60 TABLET | Refills: 3 | Status: CANCELLED | OUTPATIENT
Start: 2018-05-25

## 2018-05-29 ENCOUNTER — HOSPITAL ENCOUNTER (OUTPATIENT)
Dept: RADIOLOGY | Facility: HOSPITAL | Age: 83
Discharge: HOME | End: 2018-05-29
Attending: INTERNAL MEDICINE
Payer: MEDICARE

## 2018-05-29 DIAGNOSIS — I50.9 ACUTE DECOMPENSATED HEART FAILURE: ICD-10-CM

## 2018-05-29 PROCEDURE — 71250 CT THORAX DX C-: CPT | Mod: 26,,, | Performed by: RADIOLOGY

## 2018-05-29 PROCEDURE — 71250 CT THORAX DX C-: CPT | Mod: TC,PO

## 2018-06-12 ENCOUNTER — OFFICE VISIT (OUTPATIENT)
Dept: HEMATOLOGY/ONCOLOGY | Facility: CLINIC | Age: 83
End: 2018-06-12
Payer: MEDICARE

## 2018-06-12 ENCOUNTER — LAB VISIT (OUTPATIENT)
Dept: LAB | Facility: HOSPITAL | Age: 83
End: 2018-06-12
Attending: NURSE PRACTITIONER
Payer: MEDICARE

## 2018-06-12 VITALS
HEART RATE: 66 BPM | OXYGEN SATURATION: 97 % | WEIGHT: 166.25 LBS | TEMPERATURE: 98 F | DIASTOLIC BLOOD PRESSURE: 69 MMHG | HEIGHT: 69 IN | SYSTOLIC BLOOD PRESSURE: 115 MMHG | BODY MASS INDEX: 24.62 KG/M2

## 2018-06-12 DIAGNOSIS — I27.20 PULMONARY HYPERTENSION: Chronic | ICD-10-CM

## 2018-06-12 DIAGNOSIS — I10 ESSENTIAL HYPERTENSION: Chronic | ICD-10-CM

## 2018-06-12 DIAGNOSIS — I50.32 CHRONIC DIASTOLIC CONGESTIVE HEART FAILURE: ICD-10-CM

## 2018-06-12 DIAGNOSIS — I25.10 CORONARY ARTERY DISEASE, ANGINA PRESENCE UNSPECIFIED, UNSPECIFIED VESSEL OR LESION TYPE, UNSPECIFIED WHETHER NATIVE OR TRANSPLANTED HEART: Chronic | ICD-10-CM

## 2018-06-12 DIAGNOSIS — D50.8 IRON DEFICIENCY ANEMIA SECONDARY TO INADEQUATE DIETARY IRON INTAKE: Primary | ICD-10-CM

## 2018-06-12 DIAGNOSIS — D50.8 IRON DEFICIENCY ANEMIA SECONDARY TO INADEQUATE DIETARY IRON INTAKE: ICD-10-CM

## 2018-06-12 DIAGNOSIS — D51.8 OTHER VITAMIN B12 DEFICIENCY ANEMIA: ICD-10-CM

## 2018-06-12 LAB
ALBUMIN SERPL BCP-MCNC: 3.4 G/DL
ALP SERPL-CCNC: 221 U/L
ALT SERPL W/O P-5'-P-CCNC: 18 U/L
ANION GAP SERPL CALC-SCNC: 11 MMOL/L
AST SERPL-CCNC: 31 U/L
BASOPHILS # BLD AUTO: 0.05 K/UL
BASOPHILS NFR BLD: 0.8 %
BILIRUB SERPL-MCNC: 1.9 MG/DL
BUN SERPL-MCNC: 54 MG/DL
CALCIUM SERPL-MCNC: 9.7 MG/DL
CHLORIDE SERPL-SCNC: 102 MMOL/L
CO2 SERPL-SCNC: 24 MMOL/L
CREAT SERPL-MCNC: 1.9 MG/DL
DIFFERENTIAL METHOD: ABNORMAL
EOSINOPHIL # BLD AUTO: 0.4 K/UL
EOSINOPHIL NFR BLD: 7.2 %
ERYTHROCYTE [DISTWIDTH] IN BLOOD BY AUTOMATED COUNT: 26.2 %
EST. GFR  (AFRICAN AMERICAN): 36.1 ML/MIN/1.73 M^2
EST. GFR  (NON AFRICAN AMERICAN): 31.2 ML/MIN/1.73 M^2
GLUCOSE SERPL-MCNC: 108 MG/DL
HCT VFR BLD AUTO: 35.8 %
HGB BLD-MCNC: 11.2 G/DL
IRON SERPL-MCNC: 33 UG/DL
LYMPHOCYTES # BLD AUTO: 0.7 K/UL
LYMPHOCYTES NFR BLD: 12.4 %
MCH RBC QN AUTO: 25.5 PG
MCHC RBC AUTO-ENTMCNC: 31.3 G/DL
MCV RBC AUTO: 82 FL
MONOCYTES # BLD AUTO: 0.7 K/UL
MONOCYTES NFR BLD: 11.4 %
NEUTROPHILS # BLD AUTO: 4.1 K/UL
NEUTROPHILS NFR BLD: 68.2 %
PLATELET # BLD AUTO: 190 K/UL
PMV BLD AUTO: 9.2 FL
POTASSIUM SERPL-SCNC: 4.2 MMOL/L
PROT SERPL-MCNC: 7.4 G/DL
RBC # BLD AUTO: 4.39 M/UL
SATURATED IRON: 10 %
SODIUM SERPL-SCNC: 137 MMOL/L
TOTAL IRON BINDING CAPACITY: 346 UG/DL
TRANSFERRIN SERPL-MCNC: 234 MG/DL
WBC # BLD AUTO: 5.97 K/UL

## 2018-06-12 PROCEDURE — 83540 ASSAY OF IRON: CPT

## 2018-06-12 PROCEDURE — 99999 PR PBB SHADOW E&M-EST. PATIENT-LVL III: CPT | Mod: PBBFAC,,, | Performed by: NURSE PRACTITIONER

## 2018-06-12 PROCEDURE — 80053 COMPREHEN METABOLIC PANEL: CPT

## 2018-06-12 PROCEDURE — 85025 COMPLETE CBC W/AUTO DIFF WBC: CPT

## 2018-06-12 PROCEDURE — 36415 COLL VENOUS BLD VENIPUNCTURE: CPT

## 2018-06-12 PROCEDURE — 99215 OFFICE O/P EST HI 40 MIN: CPT | Mod: S$GLB,,, | Performed by: NURSE PRACTITIONER

## 2018-06-18 NOTE — PATIENT INSTRUCTIONS

## 2018-06-18 NOTE — PROGRESS NOTES
Subjective:       Patient ID: Chung Meneses Sr. is a 86 y.o. male.    Chief Complaint: Anemia and Results    86 year old male, presents to clinic for evaluation for anemia. He had coronary artery bypass surgery in November and has had significant anemia since the procedure. According to the records available he has had documented anemia since November 2017 that has worsened since that time. He is negative for dark, tarry stool, blood in stool, epistaxis. He does not recall the date of his last colonoscopy but states he has had polyps removed in the past. He is on Eliquis since his bypass surgery and this is managed by Cardiology. Other PMHx includes: HTN, hyperlipidemia, CKD III. He does not recall being anemic prior to this time. Associated symptoms include: fatigue, dyspnea with exertion.       Anemia   Symptoms include pallor. There has been no abdominal pain, bruising/bleeding easily, confusion, fever, light-headedness or palpitations.     Review of Systems   Constitutional: Positive for activity change and fatigue. Negative for appetite change, chills, diaphoresis, fever and unexpected weight change.   HENT: Negative for congestion, hearing loss, mouth sores, nosebleeds and trouble swallowing.    Eyes: Negative for discharge and visual disturbance.   Respiratory: Positive for shortness of breath. Negative for cough and chest tightness.    Cardiovascular: Negative for chest pain, palpitations and leg swelling.   Gastrointestinal: Negative for abdominal distention, abdominal pain, anal bleeding, blood in stool, constipation, diarrhea, nausea and vomiting.   Endocrine: Negative for cold intolerance and heat intolerance.   Genitourinary: Negative for difficulty urinating, dysuria, flank pain and hematuria.   Musculoskeletal: Positive for arthralgias. Negative for back pain and myalgias.   Skin: Positive for pallor.   Neurological: Positive for weakness. Negative for dizziness, light-headedness and headaches.    Hematological: Negative for adenopathy. Does not bruise/bleed easily.   Psychiatric/Behavioral: Negative for agitation, behavioral problems and confusion. The patient is nervous/anxious.        Objective:      Physical Exam   Constitutional: He is oriented to person, place, and time. He appears well-developed and well-nourished. No distress.   HENT:   Head: Normocephalic and atraumatic.   Right Ear: Hearing and external ear normal.   Left Ear: Hearing and external ear normal.   Nose: Nose normal. No mucosal edema or rhinorrhea. No epistaxis.   Mouth/Throat: Uvula is midline, oropharynx is clear and moist and mucous membranes are normal.   Eyes: Conjunctivae and EOM are normal. Pupils are equal, round, and reactive to light. Right eye exhibits no chemosis and no discharge. Left eye exhibits no chemosis and no discharge.   Neck: Trachea normal and normal range of motion. Neck supple. No thyroid mass and no thyromegaly present.   Cardiovascular: Normal rate, regular rhythm, normal heart sounds and intact distal pulses.    No murmur heard.  Pulses:       Dorsalis pedis pulses are 2+ on the right side, and 2+ on the left side.   Pulmonary/Chest: Effort normal and breath sounds normal. No respiratory distress. He has no decreased breath sounds. He has no wheezes.   Abdominal: Soft. Bowel sounds are normal. He exhibits no distension. There is no tenderness.   Musculoskeletal: Normal range of motion. He exhibits no edema.   Lymphadenopathy:     He has no cervical adenopathy.     He has no axillary adenopathy.        Right: No supraclavicular adenopathy present.        Left: No supraclavicular adenopathy present.   Neurological: He is alert and oriented to person, place, and time. He has normal strength.   Skin: Skin is warm, dry and intact. Capillary refill takes less than 2 seconds. No rash noted. He is not diaphoretic. There is pallor.   Psychiatric: His speech is normal and behavior is normal. Judgment and thought  content normal. His mood appears anxious. Cognition and memory are normal.   Vitals reviewed.      Assessment:       1. Iron deficiency anemia secondary to inadequate dietary iron intake-  Feraheme Infusion weekly x2    2. Other vitamin B12 deficiency anemia -  B12 level drawn today, will review in 1 week with patient   3. Chronic diastolic congestive heart failure -  Followed by Cardiology, Stable   4. Essential hypertension -  Followed by Cardiology, Stable   5. Hyperlipidemia, unspecified hyperlipidemia type -  Followed by Cardiology, Stable   6. XIANG on CKD stage III -  Followed by PCP, Stable   7. Hyperglycemia, unspecified -  Followed by PCP, Stable       Plan:     1. Labs in 6 weeks: CBC, CMP, Iron    2. Feraheme infusion weekly x2, first infusion is today    3. Return to clinic in 6 weeks with labs.    4. Free LT chains ratio is elevated. Will need to evaluate further.

## 2018-06-22 ENCOUNTER — OFFICE VISIT (OUTPATIENT)
Dept: CARDIOLOGY | Facility: CLINIC | Age: 83
End: 2018-06-22
Payer: MEDICARE

## 2018-06-22 VITALS
HEIGHT: 69 IN | WEIGHT: 170.19 LBS | DIASTOLIC BLOOD PRESSURE: 60 MMHG | BODY MASS INDEX: 25.21 KG/M2 | SYSTOLIC BLOOD PRESSURE: 102 MMHG | HEART RATE: 64 BPM

## 2018-06-22 DIAGNOSIS — I25.10 CORONARY ARTERY DISEASE, ANGINA PRESENCE UNSPECIFIED, UNSPECIFIED VESSEL OR LESION TYPE, UNSPECIFIED WHETHER NATIVE OR TRANSPLANTED HEART: Chronic | ICD-10-CM

## 2018-06-22 DIAGNOSIS — R42 DIZZINESS: Primary | ICD-10-CM

## 2018-06-22 DIAGNOSIS — I50.33 ACUTE ON CHRONIC DIASTOLIC (CONGESTIVE) HEART FAILURE: ICD-10-CM

## 2018-06-22 DIAGNOSIS — I48.20 CHRONIC ATRIAL FIBRILLATION: Chronic | ICD-10-CM

## 2018-06-22 DIAGNOSIS — I10 ESSENTIAL HYPERTENSION: Chronic | ICD-10-CM

## 2018-06-22 DIAGNOSIS — I35.0 NONRHEUMATIC AORTIC VALVE STENOSIS: ICD-10-CM

## 2018-06-22 DIAGNOSIS — Z95.2 S/P AVR: ICD-10-CM

## 2018-06-22 DIAGNOSIS — I27.20 PULMONARY HYPERTENSION: Chronic | ICD-10-CM

## 2018-06-22 DIAGNOSIS — E78.5 HYPERLIPIDEMIA, UNSPECIFIED HYPERLIPIDEMIA TYPE: Chronic | ICD-10-CM

## 2018-06-22 PROCEDURE — 93000 ELECTROCARDIOGRAM COMPLETE: CPT | Mod: S$GLB,,, | Performed by: INTERNAL MEDICINE

## 2018-06-22 PROCEDURE — 99214 OFFICE O/P EST MOD 30 MIN: CPT | Mod: S$GLB,,, | Performed by: PHYSICIAN ASSISTANT

## 2018-06-22 PROCEDURE — 99999 PR PBB SHADOW E&M-EST. PATIENT-LVL IV: CPT | Mod: PBBFAC,,, | Performed by: PHYSICIAN ASSISTANT

## 2018-06-22 RX ORDER — HEPARIN 100 UNIT/ML
500 SYRINGE INTRAVENOUS
Status: CANCELLED | OUTPATIENT
Start: 2018-07-02

## 2018-06-22 RX ORDER — HEPARIN 100 UNIT/ML
500 SYRINGE INTRAVENOUS
Status: CANCELLED | OUTPATIENT
Start: 2018-06-25

## 2018-06-22 RX ORDER — SODIUM CHLORIDE 0.9 % (FLUSH) 0.9 %
10 SYRINGE (ML) INJECTION
Status: CANCELLED | OUTPATIENT
Start: 2018-07-02

## 2018-06-22 RX ORDER — SODIUM CHLORIDE 0.9 % (FLUSH) 0.9 %
10 SYRINGE (ML) INJECTION
Status: CANCELLED | OUTPATIENT
Start: 2018-06-25

## 2018-06-22 NOTE — PROGRESS NOTES
Subjective:    Patient ID:  Chung Meneses Sr. is a 86 y.o. male who presents for follow-up of Congestive Heart Failure      HPI   Mr. Meneses is an 86 year old male patient with a PMHx of CAD/severe AS s/p CABG x 1 and bioprosthetic AVR, permanent atrial fibrillation (on Eliquis), CHF, HTN, MARIA DEL ROSARIO, and hyperlipidemia who presents today for CHF follow-up. He returns today and states he is feeling ok. His main complaint is lightheadedness/dizziness. States this has been an ongoing issue. Notices it worsens when he changes positions or luma down. Denies any associated near syncope, syncope, blurred vision, slurred speech. No recent falls. CHF wise, seems to be doing ok. No change in status since last visit. SOB stable, gets winded when he over-exerts himself. No PND or orthopnea. Weight stable at 170 lbs. No increased lower extremity edema, +abdominal bloating. Patient reports compliance with his medications. Admits he has been more liberal with his fluid intake as of late. CT of chest reviewed, scheduled to see Dr. Heard next week. Unable to titrate meds further at this time given borderline BP/dizzy symptoms. Creatinine stable at 1.9.    Review of Systems   Constitution: Negative for chills, decreased appetite, fever, weakness and malaise/fatigue.   HENT: Negative for congestion, hoarse voice and sore throat.    Eyes: Negative for blurred vision and discharge.   Cardiovascular: Positive for dyspnea on exertion and leg swelling. Negative for chest pain, claudication, cyanosis, irregular heartbeat, near-syncope, orthopnea, palpitations and paroxysmal nocturnal dyspnea.   Respiratory: Negative for cough, hemoptysis, shortness of breath, snoring, sputum production and wheezing.    Endocrine: Negative for cold intolerance and heat intolerance.   Hematologic/Lymphatic: Negative for bleeding problem. Does not bruise/bleed easily.   Skin: Negative for rash.   Musculoskeletal: Positive for arthritis and joint pain. Negative for  "back pain, joint swelling, muscle cramps, muscle weakness and myalgias.   Gastrointestinal: Positive for bloating. Negative for abdominal pain, constipation, diarrhea, heartburn, melena and nausea.   Genitourinary: Negative for hematuria.   Neurological: Negative for dizziness, focal weakness, headaches, light-headedness, loss of balance, numbness, paresthesias and seizures.   Psychiatric/Behavioral: Negative for memory loss. The patient does not have insomnia.    Allergic/Immunologic: Negative for hives.       /60   Pulse 64   Ht 5' 9" (1.753 m)   Wt 77.2 kg (170 lb 3.1 oz)   BMI 25.13 kg/m²   Orthostatics negative today in office  Objective:    Physical Exam   Constitutional: He is oriented to person, place, and time. He appears well-developed and well-nourished. No distress.   HENT:   Head: Normocephalic and atraumatic.   Eyes: Pupils are equal, round, and reactive to light. Right eye exhibits no discharge. Left eye exhibits no discharge.   Neck: Neck supple. JVD present. No thyromegaly present.   +HJR     Cardiovascular: Normal rate, S1 normal, S2 normal and intact distal pulses.  An irregularly irregular rhythm present. PMI is not displaced.    Murmur heard.   Harsh midsystolic murmur is present  at the upper right sternal border radiating to the neck  Pulmonary/Chest: Effort normal and breath sounds normal. No respiratory distress. He has no rales.   CABG scar well-healed   Abdominal: Soft. He exhibits distension.   Musculoskeletal: He exhibits edema (BLE).   Neurological: He is alert and oriented to person, place, and time.   Skin: Skin is warm and dry. He is not diaphoretic. No erythema.   Legs with compression socks in place; left leg wrapped, receiving wound care   Psychiatric: He has a normal mood and affect. His behavior is normal. Thought content normal.   Nursing note and vitals reviewed.          Chemistry        Component Value Date/Time     06/12/2018 1300    K 4.2 06/12/2018 1300    "  06/12/2018 1300    CO2 24 06/12/2018 1300    BUN 54 (H) 06/12/2018 1300    CREATININE 1.9 (H) 06/12/2018 1300     06/12/2018 1300        Component Value Date/Time    CALCIUM 9.7 06/12/2018 1300    ALKPHOS 221 (H) 06/12/2018 1300    AST 31 06/12/2018 1300    ALT 18 06/12/2018 1300    BILITOT 1.9 (H) 06/12/2018 1300    ESTGFRAFRICA 36.1 (A) 06/12/2018 1300    EGFRNONAA 31.2 (A) 06/12/2018 1300          Assessment:       1. Dizziness    2. S/P AVR    3. Nonrheumatic aortic valve stenosis    4. Hyperlipidemia, unspecified hyperlipidemia type    5. Essential hypertension    6. Chronic atrial fibrillation    7. Coronary artery disease, angina presence unspecified, unspecified vessel or lesion type, unspecified whether native or transplanted heart    8. Acute on chronic diastolic HFpEF of 50-55% with anasarca    9. Pulmonary hypertension       Doing ok. He has fluid on board but limited with diuretic therapy and additional medication given borderline BP and dizziness/lightheadedness. Will check 48 hour holter and refer to ENT for additional evaluation. Continue same meds in meantime. Scheduled to see advanced HF next week, will await their recs.   Plan:   -Continue current medical management and risk factor modification  -C ardiac, low salt diet 1.5 L fluid restriction  -48 hour holter, ENT referral  -Keep appt with advanced HF  -Follow-up TBA    Chart reviewed. Dr. Palomares agrees with plan as outlined above.

## 2018-06-25 ENCOUNTER — INFUSION (OUTPATIENT)
Dept: INFUSION THERAPY | Facility: HOSPITAL | Age: 83
End: 2018-06-25
Attending: NURSE PRACTITIONER
Payer: MEDICARE

## 2018-06-25 VITALS
HEART RATE: 70 BPM | SYSTOLIC BLOOD PRESSURE: 123 MMHG | RESPIRATION RATE: 18 BRPM | TEMPERATURE: 97 F | OXYGEN SATURATION: 96 % | DIASTOLIC BLOOD PRESSURE: 68 MMHG

## 2018-06-25 DIAGNOSIS — D50.8 IRON DEFICIENCY ANEMIA SECONDARY TO INADEQUATE DIETARY IRON INTAKE: Primary | ICD-10-CM

## 2018-06-25 PROCEDURE — 63600175 PHARM REV CODE 636 W HCPCS: Mod: JG | Performed by: INTERNAL MEDICINE

## 2018-06-25 PROCEDURE — 96365 THER/PROPH/DIAG IV INF INIT: CPT

## 2018-06-25 PROCEDURE — 25000003 PHARM REV CODE 250: Performed by: INTERNAL MEDICINE

## 2018-06-25 RX ADMIN — FERUMOXYTOL 510 MG: 510 INJECTION INTRAVENOUS at 11:06

## 2018-06-25 NOTE — PLAN OF CARE
Problem: Patient Care Overview  Goal: Plan of Care Review  Outcome: Ongoing (interventions implemented as appropriate)  Oh I feel ok just have this light headedness sometimes

## 2018-06-25 NOTE — PATIENT INSTRUCTIONS
[facility]  [address]  [address] [address]  [phone] phone     [phone] fax  Hours of Operation: Monday- Friday 8:00am- 5:00pm  After hours phone  [phone]  Hematology / Oncology Physicians on call      Dr. Rj Lr                        Please call with any concerns regarding your appointment today.  FALL PREVENTION   Falls often occur due to slipping, tripping or losing your balance. Here are ways to reduce your risk of falling again.   Was there anything that caused your fall that can be fixed, removed or replaced?   Make your home safe by keeping walkways clear of objects you may trip over.   Use non-slip pads under rugs.   Do not walk in poorly lit areas.   Do not stand on chairs or wobbly ladders.   Use caution when reaching overhead or looking upward. This position can cause a loss of balance.   Be sure your shoes fit properly, have non-slip bottoms and are in good condition.   Be cautious when going up and down stairs, curbs, and when walking on uneven sidewalks.   If your balance is poor, consider using a cane or walker.   If your fall was related to alcohol use, stop or limit alcohol intake.   If your fall was related to use of sleeping medicines, talk to your doctor about this. You may need to reduce your dosage at bedtime if you awaken during the night to go to the bathroom.   To reduce the need for nighttime bathroom trips:   Avoid drinking fluids for several hours before going to bed   Empty your bladder before going to bed   Men can keep a urinal at the bedside   © 6205-7188 [employer], [address] [place] [address], [place] [address]. All rights reserved. This information is not intended as a substitute for professional medical care. Always follow your healthcare professional's instructions.

## 2018-06-26 ENCOUNTER — OFFICE VISIT (OUTPATIENT)
Dept: OTOLARYNGOLOGY | Facility: CLINIC | Age: 83
End: 2018-06-26
Payer: MEDICARE

## 2018-06-26 ENCOUNTER — OFFICE VISIT (OUTPATIENT)
Dept: TRANSPLANT | Facility: CLINIC | Age: 83
End: 2018-06-26
Payer: MEDICARE

## 2018-06-26 VITALS
BODY MASS INDEX: 24.95 KG/M2 | BODY MASS INDEX: 24.97 KG/M2 | DIASTOLIC BLOOD PRESSURE: 70 MMHG | WEIGHT: 169.06 LBS | SYSTOLIC BLOOD PRESSURE: 120 MMHG | WEIGHT: 168.44 LBS | SYSTOLIC BLOOD PRESSURE: 107 MMHG | HEIGHT: 69 IN | HEART RATE: 80 BPM | DIASTOLIC BLOOD PRESSURE: 58 MMHG | HEART RATE: 74 BPM

## 2018-06-26 DIAGNOSIS — R42 DIZZINESS AND GIDDINESS: ICD-10-CM

## 2018-06-26 DIAGNOSIS — Z95.2 S/P AVR: ICD-10-CM

## 2018-06-26 DIAGNOSIS — I25.10 CORONARY ARTERY DISEASE, ANGINA PRESENCE UNSPECIFIED, UNSPECIFIED VESSEL OR LESION TYPE, UNSPECIFIED WHETHER NATIVE OR TRANSPLANTED HEART: Chronic | ICD-10-CM

## 2018-06-26 DIAGNOSIS — I48.20 CHRONIC ATRIAL FIBRILLATION: Chronic | ICD-10-CM

## 2018-06-26 DIAGNOSIS — I27.20 PULMONARY HYPERTENSION: Chronic | ICD-10-CM

## 2018-06-26 DIAGNOSIS — R42 DIZZINESS: Primary | ICD-10-CM

## 2018-06-26 DIAGNOSIS — I50.33 ACUTE ON CHRONIC DIASTOLIC (CONGESTIVE) HEART FAILURE: ICD-10-CM

## 2018-06-26 DIAGNOSIS — R42 LIGHT HEADED: Primary | ICD-10-CM

## 2018-06-26 PROCEDURE — 99213 OFFICE O/P EST LOW 20 MIN: CPT | Mod: S$GLB,,, | Performed by: INTERNAL MEDICINE

## 2018-06-26 PROCEDURE — 99999 PR PBB SHADOW E&M-EST. PATIENT-LVL III: CPT | Mod: PBBFAC,,, | Performed by: INTERNAL MEDICINE

## 2018-06-26 PROCEDURE — 99213 OFFICE O/P EST LOW 20 MIN: CPT | Mod: S$GLB,,, | Performed by: PHYSICIAN ASSISTANT

## 2018-06-26 PROCEDURE — 99999 PR PBB SHADOW E&M-EST. PATIENT-LVL III: CPT | Mod: PBBFAC,,, | Performed by: PHYSICIAN ASSISTANT

## 2018-06-26 RX ORDER — POTASSIUM CHLORIDE 750 MG/1
TABLET, EXTENDED RELEASE ORAL
Qty: 30 TABLET | Refills: 3 | Status: SHIPPED | OUTPATIENT
Start: 2018-06-26 | End: 2018-07-16 | Stop reason: SDUPTHER

## 2018-06-26 NOTE — Clinical Note
Patient is NYHA FC III, has cr 2.0, has trouble with volume, lightheadedness, have had trouble tolerating meds. No energy. Interested in hearing more. [employer]

## 2018-06-26 NOTE — PROGRESS NOTES
Subjective:       Patient ID: Chung Meneses Sr. is a 86 y.o. male.    Chief Complaint: Dizziness    Patient is a very pleasant 86 year old male here to see me today for the first time for evaluation of dizziness.   He has PMHx of CAD/severe AS s/p CABG x 1 and bioprosthetic AVR (11/2017), permanent atrial fibrillation (on Eliquis), CHF, HTN, MARIA DEL ROSARIO, and hyperlipidemia.  He reports that the symptoms have been present for the last 6+ months.  On average, the patent reports symptoms that occur approximately multiple times each day.  He describes the dizziness as lightheadedness (not spinning or vertigo) and says that it lasts as long as he's up moving around.   Gets relief with sitting down.  Denies any dizziness or lightheadedness while in bed.  He has noted that standing up, bending over or sudden position changes acts as a trigger.  He denies aural pressure, otalgia, otorrhea, tinnitus and hearing loss.  He is followed by Cardiology and had recent exam and noted to be stable from CHF standpoint.  He says his Metoprolol dose was recently increased.  They recommended Holter monitor and ENT referral.  He denies near syncope, syncope, blurred vision, or slurred speech.  He was last here with ENT in 3/2018 with right ear lobe laceration sustained during a fall that required sutures to repair.  He denies any falls since that time.  He reports hx of BPPV many years ago; says this dizziness feels different.      Review of Systems   Constitutional: Negative for fever.   HENT: Negative for congestion, ear discharge, ear pain, hearing loss and tinnitus.    Respiratory: Positive for shortness of breath (with exertion at times). Negative for cough and wheezing.    Cardiovascular: Negative for chest pain and palpitations.   Gastrointestinal: Negative for diarrhea and vomiting.   Neurological: Positive for dizziness, light-headedness and headaches (occasional, dull). Negative for syncope and speech difficulty.    Psychiatric/Behavioral: Negative for confusion.       Objective:      Physical Exam   Constitutional: He is oriented to person, place, and time. Vital signs are normal. He appears well-developed and well-nourished. He is cooperative. No distress.   HENT:   Head: Normocephalic and atraumatic.   Right Ear: Tympanic membrane, external ear and ear canal normal. No middle ear effusion.   Left Ear: Tympanic membrane, external ear and ear canal normal.  No middle ear effusion.   Nose: Nose normal. No mucosal edema, rhinorrhea, nasal deformity or septal deviation. Right sinus exhibits no maxillary sinus tenderness and no frontal sinus tenderness. Left sinus exhibits no maxillary sinus tenderness and no frontal sinus tenderness.   Mouth/Throat: Uvula is midline, oropharynx is clear and moist and mucous membranes are normal. No trismus in the jaw. Normal dentition. No uvula swelling. No oropharyngeal exudate or posterior oropharyngeal edema.   Small piece of cerumen removed from right EAC today using alligator forceps and otomicroscope   Eyes: Conjunctivae and EOM are normal. Pupils are equal, round, and reactive to light. Right eye exhibits no chemosis. Left eye exhibits no chemosis. Right conjunctiva is not injected. Left conjunctiva is not injected. No scleral icterus.   Neck: Trachea normal and phonation normal. No tracheal tenderness present. No tracheal deviation present. No thyroid mass and no thyromegaly present.   Cardiovascular: Intact distal pulses.    Pulmonary/Chest: Effort normal. No accessory muscle usage or stridor. No respiratory distress.   Lymphadenopathy:        Head (right side): No submental, no submandibular, no preauricular and no posterior auricular adenopathy present.        Head (left side): No submental, no submandibular, no preauricular and no posterior auricular adenopathy present.     He has no cervical adenopathy.        Right cervical: No superficial cervical and no deep cervical adenopathy  present.       Left cervical: No superficial cervical and no deep cervical adenopathy present.   Neurological: He is alert and oriented to person, place, and time. No cranial nerve deficit.   Skin: Skin is warm and dry. No rash noted. No erythema.   Psychiatric: He has a normal mood and affect. His behavior is normal. Thought content normal.         Vallecitos-Hallpike:  Negative AU but he felt dizzy briefly upon sitting up bilaterally      Assessment:       1. Dizziness        Plan:         I had a long discussion with the patient regarding their symptoms.  Dizziness, vertigo and disequilibrium are common symptoms reported by adults during visits to their doctors. They are all symptoms that can result from a peripheral vestibular disorder (a dysfunction of the balance organs of the inner ear) or central vestibular disorder (a dysfunction of one or more parts of the central nervous system that help process balance and spatial information).  There are also non-vestibular causes of dizziness, and dizziness can be linked to a wide array of problems such as blood-flow irregularities from cardiovascular problems and blood pressure fluctuations.   Discussed with patient that I don't see signs of BPPV today and his symptoms sound CV in nature.  I would recommend a VNG with audiogram for further diagnostic testing, and will contact the patient with further recommendations when that is complete.  If inner ear testing is normal, recommend return to Cardiology for further workup.  He is scheduled for Holter monitor next week.

## 2018-06-26 NOTE — PATIENT INSTRUCTIONS
Try to cut torsemide to 40mg on Monday,Wednesday,Friday, and 20mg in Tuesday, Thursday, Saturday, Sunday.     Take potassium on the days you take 40mg of torsemide.    Repeat labs 1 week at [facility] that is closest to you.

## 2018-06-26 NOTE — LETTER
June 26, 2018      Laura Bowens PA-C  [address] [place] [address] Dr Ifeoma [place] [address]           [facility]  [address] [place] [address] Isa[address] [place] [address]  Phone: [phone]  Fax: [phone]          Patient: Chung Meneses Sr.   MR Number: [id number]   YOB: 1931   Date of Visit: 6/26/2018       Dear Laura Bowens:    Thank you for referring Chung Meneses to me for evaluation. Attached you will find relevant portions of my assessment and plan of care.    If you have questions, please do not hesitate to call me. I look forward to following Chung Meneses along with you.    Sincerely,    Adrianne Frank PA-C    Enclosure  CC:  No Recipients    If you would like to receive this communication electronically, please contact [email or web address] or [phone] to request more information on [facility] [employer] access.    For providers and/or their staff who would like to refer a patient to [facility], please contact us through our one-stop-shop provider referral line, [facility] , at [phone].    If you feel you have received this communication in error or would no longer like to receive these types of communications, please e-mail [email or web address]

## 2018-06-26 NOTE — PROGRESS NOTES
Subjective:    Patient ID:  Chung Meneses Sr. is a 86 y.o. male who presents for follow-up of Congestive Heart Failure      Congestive Heart Failure   Associated symptoms include weakness. Pertinent negatives include no abdominal pain, chest pain, chills, congestion, coughing, fever, headaches, joint swelling, myalgias, nausea, numbness, rash or sore throat.     Mr. Meneses is an 86 year old male patient with a PMHx of CAD/severe AS s/p CABG x 1 and bioprosthetic AVR, permanent atrial fibrillation (on Eliquis), CHF, HTN, and hyperlipidemia who presents today for evaluation for ongoing heart failure. Patient with presumed diastolic heart failure, leading to multiple heart failure exacerbations after undergoing CABG and bioprosthetic AVR. Patient underwent CABG x 1 vessel plus AVR 12/17 additionally with HFpEF 50-55%, permanent AF on eliquis, CKD stage 3, who has had several admissions following surgery. Patient most recently admitted and discharged 03/30/18 with ascites, ADHF, increased edema to thighs, and weight gain. Patient states he has been having dizziness, seen a couple of weeks ago, states the dizziness really has not changed. Does think that since increasing his diuretic dose it got a little worse. NYHA FC III.  Seems upset with his quality of life. That being said, his swelling seems to be improved and abdominal swelling is significantly improved from when I first met him a couple months ago. Still with leg swelling but improved and still with abdominal swelling but improved.     Review of Systems   Constitution: Positive for decreased appetite, weakness and malaise/fatigue. Negative for chills and fever.   HENT: Negative for congestion, hoarse voice and sore throat.    Eyes: Negative for blurred vision and discharge.   Cardiovascular: Positive for dyspnea on exertion, leg swelling and orthopnea. Negative for chest pain, claudication, cyanosis, irregular heartbeat, near-syncope, palpitations and paroxysmal  "nocturnal dyspnea.   Respiratory: Positive for shortness of breath. Negative for cough, hemoptysis, snoring, sputum production and wheezing.    Endocrine: Negative for cold intolerance and heat intolerance.   Hematologic/Lymphatic: Negative for bleeding problem. Does not bruise/bleed easily.   Skin: Negative for rash.   Musculoskeletal: Negative for arthritis, back pain, joint pain, joint swelling, muscle cramps, muscle weakness and myalgias.   Gastrointestinal: Positive for bloating. Negative for abdominal pain, constipation, diarrhea, heartburn, melena and nausea.   Genitourinary: Negative for hematuria.   Neurological: Negative for dizziness, focal weakness, headaches, light-headedness, loss of balance, numbness, paresthesias and seizures.   Psychiatric/Behavioral: Negative for memory loss. The patient does not have insomnia.    Allergic/Immunologic: Negative for hives.         Objective:    Physical Exam   Constitutional: He is oriented to person, place, and time. He appears well-developed and well-nourished. No distress.   /70 (BP Method: Small (Manual))   Pulse 74   Ht 5' 9" (1.753 m)   Wt 76.4 kg (168 lb 6.9 oz)   BMI 24.87 kg/m²    HENT:   Head: Normocephalic and atraumatic.   Eyes: Pupils are equal, round, and reactive to light. Right eye exhibits no discharge. Left eye exhibits no discharge.   Neck: Neck supple. JVD (11cm H2O) present.   Cardiovascular: Normal rate, S1 normal and S2 normal.  An irregularly irregular rhythm present. Exam reveals no gallop, no S3, no S4 and no friction rub.    Murmur heard.   Harsh midsystolic murmur is present  at the upper right sternal border radiating to the neck  Pulmonary/Chest: Effort normal. No respiratory distress. He has no wheezes. He has no rales.   Abdominal: He exhibits no distension (much improved, but still mild distention). There is no tenderness. There is no rebound.   Musculoskeletal: He exhibits edema (pretibial 1+ to 2/3 close to knees). "   Neurological: He is alert and oriented to person, place, and time.   Skin: Skin is warm and dry. He is not diaphoretic. No erythema.   Psychiatric: He has a normal mood and affect. His behavior is normal. Thought content normal.   Nursing note and vitals reviewed.      2D Echo CONCLUSIONS     1 - Low normal to mildly depressed left ventricular systolic function (EF 50-55%).     2 - Impaired LV relaxation, elevated LAP (grade 2 diastolic dysfunction).     3 - Wall motion abnormalities.     4 - Low normal right ventricular systolic function .     5 - Severe left atrial enlargement.     6 - Concentric hypertrophy.     7 - Pulmonary hypertension. The estimated PA systolic pressure is 41 mmHg.     8 - Aortic valve prosthesis, mean gradient = 11 mmHg.     9 - Moderate mitral regurgitation.     10 - Mild to moderate tricuspid regurgitation.     11 - Increased central venous pressure.   Assessment:       1. Light headed    2. Dizziness and giddiness    3. Acute on chronic diastolic HFpEF of 50-55% with anasarca    4. Chronic atrial fibrillation    5. Coronary artery disease, angina presence unspecified, unspecified vessel or lesion type, unspecified whether native or transplanted heart    6. S/P AVR    7. Pulmonary hypertension        Plan:       Patient has diuresed much better with torsemide. However believe at this time his quality of life is most important.   Additionally he was interested in hearing more about cardiomems, will ask our team what their thoughts are on this.  Patient is NYHA FC III, class c.   Recommend 2 gram sodium restriction and 1500cc fluid restriction.  Encourage physical activity with graded exercise program.  Requested patient to weigh themselves daily, and to notify us if their weight increases by more than 3 lbs in 1 day or 5 lbs in 1 week.  Try to cut torsemide to 40mg on Monday,Wednesday,Friday, and 20mg in Tuesday, Thursday, Saturday, Sunday.   Take potassium on the days he takes 40mg of  torsemide.  Repeat labs 1 week at [facility] that is near patient.   RTC 4-6 weeks with anyone, see how he is doing with reduced diuresis.

## 2018-06-27 ENCOUNTER — CLINICAL SUPPORT (OUTPATIENT)
Dept: AUDIOLOGY | Facility: CLINIC | Age: 83
End: 2018-06-27
Payer: MEDICARE

## 2018-06-27 DIAGNOSIS — H93.13 TINNITUS, BILATERAL: ICD-10-CM

## 2018-06-27 DIAGNOSIS — H90.3 SENSORINEURAL HEARING LOSS, BILATERAL: Primary | ICD-10-CM

## 2018-06-27 DIAGNOSIS — R42 DIZZINESS AND GIDDINESS: ICD-10-CM

## 2018-06-27 PROCEDURE — 92540 BASIC VESTIBULAR EVALUATION: CPT | Mod: S$GLB,,, | Performed by: AUDIOLOGIST-HEARING AID FITTER

## 2018-06-27 PROCEDURE — 92537 CALORIC VSTBLR TEST W/REC: CPT | Mod: S$GLB,,, | Performed by: AUDIOLOGIST-HEARING AID FITTER

## 2018-06-27 PROCEDURE — 92557 COMPREHENSIVE HEARING TEST: CPT | Mod: S$GLB,,, | Performed by: AUDIOLOGIST-HEARING AID FITTER

## 2018-06-27 PROCEDURE — 92567 TYMPANOMETRY: CPT | Mod: S$GLB,,, | Performed by: AUDIOLOGIST-HEARING AID FITTER

## 2018-06-27 NOTE — PROGRESS NOTES
Referring provider: LEXI Benedict . was seen 2018 for an audiological and vestibular evaluation.  Patient complains of imbalance and lightheadedness since his open heart surgery in . He reports feeling this while walking and symptoms resolve while laying down. He denies spinning sensations. He reported a history of hearing loss and constant tinnitus bilaterally. 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 Denied otalgia and aural fullness.    Audiology Report:  Results reveal a mild-to-profound sensorineural hearing loss 250-8000 Hz in each ear.   Speech Reception Thresholds were  55 dBHL for the right ear and 40 dBHL for the left ear.   Word recognition scores were fair for the right ear and fair for the left ear.   Tympanograms were Type A, normal for the right ear and Type A, normal for the left ear.      Videonystagmography Report (VNG):  Oculomotor function tests (sinusoidal tracking, saccade, optokinetic) were normal and symmetric.  Spontaneous test was absent for nystagmus.  Gaze test was absent for nystagmus.  Head-shake test was absent for after head-shake nystagmus.  Static Positional test was absent for nystagmus.  [place]-Hallpike Right was negative for BPPV. There was clinically insignificant 2 d/s RB positional nystagmus.  [place]-Hallpike Left was negative for BPPV.  Bi-thermal caloric test was Normal.  Fixation suppression following caloric irrigations was normal.  The following values were obtained:  Unilateral weakness (UW): 3% right ear  Directional preponderance (DP): 3% right beating  RC: 8 d/s   d/s  RW: 8 d/s  LW: 8 d/s    Summary: Normal VNG.    Recommendations:  1. ENT review. Follow-up with primary care/cardiology was suggested to the patient. 2  2. Hearing aids, binaural.  Patient is not interested.         ___________________________________________________  Mike Pandya, ANDRE-CORBY/Maria Elena Benjamin  Jyoti, [facility]  Audiologists

## 2018-07-02 ENCOUNTER — INFUSION (OUTPATIENT)
Dept: INFUSION THERAPY | Facility: HOSPITAL | Age: 83
End: 2018-07-02
Attending: NURSE PRACTITIONER
Payer: MEDICARE

## 2018-07-02 VITALS
TEMPERATURE: 97 F | DIASTOLIC BLOOD PRESSURE: 78 MMHG | RESPIRATION RATE: 18 BRPM | SYSTOLIC BLOOD PRESSURE: 133 MMHG | HEART RATE: 61 BPM

## 2018-07-02 DIAGNOSIS — D50.8 IRON DEFICIENCY ANEMIA SECONDARY TO INADEQUATE DIETARY IRON INTAKE: Primary | ICD-10-CM

## 2018-07-02 PROCEDURE — 96365 THER/PROPH/DIAG IV INF INIT: CPT

## 2018-07-02 PROCEDURE — 63600175 PHARM REV CODE 636 W HCPCS: Mod: JG | Performed by: NURSE PRACTITIONER

## 2018-07-02 PROCEDURE — 25000003 PHARM REV CODE 250: Performed by: NURSE PRACTITIONER

## 2018-07-02 RX ORDER — SODIUM CHLORIDE 0.9 % (FLUSH) 0.9 %
10 SYRINGE (ML) INJECTION
Status: DISCONTINUED | OUTPATIENT
Start: 2018-07-02 | End: 2018-07-02 | Stop reason: HOSPADM

## 2018-07-02 RX ADMIN — FERUMOXYTOL 510 MG: 510 INJECTION INTRAVENOUS at 12:07

## 2018-07-02 NOTE — PLAN OF CARE
Problem: Patient Care Overview  Goal: Plan of Care Review  Outcome: Ongoing (interventions implemented as appropriate)  States he is still very tired.
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
## 2018-07-03 ENCOUNTER — LAB VISIT (OUTPATIENT)
Dept: LAB | Facility: HOSPITAL | Age: 83
End: 2018-07-03
Attending: NURSE PRACTITIONER
Payer: MEDICARE

## 2018-07-03 DIAGNOSIS — I50.33 ACUTE ON CHRONIC DIASTOLIC HEART FAILURE: ICD-10-CM

## 2018-07-03 DIAGNOSIS — I50.9 ACUTE DECOMPENSATED HEART FAILURE: ICD-10-CM

## 2018-07-03 LAB
ANION GAP SERPL CALC-SCNC: 11 MMOL/L
BNP SERPL-MCNC: 1575 PG/ML
BUN SERPL-MCNC: 46 MG/DL
CALCIUM SERPL-MCNC: 9.5 MG/DL
CHLORIDE SERPL-SCNC: 102 MMOL/L
CO2 SERPL-SCNC: 25 MMOL/L
CREAT SERPL-MCNC: 1.8 MG/DL
EST. GFR  (AFRICAN AMERICAN): 38.5 ML/MIN/1.73 M^2
EST. GFR  (NON AFRICAN AMERICAN): 33.3 ML/MIN/1.73 M^2
GLUCOSE SERPL-MCNC: 109 MG/DL
POTASSIUM SERPL-SCNC: 4.1 MMOL/L
SODIUM SERPL-SCNC: 138 MMOL/L

## 2018-07-03 PROCEDURE — 83880 ASSAY OF NATRIURETIC PEPTIDE: CPT

## 2018-07-03 PROCEDURE — 36415 COLL VENOUS BLD VENIPUNCTURE: CPT | Mod: PO

## 2018-07-03 PROCEDURE — 80048 BASIC METABOLIC PNL TOTAL CA: CPT

## 2018-07-09 ENCOUNTER — TELEPHONE (OUTPATIENT)
Dept: TRANSPLANT | Facility: CLINIC | Age: 83
End: 2018-07-09

## 2018-07-09 NOTE — TELEPHONE ENCOUNTER
7/9/18 - Received message from patient stating he has had a weight gain.  Returned call to patient.  Patient stated he was 166 lb at home the day of his clinic visit, 6/26/18 and is now 176 lb at home.  I asked patient if he was taking his Torsemide as prescribed 40 mg Mon, Wed and Fri and 20 mg Tues, Thurs, Sat and Sun.  Patient stated he took 40 mg this morning and has only been taking Torsemide 40 mg Mon, Wed and Fri.  He stated he must have misunderstood that he was suppose to take 20 mg all other days.  Patient states he is a little more SOB when walking but denies any other symptoms.  Discussed/Reveiwed with LENNY Heard M.D.  VORB: LENNY Heard M.D./IRON Nice RN: Take Torsemide 40 mg tomorrow and Wednesday and notify our office of weight Wednesday for further instructions.  Instructed patient of above orders.  Patient repeated orders back to me and verbalized understanding to all instructions given.

## 2018-07-10 ENCOUNTER — APPOINTMENT (OUTPATIENT)
Dept: CARDIOLOGY | Facility: CLINIC | Age: 83
End: 2018-07-10
Attending: PHYSICIAN ASSISTANT
Payer: MEDICARE

## 2018-07-10 PROCEDURE — 93226 XTRNL ECG REC<48 HR SCAN A/R: CPT | Mod: PBBFAC,PO | Performed by: INTERNAL MEDICINE

## 2018-07-10 PROCEDURE — 93227 XTRNL ECG REC<48 HR R&I: CPT | Mod: S$PBB,,, | Performed by: INTERNAL MEDICINE

## 2018-07-11 ENCOUNTER — TELEPHONE (OUTPATIENT)
Dept: TRANSPLANT | Facility: CLINIC | Age: 83
End: 2018-07-11

## 2018-07-11 DIAGNOSIS — I50.22 CHRONIC SYSTOLIC CONGESTIVE HEART FAILURE: Primary | ICD-10-CM

## 2018-07-11 NOTE — TELEPHONE ENCOUNTER
" Called patient to phone assess. Pt reports that the did follow instructions on torsemide and took 40mg on Tuesday and this am and 20meq of potassium. Pt reports sob is the same, no worse or better an he has it "mainly when walking, and little activity. Pt also says he has the usual lightheadedness mainly when waking up in the morning when he gets out of bed, but it goes away. Denies sob at rest or when lying down. Pt says he has the usual swelling in his legs R legs usually worse and he wears a a support hose on it,a dn L leg has dressing that he gets at wound care. Pt states wt today is 172.7lbs. Instructed pt to continue torsemide 40mg Thursday and Friday then Sat return to Dr. Heard's previous instructions of torsemide 40mg Mon, Wed, Fri, and 20mg on Tues, Thurs, Sat, Sun and 20meq of potassium ont the days he takes 40mg of torsemide M,W,F. I asked pt to make note of it. Pt repeated instructions correctly. Labs  Monday at [facility].  "

## 2018-07-16 ENCOUNTER — LAB VISIT (OUTPATIENT)
Dept: LAB | Facility: HOSPITAL | Age: 83
End: 2018-07-16
Attending: INTERNAL MEDICINE
Payer: MEDICARE

## 2018-07-16 ENCOUNTER — OFFICE VISIT (OUTPATIENT)
Dept: CARDIOLOGY | Facility: CLINIC | Age: 83
End: 2018-07-16
Payer: MEDICARE

## 2018-07-16 VITALS
WEIGHT: 174.38 LBS | BODY MASS INDEX: 25.83 KG/M2 | HEART RATE: 67 BPM | HEIGHT: 69 IN | SYSTOLIC BLOOD PRESSURE: 130 MMHG | DIASTOLIC BLOOD PRESSURE: 70 MMHG

## 2018-07-16 DIAGNOSIS — E78.5 HYPERLIPIDEMIA, UNSPECIFIED HYPERLIPIDEMIA TYPE: Chronic | ICD-10-CM

## 2018-07-16 DIAGNOSIS — I27.20 PULMONARY HYPERTENSION: Chronic | ICD-10-CM

## 2018-07-16 DIAGNOSIS — I10 ESSENTIAL HYPERTENSION: Chronic | ICD-10-CM

## 2018-07-16 DIAGNOSIS — Z95.2 S/P AVR: ICD-10-CM

## 2018-07-16 DIAGNOSIS — I48.20 CHRONIC ATRIAL FIBRILLATION: Chronic | ICD-10-CM

## 2018-07-16 DIAGNOSIS — I50.22 CHRONIC SYSTOLIC CONGESTIVE HEART FAILURE: ICD-10-CM

## 2018-07-16 DIAGNOSIS — I50.9 CONGESTIVE HEART FAILURE, UNSPECIFIED HF CHRONICITY, UNSPECIFIED HEART FAILURE TYPE: Primary | ICD-10-CM

## 2018-07-16 DIAGNOSIS — I25.10 CORONARY ARTERY DISEASE, ANGINA PRESENCE UNSPECIFIED, UNSPECIFIED VESSEL OR LESION TYPE, UNSPECIFIED WHETHER NATIVE OR TRANSPLANTED HEART: Chronic | ICD-10-CM

## 2018-07-16 LAB
ANION GAP SERPL CALC-SCNC: 9 MMOL/L
BNP SERPL-MCNC: 1667 PG/ML
BUN SERPL-MCNC: 38 MG/DL
CALCIUM SERPL-MCNC: 9.7 MG/DL
CHLORIDE SERPL-SCNC: 103 MMOL/L
CO2 SERPL-SCNC: 28 MMOL/L
CREAT SERPL-MCNC: 1.6 MG/DL
EST. GFR  (AFRICAN AMERICAN): 44 ML/MIN/1.73 M^2
EST. GFR  (NON AFRICAN AMERICAN): 38 ML/MIN/1.73 M^2
GLUCOSE SERPL-MCNC: 85 MG/DL
POTASSIUM SERPL-SCNC: 4.1 MMOL/L
SODIUM SERPL-SCNC: 140 MMOL/L

## 2018-07-16 PROCEDURE — 36415 COLL VENOUS BLD VENIPUNCTURE: CPT | Mod: PO

## 2018-07-16 PROCEDURE — 99999 PR PBB SHADOW E&M-EST. PATIENT-LVL III: CPT | Mod: PBBFAC,,, | Performed by: NURSE PRACTITIONER

## 2018-07-16 PROCEDURE — 83880 ASSAY OF NATRIURETIC PEPTIDE: CPT

## 2018-07-16 PROCEDURE — 99214 OFFICE O/P EST MOD 30 MIN: CPT | Mod: S$GLB,,, | Performed by: NURSE PRACTITIONER

## 2018-07-16 PROCEDURE — 80048 BASIC METABOLIC PNL TOTAL CA: CPT | Mod: PO

## 2018-07-16 NOTE — PROGRESS NOTES
Subjective:   Patient ID:  Chung Meneses Sr. is a 86 y.o. male who presents for follow up of Congestive Heart Failure      HPI  Mr. Meneses presents to clinic today for follow up and management of CHF. His current conditions include CAD/severe AS s/p CABG x 1 and bioprosthetic AVR, permanent atrial fibrillation (on Eliquis), CHF, HTN, and hyperlipidemia , CKD. Patient with presumed diastolic heart failure, leading to multiple heart failure exacerbations after undergoing CABG and bioprosthetic AVR. Patient underwent CABG x 1 vessel plus AVR 12/17 additionally with HFpEF 50-55%, permanent AF on eliquis, CKD stage 3.    He presents today with no chest pain. Does get SOB with exertion and still complains of dizziness. Denies any near syncope or syncope. Has edema during the day in which he wears right compression stocking. Still doing wound care to left leg. Has no orthopnea. Sleeping on 1 pillow. Has occasional abdominal bloating. Bowels moving ok. Holter results pending. Weight 172lbs on 7/11/18 and 174 lbs today in clinic. Last admission with CHF was in March. Was switched to Torsemide as an OP. Has responded well to change in diuretic. Feels he has diuresed much better.     Has been following with Dr. Heard. Patient given instructions to take Torsemide 40mg daily on MWF and 20mg Tues, Thurs, Sat and Sunday. Has been taking Torsemide like this for approximately 1 week now. Takes 20 meq of KCL on MWF and no K+ supplement on the other day. BMP drawn today and is stable. BNP pending. Has no abnormal bleeding on qiuis. Has no CNS complaints to suggest TIA or CVA. Still undecided about cardiomems       Past Medical History:   Diagnosis Date    Aortic stenosis 8/2/2013    Atrial fibrillation 7/5/2013    CHF (congestive heart failure)     Coronary artery disease     Dizziness - light-headed     Hyperlipidemia     Hypertension     Syncope 1/26/2018       Past Surgical History:   Procedure Laterality Date    BACK  SURGERY  2003    CARDIAC VALVE SURGERY      CORONARY ARTERY BYPASS GRAFT      knee replacemnt bilateral  2001    SHOULDER SURGERY  2002       Social History   Substance Use Topics    Smoking status: Never Smoker    Smokeless tobacco: Never Used    Alcohol use No      Comment: HOLIDAY       Family History   Problem Relation Age of Onset    Hypertension Mother     Hypertension Father     Hypertension Sister     Hypertension Brother     Heart failure Brother     Heart disease Brother     Heart attack Brother        Current Outpatient Prescriptions   Medication Sig    apixaban (ELIQUIS) 2.5 mg Tab Take 1 tablet (2.5 mg total) by mouth 2 (two) times daily.    aspirin (ECOTRIN) 81 MG EC tablet Take 1 tablet (81 mg total) by mouth once daily.    fish oil-omega-3 fatty acids 300-1,000 mg capsule Take 1,200 mg by mouth once daily.    metoprolol succinate (TOPROL-XL) 25 MG 24 hr tablet Take 25 mg by mouth 2 (two) times daily.    multivitamin with folic acid 400 mcg Tab Take 1 tablet by mouth.    potassium chloride (K-TAB) 20 mEq Take 1 tablet (20 mEq total) by mouth every morning. Takes one tablet (20 meq) in the morning and half tablet (10 meq) in the evening (Patient taking differently: Take 20 mEq by mouth every morning. Takes one tablet (20 meq) in the morning)    ranitidine (ZANTAC) 75 MG tablet Take 75 mg by mouth nightly.     torsemide (DEMADEX) 20 MG Tab Take 20 mg by mouth once daily.    vitamin D 1000 units Tab Take 1,000 Units by mouth once daily.      No current facility-administered medications for this visit.      Current Outpatient Prescriptions on File Prior to Visit   Medication Sig    apixaban (ELIQUIS) 2.5 mg Tab Take 1 tablet (2.5 mg total) by mouth 2 (two) times daily.    aspirin (ECOTRIN) 81 MG EC tablet Take 1 tablet (81 mg total) by mouth once daily.    fish oil-omega-3 fatty acids 300-1,000 mg capsule Take 1,200 mg by mouth once daily.    metoprolol succinate (TOPROL-XL) 25  MG 24 hr tablet Take 25 mg by mouth 2 (two) times daily.    multivitamin with folic acid 400 mcg Tab Take 1 tablet by mouth.    potassium chloride (K-TAB) 20 mEq Take 1 tablet (20 mEq total) by mouth every morning. Takes one tablet (20 meq) in the morning and half tablet (10 meq) in the evening (Patient taking differently: Take 20 mEq by mouth every morning. Takes one tablet (20 meq) in the morning)    ranitidine (ZANTAC) 75 MG tablet Take 75 mg by mouth nightly.     torsemide (DEMADEX) 20 MG Tab Take 20 mg by mouth once daily.    vitamin D 1000 units Tab Take 1,000 Units by mouth once daily.     [DISCONTINUED] potassium chloride SA (K-DUR,KLOR-CON) 10 MEQ tablet TAKE 1 TABLET BY MOUTH EVERY EVENING     No current facility-administered medications on file prior to visit.        Review of Systems   Constitution: Negative for chills, decreased appetite, fever, weakness and malaise/fatigue.   HENT: Negative for congestion, hoarse voice and sore throat.    Eyes: Negative for blurred vision and discharge.   Cardiovascular: Positive for dyspnea on exertion and leg swelling. Negative for chest pain, claudication, cyanosis, irregular heartbeat, near-syncope, orthopnea, palpitations and paroxysmal nocturnal dyspnea.   Respiratory: Positive for shortness of breath. Negative for cough, hemoptysis, snoring, sputum production and wheezing.    Endocrine: Negative for cold intolerance and heat intolerance.   Hematologic/Lymphatic: Negative for bleeding problem. Does not bruise/bleed easily.   Skin: Negative for rash.   Musculoskeletal: Positive for arthritis and joint pain. Negative for back pain, joint swelling, muscle cramps, muscle weakness and myalgias.   Gastrointestinal: Positive for bloating. Negative for abdominal pain, constipation, diarrhea, heartburn, melena and nausea.   Genitourinary: Negative for hematuria.   Neurological: Positive for dizziness and light-headedness. Negative for focal weakness, headaches, loss  "of balance, numbness, paresthesias and seizures.   Psychiatric/Behavioral: Negative for memory loss. The patient does not have insomnia.    Allergic/Immunologic: Negative for hives.       Objective:   Physical Exam   Constitutional: He is oriented to person, place, and time. He appears well-developed and well-nourished. No distress.   HENT:   Head: Normocephalic and atraumatic.   Eyes: Pupils are equal, round, and reactive to light. Right eye exhibits no discharge. Left eye exhibits no discharge.   Neck: Neck supple. JVD present. No thyromegaly present.   +HJR     Cardiovascular: Normal rate, S1 normal, S2 normal and intact distal pulses.  An irregularly irregular rhythm present. PMI is not displaced.    Murmur heard.   Harsh midsystolic murmur is present  at the upper right sternal border radiating to the neck  Pulmonary/Chest: Effort normal and breath sounds normal. No respiratory distress. He has no rales.   CABG scar well-healed   Abdominal: Soft. He exhibits distension ( mild).   Musculoskeletal: He exhibits edema (+1 BLE ).   Neurological: He is alert and oriented to person, place, and time.   Skin: Skin is warm and dry. He is not diaphoretic. No erythema.   Legs with compression socks in place; left leg wrapped, receiving wound care   Psychiatric: He has a normal mood and affect. His behavior is normal. Thought content normal.   Nursing note and vitals reviewed.    Vitals:    07/16/18 1124   BP: 130/70   Pulse: 67   Weight: 79.1 kg (174 lb 6.1 oz)   Height: 5' 9" (1.753 m)     Lab Results   Component Value Date    CHOL 104 (L) 12/11/2017    CHOL 125 04/19/2017    CHOL 147 10/21/2016     Lab Results   Component Value Date    HDL 24 (L) 12/11/2017    HDL 35 (L) 04/19/2017    HDL 31 (L) 10/21/2016     Lab Results   Component Value Date    LDLCALC 57.0 (L) 12/11/2017    LDLCALC 77.8 04/19/2017    LDLCALC 97.6 10/21/2016     Lab Results   Component Value Date    TRIG 115 12/11/2017    TRIG 61 04/19/2017    TRIG 92 " 10/21/2016     Lab Results   Component Value Date    CHOLHDL 23.1 12/11/2017    CHOLHDL 28.0 04/19/2017    CHOLHDL 21.1 10/21/2016       Chemistry        Component Value Date/Time     07/16/2018 1106    K 4.1 07/16/2018 1106     07/16/2018 1106    CO2 28 07/16/2018 1106    BUN 38 (H) 07/16/2018 1106    CREATININE 1.6 (H) 07/16/2018 1106    GLU 85 07/16/2018 1106        Component Value Date/Time    CALCIUM 9.7 07/16/2018 1106    ALKPHOS 221 (H) 06/12/2018 1300    AST 31 06/12/2018 1300    ALT 18 06/12/2018 1300    BILITOT 1.9 (H) 06/12/2018 1300    ESTGFRAFRICA 44 (A) 07/16/2018 1106    EGFRNONAA 38 (A) 07/16/2018 1106          Lab Results   Component Value Date    TSH 3.780 12/11/2017     Lab Results   Component Value Date    INR 1.4 (H) 03/27/2018    INR 1.3 (H) 03/06/2018    INR 1.5 (H) 02/20/2018     Lab Results   Component Value Date    WBC 5.97 06/12/2018    HGB 11.2 (L) 06/12/2018    HCT 35.8 (L) 06/12/2018    MCV 82 06/12/2018     06/12/2018     BMP  Sodium   Date Value Ref Range Status   07/16/2018 140 136 - 145 mmol/L Final     Potassium   Date Value Ref Range Status   07/16/2018 4.1 3.5 - 5.1 mmol/L Final     Chloride   Date Value Ref Range Status   07/16/2018 103 95 - 110 mmol/L Final     CO2   Date Value Ref Range Status   07/16/2018 28 23 - 29 mmol/L Final     BUN, Bld   Date Value Ref Range Status   07/16/2018 38 (H) 8 - 23 mg/dL Final     Creatinine   Date Value Ref Range Status   07/16/2018 1.6 (H) 0.5 - 1.4 mg/dL Final     Calcium   Date Value Ref Range Status   07/16/2018 9.7 8.7 - 10.5 mg/dL Final     Anion Gap   Date Value Ref Range Status   07/16/2018 9 8 - 16 mmol/L Final     eGFR if    Date Value Ref Range Status   07/16/2018 44 (A) >60 mL/min/1.73 m^2 Final     eGFR if non    Date Value Ref Range Status   07/16/2018 38 (A) >60 mL/min/1.73 m^2 Final     Comment:     Calculation used to obtain the estimated glomerular filtration  rate (eGFR) is  the CKD-EPI equation.        Estimated Creatinine Clearance: 33.1 mL/min (A) (based on SCr of 1.6 mg/dL (H)).    Assessment:     1. Congestive heart failure, unspecified HF chronicity, unspecified heart failure type    2. Pulmonary hypertension    3. Coronary artery disease, angina presence unspecified, unspecified vessel or lesion type, unspecified whether native or transplanted heart    4. Chronic atrial fibrillation    5. Essential hypertension    6. Hyperlipidemia, unspecified hyperlipidemia type    7. S/P AVR    Still appears volume overload on exam. Has JVD and edema. Weight up 2 lbs over the last 4 days  Currently taking Torsemide 40mg daily on MWF with 20 me q of K+ and 20mg Tues, Thurs, Sat and Sunday.  BNP pending. BMP stable  Has no abnormal bleeding on Elqiuis  Has no CNS complaints to suggest TIA or CVA  Still undecided about cardiomems   Holter results pending  His BP is stable and is not orthostatic  I'm uncertain if his dizziness is related to vertigo as he reports a history of this    Plan:   I think Torsemide needs to be increased to 40mg daily with 20meq of K+ daily   I will await BNP results and discuss my findings with Dr. Heard to see if she agrees  Heart healthy diet  Limit fluid intake 50-60 oz   Daily weights and to notify clinic if weight increases by more than 3 lbs in 1 day or 5 lbs in 1 week.   Exercise routine as tolerated  Will need repeat BMP in 1 week

## 2018-07-17 ENCOUNTER — TELEPHONE (OUTPATIENT)
Dept: CARDIOLOGY | Facility: CLINIC | Age: 83
End: 2018-07-17

## 2018-07-17 DIAGNOSIS — I50.9 CONGESTIVE HEART FAILURE, UNSPECIFIED HF CHRONICITY, UNSPECIFIED HEART FAILURE TYPE: Primary | ICD-10-CM

## 2018-07-17 NOTE — TELEPHONE ENCOUNTER
BNP remains elevated at 1600. Torsemide needs to be increased to 40mg daily with 20meq of K+ daily. Repeat BMP and BNP in 1 week. Further recommendations to follow repeat labs

## 2018-07-27 ENCOUNTER — OFFICE VISIT (OUTPATIENT)
Dept: CARDIOLOGY | Facility: CLINIC | Age: 83
End: 2018-07-27
Payer: MEDICARE

## 2018-07-27 VITALS
BODY MASS INDEX: 25.5 KG/M2 | DIASTOLIC BLOOD PRESSURE: 74 MMHG | HEIGHT: 69 IN | SYSTOLIC BLOOD PRESSURE: 126 MMHG | HEART RATE: 72 BPM | WEIGHT: 172.19 LBS

## 2018-07-27 DIAGNOSIS — E78.5 HYPERLIPIDEMIA, UNSPECIFIED HYPERLIPIDEMIA TYPE: Chronic | ICD-10-CM

## 2018-07-27 DIAGNOSIS — I27.20 PULMONARY HYPERTENSION: Chronic | ICD-10-CM

## 2018-07-27 DIAGNOSIS — I50.33 ACUTE ON CHRONIC DIASTOLIC (CONGESTIVE) HEART FAILURE: ICD-10-CM

## 2018-07-27 DIAGNOSIS — I48.20 CHRONIC ATRIAL FIBRILLATION: Chronic | ICD-10-CM

## 2018-07-27 DIAGNOSIS — R42 DIZZINESS AND GIDDINESS: ICD-10-CM

## 2018-07-27 DIAGNOSIS — I10 ESSENTIAL HYPERTENSION: Chronic | ICD-10-CM

## 2018-07-27 DIAGNOSIS — R42 LIGHT HEADED: ICD-10-CM

## 2018-07-27 DIAGNOSIS — I35.0 NONRHEUMATIC AORTIC VALVE STENOSIS: ICD-10-CM

## 2018-07-27 DIAGNOSIS — I25.10 CORONARY ARTERY DISEASE, ANGINA PRESENCE UNSPECIFIED, UNSPECIFIED VESSEL OR LESION TYPE, UNSPECIFIED WHETHER NATIVE OR TRANSPLANTED HEART: Chronic | ICD-10-CM

## 2018-07-27 DIAGNOSIS — I50.9 CONGESTIVE HEART FAILURE, UNSPECIFIED HF CHRONICITY, UNSPECIFIED HEART FAILURE TYPE: Primary | ICD-10-CM

## 2018-07-27 DIAGNOSIS — Z95.2 S/P AVR: ICD-10-CM

## 2018-07-27 DIAGNOSIS — I50.33 ACUTE ON CHRONIC DIASTOLIC HEART FAILURE: ICD-10-CM

## 2018-07-27 PROCEDURE — 99999 PR PBB SHADOW E&M-EST. PATIENT-LVL III: CPT | Mod: PBBFAC,,, | Performed by: INTERNAL MEDICINE

## 2018-07-27 PROCEDURE — 99214 OFFICE O/P EST MOD 30 MIN: CPT | Mod: S$GLB,,, | Performed by: INTERNAL MEDICINE

## 2018-07-27 NOTE — PROGRESS NOTES
Subjective:   Patient ID:  Chung Meneses Sr. is a 86 y.o. male who presents for follow up of Congestive Heart Failure; Dizziness; and Leg Swelling      HPI  An 85 yo male with diastolic chf pulmonary htn s/p cabgx1 s/p avr is here for f/u he ahs seen chf DR RANGEL and JULIET HERNANDEZ HIS BNP IS STILL ELEVATED. HE IS STILL SHORT OF BREATH  YARDS . HIS WEIGHT IS STABLE . HE IS TAKING K SUPPLEMENT WITH DEMADEX 40 MG PO DAILY. NONE WHEN EH IS TAKING 20 MG PO. HE HAS DIZZINESS WHEN HE IS WALKING AND WITH BODY POSITION CHANGES. HE HAS NO CHANGE IN DIZZINESS WHEN HE WAS TAKING 40  MG DEMADEX DAILY. HAS NO ORTHOPNEA PND . HIS LEG SWELLING IS IMPROVING. HIS LEG ULCERS ARE BEING TAKEN CARE WITH WOUND CARE .   Past Medical History:   Diagnosis Date    Aortic stenosis 8/2/2013    Atrial fibrillation 7/5/2013    CHF (congestive heart failure)     Coronary artery disease     Dizziness - light-headed     Hyperlipidemia     Hypertension     Syncope 1/26/2018       Past Surgical History:   Procedure Laterality Date    BACK SURGERY  2003    CARDIAC VALVE SURGERY      CORONARY ARTERY BYPASS GRAFT      knee replacemnt bilateral  2001    SHOULDER SURGERY  2002       Social History   Substance Use Topics    Smoking status: Never Smoker    Smokeless tobacco: Never Used    Alcohol use No      Comment: HOLIDAY       Family History   Problem Relation Age of Onset    Hypertension Mother     Hypertension Father     Hypertension Sister     Hypertension Brother     Heart failure Brother     Heart disease Brother     Heart attack Brother        Current Outpatient Prescriptions   Medication Sig    apixaban (ELIQUIS) 2.5 mg Tab Take 1 tablet (2.5 mg total) by mouth 2 (two) times daily.    aspirin (ECOTRIN) 81 MG EC tablet Take 1 tablet (81 mg total) by mouth once daily.    fish oil-omega-3 fatty acids 300-1,000 mg capsule Take 1,200 mg by mouth once daily.    metoprolol succinate (TOPROL-XL) 25 MG 24 hr tablet  Take 25 mg by mouth 2 (two) times daily.    multivitamin with folic acid 400 mcg Tab Take 1 tablet by mouth.    potassium chloride (K-TAB) 20 mEq Take 1 tablet (20 mEq total) by mouth every morning. Takes one tablet (20 meq) in the morning and half tablet (10 meq) in the evening (Patient taking differently: Take 20 mEq by mouth every morning. Takes one tablet (20 meq) in the morning)    ranitidine (ZANTAC) 75 MG tablet Take 75 mg by mouth nightly.     torsemide (DEMADEX) 20 MG Tab Take 20 mg by mouth once daily.     vitamin D 1000 units Tab Take 1,000 Units by mouth once daily.      No current facility-administered medications for this visit.      Current Outpatient Prescriptions on File Prior to Visit   Medication Sig    apixaban (ELIQUIS) 2.5 mg Tab Take 1 tablet (2.5 mg total) by mouth 2 (two) times daily.    aspirin (ECOTRIN) 81 MG EC tablet Take 1 tablet (81 mg total) by mouth once daily.    fish oil-omega-3 fatty acids 300-1,000 mg capsule Take 1,200 mg by mouth once daily.    metoprolol succinate (TOPROL-XL) 25 MG 24 hr tablet Take 25 mg by mouth 2 (two) times daily.    multivitamin with folic acid 400 mcg Tab Take 1 tablet by mouth.    potassium chloride (K-TAB) 20 mEq Take 1 tablet (20 mEq total) by mouth every morning. Takes one tablet (20 meq) in the morning and half tablet (10 meq) in the evening (Patient taking differently: Take 20 mEq by mouth every morning. Takes one tablet (20 meq) in the morning)    ranitidine (ZANTAC) 75 MG tablet Take 75 mg by mouth nightly.     torsemide (DEMADEX) 20 MG Tab Take 20 mg by mouth once daily.     vitamin D 1000 units Tab Take 1,000 Units by mouth once daily.      No current facility-administered medications on file prior to visit.      Review of patient's allergies indicates:   Allergen Reactions    Sulfa (sulfonamide antibiotics) Hives       Review of Systems   Constitution: Negative for weakness and malaise/fatigue.   Eyes: Negative for blurred  vision.   Cardiovascular: Positive for dyspnea on exertion and leg swelling. Negative for chest pain, claudication, cyanosis, irregular heartbeat, near-syncope, orthopnea, palpitations and paroxysmal nocturnal dyspnea.   Respiratory: Positive for shortness of breath. Negative for cough and hemoptysis.    Hematologic/Lymphatic: Negative for bleeding problem. Does not bruise/bleed easily.   Skin: Negative for dry skin and itching.   Musculoskeletal: Negative for falls, muscle weakness and myalgias.   Gastrointestinal: Negative for abdominal pain, diarrhea, heartburn, hematemesis, hematochezia and melena.   Genitourinary: Negative for flank pain and hematuria.   Neurological: Positive for dizziness and light-headedness. Negative for focal weakness, headaches, numbness, paresthesias and seizures.   Psychiatric/Behavioral: Negative for altered mental status and memory loss. The patient is not nervous/anxious.    Allergic/Immunologic: Negative for hives.       Objective:   Physical Exam   Constitutional: He is oriented to person, place, and time. He appears well-developed and well-nourished. No distress.   HENT:   Head: Normocephalic and atraumatic.   Eyes: EOM are normal. Pupils are equal, round, and reactive to light. Right eye exhibits no discharge. Left eye exhibits no discharge.   Neck: Neck supple. JVD present. No thyromegaly present.   Cardiovascular: Normal rate and intact distal pulses.  An irregularly irregular rhythm present. Exam reveals no gallop and no friction rub.    Murmur heard.   Harsh midsystolic murmur is present with a grade of 1/6  at the upper right sternal border radiating to the neck  High-pitched blowing holosystolic murmur of grade 2/6 is also present at the apex. DISTAL PULSES NOT FELT WELL DUE TO STI=OCKINGS.   Pulses:       Carotid pulses are 2+ on the right side, and 2+ on the left side.       Radial pulses are 2+ on the right side, and 2+ on the left side.        Femoral pulses are 2+ on  "the right side, and 2+ on the left side.       Popliteal pulses are 2+ on the right side, and 2+ on the left side.   Pulmonary/Chest: Effort normal and breath sounds normal. No respiratory distress. He has no wheezes. He has no rales. He exhibits no tenderness.   SCAR CABG WELL HEALED.   Abdominal: Soft. Bowel sounds are normal. He exhibits no distension. There is no tenderness.   PULSATILE LIVER  POSITIVE HJR    Musculoskeletal: Normal range of motion. He exhibits edema (TRACE BILATERALL LEGS WRAPPED. ).   Neurological: He is alert and oriented to person, place, and time. No cranial nerve deficit.   Skin: Skin is warm and dry. No rash noted. He is not diaphoretic. No erythema.   Psychiatric: He has a normal mood and affect. His behavior is normal.   Nursing note and vitals reviewed.    Vitals:    07/27/18 1004   BP: 126/74   BP Location: Right arm   Pulse: 72   Weight: 78.1 kg (172 lb 2.9 oz)   Height: 5' 9" (1.753 m)     Lab Results   Component Value Date    CHOL 104 (L) 12/11/2017    CHOL 125 04/19/2017    CHOL 147 10/21/2016     Lab Results   Component Value Date    HDL 24 (L) 12/11/2017    HDL 35 (L) 04/19/2017    HDL 31 (L) 10/21/2016     Lab Results   Component Value Date    LDLCALC 57.0 (L) 12/11/2017    LDLCALC 77.8 04/19/2017    LDLCALC 97.6 10/21/2016     Lab Results   Component Value Date    TRIG 115 12/11/2017    TRIG 61 04/19/2017    TRIG 92 10/21/2016     Lab Results   Component Value Date    CHOLHDL 23.1 12/11/2017    CHOLHDL 28.0 04/19/2017    CHOLHDL 21.1 10/21/2016       Chemistry        Component Value Date/Time     07/16/2018 1106    K 4.1 07/16/2018 1106     07/16/2018 1106    CO2 28 07/16/2018 1106    BUN 38 (H) 07/16/2018 1106    CREATININE 1.6 (H) 07/16/2018 1106    GLU 85 07/16/2018 1106        Component Value Date/Time    CALCIUM 9.7 07/16/2018 1106    ALKPHOS 221 (H) 06/12/2018 1300    AST 31 06/12/2018 1300    ALT 18 06/12/2018 1300    BILITOT 1.9 (H) 06/12/2018 1300    " ESTGFRAFRICA 44 (A) 07/16/2018 1106    EGFRNONAA 38 (A) 07/16/2018 1106          Lab Results   Component Value Date    TSH 3.780 12/11/2017     Lab Results   Component Value Date    INR 1.4 (H) 03/27/2018    INR 1.3 (H) 03/06/2018    INR 1.5 (H) 02/20/2018     Lab Results   Component Value Date    WBC 5.97 06/12/2018    HGB 11.2 (L) 06/12/2018    HCT 35.8 (L) 06/12/2018    MCV 82 06/12/2018     06/12/2018     BMP  Sodium   Date Value Ref Range Status   07/16/2018 140 136 - 145 mmol/L Final     Potassium   Date Value Ref Range Status   07/16/2018 4.1 3.5 - 5.1 mmol/L Final     Chloride   Date Value Ref Range Status   07/16/2018 103 95 - 110 mmol/L Final     CO2   Date Value Ref Range Status   07/16/2018 28 23 - 29 mmol/L Final     BUN, Bld   Date Value Ref Range Status   07/16/2018 38 (H) 8 - 23 mg/dL Final     Creatinine   Date Value Ref Range Status   07/16/2018 1.6 (H) 0.5 - 1.4 mg/dL Final     Calcium   Date Value Ref Range Status   07/16/2018 9.7 8.7 - 10.5 mg/dL Final     Anion Gap   Date Value Ref Range Status   07/16/2018 9 8 - 16 mmol/L Final     eGFR if    Date Value Ref Range Status   07/16/2018 44 (A) >60 mL/min/1.73 m^2 Final     eGFR if non    Date Value Ref Range Status   07/16/2018 38 (A) >60 mL/min/1.73 m^2 Final     Comment:     Calculation used to obtain the estimated glomerular filtration  rate (eGFR) is the CKD-EPI equation.        CrCl cannot be calculated (Patient's most recent lab result is older than the maximum 7 days allowed.).    Assessment:     1. Congestive heart failure, unspecified HF chronicity, unspecified heart failure type    2. Essential hypertension    3. Hyperlipidemia, unspecified hyperlipidemia type    4. Chronic atrial fibrillation    5. Pulmonary hypertension    6. Coronary artery disease, angina presence unspecified, unspecified vessel or lesion type, unspecified whether native or transplanted heart    7. Dizziness and giddiness    8.  Light headed    9. Nonrheumatic aortic valve stenosis    10. S/P AVR    11. Acute on chronic diastolic heart failure    12. Acute on chronic diastolic HFpEF of 50-55% with anasarca      STABLE CLINICALLY HE WILL BENEFIT FROM INCREASING DEMADEX 40 MG PO DAILY AND K SUPPLEMENT DAILY WILL F/U IN 2 WEEKS WITH CHF CLINIC WITH BMP BNP.  Plan:   AS ABOVE F/U IN 2 WEEKS WITH BNP BMP IN CHF .

## 2018-07-30 ENCOUNTER — TELEPHONE (OUTPATIENT)
Dept: CARDIOLOGY | Facility: CLINIC | Age: 83
End: 2018-07-30

## 2018-07-30 RX ORDER — POTASSIUM CHLORIDE 20 MEQ/1
20 TABLET, EXTENDED RELEASE ORAL DAILY
COMMUNITY
End: 2018-09-06 | Stop reason: SDUPTHER

## 2018-07-30 NOTE — TELEPHONE ENCOUNTER
Received call from patient requesting clarification of how he's suppose to take Potassium  AVS with multiple directions clarified 20 meq daily

## 2018-08-13 ENCOUNTER — TELEPHONE (OUTPATIENT)
Dept: CARDIOLOGY | Facility: CLINIC | Age: 83
End: 2018-08-13

## 2018-08-13 ENCOUNTER — LAB VISIT (OUTPATIENT)
Dept: LAB | Facility: HOSPITAL | Age: 83
End: 2018-08-13
Attending: INTERNAL MEDICINE
Payer: MEDICARE

## 2018-08-13 ENCOUNTER — OFFICE VISIT (OUTPATIENT)
Dept: CARDIOLOGY | Facility: CLINIC | Age: 83
End: 2018-08-13
Payer: MEDICARE

## 2018-08-13 VITALS
WEIGHT: 175.25 LBS | HEIGHT: 69 IN | SYSTOLIC BLOOD PRESSURE: 118 MMHG | DIASTOLIC BLOOD PRESSURE: 62 MMHG | BODY MASS INDEX: 25.96 KG/M2 | HEART RATE: 63 BPM

## 2018-08-13 DIAGNOSIS — I50.9 CONGESTIVE HEART FAILURE, UNSPECIFIED HF CHRONICITY, UNSPECIFIED HEART FAILURE TYPE: ICD-10-CM

## 2018-08-13 DIAGNOSIS — I50.32 CHRONIC DIASTOLIC CONGESTIVE HEART FAILURE: Primary | ICD-10-CM

## 2018-08-13 DIAGNOSIS — I48.20 CHRONIC ATRIAL FIBRILLATION: Chronic | ICD-10-CM

## 2018-08-13 DIAGNOSIS — E78.5 HYPERLIPIDEMIA, UNSPECIFIED HYPERLIPIDEMIA TYPE: Chronic | ICD-10-CM

## 2018-08-13 DIAGNOSIS — I25.10 CORONARY ARTERY DISEASE, ANGINA PRESENCE UNSPECIFIED, UNSPECIFIED VESSEL OR LESION TYPE, UNSPECIFIED WHETHER NATIVE OR TRANSPLANTED HEART: Chronic | ICD-10-CM

## 2018-08-13 DIAGNOSIS — Z95.2 S/P AVR: ICD-10-CM

## 2018-08-13 DIAGNOSIS — I27.20 PULMONARY HYPERTENSION: Chronic | ICD-10-CM

## 2018-08-13 DIAGNOSIS — I10 ESSENTIAL HYPERTENSION: Chronic | ICD-10-CM

## 2018-08-13 LAB
ANION GAP SERPL CALC-SCNC: 11 MMOL/L
BNP SERPL-MCNC: 1155 PG/ML
BUN SERPL-MCNC: 59 MG/DL
CALCIUM SERPL-MCNC: 9.4 MG/DL
CHLORIDE SERPL-SCNC: 102 MMOL/L
CO2 SERPL-SCNC: 26 MMOL/L
CREAT SERPL-MCNC: 2 MG/DL
EST. GFR  (AFRICAN AMERICAN): 33.9 ML/MIN/1.73 M^2
EST. GFR  (NON AFRICAN AMERICAN): 29.3 ML/MIN/1.73 M^2
GLUCOSE SERPL-MCNC: 108 MG/DL
POTASSIUM SERPL-SCNC: 3.9 MMOL/L
SODIUM SERPL-SCNC: 139 MMOL/L

## 2018-08-13 PROCEDURE — 99999 PR PBB SHADOW E&M-EST. PATIENT-LVL III: CPT | Mod: PBBFAC,,, | Performed by: NURSE PRACTITIONER

## 2018-08-13 PROCEDURE — 36415 COLL VENOUS BLD VENIPUNCTURE: CPT | Mod: PO

## 2018-08-13 PROCEDURE — 80048 BASIC METABOLIC PNL TOTAL CA: CPT

## 2018-08-13 PROCEDURE — 83880 ASSAY OF NATRIURETIC PEPTIDE: CPT

## 2018-08-13 PROCEDURE — 99214 OFFICE O/P EST MOD 30 MIN: CPT | Mod: S$GLB,,, | Performed by: NURSE PRACTITIONER

## 2018-08-13 NOTE — TELEPHONE ENCOUNTER
----- Message from GARRETT Martinez sent at 8/13/2018  3:19 PM CDT -----  Please let patient know that his lab results are still pending and that I will reach out to him as soon as I review them     Thanks

## 2018-08-13 NOTE — PROGRESS NOTES
Subjective:   Patient ID:  Chung Meneses Sr. is a 86 y.o. male who presents for follow up of Congestive Heart Failure      HPI     Patient presents to clinic for follow up and management of diastolic HF. His current conditions include CAD/severe AS s/p CABG x 1 and bioprosthetic AVR in Nov 2017, permanent atrial fibrillation (on Eliquis), CHF, HTN, and hyperlipidemia, CKD.   Torsemide increased to 40mg daily by Dr. Espinoza at last visit for CHF symptoms. He was also instructed to take K supplement daily. Edema improved. SOB has improved, but does complain of feeling lightheaded. This is not new. He has been feeling this way since his valve replacement and CABG.  Has been told that he has vertigo. Has no abnormal bleeding on Elqiuis. Has no CNS complaints to suggest TIA or CVA. Still doing wound care. BNP and BMP pending . Has had successful cardioversion in the past, but still remained fatigue and dizzy. Has been seen by Dr. Denton and was on Tikosyn at one time, but converted back to A-fib. Has also seen ENT and was told that his dizziness was not from ENT issue.       Past Medical History:   Diagnosis Date    Aortic stenosis 8/2/2013    Atrial fibrillation 7/5/2013    CHF (congestive heart failure)     Coronary artery disease     Dizziness - light-headed     Hyperlipidemia     Hypertension     Pulmonary hypertension 11/30/2017    S/P AVR 12/1/2017    Syncope 1/26/2018       Past Surgical History:   Procedure Laterality Date    BACK SURGERY  2003    CARDIAC VALVE SURGERY      CORONARY ARTERY BYPASS GRAFT      knee replacemnt bilateral  2001    SHOULDER SURGERY  2002       Social History     Tobacco Use    Smoking status: Never Smoker    Smokeless tobacco: Never Used   Substance Use Topics    Alcohol use: No     Comment: HOLIDAY    Drug use: No       Family History   Problem Relation Age of Onset    Hypertension Mother     Hypertension Father     Hypertension Sister     Hypertension Brother      Heart failure Brother     Heart disease Brother     Heart attack Brother        Current Outpatient Medications   Medication Sig    apixaban (ELIQUIS) 2.5 mg Tab Take 1 tablet (2.5 mg total) by mouth 2 (two) times daily.    aspirin (ECOTRIN) 81 MG EC tablet Take 1 tablet (81 mg total) by mouth once daily.    fish oil-omega-3 fatty acids 300-1,000 mg capsule Take 1,200 mg by mouth once daily.    metoprolol succinate (TOPROL-XL) 25 MG 24 hr tablet Take 25 mg by mouth 2 (two) times daily.    multivitamin with folic acid 400 mcg Tab Take 1 tablet by mouth.    potassium chloride SA (K-DUR,KLOR-CON) 20 MEQ tablet Take 20 mEq by mouth once daily. Clarified 7/27 and 30    ranitidine (ZANTAC) 75 MG tablet Take 75 mg by mouth nightly.     torsemide (DEMADEX) 20 MG Tab Take 20 mg by mouth once daily.     vitamin D 1000 units Tab Take 1,000 Units by mouth once daily.      No current facility-administered medications for this visit.      Current Outpatient Medications on File Prior to Visit   Medication Sig    apixaban (ELIQUIS) 2.5 mg Tab Take 1 tablet (2.5 mg total) by mouth 2 (two) times daily.    aspirin (ECOTRIN) 81 MG EC tablet Take 1 tablet (81 mg total) by mouth once daily.    fish oil-omega-3 fatty acids 300-1,000 mg capsule Take 1,200 mg by mouth once daily.    metoprolol succinate (TOPROL-XL) 25 MG 24 hr tablet Take 25 mg by mouth 2 (two) times daily.    multivitamin with folic acid 400 mcg Tab Take 1 tablet by mouth.    potassium chloride SA (K-DUR,KLOR-CON) 20 MEQ tablet Take 20 mEq by mouth once daily. Clarified 7/27 and 30    ranitidine (ZANTAC) 75 MG tablet Take 75 mg by mouth nightly.     torsemide (DEMADEX) 20 MG Tab Take 20 mg by mouth once daily.     vitamin D 1000 units Tab Take 1,000 Units by mouth once daily.      No current facility-administered medications on file prior to visit.        Review of Systems   Constitution: Negative for diaphoresis, weakness, malaise/fatigue, weight  gain and weight loss.   HENT: Negative for congestion and nosebleeds.    Cardiovascular: Negative for chest pain, claudication, cyanosis, dyspnea on exertion, irregular heartbeat, leg swelling, near-syncope, orthopnea, palpitations, paroxysmal nocturnal dyspnea and syncope.   Respiratory: Positive for shortness of breath (improving ). Negative for cough, hemoptysis, sleep disturbances due to breathing, snoring, sputum production and wheezing.    Hematologic/Lymphatic: Negative for bleeding problem. Does not bruise/bleed easily.   Skin: Negative for rash.   Musculoskeletal: Positive for arthritis and joint pain. Negative for back pain, falls, muscle cramps and muscle weakness.   Gastrointestinal: Negative for abdominal pain, constipation, diarrhea, heartburn, hematemesis, hematochezia, melena and nausea.   Genitourinary: Negative for dysuria, hematuria and nocturia.   Neurological: Positive for dizziness and light-headedness. Negative for excessive daytime sleepiness, headaches, loss of balance, numbness and vertigo.       Objective:   Physical Exam   Constitutional: He is oriented to person, place, and time. He appears well-developed and well-nourished.   Neck: Neck supple. JVD present.   Cardiovascular: Normal rate. An irregularly irregular rhythm present. Exam reveals decreased pulses. Exam reveals no friction rub.   Murmur heard.  High-pitched blowing holosystolic murmur is present at the apex.  Pulmonary/Chest: Effort normal and breath sounds normal. No respiratory distress. He has no wheezes. He has no rales.   Sternal scar    Abdominal: Soft. Bowel sounds are normal. He exhibits no distension.   Musculoskeletal: He exhibits no edema or tenderness.   Neurological: He is alert and oriented to person, place, and time.   Skin: Skin is warm and dry. No rash noted.   BLE trace edema      Psychiatric: He has a normal mood and affect. His behavior is normal.   Nursing note and vitals reviewed.    Vitals:    08/13/18  "0911   BP: 106/64   Pulse: 63   Weight: 79.5 kg (175 lb 4.3 oz)   Height: 5' 9" (1.753 m)     Lab Results   Component Value Date    CHOL 104 (L) 12/11/2017    CHOL 125 04/19/2017    CHOL 147 10/21/2016     Lab Results   Component Value Date    HDL 24 (L) 12/11/2017    HDL 35 (L) 04/19/2017    HDL 31 (L) 10/21/2016     Lab Results   Component Value Date    LDLCALC 57.0 (L) 12/11/2017    LDLCALC 77.8 04/19/2017    LDLCALC 97.6 10/21/2016     Lab Results   Component Value Date    TRIG 115 12/11/2017    TRIG 61 04/19/2017    TRIG 92 10/21/2016     Lab Results   Component Value Date    CHOLHDL 23.1 12/11/2017    CHOLHDL 28.0 04/19/2017    CHOLHDL 21.1 10/21/2016       Chemistry        Component Value Date/Time     07/16/2018 1106    K 4.1 07/16/2018 1106     07/16/2018 1106    CO2 28 07/16/2018 1106    BUN 38 (H) 07/16/2018 1106    CREATININE 1.6 (H) 07/16/2018 1106    GLU 85 07/16/2018 1106        Component Value Date/Time    CALCIUM 9.7 07/16/2018 1106    ALKPHOS 221 (H) 06/12/2018 1300    AST 31 06/12/2018 1300    ALT 18 06/12/2018 1300    BILITOT 1.9 (H) 06/12/2018 1300    ESTGFRAFRICA 44 (A) 07/16/2018 1106    EGFRNONAA 38 (A) 07/16/2018 1106          Lab Results   Component Value Date    TSH 3.780 12/11/2017     Lab Results   Component Value Date    INR 1.4 (H) 03/27/2018    INR 1.3 (H) 03/06/2018    INR 1.5 (H) 02/20/2018     Lab Results   Component Value Date    WBC 5.97 06/12/2018    HGB 11.2 (L) 06/12/2018    HCT 35.8 (L) 06/12/2018    MCV 82 06/12/2018     06/12/2018     BMP  Sodium   Date Value Ref Range Status   07/16/2018 140 136 - 145 mmol/L Final     Potassium   Date Value Ref Range Status   07/16/2018 4.1 3.5 - 5.1 mmol/L Final     Chloride   Date Value Ref Range Status   07/16/2018 103 95 - 110 mmol/L Final     CO2   Date Value Ref Range Status   07/16/2018 28 23 - 29 mmol/L Final     BUN, Bld   Date Value Ref Range Status   07/16/2018 38 (H) 8 - 23 mg/dL Final     Creatinine   Date " Value Ref Range Status   07/16/2018 1.6 (H) 0.5 - 1.4 mg/dL Final     Calcium   Date Value Ref Range Status   07/16/2018 9.7 8.7 - 10.5 mg/dL Final     Anion Gap   Date Value Ref Range Status   07/16/2018 9 8 - 16 mmol/L Final     eGFR if    Date Value Ref Range Status   07/16/2018 44 (A) >60 mL/min/1.73 m^2 Final     eGFR if non    Date Value Ref Range Status   07/16/2018 38 (A) >60 mL/min/1.73 m^2 Final     Comment:     Calculation used to obtain the estimated glomerular filtration  rate (eGFR) is the CKD-EPI equation.        CrCl cannot be calculated (Patient's most recent lab result is older than the maximum 7 days allowed.).    Assessment:     1. Chronic diastolic congestive heart failure    2. Pulmonary hypertension    3. Coronary artery disease, angina presence unspecified, unspecified vessel or lesion type, unspecified whether native or transplanted heart    4. Chronic atrial fibrillation    5. Essential hypertension    6. Hyperlipidemia, unspecified hyperlipidemia type    7. S/P AVR    Still having dizziness. Was told that he had vertigo years ago, but feels that Antivert didn't help him  He is not orthostatic today in clinic. May be caused from his A-fib  BNP pending. BMP stable  Has no abnormal bleeding on Elqiuis  Has no CNS complaints to suggest TIA or CVA    Plan:   Will call with BMP and BNP results and make necessary changes to medical management   Heart healthy diet  Limit fluid intake 50-60 oz   Daily weights and to notify clinic if weight increases by more than 3 lbs in 1 day or 5 lbs in 1 week.   Exercise routine as tolerated  RTC likely in 2 months or sooner if needed

## 2018-08-14 ENCOUNTER — TELEPHONE (OUTPATIENT)
Dept: CARDIOLOGY | Facility: CLINIC | Age: 83
End: 2018-08-14

## 2018-08-14 DIAGNOSIS — I50.32 CHRONIC DIASTOLIC CONGESTIVE HEART FAILURE: Primary | ICD-10-CM

## 2018-08-14 NOTE — TELEPHONE ENCOUNTER
Please contact patient and inform him that Dr. Espinoza and I have reviewed his lab results and we recommend that he continue his current dose of Torsemide at 40mg daily for 1 more week and repeat a BMP and BNP in 1 week.. Will give further instructions at that time

## 2018-08-14 NOTE — TELEPHONE ENCOUNTER
The patient has been notified of this information and all questions answered.      Pt is scheduled for repeat labs in 1 week appt letter mailed to pt.

## 2018-08-22 ENCOUNTER — LAB VISIT (OUTPATIENT)
Dept: LAB | Facility: HOSPITAL | Age: 83
End: 2018-08-22
Attending: NURSE PRACTITIONER
Payer: MEDICARE

## 2018-08-22 DIAGNOSIS — I50.32 CHRONIC DIASTOLIC CONGESTIVE HEART FAILURE: ICD-10-CM

## 2018-08-22 LAB
ANION GAP SERPL CALC-SCNC: 10 MMOL/L
BNP SERPL-MCNC: 1395 PG/ML
BUN SERPL-MCNC: 54 MG/DL
CALCIUM SERPL-MCNC: 9.7 MG/DL
CHLORIDE SERPL-SCNC: 103 MMOL/L
CO2 SERPL-SCNC: 26 MMOL/L
CREAT SERPL-MCNC: 2.1 MG/DL
EST. GFR  (AFRICAN AMERICAN): 32 ML/MIN/1.73 M^2
EST. GFR  (NON AFRICAN AMERICAN): 27.7 ML/MIN/1.73 M^2
GLUCOSE SERPL-MCNC: 131 MG/DL
POTASSIUM SERPL-SCNC: 4.1 MMOL/L
SODIUM SERPL-SCNC: 139 MMOL/L

## 2018-08-22 PROCEDURE — 36415 COLL VENOUS BLD VENIPUNCTURE: CPT | Mod: PO

## 2018-08-22 PROCEDURE — 80048 BASIC METABOLIC PNL TOTAL CA: CPT

## 2018-08-22 PROCEDURE — 83880 ASSAY OF NATRIURETIC PEPTIDE: CPT

## 2018-08-23 ENCOUNTER — TELEPHONE (OUTPATIENT)
Dept: CARDIOLOGY | Facility: HOSPITAL | Age: 83
End: 2018-08-23

## 2018-08-23 NOTE — TELEPHONE ENCOUNTER
Labs stable, but BNP remains elevated. Please have him continue current dose of Torsemide 40mg daily  and make sure he is limiting his sodium and fluid intake

## 2018-09-06 RX ORDER — POTASSIUM CHLORIDE 20 MEQ/1
TABLET, EXTENDED RELEASE ORAL
Qty: 30 TABLET | Refills: 3 | Status: SHIPPED | OUTPATIENT
Start: 2018-09-06

## 2018-09-18 ENCOUNTER — TELEPHONE (OUTPATIENT)
Dept: CARDIOLOGY | Facility: CLINIC | Age: 83
End: 2018-09-18

## 2018-09-18 DIAGNOSIS — R94.31 ABNORMAL HOLTER EXAM: Primary | ICD-10-CM

## 2018-09-18 NOTE — TELEPHONE ENCOUNTER
Please phone patient. Holter monitor reviewed, showed persistent afib and PVC's. One brief pause was also noted.     Would continue current dose of Toprol and arrange EP follow-up    Thanks

## 2018-09-19 ENCOUNTER — TELEPHONE (OUTPATIENT)
Dept: CARDIOLOGY | Facility: CLINIC | Age: 83
End: 2018-09-19

## 2018-09-19 NOTE — TELEPHONE ENCOUNTER
Spoke with pt notified pt holter results pt voiced understanding.    I have scheduled pt to see Dr. Tran 10/10/18 appt letter mailed to pt.

## 2018-09-19 NOTE — TELEPHONE ENCOUNTER
----- Message from Laura Bowens PA-C sent at 9/19/2018  7:46 AM CDT -----  Please try to reach patient again regarding Holter results    Thanks

## 2018-10-10 ENCOUNTER — LAB VISIT (OUTPATIENT)
Dept: LAB | Facility: HOSPITAL | Age: 83
End: 2018-10-10
Attending: NURSE PRACTITIONER
Payer: MEDICARE

## 2018-10-10 ENCOUNTER — CLINICAL SUPPORT (OUTPATIENT)
Dept: CARDIOLOGY | Facility: CLINIC | Age: 83
End: 2018-10-10
Payer: MEDICARE

## 2018-10-10 ENCOUNTER — INITIAL CONSULT (OUTPATIENT)
Dept: CARDIOLOGY | Facility: CLINIC | Age: 83
End: 2018-10-10
Payer: MEDICARE

## 2018-10-10 ENCOUNTER — PROCEDURE VISIT (OUTPATIENT)
Dept: PULMONOLOGY | Facility: CLINIC | Age: 83
End: 2018-10-10
Payer: MEDICARE

## 2018-10-10 VITALS
WEIGHT: 176 LBS | BODY MASS INDEX: 26.07 KG/M2 | HEART RATE: 72 BPM | DIASTOLIC BLOOD PRESSURE: 78 MMHG | SYSTOLIC BLOOD PRESSURE: 126 MMHG | HEIGHT: 69 IN

## 2018-10-10 DIAGNOSIS — R06.09 DYSPNEA ON EXERTION: ICD-10-CM

## 2018-10-10 DIAGNOSIS — I48.21 PERMANENT ATRIAL FIBRILLATION: Primary | ICD-10-CM

## 2018-10-10 DIAGNOSIS — Z95.2 S/P AVR: ICD-10-CM

## 2018-10-10 DIAGNOSIS — E78.5 HYPERLIPIDEMIA, UNSPECIFIED HYPERLIPIDEMIA TYPE: Chronic | ICD-10-CM

## 2018-10-10 DIAGNOSIS — I25.10 CORONARY ARTERY DISEASE, ANGINA PRESENCE UNSPECIFIED, UNSPECIFIED VESSEL OR LESION TYPE, UNSPECIFIED WHETHER NATIVE OR TRANSPLANTED HEART: Chronic | ICD-10-CM

## 2018-10-10 DIAGNOSIS — I27.20 PULMONARY HYPERTENSION: Chronic | ICD-10-CM

## 2018-10-10 DIAGNOSIS — I10 ESSENTIAL HYPERTENSION: Chronic | ICD-10-CM

## 2018-10-10 DIAGNOSIS — I50.32 CHRONIC DIASTOLIC CONGESTIVE HEART FAILURE: ICD-10-CM

## 2018-10-10 DIAGNOSIS — I48.19 PERSISTENT ATRIAL FIBRILLATION: ICD-10-CM

## 2018-10-10 DIAGNOSIS — I35.0 NONRHEUMATIC AORTIC VALVE STENOSIS: ICD-10-CM

## 2018-10-10 DIAGNOSIS — R42 LIGHT HEADED: ICD-10-CM

## 2018-10-10 LAB
ANION GAP SERPL CALC-SCNC: 12 MMOL/L
BUN SERPL-MCNC: 47 MG/DL
CALCIUM SERPL-MCNC: 9.4 MG/DL
CHLORIDE SERPL-SCNC: 102 MMOL/L
CO2 SERPL-SCNC: 25 MMOL/L
CREAT SERPL-MCNC: 2 MG/DL
EST. GFR  (AFRICAN AMERICAN): 34 ML/MIN/1.73 M^2
EST. GFR  (NON AFRICAN AMERICAN): 29 ML/MIN/1.73 M^2
GLUCOSE SERPL-MCNC: 84 MG/DL
POTASSIUM SERPL-SCNC: 4.6 MMOL/L
SODIUM SERPL-SCNC: 139 MMOL/L

## 2018-10-10 PROCEDURE — 94729 DIFFUSING CAPACITY: CPT | Mod: PBBFAC,PO

## 2018-10-10 PROCEDURE — 80048 BASIC METABOLIC PNL TOTAL CA: CPT | Mod: PO

## 2018-10-10 PROCEDURE — 93010 ELECTROCARDIOGRAM REPORT: CPT | Mod: S$PBB,,, | Performed by: INTERNAL MEDICINE

## 2018-10-10 PROCEDURE — 94060 EVALUATION OF WHEEZING: CPT | Mod: 26,S$PBB,, | Performed by: INTERNAL MEDICINE

## 2018-10-10 PROCEDURE — 1101F PT FALLS ASSESS-DOCD LE1/YR: CPT | Mod: CPTII,,, | Performed by: INTERNAL MEDICINE

## 2018-10-10 PROCEDURE — 94060 EVALUATION OF WHEEZING: CPT | Mod: PBBFAC,PO

## 2018-10-10 PROCEDURE — 99999 PR PBB SHADOW E&M-EST. PATIENT-LVL II: CPT | Mod: PBBFAC,,, | Performed by: INTERNAL MEDICINE

## 2018-10-10 PROCEDURE — 36415 COLL VENOUS BLD VENIPUNCTURE: CPT | Mod: PO

## 2018-10-10 PROCEDURE — 94729 DIFFUSING CAPACITY: CPT | Mod: 26,S$PBB,, | Performed by: INTERNAL MEDICINE

## 2018-10-10 PROCEDURE — 83880 ASSAY OF NATRIURETIC PEPTIDE: CPT

## 2018-10-10 PROCEDURE — 93005 ELECTROCARDIOGRAM TRACING: CPT | Mod: PBBFAC,PO | Performed by: INTERNAL MEDICINE

## 2018-10-10 PROCEDURE — 99214 OFFICE O/P EST MOD 30 MIN: CPT | Mod: S$PBB,,, | Performed by: INTERNAL MEDICINE

## 2018-10-10 PROCEDURE — 99212 OFFICE O/P EST SF 10 MIN: CPT | Mod: PBBFAC,PO,25 | Performed by: INTERNAL MEDICINE

## 2018-10-10 NOTE — PROGRESS NOTES
Subjective:   Patient ID:  Chung Meneses Sr. is a 87 y.o. male     Chief complaint:Atrial Fibrillation and Dizziness      HPI  Background as recorded in my last note (2014):  81 man   Came in August for eval persistent AF   Has Hx PAF -- DCCV in 2003 then persistent AF , on Amio in 2004 and 2005.   On no AA Rx later -- was in NSR till 12/2012 at least   ECGs in AF since early July 2013 (no ECGs prior to that in 2013).   Said he felt fatigued and some palps with AF   Has HTN, HLP   Has mild AS -- HERNANDEZ 1.35 or so   Followed by Dr Espinoza   He was started on tikosyn and DCCVed on 9/4 >. 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 NSR -- , QTc 411, rate 56 bpm. (i REVIEWED IT MYSELF)  Has had no issues with bleeding related to XARELTO    Update since then:  He was doing well on tikosyn and in NSR.  I did not see him since April 2014 and he stayed in normal sinus on all ECGs through May 2016. The next ECG on 4/26/17 he was in AF and all of his subsequent ECGs show AF with CVR. Dofetilide was stopped in may 2017.  He has had a Holter in July and it showed rates 90/65 in response to AF with nocturnal pauses up to 2.5 sec and 6% MM PVCs (LD) as well as some PM VTNS -- 4 to 5 beats  Most recent echo was in Jan 2018:    1 - Low normal to mildly depressed left ventricular systolic function (EF 50-55%).     2 - Impaired LV relaxation, elevated LAP (grade 2 diastolic dysfunction).     3 - Wall motion abnormalities.     4 - Low normal right ventricular systolic function .     5 - Severe left atrial enlargement. MACKENZIE 73    6 - Concentric hypertrophy.     7 - Pulmonary hypertension. The estimated PA systolic pressure is 41 mmHg.     8 - Aortic valve prosthesis, mean gradient = 11 mmHg.     9 - Moderate mitral regurgitation.   10 - Mild to  "moderate tricuspid regurgitation.   11 - Increased central venous pressure.   He has had a single vessel ("one small one" he says) CABG and SAVR in 11/2017. Since then, he has been very limited with EISENBERG and L/H lately to as short as 100 feet. He can now do more than he used to after Rx with Bernard Espinoza and Félix bu he still has exertion related l/H -- at any kind of level -- even getting out of bed, walking etc. His BNP continues to be > 1000 despite aggressive diuresis but Cr is 2.0.   He has been referred to jeremy coffman to the pauses noted on Holter  He still feels bloated.  I have reviewed the actual image of the ECG tracing obtained today and it shows AF with calculated average VR of 71 bpm, measured RR of 800-100 msec with o/w normal intervals.    Current Outpatient Medications   Medication Sig    apixaban (ELIQUIS) 2.5 mg Tab Take 1 tablet (2.5 mg total) by mouth 2 (two) times daily.    aspirin (ECOTRIN) 81 MG EC tablet Take 1 tablet (81 mg total) by mouth once daily.    fish oil-omega-3 fatty acids 300-1,000 mg capsule Take 1,200 mg by mouth once daily.    metoprolol succinate (TOPROL-XL) 25 MG 24 hr tablet Take 25 mg by mouth 2 (two) times daily.    multivitamin with folic acid 400 mcg Tab Take 1 tablet by mouth.    potassium chloride SA (K-DUR,KLOR-CON) 20 MEQ tablet TAKE 1 TABLET BY MOUTH IN THE MORNING    ranitidine (ZANTAC) 75 MG tablet Take 75 mg by mouth nightly.     torsemide (DEMADEX) 20 MG Tab Take 20 mg by mouth once daily. 2 tablets (40 mg) everyday    vitamin D 1000 units Tab Take 1,000 Units by mouth once daily.      No current facility-administered medications for this visit.      Review of Systems   Constitution: Positive for weight gain. Negative for decreased appetite, weakness, malaise/fatigue and weight loss.   Eyes: Negative for blurred vision.   Cardiovascular: Positive for near-syncope and syncope. Negative for chest pain, claudication, cyanosis, dyspnea on exertion, irregular " heartbeat, leg swelling, orthopnea and palpitations.   Respiratory: Positive for shortness of breath. Negative for cough, sleep disturbances due to breathing, snoring and wheezing.    Endocrine: Negative for heat intolerance.   Hematologic/Lymphatic: Bruises/bleeds easily.   Musculoskeletal: Positive for arthritis and stiffness. Negative for muscle weakness and myalgias.   Gastrointestinal: Negative for melena, nausea and vomiting.   Genitourinary: Negative for nocturia.   Neurological: Negative for excessive daytime sleepiness, dizziness, headaches and light-headedness.   Psychiatric/Behavioral: Negative for depression, memory loss and substance abuse. The patient does not have insomnia and is not nervous/anxious.        Objective:   Physical Exam   Constitutional: He is oriented to person, place, and time. He appears well-developed and well-nourished.   HENT:   Head: Normocephalic and atraumatic.   Right Ear: External ear normal.   Left Ear: External ear normal.   Eyes: Conjunctivae are normal. Pupils are equal, round, and reactive to light. Right eye exhibits no discharge. Left eye exhibits no discharge. Right conjunctiva is not injected. Left conjunctiva has no hemorrhage.   Neck: Neck supple. No JVD present. No thyromegaly present.   Neck veins to the ears   Cardiovascular: Normal rate, normal heart sounds and intact distal pulses. An irregular rhythm present. PMI is not displaced. Exam reveals no gallop, no friction rub, no midsystolic click and no opening snap.   No murmur heard.  Pulses:       Carotid pulses are 2+ on the right side, and 2+ on the left side.       Radial pulses are 2+ on the right side, and 2+ on the left side.        Dorsalis pedis pulses are 2+ on the right side, and 2+ on the left side.        Posterior tibial pulses are 2+ on the right side, and 2+ on the left side.   Pulmonary/Chest: Effort normal and breath sounds normal. No respiratory distress. He has no wheezes. He has no rales. He  "exhibits no tenderness.   Abdominal: Soft. Normal appearance. He exhibits no pulsatile liver. There is no hepatomegaly. There is no tenderness. There is no rebound and no guarding.   Musculoskeletal: Normal range of motion. He exhibits no edema or tenderness.        Right knee: He exhibits no swelling.        Left knee: He exhibits no swelling.        Right ankle: He exhibits no swelling.        Left ankle: He exhibits no swelling.        Right lower leg: He exhibits no swelling.        Left lower leg: He exhibits no swelling.        Right foot: There is no swelling.        Left foot: There is no swelling.   Neurological: He is alert and oriented to person, place, and time. He has normal strength and normal reflexes. No cranial nerve deficit. Coordination normal.   Skin: Skin is warm, dry and intact. No rash noted. He is not diaphoretic. No cyanosis.   Psychiatric: He has a normal mood and affect. His behavior is normal.   Nursing note and vitals reviewed.    /78   Pulse 72   Ht 5' 9" (1.753 m)   Wt 79.8 kg (176 lb)   BMI 25.99 kg/m²  sitting  Supine 118/66, HR 70, standing 124/68, HR 66     Assessment:    I think that the l/H is a reflection of exercise induced PHTN and Rx of DCHF would help with these Sx  PHTN appears to be cardiac related but since Sx started after a bypass run and since he has chronic AF, and a liflelong exposure to heavy 2nd hand smoking I am not sure that he does not have a pulm component (including a thromboembolic one ) to explainb some of that  1. Permanent atrial fibrillation    2. Persistent atrial fibrillation    3. Essential hypertension    4. Chronic diastolic congestive heart failure    5. Coronary artery disease, angina presence unspecified, unspecified vessel or lesion type, unspecified whether native or transplanted heart    6. Nonrheumatic aortic valve stenosis    7. S/P AVR    8. Hyperlipidemia, unspecified hyperlipidemia type    9. Pulmonary hypertension    10. Light " headed    11. Dyspnea on exertion        Plan:    There is no current indication for PPM (nocturnal pauses < 3 sec are w/o prognostic significance)  He may benefit from adding aldactone or eplerenone (carefully) and DCing KCL but I'll leave that to his Rxing cardilogists  Orders Placed This Encounter   Procedures    SCHEDULED EKG 12-LEAD (to Muse)     Standing Status:   Future     Number of Occurrences:   1     Standing Expiration Date:   10/10/2019     Order Specific Question:   Diagnosis     Answer:   A-fib [[id number]]    Spirometry with/without bronchodilator & DLCO ([facility] Only)     Standing Status:   Future     Standing Expiration Date:   10/10/2019     Follow-up if symptoms worsen or fail to improve.  There are no discontinued medications.  This SmartLink is deprecated. Use AVSMEDLIST instead to display the medication list for a patient.  This SmartLink is deprecated. Use AVSMEDLIST instead to display the medication list for a patient.

## 2018-10-10 NOTE — LETTER
October 10, 2018      Laura Bowens PA-C  [id number] [place] [address]           [facility]  [address] [facility] [place] [address]  Phone: [phone]  Fax: [phone]          Patient: Chung Meneses Sr.   MR Number: [id number]   YOB: 1931   Date of Visit: 10/10/2018       Dear Laura Bowens:    Thank you for referring Chung Meneses to me for evaluation. Attached you will find relevant portions of my assessment and plan of care.    If you have questions, please do not hesitate to call me. I look forward to following Chung Meneses along with you.    Sincerely,    Ramo Geronimo MD    Enclosure  CC:  No Recipients    If you would like to receive this communication electronically, please contact [email or web address] or [phone] to request more information on [employer] Link access.    For providers and/or their staff who would like to refer a patient to [facility], please contact us through our one-stop-shop provider referral line, [facility] [place], at [phone].    If you feel you have received this communication in error or would no longer like to receive these types of communications, please e-mail [email or web address]

## 2018-10-11 ENCOUNTER — TELEPHONE (OUTPATIENT)
Dept: CARDIOLOGY | Facility: CLINIC | Age: 83
End: 2018-10-11

## 2018-10-11 LAB — BNP SERPL-MCNC: 1473 PG/ML

## 2018-10-11 NOTE — TELEPHONE ENCOUNTER
----- Message from Ramo Geronimo MD sent at 10/10/2018  6:31 PM CDT -----  See comments below and call patient to discuss.   Please close encounter when done -- no need to route back to me.  Thanks    These are normal - -not a likley reason for his PHTN

## 2018-10-11 NOTE — TELEPHONE ENCOUNTER
Telephoned results of lung study to patient and all questions answered.  Advised Darling Espinoza and Nii will discuss his next plan of care.

## 2018-10-15 ENCOUNTER — OFFICE VISIT (OUTPATIENT)
Dept: CARDIOLOGY | Facility: CLINIC | Age: 83
End: 2018-10-15
Payer: MEDICARE

## 2018-10-15 VITALS
WEIGHT: 176.13 LBS | SYSTOLIC BLOOD PRESSURE: 130 MMHG | HEART RATE: 64 BPM | BODY MASS INDEX: 26.09 KG/M2 | DIASTOLIC BLOOD PRESSURE: 72 MMHG | HEIGHT: 69 IN

## 2018-10-15 DIAGNOSIS — I48.21 PERMANENT ATRIAL FIBRILLATION: ICD-10-CM

## 2018-10-15 DIAGNOSIS — Z95.2 S/P AVR: ICD-10-CM

## 2018-10-15 DIAGNOSIS — I25.10 CORONARY ARTERY DISEASE, ANGINA PRESENCE UNSPECIFIED, UNSPECIFIED VESSEL OR LESION TYPE, UNSPECIFIED WHETHER NATIVE OR TRANSPLANTED HEART: Chronic | ICD-10-CM

## 2018-10-15 DIAGNOSIS — I10 ESSENTIAL HYPERTENSION: Chronic | ICD-10-CM

## 2018-10-15 DIAGNOSIS — I50.32 CHRONIC DIASTOLIC CONGESTIVE HEART FAILURE: Primary | ICD-10-CM

## 2018-10-15 DIAGNOSIS — I35.0 NONRHEUMATIC AORTIC VALVE STENOSIS: ICD-10-CM

## 2018-10-15 DIAGNOSIS — E78.5 HYPERLIPIDEMIA, UNSPECIFIED HYPERLIPIDEMIA TYPE: Chronic | ICD-10-CM

## 2018-10-15 PROCEDURE — 1101F PT FALLS ASSESS-DOCD LE1/YR: CPT | Mod: CPTII,,, | Performed by: NURSE PRACTITIONER

## 2018-10-15 PROCEDURE — 99213 OFFICE O/P EST LOW 20 MIN: CPT | Mod: PBBFAC | Performed by: NURSE PRACTITIONER

## 2018-10-15 PROCEDURE — 99999 PR PBB SHADOW E&M-EST. PATIENT-LVL III: CPT | Mod: PBBFAC,,, | Performed by: NURSE PRACTITIONER

## 2018-10-15 PROCEDURE — 99214 OFFICE O/P EST MOD 30 MIN: CPT | Mod: S$PBB,,, | Performed by: NURSE PRACTITIONER

## 2018-10-15 NOTE — PROGRESS NOTES
Subjective:   Patient ID:  Chung Meneses Sr. is a 87 y.o. male who presents for follow up of Congestive Heart Failure      HPI  Patient presents to clinic for follow up and management of diastolic HF. His current conditions include CAD/severe AS s/p CABG x 1 and bioprosthetic AVR in Nov 2017, permanent atrial fibrillation (on Eliquis), CHF, HTN, and hyperlipidemia, CKD.   Patient has had multiple cardioversions for A-fib. Has been on both Tikosyn and Amio. Was in A-fib when seen by Dr. Denton on 10/10/18. Had VR of 71 bpm.   Noted to have persistent a-fib, PVCs and one nocturnal pause on recent holter.  Evaluated by Dr. Denton who felt that PPM wasn't indicated due to prognostic insignificance.     Currently on Torsemide 40mg daily. BNP remains elevated at 1473. Creatine stable at 2.0  Has no abnormal bleeding on Elqiuis. Has no CNS complaints to suggest TIA or CVA. Denies any chest pain, orthopnea, edema, PND, near syncope or syncope. Has no palpitations. Has ongoing issues with dizziness. Had carotid US in March 2018 that showed minimal plaque. Patient still having issues with fatigue. Just doesn't have the energy to do much. Has chronic SOB that is unchanged. Weight stable at 176 lbs. Doing well with limiting sodium intake.       Past Medical History:   Diagnosis Date    Aortic stenosis 8/2/2013    Atrial fibrillation 7/5/2013    CHF (congestive heart failure)     Coronary artery disease     Dizziness - light-headed     Hyperlipidemia     Hypertension     Pulmonary hypertension 11/30/2017    S/P AVR 12/1/2017    Syncope 1/26/2018       Past Surgical History:   Procedure Laterality Date    AORTOCORONARY BYPASS-CABG N/A 11/30/2017    [facility]    BACK SURGERY  2003    CARDIAC VALVE SURGERY      CARDIOVERSION N/A 5/1/2017    [facility] CATH LAB    CARDIOVERSION N/A 9/4/2013    [facility] CATH [facility]     CORONARY ARTERY BYPASS GRAFT      HARVEST-VEIN-ENDOVASCULAR Left 11/30/2017    [facility]    HEART CATH-BILATERAL Bilateral 11/14/2017    [facility] CATH LAB    knee replacemnt bilateral  2001    REPLACEMENT-VALVE-AORTIC N/A 11/30/2017    [facility] at [facility] OR    SHOULDER SURGERY  2002    TRANSESOPHAGEAL ECHOCARDIOGRAM (ESTEFANY) N/A 11/30/2017    [facility] OR       Social History     Tobacco Use    Smoking status: Never Smoker    Smokeless tobacco: Never Used   Substance Use Topics    Alcohol use: No     Comment: HOLIDAY    Drug use: No       Family History   Problem Relation Age of Onset    Hypertension Mother     Hypertension Father     Hypertension Sister     Hypertension Brother     Heart failure Brother     Heart disease Brother     Heart attack Brother        Current Outpatient Medications   Medication Sig    apixaban (ELIQUIS) 2.5 mg Tab Take 1 tablet (2.5 mg total) by mouth 2 (two) times daily.    aspirin (ECOTRIN) 81 MG EC tablet Take 1 tablet (81 mg total) by mouth once daily.    fish oil-omega-3 fatty acids 300-1,000 mg capsule Take 1,200 mg by mouth once daily.    metoprolol succinate (TOPROL-XL) 25 MG 24 hr tablet Take 25 mg by mouth 2 (two) times daily.    multivitamin with folic acid 400 mcg Tab Take 1 tablet by mouth.    potassium chloride SA (K-DUR,KLOR-CON) 20 MEQ tablet TAKE 1 TABLET BY MOUTH IN THE MORNING    ranitidine (ZANTAC) 75 MG tablet Take 75 mg by mouth nightly.     torsemide (DEMADEX) 20 MG Tab Take 20 mg by mouth once daily. 2 tablets (40 mg) everyday    vitamin D 1000 units Tab Take 1,000 Units by mouth once daily.      No current facility-administered medications for this visit.      Current Outpatient Medications on File Prior to Visit   Medication Sig    apixaban (ELIQUIS) 2.5 mg Tab Take 1 tablet (2.5 mg total) by mouth 2 (two) times daily.     aspirin (ECOTRIN) 81 MG EC tablet Take 1 tablet (81 mg total) by mouth once daily.    fish oil-omega-3 fatty acids 300-1,000 mg capsule Take 1,200 mg by mouth once daily.    metoprolol succinate (TOPROL-XL) 25 MG 24 hr tablet Take 25 mg by mouth 2 (two) times daily.    multivitamin with folic acid 400 mcg Tab Take 1 tablet by mouth.    potassium chloride SA (K-DUR,KLOR-CON) 20 MEQ tablet TAKE 1 TABLET BY MOUTH IN THE MORNING    ranitidine (ZANTAC) 75 MG tablet Take 75 mg by mouth nightly.     torsemide (DEMADEX) 20 MG Tab Take 20 mg by mouth once daily. 2 tablets (40 mg) everyday    vitamin D 1000 units Tab Take 1,000 Units by mouth once daily.      No current facility-administered medications on file prior to visit.        Review of Systems   Constitution: Negative for decreased appetite, weakness, malaise/fatigue, weight gain and weight loss.   Eyes: Negative for blurred vision.   Cardiovascular: Negative for chest pain, claudication, cyanosis, dyspnea on exertion, irregular heartbeat, leg swelling, near-syncope, orthopnea, palpitations and syncope.   Respiratory: Positive for shortness of breath (chronic ). Negative for cough, sleep disturbances due to breathing, snoring and wheezing.    Endocrine: Negative for heat intolerance.   Hematologic/Lymphatic: Bruises/bleeds easily.   Musculoskeletal: Positive for arthritis and stiffness. Negative for muscle weakness and myalgias.   Gastrointestinal: Negative for melena, nausea and vomiting.   Genitourinary: Negative for nocturia.   Neurological: Positive for dizziness and light-headedness. Negative for excessive daytime sleepiness and headaches.   Psychiatric/Behavioral: Negative for depression, memory loss and substance abuse. The patient does not have insomnia and is not nervous/anxious.        Objective:   Physical Exam   Constitutional: He is oriented to person, place, and time. He appears well-developed and well-nourished.   Neck: Neck supple. JVD present.  "  Cardiovascular: Normal rate. An irregularly irregular rhythm present. Exam reveals decreased pulses. Exam reveals no friction rub.   Murmur heard.  High-pitched blowing holosystolic murmur is present at the apex.  Pulmonary/Chest: Effort normal and breath sounds normal. No respiratory distress. He has no wheezes. He has no rales.   Sternal scar    Abdominal: Soft. Bowel sounds are normal. He exhibits no distension.   Musculoskeletal: He exhibits edema (trace bilaterally ). He exhibits no tenderness.   Neurological: He is alert and oriented to person, place, and time.   Skin: Skin is warm and dry. No rash noted.   BLE trace edema      Psychiatric: He has a normal mood and affect. His behavior is normal.   Nursing note and vitals reviewed.    Vitals:    10/15/18 0856 10/15/18 0901   BP: 126/72 130/72   BP Location: Right arm Left arm   Patient Position: Sitting Sitting   BP Method: Medium (Manual) Medium (Manual)   Pulse: 64    Weight: 79.9 kg (176 lb 2.4 oz)    Height: 5' 9" (1.753 m)      Lab Results   Component Value Date    CHOL 104 (L) 12/11/2017    CHOL 125 04/19/2017    CHOL 147 10/21/2016     Lab Results   Component Value Date    HDL 24 (L) 12/11/2017    HDL 35 (L) 04/19/2017    HDL 31 (L) 10/21/2016     Lab Results   Component Value Date    LDLCALC 57.0 (L) 12/11/2017    LDLCALC 77.8 04/19/2017    LDLCALC 97.6 10/21/2016     Lab Results   Component Value Date    TRIG 115 12/11/2017    TRIG 61 04/19/2017    TRIG 92 10/21/2016     Lab Results   Component Value Date    CHOLHDL 23.1 12/11/2017    CHOLHDL 28.0 04/19/2017    CHOLHDL 21.1 10/21/2016       Chemistry        Component Value Date/Time     10/10/2018 1203    K 4.6 10/10/2018 1203     10/10/2018 1203    CO2 25 10/10/2018 1203    BUN 47 (H) 10/10/2018 1203    CREATININE 2.0 (H) 10/10/2018 1203    GLU 84 10/10/2018 1203        Component Value Date/Time    CALCIUM 9.4 10/10/2018 1203    ALKPHOS 221 (H) 06/12/2018 1300    AST 31 06/12/2018 1300 "    ALT 18 06/12/2018 1300    BILITOT 1.9 (H) 06/12/2018 1300    ESTGFRAFRICA 34 (A) 10/10/2018 1203    EGFRNONAA 29 (A) 10/10/2018 1203          Lab Results   Component Value Date    TSH 3.780 12/11/2017     Lab Results   Component Value Date    INR 1.4 (H) 03/27/2018    INR 1.3 (H) 03/06/2018    INR 1.5 (H) 02/20/2018     Lab Results   Component Value Date    WBC 5.97 06/12/2018    HGB 11.2 (L) 06/12/2018    HCT 35.8 (L) 06/12/2018    MCV 82 06/12/2018     06/12/2018     BMP  Sodium   Date Value Ref Range Status   10/10/2018 139 136 - 145 mmol/L Final     Potassium   Date Value Ref Range Status   10/10/2018 4.6 3.5 - 5.1 mmol/L Final     Chloride   Date Value Ref Range Status   10/10/2018 102 95 - 110 mmol/L Final     CO2   Date Value Ref Range Status   10/10/2018 25 23 - 29 mmol/L Final     BUN, Bld   Date Value Ref Range Status   10/10/2018 47 (H) 8 - 23 mg/dL Final     Creatinine   Date Value Ref Range Status   10/10/2018 2.0 (H) 0.5 - 1.4 mg/dL Final     Calcium   Date Value Ref Range Status   10/10/2018 9.4 8.7 - 10.5 mg/dL Final     Anion Gap   Date Value Ref Range Status   10/10/2018 12 8 - 16 mmol/L Final     eGFR if    Date Value Ref Range Status   10/10/2018 34 (A) >60 mL/min/1.73 m^2 Final     eGFR if non    Date Value Ref Range Status   10/10/2018 29 (A) >60 mL/min/1.73 m^2 Final     Comment:     Calculation used to obtain the estimated glomerular filtration  rate (eGFR) is the CKD-EPI equation.        Estimated Creatinine Clearance: 26 mL/min (A) (based on SCr of 2 mg/dL (H)).    CONCLUSIONS     1 - Low normal to mildly depressed left ventricular systolic function (EF 50-55%).     2 - Impaired LV relaxation, elevated LAP (grade 2 diastolic dysfunction).     3 - Wall motion abnormalities.     4 - Low normal right ventricular systolic function .     5 - Severe left atrial enlargement.     6 - Concentric hypertrophy.     7 - Pulmonary hypertension. The estimated PA  systolic pressure is 41 mmHg.     8 - Aortic valve prosthesis, mean gradient = 11 mmHg.     9 - Moderate mitral regurgitation.     10 - Mild to moderate tricuspid regurgitation.     11 - Increased central venous pressure.             This document has been electronically    SIGNED BY: Jonathan Alvarenga MD On: 01/26/2018 15:17    Assessment:     1. Chronic diastolic congestive heart failure    2. Coronary artery disease, angina presence unspecified, unspecified vessel or lesion type, unspecified whether native or transplanted heart    3. Essential hypertension    4. Hyperlipidemia, unspecified hyperlipidemia type    5. Nonrheumatic aortic valve stenosis    6. Permanent atrial fibrillation    7. S/P AVR    neck veins up today  Taking Torsemide 40mg daily   Renal function stable  Has no abnormal bleeding on Elqiuis  Has no CNS complaints to suggest TIA or CVA  BP stable   Plan:   Instructed to take extra 20mg tab of Torsemide x2 days  Compression stockings daily  Heart healthy diet  Limit fluid intake 50-60 oz   Daily weights and to notify clinic if weight increases by more than 3 lbs in 1 day or 5 lbs in 1 week.   Exercise routine as tolerated  RTC in 3 months or sooner if needed

## 2018-10-17 LAB
BRPFT: ABNORMAL
DLCO ADJ PRE: 16.54 ML/(MIN*MMHG) (ref 15.64–29.5)
DLCO SINGLE BREATH LLN: 15.64
DLCO SINGLE BREATH PRE REF: 73.3 %
DLCO SINGLE BREATH REF: 22.57
DLCOC SBVA LLN: 2.15
DLCOC SBVA PRE REF: 114.1 %
DLCOC SBVA REF: 3.31
DLCOC SINGLE BREATH LLN: 15.64
DLCOC SINGLE BREATH PRE REF: 73.3 %
DLCOC SINGLE BREATH REF: 22.57
DLCOVA LLN: 2.15
DLCOVA PRE REF: 114.1 %
DLCOVA PRE: 3.77 ML/(MIN*MMHG*L) (ref 2.15–4.47)
DLCOVA REF: 3.31
DLVAADJ PRE: 3.77 ML/(MIN*MMHG*L) (ref 2.15–4.47)
FEF 25 75 CHG: 19.9 %
FEF 25 75 LLN: 0.59
FEF 25 75 POST REF: 77.9 %
FEF 25 75 PRE REF: 64.9 %
FEF 25 75 REF: 1.74
FET100 CHG: -1.8 %
FEV1 CHG: 1.3 %
FEV1 FVC CHG: 3.5 %
FEV1 FVC LLN: 58
FEV1 FVC POST REF: 95.9 %
FEV1 FVC PRE REF: 92.7 %
FEV1 FVC REF: 74
FEV1 LLN: 1.72
FEV1 POST REF: 79.9 %
FEV1 PRE REF: 78.9 %
FEV1 REF: 2.55
FEV6 CHG: -0.8 %
FEV6 LLN: 2.33
FEV6 POST REF: 86.1 %
FEV6 POST: 2.75 L (ref 2.33–4.06)
FEV6 PRE REF: 86.8 %
FEV6 PRE: 2.77 L (ref 2.33–4.06)
FEV6 REF: 3.2
FVC CHG: -2 %
FVC LLN: 2.47
FVC POST REF: 82.4 %
FVC PRE REF: 84.1 %
FVC REF: 3.49
IVC PRE: 2.49 L (ref 2.47–4.51)
IVC SINGLE BREATH LLN: 2.47
IVC SINGLE BREATH PRE REF: 71.5 %
IVC SINGLE BREATH REF: 3.49
MVV LLN: 84
MVV REF: 99
PEF CHG: -4.7 %
PEF LLN: 3.74
PEF POST REF: 85.3 %
PEF PRE REF: 89.5 %
PEF REF: 5.96
POST FEF 25 75: 1.35 L/S (ref 0.59–2.88)
POST FET 100: 9.95 SEC
POST FEV1 FVC: 71.07 % (ref 58.36–89.84)
POST FEV1: 2.04 L (ref 1.72–3.39)
POST FVC: 2.87 L (ref 2.47–4.51)
POST PEF: 5.08 L/S (ref 3.74–8.18)
PRE DLCO: 16.54 ML/(MIN*MMHG) (ref 15.64–29.5)
PRE FEF 25 75: 1.13 L/S (ref 0.59–2.88)
PRE FET 100: 10.14 SEC
PRE FEV1 FVC: 68.69 % (ref 58.36–89.84)
PRE FEV1: 2.01 L (ref 1.72–3.39)
PRE FVC: 2.93 L (ref 2.47–4.51)
PRE PEF: 5.33 L/S (ref 3.74–8.18)
VA PRE: 4.38 L (ref 6.67–6.67)
VA SINGLE BREATH LLN: 6.67
VA SINGLE BREATH PRE REF: 65.7 %
VA SINGLE BREATH REF: 6.67

## 2018-10-29 ENCOUNTER — HOSPITAL ENCOUNTER (INPATIENT)
Facility: HOSPITAL | Age: 83
LOS: 5 days | Discharge: HOSPICE | DRG: 469 | End: 2018-11-03
Attending: EMERGENCY MEDICINE | Admitting: INTERNAL MEDICINE
Payer: MEDICARE

## 2018-10-29 DIAGNOSIS — I25.10 CORONARY ARTERY DISEASE, ANGINA PRESENCE UNSPECIFIED, UNSPECIFIED VESSEL OR LESION TYPE, UNSPECIFIED WHETHER NATIVE OR TRANSPLANTED HEART: Chronic | ICD-10-CM

## 2018-10-29 DIAGNOSIS — E87.20 METABOLIC ACIDEMIA: ICD-10-CM

## 2018-10-29 DIAGNOSIS — E87.5 HYPERKALEMIA: ICD-10-CM

## 2018-10-29 DIAGNOSIS — S72.002A LEFT DISPLACED FEMORAL NECK FRACTURE: Primary | ICD-10-CM

## 2018-10-29 DIAGNOSIS — S72.009A HIP FRACTURE: ICD-10-CM

## 2018-10-29 DIAGNOSIS — R07.81 RIB PAIN ON LEFT SIDE: ICD-10-CM

## 2018-10-29 DIAGNOSIS — I95.89 OTHER SPECIFIED HYPOTENSION: ICD-10-CM

## 2018-10-29 DIAGNOSIS — S59.902A ELBOW INJURY, LEFT, INITIAL ENCOUNTER: ICD-10-CM

## 2018-10-29 DIAGNOSIS — R57.9 SHOCK, UNSPECIFIED: ICD-10-CM

## 2018-10-29 DIAGNOSIS — K76.82 HEPATIC ENCEPHALOPATHY: ICD-10-CM

## 2018-10-29 DIAGNOSIS — N17.9 AKI (ACUTE KIDNEY INJURY): ICD-10-CM

## 2018-10-29 DIAGNOSIS — Z01.818 PRE-OP EVALUATION: ICD-10-CM

## 2018-10-29 DIAGNOSIS — Z95.2 H/O AORTIC VALVE REPLACEMENT: ICD-10-CM

## 2018-10-29 DIAGNOSIS — R57.9 SHOCK: ICD-10-CM

## 2018-10-29 DIAGNOSIS — Z98.890 POST-OPERATIVE STATE: ICD-10-CM

## 2018-10-29 DIAGNOSIS — S79.912A HIP INJURY, LEFT, INITIAL ENCOUNTER: ICD-10-CM

## 2018-10-29 DIAGNOSIS — R06.02 SHORTNESS OF BREATH: ICD-10-CM

## 2018-10-29 DIAGNOSIS — I50.9 HEART FAILURE: ICD-10-CM

## 2018-10-29 DIAGNOSIS — I50.32 CHRONIC DIASTOLIC CONGESTIVE HEART FAILURE: ICD-10-CM

## 2018-10-29 DIAGNOSIS — I48.21 PERMANENT ATRIAL FIBRILLATION: Chronic | ICD-10-CM

## 2018-10-29 DIAGNOSIS — K72.00 SHOCK LIVER: ICD-10-CM

## 2018-10-29 DIAGNOSIS — I27.20 PULMONARY HYPERTENSION: Chronic | ICD-10-CM

## 2018-10-29 LAB
ALBUMIN SERPL BCP-MCNC: 3.7 G/DL
ALP SERPL-CCNC: 225 U/L
ALT SERPL W/O P-5'-P-CCNC: 22 U/L
ANION GAP SERPL CALC-SCNC: 12 MMOL/L
ANISOCYTOSIS BLD QL SMEAR: SLIGHT
APTT BLDCRRT: 38.3 SEC
AST SERPL-CCNC: 40 U/L
BASOPHILS # BLD AUTO: 0.03 K/UL
BASOPHILS NFR BLD: 0.3 %
BILIRUB SERPL-MCNC: 3.5 MG/DL
BUN SERPL-MCNC: 52 MG/DL
CALCIUM SERPL-MCNC: 9.7 MG/DL
CHLORIDE SERPL-SCNC: 100 MMOL/L
CO2 SERPL-SCNC: 25 MMOL/L
CREAT SERPL-MCNC: 2 MG/DL
DACRYOCYTES BLD QL SMEAR: ABNORMAL
DIFFERENTIAL METHOD: ABNORMAL
EOSINOPHIL # BLD AUTO: 0.1 K/UL
EOSINOPHIL NFR BLD: 0.6 %
ERYTHROCYTE [DISTWIDTH] IN BLOOD BY AUTOMATED COUNT: 17.3 %
EST. GFR  (AFRICAN AMERICAN): 34 ML/MIN/1.73 M^2
EST. GFR  (NON AFRICAN AMERICAN): 29 ML/MIN/1.73 M^2
GLUCOSE SERPL-MCNC: 103 MG/DL
HCT VFR BLD AUTO: 40.4 %
HGB BLD-MCNC: 13.2 G/DL
HYPOCHROMIA BLD QL SMEAR: ABNORMAL
INR PPP: 1.4
LYMPHOCYTES # BLD AUTO: 1.2 K/UL
LYMPHOCYTES NFR BLD: 13.1 %
MCH RBC QN AUTO: 30.3 PG
MCHC RBC AUTO-ENTMCNC: 32.7 G/DL
MCV RBC AUTO: 93 FL
MONOCYTES # BLD AUTO: 0.4 K/UL
MONOCYTES NFR BLD: 4.2 %
NEUTROPHILS # BLD AUTO: 7.6 K/UL
NEUTROPHILS NFR BLD: 82 %
OVALOCYTES BLD QL SMEAR: ABNORMAL
PLATELET # BLD AUTO: 175 K/UL
PLATELET BLD QL SMEAR: ABNORMAL
PMV BLD AUTO: 10.5 FL
POIKILOCYTOSIS BLD QL SMEAR: SLIGHT
POTASSIUM SERPL-SCNC: 4.1 MMOL/L
PROT SERPL-MCNC: 7.9 G/DL
PROTHROMBIN TIME: 14.2 SEC
RBC # BLD AUTO: 4.35 M/UL
SODIUM SERPL-SCNC: 137 MMOL/L
SPHEROCYTES BLD QL SMEAR: ABNORMAL
STOMATOCYTES BLD QL SMEAR: PRESENT
WBC # BLD AUTO: 9.33 K/UL

## 2018-10-29 PROCEDURE — 25000003 PHARM REV CODE 250: Performed by: NURSE PRACTITIONER

## 2018-10-29 PROCEDURE — 99285 EMERGENCY DEPT VISIT HI MDM: CPT | Mod: 25

## 2018-10-29 PROCEDURE — 86920 COMPATIBILITY TEST SPIN: CPT

## 2018-10-29 PROCEDURE — 36415 COLL VENOUS BLD VENIPUNCTURE: CPT

## 2018-10-29 PROCEDURE — 96374 THER/PROPH/DIAG INJ IV PUSH: CPT

## 2018-10-29 PROCEDURE — 93005 ELECTROCARDIOGRAM TRACING: CPT

## 2018-10-29 PROCEDURE — 85025 COMPLETE CBC W/AUTO DIFF WBC: CPT

## 2018-10-29 PROCEDURE — 21400001 HC TELEMETRY ROOM

## 2018-10-29 PROCEDURE — 86850 RBC ANTIBODY SCREEN: CPT

## 2018-10-29 PROCEDURE — 85730 THROMBOPLASTIN TIME PARTIAL: CPT

## 2018-10-29 PROCEDURE — 85610 PROTHROMBIN TIME: CPT

## 2018-10-29 PROCEDURE — 93010 ELECTROCARDIOGRAM REPORT: CPT | Mod: ,,, | Performed by: INTERNAL MEDICINE

## 2018-10-29 PROCEDURE — 63600175 PHARM REV CODE 636 W HCPCS: Performed by: EMERGENCY MEDICINE

## 2018-10-29 PROCEDURE — 80053 COMPREHEN METABOLIC PANEL: CPT

## 2018-10-29 RX ORDER — TORSEMIDE 10 MG/1
20 TABLET ORAL DAILY
Status: DISCONTINUED | OUTPATIENT
Start: 2018-10-30 | End: 2018-10-30

## 2018-10-29 RX ORDER — METOPROLOL SUCCINATE 25 MG/1
25 TABLET, EXTENDED RELEASE ORAL 2 TIMES DAILY
Status: DISCONTINUED | OUTPATIENT
Start: 2018-10-29 | End: 2018-10-30

## 2018-10-29 RX ORDER — MORPHINE SULFATE 4 MG/ML
2 INJECTION, SOLUTION INTRAMUSCULAR; INTRAVENOUS
Status: DISCONTINUED | OUTPATIENT
Start: 2018-10-29 | End: 2018-10-29

## 2018-10-29 RX ORDER — ACETAMINOPHEN 325 MG/1
650 TABLET ORAL EVERY 6 HOURS PRN
Status: DISCONTINUED | OUTPATIENT
Start: 2018-10-29 | End: 2018-11-01

## 2018-10-29 RX ORDER — MORPHINE SULFATE 4 MG/ML
4 INJECTION, SOLUTION INTRAMUSCULAR; INTRAVENOUS EVERY 4 HOURS PRN
Status: DISCONTINUED | OUTPATIENT
Start: 2018-10-29 | End: 2018-11-03

## 2018-10-29 RX ORDER — ONDANSETRON 2 MG/ML
4 INJECTION INTRAMUSCULAR; INTRAVENOUS
Status: DISCONTINUED | OUTPATIENT
Start: 2018-10-29 | End: 2018-10-29

## 2018-10-29 RX ORDER — ACETAMINOPHEN 10 MG/ML
1000 INJECTION, SOLUTION INTRAVENOUS ONCE
Status: COMPLETED | OUTPATIENT
Start: 2018-10-29 | End: 2018-10-29

## 2018-10-29 RX ORDER — ONDANSETRON 2 MG/ML
4 INJECTION INTRAMUSCULAR; INTRAVENOUS EVERY 8 HOURS PRN
Status: DISCONTINUED | OUTPATIENT
Start: 2018-10-29 | End: 2018-11-03

## 2018-10-29 RX ORDER — MORPHINE SULFATE 4 MG/ML
2 INJECTION, SOLUTION INTRAMUSCULAR; INTRAVENOUS EVERY 4 HOURS PRN
Status: DISCONTINUED | OUTPATIENT
Start: 2018-10-29 | End: 2018-11-03

## 2018-10-29 RX ADMIN — ACETAMINOPHEN 1000 MG: 10 INJECTION, SOLUTION INTRAVENOUS at 02:10

## 2018-10-29 RX ADMIN — METOPROLOL SUCCINATE 25 MG: 25 TABLET, EXTENDED RELEASE ORAL at 10:10

## 2018-10-29 NOTE — ED NOTES
Pt presents to ED after a fall. C/o left hip pain and left arm pain. No obvious deformities noted. 2+ pedal pulse. Pt states on blood thinners and did hit head. Denies LOC, A/O xs 4.

## 2018-10-29 NOTE — SUBJECTIVE & OBJECTIVE
Past Medical History:   Diagnosis Date    Aortic stenosis 8/2/2013    Atrial fibrillation 7/5/2013    CHF (congestive heart failure)     Coronary artery disease     Dizziness - light-headed     Hyperlipidemia     Hypertension     Pulmonary hypertension 11/30/2017    S/P AVR 12/1/2017    Syncope 1/26/2018       Past Surgical History:   Procedure Laterality Date    AORTOCORONARY BYPASS-CABG N/A 11/30/2017    Performed by Shaq [facility]eng [facility] at [facility] OR    BACK SURGERY  2003    CARDIAC VALVE SURGERY      CARDIOVERSION N/A 5/1/2017    Performed by Patricia Espinoza [facility] at [facility] CATH LAB    CARDIOVERSION N/A 9/4/2013    Performed by Patricia [facility]za [facility] at [facility] CATH [facility]    CORONARY ARTERY BYPASS GRAFT      HARVEST-VEIN-ENDOVASCULAR Left 11/30/2017    Performed [facility]q [facility]eng [facility] at [facility]    HEART CATH-BILATERAL Bilateral 11/14/2017    Performed by Patricia [facility] at [facility] CATH [facility]    knee replacemnt bilateral  2001    REPLACEMENT-VALVE-AORTIC N/A 11/30/2017    Performed [facility]q [facility] at [facility]    SHOULDER SURGERY  2002    TRANSESOPHAGEAL ECHOCARDIOGRAM (ESTEFANY) N/A 11/30/2017    Performed by Shaq [facility]eng [facility] at [facility]       Review of patient's allergies indicates:   Allergen Reactions    Sulfa (sulfonamide antibiotics) Hives       No current facility-administered medications on file prior to encounter.      Current Outpatient Medications on File Prior to Encounter   Medication Sig    apixaban (ELIQUIS) 2.5 mg Tab Take 1 tablet (2.5 mg total) by mouth 2 (two) times daily.    aspirin (ECOTRIN) 81 MG EC tablet Take 1 tablet (81 mg total) by mouth once daily.    fish oil-omega-3 fatty acids 300-1,000 mg capsule Take 1,200 mg by mouth once daily.    metoprolol succinate (TOPROL-XL) 25 MG 24 hr tablet Take 25 mg by mouth 2 (two) times daily.    multivitamin with folic acid 400 mcg Tab Take 1 tablet by mouth.    potassium chloride SA (K-DUR,KLOR-CON) 20 MEQ tablet TAKE 1 TABLET BY MOUTH  IN THE MORNING    ranitidine (ZANTAC) 75 MG tablet Take 75 mg by mouth nightly.     torsemide (DEMADEX) 20 MG Tab Take 20 mg by mouth once daily. 2 tablets (40 mg) everyday    vitamin D 1000 units Tab Take 1,000 Units by mouth once daily.      Family History     Problem Relation (Age of Onset)    Heart attack Brother    Heart disease Brother    Heart failure Brother    Hypertension Mother, Father, Sister, Brother        Tobacco Use    Smoking status: Never Smoker    Smokeless tobacco: Never Used   Substance and Sexual Activity    Alcohol use: No     Comment: HOLIDAY    Drug use: No    Sexual activity: No     Review of Systems   Constitutional: Negative for activity change, appetite change, chills, fatigue, fever and unexpected weight change.   HENT: Negative for congestion, facial swelling, rhinorrhea, sinus pressure, sneezing and sore throat.    Eyes: Negative for discharge, redness and visual disturbance.   Respiratory: Negative for apnea, cough, chest tightness, shortness of breath, wheezing and stridor.    Cardiovascular: Negative for chest pain, palpitations and leg swelling.   Gastrointestinal: Negative for abdominal distention, abdominal pain, anal bleeding, blood in stool, constipation, diarrhea, nausea and vomiting.   Genitourinary: Negative for difficulty urinating, discharge, dysuria, frequency and hematuria.   Musculoskeletal: Positive for arthralgias. Negative for back pain, gait problem, joint swelling, myalgias, neck pain and neck stiffness.        Left elbow pain, Left hip pain   Skin: Negative for color change and pallor.   Neurological: Negative for dizziness, tremors, seizures, syncope, facial asymmetry, speech difficulty, weakness, light-headedness, numbness and headaches.   Psychiatric/Behavioral: Negative for behavioral problems, confusion, hallucinations and suicidal ideas. The patient is not nervous/anxious.    All other systems reviewed and are negative.    Objective:     Vital Signs  (Most Recent):  Temp: 97.5 °F (36.4 °C) (10/29/18 1122)  Pulse: 71 (10/29/18 1122)  Resp: 20 (10/29/18 1122)  BP: 137/84 (10/29/18 1122)  SpO2: 96 % (10/29/18 1122) Vital Signs (24h Range):  Temp:  [97.5 °F (36.4 °C)] 97.5 °F (36.4 °C)  Pulse:  [71] 71  Resp:  [20] 20  SpO2:  [96 %] 96 %  BP: (137)/(84) 137/84     Weight: 79.4 kg (175 lb)  Body mass index is 25.84 kg/m².    Physical Exam   Constitutional: He is oriented to person, place, and time. He appears well-developed and well-nourished.   Elderly appearing    HENT:   Head: Normocephalic and atraumatic.   Eyes: Conjunctivae are normal. Pupils are equal, round, and reactive to light.   Neck: Normal range of motion. Neck supple.   Cardiovascular: Normal rate, regular rhythm, normal heart sounds and intact distal pulses.   No murmur heard.  Pulmonary/Chest: Effort normal and breath sounds normal. No respiratory distress.   Abdominal: Soft. Bowel sounds are normal. He exhibits no distension. There is no tenderness.   Musculoskeletal: He exhibits tenderness. He exhibits no edema or deformity.   Left hip pain, Decreased ROM to LLE.    Neurological: He is alert and oriented to person, place, and time.   Skin: Skin is warm and dry. No rash noted. No erythema.   Abrasion noted to Left scalp, Abrasion noted to Left elbow.    Psychiatric: He has a normal mood and affect. His behavior is normal.   Nursing note and vitals reviewed.        CRANIAL NERVES     CN III, IV, VI   Pupils are equal, round, and reactive to light.       Significant Labs: All pertinent labs within the past 24 hours have been reviewed.    Significant Imaging:   Imaging Results          X-Ray Chest AP Portable (Final result)  Result time 10/29/18 14:19:11    Final result by PAULA Mckinney Sr., MD (10/29/18 14:19:11)                 Impression:      1. There is an acute appearing nondisplaced fracture in the lateral aspect of the left 9th rib that is best seen on the dedicated rib series.  2. The lungs  are clear.  3. Surgical changes  .      Electronically signed by: Antelmo Mckinney MD  Date:    10/29/2018  Time:    14:19             Narrative:    EXAMINATION:  XR CHEST AP PORTABLE    CLINICAL HISTORY:  Fracture of unspecified part of neck of unspecified femur, initial encounter for closed fracture    COMPARISON:  A plain film examination of the left ribs performed on 10/29/2018.    FINDINGS:  There are multiple sternotomy wires in place.  There is partial visualization of a prosthetic right humeral head.  There is a prosthetic left humeral head in place.  There is an acute appearing nondisplaced fracture in the lateral aspect of the left 9th rib that is best seen on the dedicated rib series.  The size of the heart is normal. The lungs are clear. There is no pneumothorax.  The costophrenic angles are sharp.                               CT Head Without Contrast (Final result)  Result time 10/29/18 12:41:21    Final result by Sharad Wall MD (10/29/18 12:41:21)                 Impression:      Chronic microvascular ischemic changes.      Electronically signed by: Sharad Wall MD  Date:    10/29/2018  Time:    12:41             Narrative:    EXAMINATION:  CT HEAD WITHOUT CONTRAST    CLINICAL HISTORY:  head injury;    TECHNIQUE:  Low dose axial CT images obtained throughout the head without intravenous contrast. Sagittal and coronal reconstructions were performed.  All CT scans at this facility use dose modulation, iterative reconstruction and/or weight based dosing when appropriate to reduce radiation dose to as low as reasonably achievable.    COMPARISON:  03/06/2018    FINDINGS:  Intracranial compartment:    The brain parenchyma demonstrates areas of decreased attenuation with mild to moderate periventricular white matter consistent with chronic microvascular ischemic changes..  No parenchymal mass, hemorrhage, edema or major vascular distribution infarct.  Vascular calcifications are noted.    Mild  prominence of the sulci and ventricles are consistent with age-related involutional changes.    No extra-axial blood or fluid collections.    Skull/extracranial contents (limited evaluation): No fracture. Mastoid air cells and paranasal sinuses are essentially clear.                               X-Ray Elbow Complete Left (Final result)  Result time 10/29/18 13:03:52    Final result by PAULA Mckinney Sr., MD (10/29/18 13:03:52)                 Impression:      1. There is no acute fracture visualized.  2. There are several osseous densities in the lateral aspect of the left elbow. One of the larger ones measures 8 mm.  These are characteristic of intra-articular loose bodies.  3. There is a 6 mm calcific density in the expected location of the distal aspect of the triceps tendon.  This is characteristic of prior trauma.      Electronically signed by: Antelmo Mckinney MD  Date:    10/29/2018  Time:    13:03             Narrative:    EXAMINATION:  XR ELBOW COMPLETE 3 VIEW LEFT    CLINICAL HISTORY:  Unspecified injury of left elbow, initial encounter    COMPARISON:  None    FINDINGS:  There is no acute fracture visualized.  There is no dislocation.  There are several osseous densities in the lateral aspect of the left elbow.  One of the larger ones measures 8 mm.  There is a 6 mm calcific density in the expected location of the distal aspect of the triceps tendon.                               X-Ray Hip 2 View Left (Final result)  Result time 10/29/18 13:01:26    Final result by PAULA Mckinney Sr., MD (10/29/18 13:01:26)                 Impression:      1. There is a poorly visualized fracture in the left femoral neck.  The left femoral head still articulates with the left acetabulum.  2. There is partial visualization of posterior spinal fusion hardware in the lumbar spine.  3. There is a moderate amount of atherosclerosis.      Electronically signed by: Antelmo Mckinney MD  Date:    10/29/2018  Time:    13:01              Narrative:    EXAMINATION:  XR HIP 2 VIEW LEFT    CLINICAL HISTORY:  Unspecified injury of left hip, initial encounter    COMPARISON:  None    FINDINGS:  There is a poorly visualized fracture in the left femoral neck.  The left femoral head still articulates with the left acetabulum.  There is a moderate amount of atherosclerosis.  There is partial visualization of posterior spinal fusion hardware in the lumbar spine.                               X-Ray Ribs 2 View Left (Final result)  Result time 10/29/18 13:09:06    Final result by PAULA Mckinney Sr., MD (10/29/18 13:09:06)                 Impression:      1. There is an acute appearing nondisplaced fracture in the lateral aspect of the left 9th rib.  2. Surgical changes      Electronically signed by: Antelmo Mckinney MD  Date:    10/29/2018  Time:    13:09             Narrative:    EXAMINATION:  XR RIBS 2 VIEW LEFT    TECHNIQUE:  Standard knee MRI protocol without IV contrast was performed.    FINDINGS:  There is an acute appearing nondisplaced fracture in the lateral aspect of the left 9th rib.  There are healed fractures in the left 10th and 11th ribs.  There are multiple sternotomy wires in place.  There is partial visualization of a prosthetic left humeral head.  There is no pneumothorax.  The left costophrenic angle is sharp.  There is no evidence of an acute pulmonary process.

## 2018-10-29 NOTE — ED PROVIDER NOTES
"  SCRIBE #2 NOTE: I, Savanna Ugarte, am scribing for, and in the presence of, Alcides Toledo Jr., MD.       HISTORY     Chief Complaint   Patient presents with    Fall     Pt states, "I fell outside of my house and hurt my left hip, left elbow and my head."     Review of patient's allergies indicates:   Allergen Reactions    Sulfa (sulfonamide antibiotics) Hives        HPI     Fall   The accident occurred today. The fall occurred while walking. Distance fallen: Groundlevel. He landed on concrete. There was no blood loss. The point of impact was the head, left elbow and left hip. The pain is present in the head, left elbow and left hip. The pain is at a severity of 6/10. He was not ambulatory at the scene. There was no entrapment after the fall. There was no drug use involved in the accident. There was no alcohol use involved in the accident. Pertinent negatives include no back pain, no visual change, no fever, no abdominal pain, no nausea, no vomiting, no headaches and no loss of consciousness. The symptoms are aggravated by standing, flexion, ambulation and pressure on the injury. He has tried nothing for the symptoms.        PCP: Glenroy Carmichael MD     Past Medical History:  Past Medical History:   Diagnosis Date    Aortic stenosis 8/2/2013    Atrial fibrillation 7/5/2013    CHF (congestive heart failure)     Coronary artery disease     Dizziness - light-headed     Hyperlipidemia     Hypertension     Pulmonary hypertension 11/30/2017    S/P AVR 12/1/2017    Syncope 1/26/2018        Past Surgical History:  Past Surgical History:   Procedure Laterality Date    AORTOCORONARY BYPASS-CABG N/A 11/30/2017    Performed by [facility]q [facility]eng [facility] at [facility] OR    BACK SURGERY  2003    CARDIAC VALVE SURGERY      CARDIOVERSION N/A 5/1/2017    Performed by Patricia Espinoza MD at [facility] CATH LAB    CARDIOVERSION N/A 9/4/2013    Performed by Patricia [facility] MD at [facility] CATH LAB    CORONARY ARTERY BYPASS GRAFT      " HARVEST-VEIN-ENDOVASCULAR Left 11/30/2017    Per[facility] at [facility] OR    HEART CATH-BILATERAL Bilateral 11/14/2017    Performed by Patricia [facility] MD at [facility] CATH LAB    knee replacemnt bilateral  2001    REPLACEMENT-VALVE-AORTIC N/A 11/30/2017    Performed by [facility]q [facility]eng MD at [facility] OR    SHOULDER SURGERY  2002    TRANSESOPHAGEAL ECHOCARDIOGRAM (ESTEFANY) N/A 11/30/2017    Performed by Shaq [facility]eng MD at [facility] OR        Family History:  Family History   Problem Relation Age of Onset    Hypertension Mother     Hypertension Father     Hypertension Sister     Hypertension Brother     Heart failure Brother     Heart disease Brother     Heart attack Brother         Social History:  Social History     Tobacco Use    Smoking status: Never Smoker    Smokeless tobacco: Never Used   Substance and Sexual Activity    Alcohol use: No     Comment: HOLIDAY    Drug use: No    Sexual activity: No         ROS   Review of Systems   Constitutional: Negative for fever.   HENT: Negative for sore throat.         + Abrasion to scalp   Respiratory: Negative for shortness of breath.         + L rib pain   Cardiovascular: Negative for chest pain.   Gastrointestinal: Negative for abdominal pain, nausea and vomiting.   Genitourinary: Negative for dysuria.   Musculoskeletal: Negative for back pain.        + L elbow pain  + L hip pain     Skin: Negative for rash.        + Abrasions to L elbow   Neurological: Negative for loss of consciousness, weakness and headaches.   Hematological: Does not bruise/bleed easily.   All other systems reviewed and are negative.      PHYSICAL EXAM     Initial Vitals [10/29/18 1122]   BP Pulse Resp Temp SpO2   137/84 71 20 97.5 °F (36.4 °C) 96 %      MAP       --           Physical Exam    Constitutional: He appears well-developed and well-nourished. No distress.   HENT:   Head: Normocephalic. Head is with abrasion and with contusion. Head is without raccoon's eyes, without  "Martin's sign and without laceration.       Eyes: Conjunctivae and EOM are normal. Pupils are equal, round, and reactive to light.   Neck: Normal range of motion. Neck supple.   Cardiovascular: Normal rate and regular rhythm.   Pulmonary/Chest: Breath sounds normal. No respiratory distress. He has no wheezes. He has no rales.   Abdominal: Soft. Bowel sounds are normal.   Musculoskeletal:        Left elbow: He exhibits normal range of motion. Tenderness found. Lateral epicondyle tenderness noted.        Left hip: He exhibits decreased range of motion, decreased strength, tenderness and bony tenderness.        Arms:  Neurological: He is alert and oriented to person, place, and time.   Skin: Skin is warm and dry. Capillary refill takes less than 2 seconds. Abrasion noted. No rash noted.        Psychiatric: He has a normal mood and affect.          ED COURSE   Procedures  ED ONGOING VITALS:  Vitals:    10/29/18 1122   BP: 137/84   Pulse: 71   Resp: 20   Temp: 97.5 °F (36.4 °C)   TempSrc: Oral   SpO2: 96%   Weight: 79.4 kg (175 lb)   Height: 5' 9" (1.753 m)       ABNORMAL LAB VALUES:  Labs Reviewed   CBC W/ AUTO DIFFERENTIAL   COMPREHENSIVE METABOLIC PANEL   PROTIME-INR   APTT   URINALYSIS       ALL LAB VALUES:  Results for orders placed or performed during the hospital encounter of 10/29/18   CBC auto differential   Result Value Ref Range    WBC 9.33 3.90 - 12.70 K/uL    RBC 4.35 (L) 4.60 - 6.20 M/uL    Hemoglobin 13.2 (L) 14.0 - 18.0 g/dL    Hematocrit 40.4 40.0 - 54.0 %    MCV 93 82 - 98 fL    MCH 30.3 27.0 - 31.0 pg    MCHC 32.7 32.0 - 36.0 g/dL    RDW 17.3 (H) 11.5 - 14.5 %    Platelets 175 150 - 350 K/uL    MPV 10.5 9.2 - 12.9 fL    Gran # (ANC) 7.6 1.8 - 7.7 K/uL    Lymph # 1.2 1.0 - 4.8 K/uL    Mono # 0.4 0.3 - 1.0 K/uL    Eos # 0.1 0.0 - 0.5 K/uL    Baso # 0.03 0.00 - 0.20 K/uL    Gran% 82.0 (H) 38.0 - 73.0 %    Lymph% 13.1 (L) 18.0 - 48.0 %    Mono% 4.2 4.0 - 15.0 %    Eosinophil% 0.6 0.0 - 8.0 %    Basophil% " 0.3 0.0 - 1.9 %    Platelet Estimate Appears normal     Aniso Slight     Poik Slight     Hypo Occasional     Ovalocytes Occasional     Tear Drop Cells Occasional     Stomatocytes Present     Spherocytes Occasional     Differential Method Automated    Comprehensive metabolic panel   Result Value Ref Range    Sodium 137 136 - 145 mmol/L    Potassium 4.1 3.5 - 5.1 mmol/L    Chloride 100 95 - 110 mmol/L    CO2 25 23 - 29 mmol/L    Glucose 103 70 - 110 mg/dL    BUN, Bld 52 (H) 8 - 23 mg/dL    Creatinine 2.0 (H) 0.5 - 1.4 mg/dL    Calcium 9.7 8.7 - 10.5 mg/dL    Total Protein 7.9 6.0 - 8.4 g/dL    Albumin 3.7 3.5 - 5.2 g/dL    Total Bilirubin 3.5 (H) 0.1 - 1.0 mg/dL    Alkaline Phosphatase 225 (H) 55 - 135 U/L    AST 40 10 - 40 U/L    ALT 22 10 - 44 U/L    Anion Gap 12 8 - 16 mmol/L    eGFR if African American 34 (A) >60 mL/min/1.73 m^2    eGFR if non African American 29 (A) >60 mL/min/1.73 m^2   Protime-INR   Result Value Ref Range    Prothrombin Time 14.2 (H) 9.0 - 12.5 sec    INR 1.4 (H) 0.8 - 1.2   APTT   Result Value Ref Range    aPTT 38.3 (H) 21.0 - 32.0 sec       RADIOLOGY STUDIES:  Imaging Results          X-Ray Chest AP Portable (Final result)  Result time 10/29/18 14:19:11    Final result by PAULA Mckinney Sr., MD (10/29/18 14:19:11)                 Impression:      1. There is an acute appearing nondisplaced fracture in the lateral aspect of the left 9th rib that is best seen on the dedicated rib series.  2. The lungs are clear.  3. Surgical changes  .      Electronically signed by: Antelmo Mckinney MD  Date:    10/29/2018  Time:    14:19             Narrative:    EXAMINATION:  XR CHEST AP PORTABLE    CLINICAL HISTORY:  Fracture of unspecified part of neck of unspecified femur, initial encounter for closed fracture    COMPARISON:  A plain film examination of the left ribs performed on 10/29/2018.    FINDINGS:  There are multiple sternotomy wires in place.  There is partial visualization of a prosthetic right  humeral head.  There is a prosthetic left humeral head in place.  There is an acute appearing nondisplaced fracture in the lateral aspect of the left 9th rib that is best seen on the dedicated rib series.  The size of the heart is normal. The lungs are clear. There is no pneumothorax.  The costophrenic angles are sharp.                               CT Head Without Contrast (Final result)  Result time 10/29/18 12:41:21    Final result by Sharad Wall MD (10/29/18 12:41:21)                 Impression:      Chronic microvascular ischemic changes.      Electronically signed by: Sharad Wall MD  Date:    10/29/2018  Time:    12:41             Narrative:    EXAMINATION:  CT HEAD WITHOUT CONTRAST    CLINICAL HISTORY:  head injury;    TECHNIQUE:  Low dose axial CT images obtained throughout the head without intravenous contrast. Sagittal and coronal reconstructions were performed.  All CT scans at this facility use dose modulation, iterative reconstruction and/or weight based dosing when appropriate to reduce radiation dose to as low as reasonably achievable.    COMPARISON:  03/06/2018    FINDINGS:  Intracranial compartment:    The brain parenchyma demonstrates areas of decreased attenuation with mild to moderate periventricular white matter consistent with chronic microvascular ischemic changes..  No parenchymal mass, hemorrhage, edema or major vascular distribution infarct.  Vascular calcifications are noted.    Mild prominence of the sulci and ventricles are consistent with age-related involutional changes.    No extra-axial blood or fluid collections.    Skull/extracranial contents (limited evaluation): No fracture. Mastoid air cells and paranasal sinuses are essentially clear.                               X-Ray Elbow Complete Left (Final result)  Result time 10/29/18 13:03:52    Final result by PAULA Mckinney Sr., MD (10/29/18 13:03:52)                 Impression:      1. There is no acute fracture  visualized.  2. There are several osseous densities in the lateral aspect of the left elbow. One of the larger ones measures 8 mm.  These are characteristic of intra-articular loose bodies.  3. There is a 6 mm calcific density in the expected location of the distal aspect of the triceps tendon.  This is characteristic of prior trauma.      Electronically signed by: Antelmo Mckinney MD  Date:    10/29/2018  Time:    13:03             Narrative:    EXAMINATION:  XR ELBOW COMPLETE 3 VIEW LEFT    CLINICAL HISTORY:  Unspecified injury of left elbow, initial encounter    COMPARISON:  None    FINDINGS:  There is no acute fracture visualized.  There is no dislocation.  There are several osseous densities in the lateral aspect of the left elbow.  One of the larger ones measures 8 mm.  There is a 6 mm calcific density in the expected location of the distal aspect of the triceps tendon.                               X-Ray Hip 2 View Left (Final result)  Result time 10/29/18 13:01:26    Final result by PAULA Mckinney Sr., MD (10/29/18 13:01:26)                 Impression:      1. There is a poorly visualized fracture in the left femoral neck.  The left femoral head still articulates with the left acetabulum.  2. There is partial visualization of posterior spinal fusion hardware in the lumbar spine.  3. There is a moderate amount of atherosclerosis.      Electronically signed by: Antelmo Mckinney MD  Date:    10/29/2018  Time:    13:01             Narrative:    EXAMINATION:  XR HIP 2 VIEW LEFT    CLINICAL HISTORY:  Unspecified injury of left hip, initial encounter    COMPARISON:  None    FINDINGS:  There is a poorly visualized fracture in the left femoral neck.  The left femoral head still articulates with the left acetabulum.  There is a moderate amount of atherosclerosis.  There is partial visualization of posterior spinal fusion hardware in the lumbar spine.                               X-Ray Ribs 2 View Left (Final result)   Result time 10/29/18 13:09:06    Final result by PAULA Mckinney Sr., MD (10/29/18 13:09:06)                 Impression:      1. There is an acute appearing nondisplaced fracture in the lateral aspect of the left 9th rib.  2. Surgical changes      Electronically signed by: Antelmo Mckinney MD  Date:    10/29/2018  Time:    13:09             Narrative:    EXAMINATION:  XR RIBS 2 VIEW LEFT    TECHNIQUE:  Standard knee MRI protocol without IV contrast was performed.    FINDINGS:  There is an acute appearing nondisplaced fracture in the lateral aspect of the left 9th rib.  There are healed fractures in the left 10th and 11th ribs.  There are multiple sternotomy wires in place.  There is partial visualization of a prosthetic left humeral head.  There is no pneumothorax.  The left costophrenic angle is sharp.  There is no evidence of an acute pulmonary process.                                        The EKG was ordered, reviewed, and independently interpreted by the ED provider.  Interpretation time: 13:52  Rate: 59 BPM  Rhythm: atrial fibrillation with slow ventricular response  Interpretation: LAD. Nonspecific T wave abnormality. No STEMI.      Vitals:    10/29/18 1122   BP: 137/84   Pulse: 71   Resp: 20   Temp: 97.5 °F (36.4 °C)     The above vital signs and test results have been reviewed by the emergency provider.     Discussion:    1:40 PM: Alcides Lozano Jr., FNP transfers care of pt to Dr. Toledo.    2:33 PM: Re-evaluated pt. Pt is resting comfortably and is in no acute distress.  D/w pt all pertinent results. D/w pt any concerns expressed at this time. Answered all questions. Pt expresses understanding at this time.    2:33 PM: Dr. Toledo discussed the pt's case with Dr. Simental (Orthopedics) who recommends hospital admission.    2:38 PM: Discussed case with REYNALDO Briggs (Hospital Medicine). Dr. Eastman agrees with current care and management of pt and accepts admission.   Admitting Service: [facility]  medicine   Admitting Physician: Dr. Eastman  Admit to: Tele    2:40 PM: Re-evaluated pt. I have discussed test results, shared treatment plan, and the need for admission with patient and family at bedside. Pt and family express understanding at this time and agree with all information. All questions answered. Pt and family have no further questions or concerns at this time. Pt is ready for admit.    ED Medications:  Medications   acetaminophen (10 mg/mL) injection 1,000 mg (not administered)            MEDICAL DECISION MAKING   Medical Decision Making:   Clinical Tests:   Lab Tests: Reviewed and Ordered  Radiological Study: Reviewed and Ordered  Medical Tests: Reviewed and Ordered               CLINICAL IMPRESSION       ICD-10-CM ICD-9-CM   1. Elbow injury, left, initial encounter S59.902A 959.3   2. Hip injury, left, initial encounter S79.912A 959.6   3. Rib pain on left side R07.81 786.50   4. Pre-op evaluation Z01.818 V72.84   5. Hip fracture S72.009A 820.8       Disposition:   Disposition: Admitted  Condition: Fair         Alcides Toledo Jr., MD  10/29/18 1959

## 2018-10-29 NOTE — HPI
Chung Meneses is an 86 yo male with a PMH of Afib, CHF, CAD, HTN, S/P AVR who presented to the ER after suffering a ground level fall at home today. The patient reports that he tripped and fell while walking outside today. The patient reports that he fell on his left side hitting his head. The patient denies any loss of consciousness. Imaging showed a left femoral neck fracture. CXR showed an acute appearing nondisplaced fracture in the lateral aspect of the left 9th rib. The patient reports pain is currently well controlled. Dr. Simental (Ortho) was consulted and will evaluate the patient. Patient denies fever, chills, CP, cough, stallworth, pnd, orthopnea, palpitations, diaphoresis, headache, blurred vision, numbness, tingling, dizziness, localized weakness, n/v/d, abdominal pain, blood in stools, melena, hematemesis, urinary frequency, urgency, dysuria or hematuria.

## 2018-10-29 NOTE — H&P
"[facility] Hospital Medicine  History & Physical    Patient Name: Chung Meneses Sr.  MRN: [id number]  Admission Date: 10/29/2018  Attending Physician: Alcides Toledo Jr., MD   Primary Care Provider: Glenroy Carmichael MD         Patient information was obtained from patient and ER records.     Subjective:     Principal Problem:Left displaced femoral neck fracture    Chief Complaint:   Chief Complaint   Patient presents with    Fall     Pt states, "I fell outside of my house and hurt my left hip, left elbow and my head."        HPI: Chung Meneses is an 86 yo male with a PMH of Afib, CHF, CAD, HTN, S/P AVR who presented to the ER after suffering a ground level fall at home today. The patient reports that he tripped and fell while walking outside today. The patient reports that he fell on his left side hitting his head. The patient denies any loss of consciousness. Imaging showed a left femoral neck fracture. CXR showed an acute appearing nondisplaced fracture in the lateral aspect of the left 9th rib. The patient reports pain is currently well controlled. Dr. Simental (Ortho) was consulted and will evaluate the patient. Patient denies fever, chills, CP, cough, stallworth, pnd, orthopnea, palpitations, diaphoresis, headache, blurred vision, numbness, tingling, dizziness, localized weakness, n/v/d, abdominal pain, blood in stools, melena, hematemesis, urinary frequency, urgency, dysuria or hematuria.         Past Medical History:   Diagnosis Date    Aortic stenosis 8/2/2013    Atrial fibrillation 7/5/2013    CHF (congestive heart failure)     Coronary artery disease     Dizziness - light-headed     Hyperlipidemia     Hypertension     Pulmonary hypertension 11/30/2017    S/P AVR 12/1/2017    Syncope 1/26/2018       Past Surgical History:   Procedure Laterality Date    AORTOCORONARY BYPASS-CABG N/A 11/30/2017    [facility]    BACK SURGERY  2003    CARDIAC VALVE SURGERY   "    CARDIOVERSION N/A 5/1/2017    Performed by Patricia [facility] C[facility] LAB    CARDIOVERSION N/A 9/4/2013    Performed by Patricia [facility] at [facility] CATH LAB    CORONARY ARTERY BYPASS GRAFT      HARVEST-VEIN-ENDOVASCULAR Left 11/30/2017    Performed by Shaq [facility]eng [facility] at [facility] OR    HEART CATH-BILATERAL Bilateral 11/14/2017    Performed by Patricia [facility] at [facility] CATH LAB    knee replacemnt bilateral  2001    REPLACEMENT-VALVE-AORTIC N/A 11/30/2017    Performed by Shaq Boateng MD at [facility] OR    SHOULDER SURGERY  2002    TRANSESOPHAGEAL ECHOCARDIOGRAM (ESTEFANY) N/A 11/30/2017    Performed by Shaq Boateng MD at [facility] OR       Review of patient's allergies indicates:   Allergen Reactions    Sulfa (sulfonamide antibiotics) Hives       No current facility-administered medications on file prior to encounter.      Current Outpatient Medications on File Prior to Encounter   Medication Sig    apixaban (ELIQUIS) 2.5 mg Tab Take 1 tablet (2.5 mg total) by mouth 2 (two) times daily.    aspirin (ECOTRIN) 81 MG EC tablet Take 1 tablet (81 mg total) by mouth once daily.    fish oil-omega-3 fatty acids 300-1,000 mg capsule Take 1,200 mg by mouth once daily.    metoprolol succinate (TOPROL-XL) 25 MG 24 hr tablet Take 25 mg by mouth 2 (two) times daily.    multivitamin with folic acid 400 mcg Tab Take 1 tablet by mouth.    potassium chloride SA (K-DUR,KLOR-CON) 20 MEQ tablet TAKE 1 TABLET BY MOUTH IN THE MORNING    ranitidine (ZANTAC) 75 MG tablet Take 75 mg by mouth nightly.     torsemide (DEMADEX) 20 MG Tab Take 20 mg by mouth once daily. 2 tablets (40 mg) everyday    vitamin D 1000 units Tab Take 1,000 Units by mouth once daily.      Family History     Problem Relation (Age of Onset)    Heart attack Brother    Heart disease Brother    Heart failure Brother    Hypertension Mother, Father, Sister, Brother        Tobacco Use    Smoking status: Never Smoker    Smokeless tobacco: Never Used    Substance and Sexual Activity    Alcohol use: No     Comment: HOLIDAY    Drug use: No    Sexual activity: No     Review of Systems   Constitutional: Negative for activity change, appetite change, chills, fatigue, fever and unexpected weight change.   HENT: Negative for congestion, facial swelling, rhinorrhea, sinus pressure, sneezing and sore throat.    Eyes: Negative for discharge, redness and visual disturbance.   Respiratory: Negative for apnea, cough, chest tightness, shortness of breath, wheezing and stridor.    Cardiovascular: Negative for chest pain, palpitations and leg swelling.   Gastrointestinal: Negative for abdominal distention, abdominal pain, anal bleeding, blood in stool, constipation, diarrhea, nausea and vomiting.   Genitourinary: Negative for difficulty urinating, discharge, dysuria, frequency and hematuria.   Musculoskeletal: Positive for arthralgias. Negative for back pain, gait problem, joint swelling, myalgias, neck pain and neck stiffness.        Left elbow pain, Left hip pain   Skin: Negative for color change and pallor.   Neurological: Negative for dizziness, tremors, seizures, syncope, facial asymmetry, speech difficulty, weakness, light-headedness, numbness and headaches.   Psychiatric/Behavioral: Negative for behavioral problems, confusion, hallucinations and suicidal ideas. The patient is not nervous/anxious.    All other systems reviewed and are negative.    Objective:     Vital Signs (Most Recent):  Temp: 97.5 °F (36.4 °C) (10/29/18 1122)  Pulse: 71 (10/29/18 1122)  Resp: 20 (10/29/18 1122)  BP: 137/84 (10/29/18 1122)  SpO2: 96 % (10/29/18 1122) Vital Signs (24h Range):  Temp:  [97.5 °F (36.4 °C)] 97.5 °F (36.4 °C)  Pulse:  [71] 71  Resp:  [20] 20  SpO2:  [96 %] 96 %  BP: (137)/(84) 137/84     Weight: 79.4 kg (175 lb)  Body mass index is 25.84 kg/m².    Physical Exam   Constitutional: He is oriented to person, place, and time. He appears well-developed and well-nourished.    Elderly appearing    HENT:   Head: Normocephalic and atraumatic.   Eyes: Conjunctivae are normal. Pupils are equal, round, and reactive to light.   Neck: Normal range of motion. Neck supple.   Cardiovascular: Normal rate, regular rhythm, normal heart sounds and intact distal pulses.   No murmur heard.  Pulmonary/Chest: Effort normal and breath sounds normal. No respiratory distress.   Abdominal: Soft. Bowel sounds are normal. He exhibits no distension. There is no tenderness.   Musculoskeletal: He exhibits tenderness. He exhibits no edema or deformity.   Left hip pain, Decreased ROM to LLE.    Neurological: He is alert and oriented to person, place, and time.   Skin: Skin is warm and dry. No rash noted. No erythema.   Abrasion noted to Left scalp, Abrasion noted to Left elbow.    Psychiatric: He has a normal mood and affect. His behavior is normal.   Nursing note and vitals reviewed.        CRANIAL NERVES     CN III, IV, VI   Pupils are equal, round, and reactive to light.       Significant Labs: All pertinent labs within the past 24 hours have been reviewed.    Significant Imaging:   Imaging Results          X-Ray Chest AP Portable (Final result)  Result time 10/29/18 14:19:11    Final result by PAULA Mckinney Sr., MD (10/29/18 14:19:11)                 Impression:      1. There is an acute appearing nondisplaced fracture in the lateral aspect of the left 9th rib that is best seen on the dedicated rib series.  2. The lungs are clear.  3. Surgical changes  .      Electronically signed by: Antelmo Mckinney MD  Date:    10/29/2018  Time:    14:19             Narrative:    EXAMINATION:  XR CHEST AP PORTABLE    CLINICAL HISTORY:  Fracture of unspecified part of neck of unspecified femur, initial encounter for closed fracture    COMPARISON:  A plain film examination of the left ribs performed on 10/29/2018.    FINDINGS:  There are multiple sternotomy wires in place.  There is partial visualization of a prosthetic  right humeral head.  There is a prosthetic left humeral head in place.  There is an acute appearing nondisplaced fracture in the lateral aspect of the left 9th rib that is best seen on the dedicated rib series.  The size of the heart is normal. The lungs are clear. There is no pneumothorax.  The costophrenic angles are sharp.                               CT Head Without Contrast (Final result)  Result time 10/29/18 12:41:21    Final result by Sharad Wall MD (10/29/18 12:41:21)                 Impression:      Chronic microvascular ischemic changes.      Electronically signed by: Sharad Wall MD  Date:    10/29/2018  Time:    12:41             Narrative:    EXAMINATION:  CT HEAD WITHOUT CONTRAST    CLINICAL HISTORY:  head injury;    TECHNIQUE:  Low dose axial CT images obtained throughout the head without intravenous contrast. Sagittal and coronal reconstructions were performed.  All CT scans at this facility use dose modulation, iterative reconstruction and/or weight based dosing when appropriate to reduce radiation dose to as low as reasonably achievable.    COMPARISON:  03/06/2018    FINDINGS:  Intracranial compartment:    The brain parenchyma demonstrates areas of decreased attenuation with mild to moderate periventricular white matter consistent with chronic microvascular ischemic changes..  No parenchymal mass, hemorrhage, edema or major vascular distribution infarct.  Vascular calcifications are noted.    Mild prominence of the sulci and ventricles are consistent with age-related involutional changes.    No extra-axial blood or fluid collections.    Skull/extracranial contents (limited evaluation): No fracture. Mastoid air cells and paranasal sinuses are essentially clear.                               X-Ray Elbow Complete Left (Final result)  Result time 10/29/18 13:03:52    Final result by PAULA Mckinney Sr., MD (10/29/18 13:03:52)                 Impression:      1. There is no acute fracture  visualized.  2. There are several osseous densities in the lateral aspect of the left elbow. One of the larger ones measures 8 mm.  These are characteristic of intra-articular loose bodies.  3. There is a 6 mm calcific density in the expected location of the distal aspect of the triceps tendon.  This is characteristic of prior trauma.      Electronically signed by: Antelmo Mckinney MD  Date:    10/29/2018  Time:    13:03             Narrative:    EXAMINATION:  XR ELBOW COMPLETE 3 VIEW LEFT    CLINICAL HISTORY:  Unspecified injury of left elbow, initial encounter    COMPARISON:  None    FINDINGS:  There is no acute fracture visualized.  There is no dislocation.  There are several osseous densities in the lateral aspect of the left elbow.  One of the larger ones measures 8 mm.  There is a 6 mm calcific density in the expected location of the distal aspect of the triceps tendon.                               X-Ray Hip 2 View Left (Final result)  Result time 10/29/18 13:01:26    Final result by PAULA Mckinney Sr., MD (10/29/18 13:01:26)                 Impression:      1. There is a poorly visualized fracture in the left femoral neck.  The left femoral head still articulates with the left acetabulum.  2. There is partial visualization of posterior spinal fusion hardware in the lumbar spine.  3. There is a moderate amount of atherosclerosis.      Electronically signed by: Antelmo Mckinney MD  Date:    10/29/2018  Time:    13:01             Narrative:    EXAMINATION:  XR HIP 2 VIEW LEFT    CLINICAL HISTORY:  Unspecified injury of left hip, initial encounter    COMPARISON:  None    FINDINGS:  There is a poorly visualized fracture in the left femoral neck.  The left femoral head still articulates with the left acetabulum.  There is a moderate amount of atherosclerosis.  There is partial visualization of posterior spinal fusion hardware in the lumbar spine.                               X-Ray Ribs 2 View Left (Final result)   Result time 10/29/18 13:09:06    Final result by PAULA Mckinney Sr., MD (10/29/18 13:09:06)                 Impression:      1. There is an acute appearing nondisplaced fracture in the lateral aspect of the left 9th rib.  2. Surgical changes      Electronically signed by: Antelmo Mckinney MD  Date:    10/29/2018  Time:    13:09             Narrative:    EXAMINATION:  XR RIBS 2 VIEW LEFT    TECHNIQUE:  Standard knee MRI protocol without IV contrast was performed.    FINDINGS:  There is an acute appearing nondisplaced fracture in the lateral aspect of the left 9th rib.  There are healed fractures in the left 10th and 11th ribs.  There are multiple sternotomy wires in place.  There is partial visualization of a prosthetic left humeral head.  There is no pneumothorax.  The left costophrenic angle is sharp.  There is no evidence of an acute pulmonary process.                                   Assessment/Plan:     * Left displaced femoral neck fracture    - Ortho consulted   - Hold Eliquis and ASA  - Analgesia PRN   - PT/OT consult   - NPO after midnight      CAD (coronary artery disease)    - Resume home meds        S/P AVR    - Resume home meds  - Keep outpatient follow up       CHF (congestive heart failure)    - Resume torsemide   - monitor I&O   - resume BB   - Low Na diet       Essential hypertension    - Monitor VS   - Resume metoprolol            VTE Risk Mitigation (From admission, onward)    None             Khari Esteban NP  Department of Hospital Medicine   [facility]

## 2018-10-29 NOTE — ASSESSMENT & PLAN NOTE
- Ortho consulted   - Hold Eliquis and ASA  - Analgesia PRN   - PT/OT consult   - NPO after midnight

## 2018-10-30 ENCOUNTER — ANESTHESIA (OUTPATIENT)
Dept: SURGERY | Facility: HOSPITAL | Age: 83
DRG: 469 | End: 2018-10-30
Payer: MEDICARE

## 2018-10-30 ENCOUNTER — ANESTHESIA EVENT (OUTPATIENT)
Dept: SURGERY | Facility: HOSPITAL | Age: 83
DRG: 469 | End: 2018-10-30
Payer: MEDICARE

## 2018-10-30 PROBLEM — I83.029 VENOUS ULCER OF LEFT LEG: Status: ACTIVE | Noted: 2018-10-30

## 2018-10-30 PROBLEM — L97.929 VENOUS ULCER OF LEFT LEG: Status: ACTIVE | Noted: 2018-10-30

## 2018-10-30 PROBLEM — T14.8XXA ABRASION: Status: ACTIVE | Noted: 2018-10-30

## 2018-10-30 PROBLEM — L30.8 DERMATITIS ASSOCIATED WITH MOISTURE: Status: ACTIVE | Noted: 2018-10-30

## 2018-10-30 LAB
ABO + RH BLD: NORMAL
ALBUMIN SERPL BCP-MCNC: 3.2 G/DL
ALP SERPL-CCNC: 201 U/L
ALT SERPL W/O P-5'-P-CCNC: 19 U/L
ANION GAP SERPL CALC-SCNC: 11 MMOL/L
AST SERPL-CCNC: 32 U/L
BASOPHILS # BLD AUTO: 0.02 K/UL
BASOPHILS NFR BLD: 0.3 %
BILIRUB SERPL-MCNC: 3 MG/DL
BILIRUB UR QL STRIP: ABNORMAL
BLD GP AB SCN CELLS X3 SERPL QL: NORMAL
BNP SERPL-MCNC: 2447 PG/ML
BUN SERPL-MCNC: 54 MG/DL
CALCIUM SERPL-MCNC: 9.2 MG/DL
CHLORIDE SERPL-SCNC: 103 MMOL/L
CLARITY UR: CLEAR
CO2 SERPL-SCNC: 24 MMOL/L
COLOR UR: YELLOW
CREAT SERPL-MCNC: 2 MG/DL
DIFFERENTIAL METHOD: ABNORMAL
EOSINOPHIL # BLD AUTO: 0.1 K/UL
EOSINOPHIL NFR BLD: 2.1 %
ERYTHROCYTE [DISTWIDTH] IN BLOOD BY AUTOMATED COUNT: 17.3 %
EST. GFR  (AFRICAN AMERICAN): 34 ML/MIN/1.73 M^2
EST. GFR  (NON AFRICAN AMERICAN): 29 ML/MIN/1.73 M^2
GLUCOSE SERPL-MCNC: 172 MG/DL
GLUCOSE UR QL STRIP: NEGATIVE
HCT VFR BLD AUTO: 36.6 %
HGB BLD-MCNC: 12.1 G/DL
HGB UR QL STRIP: NEGATIVE
KETONES UR QL STRIP: NEGATIVE
LEUKOCYTE ESTERASE UR QL STRIP: NEGATIVE
LYMPHOCYTES # BLD AUTO: 0.7 K/UL
LYMPHOCYTES NFR BLD: 9.7 %
MCH RBC QN AUTO: 30.3 PG
MCHC RBC AUTO-ENTMCNC: 33.1 G/DL
MCV RBC AUTO: 92 FL
MONOCYTES # BLD AUTO: 1 K/UL
MONOCYTES NFR BLD: 14.8 %
NEUTROPHILS # BLD AUTO: 4.9 K/UL
NEUTROPHILS NFR BLD: 73.1 %
NITRITE UR QL STRIP: NEGATIVE
PH UR STRIP: 6 [PH] (ref 5–8)
PLATELET # BLD AUTO: 148 K/UL
PMV BLD AUTO: 10.2 FL
POTASSIUM SERPL-SCNC: 4.1 MMOL/L
PROT SERPL-MCNC: 7 G/DL
PROT UR QL STRIP: NEGATIVE
RBC # BLD AUTO: 4 M/UL
SODIUM SERPL-SCNC: 138 MMOL/L
SP GR UR STRIP: 1.01 (ref 1–1.03)
URN SPEC COLLECT METH UR: ABNORMAL
UROBILINOGEN UR STRIP-ACNC: 1 EU/DL
WBC # BLD AUTO: 6.69 K/UL

## 2018-10-30 PROCEDURE — 36415 COLL VENOUS BLD VENIPUNCTURE: CPT

## 2018-10-30 PROCEDURE — 25000003 PHARM REV CODE 250: Performed by: NURSE PRACTITIONER

## 2018-10-30 PROCEDURE — 99222 1ST HOSP IP/OBS MODERATE 55: CPT | Mod: ,,, | Performed by: INTERNAL MEDICINE

## 2018-10-30 PROCEDURE — 83880 ASSAY OF NATRIURETIC PEPTIDE: CPT

## 2018-10-30 PROCEDURE — 80053 COMPREHEN METABOLIC PANEL: CPT

## 2018-10-30 PROCEDURE — 99900038 HC OT GENERIC THERAPY SCREENING (STAT)

## 2018-10-30 PROCEDURE — 99900037 HC PT THERAPY SCREENING (STAT)

## 2018-10-30 PROCEDURE — 63600175 PHARM REV CODE 636 W HCPCS: Performed by: NURSE PRACTITIONER

## 2018-10-30 PROCEDURE — 85025 COMPLETE CBC W/AUTO DIFF WBC: CPT

## 2018-10-30 PROCEDURE — 94640 AIRWAY INHALATION TREATMENT: CPT

## 2018-10-30 PROCEDURE — 25000242 PHARM REV CODE 250 ALT 637 W/ HCPCS: Performed by: NURSE PRACTITIONER

## 2018-10-30 PROCEDURE — 81003 URINALYSIS AUTO W/O SCOPE: CPT

## 2018-10-30 PROCEDURE — 21400001 HC TELEMETRY ROOM

## 2018-10-30 PROCEDURE — 99223 1ST HOSP IP/OBS HIGH 75: CPT | Mod: ,,, | Performed by: ORTHOPAEDIC SURGERY

## 2018-10-30 RX ORDER — IPRATROPIUM BROMIDE AND ALBUTEROL SULFATE 2.5; .5 MG/3ML; MG/3ML
3 SOLUTION RESPIRATORY (INHALATION) EVERY 4 HOURS PRN
Status: DISCONTINUED | OUTPATIENT
Start: 2018-10-30 | End: 2018-11-03

## 2018-10-30 RX ORDER — ACETAMINOPHEN 10 MG/ML
1000 INJECTION, SOLUTION INTRAVENOUS EVERY 8 HOURS
Status: COMPLETED | OUTPATIENT
Start: 2018-10-30 | End: 2018-10-31

## 2018-10-30 RX ORDER — HYDRALAZINE HYDROCHLORIDE 20 MG/ML
10 INJECTION INTRAMUSCULAR; INTRAVENOUS EVERY 6 HOURS PRN
Status: DISCONTINUED | OUTPATIENT
Start: 2018-10-30 | End: 2018-11-03

## 2018-10-30 RX ORDER — METOPROLOL SUCCINATE 50 MG/1
50 TABLET, EXTENDED RELEASE ORAL 2 TIMES DAILY
Status: DISCONTINUED | OUTPATIENT
Start: 2018-10-30 | End: 2018-11-02

## 2018-10-30 RX ORDER — FUROSEMIDE 10 MG/ML
40 INJECTION INTRAMUSCULAR; INTRAVENOUS 2 TIMES DAILY
Status: DISCONTINUED | OUTPATIENT
Start: 2018-10-30 | End: 2018-11-01

## 2018-10-30 RX ADMIN — IPRATROPIUM BROMIDE AND ALBUTEROL SULFATE 3 ML: .5; 3 SOLUTION RESPIRATORY (INHALATION) at 03:10

## 2018-10-30 RX ADMIN — ACETAMINOPHEN 1000 MG: 10 INJECTION, SOLUTION INTRAVENOUS at 09:10

## 2018-10-30 RX ADMIN — FUROSEMIDE 40 MG: 10 INJECTION, SOLUTION INTRAMUSCULAR; INTRAVENOUS at 07:10

## 2018-10-30 RX ADMIN — ACETAMINOPHEN 1000 MG: 10 INJECTION, SOLUTION INTRAVENOUS at 02:10

## 2018-10-30 RX ADMIN — TORSEMIDE 20 MG: 10 TABLET ORAL at 10:10

## 2018-10-30 RX ADMIN — METOPROLOL SUCCINATE 25 MG: 25 TABLET, EXTENDED RELEASE ORAL at 10:10

## 2018-10-30 RX ADMIN — METOPROLOL SUCCINATE 50 MG: 50 TABLET, EXTENDED RELEASE ORAL at 09:10

## 2018-10-30 NOTE — HPI
Mr. Meneses is a 87 year old male with history of CAD s/p CABG x 1, severe AS s/p AVR (2017), AFIB, CHF, HTN, HLP who presented to [facility] ED after suffering a ground level fall. He reports that he tripped and feel while walking outside. Denies any loss of consciousness. Imaging revealed a left femoral neck fracture. Dr. Simental evaluated patient. Hospital medicine called for admission. Cardiology consulted today for clearance prior to surgery. Patient seen and examined in room. Denies chest pain or anginal equivalents. No shortness of breath, EISENBERG or palpitations. NO leg swelling today. Reports compliance with medications and dietary restrictions. Low to moderate periop risk of CV events for moderate risk procedure.      11/2/18-Re consulted due to shock post left hip replacement. Transferred to ICU for Levophed support for BP. Patient seen and examined in room. Awake and alert, able to answer questions at this time. No chest pain or anginal equivalents. Levophed gtt for BP support in place. TTE pending.

## 2018-10-30 NOTE — CONSULTS
[facility]  Cardiology  Consult Note    Patient Name: Chung Meneses Sr.  MRN: [id number]  Admission Date: 10/29/2018  Hospital Length of Stay: 1 days  Code Status: Full Code   Attending Provider: Jake Eastman MD   Consulting Provider: Sahara Olguin NP  Primary Care Physician: Glenroy Carmichael MD  Principal Problem:Left displaced femoral neck fracture    Patient information was obtained from patient, relative(s), past medical records and ER records.     Inpatient consult to Cardiology  Consult performed by: Sahara Olguin NP  Consult ordered by: Yokasta Vila MD        Subjective:     Chief Complaint:  Fall      HPI:   Mr. Meneses is a 87 year old male with history of CAD s/p CABG x 1, severe AS s/p AVR (2017), AFIB, CHF, HTN, HLP who presented to [facility] ED after suffering a ground level fall. He reports that he tripped and feel while walking outside. Denies any loss of consciousness. Imaging revealed a left femoral neck fracture. Dr. Simental evaluated patient. Hospital medicine called for admission. Cardiology consulted today for clearance prior to surgery. Patient seen and examined in room. Denies chest pain or anginal equivalents. No shortness of breath, EISENBERG or palpitations. NO leg swelling today. Reports compliance with medications and dietary restrictions. Low to moderate periop risk of CV events for moderate risk procedure.          Past Medical History:   Diagnosis Date    Aortic stenosis 8/2/2013    Atrial fibrillation 7/5/2013    CHF (congestive heart failure)     Coronary artery disease     Dizziness - light-headed     Hyperlipidemia     Hypertension     Pulmonary hypertension 11/30/2017    S/P AVR 12/1/2017    Syncope 1/26/2018       Past Surgical History:   Procedure Laterality Date    AORTOCORONARY BYPASS-CABG N/A 11/30/2017    Performed by Shaq Boateng [facility] at [facility] OR    BACK SURGERY  2003    CARDIAC VALVE SURGERY      CARDIOVERSION N/A 5/1/2017    Performed  by [facility]za [facility] at [facility] CATH LAB    CARDIOVERSION N/A 9/4/2013    Performed by Patricia [facility]za MD at [facility] CATH LAB    CORONARY ARTERY BYPASS GRAFT      HARVEST-VEIN-ENDOVASCULAR Left 11/30/2017    Performed by [facility]q Boateng MD at [facility] OR    HEART CATH-BILATERAL Bilateral 11/14/2017    Performed by Patricia [facility] MD at [facility] CATH LAB    knee replacemnt bilateral  2001    REPLACEMENT-VALVE-AORTIC N/A 11/30/2017    Performed by [facility]q Boateng MD at [facility] OR    SHOULDER SURGERY  2002    TRANSESOPHAGEAL ECHOCARDIOGRAM (ESTEFANY) N/A 11/30/2017    Performed by Shaq Boateng MD at [facility] OR       Review of patient's allergies indicates:   Allergen Reactions    Sulfa (sulfonamide antibiotics) Hives       No current facility-administered medications on file prior to encounter.      Current Outpatient Medications on File Prior to Encounter   Medication Sig    apixaban (ELIQUIS) 2.5 mg Tab Take 1 tablet (2.5 mg total) by mouth 2 (two) times daily.    aspirin (ECOTRIN) 81 MG EC tablet Take 1 tablet (81 mg total) by mouth once daily.    fish oil-omega-3 fatty acids 300-1,000 mg capsule Take 1,200 mg by mouth once daily.    metoprolol succinate (TOPROL-XL) 25 MG 24 hr tablet Take 25 mg by mouth 2 (two) times daily.    multivitamin with folic acid 400 mcg Tab Take 1 tablet by mouth.    potassium chloride SA (K-DUR,KLOR-CON) 20 MEQ tablet TAKE 1 TABLET BY MOUTH IN THE MORNING    ranitidine (ZANTAC) 75 MG tablet Take 75 mg by mouth nightly.     torsemide (DEMADEX) 20 MG Tab Take 20 mg by mouth once daily. 2 tablets (40 mg) everyday    vitamin D 1000 units Tab Take 1,000 Units by mouth once daily.      Family History     Problem Relation (Age of Onset)    Heart attack Brother    Heart disease Brother    Heart failure Brother    Hypertension Mother, Father, Sister, Brother        Tobacco Use    Smoking status: Never Smoker    Smokeless tobacco: Never Used   Substance and Sexual Activity    Alcohol use: No      Comment: HOLIDAY    Drug use: No    Sexual activity: No     Review of Systems   Constitution: Negative for weakness and malaise/fatigue.   HENT: Negative for hearing loss and hoarse voice.    Eyes: Negative for visual disturbance.   Cardiovascular: Negative for chest pain, claudication, dyspnea on exertion, irregular heartbeat, leg swelling, near-syncope, orthopnea, palpitations, paroxysmal nocturnal dyspnea and syncope.   Respiratory: Negative for cough, hemoptysis, shortness of breath, sleep disturbances due to breathing, snoring and wheezing.    Hematologic/Lymphatic: Does not bruise/bleed easily.   Skin: Negative for color change, dry skin and nail changes.   Musculoskeletal: Positive for arthritis and joint pain (left hip). Negative for back pain and myalgias.   Gastrointestinal: Negative for bloating, abdominal pain, constipation, nausea and vomiting.   Genitourinary: Negative for dysuria, flank pain, hematuria and hesitancy.   Neurological: Negative for headaches, light-headedness, loss of balance, numbness and paresthesias.   Psychiatric/Behavioral: Negative for altered mental status.   Allergic/Immunologic: Negative for environmental allergies.     Objective:     Vital Signs (Most Recent):  Temp: 99.5 °F (37.5 °C) (10/30/18 1149)  Pulse: 72 (10/30/18 1149)  Resp: 16 (10/30/18 1149)  BP: 134/75 (10/30/18 1149)  SpO2: 95 % (10/30/18 1149) Vital Signs (24h Range):  Temp:  [97.6 °F (36.4 °C)-99.8 °F (37.7 °C)] 99.5 °F (37.5 °C)  Pulse:  [67-78] 72  Resp:  [16-20] 16  SpO2:  [95 %-99 %] 95 %  BP: (134-182)/(67-84) 134/75     Weight: 84.6 kg (186 lb 8.2 oz)  Body mass index is 27.54 kg/m².    SpO2: 95 %  O2 Device (Oxygen Therapy): room air      Intake/Output Summary (Last 24 hours) at 10/30/2018 1304  Last data filed at 10/30/2018 0400  Gross per 24 hour   Intake 100 ml   Output 500 ml   Net -400 ml       Lines/Drains/Airways     Peripheral Intravenous Line                 Peripheral IV - Single Lumen  10/29/18 1440 Left Antecubital less than 1 day                Physical Exam   Constitutional: He is oriented to person, place, and time. He appears well-developed and well-nourished. No distress.   HENT:   Head: Normocephalic and atraumatic.   Eyes: Pupils are equal, round, and reactive to light.   Neck: Normal range of motion and full passive range of motion without pain. Neck supple. No JVD present.   Cardiovascular: Normal rate, regular rhythm, S1 normal, S2 normal and intact distal pulses. PMI is not displaced. Exam reveals no distant heart sounds.   No murmur heard.  Pulses:       Radial pulses are 2+ on the right side, and 2+ on the left side.        Dorsalis pedis pulses are 2+ on the right side, and 2+ on the left side.   Pulmonary/Chest: Effort normal and breath sounds normal. No respiratory distress. He has no wheezes.   Abdominal: Soft. Bowel sounds are normal. He exhibits no distension. There is no tenderness.   Musculoskeletal: Normal range of motion. He exhibits no edema.        Right ankle: He exhibits no swelling.        Left ankle: He exhibits no swelling.   Neurological: He is alert and oriented to person, place, and time.   Skin: Skin is warm and dry. No rash noted. He is not diaphoretic. No cyanosis or erythema. No pallor. Nails show no clubbing.   Psychiatric: He has a normal mood and affect. His speech is normal and behavior is normal. Judgment and thought content normal. Cognition and memory are normal.   Nursing note and vitals reviewed.      Significant Labs:   All pertinent lab results from the last 24 hours have been reviewed. and   Recent Lab Results       10/30/18  0403   10/29/18  2358   10/29/18  1440        Albumin 3.2   3.7     Alkaline Phosphatase 201   225     ALT 19   22     Anion Gap 11   12     Aniso     Slight     aPTT     38.3  Comment:  aPTT therapeutic range = 39-69 seconds     AST 32   40     Baso # 0.02   0.03     Basophil% 0.3   0.3     Total Bilirubin 3.0  Comment:  For  infants and newborns, interpretation of results should be based  on gestational age, weight and in agreement with clinical  observations.  Premature Infant recommended reference ranges:  Up to 24 hours.............<8.0 mg/dL  Up to 48 hours............<12.0 mg/dL  3-5 days..................<15.0 mg/dL  6-29 days.................<15.0 mg/dL     3.5  Comment:  For infants and newborns, interpretation of results should be based  on gestational age, weight and in agreement with clinical  observations.  Premature Infant recommended reference ranges:  Up to 24 hours.............<8.0 mg/dL  Up to 48 hours............<12.0 mg/dL  3-5 days..................<15.0 mg/dL  6-29 days.................<15.0 mg/dL       BUN, Bld 54   52     Calcium 9.2   9.7     Chloride 103   100     CO2 24   25     Creatinine 2.0   2.0     Differential Method Automated   Automated     eGFR if  34   34     eGFR if non  29  Comment:  Calculation used to obtain the estimated glomerular filtration  rate (eGFR) is the CKD-EPI equation.      29  Comment:  Calculation used to obtain the estimated glomerular filtration  rate (eGFR) is the CKD-EPI equation.        Eos # 0.1   0.1     Eosinophil% 2.1   0.6     Glucose 172   103     Gran # (ANC) 4.9   7.6     Gran% 73.1   82.0     Group & Rh   A POS       Hematocrit 36.6   40.4     Hemoglobin 12.1   13.2     Hypo     Occasional     INDIRECT LEEANN   NEG       Coumadin Monitoring INR     1.4  Comment:  Coumadin Therapy:  2.0 - 3.0 for INR for all indicators except mechanical heart valves  and antiphospholipid syndromes which should use 2.5 - 3.5.       Lymph # 0.7   1.2     Lymph% 9.7   13.1     MCH 30.3   30.3     MCHC 33.1   32.7     MCV 92   93     Mono # 1.0   0.4     Mono% 14.8   4.2     MPV 10.2   10.5     Ovalocytes     Occasional     Platelet Estimate     Appears normal     Platelets 148   175     Poik     Slight     Potassium 4.1   4.1     Total Protein 7.0   7.9      Protime     14.2     RBC 4.00   4.35     RDW 17.3   17.3     Sodium 138   137     Spherocytes     Occasional     Stomatocytes     Present     Tear Drop Cells     Occasional     WBC 6.69   9.33           Significant Imaging: Echocardiogram:   2D echo with color flow doppler:   Results for orders placed or performed in visit on 01/26/18   2D Echo w/ Color Flow Doppler   Result Value Ref Range    Calculated EF 50 55 - 65    Mitral Valve Regurgitation MODERATE (A)     Diastolic Dysfunction Yes (A)     Est. PA Systolic Pressure 41.01 (A)     Mitral Valve Mobility NORMAL     Tricuspid Valve Regurgitation MILD TO MODERATE     and X-Ray: CXR: X-Ray Chest 1 View (CXR): No results found for this visit on 10/29/18. and X-Ray Chest PA and Lateral (CXR): No results found for this visit on 10/29/18.    Assessment and Plan:     * Left displaced femoral neck fracture    Per primary team/ ortho     CAD (coronary artery disease)    Continue BB and Torsemide  Hold ASA and Eliquis  Hold ACei/ARB for now given renal function  No chest pain or anginal equivalents on exam today.   Resume statin post op  Check FLP in AM    Low to moderate periop risk of CV events for moderate risk procedure.  No chest pain, active arrhythmia and CHF symptoms.  Ok to proceed the scheduled surgery without further cardiac study.    Will be available as needed.          S/P AVR    Continue BB and torsemide  Hold ASA and Eliquis prior to surgery  Resume as soon as possible post op     CHF (congestive heart failure)    Appears compensated on exam  Continue torsemide  DASH diet, 2 gm sodium restriction  1L fluid restriction per day  Intake and output     Essential hypertension    On medical therapy  Continue BB and torsemide  Hold ACEI due to elevated renal function  Resume when able.          VTE Risk Mitigation (From admission, onward)    None          Thank you for your consult. I will follow-up with patient. Please contact us if you have any additional  questions.    Sahara Olguin NP  Cardiology   [facility]

## 2018-10-30 NOTE — PROGRESS NOTES
Wound dressing removed. Wound noted in inner calf. Clean. No active bleeding. Ulcer to lower shin yellow center. Old popilateal wound dry crusty. Wound care nurse to room after unwrapping dressing.

## 2018-10-30 NOTE — ASSESSMENT & PLAN NOTE
- Ortho consulted   - Hold Eliquis and ASA  - Analgesia PRN   - PT/OT consult   - NPO after midnight     10/30/18 No acute issues overnight. The patient reports that his pain is well controlled at this time. Dr. Simental will likely take the patient to surgery tomorrow. NPO after MN
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
## 2018-10-30 NOTE — PROGRESS NOTES
Pt with exp wheezing to lll. Pt with ins and exp wheezing to rt lobes. Denies sob. 2 + edema to ble. Hx of chf. Will moitor. No resp distress noted. Family at bedside. Pt repositioned to rt side. Inner fold of buttocks. Moist with sloughing. No ulcers noted.

## 2018-10-30 NOTE — ANESTHESIA PREPROCEDURE EVALUATION
10/30/2018  Chung Meneses Sr. is a 87 y.o., male.    Pre-op Assessment    I have reviewed the Patient Summary Reports.     I have reviewed the Nursing Notes.   I have reviewed the Medications.     Review of Systems  Anesthesia Hx:  No problems with previous Anesthesia Denies Hx of Anesthetic complications  Denies Family Hx of Anesthesia complications.   Denies Personal Hx of Anesthesia complications.   Social:  Non-Smoker, No Alcohol Use    Hematology/Oncology:         -- Anemia: chronic   Cardiovascular:   Exercise tolerance: poor Hypertension, well controlled Valvular problems/Murmurs (S/P AVR 10/2017) Denies MI. CAD   Dysrhythmias atrial fibrillation CHF hyperlipidemia ECG has been reviewed. Afib with slow vent response on EKG, 2 runs of tachycardia last night Functional Capacity low / < 4 METS  Congestive Heart Failure (CHF) , Chronic Congestive Heart Failure , LV Systolic HF Cardiomyopathy Decreased Myocardial Function/Contractility, determined by 2D echo, moderately decreased LVEF (25 -39 %)  Hypertension, Essential Hypertension  Disorder of Cardiac Rhythm, Atrial Fibrillation, Chronic Atrial Fibrillation    Pulmonary:  Pulmonary Normal  Denies Pulmonary Symptoms.    Renal/:   Chronic Renal Disease  Denies Renal Symptoms/Infections/Stones    Hepatic/GI:  Denies Hepatic/GI Symptoms    Musculoskeletal:   Arthritis   Denies Musculoskeletal General/Symptoms    Neurological:  Neurology Normal   no Neuro Symptoms   Endocrine:  Endocrine Normal  Denies Diabetes  Denies Thyroid Disease    Psych:  Psychiatric Normal                                                                                                                    10/30/2018  Chung Meneses Sr. is a 87 y.o., male.    Anesthesia Evaluation    I have reviewed the Patient Summary Reports.    I have reviewed the Nursing Notes.   I have reviewed the  Medications.     Review of Systems  Anesthesia Hx:  No problems with previous Anesthesia  History of prior surgery of interest to airway management or planning: Denies Family Hx of Anesthesia complications.   Denies Personal Hx of Anesthesia complications.   Social:  Non-Smoker    Cardiovascular:   Hypertension Valvular problems/Murmurs, AS Dysrhythmias hyperlipidemia ECG has been reviewed. Echo 05/15  CONCLUSIONS     1 - Biatrial enlargement.     2 - Concentric hypertrophy.     3 - Normal left ventricular systolic function (EF 55-60%).     4 - Left ventricular diastolic dysfunction.     5 - Normal right ventricular systolic function .     6 - Moderate aortic stenosis, HERNANDEZ = 1.15 cm2, mean gradient = 11.0 mmHg.     7 - Trivial aortic regurgitation.     8 - Mild mitral regurgitation.     9 - Mild tricuspid regurgitation.     10 - Mild pulmonic regurgitation.    Pulmonary:  Pulmonary Normal    Hepatic/GI:  Hepatic/GI Normal    Neurological:  Neurology Normal    Endocrine:  Endocrine Normal    Psych:  Psychiatric Normal           Physical Exam  General:  Well nourished    Airway/Jaw/Neck:  Airway Findings: Mouth Opening: Normal Tongue: Normal  General Airway Assessment: Adult  Mallampati: II  TM Distance: Normal, at least 6 cm  Jaw/Neck Findings:  Neck ROM: Extension Decreased, Mod.      Dental:  Dental Findings: upper partial denturesPoor dentition, upper partial removed.   Chest/Lungs:  Chest/Lungs Findings: Normal Respiratory Rate     Heart/Vascular:  Heart Findings: Rhythm: Irregularly Irregular             Anesthesia Plan  Type of Anesthesia, risks & benefits discussed:  Anesthesia Type:  MAC  Patient's Preference:   Intra-op Monitoring Plan:   Intra-op Monitoring Plan Comments:   Post Op Pain Control Plan:   Post Op Pain Control Plan Comments:   Induction:   IV  Beta Blocker:  Patient is on a Beta-Blocker and has received one dose within the past 24 hours (No further documentation required).       Informed  Consent: Patient understands risks and agrees with Anesthesia plan.  Questions answered. Anesthesia consent signed with patient.  ASA Score: 3     Day of Surgery Review of History & Physical:    H&P update referred to the surgeon.         Ready For Surgery From Anesthesia Perspective.         Physical Exam  General:  Well nourished    Airway/Jaw/Neck:  Airway Findings: Mouth Opening: Normal Tongue: Normal  General Airway Assessment: Adult  Mallampati: I  TM Distance: Normal, at least 6 cm      Dental:  Dental Findings: upper partial dentures   Chest/Lungs:  Chest/Lungs Findings: Normal Respiratory Rate  Right middle/upper lobe lung exp wheezing     Heart/Vascular:  Heart Findings: Rate: Normal        Mental Status:  Mental Status Findings:  Cooperative, Alert and Oriented         Anesthesia Plan  Type of Anesthesia, risks & benefits discussed:  Anesthesia Type:  general  Patient's Preference:   Intra-op Monitoring Plan: arterial line, central line, [place], cardiac output and standard ASA monitors  Intra-op Monitoring Plan Comments:   Post Op Pain Control Plan: multimodal analgesia  Post Op Pain Control Plan Comments:   Induction:   IV  Beta Blocker:  Patient is on a Beta-Blocker and has received one dose within the past 24 hours (No further documentation required).       Informed Consent: Patient understands risks and agrees with Anesthesia plan.  Questions answered. Anesthesia consent signed with patient.  ASA Score: 3     Day of Surgery Review of History & Physical: I have interviewed and examined the patient. I have reviewed the patient's H&P dated:  There are no significant changes.          Ready For Surgery From Anesthesia Perspective.

## 2018-10-30 NOTE — HOSPITAL COURSE
10/30/18-Patient admitted to telemetry, pending left hip replacement tomorrow. No chest pain or anginal equivalents. No shortness of breath or palpitations.     11/2/18-Patient admitted to ICU today due to low BP requiring Levophed support. No chest pain or anginal equivalents at this time. Denies SOB or EISENBERG. TTE pending.     11/3/18-Patient seen and examined in ICU, IV levophed gtt in place. Is contemplating dialysis vs hospice care. Labs reviewed, Cr 5.2, INR 5.2, LA 6.6.

## 2018-10-30 NOTE — PROGRESS NOTES
"CC:  This is an 87-year-old male that complains of left hip and left elbow pain.  Chief Complaint   Patient presents with    Fall     Pt states, "I fell outside of my house and hurt my left hip, left elbow and my head."       HPI:  The patient states that he fell and sustained a fracture to the left hip. Patient also sustained a contusion to the left elbow.  The patient indicates that the pain level as a 10/10 he was unable to ambulate following the fall and fracture.  The patient does have a history of arthritis in left hip.  He does have a history of chronic wounds the left lower extremity with poor wound healing.    PMH:    Past Medical History:   Diagnosis Date    Aortic stenosis 8/2/2013    Atrial fibrillation 7/5/2013    CHF (congestive heart failure)     Coronary artery disease     Dizziness - light-headed     Hyperlipidemia     Hypertension     Pulmonary hypertension 11/30/2017    S/P AVR 12/1/2017    Syncope 1/26/2018       PSH:    Past Surgical History:   Procedure Laterality Date    AORTOCORONARY BYPASS-CABG N/A 11/30/2017    Performed by [facility] at [facility] OR    BACK SURGERY  2003    CARDIAC VALVE SURGERY      CARDIOVERSION N/A 5/1/2017    Performed by Patricia Esp[facility]za MD at [facility] CATH LAB    CARDIOVERSION N/A 9/4/2013    Performed by [facility] at [facility] CATH LAB    CORONARY ARTERY BYPASS GRAFT      HARVEST-VEIN-ENDOVASCULAR Left 11/30/2017    Performed by [facility]q [facility]eng [facility] at [facility] OR    HEART CATH-BILATERAL Bilateral 11/14/2017    Performed by Patricia [facility] at [facility] CATH LAB    knee replacemnt bilateral  2001    REPLACEMENT-VALVE-AORTIC N/A 11/30/2017    Performed by [facility]q [facility] at [facility] OR    SHOULDER SURGERY  2002    TRANSESOPHAGEAL ECHOCARDIOGRAM (ESTEFANY) N/A 11/30/2017    Performed by [facility]q [facility]eng MD at [facility] OR       Family Hx:    Family History   Problem Relation Age of Onset    Hypertension Mother     Hypertension Father     Hypertension Sister "     Hypertension Brother     Heart failure Brother     Heart disease Brother     Heart attack Brother        Allergy:    Review of patient's allergies indicates:   Allergen Reactions    Sulfa (sulfonamide antibiotics) Hives       Medication:    Current Facility-Administered Medications:     acetaminophen (10 mg/mL) injection 1,000 mg, 1,000 mg, Intravenous, Q8H, Khari Esteban NP, Last Rate: 400 mL/hr at 10/30/18 1423, 1,000 mg at 10/30/18 1423    acetaminophen tablet 650 mg, 650 mg, Oral, Q6H PRN, Khari Esteban NP    albuterol-ipratropium 2.5 mg-0.5 mg/3 mL nebulizer solution 3 mL, 3 mL, Nebulization, Q4H PRN, Khari Esteban NP, 3 mL at 10/30/18 1507    furosemide injection 40 mg, 40 mg, Intravenous, BID, Khari Esteban NP    hydrALAZINE injection 10 mg, 10 mg, Intravenous, Q6H PRN, Khari Esteban NP    metoprolol succinate (TOPROL-XL) 24 hr tablet 50 mg, 50 mg, Oral, BID, Sahara Olguin NP    morphine injection 2 mg, 2 mg, Intravenous, Q4H PRN, Alcides Toledo Jr., MD    morphine injection 4 mg, 4 mg, Intravenous, Q4H PRN, Khari Esteban NP    ondansetron injection 4 mg, 4 mg, Intravenous, Q8H PRN, Alcides Toledo Jr., MD    Social History:    Social History     Socioeconomic History    Marital status:      Spouse name: Not on file    Number of children: Not on file    Years of education: Not on file    Highest education level: Not on file   Social Needs    Financial resource strain: Not on file    Food insecurity - worry: Not on file    Food insecurity - inability: Not on file    Transportation needs - medical: Not on file    Transportation needs - non-medical: Not on file   Occupational History    Not on file   Tobacco Use    Smoking status: Never Smoker    Smokeless tobacco: Never Used   Substance and Sexual Activity    Alcohol use: No     Comment: HOLIDAY    Drug use: No    Sexual activity: No   Other Topics Concern    Not on file   Social History Narrative  "   Not on file       Vitals:   BP (!) 124/57 (BP Location: Right arm, Patient Position: Lying)   Pulse 84   Temp 99.3 °F (37.4 °C) (Oral)   Resp 18   Ht 5' 9" (1.753 m)   Wt 84.6 kg (186 lb 8.2 oz)   SpO2 (!) 94%   BMI 27.54 kg/m²      ROS:  GENERAL: No fever, chills, fatigability or weight loss.  SKIN: No rashes, itching or changes in color or texture of skin.  PERIPHERAL VASCULAR: No claudication or cyanosis.  NEUROLOGIC: No history of seizures, paralysis, alteration of gait or coordination.  MUSCULOSKELETAL: See HPI    PE:  APPEARANCE: Well nourished, well developed, in no acute distress.   NEUROLOGIC: Cranial Nerves: II-XII grossly intact, also see MUSCULOSKELETAL  MUSCULOSKELETAL:     Left hip-1+ dorsalis pedis and posterior tibial artery pulses. Grossly neurovascularly intact. There is pain on range of motion with crepitus of the left hip.    Assessment:  X-Ray Chest AP Portable  Narrative: EXAMINATION:  XR CHEST AP PORTABLE    CLINICAL HISTORY:  Fracture of unspecified part of neck of unspecified femur, initial encounter for closed fracture    COMPARISON:  A plain film examination of the left ribs performed on 10/29/2018.    FINDINGS:  There are multiple sternotomy wires in place.  There is partial visualization of a prosthetic right humeral head.  There is a prosthetic left humeral head in place.  There is an acute appearing nondisplaced fracture in the lateral aspect of the left 9th rib that is best seen on the dedicated rib series.  The size of the heart is normal. The lungs are clear. There is no pneumothorax.  The costophrenic angles are sharp.  Impression: 1. There is an acute appearing nondisplaced fracture in the lateral aspect of the left 9th rib that is best seen on the dedicated rib series.  2. The lungs are clear.  3. Surgical changes  .    Electronically signed by: Antelmo Mckinney MD  Date:    10/29/2018  Time:    14:19  X-Ray Ribs 2 View Left  Narrative: EXAMINATION:  XR RIBS 2 VIEW " LEFT    TECHNIQUE:  Standard knee MRI protocol without IV contrast was performed.    FINDINGS:  There is an acute appearing nondisplaced fracture in the lateral aspect of the left 9th rib.  There are healed fractures in the left 10th and 11th ribs.  There are multiple sternotomy wires in place.  There is partial visualization of a prosthetic left humeral head.  There is no pneumothorax.  The left costophrenic angle is sharp.  There is no evidence of an acute pulmonary process.  Impression: 1. There is an acute appearing nondisplaced fracture in the lateral aspect of the left 9th rib.  2. Surgical changes    Electronically signed by: Antelmo Mckinney MD  Date:    10/29/2018  Time:    13:09  X-Ray Elbow Complete Left  Narrative: EXAMINATION:  XR ELBOW COMPLETE 3 VIEW LEFT    CLINICAL HISTORY:  Unspecified injury of left elbow, initial encounter    COMPARISON:  None    FINDINGS:  There is no acute fracture visualized.  There is no dislocation.  There are several osseous densities in the lateral aspect of the left elbow.  One of the larger ones measures 8 mm.  There is a 6 mm calcific density in the expected location of the distal aspect of the triceps tendon.  Impression: 1. There is no acute fracture visualized.  2. There are several osseous densities in the lateral aspect of the left elbow. One of the larger ones measures 8 mm.  These are characteristic of intra-articular loose bodies.  3. There is a 6 mm calcific density in the expected location of the distal aspect of the triceps tendon.  This is characteristic of prior trauma.    Electronically signed by: Antelmo Mckinney MD  Date:    10/29/2018  Time:    13:03  X-Ray Hip 2 View Left  Narrative: EXAMINATION:  XR HIP 2 VIEW LEFT    CLINICAL HISTORY:  Unspecified injury of left hip, initial encounter    COMPARISON:  None    FINDINGS:  There is a poorly visualized fracture in the left femoral neck.  The left femoral head still articulates with the left acetabulum.   There is a moderate amount of atherosclerosis.  There is partial visualization of posterior spinal fusion hardware in the lumbar spine.  Impression: 1. There is a poorly visualized fracture in the left femoral neck.  The left femoral head still articulates with the left acetabulum.  2. There is partial visualization of posterior spinal fusion hardware in the lumbar spine.  3. There is a moderate amount of atherosclerosis.    Electronically signed by: Antelmo Mckinney MD  Date:    10/29/2018  Time:    13:01  CT Head Without Contrast  Narrative: EXAMINATION:  CT HEAD WITHOUT CONTRAST    CLINICAL HISTORY:  head injury;    TECHNIQUE:  Low dose axial CT images obtained throughout the head without intravenous contrast. Sagittal and coronal reconstructions were performed.  All CT scans at this facility use dose modulation, iterative reconstruction and/or weight based dosing when appropriate to reduce radiation dose to as low as reasonably achievable.    COMPARISON:  03/06/2018    FINDINGS:  Intracranial compartment:    The brain parenchyma demonstrates areas of decreased attenuation with mild to moderate periventricular white matter consistent with chronic microvascular ischemic changes..  No parenchymal mass, hemorrhage, edema or major vascular distribution infarct.  Vascular calcifications are noted.    Mild prominence of the sulci and ventricles are consistent with age-related involutional changes.    No extra-axial blood or fluid collections.    Skull/extracranial contents (limited evaluation): No fracture. Mastoid air cells and paranasal sinuses are essentially clear.  Impression: Chronic microvascular ischemic changes.    Electronically signed by: Sharad Wall MD  Date:    10/29/2018  Time:    12:41             Diagnosis:           1.  Left hip arthritis and left hip femoral neck fracture.                   Diagnostic Studies  MRI-No  X-Ray-No  EMG/NCV-No  Arthrogram-No  Bone Scan-No  CT  Scan-No  Doppler-No  ESR-No  CRP-none  CBC with Diff-Yes   Rheumatoid/Arthritis Panel-No      Plan:                                                 1. PT-yes                                                 2.OT-yes                                          3.NSAID-no                                        4. Narcotics-yes                                     5. Wound care-No                                 6. Rest-yes                                           7. Surgery-yes, left hip anterior total hip replacement                                        8. GRIS Hose-yes                                    9. Anticoagulation therapy-yes               10. Elevation-no                                     11. Crutches-no                                    12. Walker-yes             13. Cane no                        14. Referral-no                                     15.Injection-no                            16. Splint   /    Cast   /   Cast Shoe-No              17. RICE-none

## 2018-10-30 NOTE — SUBJECTIVE & OBJECTIVE
Past Medical History:   Diagnosis Date    Aortic stenosis 8/2/2013    Atrial fibrillation 7/5/2013    CHF (congestive heart failure)     Coronary artery disease     Dizziness - light-headed     Hyperlipidemia     Hypertension     Pulmonary hypertension 11/30/2017    S/P AVR 12/1/2017    Syncope 1/26/2018       Past Surgical History:   Procedure Laterality Date    AORTOCORONARY BYPASS-CABG N/A 11/30/2017    Performed by [facility] OR    BACK SURGERY  2003    CARDIAC VALVE SURGERY      CARDIOVERSION N/A 5/1/2017    Performed by Patricia [facility] at [facility] CATH LAB    CARDIOVERSION N/A 9/4/2013    Performed by [facility] at [facility] CATH LAB    CORONARY ARTERY BYPASS GRAFT      HARVEST-VEIN-ENDOVASCULAR Left 11/30/2017    [facility]formed [facility] at [facility] OR    HEART CATH-BILATERAL Bilateral 11/14/2017    Performed by [facility] at [facility] CATH LAB    knee replacemnt bilateral  2001    REPLACEMENT-VALVE-AORTIC N/A 11/30/2017    Performed by [facility] at [facility] OR    SHOULDER SURGERY  2002    TRANSESOPHAGEAL ECHOCARDIOGRAM (ESTEFANY) N/A 11/30/2017    Performed by [facility]q [facility] at [facility] OR       Review of patient's allergies indicates:   Allergen Reactions    Sulfa (sulfonamide antibiotics) Hives       No current facility-administered medications on file prior to encounter.      Current Outpatient Medications on File Prior to Encounter   Medication Sig    apixaban (ELIQUIS) 2.5 mg Tab Take 1 tablet (2.5 mg total) by mouth 2 (two) times daily.    aspirin (ECOTRIN) 81 MG EC tablet Take 1 tablet (81 mg total) by mouth once daily.    fish oil-omega-3 fatty acids 300-1,000 mg capsule Take 1,200 mg by mouth once daily.    metoprolol succinate (TOPROL-XL) 25 MG 24 hr tablet Take 25 mg by mouth 2 (two) times daily.    multivitamin with folic acid 400 mcg Tab Take 1 tablet by mouth.    potassium chloride SA (K-DUR,KLOR-CON) 20 MEQ tablet TAKE 1 TABLET BY MOUTH  IN THE MORNING    ranitidine (ZANTAC) 75 MG tablet Take 75 mg by mouth nightly.     torsemide (DEMADEX) 20 MG Tab Take 20 mg by mouth once daily. 2 tablets (40 mg) everyday    vitamin D 1000 units Tab Take 1,000 Units by mouth once daily.      Family History     Problem Relation (Age of Onset)    Heart attack Brother    Heart disease Brother    Heart failure Brother    Hypertension Mother, Father, Sister, Brother        Tobacco Use    Smoking status: Never Smoker    Smokeless tobacco: Never Used   Substance and Sexual Activity    Alcohol use: No     Comment: HOLIDAY    Drug use: No    Sexual activity: No     Review of Systems   Constitution: Negative for weakness and malaise/fatigue.   HENT: Negative for hearing loss and hoarse voice.    Eyes: Negative for visual disturbance.   Cardiovascular: Negative for chest pain, claudication, dyspnea on exertion, irregular heartbeat, leg swelling, near-syncope, orthopnea, palpitations, paroxysmal nocturnal dyspnea and syncope.   Respiratory: Negative for cough, hemoptysis, shortness of breath, sleep disturbances due to breathing, snoring and wheezing.    Hematologic/Lymphatic: Does not bruise/bleed easily.   Skin: Negative for color change, dry skin and nail changes.   Musculoskeletal: Positive for arthritis and joint pain (left hip). Negative for back pain and myalgias.   Gastrointestinal: Negative for bloating, abdominal pain, constipation, nausea and vomiting.   Genitourinary: Negative for dysuria, flank pain, hematuria and hesitancy.   Neurological: Negative for headaches, light-headedness, loss of balance, numbness and paresthesias.   Psychiatric/Behavioral: Negative for altered mental status.   Allergic/Immunologic: Negative for environmental allergies.     Objective:     Vital Signs (Most Recent):  Temp: 99.5 °F (37.5 °C) (10/30/18 1149)  Pulse: 72 (10/30/18 1149)  Resp: 16 (10/30/18 1149)  BP: 134/75 (10/30/18 1149)  SpO2: 95 % (10/30/18 1149) Vital Signs (24h  Range):  Temp:  [97.6 °F (36.4 °C)-99.8 °F (37.7 °C)] 99.5 °F (37.5 °C)  Pulse:  [67-78] 72  Resp:  [16-20] 16  SpO2:  [95 %-99 %] 95 %  BP: (134-182)/(67-84) 134/75     Weight: 84.6 kg (186 lb 8.2 oz)  Body mass index is 27.54 kg/m².    SpO2: 95 %  O2 Device (Oxygen Therapy): room air      Intake/Output Summary (Last 24 hours) at 10/30/2018 1304  Last data filed at 10/30/2018 0400  Gross per 24 hour   Intake 100 ml   Output 500 ml   Net -400 ml       Lines/Drains/Airways     Peripheral Intravenous Line                 Peripheral IV - Single Lumen 10/29/18 1440 Left Antecubital less than 1 day                Physical Exam   Constitutional: He is oriented to person, place, and time. He appears well-developed and well-nourished. No distress.   HENT:   Head: Normocephalic and atraumatic.   Eyes: Pupils are equal, round, and reactive to light.   Neck: Normal range of motion and full passive range of motion without pain. Neck supple. No JVD present.   Cardiovascular: Normal rate, regular rhythm, S1 normal, S2 normal and intact distal pulses. PMI is not displaced. Exam reveals no distant heart sounds.   No murmur heard.  Pulses:       Radial pulses are 2+ on the right side, and 2+ on the left side.        Dorsalis pedis pulses are 2+ on the right side, and 2+ on the left side.   Pulmonary/Chest: Effort normal and breath sounds normal. No respiratory distress. He has no wheezes.   Abdominal: Soft. Bowel sounds are normal. He exhibits no distension. There is no tenderness.   Musculoskeletal: Normal range of motion. He exhibits no edema.        Right ankle: He exhibits no swelling.        Left ankle: He exhibits no swelling.   Neurological: He is alert and oriented to person, place, and time.   Skin: Skin is warm and dry. No rash noted. He is not diaphoretic. No cyanosis or erythema. No pallor. Nails show no clubbing.   Psychiatric: He has a normal mood and affect. His speech is normal and behavior is normal. Judgment and  thought content normal. Cognition and memory are normal.   Nursing note and vitals reviewed.      Significant Labs:   All pertinent lab results from the last 24 hours have been reviewed. and   Recent Lab Results       10/30/18  0403   10/29/18  2358   10/29/18  1440        Albumin 3.2   3.7     Alkaline Phosphatase 201   225     ALT 19   22     Anion Gap 11   12     Aniso     Slight     aPTT     38.3  Comment:  aPTT therapeutic range = 39-69 seconds     AST 32   40     Baso # 0.02   0.03     Basophil% 0.3   0.3     Total Bilirubin 3.0  Comment:  For infants and newborns, interpretation of results should be based  on gestational age, weight and in agreement with clinical  observations.  Premature Infant recommended reference ranges:  Up to 24 hours.............<8.0 mg/dL  Up to 48 hours............<12.0 mg/dL  3-5 days..................<15.0 mg/dL  6-29 days.................<15.0 mg/dL     3.5  Comment:  For infants and newborns, interpretation of results should be based  on gestational age, weight and in agreement with clinical  observations.  Premature Infant recommended reference ranges:  Up to 24 hours.............<8.0 mg/dL  Up to 48 hours............<12.0 mg/dL  3-5 days..................<15.0 mg/dL  6-29 days.................<15.0 mg/dL       BUN, Bld 54   52     Calcium 9.2   9.7     Chloride 103   100     CO2 24   25     Creatinine 2.0   2.0     Differential Method Automated   Automated     eGFR if  34   34     eGFR if non  29  Comment:  Calculation used to obtain the estimated glomerular filtration  rate (eGFR) is the CKD-EPI equation.      29  Comment:  Calculation used to obtain the estimated glomerular filtration  rate (eGFR) is the CKD-EPI equation.        Eos # 0.1   0.1     Eosinophil% 2.1   0.6     Glucose 172   103     Gran # (ANC) 4.9   7.6     Gran% 73.1   82.0     Group & Rh   A POS       Hematocrit 36.6   40.4     Hemoglobin 12.1   13.2     Hypo     Occasional      INDIRECT LEEANN   NEG       Coumadin Monitoring INR     1.4  Comment:  Coumadin Therapy:  2.0 - 3.0 for INR for all indicators except mechanical heart valves  and antiphospholipid syndromes which should use 2.5 - 3.5.       Lymph # 0.7   1.2     Lymph% 9.7   13.1     MCH 30.3   30.3     MCHC 33.1   32.7     MCV 92   93     Mono # 1.0   0.4     Mono% 14.8   4.2     MPV 10.2   10.5     Ovalocytes     Occasional     Platelet Estimate     Appears normal     Platelets 148   175     Poik     Slight     Potassium 4.1   4.1     Total Protein 7.0   7.9     Protime     14.2     RBC 4.00   4.35     RDW 17.3   17.3     Sodium 138   137     Spherocytes     Occasional     Stomatocytes     Present     Tear Drop Cells     Occasional     WBC 6.69   9.33           Significant Imaging: Echocardiogram:   2D echo with color flow doppler:   Results for orders placed or performed in visit on 01/26/18   2D Echo w/ Color Flow Doppler   Result Value Ref Range    Calculated EF 50 55 - 65    Mitral Valve Regurgitation MODERATE (A)     Diastolic Dysfunction Yes (A)     Est. PA Systolic Pressure 41.01 (A)     Mitral Valve Mobility NORMAL     Tricuspid Valve Regurgitation MILD TO MODERATE     and X-Ray: CXR: X-Ray Chest 1 View (CXR): No results found for this visit on 10/29/18. and X-Ray Chest PA and Lateral (CXR): No results found for this visit on 10/29/18.

## 2018-10-30 NOTE — PT/OT/SLP PROGRESS
Physical Therapy      Patient Name:  Chung Meneses .   MRN:  [id number]    KALEE WYNNE INITIATED THIS AM VIA CHART REVIEW, PT WITH L HIP FX. AWAITING HIP SX. PLANNED FOR 10-31-18, WILL COMPLETE KALEE WYNNE FOLLOWING SX., NOTIFIED NURSE KARIS Maher, PT   10/30/2018   2349

## 2018-10-30 NOTE — PROGRESS NOTES
Patient has a low to moderate risk for major cardiac event including MI, PE, Vfib, primary cardiac risk and complete heart block based on the revised cardiac index.

## 2018-10-30 NOTE — ASSESSMENT & PLAN NOTE
Continue BB and torsemide  Hold ASA and Eliquis prior to surgery  Resume as soon as possible post op

## 2018-10-30 NOTE — CONSULTS
Consulted on this 86 y/o M patient due to present on admission skin breakdown. Patient admitted after fall with left femur fracture and is awaiting ortho surgery tomorrow. He has PMH significant for VLU to LLE and chronic wound to LLE.  He states he goes to wound care clinic at [facility] every week.  Dressings removed from LLE at this time. Multiple scabs noted.  Cleansed leg with saline and patted dry.  Left lower shin VLU noted measuring 0.5x1x0.1cm with moist red wound bed. Serous fluid filled blister noted distally.  VLU noted to medial calf measuring 4x2x0.2cm with moist red and yellow wound bed and no slough, small amount serosanuinous drainage noted to this site. Aquacel AG extra cut to size and applied to cover ulcers and topped with nonadherent foam.  Sween 24 cream then liberally applied to LLE.  Tubigrip stocking then reapplied. Patient tolerated well. Abrasion noted to left elbow with surrounding ecchymosis and small scab, left RODRIGUEZ.  Left posterior shoulder abrasion noted with moist red wound bed, cleansed with saline and foam dressing applied.  Patient turned to right side.  MASD to gluteal cleft noted.  Recommend critic aid paste moisture barrier to this site BID and prn. Will follow.

## 2018-10-30 NOTE — PROGRESS NOTES
Notified aminah duncan np. Of pt exp and inspiratory wheezing. Orders on chart. Lungs other wise unremarkable. Will monitor. IS was brought to room earlier. Pt educated

## 2018-10-30 NOTE — ASSESSMENT & PLAN NOTE
Appears compensated on exam  Continue torsemide  DASH diet, 2 gm sodium restriction  1L fluid restriction per day  Intake and output

## 2018-10-30 NOTE — PLAN OF CARE
CM met with patient/family at the bedside to assess for discharge needs.  Patient lives at home with his wife and was independent prior to admission.  Patient stated that the plan if for surgery tomorrow but he is unsure of time.  The plan is for a hip replacement.  CM discussed SNF vs home health.  Patient states that he will know more of his needs after surgery and PT/OT.  CM provided a list of in network facilities for patient/family to review.  CM will discuss ratings with family after plan is determined.  CM will continue to follow.  CM provided a transitional care folder, information on advanced directives, information on pharmacy bedside delivery, and discharge planning begins on admission with contact information for any needs/questions.     D/C Plan:  SNF vs Home Health    [facility] - [place], [place] - [address]  [address] [address]  Phone: [phone] Fax: [phone]    [id number] - [place], [place] - [address] [facility]  [address] [facility]  [place] [address]  Phone: [phone] Fax: [phone]    Glenroy Carmichael MD  Payor: [employer] MEDICARE / Plan: [employer] MEDICARE HMO / Product Type: Capitation /       10/30/18 1420   Discharge Assessment   Assessment Type Discharge Planning Assessment   Confirmed/corrected address and phone number on facesheet? Yes   Assessment information obtained from? Patient;Medical Record;Caregiver   Expected Length of Stay (days) (2-3)   Communicated expected length of stay with patient/caregiver yes   Prior to hospitilization cognitive status: Alert/Oriented   Prior to hospitalization functional status: Independent   Current cognitive status: Alert/Oriented   Current Functional Status: Needs Assistance;Partially Dependent   Facility Arrived From: home   Lives With spouse   Able to Return to Prior Arrangements yes   Is patient able to care for self after discharge? Unable to determine at this time (comments)   Who  are your caregiver(s) and their phone number(s)? Jocelin Machuca, daughter [phone]   Patient's perception of discharge disposition skilled nursing facility   Readmission Within The Last 30 Days no previous admission in last 30 days   Patient currently being followed by outpatient case management? No   Patient currently receives any other outside agency services? No   Equipment Currently Used at Home none   Do you have any problems affording any of your prescribed medications? No   Is the patient taking medications as prescribed? yes   Does the patient have transportation home? Yes   Transportation Available car;family or friend will provide   Dialysis Name and Scheduled days NA   Does the patient receive services at the [facility]? No   Discharge Plan A Skilled Nursing Facility   Discharge Plan B Home with family;Home Health   Patient/Family In Agreement With Plan yes

## 2018-10-30 NOTE — PROGRESS NOTES
[facility] Hospital Medicine  Progress Note    Patient Name: Chung Meneses Sr.  MRN: [id number]  Patient Class: IP- Inpatient   Admission Date: 10/29/2018  Length of Stay: 1 days  Attending Physician: Jake Eastman MD  Primary Care Provider: Glenroy Carmichael MD        Subjective:     Principal Problem:Left displaced femoral neck fracture    HPI:  Chung Meneses is an 86 yo male with a PMH of Afib, CHF, CAD, HTN, S/P AVR who presented to the ER after suffering a ground level fall at home today. The patient reports that he tripped and fell while walking outside today. The patient reports that he fell on his left side hitting his head. The patient denies any loss of consciousness. Imaging showed a left femoral neck fracture. CXR showed an acute appearing nondisplaced fracture in the lateral aspect of the left 9th rib. The patient reports pain is currently well controlled. Dr. Simental (Ortho) was consulted and will evaluate the patient. Patient denies fever, chills, CP, cough, stallworth, pnd, orthopnea, palpitations, diaphoresis, headache, blurred vision, numbness, tingling, dizziness, localized weakness, n/v/d, abdominal pain, blood in stools, melena, hematemesis, urinary frequency, urgency, dysuria or hematuria.         Hospital Course:  10/30/18 No acute issues overnight. The patient reports that his pain is well controlled at this time. Dr. Simental will likely take the patient to surgery tomorrow. NPO after MN    Interval History: No acute issues overnight. The patient reports that his pain is well controlled at this time. Dr. Simental will likely take the patient to surgery tomorrow. NPO after MN      Review of Systems   Constitutional: Negative for activity change, appetite change, chills, fatigue, fever and unexpected weight change.   HENT: Negative for congestion, facial swelling, rhinorrhea, sinus pressure, sneezing and sore throat.    Eyes: Negative for discharge, redness and visual disturbance.    Respiratory: Negative for apnea, cough, chest tightness, shortness of breath, wheezing and stridor.    Cardiovascular: Negative for chest pain, palpitations and leg swelling.   Gastrointestinal: Negative for abdominal distention, abdominal pain, anal bleeding, blood in stool, constipation, diarrhea, nausea and vomiting.   Genitourinary: Negative for difficulty urinating, discharge, dysuria, frequency and hematuria.   Musculoskeletal: Positive for arthralgias. Negative for back pain, gait problem, joint swelling, myalgias, neck pain and neck stiffness.        Left elbow pain, Left hip pain   Skin: Negative for color change and pallor.   Neurological: Negative for dizziness, tremors, seizures, syncope, facial asymmetry, speech difficulty, weakness, light-headedness, numbness and headaches.   Psychiatric/Behavioral: Negative for behavioral problems, confusion, hallucinations and suicidal ideas. The patient is not nervous/anxious.    All other systems reviewed and are negative.    Objective:     Vital Signs (Most Recent):  Temp: 99.5 °F (37.5 °C) (10/30/18 1149)  Pulse: 72 (10/30/18 1149)  Resp: 16 (10/30/18 1149)  BP: 134/75 (10/30/18 1149)  SpO2: 95 % (10/30/18 1149) Vital Signs (24h Range):  Temp:  [97.6 °F (36.4 °C)-99.8 °F (37.7 °C)] 99.5 °F (37.5 °C)  Pulse:  [67-78] 72  Resp:  [16-20] 16  SpO2:  [95 %-99 %] 95 %  BP: (134-182)/(67-84) 134/75     Weight: 84.6 kg (186 lb 8.2 oz)  Body mass index is 27.54 kg/m².    Intake/Output Summary (Last 24 hours) at 10/30/2018 1508  Last data filed at 10/30/2018 1439  Gross per 24 hour   Intake --   Output 700 ml   Net -700 ml      Physical Exam   Constitutional: He is oriented to person, place, and time. He appears well-developed and well-nourished.   Elderly appearing    HENT:   Head: Normocephalic and atraumatic.   Eyes: Conjunctivae are normal. Pupils are equal, round, and reactive to light.   Neck: Normal range of motion. Neck supple.   Cardiovascular: Normal rate,  regular rhythm, normal heart sounds and intact distal pulses.   No murmur heard.  Pulmonary/Chest: Effort normal and breath sounds normal. No respiratory distress.   Abdominal: Soft. Bowel sounds are normal. He exhibits no distension. There is no tenderness.   Musculoskeletal: He exhibits tenderness. He exhibits no edema or deformity.   Left hip pain, Decreased ROM to LLE.    Neurological: He is alert and oriented to person, place, and time.   Skin: Skin is warm and dry. No rash noted. No erythema.   Abrasion noted to Left scalp, Abrasion noted to Left elbow.    Psychiatric: He has a normal mood and affect. His behavior is normal.   Nursing note and vitals reviewed.      Significant Labs: All pertinent labs within the past 24 hours have been reviewed.    Significant Imaging:   Imaging Results          X-Ray Chest AP Portable (Final result)  Result time 10/29/18 14:19:11    Final result by PAULA Mckinney Sr., MD (10/29/18 14:19:11)                 Impression:      1. There is an acute appearing nondisplaced fracture in the lateral aspect of the left 9th rib that is best seen on the dedicated rib series.  2. The lungs are clear.  3. Surgical changes  .      Electronically signed by: Antelmo Mckinney MD  Date:    10/29/2018  Time:    14:19             Narrative:    EXAMINATION:  XR CHEST AP PORTABLE    CLINICAL HISTORY:  Fracture of unspecified part of neck of unspecified femur, initial encounter for closed fracture    COMPARISON:  A plain film examination of the left ribs performed on 10/29/2018.    FINDINGS:  There are multiple sternotomy wires in place.  There is partial visualization of a prosthetic right humeral head.  There is a prosthetic left humeral head in place.  There is an acute appearing nondisplaced fracture in the lateral aspect of the left 9th rib that is best seen on the dedicated rib series.  The size of the heart is normal. The lungs are clear. There is no pneumothorax.  The costophrenic angles are  ariel.                               CT Head Without Contrast (Final result)  Result time 10/29/18 12:41:21    Final result by Sharad Wall MD (10/29/18 12:41:21)                 Impression:      Chronic microvascular ischemic changes.      Electronically signed by: Sharad Wall MD  Date:    10/29/2018  Time:    12:41             Narrative:    EXAMINATION:  CT HEAD WITHOUT CONTRAST    CLINICAL HISTORY:  head injury;    TECHNIQUE:  Low dose axial CT images obtained throughout the head without intravenous contrast. Sagittal and coronal reconstructions were performed.  All CT scans at this facility use dose modulation, iterative reconstruction and/or weight based dosing when appropriate to reduce radiation dose to as low as reasonably achievable.    COMPARISON:  03/06/2018    FINDINGS:  Intracranial compartment:    The brain parenchyma demonstrates areas of decreased attenuation with mild to moderate periventricular white matter consistent with chronic microvascular ischemic changes..  No parenchymal mass, hemorrhage, edema or major vascular distribution infarct.  Vascular calcifications are noted.    Mild prominence of the sulci and ventricles are consistent with age-related involutional changes.    No extra-axial blood or fluid collections.    Skull/extracranial contents (limited evaluation): No fracture. Mastoid air cells and paranasal sinuses are essentially clear.                               X-Ray Elbow Complete Left (Final result)  Result time 10/29/18 13:03:52    Final result by PAULA Mckinney Sr., MD (10/29/18 13:03:52)                 Impression:      1. There is no acute fracture visualized.  2. There are several osseous densities in the lateral aspect of the left elbow. One of the larger ones measures 8 mm.  These are characteristic of intra-articular loose bodies.  3. There is a 6 mm calcific density in the expected location of the distal aspect of the triceps tendon.  This is characteristic of  prior trauma.      Electronically signed by: Antelmo Mckinney MD  Date:    10/29/2018  Time:    13:03             Narrative:    EXAMINATION:  XR ELBOW COMPLETE 3 VIEW LEFT    CLINICAL HISTORY:  Unspecified injury of left elbow, initial encounter    COMPARISON:  None    FINDINGS:  There is no acute fracture visualized.  There is no dislocation.  There are several osseous densities in the lateral aspect of the left elbow.  One of the larger ones measures 8 mm.  There is a 6 mm calcific density in the expected location of the distal aspect of the triceps tendon.                               X-Ray Hip 2 View Left (Final result)  Result time 10/29/18 13:01:26    Final result by PAULA Mckinney Sr., MD (10/29/18 13:01:26)                 Impression:      1. There is a poorly visualized fracture in the left femoral neck.  The left femoral head still articulates with the left acetabulum.  2. There is partial visualization of posterior spinal fusion hardware in the lumbar spine.  3. There is a moderate amount of atherosclerosis.      Electronically signed by: Antelmo Mckinney MD  Date:    10/29/2018  Time:    13:01             Narrative:    EXAMINATION:  XR HIP 2 VIEW LEFT    CLINICAL HISTORY:  Unspecified injury of left hip, initial encounter    COMPARISON:  None    FINDINGS:  There is a poorly visualized fracture in the left femoral neck.  The left femoral head still articulates with the left acetabulum.  There is a moderate amount of atherosclerosis.  There is partial visualization of posterior spinal fusion hardware in the lumbar spine.                               X-Ray Ribs 2 View Left (Final result)  Result time 10/29/18 13:09:06    Final result by PAULA Mckinney Sr., MD (10/29/18 13:09:06)                 Impression:      1. There is an acute appearing nondisplaced fracture in the lateral aspect of the left 9th rib.  2. Surgical changes      Electronically signed by: Antelmo Mckinney  MD  Date:    10/29/2018  Time:    13:09             Narrative:    EXAMINATION:  XR RIBS 2 VIEW LEFT    TECHNIQUE:  Standard knee MRI protocol without IV contrast was performed.    FINDINGS:  There is an acute appearing nondisplaced fracture in the lateral aspect of the left 9th rib.  There are healed fractures in the left 10th and 11th ribs.  There are multiple sternotomy wires in place.  There is partial visualization of a prosthetic left humeral head.  There is no pneumothorax.  The left costophrenic angle is sharp.  There is no evidence of an acute pulmonary process.                                   Assessment/Plan:      * Left displaced femoral neck fracture    - Ortho consulted   - Hold Eliquis and ASA  - Analgesia PRN   - PT/OT consult   - NPO after midnight     10/30/18 No acute issues overnight. The patient reports that his pain is well controlled at this time. Dr. Simental will likely take the patient to surgery tomorrow. NPO after MN       CAD (coronary artery disease)    - Resume home meds        S/P AVR    - Resume home meds  - Keep outpatient follow up       Pulmonary hypertension           CHF (congestive heart failure)    - Resume torsemide   - monitor I&O   - resume BB   - Low Na diet       Essential hypertension    - Monitor VS   - Resume metoprolol            VTE Risk Mitigation (From admission, onward)    None              Khari Esteban NP  Department of Hospital Medicine   [facility]

## 2018-10-30 NOTE — ASSESSMENT & PLAN NOTE
On medical therapy  Continue BB and torsemide  Hold ACEI due to elevated renal function  Resume when able.

## 2018-10-30 NOTE — SUBJECTIVE & OBJECTIVE
Interval History: No acute issues overnight. The patient reports that his pain is well controlled at this time. Dr. Simental will likely take the patient to surgery tomorrow. NPO after MN      Review of Systems   Constitutional: Negative for activity change, appetite change, chills, fatigue, fever and unexpected weight change.   HENT: Negative for congestion, facial swelling, rhinorrhea, sinus pressure, sneezing and sore throat.    Eyes: Negative for discharge, redness and visual disturbance.   Respiratory: Negative for apnea, cough, chest tightness, shortness of breath, wheezing and stridor.    Cardiovascular: Negative for chest pain, palpitations and leg swelling.   Gastrointestinal: Negative for abdominal distention, abdominal pain, anal bleeding, blood in stool, constipation, diarrhea, nausea and vomiting.   Genitourinary: Negative for difficulty urinating, discharge, dysuria, frequency and hematuria.   Musculoskeletal: Positive for arthralgias. Negative for back pain, gait problem, joint swelling, myalgias, neck pain and neck stiffness.        Left elbow pain, Left hip pain   Skin: Negative for color change and pallor.   Neurological: Negative for dizziness, tremors, seizures, syncope, facial asymmetry, speech difficulty, weakness, light-headedness, numbness and headaches.   Psychiatric/Behavioral: Negative for behavioral problems, confusion, hallucinations and suicidal ideas. The patient is not nervous/anxious.    All other systems reviewed and are negative.    Objective:     Vital Signs (Most Recent):  Temp: 99.5 °F (37.5 °C) (10/30/18 1149)  Pulse: 72 (10/30/18 1149)  Resp: 16 (10/30/18 1149)  BP: 134/75 (10/30/18 1149)  SpO2: 95 % (10/30/18 1149) Vital Signs (24h Range):  Temp:  [97.6 °F (36.4 °C)-99.8 °F (37.7 °C)] 99.5 °F (37.5 °C)  Pulse:  [67-78] 72  Resp:  [16-20] 16  SpO2:  [95 %-99 %] 95 %  BP: (134-182)/(67-84) 134/75     Weight: 84.6 kg (186 lb 8.2 oz)  Body mass index is 27.54 kg/m².    Intake/Output  Summary (Last 24 hours) at 10/30/2018 1508  Last data filed at 10/30/2018 1439  Gross per 24 hour   Intake --   Output 700 ml   Net -700 ml      Physical Exam   Constitutional: He is oriented to person, place, and time. He appears well-developed and well-nourished.   Elderly appearing    HENT:   Head: Normocephalic and atraumatic.   Eyes: Conjunctivae are normal. Pupils are equal, round, and reactive to light.   Neck: Normal range of motion. Neck supple.   Cardiovascular: Normal rate, regular rhythm, normal heart sounds and intact distal pulses.   No murmur heard.  Pulmonary/Chest: Effort normal and breath sounds normal. No respiratory distress.   Abdominal: Soft. Bowel sounds are normal. He exhibits no distension. There is no tenderness.   Musculoskeletal: He exhibits tenderness. He exhibits no edema or deformity.   Left hip pain, Decreased ROM to LLE.    Neurological: He is alert and oriented to person, place, and time.   Skin: Skin is warm and dry. No rash noted. No erythema.   Abrasion noted to Left scalp, Abrasion noted to Left elbow.    Psychiatric: He has a normal mood and affect. His behavior is normal.   Nursing note and vitals reviewed.      Significant Labs: All pertinent labs within the past 24 hours have been reviewed.    Significant Imaging:   Imaging Results          X-Ray Chest AP Portable (Final result)  Result time 10/29/18 14:19:11    Final result by PAULA Mckinney Sr., MD (10/29/18 14:19:11)                 Impression:      1. There is an acute appearing nondisplaced fracture in the lateral aspect of the left 9th rib that is best seen on the dedicated rib series.  2. The lungs are clear.  3. Surgical changes  .      Electronically signed by: Antelmo Mckinney MD  Date:    10/29/2018  Time:    14:19             Narrative:    EXAMINATION:  XR CHEST AP PORTABLE    CLINICAL HISTORY:  Fracture of unspecified part of neck of unspecified femur, initial encounter for closed fracture    COMPARISON:  A  plain film examination of the left ribs performed on 10/29/2018.    FINDINGS:  There are multiple sternotomy wires in place.  There is partial visualization of a prosthetic right humeral head.  There is a prosthetic left humeral head in place.  There is an acute appearing nondisplaced fracture in the lateral aspect of the left 9th rib that is best seen on the dedicated rib series.  The size of the heart is normal. The lungs are clear. There is no pneumothorax.  The costophrenic angles are sharp.                               CT Head Without Contrast (Final result)  Result time 10/29/18 12:41:21    Final result by Sharad Wall MD (10/29/18 12:41:21)                 Impression:      Chronic microvascular ischemic changes.      Electronically signed by: Sharad Wall MD  Date:    10/29/2018  Time:    12:41             Narrative:    EXAMINATION:  CT HEAD WITHOUT CONTRAST    CLINICAL HISTORY:  head injury;    TECHNIQUE:  Low dose axial CT images obtained throughout the head without intravenous contrast. Sagittal and coronal reconstructions were performed.  All CT scans at this facility use dose modulation, iterative reconstruction and/or weight based dosing when appropriate to reduce radiation dose to as low as reasonably achievable.    COMPARISON:  03/06/2018    FINDINGS:  Intracranial compartment:    The brain parenchyma demonstrates areas of decreased attenuation with mild to moderate periventricular white matter consistent with chronic microvascular ischemic changes..  No parenchymal mass, hemorrhage, edema or major vascular distribution infarct.  Vascular calcifications are noted.    Mild prominence of the sulci and ventricles are consistent with age-related involutional changes.    No extra-axial blood or fluid collections.    Skull/extracranial contents (limited evaluation): No fracture. Mastoid air cells and paranasal sinuses are essentially clear.                               X-Ray Elbow Complete Left  (Final result)  Result time 10/29/18 13:03:52    Final result by PAULA Mckinney Sr., MD (10/29/18 13:03:52)                 Impression:      1. There is no acute fracture visualized.  2. There are several osseous densities in the lateral aspect of the left elbow. One of the larger ones measures 8 mm.  These are characteristic of intra-articular loose bodies.  3. There is a 6 mm calcific density in the expected location of the distal aspect of the triceps tendon.  This is characteristic of prior trauma.      Electronically signed by: Antelmo Mckinney MD  Date:    10/29/2018  Time:    13:03             Narrative:    EXAMINATION:  XR ELBOW COMPLETE 3 VIEW LEFT    CLINICAL HISTORY:  Unspecified injury of left elbow, initial encounter    COMPARISON:  None    FINDINGS:  There is no acute fracture visualized.  There is no dislocation.  There are several osseous densities in the lateral aspect of the left elbow.  One of the larger ones measures 8 mm.  There is a 6 mm calcific density in the expected location of the distal aspect of the triceps tendon.                               X-Ray Hip 2 View Left (Final result)  Result time 10/29/18 13:01:26    Final result by PAULA Mckinney Sr., MD (10/29/18 13:01:26)                 Impression:      1. There is a poorly visualized fracture in the left femoral neck.  The left femoral head still articulates with the left acetabulum.  2. There is partial visualization of posterior spinal fusion hardware in the lumbar spine.  3. There is a moderate amount of atherosclerosis.      Electronically signed by: Antelmo Mckinney MD  Date:    10/29/2018  Time:    13:01             Narrative:    EXAMINATION:  XR HIP 2 VIEW LEFT    CLINICAL HISTORY:  Unspecified injury of left hip, initial encounter    COMPARISON:  None    FINDINGS:  There is a poorly visualized fracture in the left femoral neck.  The left femoral head still articulates with the left acetabulum.  There is a moderate amount of  atherosclerosis.  There is partial visualization of posterior spinal fusion hardware in the lumbar spine.                               X-Ray Ribs 2 View Left (Final result)  Result time 10/29/18 13:09:06    Final result by PAULA Mckinney Sr., MD (10/29/18 13:09:06)                 Impression:      1. There is an acute appearing nondisplaced fracture in the lateral aspect of the left 9th rib.  2. Surgical changes      Electronically signed by: Antelmo Mckinney MD  Date:    10/29/2018  Time:    13:09             Narrative:    EXAMINATION:  XR RIBS 2 VIEW LEFT    TECHNIQUE:  Standard knee MRI protocol without IV contrast was performed.    FINDINGS:  There is an acute appearing nondisplaced fracture in the lateral aspect of the left 9th rib.  There are healed fractures in the left 10th and 11th ribs.  There are multiple sternotomy wires in place.  There is partial visualization of a prosthetic left humeral head.  There is no pneumothorax.  The left costophrenic angle is sharp.  There is no evidence of an acute pulmonary process.

## 2018-10-30 NOTE — HOSPITAL COURSE
10/30/18 No acute issues overnight. The patient reports that his pain is well controlled at this time. Dr. Simental will likely take the patient to surgery tomorrow. NPO after MN 10/31/18 No acute issues overnight. The patient is being taken to the OR by Dr. Simental today. 11/1/18 No acute issues overnight. The patient reports that his pain is well controlled. The patient worked well with PT/OT who recommended SNF vs rehab for continued therapy at the time of discharge.  02 November:  Intermittent hypotension overnight.  Did not correct consistently with fluid bolus.  More lethargic.  Transferred to ICU.  US liver and cardiac echo pending.  Worsening renal function with oliguria and liver enzymes show evidence of shock liver.  Requiring low dose Levophed to maintain blood pressure.  Renal function worsening and would need CRRT.  Patient and family discussed treatment plan and prognosis and opted for comfort care.  Discharge plan for inpatient hospice.

## 2018-10-31 DIAGNOSIS — Z98.890 POST-OPERATIVE STATE: Primary | ICD-10-CM

## 2018-10-31 LAB
ALBUMIN SERPL BCP-MCNC: 3.1 G/DL
ALP SERPL-CCNC: 176 U/L
ALT SERPL W/O P-5'-P-CCNC: 15 U/L
ANION GAP SERPL CALC-SCNC: 13 MMOL/L
AST SERPL-CCNC: 27 U/L
BASOPHILS # BLD AUTO: 0.02 K/UL
BASOPHILS NFR BLD: 0.2 %
BILIRUB SERPL-MCNC: 3.5 MG/DL
BUN SERPL-MCNC: 58 MG/DL
CALCIUM SERPL-MCNC: 9.1 MG/DL
CHLORIDE SERPL-SCNC: 101 MMOL/L
CHOLEST SERPL-MCNC: 99 MG/DL
CHOLEST/HDLC SERPL: 5.2 {RATIO}
CO2 SERPL-SCNC: 24 MMOL/L
CREAT SERPL-MCNC: 2.2 MG/DL
DIFFERENTIAL METHOD: ABNORMAL
EOSINOPHIL # BLD AUTO: 0.1 K/UL
EOSINOPHIL NFR BLD: 1.7 %
ERYTHROCYTE [DISTWIDTH] IN BLOOD BY AUTOMATED COUNT: 17.5 %
EST. GFR  (AFRICAN AMERICAN): 30 ML/MIN/1.73 M^2
EST. GFR  (NON AFRICAN AMERICAN): 26 ML/MIN/1.73 M^2
GLUCOSE SERPL-MCNC: 124 MG/DL
HCT VFR BLD AUTO: 28.2 %
HCT VFR BLD AUTO: 30.6 %
HCT VFR BLD AUTO: 34.7 %
HDLC SERPL-MCNC: 19 MG/DL
HDLC SERPL: 19.2 %
HGB BLD-MCNC: 10.1 G/DL
HGB BLD-MCNC: 11.4 G/DL
HGB BLD-MCNC: 9.5 G/DL
LDLC SERPL CALC-MCNC: 68 MG/DL
LYMPHOCYTES # BLD AUTO: 0.8 K/UL
LYMPHOCYTES NFR BLD: 10.1 %
MCH RBC QN AUTO: 30.2 PG
MCHC RBC AUTO-ENTMCNC: 32.9 G/DL
MCV RBC AUTO: 92 FL
MONOCYTES # BLD AUTO: 1.4 K/UL
MONOCYTES NFR BLD: 17.4 %
NEUTROPHILS # BLD AUTO: 5.8 K/UL
NEUTROPHILS NFR BLD: 70.6 %
NONHDLC SERPL-MCNC: 80 MG/DL
PLATELET # BLD AUTO: 131 K/UL
PMV BLD AUTO: 10.4 FL
POTASSIUM SERPL-SCNC: 3.7 MMOL/L
PROT SERPL-MCNC: 6.7 G/DL
RBC # BLD AUTO: 3.77 M/UL
SODIUM SERPL-SCNC: 138 MMOL/L
TRIGL SERPL-MCNC: 60 MG/DL
WBC # BLD AUTO: 8.21 K/UL

## 2018-10-31 PROCEDURE — 27236 TREAT THIGH FRACTURE: CPT | Mod: AS,LT,, | Performed by: PHYSICIAN ASSISTANT

## 2018-10-31 PROCEDURE — 25000003 PHARM REV CODE 250: Performed by: ORTHOPAEDIC SURGERY

## 2018-10-31 PROCEDURE — 25000003 PHARM REV CODE 250: Performed by: NURSE ANESTHETIST, CERTIFIED REGISTERED

## 2018-10-31 PROCEDURE — 85018 HEMOGLOBIN: CPT | Mod: 91

## 2018-10-31 PROCEDURE — P9045 ALBUMIN (HUMAN), 5%, 250 ML: HCPCS | Mod: JG | Performed by: NURSE ANESTHETIST, CERTIFIED REGISTERED

## 2018-10-31 PROCEDURE — 37000008 HC ANESTHESIA 1ST 15 MINUTES: Performed by: ORTHOPAEDIC SURGERY

## 2018-10-31 PROCEDURE — 85014 HEMATOCRIT: CPT | Mod: 91

## 2018-10-31 PROCEDURE — 71000039 HC RECOVERY, EACH ADD'L HOUR: Performed by: ORTHOPAEDIC SURGERY

## 2018-10-31 PROCEDURE — 36000711: Performed by: ORTHOPAEDIC SURGERY

## 2018-10-31 PROCEDURE — 27201423 OPTIME MED/SURG SUP & DEVICES STERILE SUPPLY: Performed by: ORTHOPAEDIC SURGERY

## 2018-10-31 PROCEDURE — 63600175 PHARM REV CODE 636 W HCPCS: Performed by: ORTHOPAEDIC SURGERY

## 2018-10-31 PROCEDURE — 71000033 HC RECOVERY, INTIAL HOUR: Performed by: ORTHOPAEDIC SURGERY

## 2018-10-31 PROCEDURE — C9290 INJ, BUPIVACAINE LIPOSOME: HCPCS | Performed by: ORTHOPAEDIC SURGERY

## 2018-10-31 PROCEDURE — 85014 HEMATOCRIT: CPT

## 2018-10-31 PROCEDURE — 0SNB0ZZ RELEASE LEFT HIP JOINT, OPEN APPROACH: ICD-10-PCS | Performed by: ORTHOPAEDIC SURGERY

## 2018-10-31 PROCEDURE — C1776 JOINT DEVICE (IMPLANTABLE): HCPCS | Performed by: ORTHOPAEDIC SURGERY

## 2018-10-31 PROCEDURE — 21400001 HC TELEMETRY ROOM

## 2018-10-31 PROCEDURE — 88305 TISSUE EXAM BY PATHOLOGIST: CPT | Mod: 26,,, | Performed by: PATHOLOGY

## 2018-10-31 PROCEDURE — 63600175 PHARM REV CODE 636 W HCPCS: Performed by: NURSE ANESTHETIST, CERTIFIED REGISTERED

## 2018-10-31 PROCEDURE — 63600175 PHARM REV CODE 636 W HCPCS: Performed by: NURSE PRACTITIONER

## 2018-10-31 PROCEDURE — 85025 COMPLETE CBC W/AUTO DIFF WBC: CPT

## 2018-10-31 PROCEDURE — 0SBB0ZZ EXCISION OF LEFT HIP JOINT, OPEN APPROACH: ICD-10-PCS | Performed by: ORTHOPAEDIC SURGERY

## 2018-10-31 PROCEDURE — 36415 COLL VENOUS BLD VENIPUNCTURE: CPT

## 2018-10-31 PROCEDURE — 37000009 HC ANESTHESIA EA ADD 15 MINS: Performed by: ORTHOPAEDIC SURGERY

## 2018-10-31 PROCEDURE — 85018 HEMOGLOBIN: CPT

## 2018-10-31 PROCEDURE — P9016 RBC LEUKOCYTES REDUCED: HCPCS

## 2018-10-31 PROCEDURE — 88311 DECALCIFY TISSUE: CPT | Performed by: PATHOLOGY

## 2018-10-31 PROCEDURE — S0020 INJECTION, BUPIVICAINE HYDRO: HCPCS | Performed by: NURSE ANESTHETIST, CERTIFIED REGISTERED

## 2018-10-31 PROCEDURE — 36000710: Performed by: ORTHOPAEDIC SURGERY

## 2018-10-31 PROCEDURE — 0QB70ZZ EXCISION OF LEFT UPPER FEMUR, OPEN APPROACH: ICD-10-PCS | Performed by: ORTHOPAEDIC SURGERY

## 2018-10-31 PROCEDURE — 0QS70ZZ REPOSITION LEFT UPPER FEMUR, OPEN APPROACH: ICD-10-PCS | Performed by: ORTHOPAEDIC SURGERY

## 2018-10-31 PROCEDURE — 80061 LIPID PANEL: CPT

## 2018-10-31 PROCEDURE — 88311 DECALCIFY TISSUE: CPT | Mod: 26,,, | Performed by: PATHOLOGY

## 2018-10-31 PROCEDURE — 25000003 PHARM REV CODE 250: Performed by: NURSE PRACTITIONER

## 2018-10-31 PROCEDURE — 0SRB0JA REPLACEMENT OF LEFT HIP JOINT WITH SYNTHETIC SUBSTITUTE, UNCEMENTED, OPEN APPROACH: ICD-10-PCS | Performed by: ORTHOPAEDIC SURGERY

## 2018-10-31 PROCEDURE — 80053 COMPREHEN METABOLIC PANEL: CPT

## 2018-10-31 PROCEDURE — 27236 TREAT THIGH FRACTURE: CPT | Mod: LT,,, | Performed by: ORTHOPAEDIC SURGERY

## 2018-10-31 DEVICE — IMPLANTABLE DEVICE: Type: IMPLANTABLE DEVICE | Site: HIP | Status: FUNCTIONAL

## 2018-10-31 RX ORDER — FENTANYL CITRATE 50 UG/ML
12.5 INJECTION, SOLUTION INTRAMUSCULAR; INTRAVENOUS EVERY 5 MIN PRN
Status: DISCONTINUED | OUTPATIENT
Start: 2018-10-31 | End: 2018-10-31

## 2018-10-31 RX ORDER — GLYCOPYRROLATE 0.2 MG/ML
INJECTION INTRAMUSCULAR; INTRAVENOUS
Status: DISCONTINUED | OUTPATIENT
Start: 2018-10-31 | End: 2018-10-31

## 2018-10-31 RX ORDER — ACETAMINOPHEN 10 MG/ML
1000 INJECTION, SOLUTION INTRAVENOUS EVERY 8 HOURS
Status: COMPLETED | OUTPATIENT
Start: 2018-10-31 | End: 2018-11-01

## 2018-10-31 RX ORDER — SODIUM CHLORIDE 0.9 % (FLUSH) 0.9 %
3 SYRINGE (ML) INJECTION
Status: DISCONTINUED | OUTPATIENT
Start: 2018-10-31 | End: 2018-11-03

## 2018-10-31 RX ORDER — ONDANSETRON 8 MG/1
8 TABLET, ORALLY DISINTEGRATING ORAL EVERY 8 HOURS PRN
Status: DISCONTINUED | OUTPATIENT
Start: 2018-10-31 | End: 2018-11-03

## 2018-10-31 RX ORDER — ONDANSETRON 2 MG/ML
INJECTION INTRAMUSCULAR; INTRAVENOUS
Status: DISCONTINUED | OUTPATIENT
Start: 2018-10-31 | End: 2018-10-31

## 2018-10-31 RX ORDER — LIDOCAINE HYDROCHLORIDE 10 MG/ML
INJECTION INFILTRATION; PERINEURAL
Status: DISCONTINUED | OUTPATIENT
Start: 2018-10-31 | End: 2018-10-31

## 2018-10-31 RX ORDER — ALBUMIN HUMAN 50 G/1000ML
SOLUTION INTRAVENOUS CONTINUOUS PRN
Status: DISCONTINUED | OUTPATIENT
Start: 2018-10-31 | End: 2018-10-31

## 2018-10-31 RX ORDER — CHLORHEXIDINE GLUCONATE ORAL RINSE 1.2 MG/ML
10 SOLUTION DENTAL 2 TIMES DAILY
Status: DISCONTINUED | OUTPATIENT
Start: 2018-10-31 | End: 2018-11-03

## 2018-10-31 RX ORDER — PHENYLEPHRINE HYDROCHLORIDE 10 MG/ML
INJECTION INTRAVENOUS
Status: DISCONTINUED | OUTPATIENT
Start: 2018-10-31 | End: 2018-10-31

## 2018-10-31 RX ORDER — SODIUM CHLORIDE 0.9 % (FLUSH) 0.9 %
3 SYRINGE (ML) INJECTION EVERY 8 HOURS
Status: DISCONTINUED | OUTPATIENT
Start: 2018-10-31 | End: 2018-10-31

## 2018-10-31 RX ORDER — TRANEXAMIC ACID 100 MG/ML
1000 INJECTION, SOLUTION INTRAVENOUS
Status: DISCONTINUED | OUTPATIENT
Start: 2018-10-31 | End: 2018-11-03

## 2018-10-31 RX ORDER — PROPOFOL 10 MG/ML
VIAL (ML) INTRAVENOUS
Status: DISCONTINUED | OUTPATIENT
Start: 2018-10-31 | End: 2018-10-31

## 2018-10-31 RX ORDER — SODIUM CHLORIDE 0.9 % (FLUSH) 0.9 %
3 SYRINGE (ML) INJECTION
Status: DISCONTINUED | OUTPATIENT
Start: 2018-10-31 | End: 2018-11-03 | Stop reason: HOSPADM

## 2018-10-31 RX ORDER — TRANEXAMIC ACID 100 MG/ML
1000 INJECTION, SOLUTION INTRAVENOUS
Status: COMPLETED | OUTPATIENT
Start: 2018-10-31 | End: 2018-10-31

## 2018-10-31 RX ORDER — BUPIVACAINE HYDROCHLORIDE 7.5 MG/ML
INJECTION, SOLUTION EPIDURAL; RETROBULBAR
Status: COMPLETED | OUTPATIENT
Start: 2018-10-31 | End: 2018-10-31

## 2018-10-31 RX ORDER — CHLORHEXIDINE GLUCONATE ORAL RINSE 1.2 MG/ML
10 SOLUTION DENTAL
Status: DISCONTINUED | OUTPATIENT
Start: 2018-10-31 | End: 2018-10-31

## 2018-10-31 RX ORDER — FENTANYL CITRATE 50 UG/ML
INJECTION, SOLUTION INTRAMUSCULAR; INTRAVENOUS
Status: DISCONTINUED | OUTPATIENT
Start: 2018-10-31 | End: 2018-10-31

## 2018-10-31 RX ORDER — MIDAZOLAM HYDROCHLORIDE 1 MG/ML
INJECTION, SOLUTION INTRAMUSCULAR; INTRAVENOUS
Status: DISCONTINUED | OUTPATIENT
Start: 2018-10-31 | End: 2018-10-31

## 2018-10-31 RX ORDER — CEFAZOLIN SODIUM 2 G/50ML
2 SOLUTION INTRAVENOUS
Status: DISCONTINUED | OUTPATIENT
Start: 2018-10-31 | End: 2018-10-31

## 2018-10-31 RX ORDER — SODIUM CHLORIDE 9 MG/ML
INJECTION, SOLUTION INTRAVENOUS CONTINUOUS
Status: DISCONTINUED | OUTPATIENT
Start: 2018-10-31 | End: 2018-10-31

## 2018-10-31 RX ORDER — HYDROCODONE BITARTRATE AND ACETAMINOPHEN 500; 5 MG/1; MG/1
TABLET ORAL
Status: DISCONTINUED | OUTPATIENT
Start: 2018-10-31 | End: 2018-10-31

## 2018-10-31 RX ADMIN — PROPOFOL 20 MG: 10 INJECTION, EMULSION INTRAVENOUS at 10:10

## 2018-10-31 RX ADMIN — CHLORHEXIDINE GLUCONATE 10 ML: 1.2 RINSE ORAL at 10:10

## 2018-10-31 RX ADMIN — SODIUM CHLORIDE: 0.9 INJECTION, SOLUTION INTRAVENOUS at 10:10

## 2018-10-31 RX ADMIN — ACETAMINOPHEN 1000 MG: 10 INJECTION, SOLUTION INTRAVENOUS at 10:10

## 2018-10-31 RX ADMIN — FENTANYL CITRATE 40 MCG: 50 INJECTION, SOLUTION INTRAMUSCULAR; INTRAVENOUS at 10:10

## 2018-10-31 RX ADMIN — METOPROLOL SUCCINATE 50 MG: 50 TABLET, EXTENDED RELEASE ORAL at 10:10

## 2018-10-31 RX ADMIN — FUROSEMIDE 40 MG: 10 INJECTION, SOLUTION INTRAMUSCULAR; INTRAVENOUS at 07:10

## 2018-10-31 RX ADMIN — BUPIVACAINE HYDROCHLORIDE 1.8 ML: 7.5 INJECTION, SOLUTION EPIDURAL; RETROBULBAR at 10:10

## 2018-10-31 RX ADMIN — FENTANYL CITRATE 50 MCG: 50 INJECTION, SOLUTION INTRAMUSCULAR; INTRAVENOUS at 10:10

## 2018-10-31 RX ADMIN — CEFAZOLIN SODIUM 2 G: 2 SOLUTION INTRAVENOUS at 10:10

## 2018-10-31 RX ADMIN — ACETAMINOPHEN 1000 MG: 10 INJECTION, SOLUTION INTRAVENOUS at 06:10

## 2018-10-31 RX ADMIN — MIDAZOLAM 2 MG: 1 INJECTION INTRAMUSCULAR; INTRAVENOUS at 11:10

## 2018-10-31 RX ADMIN — ALBUMIN (HUMAN): 12.5 SOLUTION INTRAVENOUS at 12:10

## 2018-10-31 RX ADMIN — ROBINUL 0.4 MG: 0.2 INJECTION INTRAMUSCULAR; INTRAVENOUS at 10:10

## 2018-10-31 RX ADMIN — LIDOCAINE HYDROCHLORIDE 50 MG: 10 INJECTION, SOLUTION INFILTRATION; PERINEURAL at 10:10

## 2018-10-31 RX ADMIN — MIDAZOLAM 2 MG: 1 INJECTION INTRAMUSCULAR; INTRAVENOUS at 10:10

## 2018-10-31 RX ADMIN — METOPROLOL SUCCINATE 50 MG: 50 TABLET, EXTENDED RELEASE ORAL at 09:10

## 2018-10-31 RX ADMIN — FENTANYL CITRATE 10 MCG: 50 INJECTION, SOLUTION INTRAMUSCULAR; INTRAVENOUS at 10:10

## 2018-10-31 RX ADMIN — PHENYLEPHRINE HYDROCHLORIDE 100 MCG: 10 INJECTION INTRAVENOUS at 10:10

## 2018-10-31 RX ADMIN — TRANEXAMIC ACID 1000 MG: 100 INJECTION, SOLUTION INTRAVENOUS at 09:10

## 2018-10-31 RX ADMIN — ONDANSETRON 4 MG: 2 INJECTION, SOLUTION INTRAMUSCULAR; INTRAVENOUS at 10:10

## 2018-10-31 NOTE — OP NOTE
DATE OF PROCEDURE: 10/31/2018    PREOPERATIVE DIAGNOSES:Left hip osteoarthritis and left femoral neck fracture    POSTOPERATIVE DIAGNOSES:Left hip osteoarthritis, left hip synovitis, left hip exostosis, and left femoral neck fracture    OPERATIVE PROCEDURE: Left hip anterior total hip replacement , capsulotomy, synovectomy and exostosis excision, open treatment of left femoral neck fracture    SURGEON: Marlon Simental Sr., M.D.    ASSISTANT:EFFIE Ramirez. His assistance was critical and necesssary with positioning of the extremity throughout the surgical procedure.The physician assistant allowed me to access areas of the knee/shoulder that could not be possible without the assistance of a trained orthopedic physician assistant.  His assistance was critical to allowing me to provide the highest level of care to the patient.    COMPLICATIONS: None    SPECIMEN: Left femoral head  .  ANESTHESIA: General anesthesia with intubation.    INDICATION FOR SURGERY: The patient failed conservative treatment with weight    loss, physical therapy exercises, anti-inflammatory narcotic medication and    walking aid. The patient had a significant fall risk and the pain in the left  hip was progressing and interfering with her activities of daily living. The    patient surgical procedure was 100% greater degree of difficulty than a normal    total hip due to her hip contracture and the nature of the femoral neck fracture . The procedure required the  constant assistance of 3 individuals assisting in surgery at all times    throughout the procedure. The patient's estimated blood loss was 350 mL.    OPERATIVE PROCEDURE IN DETAIL: The patient was brought to the Operating Room    after appropriate consent, placed under general anesthesia with intubation. The  patient was placed in a supine position. The Left hip prepped  with alcohol, chlorhexidine and sterilely draped. The time-out was performed.    The patient did receive  preoperative IV antibiotics. An incision made  3 cm lateral to   And 1 cm distal to the anterior superior iliac spine.  The incision extended distally in line with the anterior portion of the tensor fascia rach. Dissection was    carried through subcutaneous tissue down to tensor fascia rach. Fascia was    Incised overlying the tensor fascia rach. The interval between the tensor fascia rach and the    Rectus femoris was developed. The cautery was used for hemostasis.    The capsule was identified and the Cobra was placed over the anterior neck outside of the capsule as well as the posterior neck of the outer capsule of the hip. The capsulotomy    performed. The patient noted to have osteophyte at the periphery of the    acetabulum as well as greater trochanter. The rongeur used to remove and excise  the exostosis.  Patient noted to have a displaced femoral neck fracture.  The patient noted to have a synovitis of the hip joint.The femoral neck cut made after  the femoral neck cut guide placed. The femoral head was removed.    The alignment of the femoral head neck lesser and greater trochanter were isolated under    Fluoroscopy fluoroscopy. The relationship of the lesser trochanter in relation to the issue tuberosity with good obturator views was noted. The hips were kept in neutral rotation on the [place] table.  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 The trial polyethylene locked in place. The reaming  and rasping performed on the femoral side using a size 11 reamer and rasp. The    Size 11 rasp femoral component left in place. The +12 neck,  32 mm head placed    on the femoral side. The hip reduced, brought through range of  motion, noted to  track quite well. All trial components were removed. Pulse lavaged thoroughly  performed. The permanent polyethylene locked in place. The femoral stem size    11 placed with the appropriate femoral version, a size  32 mm head with a +12   neck was placed.  The hip reduced, brought through range of motion, noted    to track quite well. No significant pistoning noted. Intraoperative fluoroscopy showed good alignment of the  Prosthesis as well as good leg length symmetry.  The patient was injected with Exparel.  The pulse lavage performed an intraarticular drain placed.  The subcutaneous tissue opposed with #1 Vicryl sutures, a    subcuticular suture placed. The overlying skin reinforced with Dermabond.     Sterile gauze applied. The sponge tape    applied. A   deep drain was in place prior to closing of the    incision.Intraoperative x-ray    verified the placement of the hardware, which was noted to be satisfactory. The  patient tolerated the procedure well and left the Operating Room in good    Condition.    Marlon Simental M.D.

## 2018-10-31 NOTE — OR NURSING
Pt reports skin graft to right lower leg. GRIS hose placed during surgery per patient's request     Brigido Nelson RN

## 2018-10-31 NOTE — PROGRESS NOTES
[facility] Hospital Medicine  Progress Note    Patient Name: Chung Meneses Sr.  MRN: [id number]  Patient Class: IP- Inpatient   Admission Date: 10/29/2018  Length of Stay: 2 days  Attending Physician: Jake Eastman MD  Primary Care Provider: Glenroy Carmichael MD        Subjective:     Principal Problem:Left displaced femoral neck fracture    HPI:  Chung Meneses is an 86 yo male with a PMH of Afib, CHF, CAD, HTN, S/P AVR who presented to the ER after suffering a ground level fall at home today. The patient reports that he tripped and fell while walking outside today. The patient reports that he fell on his left side hitting his head. The patient denies any loss of consciousness. Imaging showed a left femoral neck fracture. CXR showed an acute appearing nondisplaced fracture in the lateral aspect of the left 9th rib. The patient reports pain is currently well controlled. Dr. Simental (Ortho) was consulted and will evaluate the patient. Patient denies fever, chills, CP, cough, stallworth, pnd, orthopnea, palpitations, diaphoresis, headache, blurred vision, numbness, tingling, dizziness, localized weakness, n/v/d, abdominal pain, blood in stools, melena, hematemesis, urinary frequency, urgency, dysuria or hematuria.         Hospital Course:  10/30/18 No acute issues overnight. The patient reports that his pain is well controlled at this time. Dr. Simental will likely take the patient to surgery tomorrow. NPO after MN 10/31/18 No acute issues overnight. The patient is being taken to the OR by Dr. Simental today.     Interval History: No acute issues overnight. The patient is being taken to the OR by Dr. Simental today.       Review of Systems   Constitutional: Negative for activity change, appetite change, chills, fatigue, fever and unexpected weight change.   HENT: Negative for congestion, facial swelling, rhinorrhea, sinus pressure, sneezing and sore throat.    Eyes: Negative for discharge, redness and visual  disturbance.   Respiratory: Negative for apnea, cough, chest tightness, shortness of breath, wheezing and stridor.    Cardiovascular: Negative for chest pain, palpitations and leg swelling.   Gastrointestinal: Negative for abdominal distention, abdominal pain, anal bleeding, blood in stool, constipation, diarrhea, nausea and vomiting.   Genitourinary: Negative for difficulty urinating, discharge, dysuria, frequency and hematuria.   Musculoskeletal: Positive for arthralgias. Negative for back pain, gait problem, joint swelling, myalgias, neck pain and neck stiffness.        Left elbow pain, Left hip pain   Skin: Negative for color change and pallor.   Neurological: Negative for dizziness, tremors, seizures, syncope, facial asymmetry, speech difficulty, weakness, light-headedness, numbness and headaches.   Psychiatric/Behavioral: Negative for behavioral problems, confusion, hallucinations and suicidal ideas. The patient is not nervous/anxious.    All other systems reviewed and are negative.    Objective:     Vital Signs (Most Recent):  Temp: 97.5 °F (36.4 °C) (10/31/18 1430)  Pulse: 66 (10/31/18 1430)  Resp: 14 (10/31/18 1430)  BP: (!) 109/58 (10/31/18 1430)  SpO2: 95 % (10/31/18 1430) Vital Signs (24h Range):  Temp:  [97.4 °F (36.3 °C)-99.3 °F (37.4 °C)] 97.5 °F (36.4 °C)  Pulse:  [58-89] 66  Resp:  [14-21] 14  SpO2:  [94 %-96 %] 95 %  BP: ()/(50-66) 109/58     Weight: 84.6 kg (186 lb 8.2 oz)  Body mass index is 27.54 kg/m².    Intake/Output Summary (Last 24 hours) at 10/31/2018 1446  Last data filed at 10/31/2018 1340  Gross per 24 hour   Intake 2448 ml   Output 2370 ml   Net 78 ml      Physical Exam   Constitutional: He is oriented to person, place, and time. He appears well-developed and well-nourished.   Elderly appearing    HENT:   Head: Normocephalic and atraumatic.   Eyes: Conjunctivae are normal. Pupils are equal, round, and reactive to light.   Neck: Normal range of motion. Neck supple.    Cardiovascular: Normal rate, regular rhythm, normal heart sounds and intact distal pulses.   No murmur heard.  Pulmonary/Chest: Effort normal and breath sounds normal. No respiratory distress.   Abdominal: Soft. Bowel sounds are normal. He exhibits no distension. There is no tenderness.   Musculoskeletal: He exhibits tenderness. He exhibits no edema or deformity.   Left hip pain, Decreased ROM to LLE.    Neurological: He is alert and oriented to person, place, and time.   Skin: Skin is warm and dry. No rash noted. No erythema.   Abrasion noted to Left scalp, Abrasion noted to Left elbow.    Psychiatric: He has a normal mood and affect. His behavior is normal.   Nursing note and vitals reviewed.      Significant Labs: All pertinent labs within the past 24 hours have been reviewed.    Significant Imaging:   Imaging Results          X-Ray Chest AP Portable (Final result)  Result time 10/29/18 14:19:11    Final result by PAULA Mckinney Sr., MD (10/29/18 14:19:11)                 Impression:      1. There is an acute appearing nondisplaced fracture in the lateral aspect of the left 9th rib that is best seen on the dedicated rib series.  2. The lungs are clear.  3. Surgical changes  .      Electronically signed by: Antelmo Mckinney MD  Date:    10/29/2018  Time:    14:19             Narrative:    EXAMINATION:  XR CHEST AP PORTABLE    CLINICAL HISTORY:  Fracture of unspecified part of neck of unspecified femur, initial encounter for closed fracture    COMPARISON:  A plain film examination of the left ribs performed on 10/29/2018.    FINDINGS:  There are multiple sternotomy wires in place.  There is partial visualization of a prosthetic right humeral head.  There is a prosthetic left humeral head in place.  There is an acute appearing nondisplaced fracture in the lateral aspect of the left 9th rib that is best seen on the dedicated rib series.  The size of the heart is normal. The lungs are clear. There is no  pneumothorax.  The costophrenic angles are sharp.                               CT Head Without Contrast (Final result)  Result time 10/29/18 12:41:21    Final result by Sharad Wall MD (10/29/18 12:41:21)                 Impression:      Chronic microvascular ischemic changes.      Electronically signed by: Sharad Wall MD  Date:    10/29/2018  Time:    12:41             Narrative:    EXAMINATION:  CT HEAD WITHOUT CONTRAST    CLINICAL HISTORY:  head injury;    TECHNIQUE:  Low dose axial CT images obtained throughout the head without intravenous contrast. Sagittal and coronal reconstructions were performed.  All CT scans at this facility use dose modulation, iterative reconstruction and/or weight based dosing when appropriate to reduce radiation dose to as low as reasonably achievable.    COMPARISON:  03/06/2018    FINDINGS:  Intracranial compartment:    The brain parenchyma demonstrates areas of decreased attenuation with mild to moderate periventricular white matter consistent with chronic microvascular ischemic changes..  No parenchymal mass, hemorrhage, edema or major vascular distribution infarct.  Vascular calcifications are noted.    Mild prominence of the sulci and ventricles are consistent with age-related involutional changes.    No extra-axial blood or fluid collections.    Skull/extracranial contents (limited evaluation): No fracture. Mastoid air cells and paranasal sinuses are essentially clear.                               X-Ray Elbow Complete Left (Final result)  Result time 10/29/18 13:03:52    Final result by PAULA Mckinney Sr., MD (10/29/18 13:03:52)                 Impression:      1. There is no acute fracture visualized.  2. There are several osseous densities in the lateral aspect of the left elbow. One of the larger ones measures 8 mm.  These are characteristic of intra-articular loose bodies.  3. There is a 6 mm calcific density in the expected location of the distal aspect of the  triceps tendon.  This is characteristic of prior trauma.      Electronically signed by: Antelmo Mckinney MD  Date:    10/29/2018  Time:    13:03             Narrative:    EXAMINATION:  XR ELBOW COMPLETE 3 VIEW LEFT    CLINICAL HISTORY:  Unspecified injury of left elbow, initial encounter    COMPARISON:  None    FINDINGS:  There is no acute fracture visualized.  There is no dislocation.  There are several osseous densities in the lateral aspect of the left elbow.  One of the larger ones measures 8 mm.  There is a 6 mm calcific density in the expected location of the distal aspect of the triceps tendon.                               X-Ray Hip 2 View Left (Final result)  Result time 10/29/18 13:01:26    Final result by PAULA Mckinney Sr., MD (10/29/18 13:01:26)                 Impression:      1. There is a poorly visualized fracture in the left femoral neck.  The left femoral head still articulates with the left acetabulum.  2. There is partial visualization of posterior spinal fusion hardware in the lumbar spine.  3. There is a moderate amount of atherosclerosis.      Electronically signed by: Antelmo Mckinney MD  Date:    10/29/2018  Time:    13:01             Narrative:    EXAMINATION:  XR HIP 2 VIEW LEFT    CLINICAL HISTORY:  Unspecified injury of left hip, initial encounter    COMPARISON:  None    FINDINGS:  There is a poorly visualized fracture in the left femoral neck.  The left femoral head still articulates with the left acetabulum.  There is a moderate amount of atherosclerosis.  There is partial visualization of posterior spinal fusion hardware in the lumbar spine.                               X-Ray Ribs 2 View Left (Final result)  Result time 10/29/18 13:09:06    Final result by PAULA Mckinney Sr., MD (10/29/18 13:09:06)                 Impression:      1. There is an acute appearing nondisplaced fracture in the lateral aspect of the left 9th rib.  2. Surgical changes      Electronically signed  by: Antelmo Mckinney MD  Date:    10/29/2018  Time:    13:09             Narrative:    EXAMINATION:  XR RIBS 2 VIEW LEFT    TECHNIQUE:  Standard knee MRI protocol without IV contrast was performed.    FINDINGS:  There is an acute appearing nondisplaced fracture in the lateral aspect of the left 9th rib.  There are healed fractures in the left 10th and 11th ribs.  There are multiple sternotomy wires in place.  There is partial visualization of a prosthetic left humeral head.  There is no pneumothorax.  The left costophrenic angle is sharp.  There is no evidence of an acute pulmonary process.                                   Assessment/Plan:      * Left displaced femoral neck fracture    - Ortho consulted   - Hold Eliquis and ASA  - Analgesia PRN   - PT/OT consult   - NPO after midnight     10/30/18 No acute issues overnight. The patient reports that his pain is well controlled at this time. Dr. Simental will likely take the patient to surgery tomorrow. NPO after MN  10/31/18 No acute issues overnight. The patient is being taken to the OR by Dr. Simental today.          Dermatitis associated with moisture           CAD (coronary artery disease)    - Resume home meds        S/P AVR    - Resume home meds  - Keep outpatient follow up       Pulmonary hypertension           CHF (congestive heart failure)    - Resume torsemide   - monitor I&O   - resume BB   - Low Na diet       Essential hypertension    - Monitor VS   - Resume metoprolol            VTE Risk Mitigation (From admission, onward)    None              Khari Esteban NP  Department of Hospital Medicine   [facility]

## 2018-10-31 NOTE — ANESTHESIA RELEASE NOTE
"Anesthesia Release from PACU Note    Patient: Chung Meneses .    Procedure(s) Performed: Procedure(s) (LRB):  ARTHROPLASTY, HIP, ANTERIOR APPROACH (Left)    Anesthesia type: spinal    Post pain: Adequate analgesia    Post assessment: no apparent anesthetic complications and tolerated procedure well    Last Vitals:   Visit Vitals  BP 98/61 (BP Location: Right arm, Patient Position: Lying)   Pulse 67   Temp 36.4 °C (97.5 °F) (Oral)   Resp 16   Ht 5' 9" (1.753 m)   Wt 84.6 kg (186 lb 8.2 oz)   SpO2 (!) 94%   BMI 27.54 kg/m²       Post vital signs: stable    Level of consciousness: awake, alert  and oriented    Nausea/Vomiting: no nausea/no vomiting    Complications: none    Airway Patency: patent    Respiratory: unassisted, spontaneous ventilation    Cardiovascular: stable and blood pressure at baseline    Hydration: euvolemic  "

## 2018-10-31 NOTE — ASSESSMENT & PLAN NOTE
- Ortho consulted   - Hold Eliquis and ASA  - Analgesia PRN   - PT/OT consult   - NPO after midnight     10/30/18 No acute issues overnight. The patient reports that his pain is well controlled at this time. Dr. Simental will likely take the patient to surgery tomorrow. NPO after MN  10/31/18 No acute issues overnight. The patient is being taken to the OR by Dr. Simental today.

## 2018-10-31 NOTE — PT/OT/SLP PROGRESS
Occupational Therapy      Patient Name:  Chung Meneses Sr.   MRN:  [id number].  NUHA WYNNE INITIATED THIS AM VIA CHART REVIEW, PT WITH L HIP FX. AWAITING HIP SX. PLANNED FOR 10-31-18, WILL COMPLETE NUHA WYNNE FOLLOWING SX., NOTIFIED NURSE KARIS Jama, OT  10/30/18  [id number]

## 2018-10-31 NOTE — ANESTHESIA PREPROCEDURE EVALUATION
10/31/2018  Chung Meneses Sr. is a 87 y.o., male.    Anesthesia Evaluation    I have reviewed the Patient Summary Reports.    I have reviewed the Nursing Notes.   I have reviewed the Medications.     Review of Systems  Anesthesia Hx:  No problems with previous Anesthesia  History of prior surgery of interest to airway management or planning: Denies Family Hx of Anesthesia complications.   Denies Personal Hx of Anesthesia complications.   Social:  Non-Smoker    Hematology/Oncology:         -- Anemia:   Cardiovascular:   Hypertension CAD  CABG/stent Dysrhythmias atrial fibrillation CHF hyperlipidemia ECG has been reviewed. Post AVR    Echo 01/18  CONCLUSIONS     1 - Low normal to mildly depressed left ventricular systolic function (EF 50-55%).     2 - Impaired LV relaxation, elevated LAP (grade 2 diastolic dysfunction).     3 - Wall motion abnormalities.     4 - Low normal right ventricular systolic function .     5 - Severe left atrial enlargement.     6 - Concentric hypertrophy.     7 - Pulmonary hypertension. The estimated PA systolic pressure is 41 mmHg.     8 - Aortic valve prosthesis, mean gradient = 11 mmHg.     9 - Moderate mitral regurgitation.     10 - Mild to moderate tricuspid regurgitation.     11 - Increased central venous pressure.    Pulmonary:  Pulmonary Normal    Renal/:   Chronic Renal Disease, CRI    Hepatic/GI:  Hepatic/GI Normal    Neurological:   dizziness   Endocrine:  Endocrine Normal    Psych:  Psychiatric Normal           Physical Exam  General:  Well nourished    Airway/Jaw/Neck:  Airway Findings: Mouth Opening: Normal Tongue: Normal  General Airway Assessment: Adult  Mallampati: II  TM Distance: Normal, at least 6 cm          Chest/Lungs:  Chest/Lungs Findings: Normal Respiratory Rate     Heart/Vascular:  Heart Findings: Rhythm: Irregularly Irregular             Anesthesia  Plan  Type of Anesthesia, risks & benefits discussed:  Anesthesia Type:  general, spinal  Patient's Preference:   Intra-op Monitoring Plan: standard ASA monitors  Intra-op Monitoring Plan Comments:   Post Op Pain Control Plan:   Post Op Pain Control Plan Comments:   Induction:   IV  Beta Blocker:  Patient is on a Beta-Blocker and has received one dose within the past 24 hours (No further documentation required).       Informed Consent: Patient understands risks and agrees with Anesthesia plan.  Questions answered. Anesthesia consent signed with patient.  ASA Score: 3     Day of Surgery Review of History & Physical:    H&P update referred to the surgeon.         Ready For Surgery From Anesthesia Perspective.

## 2018-10-31 NOTE — ANESTHESIA POSTPROCEDURE EVALUATION
"Anesthesia Post Evaluation    Patient: Chung Meneses Sr.    Procedure(s) Performed: Procedure(s) (LRB):  ARTHROPLASTY, HIP, ANTERIOR APPROACH (Left)    Final Anesthesia Type: spinal  Patient location during evaluation: PACU  Patient participation: Yes- Able to Participate  Level of consciousness: awake and alert and oriented  Post-procedure vital signs: reviewed and stable  Pain management: adequate  Airway patency: patent  PONV status at discharge: No PONV  Anesthetic complications: no      Cardiovascular status: hemodynamically stable  Respiratory status: spontaneous ventilation and unassisted  Hydration status: euvolemic  Follow-up not needed.        Visit Vitals  BP 98/61 (BP Location: Right arm, Patient Position: Lying)   Pulse 67   Temp 36.4 °C (97.5 °F) (Oral)   Resp 16   Ht 5' 9" (1.753 m)   Wt 84.6 kg (186 lb 8.2 oz)   SpO2 (!) 94%   BMI 27.54 kg/m²       Pain/Dandy Score: Pain Assessment Performed: Yes (10/31/2018  2:30 PM)  Presence of Pain: denies (10/31/2018  2:30 PM)  Pain Rating Prior to Med Admin: 3 (10/31/2018  6:02 AM)  Pain Rating Post Med Admin: 2 (10/31/2018  6:14 AM)  Dandy Score: 9 (10/31/2018  2:30 PM)        "

## 2018-10-31 NOTE — TRANSFER OF CARE
"Anesthesia Transfer of Care Note    Patient: Chung Meneses .    Procedure(s) Performed: Procedure(s) (LRB):  ARTHROPLASTY, HIP, ANTERIOR APPROACH (Left)    Patient location: PACU    Anesthesia Type: spinal and MAC    Transport from OR: Transported from OR on room air with adequate spontaneous ventilation    Post pain: adequate analgesia    Post assessment: no apparent anesthetic complications    Post vital signs: stable    Level of consciousness: awake    Nausea/Vomiting: no nausea/vomiting    Complications: none    Transfer of care protocol was followed      Last vitals:   Visit Vitals  BP (!) 119/56   Pulse (!) 58   Temp 36.8 °C (98.3 °F) (Oral)   Resp 18   Ht 5' 9" (1.753 m)   Wt 84.6 kg (186 lb 8.2 oz)   SpO2 96%   BMI 27.54 kg/m²     "

## 2018-10-31 NOTE — ANESTHESIA PROCEDURE NOTES
Spinal    Diagnosis: Left hip total  Patient location during procedure: OR  Start time: 10/31/2018 10:16 AM  Timeout: 10/31/2018 10:15 AM  End time: 10/31/2018 10:23 AM  Staffing  Anesthesiologist: Claudio Hargrove MD  Performed: anesthesiologist   Preanesthetic Checklist  Completed: patient identified, surgical consent, pre-op evaluation, timeout performed, IV checked, risks and benefits discussed and monitors and equipment checked  Spinal Block  Patient position: left lateral decubitus  Prep: Betadine  Patient monitoring: heart rate, cardiac monitor and continuous pulse ox  Approach: left paramedian  Location: L4-5  Injection technique: lumbar puncture  CSF Fluid: clear free-flowing CSF  Needle  Needle type: Quincke   Needle gauge: 22 G  Needle length: 3.5 in  Additional Documentation: no paresthesia on injection  Needle localization: anatomical landmarks  Assessment  Sensory level: T7   Dermatomal levels determined by pinch or prick  Ease of block: easy  Patient's tolerance of the procedure: comfortable throughout block  Additional Notes  Fentanyl 10 micrograms with .04 ml epi

## 2018-10-31 NOTE — PLAN OF CARE
Problem: Patient Care Overview  Goal: Plan of Care Review  Outcome: Ongoing (interventions implemented as appropriate)  POC reviewed with pt, verbalized understanding. C/o mild left hip pain, offirmev administered with relief. Afib on cardiac monitor. AAOx4, VSS. Reinforced and encouraged use of IS 10xhr while awake. Turned/repositioned q2h. All wounds with dsgs c/d/i. Hibiclens bath given this am for surgery today. NPO since midnight. Fall precautions maintained with bed alarm set. Remains free from injury/falls this shift. Instructed to call for assistance.

## 2018-10-31 NOTE — SUBJECTIVE & OBJECTIVE
Interval History: No acute issues overnight. The patient is being taken to the OR by Dr. Simental today.       Review of Systems   Constitutional: Negative for activity change, appetite change, chills, fatigue, fever and unexpected weight change.   HENT: Negative for congestion, facial swelling, rhinorrhea, sinus pressure, sneezing and sore throat.    Eyes: Negative for discharge, redness and visual disturbance.   Respiratory: Negative for apnea, cough, chest tightness, shortness of breath, wheezing and stridor.    Cardiovascular: Negative for chest pain, palpitations and leg swelling.   Gastrointestinal: Negative for abdominal distention, abdominal pain, anal bleeding, blood in stool, constipation, diarrhea, nausea and vomiting.   Genitourinary: Negative for difficulty urinating, discharge, dysuria, frequency and hematuria.   Musculoskeletal: Positive for arthralgias. Negative for back pain, gait problem, joint swelling, myalgias, neck pain and neck stiffness.        Left elbow pain, Left hip pain   Skin: Negative for color change and pallor.   Neurological: Negative for dizziness, tremors, seizures, syncope, facial asymmetry, speech difficulty, weakness, light-headedness, numbness and headaches.   Psychiatric/Behavioral: Negative for behavioral problems, confusion, hallucinations and suicidal ideas. The patient is not nervous/anxious.    All other systems reviewed and are negative.    Objective:     Vital Signs (Most Recent):  Temp: 97.5 °F (36.4 °C) (10/31/18 1430)  Pulse: 66 (10/31/18 1430)  Resp: 14 (10/31/18 1430)  BP: (!) 109/58 (10/31/18 1430)  SpO2: 95 % (10/31/18 1430) Vital Signs (24h Range):  Temp:  [97.4 °F (36.3 °C)-99.3 °F (37.4 °C)] 97.5 °F (36.4 °C)  Pulse:  [58-89] 66  Resp:  [14-21] 14  SpO2:  [94 %-96 %] 95 %  BP: ()/(50-66) 109/58     Weight: 84.6 kg (186 lb 8.2 oz)  Body mass index is 27.54 kg/m².    Intake/Output Summary (Last 24 hours) at 10/31/2018 1446  Last data filed at 10/31/2018  1340  Gross per 24 hour   Intake 2448 ml   Output 2370 ml   Net 78 ml      Physical Exam   Constitutional: He is oriented to person, place, and time. He appears well-developed and well-nourished.   Elderly appearing    HENT:   Head: Normocephalic and atraumatic.   Eyes: Conjunctivae are normal. Pupils are equal, round, and reactive to light.   Neck: Normal range of motion. Neck supple.   Cardiovascular: Normal rate, regular rhythm, normal heart sounds and intact distal pulses.   No murmur heard.  Pulmonary/Chest: Effort normal and breath sounds normal. No respiratory distress.   Abdominal: Soft. Bowel sounds are normal. He exhibits no distension. There is no tenderness.   Musculoskeletal: He exhibits tenderness. He exhibits no edema or deformity.   Left hip pain, Decreased ROM to LLE.    Neurological: He is alert and oriented to person, place, and time.   Skin: Skin is warm and dry. No rash noted. No erythema.   Abrasion noted to Left scalp, Abrasion noted to Left elbow.    Psychiatric: He has a normal mood and affect. His behavior is normal.   Nursing note and vitals reviewed.      Significant Labs: All pertinent labs within the past 24 hours have been reviewed.    Significant Imaging:   Imaging Results          X-Ray Chest AP Portable (Final result)  Result time 10/29/18 14:19:11    Final result by PAULA Mckinney Sr., MD (10/29/18 14:19:11)                 Impression:      1. There is an acute appearing nondisplaced fracture in the lateral aspect of the left 9th rib that is best seen on the dedicated rib series.  2. The lungs are clear.  3. Surgical changes  .      Electronically signed by: Antelmo Mckinney MD  Date:    10/29/2018  Time:    14:19             Narrative:    EXAMINATION:  XR CHEST AP PORTABLE    CLINICAL HISTORY:  Fracture of unspecified part of neck of unspecified femur, initial encounter for closed fracture    COMPARISON:  A plain film examination of the left ribs performed on  10/29/2018.    FINDINGS:  There are multiple sternotomy wires in place.  There is partial visualization of a prosthetic right humeral head.  There is a prosthetic left humeral head in place.  There is an acute appearing nondisplaced fracture in the lateral aspect of the left 9th rib that is best seen on the dedicated rib series.  The size of the heart is normal. The lungs are clear. There is no pneumothorax.  The costophrenic angles are sharp.                               CT Head Without Contrast (Final result)  Result time 10/29/18 12:41:21    Final result by Sharad Wall MD (10/29/18 12:41:21)                 Impression:      Chronic microvascular ischemic changes.      Electronically signed by: Sharad Wall MD  Date:    10/29/2018  Time:    12:41             Narrative:    EXAMINATION:  CT HEAD WITHOUT CONTRAST    CLINICAL HISTORY:  head injury;    TECHNIQUE:  Low dose axial CT images obtained throughout the head without intravenous contrast. Sagittal and coronal reconstructions were performed.  All CT scans at this facility use dose modulation, iterative reconstruction and/or weight based dosing when appropriate to reduce radiation dose to as low as reasonably achievable.    COMPARISON:  03/06/2018    FINDINGS:  Intracranial compartment:    The brain parenchyma demonstrates areas of decreased attenuation with mild to moderate periventricular white matter consistent with chronic microvascular ischemic changes..  No parenchymal mass, hemorrhage, edema or major vascular distribution infarct.  Vascular calcifications are noted.    Mild prominence of the sulci and ventricles are consistent with age-related involutional changes.    No extra-axial blood or fluid collections.    Skull/extracranial contents (limited evaluation): No fracture. Mastoid air cells and paranasal sinuses are essentially clear.                               X-Ray Elbow Complete Left (Final result)  Result time 10/29/18 13:03:52    Final  result by PAULA Mckinney Sr., MD (10/29/18 13:03:52)                 Impression:      1. There is no acute fracture visualized.  2. There are several osseous densities in the lateral aspect of the left elbow. One of the larger ones measures 8 mm.  These are characteristic of intra-articular loose bodies.  3. There is a 6 mm calcific density in the expected location of the distal aspect of the triceps tendon.  This is characteristic of prior trauma.      Electronically signed by: Antelmo Mckinney MD  Date:    10/29/2018  Time:    13:03             Narrative:    EXAMINATION:  XR ELBOW COMPLETE 3 VIEW LEFT    CLINICAL HISTORY:  Unspecified injury of left elbow, initial encounter    COMPARISON:  None    FINDINGS:  There is no acute fracture visualized.  There is no dislocation.  There are several osseous densities in the lateral aspect of the left elbow.  One of the larger ones measures 8 mm.  There is a 6 mm calcific density in the expected location of the distal aspect of the triceps tendon.                               X-Ray Hip 2 View Left (Final result)  Result time 10/29/18 13:01:26    Final result by PAULA Mckinney Sr., MD (10/29/18 13:01:26)                 Impression:      1. There is a poorly visualized fracture in the left femoral neck.  The left femoral head still articulates with the left acetabulum.  2. There is partial visualization of posterior spinal fusion hardware in the lumbar spine.  3. There is a moderate amount of atherosclerosis.      Electronically signed by: Antelmo Mckinney MD  Date:    10/29/2018  Time:    13:01             Narrative:    EXAMINATION:  XR HIP 2 VIEW LEFT    CLINICAL HISTORY:  Unspecified injury of left hip, initial encounter    COMPARISON:  None    FINDINGS:  There is a poorly visualized fracture in the left femoral neck.  The left femoral head still articulates with the left acetabulum.  There is a moderate amount of atherosclerosis.  There is partial visualization of  posterior spinal fusion hardware in the lumbar spine.                               X-Ray Ribs 2 View Left (Final result)  Result time 10/29/18 13:09:06    Final result by PAULA Mckinney Sr., MD (10/29/18 13:09:06)                 Impression:      1. There is an acute appearing nondisplaced fracture in the lateral aspect of the left 9th rib.  2. Surgical changes      Electronically signed by: Antelmo Mckinney MD  Date:    10/29/2018  Time:    13:09             Narrative:    EXAMINATION:  XR RIBS 2 VIEW LEFT    TECHNIQUE:  Standard knee MRI protocol without IV contrast was performed.    FINDINGS:  There is an acute appearing nondisplaced fracture in the lateral aspect of the left 9th rib.  There are healed fractures in the left 10th and 11th ribs.  There are multiple sternotomy wires in place.  There is partial visualization of a prosthetic left humeral head.  There is no pneumothorax.  The left costophrenic angle is sharp.  There is no evidence of an acute pulmonary process.

## 2018-11-01 LAB
ALBUMIN SERPL BCP-MCNC: 2.8 G/DL
ALP SERPL-CCNC: 122 U/L
ALT SERPL W/O P-5'-P-CCNC: 40 U/L
AMMONIA PLAS-SCNC: 70 UMOL/L
ANION GAP SERPL CALC-SCNC: 16 MMOL/L
AST SERPL-CCNC: 102 U/L
BASOPHILS # BLD AUTO: 0.01 K/UL
BASOPHILS NFR BLD: 0.1 %
BILIRUB DIRECT SERPL-MCNC: 4.7 MG/DL
BILIRUB SERPL-MCNC: 5.1 MG/DL
BILIRUB SERPL-MCNC: 6.5 MG/DL
BUN SERPL-MCNC: 68 MG/DL
CALCIUM SERPL-MCNC: 8.6 MG/DL
CHLORIDE SERPL-SCNC: 100 MMOL/L
CO2 SERPL-SCNC: 19 MMOL/L
CREAT SERPL-MCNC: 2.9 MG/DL
DIFFERENTIAL METHOD: ABNORMAL
EOSINOPHIL # BLD AUTO: 0 K/UL
EOSINOPHIL NFR BLD: 0.1 %
ERYTHROCYTE [DISTWIDTH] IN BLOOD BY AUTOMATED COUNT: 17.9 %
EST. GFR  (AFRICAN AMERICAN): 21 ML/MIN/1.73 M^2
EST. GFR  (NON AFRICAN AMERICAN): 19 ML/MIN/1.73 M^2
GLUCOSE SERPL-MCNC: 76 MG/DL
HAPTOGLOB SERPL-MCNC: 102 MG/DL
HCT VFR BLD AUTO: 27.8 %
HGB BLD-MCNC: 9.2 G/DL
LDH SERPL L TO P-CCNC: 1651 U/L
LYMPHOCYTES # BLD AUTO: 0.9 K/UL
LYMPHOCYTES NFR BLD: 8.8 %
MCH RBC QN AUTO: 30.1 PG
MCHC RBC AUTO-ENTMCNC: 33.1 G/DL
MCV RBC AUTO: 91 FL
MONOCYTES # BLD AUTO: 1.4 K/UL
MONOCYTES NFR BLD: 14.5 %
NEUTROPHILS # BLD AUTO: 7.5 K/UL
NEUTROPHILS NFR BLD: 76.5 %
PLATELET # BLD AUTO: 155 K/UL
PMV BLD AUTO: 10.2 FL
POTASSIUM SERPL-SCNC: 4.9 MMOL/L
PROCALCITONIN SERPL IA-MCNC: 9.53 NG/ML
PROT SERPL-MCNC: 5.8 G/DL
RBC # BLD AUTO: 3.06 M/UL
SODIUM SERPL-SCNC: 135 MMOL/L
WBC # BLD AUTO: 9.78 K/UL

## 2018-11-01 PROCEDURE — 25000003 PHARM REV CODE 250: Performed by: ORTHOPAEDIC SURGERY

## 2018-11-01 PROCEDURE — 82248 BILIRUBIN DIRECT: CPT

## 2018-11-01 PROCEDURE — 82140 ASSAY OF AMMONIA: CPT

## 2018-11-01 PROCEDURE — 97530 THERAPEUTIC ACTIVITIES: CPT

## 2018-11-01 PROCEDURE — 63600175 PHARM REV CODE 636 W HCPCS: Performed by: ORTHOPAEDIC SURGERY

## 2018-11-01 PROCEDURE — 97162 PT EVAL MOD COMPLEX 30 MIN: CPT

## 2018-11-01 PROCEDURE — G8987 SELF CARE CURRENT STATUS: HCPCS | Mod: CM

## 2018-11-01 PROCEDURE — G8978 MOBILITY CURRENT STATUS: HCPCS | Mod: CM

## 2018-11-01 PROCEDURE — 83615 LACTATE (LD) (LDH) ENZYME: CPT

## 2018-11-01 PROCEDURE — 84145 PROCALCITONIN (PCT): CPT

## 2018-11-01 PROCEDURE — 82247 BILIRUBIN TOTAL: CPT

## 2018-11-01 PROCEDURE — G8988 SELF CARE GOAL STATUS: HCPCS | Mod: CK

## 2018-11-01 PROCEDURE — 83010 ASSAY OF HAPTOGLOBIN QUANT: CPT

## 2018-11-01 PROCEDURE — 85025 COMPLETE CBC W/AUTO DIFF WBC: CPT

## 2018-11-01 PROCEDURE — 80053 COMPREHEN METABOLIC PANEL: CPT

## 2018-11-01 PROCEDURE — 21400001 HC TELEMETRY ROOM

## 2018-11-01 PROCEDURE — 25000003 PHARM REV CODE 250: Performed by: NURSE PRACTITIONER

## 2018-11-01 PROCEDURE — 97166 OT EVAL MOD COMPLEX 45 MIN: CPT

## 2018-11-01 PROCEDURE — 36415 COLL VENOUS BLD VENIPUNCTURE: CPT

## 2018-11-01 PROCEDURE — G8979 MOBILITY GOAL STATUS: HCPCS | Mod: CK

## 2018-11-01 PROCEDURE — 82533 TOTAL CORTISOL: CPT

## 2018-11-01 RX ORDER — SODIUM CHLORIDE 9 MG/ML
INJECTION, SOLUTION INTRAVENOUS CONTINUOUS
Status: DISCONTINUED | OUTPATIENT
Start: 2018-11-01 | End: 2018-11-02

## 2018-11-01 RX ORDER — SODIUM CHLORIDE 9 MG/ML
INJECTION, SOLUTION INTRAVENOUS CONTINUOUS
Status: DISCONTINUED | OUTPATIENT
Start: 2018-11-01 | End: 2018-11-01

## 2018-11-01 RX ADMIN — ACETAMINOPHEN 1000 MG: 10 INJECTION, SOLUTION INTRAVENOUS at 03:11

## 2018-11-01 RX ADMIN — ACETAMINOPHEN 1000 MG: 10 INJECTION, SOLUTION INTRAVENOUS at 05:11

## 2018-11-01 RX ADMIN — SODIUM CHLORIDE 500 ML: 0.9 INJECTION, SOLUTION INTRAVENOUS at 06:11

## 2018-11-01 RX ADMIN — CHLORHEXIDINE GLUCONATE 10 ML: 1.2 RINSE ORAL at 09:11

## 2018-11-01 RX ADMIN — SODIUM CHLORIDE 250 ML: 0.9 INJECTION, SOLUTION INTRAVENOUS at 04:11

## 2018-11-01 RX ADMIN — CHLORHEXIDINE GLUCONATE 10 ML: 1.2 RINSE ORAL at 08:11

## 2018-11-01 RX ADMIN — SODIUM CHLORIDE 500 ML: 0.9 INJECTION, SOLUTION INTRAVENOUS at 09:11

## 2018-11-01 NOTE — PT/OT/SLP EVAL
Occupational Therapy   Evaluation    Name: Chung Meneses Sr.  MRN: [id number]  Admitting Diagnosis:  Left displaced femoral neck fracture 1 Day Post-Op    Recommendations:     Discharge Recommendations: nursing facility, skilled  Discharge Equipment Recommendations:  (tbd)  Barriers to discharge:  Decreased caregiver support    History:     Occupational Profile:  Living Environment: LIVES WITH SPOUSE IN 1 STORY HOUSE WITH NO STEPS  Previous level of function: (I) WITH ADL'S  Roles and Routines: OCCUPATIONAL THERAPY  Equipment Used at Home:  wheelchair, walker, rolling, grab bar, shower chair  Assistance upon Discharge:     Past Medical History:   Diagnosis Date    Aortic stenosis 8/2/2013    Atrial fibrillation 7/5/2013    CHF (congestive heart failure)     Coronary artery disease     Dizziness - light-headed     Hyperlipidemia     Hypertension     Pulmonary hypertension 11/30/2017    S/P AVR 12/1/2017    Syncope 1/26/2018       Past Surgical History:   Procedure Laterality Date    AORTOCORONARY BYPASS-CABG N/A 11/30/2017    Performed by [facility] at [facility] [place] OR    BACK SURGERY  2003    CARDIAC VALVE SURGERY      CARDIOVERSION N/A 5/1/2017    Performed by Patricia Espinoza MD at [facility] [place] CATH LAB    CARDIOVERSION N/A 9/4/2013    Performed by [facility] MD at [facility] [place] CATH LAB    CORONARY ARTERY BYPASS GRAFT      HARVEST-VEIN-ENDOVASCULAR Left 11/30/2017    Performed by [facility]q [facility]eng MD at [facility] [place] OR    HEART CATH-BILATERAL Bilateral 11/14/2017    Performed by Patricia [facility]za MD at [facility] [place] CATH LAB    knee replacemnt bilateral  2001    REPLACEMENT-VALVE-AORTIC N/A 11/30/2017    Performed by [facility]q [facility]eng MD at [facility] [place] OR    SHOULDER SURGERY  2002    TRANSESOPHAGEAL ECHOCARDIOGRAM (ESTEFANY) N/A 11/30/2017    Performed by Shaq Boateng MD at [facility] [place] OR       Subjective     Chief Complaint: IMPAIRED ADL'S, DECREASE FUNCTIONAL MOBILITY AND DECREASE T/F'S  Patient/Family Comments/goals:      Pain/Comfort:  · Pain Rating 1: 0/10    Patients cultural, spiritual, L[employer] day conflicts given the current situation:      Objective:     Communicated with: NURSE MARK  AND Epic CHART REVIEW prior to session.  Patient found all lines intact, call button in reach, bed alarm on and NURSE MARK notified and telemetry, peripheral IV upon OT entry to room.    General Precautions: Standard, fall   Orthopedic Precautions:LLE weight bearing as tolerated   Braces: N/A     Occupational Performance:    Bed Mobility:    · Patient completed Rolling/Turning to Left with  maximal assistance  · Patient completed Rolling/Turning to Right with maximal assistance  · Patient completed Scooting/Bridging with maximal assistance  · Patient completed Supine to Sit with total assistance  · Patient completed Sit to Supine with total assistance    Activities of Daily Living:  · Upper Body Dressing: maximal assistance .  · Lower Body Dressing: total assistance .  · Toileting: total assistance [employer]    Cognitive/Visual Perceptual:  Cognitive/Psychosocial Skills:     -       Oriented to: Person, Place, Time and Situation   -       Follows Commands/attention:Follows one-step commands  -       Communication: clear/fluent  -       Memory: No Deficits noted  -       Safety awareness/insight to disability: impaired   Visual/Perceptual:      -Intact .   PT  VERY DROWSY    Physical Exam:  Upper Extremity Range of Motion:     -       Right Upper Extremity: WFL .  -       Left Upper Extremity: WFL . .  Upper Extremity Strength:    -       Right Upper Extremity: MMT: 4/5 GROSSLY  -       Left Upper Extremity: MMT: 4/5 GROSSLY .   Strength:    -       Right Upper Extremity: WFL .  -       Left Upper Extremity: WFL .    AMPAC 6 Click ADL:  AMPAC Total Score: 11    Treatment & Education:    Education:    Patient left left sidelying with all lines intact, call button in reach, bed alarm on and NURSE notified    Assessment:     Chung SHAH  "Gideon Duckworth is a 87 y.o. male with a medical diagnosis of Left displaced femoral neck fracture.  He presents with the following performance deficits affecting function: weakness, impaired functional mobilty, decreased safety awareness, impaired endurance, gait instability, impaired balance, impaired self care skills, decreased upper extremity function.      Rehab Prognosis: Good; patient would benefit from acute skilled OT services to address these deficits and reach maximum level of function.         Clinical Decision Makin.  OT Mod:  "Pt evaluation falls under moderate complexity for evaluation coding due to identification of 3-5 performance deficits noted as stated above. Eval required Min/Mod assistance to complete on this date and detailed assessment(s) were utilized. Moreover, an expanded review of history and occupational profile obtained with additional review of cognitive, physical and psychosocial hx."     Plan:     Patient to be seen 3 x/week to address the above listed problems via self-care/home management, therapeutic activities, therapeutic exercises  · Plan of Care Expires: 18  · Plan of Care Reviewed with: patient    This Plan of care has been discussed with the patient who was involved in its development and understands and is in agreement with the identified goals and treatment plan    GOALS:   Multidisciplinary Problems     Occupational Therapy Goals        Problem: Occupational Therapy Goal    Goal Priority Disciplines Outcome Interventions   Occupational Therapy Goal     OT, PT/OT            Problem: Occupational Therapy Goal    Goal Priority Disciplines Outcome Interventions   Occupational Therapy Goal     OT, PT/OT     Description:  OT GOALS TO BE MET MY -18  MIN A WITH LE DRESSING  PT WILL TOLERATE 1 SET X 15 REPS B UE RPM EXERCISE WITH MIN RESISTANCE  SBA WITH UE DRESSING  MIN A WITH BSC T/F'S                    Time Tracking:     OT Date of Treatment: 18  OT Start " Time: 1017  OT Stop Time: 1040  OT Total Time (min): 23 min    Billable Minutes:Evaluation 10 MINUTES  Therapeutic Activity 13 MINUTES    Kirsty Jama OT  11/1/2018

## 2018-11-01 NOTE — PROGRESS NOTES
SUBJECTIVE:  Patient is status post Left MARIO ALBERTO  The patient is alert and oriented times three.   Patient complains of  0/10  pain     Past Medical History:   Diagnosis Date    Aortic stenosis 8/2/2013    Atrial fibrillation 7/5/2013    CHF (congestive heart failure)     Coronary artery disease     Dizziness - light-headed     Hyperlipidemia     Hypertension     Pulmonary hypertension 11/30/2017    S/P AVR 12/1/2017    Syncope 1/26/2018     Past Surgical History:   Procedure Laterality Date    AORTOCORONARY BYPASS-CABG N/A 11/30/2017    Performed by [facility] at [facility] OR    ARTHROPLASTY OF HIP BY ANTERIOR APPROACH Left 10/31/2018    Procedure: ARTHROPLASTY, HIP, ANTERIOR APPROACH;  Surgeon: Marlon Simental Sr., MD;  Location: [facility] OR;  Service: Orthopedics;  Laterality: Left;    ARTHROPLASTY, HIP, ANTERIOR APPROACH Left 10/31/2018    Performed by Marlon Simental Sr., MD at [facility] OR    BACK SURGERY  2003    BONE EXOSTOSIS EXCISION Left 10/31/2018    Procedure: EXCISION, EXOSTOSIS;  Surgeon: Marlon Simental Sr., MD;  Location: [facility] OR;  Service: Orthopedics;  Laterality: Left;    CAPSULOTOMY OF JOINT Left 10/31/2018    Procedure: CAPSULOTOMY, JOINT;  Surgeon: Marlon Simental Sr., MD;  Location: [facility] OR;  Service: Orthopedics;  Laterality: Left;    CAPSULOTOMY, JOINT Left 10/31/2018    Performed by Marlon Simental Sr., MD at [facility] OR    CARDIAC VALVE SURGERY      CARDIOVERSION N/A 5/1/2017    Performed by Patricia Esp[facility]za MD at [facility] CATH LAB    CARDIOVERSION N/A 9/4/2013    Performed by Patricia Esp[facility]za MD at [facility] CATH LAB    CORONARY ARTERY BYPASS GRAFT      EXCISION, EXOSTOSIS Left 10/31/2018    Performed by Marlon Simental Sr., MD at [facility] OR    HARVEST-VEIN-ENDOVASCULAR Left 11/30/2017    Performed by Shaq Boateng MD at [facility] OR    HEART CATH-BILATERAL Bilateral 11/14/2017    Performed by Patricia [facility]za MD at [facility] CATH LAB    knee replacemnt bilateral  2001    REPLACEMENT-VALVE-AORTIC N/A  "11/30/2017    Performed by [facility]q [facility] at [facility] OR    SHOULDER SURGERY  2002    SYNOVECTOMY OF HIP Left 10/31/2018    Procedure: SYNOVECTOMY, HIP;  Surgeon: Marlon Simental Sr., MD;  Location: [facility] OR;  Service: Orthopedics;  Laterality: Left;    SYNOVECTOMY, HIP Left 10/31/2018    Performed by Marlon Simental Sr., MD at [facility] OR    TRANSESOPHAGEAL ECHOCARDIOGRAM (ESTEFANY) N/A 11/30/2017    Performed by Shaq Boateng MD at [facility] OR     Review of patient's allergies indicates:   Allergen Reactions    Sulfa (sulfonamide antibiotics) Hives     No current facility-administered medications on file prior to encounter.      Current Outpatient Medications on File Prior to Encounter   Medication Sig Dispense Refill    metoprolol succinate (TOPROL-XL) 25 MG 24 hr tablet Take 25 mg by mouth 2 (two) times daily.      apixaban (ELIQUIS) 2.5 mg Tab Take 1 tablet (2.5 mg total) by mouth 2 (two) times daily. 180 tablet 3    aspirin (ECOTRIN) 81 MG EC tablet Take 1 tablet (81 mg total) by mouth once daily. 30 tablet 0    fish oil-omega-3 fatty acids 300-1,000 mg capsule Take 1,200 mg by mouth once daily.      multivitamin with folic acid 400 mcg Tab Take 1 tablet by mouth.      potassium chloride SA (K-DUR,KLOR-CON) 20 MEQ tablet TAKE 1 TABLET BY MOUTH IN THE MORNING 30 tablet 3    ranitidine (ZANTAC) 75 MG tablet Take 75 mg by mouth nightly.       torsemide (DEMADEX) 20 MG Tab Take 20 mg by mouth once daily. 2 tablets (40 mg) everyday      vitamin D 1000 units Tab Take 1,000 Units by mouth once daily.        BP (!) 82/52 (BP Location: Left arm, Patient Position: Lying)   Pulse 75   Temp 97.6 °F (36.4 °C) (Oral)   Resp 18   Ht 5' 9" (1.753 m)   Wt 84.6 kg (186 lb 8.2 oz)   SpO2 97%   BMI 27.54 kg/m²    ROS:  GENERAL: No fever, chills, fatigability or weight loss.  MUSCULOSKELETAL: See HPI    PE:  APPEARANCE: Well nourished, well developed, in no acute distress.   MUSCULOSKELETAL:        Left Hip    -dressing " "intact, 2 plus dorsalis pedis and posterior tibial artery pulses, light touch intact Left lower extremity.  All digits are warm. No erythema, no warmth, no drainage, mild swelling,mild tenderness.  Less than 2 seconds capillary refill all digits.    Intake/Output - Last 3 Shifts       10/30 0700 - 10/31 0659 10/31 0700 - 11/01 0659 11/01 0700 - 11/02 0659    P.O. 600 350 240    I.V. (mL/kg)  1775 (21)     Blood  328     IV Piggyback 300 100     Total Intake(mL/kg) 900 (10.6) 2553 (30.2) 240 (2.8)    Urine (mL/kg/hr) 1225 (0.6) 810 (0.4)     Stool 0 0     Blood  1000     Total Output 1225 1810     Net -325 +743 +240           Urine Occurrence 2 x      Stool Occurrence 0 x 0 x           BP (!) 82/52 (BP Location: Left arm, Patient Position: Lying)   Pulse 75   Temp 97.6 °F (36.4 °C) (Oral)   Resp 18   Ht 5' 9" (1.753 m)   Wt 84.6 kg (186 lb 8.2 oz)   SpO2 97%   BMI 27.54 kg/m²     Hb- 9.2  Hct-  27.8    ASSESSMENT:    The patient is stable and improving.  X-Ray Chest 1 View  Narrative: EXAMINATION:  XR CHEST 1 VIEW    CLINICAL HISTORY:  Shortness of breath;    COMPARISON:  October 30, 2018    FINDINGS:  The lungs remain clear.  Hilar and mediastinal contours and osseous structures are unchanged.  Stable median sternotomy wires, CABG changes, postoperative changes to both shoulder girdles, tortuous aorta, calcifications of the aortic knob and degenerative changes of the spine.  Impression: 1.  Overall, no significant interval change has occurred.    Electronically signed by: Gunnar Carrillo MD  Date:    11/01/2018  Time:    17:23  FL Greater Than 1 Hour  Narrative: EXAMINATION:  FL GREATER THAN 1 HOUR    CLINICAL HISTORY:  intraop;    COMPARISON:  None    FINDINGS:  Fluoroscopy used for procedure.  Please see physician notes for details and radiology report with accession #    Fluorscopy time:    Number of images:  Impression: Please see above.    Electronically signed by: Maggie " Radiologist  Date:    11/01/2018  Time:    15:39        PLAN:  Walker assisted ambulation FWB left lower extremity  Continue pain medication  Continue wound care  Continue physical therapy  No Follow-up on file.

## 2018-11-01 NOTE — PHYSICIAN QUERY
"PT Name: Chung Meneses .  MR #: [id number]    Physician Query Form - Heart  Condition Clarification     CDS/: Talya Styles               Contact information:[phone]  This form is a permanent document in the medical record.     Query Date: November 1, 2018    By submitting this query, we are merely seeking further clarification of documentation. Please utilize your independent clinical judgment when addressing the question(s) below.    The medical record contains the following   Indicators     Supporting Clinical Findings Location in Medical Record    x BNP  2,447  Lab Results 10/30/2018    x EF  (EF 50-55%).   Anesthesia Information 10/31/2018    x Radiology findings  No acute abnormality.  Chest X-Ray 10/30/2018    x Echo Results Echo 01/18  CONCLUSIONS     1 - Low normal to mildly depressed left ventricular systolic function (EF 50-55%).     2 - Impaired LV relaxation, elevated LAP (grade 2 diastolic dysfunction).     3 - Wall motion abnormalities.     4 - Low normal right ventricular systolic function .     5 - Severe left atrial enlargement.     6 - Concentric hypertrophy.     7 - Pulmonary hypertension. The estimated PA systolic pressure is 41 mmHg.     8 - Aortic valve prosthesis, mean gradient = 11 mmHg.     9 - Moderate mitral regurgitation.     10 - Mild to moderate tricuspid regurgitation.     11 - Increased central venous pressure.       Anesthesia Information 10/31/2018    "Ascites" documented      "SOB" or "EISENBERG" documented      "Hypoxia" documented      x Heart Failure documented CHF (congestive heart failure)   Appears compensated on exam   Cardiology Consult Note 10/30/2018    "Edema" documented      x Diuretics/Meds Metoprolol succinate (Toprol-XL) 24 hr tablet 50 mg: Oral: 2 times daily  Furosemide injection 40 mg: Intravenous: 2 times daily   MD Orders 10/30/2018    x Treatment: Continue torsemide  DASH diet, 2 gm sodium restriction  1L fluid restriction per day   Cardiology Consult " Note 10/30/2018    Other:      Heart failure (HF) can be acute, chronic or both. It is generally further specificed as systolic, diastolic, or combined. Lastly, it is important to identify an underlying etiology if known or suspected.     Common clues to acute exacerbation:  Rapidly progressive symptoms (w/in 2 weeks of presentation), using IV diuretics to treat, using supplemental O2, pulmonary edema on Xray, MI w/in 4 weeks, and/or BNP >500    Systolic Heart Failure: is defined as chart documentation of a left ventricular ejection fraction (LVEF) less than 40%     Diastolic Heart Failure: is defined as a left ventricular ejection fraction (LVEF) greater than 40%   +      Evidence of diastolic dysfunction on echocardiography OR    Right heart catheterization wedge pressure above 12 mm Hg OR    Left heart catheterization left ventricular end diastolic pressure 18 mm Hg or above.    References: *American Heart Association    The clinical guidelines noted below are only system guidelines, and do not replace the providers clinical judgment.     Provider, please specify the diagnosis associated with above clinical findings        [   ] Acute Diastolic Heart Failure - New diagnosis.  EF > 40%  and acute HF symptoms documented  [ x  ] Acute on Chronic Diastolic Heart Failure -    Pre-existing diastoic HF diagnosis.  EF > 40%  and acute HF symptoms documented                                 [   ] Chronic Diastolic Heart Failure - Pre-existing diastolic HF diagnosis.  EF > 40%  without  acute HF symptoms documented            [   ] Other Type of Heart Failure (please specify type): _________________________  [   ] Heart Failure Ruled Out  [   ] Other (please specify): ___________________________________  [  ] Clinically Undetermined                          Please document in your progress notes daily for the duration of treatment until resolved and include in your discharge summary.

## 2018-11-01 NOTE — SUBJECTIVE & OBJECTIVE
Interval History: No acute issues overnight. The patient reports that his pain is well controlled. The patient worked well with PT/OT who recommended SNF vs rehab for continued therapy at the time of discharge.       Review of Systems   Constitutional: Negative for activity change, appetite change, chills, fatigue, fever and unexpected weight change.   HENT: Negative for congestion, facial swelling, rhinorrhea, sinus pressure, sneezing and sore throat.    Eyes: Negative for discharge, redness and visual disturbance.   Respiratory: Negative for apnea, cough, chest tightness, shortness of breath, wheezing and stridor.    Cardiovascular: Negative for chest pain, palpitations and leg swelling.   Gastrointestinal: Negative for abdominal distention, abdominal pain, anal bleeding, blood in stool, constipation, diarrhea, nausea and vomiting.   Genitourinary: Negative for difficulty urinating, discharge, dysuria, frequency and hematuria.   Musculoskeletal: Positive for arthralgias. Negative for back pain, gait problem, joint swelling, myalgias, neck pain and neck stiffness.        Left elbow pain, Left hip pain   Skin: Negative for color change and pallor.   Neurological: Negative for dizziness, tremors, seizures, syncope, facial asymmetry, speech difficulty, weakness, light-headedness, numbness and headaches.   Psychiatric/Behavioral: Negative for behavioral problems, confusion, hallucinations and suicidal ideas. The patient is not nervous/anxious.    All other systems reviewed and are negative.    Objective:     Vital Signs (Most Recent):  Temp: 96.8 °F (36 °C) (11/01/18 1140)  Pulse: 74 (11/01/18 1140)  Resp: 16 (11/01/18 0839)  BP: (!) 81/52 (11/01/18 1140)  SpO2: 96 % (11/01/18 1140) Vital Signs (24h Range):  Temp:  [96.8 °F (36 °C)-97.9 °F (36.6 °C)] 96.8 °F (36 °C)  Pulse:  [] 74  Resp:  [16-18] 16  SpO2:  [91 %-96 %] 96 %  BP: ()/(47-68) 81/52     Weight: 84.6 kg (186 lb 8.2 oz)  Body mass index is 27.54  kg/m².    Intake/Output Summary (Last 24 hours) at 11/1/2018 1451  Last data filed at 11/1/2018 1300  Gross per 24 hour   Intake 765 ml   Output 465 ml   Net 300 ml      Physical Exam   Constitutional: He is oriented to person, place, and time. He appears well-developed and well-nourished.   Elderly appearing    HENT:   Head: Normocephalic and atraumatic.   Eyes: Conjunctivae are normal. Pupils are equal, round, and reactive to light.   Neck: Normal range of motion. Neck supple.   Cardiovascular: Normal rate, regular rhythm, normal heart sounds and intact distal pulses.   No murmur heard.  Pulmonary/Chest: Effort normal and breath sounds normal. No respiratory distress.   Abdominal: Soft. Bowel sounds are normal. He exhibits no distension. There is no tenderness.   Musculoskeletal: He exhibits tenderness. He exhibits no edema or deformity.   Left hip pain, Decreased ROM to LLE.    Neurological: He is alert and oriented to person, place, and time.   Skin: Skin is warm and dry. No rash noted. No erythema.   Abrasion noted to Left scalp, Abrasion noted to Left elbow.    Psychiatric: He has a normal mood and affect. His behavior is normal.   Nursing note and vitals reviewed.      Significant Labs: All pertinent labs within the past 24 hours have been reviewed.    Significant Imaging:   Imaging Results          X-Ray Chest AP Portable (Final result)  Result time 10/29/18 14:19:11    Final result by PAULA Mckinney Sr., MD (10/29/18 14:19:11)                 Impression:      1. There is an acute appearing nondisplaced fracture in the lateral aspect of the left 9th rib that is best seen on the dedicated rib series.  2. The lungs are clear.  3. Surgical changes  .      Electronically signed by: Antelmo Mckinney MD  Date:    10/29/2018  Time:    14:19             Narrative:    EXAMINATION:  XR CHEST AP PORTABLE    CLINICAL HISTORY:  Fracture of unspecified part of neck of unspecified femur, initial encounter for closed  fracture    COMPARISON:  A plain film examination of the left ribs performed on 10/29/2018.    FINDINGS:  There are multiple sternotomy wires in place.  There is partial visualization of a prosthetic right humeral head.  There is a prosthetic left humeral head in place.  There is an acute appearing nondisplaced fracture in the lateral aspect of the left 9th rib that is best seen on the dedicated rib series.  The size of the heart is normal. The lungs are clear. There is no pneumothorax.  The costophrenic angles are sharp.                               CT Head Without Contrast (Final result)  Result time 10/29/18 12:41:21    Final result by Sharad Wall MD (10/29/18 12:41:21)                 Impression:      Chronic microvascular ischemic changes.      Electronically signed by: Sharad Wall MD  Date:    10/29/2018  Time:    12:41             Narrative:    EXAMINATION:  CT HEAD WITHOUT CONTRAST    CLINICAL HISTORY:  head injury;    TECHNIQUE:  Low dose axial CT images obtained throughout the head without intravenous contrast. Sagittal and coronal reconstructions were performed.  All CT scans at this facility use dose modulation, iterative reconstruction and/or weight based dosing when appropriate to reduce radiation dose to as low as reasonably achievable.    COMPARISON:  03/06/2018    FINDINGS:  Intracranial compartment:    The brain parenchyma demonstrates areas of decreased attenuation with mild to moderate periventricular white matter consistent with chronic microvascular ischemic changes..  No parenchymal mass, hemorrhage, edema or major vascular distribution infarct.  Vascular calcifications are noted.    Mild prominence of the sulci and ventricles are consistent with age-related involutional changes.    No extra-axial blood or fluid collections.    Skull/extracranial contents (limited evaluation): No fracture. Mastoid air cells and paranasal sinuses are essentially clear.                                X-Ray Elbow Complete Left (Final result)  Result time 10/29/18 13:03:52    Final result by PAULA Mckinney Sr., MD (10/29/18 13:03:52)                 Impression:      1. There is no acute fracture visualized.  2. There are several osseous densities in the lateral aspect of the left elbow. One of the larger ones measures 8 mm.  These are characteristic of intra-articular loose bodies.  3. There is a 6 mm calcific density in the expected location of the distal aspect of the triceps tendon.  This is characteristic of prior trauma.      Electronically signed by: Antelmo Mckinney MD  Date:    10/29/2018  Time:    13:03             Narrative:    EXAMINATION:  XR ELBOW COMPLETE 3 VIEW LEFT    CLINICAL HISTORY:  Unspecified injury of left elbow, initial encounter    COMPARISON:  None    FINDINGS:  There is no acute fracture visualized.  There is no dislocation.  There are several osseous densities in the lateral aspect of the left elbow.  One of the larger ones measures 8 mm.  There is a 6 mm calcific density in the expected location of the distal aspect of the triceps tendon.                               X-Ray Hip 2 View Left (Final result)  Result time 10/29/18 13:01:26    Final result by PAULA Mckinney Sr., MD (10/29/18 13:01:26)                 Impression:      1. There is a poorly visualized fracture in the left femoral neck.  The left femoral head still articulates with the left acetabulum.  2. There is partial visualization of posterior spinal fusion hardware in the lumbar spine.  3. There is a moderate amount of atherosclerosis.      Electronically signed by: Antelmo Mckinney MD  Date:    10/29/2018  Time:    13:01             Narrative:    EXAMINATION:  XR HIP 2 VIEW LEFT    CLINICAL HISTORY:  Unspecified injury of left hip, initial encounter    COMPARISON:  None    FINDINGS:  There is a poorly visualized fracture in the left femoral neck.  The left femoral head still articulates with the left acetabulum.  There  is a moderate amount of atherosclerosis.  There is partial visualization of posterior spinal fusion hardware in the lumbar spine.                               X-Ray Ribs 2 View Left (Final result)  Result time 10/29/18 13:09:06    Final result by PAULA Mckinney Sr., MD (10/29/18 13:09:06)                 Impression:      1. There is an acute appearing nondisplaced fracture in the lateral aspect of the left 9th rib.  2. Surgical changes      Electronically signed by: Antelmo Mckinney MD  Date:    10/29/2018  Time:    13:09             Narrative:    EXAMINATION:  XR RIBS 2 VIEW LEFT    TECHNIQUE:  Standard knee MRI protocol without IV contrast was performed.    FINDINGS:  There is an acute appearing nondisplaced fracture in the lateral aspect of the left 9th rib.  There are healed fractures in the left 10th and 11th ribs.  There are multiple sternotomy wires in place.  There is partial visualization of a prosthetic left humeral head.  There is no pneumothorax.  The left costophrenic angle is sharp.  There is no evidence of an acute pulmonary process.

## 2018-11-01 NOTE — PHYSICIAN QUERY
PT Name: Chung Meneses .  MR #: [id number]    Physician Query Form - Atrial Fibrillation Specificity     CDS/: Talya Styles               Contact information: [phone]     This form is a permanent document in the medical record.     Query Date: November 1, 2018    By submitting this query, we are merely seeking further clarification of documentation. Please utilize your independent clinical judgment when addressing the question(s) below.    The medical record contains the following:   Indicators     Supporting Clinical Findings Location in Medical Record    x Atrial Fibrillation PMH of Afib, CHF, CAD, HTN, S/P AVR who presented to the ER after suffering a ground level fall at home today.       Hospital Medicine H&P 10/29/2018    x EKG results Atrial fibrillation with slow ventricular response  Left axis deviation  Nonspecific T wave abnormality  Abnormal ECG  When compared with ECG of 10-OCT-2018 12:20,  T wave inversion now evident in Lateral leads   EKG Results 10/29/2018    Medication      x Treatment Hold ASA and Eliquis prior to surgery  Resume as soon as possible post op  Cardiology Consult Note 10/30/2018    Other         Provider, please further specify the Atrial Fibrillation diagnosis.    x Chronic    Paroxysmal    Permanent    Persistent    Other (please specify):    Clinically Undetermined       Please document in your progress notes daily for the duration of treatment until resolved, and include in your discharge summary.

## 2018-11-01 NOTE — PLAN OF CARE
CM met with patient/daughter at the bedside regarding discharge plans.  PT/OT recommendations for SNF.  CM discussed in network facilities/integrated partners/ratings.  Choice for [facility].  Choice form completed and placed in patient blue folder.      Referral made in right care to Emmy Pelaez.

## 2018-11-01 NOTE — NURSING
Patient BP low. Patient is awake and alert and in no obvious signs of distress. Reported to Dr. Gomez. Will continue to monitor patient.

## 2018-11-01 NOTE — PLAN OF CARE
Problem: Patient Care Overview  Goal: Interdisciplinary Rounds/Family Conf  Outcome: Ongoing (interventions implemented as appropriate)  Pt in bed AAOx4. Plan of Care reviewed, Verbalized understanding.   Patient remained free from falls, fall precautions in place.  Patient is AFib on monitor.VSS.  PIV CDI.   Call bell and personal belongings within reach. Hourly rounding complete. Reminded to call for assistance. No complaints at this time. Will continue to monitor.

## 2018-11-01 NOTE — ASSESSMENT & PLAN NOTE
- Ortho consulted   - Hold Eliquis and ASA  - Analgesia PRN   - PT/OT consult   - NPO after midnight     10/30/18 No acute issues overnight. The patient reports that his pain is well controlled at this time. Dr. Simental will likely take the patient to surgery tomorrow. NPO after MN  10/31/18 No acute issues overnight. The patient is being taken to the OR by Dr. Simental today.   11/1/18 No acute issues overnight. The patient reports that his pain is well controlled. The patient worked well with PT/OT who recommended SNF vs rehab for continued therapy at the time of discharge.

## 2018-11-01 NOTE — PROGRESS NOTES
[facility] Hospital Medicine  Progress Note    Patient Name: Chung Meneses Sr.  MRN: [id number]  Patient Class: IP- Inpatient   Admission Date: 10/29/2018  Length of Stay: 3 days  Attending Physician: Jake Eastman MD  Primary Care Provider: Glenroy Carmichael MD        Subjective:     Principal Problem:Left displaced femoral neck fracture    HPI:  Chung Meneses is an 86 yo male with a PMH of Afib, CHF, CAD, HTN, S/P AVR who presented to the ER after suffering a ground level fall at home today. The patient reports that he tripped and fell while walking outside today. The patient reports that he fell on his left side hitting his head. The patient denies any loss of consciousness. Imaging showed a left femoral neck fracture. CXR showed an acute appearing nondisplaced fracture in the lateral aspect of the left 9th rib. The patient reports pain is currently well controlled. Dr. Simental (Ortho) was consulted and will evaluate the patient. Patient denies fever, chills, CP, cough, stallworth, pnd, orthopnea, palpitations, diaphoresis, headache, blurred vision, numbness, tingling, dizziness, localized weakness, n/v/d, abdominal pain, blood in stools, melena, hematemesis, urinary frequency, urgency, dysuria or hematuria.         Hospital Course:  10/30/18 No acute issues overnight. The patient reports that his pain is well controlled at this time. Dr. Simental will likely take the patient to surgery tomorrow. NPO after MN 10/31/18 No acute issues overnight. The patient is being taken to the OR by Dr. Simental today. 11/1/18 No acute issues overnight. The patient reports that his pain is well controlled. The patient worked well with PT/OT who recommended SNF vs rehab for continued therapy at the time of discharge.     Interval History: No acute issues overnight. The patient reports that his pain is well controlled. The patient worked well with PT/OT who recommended SNF vs rehab for continued therapy at the time of  discharge.       Review of Systems   Constitutional: Negative for activity change, appetite change, chills, fatigue, fever and unexpected weight change.   HENT: Negative for congestion, facial swelling, rhinorrhea, sinus pressure, sneezing and sore throat.    Eyes: Negative for discharge, redness and visual disturbance.   Respiratory: Negative for apnea, cough, chest tightness, shortness of breath, wheezing and stridor.    Cardiovascular: Negative for chest pain, palpitations and leg swelling.   Gastrointestinal: Negative for abdominal distention, abdominal pain, anal bleeding, blood in stool, constipation, diarrhea, nausea and vomiting.   Genitourinary: Negative for difficulty urinating, discharge, dysuria, frequency and hematuria.   Musculoskeletal: Positive for arthralgias. Negative for back pain, gait problem, joint swelling, myalgias, neck pain and neck stiffness.        Left elbow pain, Left hip pain   Skin: Negative for color change and pallor.   Neurological: Negative for dizziness, tremors, seizures, syncope, facial asymmetry, speech difficulty, weakness, light-headedness, numbness and headaches.   Psychiatric/Behavioral: Negative for behavioral problems, confusion, hallucinations and suicidal ideas. The patient is not nervous/anxious.    All other systems reviewed and are negative.    Objective:     Vital Signs (Most Recent):  Temp: 96.8 °F (36 °C) (11/01/18 1140)  Pulse: 74 (11/01/18 1140)  Resp: 16 (11/01/18 0839)  BP: (!) 81/52 (11/01/18 1140)  SpO2: 96 % (11/01/18 1140) Vital Signs (24h Range):  Temp:  [96.8 °F (36 °C)-97.9 °F (36.6 °C)] 96.8 °F (36 °C)  Pulse:  [] 74  Resp:  [16-18] 16  SpO2:  [91 %-96 %] 96 %  BP: ()/(47-68) 81/52     Weight: 84.6 kg (186 lb 8.2 oz)  Body mass index is 27.54 kg/m².    Intake/Output Summary (Last 24 hours) at 11/1/2018 1451  Last data filed at 11/1/2018 1300  Gross per 24 hour   Intake 765 ml   Output 465 ml   Net 300 ml      Physical Exam    Constitutional: He is oriented to person, place, and time. He appears well-developed and well-nourished.   Elderly appearing    HENT:   Head: Normocephalic and atraumatic.   Eyes: Conjunctivae are normal. Pupils are equal, round, and reactive to light.   Neck: Normal range of motion. Neck supple.   Cardiovascular: Normal rate, regular rhythm, normal heart sounds and intact distal pulses.   No murmur heard.  Pulmonary/Chest: Effort normal and breath sounds normal. No respiratory distress.   Abdominal: Soft. Bowel sounds are normal. He exhibits no distension. There is no tenderness.   Musculoskeletal: He exhibits tenderness. He exhibits no edema or deformity.   Left hip pain, Decreased ROM to LLE.    Neurological: He is alert and oriented to person, place, and time.   Skin: Skin is warm and dry. No rash noted. No erythema.   Abrasion noted to Left scalp, Abrasion noted to Left elbow.    Psychiatric: He has a normal mood and affect. His behavior is normal.   Nursing note and vitals reviewed.      Significant Labs: All pertinent labs within the past 24 hours have been reviewed.    Significant Imaging:   Imaging Results          X-Ray Chest AP Portable (Final result)  Result time 10/29/18 14:19:11    Final result by PAULA Mckinney Sr., MD (10/29/18 14:19:11)                 Impression:      1. There is an acute appearing nondisplaced fracture in the lateral aspect of the left 9th rib that is best seen on the dedicated rib series.  2. The lungs are clear.  3. Surgical changes  .      Electronically signed by: Antelmo Mckinney MD  Date:    10/29/2018  Time:    14:19             Narrative:    EXAMINATION:  XR CHEST AP PORTABLE    CLINICAL HISTORY:  Fracture of unspecified part of neck of unspecified femur, initial encounter for closed fracture    COMPARISON:  A plain film examination of the left ribs performed on 10/29/2018.    FINDINGS:  There are multiple sternotomy wires in place.  There is partial visualization of a  prosthetic right humeral head.  There is a prosthetic left humeral head in place.  There is an acute appearing nondisplaced fracture in the lateral aspect of the left 9th rib that is best seen on the dedicated rib series.  The size of the heart is normal. The lungs are clear. There is no pneumothorax.  The costophrenic angles are sharp.                               CT Head Without Contrast (Final result)  Result time 10/29/18 12:41:21    Final result by Sharad Wall MD (10/29/18 12:41:21)                 Impression:      Chronic microvascular ischemic changes.      Electronically signed by: Sharad Wall MD  Date:    10/29/2018  Time:    12:41             Narrative:    EXAMINATION:  CT HEAD WITHOUT CONTRAST    CLINICAL HISTORY:  head injury;    TECHNIQUE:  Low dose axial CT images obtained throughout the head without intravenous contrast. Sagittal and coronal reconstructions were performed.  All CT scans at this facility use dose modulation, iterative reconstruction and/or weight based dosing when appropriate to reduce radiation dose to as low as reasonably achievable.    COMPARISON:  03/06/2018    FINDINGS:  Intracranial compartment:    The brain parenchyma demonstrates areas of decreased attenuation with mild to moderate periventricular white matter consistent with chronic microvascular ischemic changes..  No parenchymal mass, hemorrhage, edema or major vascular distribution infarct.  Vascular calcifications are noted.    Mild prominence of the sulci and ventricles are consistent with age-related involutional changes.    No extra-axial blood or fluid collections.    Skull/extracranial contents (limited evaluation): No fracture. Mastoid air cells and paranasal sinuses are essentially clear.                               X-Ray Elbow Complete Left (Final result)  Result time 10/29/18 13:03:52    Final result by PAULA Mckinney Sr., MD (10/29/18 13:03:52)                 Impression:      1. There is no acute  fracture visualized.  2. There are several osseous densities in the lateral aspect of the left elbow. One of the larger ones measures 8 mm.  These are characteristic of intra-articular loose bodies.  3. There is a 6 mm calcific density in the expected location of the distal aspect of the triceps tendon.  This is characteristic of prior trauma.      Electronically signed by: Antelmo Mckinney MD  Date:    10/29/2018  Time:    13:03             Narrative:    EXAMINATION:  XR ELBOW COMPLETE 3 VIEW LEFT    CLINICAL HISTORY:  Unspecified injury of left elbow, initial encounter    COMPARISON:  None    FINDINGS:  There is no acute fracture visualized.  There is no dislocation.  There are several osseous densities in the lateral aspect of the left elbow.  One of the larger ones measures 8 mm.  There is a 6 mm calcific density in the expected location of the distal aspect of the triceps tendon.                               X-Ray Hip 2 View Left (Final result)  Result time 10/29/18 13:01:26    Final result by PAULA Mckinney Sr., MD (10/29/18 13:01:26)                 Impression:      1. There is a poorly visualized fracture in the left femoral neck.  The left femoral head still articulates with the left acetabulum.  2. There is partial visualization of posterior spinal fusion hardware in the lumbar spine.  3. There is a moderate amount of atherosclerosis.      Electronically signed by: Antelmo Mckinney MD  Date:    10/29/2018  Time:    13:01             Narrative:    EXAMINATION:  XR HIP 2 VIEW LEFT    CLINICAL HISTORY:  Unspecified injury of left hip, initial encounter    COMPARISON:  None    FINDINGS:  There is a poorly visualized fracture in the left femoral neck.  The left femoral head still articulates with the left acetabulum.  There is a moderate amount of atherosclerosis.  There is partial visualization of posterior spinal fusion hardware in the lumbar spine.                               X-Ray Ribs 2 View Left (Final  result)  Result time 10/29/18 13:09:06    Final result by PAULA Mckinney Sr., MD (10/29/18 13:09:06)                 Impression:      1. There is an acute appearing nondisplaced fracture in the lateral aspect of the left 9th rib.  2. Surgical changes      Electronically signed by: Antelmo Mckinney MD  Date:    10/29/2018  Time:    13:09             Narrative:    EXAMINATION:  XR RIBS 2 VIEW LEFT    TECHNIQUE:  Standard knee MRI protocol without IV contrast was performed.    FINDINGS:  There is an acute appearing nondisplaced fracture in the lateral aspect of the left 9th rib.  There are healed fractures in the left 10th and 11th ribs.  There are multiple sternotomy wires in place.  There is partial visualization of a prosthetic left humeral head.  There is no pneumothorax.  The left costophrenic angle is sharp.  There is no evidence of an acute pulmonary process.                                   Assessment/Plan:      * Left displaced femoral neck fracture    - Ortho consulted   - Hold Eliquis and ASA  - Analgesia PRN   - PT/OT consult   - NPO after midnight     10/30/18 No acute issues overnight. The patient reports that his pain is well controlled at this time. Dr. Simental will likely take the patient to surgery tomorrow. NPO after MN  10/31/18 No acute issues overnight. The patient is being taken to the OR by Dr. Simental today.   11/1/18 No acute issues overnight. The patient reports that his pain is well controlled. The patient worked well with PT/OT who recommended SNF vs rehab for continued therapy at the time of discharge.            Dermatitis associated with moisture           CAD (coronary artery disease)    - Resume home meds        S/P AVR    - Resume home meds  - Keep outpatient follow up       Pulmonary hypertension           CHF (congestive heart failure)    - Resume torsemide   - monitor I&O   - resume BB   - Low Na diet       Essential hypertension    - Monitor VS   - Resume metoprolol            VTE  Risk Mitigation (From admission, onward)    None              Khari Esteban, NP  Department of Hospital Medicine   [facility]

## 2018-11-01 NOTE — PHYSICIAN QUERY
"PT Name: Chung Meneses .  MR #: [id number]    Physician Query Form - Hematology Clarification      CDS/: Talya Styles               Contact information: [phone]    This form is a permanent document in the medical record.      Query Date: November 1, 2018    By submitting this query, we are merely seeking further clarification of documentation. Please utilize your independent clinical judgment when addressing the question(s) below.    The Medical record contains the following:   Indicators  Supporting Clinical Findings Location in Medical Record    x "Anemia" documented                            Anemia  Anesthesia Information 10/31/2018    x H & H =  13.2 & 40.4-->12.1 & 36.6--->11.4 & 34.7   10.1 & 30.6--->9.5 & 28.2----> 9.2 & 27.8  Lab Results 10/29, 10/30,10/31& 11/1/2018    x BP =                     HR= BP=()/ (47-84)    HR=  Vital Sign Flow Sheet 10/30, 10/31 & 11/1/2018    "GI bleeding" documented      Acute bleeding (Non GI site)      x Transfusion(s)  Transfuse RBC 2 units  MD Orders 10/31/2018     Treatment:      x Other:  OPERATIVE PROCEDURE: Left hip anterior total hip replacement , capsulotomy, synovectomy and exostosis excision, open treatment of left femoral neck fracture     The patient's estimated blood loss was 350 mL.     Op Note 10/31/2018     Provider, please specify diagnosis or diagnoses associated with above clinical findings.     Acute blood loss anemia expected post-operatively   x Acute blood loss anemia    Iron deficiency anemia      Other Hematological Diagnosis (please specify):      Clinically Undetermined       Please document in your progress notes daily for the duration of treatment, until resolved, and include in your discharge summary.                                                                                                      "

## 2018-11-01 NOTE — PT/OT/SLP EVAL
Physical Therapy Evaluation    Patient Name:  Chung Meneses Sr.   MRN:  [id number]    Recommendations:     Discharge Recommendations:  nursing facility, skilled, rehabilitation facility(DEPENDING ON PROGRESS WITH POC)   Discharge Equipment Recommendations: bath bench, bedside commode   Barriers to discharge: Decreased caregiver support    Assessment:     Chung Meneses Sr. is a 87 y.o. male admitted with a medical diagnosis of Left displaced femoral neck fracture.  He presents with the following impairments/functional limitations:  weakness, impaired endurance, impaired functional mobilty, gait instability, impaired balance, decreased coordination, decreased safety awareness, decreased lower extremity function, impaired skin.    Rehab Prognosis:  GOOD; patient would benefit from acute skilled PT services to address these deficits and reach maximum level of function.      Recent Surgery: Procedure(s) (LRB):  ARTHROPLASTY, HIP, ANTERIOR APPROACH (Left)  CAPSULOTOMY, JOINT (Left)  SYNOVECTOMY, HIP (Left)  EXCISION, EXOSTOSIS (Left) 1 Day Post-Op    Plan:     During this hospitalization, patient to be seen 5 x/week to address the above listed problems via gait training, therapeutic activities, therapeutic exercises  · Plan of Care Expires:      Plan of Care Reviewed with: patient    Subjective     Communicated with NURSE FLORES prior to session.  Patient found SUPINE IN BED, LETHARGIC AND DIFFICULT TO AROUSE BUT MORE ALERT ONCE SITTING AT EOB upon PT entry to room, agreeable to evaluation.  PT WITH LOW BP PER NURSE, NURSE PRESENT TO ASSESS BP DURING EVAL    Chief Complaint: WEAKNESS  Patient comments/goals: RETURN TO PLOF  Pain/Comfort:  · Pain Rating 1: 0/10    Patients cultural, spiritual, [employer] conflicts given the current situation:     Living Environment:  PT LIVES WITH WIFE WHO IS UNABLE TO PHYSICALLY ASSIST BUT ABLE TO PROVIDE 24 HOUR CARE, 1 STORY HOUSE NO STEPS, PT WAS AMBULATING COMMUNITY DISTANCES INDEP,  STILL DRIVE, INDEP WITH ADL'S, GOING TO WOUND CARE CLINIC 1X A WEEK FOR LLE WOUNDS  Prior to admission, patients level of function was INDEP.  Patient has the following equipment: none.  DME owned (not currently used): rolling walker and wheelchair.  Upon discharge, patient will have assistance from WIFE-MINIMALLY.    Objective:     Patient found with: telemetry, peripheral IV     General Precautions: Standard, fall   Orthopedic Precautions:LLE weight bearing as tolerated   Braces:       Exams:  · Cognitive Exam:  Patient is oriented to Person, Place, Situation and PT LETHARGIC UPON ARRIVAL BUT MORE ALERT WITH MOVEMENT AND UPRIGHT SITTING, PT ABLE TO ANSWER QUESTIONS APPROPRIATELY BUT SLIGHTLY DELAYED  · Postural Exam:  Patient presented with the following abnormalities:    · -       Rounded shoulders  · -       Forward head  · Sensation:    · -       Intact  light/touch BLE  · Skin Integrity/Edema:      · -       Skin integrity: MULTIPLE SORES TO L LOWER LEG COVERED WITH BANDAGES  · RLE ROM: WFL  · RLE Strength: GROSSLY 3+/5  · LLE ROM: WFL  · LLE Strength: GROSSLY 3+/5    Functional Mobility:  · Bed Mobility:     · Rolling Left:  maximal assistance  · Rolling Right: maximal assistance  · Scooting: maximal assistance and of 2 persons  · Supine to Sit: maximal assistance and of 2 persons  · Sit to Supine: maximal assistance and of 2 persons  · Balance: POOR    AM-PAC 6 CLICK MOBILITY  Total Score:8     Therapeutic Activities and Exercises:   PT EDUCATED IN ROLE OF P.T. AND POC, PT EDUCATED IN WBAT FOR LLE, PT REPORTS NO PAIN TO LLE DESPITE MOVEMENT, PT ABLE TO FOLLOW MINIMAL COMMANDS DUE TO LETHARGY, UNABLE TO TOLERATE FURTHER TX. SO ASSISTED BACK TO SUPINE, MAXA X 2 FOR SUPINE SCOOT TOWARD HOB, PT EDUCATED IN BLE THEREX TO PERFORM WHILE SUPINE IN BED    Patient left supine with all lines intact, call button in reach, bed alarm on, NURSE notified and NURSE present.    GOALS:   Multidisciplinary Problems      Physical Therapy Goals        Problem: Physical Therapy Goal    Goal Priority Disciplines Outcome Goal Variances Interventions   Physical Therapy Goal     PT, PT/OT      Description:  LTG'S TO BE MET IN 7 DAYS (11-8-18)  1. PT WILL REQUIRE MODA FOR BED MOBILITY  2. PT WILL REQUIRE MODA FOR TF'S  3. PT WILL ' WITH RW AND MODA  4. PT WILL DEMO P+ DYNAMIC BALANCE DURING GAIT                    History:     Past Medical History:   Diagnosis Date    Aortic stenosis 8/2/2013    Atrial fibrillation 7/5/2013    CHF (congestive heart failure)     Coronary artery disease     Dizziness - light-headed     Hyperlipidemia     Hypertension     Pulmonary hypertension 11/30/2017    S/P AVR 12/1/2017    Syncope 1/26/2018       Past Surgical History:   Procedure Laterality Date    AORTOCORONARY BYPASS-CABG N/A 11/30/2017    [facility]    BACK SURGERY  2003    CARDIAC VALVE SURGERY      CARDIOVERSION N/A 5/1/2017    [facility] CATH LAB    CARDIOVERSION N/A 9/4/2013    [facility] CATH LAB    CORONARY ARTERY BYPASS GRAFT      HARVEST-VEIN-ENDOVASCULAR Left 11/30/2017    [facility] OR    HEART CATH-BILATERAL Bilateral 11/14/2017    [facility] CATH LAB    knee replacemnt bilateral  2001    REPLACEMENT-VALVE-AORTIC N/A 11/30/2017    [facility] OR    SHOULDER SURGERY  2002    TRANSESOPHAGEAL ECHOCARDIOGRAM (ESTEFANY) N/A 11/30/2017    [facility] at [facility] OR       Clinical Decision Making:     History  Co-morbidities and personal factors that may impact the plan of care Examination  Body Structures and Functions, activity limitations and participation restrictions that may impact the plan of care Clinical Presentation   Decision Making/ Complexity Score   Co-morbidities:   [] Time since onset of injury / illness /  exacerbation  [] Status of current condition  []Patient's cognitive status and safety concerns    [] Multiple Medical Problems (see med hx)  Personal Factors:   [] Patient's age  [] Prior Level of function   [] Patient's home situation (environment and family support)  [] Patient's level of motivation  [] Expected progression of patient      HISTORY:(criteria)    [] 24442 - no personal factors/history    [] 60436 - has 1-2 personal factor/comorbidity     [] 57840 - has >3 personal factor/comorbidity     Body Regions:  [] Objective examination findings  [] Head     []  Neck  [] Trunk   [] Upper Extremity  [] Lower Extremity    Body Systems:  [] For communication ability, affect, cognition, language, and learning style: the assessment of the ability to make needs known, consciousness, orientation (person, place, and time), expected emotional /behavioral responses, and learning preferences (eg, learning barriers, education  needs)  [] For the neuromuscular system: a general assessment of gross coordinated movement (eg, balance, gait, locomotion, transfers, and transitions) and motor function  (motor control and motor learning)  [] For the musculoskeletal system: the assessment of gross symmetry, gross range of motion, gross strength, height, and weight  [] For the integumentary system: the assessment of pliability(texture), presence of scar formation, skin color, and skin integrity  [] For cardiovascular/pulmonary system: the assessment of heart rate, respiratory rate, blood pressure, and edema     Activity limitations:    [] Patient's cognitive status and saf ety concerns          [] Status of current condition      [] Weight bearing restriction  [] Cardiopulmunary Restriction    Participation Restrictions:   [] Goals and goal agreement with the patient     [] Rehab potential (prognosis) and probable outcome      Examination of Body System: (criteria)    [] 92458 - addressing 1-2 elements    [] 50881 - addressing a  total of 3 or more elements     [] 54338 -  Addressing a total of 4 or more elements         Clinical Presentation: (criteria)  Choose one     On examination of body system using standardized tests and measures patient presents with (CHOOSE ONE) elements from any of the following: body structures and functions, activity limitations, and/or participation restrictions.  Leading to a clinical presentation that is considered (CHOOSE ONE)                              Clinical Decision Making  (Eval Complexity):  Choose One     Time Tracking:     PT Received On: 11/01/18  PT Start Time: 0745     PT Stop Time: 0810  PT Total Time (min): 25 min     Billable Minutes: Evaluation 15 and Therapeutic Activity 10     PT ENCOURAGED TO CALL FOR ASSISTANCE WITH ALL NEEDS DUE TO FALL RISK STATUS, PT AGREEABLE    Marci Maher, PT  11/01/2018

## 2018-11-02 PROBLEM — R57.9 SHOCK, UNSPECIFIED: Status: ACTIVE | Noted: 2018-11-02

## 2018-11-02 PROBLEM — E87.5 HYPERKALEMIA: Status: ACTIVE | Noted: 2017-12-11

## 2018-11-02 PROBLEM — R79.1 ABNORMAL INR: Status: ACTIVE | Noted: 2018-11-02

## 2018-11-02 PROBLEM — I48.21 PERMANENT ATRIAL FIBRILLATION: Chronic | Status: ACTIVE | Noted: 2018-10-10

## 2018-11-02 PROBLEM — N17.9 AKI (ACUTE KIDNEY INJURY): Status: ACTIVE | Noted: 2017-12-18

## 2018-11-02 PROBLEM — K72.00 SHOCK LIVER: Status: ACTIVE | Noted: 2018-11-02

## 2018-11-02 LAB
ALBUMIN SERPL BCP-MCNC: 3 G/DL
ALLENS TEST: ABNORMAL
ALP SERPL-CCNC: 141 U/L
ALT SERPL W/O P-5'-P-CCNC: 1175 U/L
ANION GAP SERPL CALC-SCNC: 19 MMOL/L
ANION GAP SERPL CALC-SCNC: 21 MMOL/L
ANION GAP SERPL CALC-SCNC: 21 MMOL/L
ANION GAP SERPL CALC-SCNC: 22 MMOL/L
ANION GAP SERPL CALC-SCNC: 24 MMOL/L
ANISOCYTOSIS BLD QL SMEAR: SLIGHT
APAP SERPL-MCNC: 10 UG/ML
AST SERPL-CCNC: 3103 U/L
BASOPHILS # BLD AUTO: 0.01 K/UL
BASOPHILS NFR BLD: 0.1 %
BILIRUB DIRECT SERPL-MCNC: 5.2 MG/DL
BILIRUB SERPL-MCNC: 7.4 MG/DL
BLD PROD TYP BPU: NORMAL
BLD PROD TYP BPU: NORMAL
BLOOD UNIT EXPIRATION DATE: NORMAL
BLOOD UNIT EXPIRATION DATE: NORMAL
BLOOD UNIT TYPE CODE: 6200
BLOOD UNIT TYPE CODE: 6200
BLOOD UNIT TYPE: NORMAL
BLOOD UNIT TYPE: NORMAL
BUN SERPL-MCNC: 86 MG/DL
BUN SERPL-MCNC: 88 MG/DL
BUN SERPL-MCNC: 90 MG/DL
BUN SERPL-MCNC: 91 MG/DL
BUN SERPL-MCNC: 91 MG/DL
BURR CELLS BLD QL SMEAR: ABNORMAL
CALCIUM SERPL-MCNC: 8 MG/DL
CALCIUM SERPL-MCNC: 8.3 MG/DL
CALCIUM SERPL-MCNC: 8.4 MG/DL
CALCIUM SERPL-MCNC: 8.6 MG/DL
CALCIUM SERPL-MCNC: 8.9 MG/DL
CHLORIDE SERPL-SCNC: 96 MMOL/L
CHLORIDE SERPL-SCNC: 96 MMOL/L
CHLORIDE SERPL-SCNC: 97 MMOL/L
CHLORIDE SERPL-SCNC: 98 MMOL/L
CHLORIDE SERPL-SCNC: 99 MMOL/L
CO2 SERPL-SCNC: 12 MMOL/L
CO2 SERPL-SCNC: 12 MMOL/L
CO2 SERPL-SCNC: 13 MMOL/L
CO2 SERPL-SCNC: 14 MMOL/L
CO2 SERPL-SCNC: 15 MMOL/L
CODING SYSTEM: NORMAL
CODING SYSTEM: NORMAL
CORTIS SERPL-MCNC: 28.9 UG/DL
CORTIS SERPL-MCNC: 32.7 UG/DL
CREAT SERPL-MCNC: 4.2 MG/DL
CREAT SERPL-MCNC: 4.5 MG/DL
CREAT SERPL-MCNC: 4.8 MG/DL
CREAT SERPL-MCNC: 4.8 MG/DL
CREAT SERPL-MCNC: 5.1 MG/DL
DACRYOCYTES BLD QL SMEAR: ABNORMAL
DELSYS: ABNORMAL
DIFFERENTIAL METHOD: ABNORMAL
DISPENSE STATUS: NORMAL
DISPENSE STATUS: NORMAL
EOSINOPHIL # BLD AUTO: 0 K/UL
EOSINOPHIL NFR BLD: 0.1 %
ERYTHROCYTE [DISTWIDTH] IN BLOOD BY AUTOMATED COUNT: 18.2 %
EST. GFR  (AFRICAN AMERICAN): 11 ML/MIN/1.73 M^2
EST. GFR  (AFRICAN AMERICAN): 12 ML/MIN/1.73 M^2
EST. GFR  (AFRICAN AMERICAN): 12 ML/MIN/1.73 M^2
EST. GFR  (AFRICAN AMERICAN): 13 ML/MIN/1.73 M^2
EST. GFR  (AFRICAN AMERICAN): 14 ML/MIN/1.73 M^2
EST. GFR  (NON AFRICAN AMERICAN): 10 ML/MIN/1.73 M^2
EST. GFR  (NON AFRICAN AMERICAN): 10 ML/MIN/1.73 M^2
EST. GFR  (NON AFRICAN AMERICAN): 11 ML/MIN/1.73 M^2
EST. GFR  (NON AFRICAN AMERICAN): 12 ML/MIN/1.73 M^2
EST. GFR  (NON AFRICAN AMERICAN): 9 ML/MIN/1.73 M^2
ESTIMATED PA SYSTOLIC PRESSURE: 28.52
FIO2: 28
FLOW: 2
GLUCOSE SERPL-MCNC: 103 MG/DL
GLUCOSE SERPL-MCNC: 107 MG/DL
GLUCOSE SERPL-MCNC: 39 MG/DL
GLUCOSE SERPL-MCNC: 76 MG/DL
GLUCOSE SERPL-MCNC: 97 MG/DL
HCO3 UR-SCNC: 13.6 MMOL/L (ref 24–28)
HCT VFR BLD AUTO: 31.3 %
HGB BLD-MCNC: 10.3 G/DL
INR PPP: 3.4
LACTATE SERPL-SCNC: 7.7 MMOL/L
LACTATE SERPL-SCNC: 7.7 MMOL/L
LACTATE SERPL-SCNC: >12 MMOL/L
LYMPHOCYTES # BLD AUTO: 1.1 K/UL
LYMPHOCYTES NFR BLD: 7.7 %
MCH RBC QN AUTO: 30.5 PG
MCHC RBC AUTO-ENTMCNC: 32.9 G/DL
MCV RBC AUTO: 93 FL
MODE: ABNORMAL
MONOCYTES # BLD AUTO: 2.1 K/UL
MONOCYTES NFR BLD: 14.7 %
NEUTROPHILS # BLD AUTO: 11.2 K/UL
NEUTROPHILS NFR BLD: 77.9 %
NUM UNITS TRANS PACKED RBC: NORMAL
NUM UNITS TRANS PACKED RBC: NORMAL
OVALOCYTES BLD QL SMEAR: ABNORMAL
PCO2 BLDA: 25.9 MMHG (ref 35–45)
PH SMN: 7.33 [PH] (ref 7.35–7.45)
PLATELET # BLD AUTO: 218 K/UL
PLATELET BLD QL SMEAR: ABNORMAL
PMV BLD AUTO: 11.3 FL
PO2 BLDA: 109 MMHG (ref 80–100)
POC BE: -12 MMOL/L
POC SATURATED O2: 98 % (ref 95–100)
POCT GLUCOSE: 111 MG/DL (ref 70–110)
POCT GLUCOSE: 114 MG/DL (ref 70–110)
POCT GLUCOSE: 28 MG/DL (ref 70–110)
POCT GLUCOSE: 65 MG/DL (ref 70–110)
POCT GLUCOSE: 84 MG/DL (ref 70–110)
POCT GLUCOSE: 95 MG/DL (ref 70–110)
POIKILOCYTOSIS BLD QL SMEAR: SLIGHT
POLYCHROMASIA BLD QL SMEAR: ABNORMAL
POTASSIUM SERPL-SCNC: 5.7 MMOL/L
POTASSIUM SERPL-SCNC: 6.1 MMOL/L
PROT SERPL-MCNC: 6.3 G/DL
PROTHROMBIN TIME: 33 SEC
RBC # BLD AUTO: 3.38 M/UL
RETIRED EF AND QEF - SEE NOTES: 60 (ref 55–65)
SAMPLE: ABNORMAL
SITE: ABNORMAL
SODIUM SERPL-SCNC: 131 MMOL/L
SODIUM SERPL-SCNC: 131 MMOL/L
SODIUM SERPL-SCNC: 132 MMOL/L
SODIUM SERPL-SCNC: 132 MMOL/L
SODIUM SERPL-SCNC: 133 MMOL/L
TRICUSPID VALVE REGURGITATION: ABNORMAL
WBC # BLD AUTO: 14.44 K/UL

## 2018-11-02 PROCEDURE — 25000003 PHARM REV CODE 250: Performed by: INTERNAL MEDICINE

## 2018-11-02 PROCEDURE — 80076 HEPATIC FUNCTION PANEL: CPT

## 2018-11-02 PROCEDURE — 27000221 HC OXYGEN, UP TO 24 HOURS

## 2018-11-02 PROCEDURE — 36415 COLL VENOUS BLD VENIPUNCTURE: CPT

## 2018-11-02 PROCEDURE — 80048 BASIC METABOLIC PNL TOTAL CA: CPT | Mod: 91

## 2018-11-02 PROCEDURE — 86790 VIRUS ANTIBODY NOS: CPT

## 2018-11-02 PROCEDURE — 83605 ASSAY OF LACTIC ACID: CPT | Mod: 91

## 2018-11-02 PROCEDURE — 87340 HEPATITIS B SURFACE AG IA: CPT

## 2018-11-02 PROCEDURE — 93005 ELECTROCARDIOGRAM TRACING: CPT

## 2018-11-02 PROCEDURE — 36600 WITHDRAWAL OF ARTERIAL BLOOD: CPT

## 2018-11-02 PROCEDURE — C9113 INJ PANTOPRAZOLE SODIUM, VIA: HCPCS | Performed by: INTERNAL MEDICINE

## 2018-11-02 PROCEDURE — 20000000 HC ICU ROOM

## 2018-11-02 PROCEDURE — 63600175 PHARM REV CODE 636 W HCPCS: Performed by: EMERGENCY MEDICINE

## 2018-11-02 PROCEDURE — C1751 CATH, INF, PER/CENT/MIDLINE: HCPCS

## 2018-11-02 PROCEDURE — 82533 TOTAL CORTISOL: CPT

## 2018-11-02 PROCEDURE — 83605 ASSAY OF LACTIC ACID: CPT

## 2018-11-02 PROCEDURE — 87040 BLOOD CULTURE FOR BACTERIA: CPT

## 2018-11-02 PROCEDURE — 25500020 PHARM REV CODE 255: Performed by: INTERNAL MEDICINE

## 2018-11-02 PROCEDURE — 80048 BASIC METABOLIC PNL TOTAL CA: CPT

## 2018-11-02 PROCEDURE — 99291 CRITICAL CARE FIRST HOUR: CPT | Mod: 25,,, | Performed by: INTERNAL MEDICINE

## 2018-11-02 PROCEDURE — 82803 BLOOD GASES ANY COMBINATION: CPT

## 2018-11-02 PROCEDURE — 99233 SBSQ HOSP IP/OBS HIGH 50: CPT | Mod: 25,,, | Performed by: INTERNAL MEDICINE

## 2018-11-02 PROCEDURE — 63600175 PHARM REV CODE 636 W HCPCS: Performed by: INTERNAL MEDICINE

## 2018-11-02 PROCEDURE — 93010 ELECTROCARDIOGRAM REPORT: CPT | Mod: ,,, | Performed by: INTERNAL MEDICINE

## 2018-11-02 PROCEDURE — 86256 FLUORESCENT ANTIBODY TITER: CPT

## 2018-11-02 PROCEDURE — 06HY33Z INSERTION OF INFUSION DEVICE INTO LOWER VEIN, PERCUTANEOUS APPROACH: ICD-10-PCS | Performed by: INTERNAL MEDICINE

## 2018-11-02 PROCEDURE — 99291 CRITICAL CARE FIRST HOUR: CPT | Mod: ,,, | Performed by: INTERNAL MEDICINE

## 2018-11-02 PROCEDURE — 36556 INSERT NON-TUNNEL CV CATH: CPT

## 2018-11-02 PROCEDURE — 86803 HEPATITIS C AB TEST: CPT

## 2018-11-02 PROCEDURE — 36569 INSJ PICC 5 YR+ W/O IMAGING: CPT

## 2018-11-02 PROCEDURE — 25000003 PHARM REV CODE 250: Performed by: NURSE PRACTITIONER

## 2018-11-02 PROCEDURE — 85610 PROTHROMBIN TIME: CPT

## 2018-11-02 PROCEDURE — 86704 HEP B CORE ANTIBODY TOTAL: CPT

## 2018-11-02 PROCEDURE — 99223 1ST HOSP IP/OBS HIGH 75: CPT | Mod: ,,, | Performed by: INTERNAL MEDICINE

## 2018-11-02 PROCEDURE — 86235 NUCLEAR ANTIGEN ANTIBODY: CPT | Mod: 91

## 2018-11-02 PROCEDURE — 25000003 PHARM REV CODE 250: Performed by: ORTHOPAEDIC SURGERY

## 2018-11-02 PROCEDURE — 86706 HEP B SURFACE ANTIBODY: CPT

## 2018-11-02 PROCEDURE — 80329 ANALGESICS NON-OPIOID 1 OR 2: CPT

## 2018-11-02 PROCEDURE — 36556 INSERT NON-TUNNEL CV CATH: CPT | Mod: ,,, | Performed by: NURSE PRACTITIONER

## 2018-11-02 PROCEDURE — 85025 COMPLETE CBC W/AUTO DIFF WBC: CPT

## 2018-11-02 PROCEDURE — 99900035 HC TECH TIME PER 15 MIN (STAT)

## 2018-11-02 PROCEDURE — 93307 TTE W/O DOPPLER COMPLETE: CPT | Mod: 26,,, | Performed by: INTERNAL MEDICINE

## 2018-11-02 PROCEDURE — 93307 TTE W/O DOPPLER COMPLETE: CPT

## 2018-11-02 RX ORDER — VANCOMYCIN HCL IN 5 % DEXTROSE 1G/250ML
1000 PLASTIC BAG, INJECTION (ML) INTRAVENOUS
Status: DISCONTINUED | OUTPATIENT
Start: 2018-11-09 | End: 2018-11-03

## 2018-11-02 RX ORDER — PANTOPRAZOLE SODIUM 40 MG/10ML
40 INJECTION, POWDER, LYOPHILIZED, FOR SOLUTION INTRAVENOUS DAILY
Status: DISCONTINUED | OUTPATIENT
Start: 2018-11-02 | End: 2018-11-03

## 2018-11-02 RX ORDER — CALCIUM GLUCONATE 98 MG/ML
1 INJECTION, SOLUTION INTRAVENOUS ONCE
Status: COMPLETED | OUTPATIENT
Start: 2018-11-02 | End: 2018-11-02

## 2018-11-02 RX ORDER — GLUCAGON 1 MG
1 KIT INJECTION
Status: DISCONTINUED | OUTPATIENT
Start: 2018-11-02 | End: 2018-11-03

## 2018-11-02 RX ORDER — DEXTROSE MONOHYDRATE 100 MG/ML
INJECTION, SOLUTION INTRAVENOUS CONTINUOUS
Status: DISCONTINUED | OUTPATIENT
Start: 2018-11-02 | End: 2018-11-03 | Stop reason: HOSPADM

## 2018-11-02 RX ORDER — NOREPINEPHRINE BITARTRATE/D5W 8 MG/250ML
0.02 PLASTIC BAG, INJECTION (ML) INTRAVENOUS CONTINUOUS
Status: DISCONTINUED | OUTPATIENT
Start: 2018-11-02 | End: 2018-11-03

## 2018-11-02 RX ADMIN — DEXTROSE MONOHYDRATE 25 G: 25 INJECTION, SOLUTION INTRAVENOUS at 08:11

## 2018-11-02 RX ADMIN — Medication 2.25 G: at 10:11

## 2018-11-02 RX ADMIN — DEXTROSE MONOHYDRATE 25 G: 25 INJECTION, SOLUTION INTRAVENOUS at 11:11

## 2018-11-02 RX ADMIN — SODIUM POLYSTYRENE SULFONATE 30 G: 15 SUSPENSION ORAL; RECTAL at 10:11

## 2018-11-02 RX ADMIN — DEXTROSE MONOHYDRATE 12.5 G: 25 INJECTION, SOLUTION INTRAVENOUS at 06:11

## 2018-11-02 RX ADMIN — Medication 2.25 G: at 06:11

## 2018-11-02 RX ADMIN — CALCIUM GLUCONATE 1 G: 98 INJECTION, SOLUTION INTRAVENOUS at 08:11

## 2018-11-02 RX ADMIN — INSULIN HUMAN 10 UNITS: 100 INJECTION, SOLUTION PARENTERAL at 08:11

## 2018-11-02 RX ADMIN — IOHEXOL 30 ML: 350 INJECTION, SOLUTION INTRAVENOUS at 04:11

## 2018-11-02 RX ADMIN — SODIUM CHLORIDE 1000 ML: 0.9 INJECTION, SOLUTION INTRAVENOUS at 01:11

## 2018-11-02 RX ADMIN — SODIUM PHOSPHATE, DIBASIC AND SODIUM PHOSPHATE, MONOBASIC 1 ENEMA: 7; 19 ENEMA RECTAL at 04:11

## 2018-11-02 RX ADMIN — CHLORHEXIDINE GLUCONATE 10 ML: 1.2 RINSE ORAL at 08:11

## 2018-11-02 RX ADMIN — PANTOPRAZOLE SODIUM 40 MG: 40 INJECTION, POWDER, FOR SOLUTION INTRAVENOUS at 02:11

## 2018-11-02 RX ADMIN — Medication 0.02 MCG/KG/MIN: at 01:11

## 2018-11-02 RX ADMIN — DEXTROSE MONOHYDRATE: 10 INJECTION, SOLUTION INTRAVENOUS at 11:11

## 2018-11-02 RX ADMIN — SODIUM BICARBONATE: 84 INJECTION, SOLUTION INTRAVENOUS at 10:11

## 2018-11-02 RX ADMIN — VANCOMYCIN HYDROCHLORIDE 1500 MG: 1 INJECTION, POWDER, LYOPHILIZED, FOR SOLUTION INTRAVENOUS at 10:11

## 2018-11-02 RX ADMIN — SODIUM BICARBONATE: 84 INJECTION, SOLUTION INTRAVENOUS at 09:11

## 2018-11-02 RX ADMIN — MORPHINE SULFATE 2 MG: 4 INJECTION INTRAVENOUS at 02:11

## 2018-11-02 RX ADMIN — SODIUM CHLORIDE 500 ML: 0.9 INJECTION, SOLUTION INTRAVENOUS at 08:11

## 2018-11-02 NOTE — PLAN OF CARE
Problem: Patient Care Overview  Goal: Individualization & Mutuality  P.T. NOTE: PT CURRENTLY REQUIRES MAXA X 2 FOR SUP<>SIT TF, PT VERY LETHARGIC WITH LOW BP UPON ARRIVAL SO LIMITED PROGRESS TODAY, PT IS WBAT LLE   Outcome: Ongoing (interventions implemented as appropriate)  Pt responded to voice/light stimulation.  VS decreased this shift.  Pt remained afebrile throughout this shift.   IV fluids administered/bolus per order.   Spoke to KASHMIR Yeung NP regarding king that was due to be d/c'd. Okay to maintain king at this time d/t pt's decreased urine output for accurate I/Os.   Pt remained free of falls this shift. Up to the side of bed only with PT.  Pt had no c/o pain this shift.   Plan of care reviewed in regards to increasing pt's BP. Patient and family verbalizes understanding.   Pt moving/turning with wt shifting assistance off of buttocks. Pillows in use.   Patient A Fib on monitor.   All bruises and wounds WDL.  Bed low, side rails up x 2, wheels locked, call light in reach.   Patient instructed to call for assistance.   Hourly rounding completed.   Will continue to monitor.

## 2018-11-02 NOTE — HPI
87-year-old male patient initially admitted after a fall at home, known with history of CABG and tissue aortic valve replacement in December 2017, has history of AFib on apixaban at home.  Had left hip fracture status post surgery.

## 2018-11-02 NOTE — ASSESSMENT & PLAN NOTE
Cardiogenic versus septic.  Broad-spectrum antibiotic.  Check cultures.  Check repeat 2D echo.  Monitor lactic acid level.  Repeat NS bolus 500 cc x1 today.  Received 500 cc earlier.  Start Levophed, keep map above 65 mm Hg.  Check ultrasound of lower extremity venous.  Rule out venous thromboembolism.

## 2018-11-02 NOTE — HPI
Mr. Meneses is a 87 year old male with history of CAD s/p CABG x 1, severe AS s/p AVR (2017), AFIB, CHF, HTN, HLP who presented to [facility] ED after suffering a ground level fall. He reports that he tripped and feel while walking outside. Denies any loss of consciousness. Imaging revealed a left femoral neck fracture.   Patient underwent hip replacement surgery two days ago; yesterday became hypotensive and liver profile showed jaundice and elevated enzymes:  Lab Results   Component Value Date    ALT 1,175 (H) 11/02/2018    AST 3,103 (H) 11/02/2018    ALKPHOS 141 (H) 11/02/2018    BILITOT 7.4 (H) 11/02/2018       Patient has a history if ascites and was being worked up.

## 2018-11-02 NOTE — CONSULTS
[facility] -   Critical Care Medicine  Consult Note    Patient Name: Chung Meneses Sr.  MRN: [id number]  Admission Date: 10/29/2018  Hospital Length of Stay: 4 days  Code Status: Full Code  Attending Physician: Jake Eastman MD   Primary Care Provider: Glenroy Carmichael MD   Principal Problem: Shock      Subjective:     HPI:  87-year-old male patient initially admitted after a fall at home, known with history of CABG and tissue aortic valve replacement in December 2017, has history of AFib on apixaban at home.  Had left hip fracture status post surgery.    Hospital/ICU Course:  Status post left hip anterior total hip replacement , capsulotomy, synovectomy and exostosis excision, open treatment of left femoral neck fracture. POD # 2   Transferred today to ICU after he was found hypotensive, confused at times, feeling weak.  Received 1.75 L NS yesterday on the floor.          Past Medical History:   Diagnosis Date    Aortic stenosis 8/2/2013    Atrial fibrillation 7/5/2013    CHF (congestive heart failure)     Coronary artery disease     Dizziness - light-headed     Hyperlipidemia     Hypertension     Pulmonary hypertension 11/30/2017    S/P AVR 12/1/2017    Syncope 1/26/2018     Past Surgical History:   Procedure Laterality Date    AORTOCORONARY BYPASS-CABG N/A 11/30/2017    Performed by Shaq Boateng MD at [facility] OR    ARTHROPLASTY OF HIP BY ANTERIOR APPROACH Left 10/31/2018    Procedure: ARTHROPLASTY, HIP, ANTERIOR APPROACH;  Surgeon: Marlon Simental Sr., MD;  Location: [facility] OR;  Service: Orthopedics;  Laterality: Left;    ARTHROPLASTY, HIP, ANTERIOR APPROACH Left 10/31/2018    Performed by Marlon Simental Sr., MD at [facility] OR    BACK SURGERY  2003    BONE EXOSTOSIS EXCISION Left 10/31/2018    Procedure: EXCISION, EXOSTOSIS;  Surgeon: Marlon Simental Sr., MD;  Location: [facility] OR;  Service: Orthopedics;  Laterality: Left;    CAPSULOTOMY OF JOINT Left 10/31/2018    Procedure: CAPSULOTOMY,  JOINT;  Surgeon: aMrlon Simental Sr., MD;  Location: [facility] OR;  Service: Orthopedics;  Laterality: Left;    CAPSULOTOMY, JOINT Left 10/31/2018    Performed by Marlon Simental Sr., MD at [facility] OR    CARDIAC VALVE SURGERY      CARDIOVERSION N/A 5/1/2017    Performed by Patricia Espinoza MD at [facility] CATH LAB    CARDIOVERSION N/A 9/4/2013    Performed by Patricia Espinoza MD at [facility] CATH LAB    CORONARY ARTERY BYPASS GRAFT      EXCISION, EXOSTOSIS Left 10/31/2018    Performed by Marlon Simental Sr., MD at [facility] OR    HARVEST-VEIN-ENDOVASCULAR Left 11/30/2017    Performed by Shaq Boateng MD at [facility] OR    HEART CATH-BILATERAL Bilateral 11/14/2017    Performed by Patricia Espinoza MD at [facility] CATH LAB    knee replacemnt bilateral  2001    REPLACEMENT-VALVE-AORTIC N/A 11/30/2017    Performed by Shaq Boateng MD at [facility] OR    SHOULDER SURGERY  2002    SYNOVECTOMY OF HIP Left 10/31/2018    Procedure: SYNOVECTOMY, HIP;  Surgeon: Marlon Simental Sr., MD;  Location: [facility] OR;  Service: Orthopedics;  Laterality: Left;    SYNOVECTOMY, HIP Left 10/31/2018    Performed by Marlon Simental Sr., MD at [facility] OR    TRANSESOPHAGEAL ECHOCARDIOGRAM (ESTEFANY) N/A 11/30/2017    Performed by Shaq Boateng MD at [facility] OR     Review of patient's allergies indicates:   Allergen Reactions    Sulfa (sulfonamide antibiotics) Hives       Family History     Problem Relation (Age of Onset)    Heart attack Brother    Heart disease Brother    Heart failure Brother    Hypertension Mother, Father, Sister, Brother        Tobacco Use    Smoking status: Never Smoker    Smokeless tobacco: Never Used   Substance and Sexual Activity    Alcohol use: No     Comment: HOLIDAY    Drug use: No    Sexual activity: No       Review of Systems   Constitutional: Positive for activity change, appetite change and fatigue.   HENT: Negative for hearing loss and nosebleeds.    Eyes: Negative for discharge.   Respiratory: Positive for shortness of breath.     Cardiovascular: Negative for chest pain.   Gastrointestinal: Positive for abdominal distention and abdominal pain.   Endocrine: Negative for polyphagia.   Genitourinary:        Oliguric   Musculoskeletal: Positive for arthralgias, back pain and gait problem.        History of fall status post hip surgery   Allergic/Immunologic: Negative for immunocompromised state.   Neurological: Negative for seizures, syncope and speech difficulty.   Hematological: Negative for adenopathy. Bruises/bleeds easily.        On apixaban as outpatient for AFib   Psychiatric/Behavioral: Negative for behavioral problems and confusion.     Objective:     Vital Signs (Most Recent):  Temp: 97.1 °F (36.2 °C) (11/02/18 1015)  Pulse: (!) 30 (11/02/18 1030)  Resp: 17 (11/02/18 1030)  BP: (!) 89/29 (11/02/18 1030)  SpO2: (!) 92 % (11/02/18 0801) Vital Signs (24h Range):  Temp:  [97.1 °F (36.2 °C)-98.2 °F (36.8 °C)] 97.1 °F (36.2 °C)  Pulse:  [30-92] 30  Resp:  [15-18] 17  SpO2:  [88 %-97 %] 92 %  BP: ()/(29-65) 89/29     Weight: 84.6 kg (186 lb 8.2 oz)  Body mass index is 27.54 kg/m².      Intake/Output Summary (Last 24 hours) at 11/2/2018 1142  Last data filed at 11/2/2018 1100  Gross per 24 hour   Intake 2170 ml   Output 110 ml   Net 2060 ml     Physical Exam   Constitutional: He is oriented to person, place, and time. He appears well-developed and well-nourished. He appears distressed.   HENT:   Head: Normocephalic and atraumatic.   Eyes: EOM are normal.   Neck: Neck supple.   Cardiovascular:   Murmur heard.  Irregular S1-S2   Pulmonary/Chest: Effort normal and breath sounds normal.   Abdominal: He exhibits distension.   Decreased bowel sounds   Genitourinary:   Genitourinary Comments: I did perform a rectal exam, hard high stools felt  Dawson in place   Musculoskeletal: He exhibits deformity. He exhibits no edema.   Left hip bruises status post surgery   Neurological: He is alert and oriented to person, place, and time.   Skin: Skin is  warm and dry.   Psychiatric: He has a normal mood and affect. His behavior is normal. Judgment and thought content normal.     Vents:  Oxygen Concentration (%): 21 (10/31/18 1430)    Lines/Drains/Airways     Drain                 Urethral Catheter 10/31/18 1020 Latex;Straight-tip 16 Fr. 2 days          Peripheral Intravenous Line                 Peripheral IV - Single Lumen 10/31/18 Left Antecubital 2 days         Peripheral IV - Single Lumen 10/31/18 1235 Right Wrist 1 day              Significant Labs:    CBC/Anemia Profile:  Recent Labs   Lab 10/31/18  1414 11/01/18  0436 11/02/18  0432   WBC  --  9.78 14.44*   HGB 9.5* 9.2* 10.3*   HCT 28.2* 27.8* 31.3*   PLT  --  155 218   MCV  --  91 93   RDW  --  17.9* 18.2*     Chemistries:  Recent Labs   Lab 11/01/18  0436 11/01/18  1643 11/02/18  0432 11/02/18  0926   *  --  132* 133*   K 4.9  --  6.1* 6.1*     --  99 97   CO2 19*  --  12* 12*   BUN 68*  --  88* 86*   CREATININE 2.9*  --  4.2* 4.5*   CALCIUM 8.6*  --  8.9 8.6*   ALBUMIN 2.8*  --  3.0*  --    PROT 5.8*  --  6.3  --    BILITOT 5.1* 6.5* 7.4*  --    ALKPHOS 122  --  141*  --    ALT 40  --  1,175*  --    *  --  3,103*  --      Results for GEM RAI SR. (MRN [id number]) as of 11/2/2018 12:37   Ref. Range 11/2/2018 09:51 11/2/2018 11:57   POC PH Latest Ref Range: 7.35 - 7.45   7.327 (L)   POC PCO2 Latest Ref Range: 35 - 45 mmHg  25.9 (LL)   POC PO2 Latest Ref Range: 80 - 100 mmHg  109 (H)   POC BE Latest Ref Range: -2 to 2 mmol/L  -12   POC HCO3 Latest Ref Range: 24 - 28 mmol/L  13.6 (L)   POC SATURATED O2 Latest Ref Range: 95 - 100 %  98   FiO2 Unknown  28   Flow Unknown  2   Sample Unknown  ARTERIAL   DelSys Unknown  Nasal Can   Allens Test Unknown  Pass   Site Unknown  LR   Mode Unknown  SPONT     2D echo January 2018:      1 - Low normal to mildly depressed left ventricular systolic function (EF 50-55%).     2 - Impaired LV relaxation, elevated LAP (grade 2 diastolic dysfunction).     3 -  Wall motion abnormalities.     4 - Low normal right ventricular systolic function .     5 - Severe left atrial enlargement.     6 - Concentric hypertrophy.     7 - Pulmonary hypertension. The estimated PA systolic pressure is 41 mmHg.     8 - Aortic valve prosthesis, mean gradient = 11 mmHg.     9 - Moderate mitral regurgitation.     10 - Mild to moderate tricuspid regurgitation.     11 - Increased central venous pressure.     Significant Imaging:     CXR: I have reviewed all pertinent results/findings within the past 24 hours and my personal findings are:  Sternal wires.  Clear lungs.    Assessment/Plan:     Cardiac/Vascular   CHF (congestive heart failure)    Received 1.7 L NS yesterday.  Chest x-ray clear.  O2 sat 98%. Bicarb drip.     Renal/   Hyperkalemia    Monitor potassium level.  Received calcium gluconate, insulin, dextrose 50.  Kayexalate p.o. once.  Nephrology evaluation for possible need of dialysis.     XIANG (acute kidney injury)    Bicarb drip.  Strict I&Os.  Monitor BMP.  Renal ultrasound.     Hematology   Abnormal INR    Off anticoagulation.  Monitor INR.  INR today 3.4.      GI   Shock liver    Monitor LFTs     Orthopedic   Left displaced femoral neck fracture    Postop day 2.  Pain control.  Incentive spirometry.  Sequentials.     Other   * Shock    Cardiogenic versus septic.  Broad-spectrum antibiotic.  Check cultures.  Check repeat 2D echo.  Monitor lactic acid level.  Repeat NS bolus 500 cc x1 today.  Received 500 cc earlier.  Start Levophed, keep map above 65 mm Hg.  Check ultrasound of lower extremity venous.  Rule out venous thromboembolism.      Abdominal distention     CT abd pelvis w po contrast , discussed with GI   Fleet enema UT once    Critical Care Daily Checklist:    A: Awake: RASS Goal/Actual Goal: RASS Goal: 0-->alert and calm  Actual: Dong Agitation Sedation Scale (RASS): Alert and calm   B: Spontaneous Breathing Trial Performed?     C: SAT & SBT Coordinated?  Not applicable                       D: Delirium: CAM-ICU     E: Early Mobility Performed? Yes.  Bedrest status post hip surgery.  In shock.   F: Feeding Goal:    Status:     Current Diet Order   Procedures    Diet NPO Except for: Medication, Ice Chips, Sips with Medication     Order Specific Question:   Except for     Answer:   Medication     Order Specific Question:   Except for     Answer:   Ice Chips     Order Specific Question:   Except for     Answer:   Sips with Medication      AS: Analgesia/Sedation Avoid over sedation   T: Thromboembolic Prophylaxis INR 3.  Mechanical prophylaxis   H: HOB > 300 Yes   U: Stress Ulcer Prophylaxis (if needed) PPI   G: Glucose Control Fingerstick q.6 hours   B: Bowel Function Stool Occurrence: 0(Pt reports no BM since almost a week ago)   I: Indwelling Catheter (Lines & Dawson) Necessity Keep Dawson.   D: De-escalation of Antimicrobials/Pharmacotherapies Broad-spectrum antibiotic.  Shock is of unclear etiology at the moment.    Plan for the day/ETD Y    Code Status:  Family/Goals of Care: Full Code  Y     Critical Care Time: 45 minutes  Critical secondary to Patient has a condition that poses threat to life and bodily function: Acute Renal Failure     Critical care was time spent personally by me on the following activities: development of treatment plan with patient or surrogate and bedside caregivers, discussions with consultants, evaluation of patient's response to treatment, examination of patient, ordering and performing treatments and interventions, ordering and review of laboratory studies, ordering and review of radiographic studies, pulse oximetry, re-evaluation of patient's condition. This critical care time did not overlap with that of any other provider or involve time for any procedures.    Thank you for your consult. I will follow-up with patient. Please contact us if you have any additional questions.     Jacqui Valencia MD  Critical Care Medicine  [facility] -

## 2018-11-02 NOTE — SUBJECTIVE & OBJECTIVE
Past Medical History:   Diagnosis Date    Aortic stenosis 8/2/2013    Atrial fibrillation 7/5/2013    CHF (congestive heart failure)     Coronary artery disease     Dizziness - light-headed     Hyperlipidemia     Hypertension     Pulmonary hypertension 11/30/2017    S/P AVR 12/1/2017    Syncope 1/26/2018     Past Surgical History:   Procedure Laterality Date    AORTOCORONARY BYPASS-CABG N/A 11/30/2017    Performed by Shaq Boateng MD at [facility] OR    ARTHROPLASTY OF HIP BY ANTERIOR APPROACH Left 10/31/2018    Procedure: ARTHROPLASTY, HIP, ANTERIOR APPROACH;  Surgeon: Marlon Simental Sr., MD;  Location: [facility] OR;  Service: Orthopedics;  Laterality: Left;    ARTHROPLASTY, HIP, ANTERIOR APPROACH Left 10/31/2018    Performed by Marlon Simental Sr., MD at [facility] OR    BACK SURGERY  2003    BONE EXOSTOSIS EXCISION Left 10/31/2018    Procedure: EXCISION, EXOSTOSIS;  Surgeon: Marlon Simental Sr., MD;  Location: [facility] OR;  Service: Orthopedics;  Laterality: Left;    CAPSULOTOMY OF JOINT Left 10/31/2018    Procedure: CAPSULOTOMY, JOINT;  Surgeon: Marlon Simental Sr., MD;  Location: [facility] OR;  Service: Orthopedics;  Laterality: Left;    CAPSULOTOMY, JOINT Left 10/31/2018    Performed by Marlon Simental Sr., MD at [facility] OR    CARDIAC VALVE SURGERY      CARDIOVERSION N/A 5/1/2017    Performed by Patricia Espinoza MD at [facility] CATH LAB    CARDIOVERSION N/A 9/4/2013    Performed by Patricia Espinoza MD at [facility] CATH LAB    CORONARY ARTERY BYPASS GRAFT      EXCISION, EXOSTOSIS Left 10/31/2018    Performed by Marlon Simental Sr., MD at [facility] OR    HARVEST-VEIN-ENDOVASCULAR Left 11/30/2017    Performed by Shaq Boateng MD at [facility] OR    HEART CATH-BILATERAL Bilateral 11/14/2017    Performed by Patricia Espinoza MD at [facility] CATH LAB    knee replacemnt bilateral  2001    REPLACEMENT-VALVE-AORTIC N/A 11/30/2017    Performed by Shaq Boateng MD at [facility] OR    SHOULDER SURGERY  2002    SYNOVECTOMY OF HIP Left 10/31/2018     Procedure: SYNOVECTOMY, HIP;  Surgeon: Marlon Simental Sr., MD;  Location: [facility] OR;  Service: Orthopedics;  Laterality: Left;    SYNOVECTOMY, HIP Left 10/31/2018    Performed by Marlon Simental Sr., MD at [facility] OR    TRANSESOPHAGEAL ECHOCARDIOGRAM (ESTEFANY) N/A 11/30/2017    Performed by Shaq Boateng MD at [facility] OR     Review of patient's allergies indicates:   Allergen Reactions    Sulfa (sulfonamide antibiotics) Hives       Family History     Problem Relation (Age of Onset)    Heart attack Brother    Heart disease Brother    Heart failure Brother    Hypertension Mother, Father, Sister, Brother        Tobacco Use    Smoking status: Never Smoker    Smokeless tobacco: Never Used   Substance and Sexual Activity    Alcohol use: No     Comment: HOLIDAY    Drug use: No    Sexual activity: No       Review of Systems   Constitutional: Positive for activity change, appetite change and fatigue.   HENT: Negative for hearing loss and nosebleeds.    Eyes: Negative for discharge.   Respiratory: Positive for shortness of breath.    Cardiovascular: Negative for chest pain.   Gastrointestinal: Positive for abdominal distention and abdominal pain.   Endocrine: Negative for polyphagia.   Genitourinary:        Oliguric   Musculoskeletal: Positive for arthralgias, back pain and gait problem.        History of fall status post hip surgery   Allergic/Immunologic: Negative for immunocompromised state.   Neurological: Negative for seizures, syncope and speech difficulty.   Hematological: Negative for adenopathy. Bruises/bleeds easily.        On apixaban as outpatient for AFib   Psychiatric/Behavioral: Negative for behavioral problems and confusion.     Objective:     Vital Signs (Most Recent):  Temp: 97.1 °F (36.2 °C) (11/02/18 1015)  Pulse: (!) 30 (11/02/18 1030)  Resp: 17 (11/02/18 1030)  BP: (!) 89/29 (11/02/18 1030)  SpO2: (!) 92 % (11/02/18 0801) Vital Signs (24h Range):  Temp:  [97.1 °F (36.2 °C)-98.2 °F (36.8 °C)] 97.1 °F (36.2  °C)  Pulse:  [30-92] 30  Resp:  [15-18] 17  SpO2:  [88 %-97 %] 92 %  BP: ()/(29-65) 89/29     Weight: 84.6 kg (186 lb 8.2 oz)  Body mass index is 27.54 kg/m².      Intake/Output Summary (Last 24 hours) at 11/2/2018 1142  Last data filed at 11/2/2018 1100  Gross per 24 hour   Intake 2170 ml   Output 110 ml   Net 2060 ml     Physical Exam   Constitutional: He is oriented to person, place, and time. He appears well-developed and well-nourished. He appears distressed.   HENT:   Head: Normocephalic and atraumatic.   Eyes: EOM are normal.   Neck: Neck supple.   Cardiovascular:   Murmur heard.  Irregular S1-S2   Pulmonary/Chest: Effort normal and breath sounds normal.   Abdominal: He exhibits distension.   Decreased bowel sounds   Genitourinary:   Genitourinary Comments: I did perform a rectal exam, hard high stools felt  Dawson in place   Musculoskeletal: He exhibits deformity. He exhibits no edema.   Left hip bruises status post surgery   Neurological: He is alert and oriented to person, place, and time.   Skin: Skin is warm and dry.   Psychiatric: He has a normal mood and affect. His behavior is normal. Judgment and thought content normal.     Vents:  Oxygen Concentration (%): 21 (10/31/18 1430)    Lines/Drains/Airways     Drain                 Urethral Catheter 10/31/18 1020 Latex;Straight-tip 16 Fr. 2 days          Peripheral Intravenous Line                 Peripheral IV - Single Lumen 10/31/18 Left Antecubital 2 days         Peripheral IV - Single Lumen 10/31/18 1235 Right Wrist 1 day              Significant Labs:    CBC/Anemia Profile:  Recent Labs   Lab 10/31/18  1414 11/01/18  0436 11/02/18  0432   WBC  --  9.78 14.44*   HGB 9.5* 9.2* 10.3*   HCT 28.2* 27.8* 31.3*   PLT  --  155 218   MCV  --  91 93   RDW  --  17.9* 18.2*     Chemistries:  Recent Labs   Lab 11/01/18  0436 11/01/18  1643 11/02/18  0432 11/02/18  0926   *  --  132* 133*   K 4.9  --  6.1* 6.1*     --  99 97   CO2 19*  --  12* 12*    BUN 68*  --  88* 86*   CREATININE 2.9*  --  4.2* 4.5*   CALCIUM 8.6*  --  8.9 8.6*   ALBUMIN 2.8*  --  3.0*  --    PROT 5.8*  --  6.3  --    BILITOT 5.1* 6.5* 7.4*  --    ALKPHOS 122  --  141*  --    ALT 40  --  1,175*  --    *  --  3,103*  --      Results for GEM RAI SR. (MRN [id number]) as of 11/2/2018 12:37   Ref. Range 11/2/2018 09:51 11/2/2018 11:57   POC PH Latest Ref Range: 7.35 - 7.45   7.327 (L)   POC PCO2 Latest Ref Range: 35 - 45 mmHg  25.9 (LL)   POC PO2 Latest Ref Range: 80 - 100 mmHg  109 (H)   POC BE Latest Ref Range: -2 to 2 mmol/L  -12   POC HCO3 Latest Ref Range: 24 - 28 mmol/L  13.6 (L)   POC SATURATED O2 Latest Ref Range: 95 - 100 %  98   FiO2 Unknown  28   Flow Unknown  2   Sample Unknown  ARTERIAL   DelSys Unknown  Nasal Can   Allens Test Unknown  Pass   Site Unknown  LR   Mode Unknown  SPONT     2D echo January 2018:      1 - Low normal to mildly depressed left ventricular systolic function (EF 50-55%).     2 - Impaired LV relaxation, elevated LAP (grade 2 diastolic dysfunction).     3 - Wall motion abnormalities.     4 - Low normal right ventricular systolic function .     5 - Severe left atrial enlargement.     6 - Concentric hypertrophy.     7 - Pulmonary hypertension. The estimated PA systolic pressure is 41 mmHg.     8 - Aortic valve prosthesis, mean gradient = 11 mmHg.     9 - Moderate mitral regurgitation.     10 - Mild to moderate tricuspid regurgitation.     11 - Increased central venous pressure.     Significant Imaging:     CXR: I have reviewed all pertinent results/findings within the past 24 hours and my personal findings are:  Sternal wires.  Clear lungs.

## 2018-11-02 NOTE — PROCEDURES
"Chung Meneses Sr. is a 87 y.o. male patient.    Temp: 97.1 °F (36.2 °C) (11/02/18 1015)  Pulse: 69 (11/02/18 1245)  Resp: 18 (11/02/18 1245)  BP: (!) 80/43 (11/02/18 1245)  SpO2: (!) 65 % (11/02/18 1100)  Weight: 84.6 kg (186 lb 8.2 oz) (11/02/18 0600)  Height: 5' 9" (175.3 cm) (10/29/18 2124)       Central Line  Date/Time: 11/2/2018 10:52 AM  Location procedure was performed: [facility] INTENSIVE CARE UNIT  Performed by: Teodoro Crump NP  Pre-operative Diagnosis: shock  Post-operative diagnosis: shock  Consent Done: Yes  Time out: Immediately prior to procedure a "time out" was called to verify the correct patient, procedure, equipment, support staff and site/side marked as required.  Indications: vascular access and med administration  Preparation: skin prepped with ChloraPrep  Skin prep agent dried: skin prep agent completely dried prior to procedure  Sterile barriers: all five maximum sterile barriers used - cap, mask, sterile gown, sterile gloves, and large sterile sheet  Hand hygiene: hand hygiene performed prior to central venous catheter insertion  Location details: right femoral  Site selection rationale: elevated INR and unsuccessful attempt at PICC  Catheter type: triple lumen  Catheter size: 7 Fr  Catheter Length: 16cm    Ultrasound guidance: yes  Vessel Caliber: large, patent, compressibility normal  Vascular Doppler: not done  Needle advanced into vessel with real time Ultrasound guidance.  Guidewire confirmed in vessel.  Sterile sheath used.  Manometry: No   Number of attempts: 1  Assessment: successful placement  Complications: none  Estimated blood loss (mL): 2  Specimens: No  Implants: No  Post-procedure: line sutured,  chlorhexidine patch,  sterile dressing applied and blood return through all ports  Complications: No          Teodoro Crump  11/2/2018  "

## 2018-11-02 NOTE — ASSESSMENT & PLAN NOTE
Agree with IV levophed for BP support  Repeat ECHO today revealed EF 60-65%, -WMA's, indeterminate LV diastolic fn, low normal RV systolic fn,   LA >12, needs septic workup per critical care      .

## 2018-11-02 NOTE — CONSULTS
Vancomycin Consult Note     Pharmacy consulted to dose vancomycin by Dr. Eastman    Pt is an 88 y/o male with PMH of HTN, anemia, CHF, pulmonary HTN, CAD, AVR, CKD, Afib  -Pt admitted for fracture of femur after a fall at home    Indication: pneumonia --> when pt fell, he also developed rib contusion on L side --> per Dr. Alonso's note, the decreased breathing effort on L side may have led to PNA  WBC = 14.44 (up from 9.78 yesterday)  Tmax = 98.2  Pt has been hypotensive --> now transferred to ICU  Lactate now > 12 --> on a sodium bicarbonate drip  Pt has also developed shock liver & worsening renal fxn  SCr = 4.5 (trending up; XIANG on CKD)    Pt received a 1500 mg loading dose @ 1034 today  We will pulse-dose vancomycin d/t worsening/unstable renal fxn  Random level due tomorrow 11/3 with AM labs  Re-dose when level < 18 mcg/ml    Thank you for allowing us to participate in this patient's care.   Katherine McArdle, Pharm.D. 11/2/2018 11:20 AM

## 2018-11-02 NOTE — ASSESSMENT & PLAN NOTE
On medical therapy  Continue BB and torsemide  Hold ACEI due to elevated renal function  Resume when able.     11/2  -Hold BB, Torsemide and ACEI for now given Levophed gtt for BP support  Resume when clinical course allows

## 2018-11-02 NOTE — CONSULTS
[facility]  Gastroenterology  Consult Note    Patient Name: Chung Meneses Sr.  MRN: [id number]  Admission Date: 10/29/2018  Hospital Length of Stay: 4 days  Code Status: Full Code   Attending Provider: Jake Eastman MD   Consulting Provider: Klaudia Huynh NP  Primary Care Physician: Glenroy Carmichael MD  Principal Problem:Shock liver    Inpatient consult to Gastroenterology  Consult performed by: Klaudia Huynh NP  Consult ordered by: Jake Eastman MD        Subjective:     HPI:  Mr. Meneses is a 87 year old male with history of CAD s/p CABG x 1, severe AS s/p AVR (2017), AFIB, CHF, HTN, HLP who presented to [facility] ED after suffering a ground level fall. He reports that he tripped and feel while walking outside. Denies any loss of consciousness. Imaging revealed a left femoral neck fracture.   Patient underwent hip replacement surgery two days ago; yesterday became hypotensive and liver profile showed jaundice and elevated enzymes:  Lab Results   Component Value Date    ALT 1,175 (H) 11/02/2018    AST 3,103 (H) 11/02/2018    ALKPHOS 141 (H) 11/02/2018    BILITOT 7.4 (H) 11/02/2018       Patient has a history if ascites and was being worked up.     Past Medical History:   Diagnosis Date    Aortic stenosis 8/2/2013    Atrial fibrillation 7/5/2013    CHF (congestive heart failure)     Coronary artery disease     Dizziness - light-headed     Hyperlipidemia     Hypertension     Pulmonary hypertension 11/30/2017    S/P AVR 12/1/2017    Syncope 1/26/2018       Past Surgical History:   Procedure Laterality Date    AORTOCORONARY BYPASS-CABG N/A 11/30/2017    Performed by Shaq Boateng MD at [facility] OR    ARTHROPLASTY OF HIP BY ANTERIOR APPROACH Left 10/31/2018    Procedure: ARTHROPLASTY, HIP, ANTERIOR APPROACH;  Surgeon: Marlon Simental Sr., MD;  Location: [facility] OR;  Service: Orthopedics;  Laterality: Left;    ARTHROPLASTY, HIP, ANTERIOR APPROACH Left 10/31/2018    Performed by  Marlon Simental Sr., MD at [facility] OR    BACK SURGERY  2003    BONE EXOSTOSIS EXCISION Left 10/31/2018    Procedure: EXCISION, EXOSTOSIS;  Surgeon: Marlon Simental Sr., MD;  Location: [facility] OR;  Service: Orthopedics;  Laterality: Left;    CAPSULOTOMY OF JOINT Left 10/31/2018    Procedure: CAPSULOTOMY, JOINT;  Surgeon: Marlon Simental Sr., MD;  Location: [facility] OR;  Service: Orthopedics;  Laterality: Left;    CAPSULOTOMY, JOINT Left 10/31/2018    Performed by Marlon Simental Sr., MD at [facility] OR    CARDIAC VALVE SURGERY      CARDIOVERSION N/A 5/1/2017    Performed by Patricia Espinoza MD at [facility] CATH LAB    CARDIOVERSION N/A 9/4/2013    Performed by Patricia Espinoza MD at [facility] CATH LAB    CORONARY ARTERY BYPASS GRAFT      EXCISION, EXOSTOSIS Left 10/31/2018    Performed by Marlon Simental Sr., MD at [facility] OR    HARVEST-VEIN-ENDOVASCULAR Left 11/30/2017    Performed by Shaq Boateng MD at [facility] OR    HEART CATH-BILATERAL Bilateral 11/14/2017    Performed by Patricia Espinoza MD at [facility] CATH LAB    knee replacemnt bilateral  2001    REPLACEMENT-VALVE-AORTIC N/A 11/30/2017    Performed by Shaq Boateng MD at [facility] OR    SHOULDER SURGERY  2002    SYNOVECTOMY OF HIP Left 10/31/2018    Procedure: SYNOVECTOMY, HIP;  Surgeon: Marlon Simental Sr., MD;  Location: [facility] OR;  Service: Orthopedics;  Laterality: Left;    SYNOVECTOMY, HIP Left 10/31/2018    Performed by Marlon Simental Sr., MD at [facility] OR    TRANSESOPHAGEAL ECHOCARDIOGRAM (ESTEFANY) N/A 11/30/2017    Performed by Shaq Boateng MD at [facility] OR       Review of patient's allergies indicates:   Allergen Reactions    Sulfa (sulfonamide antibiotics) Hives     Family History     Problem Relation (Age of Onset)    Heart attack Brother    Heart disease Brother    Heart failure Brother    Hypertension Mother, Father, Sister, Brother        Tobacco Use    Smoking status: Never Smoker    Smokeless tobacco: Never Used   Substance and Sexual Activity    Alcohol use: No      Comment: HOLIDAY    Drug use: No    Sexual activity: No     Review of Systems   Unable to perform ROS: Acuity of condition     Objective:     Vital Signs (Most Recent):  Temp: 97.1 °F (36.2 °C) (11/02/18 1015)  Pulse: 67 (11/02/18 1330)  Resp: 18 (11/02/18 1330)  BP: (!) 87/33 (11/02/18 1330)  SpO2: (pulse ox not registering) (11/02/18 1200) Vital Signs (24h Range):  Temp:  [97.1 °F (36.2 °C)-98.2 °F (36.8 °C)] 97.1 °F (36.2 °C)  Pulse:  [] 67  Resp:  [15-21] 18  SpO2:  [65 %-97 %] 65 %  BP: ()/(29-65) 87/33     Weight: 84.6 kg (186 lb 8.2 oz) (11/02/18 0600)  Body mass index is 27.54 kg/m².      Intake/Output Summary (Last 24 hours) at 11/2/2018 1436  Last data filed at 11/2/2018 1200  Gross per 24 hour   Intake 2050 ml   Output 110 ml   Net 1940 ml       Lines/Drains/Airways     Central Venous Catheter Line                 Percutaneous Central Line Insertion/Assessment - triple lumen  11/02/18 1100 right femoral less than 1 day          Drain                 Urethral Catheter 10/31/18 1020 Latex;Straight-tip 16 Fr. 2 days          Peripheral Intravenous Line                 Peripheral IV - Single Lumen 10/31/18 1235 Right Wrist 2 days                Physical Exam   Constitutional: He is oriented to person, place, and time.   Cardiovascular: Normal rate. An irregular rhythm present.   Pulmonary/Chest: Effort normal and breath sounds normal. No respiratory distress.   Abdominal: Soft. Bowel sounds are normal. He exhibits no distension. There is no tenderness.   Neurological: He is alert and oriented to person, place, and time. No cranial nerve deficit.   Skin: Skin is warm and dry. No rash noted.       Significant Labs:  Acute Hepatitis Panel: No results for input(s): HEPBSAG, HEPAIGM, HEPCAB in the last 48 hours.    Invalid input(s): HAPBIGM  CBC:   Recent Labs   Lab 11/01/18  0436 11/02/18  0432   WBC 9.78 14.44*   HGB 9.2* 10.3*   HCT 27.8* 31.3*    218     CMP:   Recent Labs   Lab  11/02/18  0432  11/02/18  1315   GLU 39*   < > 107   CALCIUM 8.9   < > 8.3*   ALBUMIN 3.0*  --   --    PROT 6.3  --   --    *   < > 132*   K 6.1*   < > 5.7*   CO2 12*   < > 15*   CL 99   < > 98   BUN 88*   < > 90*   CREATININE 4.2*   < > 4.8*   ALKPHOS 141*  --   --    ALT 1,175*  --   --    AST 3,103*  --   --    BILITOT 7.4*  --   --     < > = values in this interval not displayed.     Coagulation:   Recent Labs   Lab 11/02/18  0927   INR 3.4*     Liver Function Test:   Recent Labs   Lab 11/01/18 0436 11/01/18  1643 11/02/18 0432   ALT 40  --  1,175*   *  --  3,103*   ALKPHOS 122  --  141*   BILITOT 5.1* 6.5* 7.4*   PROT 5.8*  --  6.3   ALBUMIN 2.8*  --  3.0*       Significant Imaging:  Imaging results within the past 24 hours have been reviewed.    Assessment/Plan:     * Shock liver    - likely shock liver  - continue supportive care per ICU team  - monitor labs  - workup pending    New Orders to be Acknowledged     Hepatitis B core antibody, total   Once, Standing Count: 1 Occurrences, Prio: Routine   When 11/02/18 0829    Ordering Provider Madi Kidd MD          Hepatitis B surface antibody   Once, Standing Count: 1 Occurrences, Prio: Routine   When 11/02/18 0829    Ordering Provider Madi Kidd MD          Hepatitis B surface antigen   Once, Standing Count: 1 Occurrences, Prio: Routine   When 11/02/18 0829    Ordering Provider Madi Kidd MD          Hepatitis C antibody   Once, Standing Count: 1 Occurrences, Prio: Routine   When 11/02/18 0829    Ordering Provider Madi Kidd MD          Hepatitis A antibody, IgG   Once, Standing Count: 1 Occurrences, Prio: Routine   When 11/02/18 0829    Ordering Provider Madi Kidd MD          Antimitochondrial antibody   Once, Standing Count: 1 Occurrences, Prio: Routine   When 11/02/18 0829    Ordering Provider Madi Kidd MD          Anti-smooth muscle antibody   Once, Standing Count: 1 Occurrences, Prio: Routine   When 11/02/18 0829     Ordering Provider Madi Kidd MD          Abnormal INR    - INR elevated at 3.4  - monitor  - consider vitamin K if not improving by tomorrow.   - Eliquis and ASA on hold.      Left displaced femoral neck fracture    - surgery done 10/31/18       CAD (coronary artery disease)    - management per cardiology     CHF (congestive heart failure)    - management per cardiology         Thank you for your consult. I will follow-up with patient. Please contact us if you have any additional questions.    Klaudia Huynh NP  Gastroenterology  [facility]

## 2018-11-02 NOTE — NURSING
Critical Blood glucose of 39 received from . Patient assessed. Patient awake and alert. No obvious signs of distress. Patient asymptomatic. Patient only reports sweating with no confusion, dizziness or drowsiness. Blood glucose rechecked manually resulting in 28. Valentine Chan NP notified. Glucose orders placed and verified. Dextrose administered to patient. Will continue to monitor patient.

## 2018-11-02 NOTE — PLAN OF CARE
Patient transferred to ICU. Patient is Status post left hip anterior total hip replacement , capsulotomy, synovectomy and exostosis excision, open treatment of left femoral neck fracture. POD # 2   Transferred today to ICU after he was found hypotensive, confused at times, feeling weak.               11/02/18 1215   Discharge Reassessment   Assessment Type Discharge Planning Reassessment   Provided patient/caregiver education on the expected discharge date and the discharge plan No   Do you have any problems affording any of your prescribed medications? No   Discharge Plan A Skilled Nursing Facility   Discharge Plan B Home with family;Home Health   Patient choice form signed by patient/caregiver N/A   Anticipated Discharge Disposition SNF   Can the patient answer the patient profile reliably? Yes, cognitively intact   How does the patient rate their overall health at the present time? Fair   Describe the patient's ability to walk at the present time. Walks with the help of equipment   How often would a person be available to care for the patient? Often   Number of comorbid conditions (as recorded on the chart) Five or more   During the past month, has the patient often been bothered by feeling down, depressed or hopeless? No   During the past month, has the patient often been bothered by little interest or pleasure in doing things? No

## 2018-11-02 NOTE — CONSULTS
[facility]  Cardiology  Consult Note    Patient Name: Chung Meneses Sr.  MRN: [id number]  Admission Date: 10/29/2018  Hospital Length of Stay: 4 days  Code Status: Full Code   Attending Provider: Jake Eastman MD   Consulting Provider: Sahara Olguin NP  Primary Care Physician: Glenroy Carmichael MD  Principal Problem:Shock liver    Patient information was obtained from patient, past medical records and ER records.     Inpatient consult to Cardiology  Consult performed by: Sahara Olguin NP  Consult ordered by: Jake Eastman MD        Subjective:     Chief Complaint:  shock     HPI:   Mr. Meneses is a 87 year old male with history of CAD s/p CABG x 1, severe AS s/p AVR (2017), AFIB, CHF, HTN, HLP who presented to [facility] ED after suffering a ground level fall. He reports that he tripped and feel while walking outside. Denies any loss of consciousness. Imaging revealed a left femoral neck fracture. Dr. Simental evaluated patient. Hospital medicine called for admission. Cardiology consulted today for clearance prior to surgery. Patient seen and examined in room. Denies chest pain or anginal equivalents. No shortness of breath, EISENBERG or palpitations. NO leg swelling today. Reports compliance with medications and dietary restrictions. Low to moderate periop risk of CV events for moderate risk procedure.      11/2/18-Re consulted due to shock post left hip replacement. Transferred to ICU for Levophed support for BP. Patient seen and examined in room. Awake and alert, able to answer questions at this time. No chest pain or anginal equivalents. Levophed gtt for BP support in place. TTE pending.     Past Medical History:   Diagnosis Date    Aortic stenosis 8/2/2013    Atrial fibrillation 7/5/2013    CHF (congestive heart failure)     Coronary artery disease     Dizziness - light-headed     Hyperlipidemia     Hypertension     Pulmonary hypertension 11/30/2017    S/P AVR 12/1/2017    Syncope  1/26/2018       Past Surgical History:   Procedure Laterality Date    AORTOCORONARY BYPASS-CABG N/A 11/30/2017    Performed by Shaq Boateng MD at [facility] OR    ARTHROPLASTY OF HIP BY ANTERIOR APPROACH Left 10/31/2018    Procedure: ARTHROPLASTY, HIP, ANTERIOR APPROACH;  Surgeon: Marlon Simental Sr., MD;  Location: [facility] OR;  Service: Orthopedics;  Laterality: Left;    ARTHROPLASTY, HIP, ANTERIOR APPROACH Left 10/31/2018    Performed by Marlon Simental Sr., MD at [facility] OR    BACK SURGERY  2003    BONE EXOSTOSIS EXCISION Left 10/31/2018    Procedure: EXCISION, EXOSTOSIS;  Surgeon: Marlon Simental Sr., MD;  Location: [facility] OR;  Service: Orthopedics;  Laterality: Left;    CAPSULOTOMY OF JOINT Left 10/31/2018    Procedure: CAPSULOTOMY, JOINT;  Surgeon: Marlon Simental Sr., MD;  Location: [facility] OR;  Service: Orthopedics;  Laterality: Left;    CAPSULOTOMY, JOINT Left 10/31/2018    Performed by Marlon Simental Sr., MD at [facility] OR    CARDIAC VALVE SURGERY      CARDIOVERSION N/A 5/1/2017    Performed by Patricia Espinoza MD at [facility] CATH LAB    CARDIOVERSION N/A 9/4/2013    Performed by Patricia Espinoza MD at [facility] CATH LAB    CORONARY ARTERY BYPASS GRAFT      EXCISION, EXOSTOSIS Left 10/31/2018    Performed by Marlon Simental Sr., MD at [facility] OR    HARVEST-VEIN-ENDOVASCULAR Left 11/30/2017    Performed by Shaq Boateng MD at [facility] OR    HEART CATH-BILATERAL Bilateral 11/14/2017    Performed by Patricia Espinoza MD at [facility] CATH LAB    knee replacemnt bilateral  2001    REPLACEMENT-VALVE-AORTIC N/A 11/30/2017    Performed by Shaq Boateng MD at [facility] OR    SHOULDER SURGERY  2002    SYNOVECTOMY OF HIP Left 10/31/2018    Procedure: SYNOVECTOMY, HIP;  Surgeon: Marlon Simental Sr., MD;  Location: [facility] OR;  Service: Orthopedics;  Laterality: Left;    SYNOVECTOMY, HIP Left 10/31/2018    Performed by Marlon Simental Sr., MD at [facility] OR    TRANSESOPHAGEAL ECHOCARDIOGRAM (ESTEFANY) N/A 11/30/2017    Performed by Shaq Boateng MD at  [facility] OR       Review of patient's allergies indicates:   Allergen Reactions    Sulfa (sulfonamide antibiotics) Hives       No current facility-administered medications on file prior to encounter.      Current Outpatient Medications on File Prior to Encounter   Medication Sig    metoprolol succinate (TOPROL-XL) 25 MG 24 hr tablet Take 25 mg by mouth 2 (two) times daily.    apixaban (ELIQUIS) 2.5 mg Tab Take 1 tablet (2.5 mg total) by mouth 2 (two) times daily.    aspirin (ECOTRIN) 81 MG EC tablet Take 1 tablet (81 mg total) by mouth once daily.    fish oil-omega-3 fatty acids 300-1,000 mg capsule Take 1,200 mg by mouth once daily.    multivitamin with folic acid 400 mcg Tab Take 1 tablet by mouth.    potassium chloride SA (K-DUR,KLOR-CON) 20 MEQ tablet TAKE 1 TABLET BY MOUTH IN THE MORNING    ranitidine (ZANTAC) 75 MG tablet Take 75 mg by mouth nightly.     torsemide (DEMADEX) 20 MG Tab Take 20 mg by mouth once daily. 2 tablets (40 mg) everyday    vitamin D 1000 units Tab Take 1,000 Units by mouth once daily.      Family History     Problem Relation (Age of Onset)    Heart attack Brother    Heart disease Brother    Heart failure Brother    Hypertension Mother, Father, Sister, Brother        Tobacco Use    Smoking status: Never Smoker    Smokeless tobacco: Never Used   Substance and Sexual Activity    Alcohol use: No     Comment: HOLIDAY    Drug use: No    Sexual activity: No     Review of Systems   Constitution: Positive for malaise/fatigue. Negative for weakness.   HENT: Negative for hearing loss and hoarse voice.    Eyes: Negative for blurred vision and visual disturbance.   Cardiovascular: Negative for chest pain, claudication, dyspnea on exertion, irregular heartbeat, leg swelling, near-syncope, orthopnea, palpitations, paroxysmal nocturnal dyspnea and syncope.   Respiratory: Negative for cough, hemoptysis, shortness of breath, sleep disturbances due to breathing, snoring and wheezing.     Endocrine: Negative for cold intolerance and heat intolerance.   Hematologic/Lymphatic: Does not bruise/bleed easily.   Skin: Negative for color change, dry skin and nail changes.   Musculoskeletal: Positive for arthritis and back pain. Negative for joint pain and myalgias.   Gastrointestinal: Negative for bloating, abdominal pain, constipation, nausea and vomiting.   Genitourinary: Negative for dysuria, flank pain, hematuria and hesitancy.   Neurological: Negative for headaches, light-headedness, loss of balance, numbness and paresthesias.   Psychiatric/Behavioral: Negative for altered mental status.   Allergic/Immunologic: Negative for environmental allergies.     Objective:     Vital Signs (Most Recent):  Temp: 97.1 °F (36.2 °C) (11/02/18 1015)  Pulse: 67 (11/02/18 1330)  Resp: 18 (11/02/18 1330)  BP: (!) 87/33 (11/02/18 1330)  SpO2: (pulse ox not registering) (11/02/18 1200) Vital Signs (24h Range):  Temp:  [97.1 °F (36.2 °C)-98.2 °F (36.8 °C)] 97.1 °F (36.2 °C)  Pulse:  [] 67  Resp:  [15-21] 18  SpO2:  [65 %-97 %] 65 %  BP: ()/(29-65) 87/33     Weight: 84.6 kg (186 lb 8.2 oz)  Body mass index is 27.54 kg/m².    SpO2: (pulse ox not registering)  O2 Device (Oxygen Therapy): nasal cannula      Intake/Output Summary (Last 24 hours) at 11/2/2018 1458  Last data filed at 11/2/2018 1200  Gross per 24 hour   Intake 2050 ml   Output 110 ml   Net 1940 ml       Lines/Drains/Airways     Central Venous Catheter Line                 Percutaneous Central Line Insertion/Assessment - triple lumen  11/02/18 1100 right femoral less than 1 day          Drain                 Urethral Catheter 10/31/18 1020 Latex;Straight-tip 16 Fr. 2 days          Peripheral Intravenous Line                 Peripheral IV - Single Lumen 10/31/18 1235 Right Wrist 2 days                Physical Exam   Constitutional: He is oriented to person, place, and time. He appears well-developed and well-nourished. No distress.   HENT:   Head:  Normocephalic and atraumatic.   Eyes: Pupils are equal, round, and reactive to light.   Neck: Normal range of motion and full passive range of motion without pain. Neck supple. No JVD present.   Cardiovascular: Normal rate, regular rhythm, S1 normal, S2 normal and intact distal pulses. PMI is not displaced. Exam reveals no distant heart sounds.   No murmur heard.  Pulses:       Radial pulses are 2+ on the right side, and 2+ on the left side.        Dorsalis pedis pulses are 2+ on the right side, and 2+ on the left side.   Pulmonary/Chest: Effort normal. No accessory muscle usage. No respiratory distress. He has decreased breath sounds in the right lower field and the left lower field. He has no wheezes.   Abdominal: Soft. Bowel sounds are normal. He exhibits no distension. There is no tenderness.   Musculoskeletal: Normal range of motion. He exhibits no edema.        Right ankle: He exhibits no swelling.        Left ankle: He exhibits no swelling.   Neurological: He is alert and oriented to person, place, and time.   Skin: Skin is warm and dry. He is not diaphoretic. No cyanosis. Nails show no clubbing.   Psychiatric: He has a normal mood and affect. His speech is normal and behavior is normal. Judgment and thought content normal. Cognition and memory are normal.   Nursing note and vitals reviewed.      Significant Labs:   All pertinent lab results from the last 24 hours have been reviewed. and   Recent Lab Results       11/02/18  1315   11/02/18  1258   11/02/18  1157   11/02/18  0951   11/02/18  0927        Procalcitonin               Acetaminophen (Tylenol), Serum               Albumin               Alkaline Phosphatase               Allens Test     Pass         ALT               Ammonia               Anion Gap 19             Aniso               AST               Baso #               Basophil%               Bilirubin, Direct               Total Bilirubin               Site     LR         BUN, Bld 90              Bill Cells               Calcium 8.3             Calculated EF               Chloride 98             CO2 15             Cortisol               Creatinine 4.8             DelSys     Nasal Can         Differential Method               eGFR if  12             eGFR if non  10  Comment:  Calculation used to obtain the estimated glomerular filtration  rate (eGFR) is the CKD-EPI equation.                Eos #               Eosinophil%               FiO2     28         Flow     2         Glucose 107             Gran # (ANC)               Gran%               Haptoglobin               Hematocrit               Hemoglobin               Coumadin Monitoring INR         3.4  Comment:  Coumadin Therapy:  2.0 - 3.0 for INR for all indicators except mechanical heart valves  and antiphospholipid syndromes which should use 2.5 - 3.5.       Lactate, Phil               LD               Lymph #               Lymph%               MCH               MCHC               MCV               Mode     SPONT         Mono #               Mono%               MPV               Ovalocytes               Est. PA Systolic Pressure               Platelet Estimate               Platelets               POC BE     -12         POC HCO3     13.6         POC PCO2     25.9         POC PH     7.327         POC PO2     109         POC SATURATED O2     98         POCT Glucose   111   114       Poik               Poly               Potassium 5.7             Total Protein               Protime         33.0  Comment:  Results confirmed, test repeated  PT result(s) called and verbal readback obtained from Sharad Herbert RN. Said results were expected., 11/02/2018 10:14       RBC               RDW               Sample     ARTERIAL         Sodium 132             Tear Drop Cells               Tricuspid Valve Regurgitation               WBC                                11/02/18  0926   11/02/18  0913   11/02/18  0811   11/02/18  0647    11/02/18  0612        Procalcitonin               Acetaminophen (Tylenol), Serum               Albumin               Alkaline Phosphatase               Allens Test               ALT               Ammonia               Anion Gap 24             Aniso               AST               Baso #               Basophil%               Bilirubin, Direct               Total Bilirubin               Site               BUN, Bld 86             [place] Cells               Calcium 8.6             Calculated EF   60           Chloride 97             CO2 12             Cortisol               Creatinine 4.5             DelSys               Differential Method               eGFR if  13             eGFR if non  11  Comment:  Calculation used to obtain the estimated glomerular filtration  rate (eGFR) is the CKD-EPI equation.                Eos #               Eosinophil%               FiO2               Flow               Glucose 103             Gran # (ANC)               Gran%               Haptoglobin               Hematocrit               Hemoglobin               Coumadin Monitoring INR               Lactate, Phil >12.0  Comment:  Falsely low lactic acid results can be found in samples   containing >=13.0 mg/dL total bilirubin and/or >=3.5 mg/dL   direct bilirubin.  lactic acid  critical result(s) called and verbal readback obtained   from adis montero rn, 11/02/2018 11:11               LD               Lymph #               Lymph%               MCH               MCHC               MCV               Mode               Mono #               Mono%               MPV               Ovalocytes               Est. PA Systolic Pressure   28.52           Platelet Estimate               Platelets               POC BE               POC HCO3               POC PCO2               POC PH               POC PO2               POC SATURATED O2               POCT Glucose     65 84 28     Poik               Poly                Potassium 6.1             Total Protein               Protime               RBC               RDW               Sample               Sodium 133             Tear Drop Cells               Tricuspid Valve Regurgitation   MODERATE           WBC                                11/02/18  0432   11/02/18  0431   11/01/18  1649   11/01/18  1643        Procalcitonin     9.53  Comment:  A concentration < 0.25 ng/mL represents a low risk bacterial   infection.  Procalcitonin may not be accurate among patients with localized   infection, recent trauma or major surgery, immunosuppressed state,   invasive fungal infection, renal dysfunction. Decisions regarding   initiation or continuation of antibiotic therapy should not be based   solely on procalcitonin levels.         Acetaminophen (Tylenol), Serum 10.0  Comment:  Toxic Levels:  Adults (4 hr post-ingestion).........>150 ug/mL  Adults (12 hr post-ingestion)........>40 ug/mL  Peds (2 hr post-ingestion, liquid)...>225 ug/mL             Albumin 3.0           Alkaline Phosphatase 141           Allens Test             ALT 1,175           Ammonia       70     Anion Gap 21           Aniso Slight           AST 3,103           Baso # 0.01           Basophil% 0.1           Bilirubin, Direct 5.2     4.7     Total Bilirubin 7.4  Comment:  For infants and newborns, interpretation of results should be based  on gestational age, weight and in agreement with clinical  observations.  Premature Infant recommended reference ranges:  Up to 24 hours.............<8.0 mg/dL  Up to 48 hours............<12.0 mg/dL  3-5 days..................<15.0 mg/dL  6-29 days.................<15.0 mg/dL       6.5  Comment:  For infants and newborns, interpretation of results should be based  on gestational age, weight and in agreement with clinical  observations.  Premature Infant recommended reference ranges:  Up to 24 hours.............<8.0 mg/dL  Up to 48 hours............<12.0 mg/dL  3-5  days..................<15.0 mg/dL  6-29 days.................<15.0 mg/dL       Site             BUN, Bld 88           Bill Cells Occasional           Calcium 8.9           Calculated EF             Chloride 99           CO2 12           Cortisol   28.90  Comment:  Cortisol Reference Range:  Before 10:00 am: 4.46-22.7 ug/dL  After 5:00 pm:    1.7-14.1 ug/dL   32.70  Comment:  Cortisol Reference Range:  Before 10:00 am: 4.46-22.7 ug/dL  After 5:00 pm:    1.7-14.1 ug/dL         Creatinine 4.2           DelSys             Differential Method Automated           eGFR if  14           eGFR if non  12  Comment:  Calculation used to obtain the estimated glomerular filtration  rate (eGFR) is the CKD-EPI equation.              Eos # 0.0           Eosinophil% 0.1           FiO2             Flow             Glucose 39  Comment:  GLUCOSE critical result(s) called and verbal readback obtained from   DORIS WEST RN, 11/02/2018 06:04             Gran # (ANC) 11.2           Gran% 77.9           Haptoglobin     102       Hematocrit 31.3           Hemoglobin 10.3           Coumadin Monitoring INR             Lactate, Phil             LD       1,651  Comment:  Results are increased in hemolyzed samples.     Lymph # 1.1           Lymph% 7.7           MCH 30.5           MCHC 32.9           MCV 93           Mode             Mono # 2.1           Mono% 14.7           MPV 11.3           Ovalocytes Occasional           Est. PA Systolic Pressure             Platelet Estimate Appears normal           Platelets 218  Comment:  Results confirmed, test repeated           POC BE             POC HCO3             POC PCO2             POC PH             POC PO2             POC SATURATED O2             POCT Glucose             Poik Slight           Poly Occasional           Potassium 6.1  Comment:  no visible hemolysis.           Total Protein 6.3           Protime             RBC 3.38           RDW 18.2            Sample             Sodium 132           Tear Drop Cells Occasional           Tricuspid Valve Regurgitation             WBC 14.44                 Significant Imaging: Echocardiogram:   2D echo with color flow doppler:   Results for orders placed or performed in visit on 01/26/18   2D Echo w/ Color Flow Doppler   Result Value Ref Range    Calculated EF 50 55 - 65    Mitral Valve Regurgitation MODERATE (A)     Diastolic Dysfunction Yes (A)     Est. PA Systolic Pressure 41.01 (A)     Mitral Valve Mobility NORMAL     Tricuspid Valve Regurgitation MILD TO MODERATE     and X-Ray: CXR: X-Ray Chest 1 View (CXR):   Results for orders placed or performed during the hospital encounter of 10/29/18   X-Ray Chest 1 View    Narrative    EXAMINATION:  XR CHEST 1 VIEW    CLINICAL HISTORY:  supect pneumonia leading to sepsis after a fall causes bruise to left ribs.;.    TECHNIQUE:  Single frontal portable view of the chest was performed.    COMPARISON:  11/01/2018    FINDINGS:  Support devices: Telemetry leads, median sternotomy wires, bilateral shoulder arthroplasty    The lungs are clear, with normal appearance of pulmonary vasculature and no pleural effusion or pneumothorax.    The cardiac silhouette is normal in size. The hilar and mediastinal contours are unremarkable.    Nondisplaced left 9th rib fracture laterally.  No pleural effusion or pneumothorax.      Impression    Nondisplaced left 9th rib fracture laterally. No pleural effusion or pneumothorax.  No appreciable consolidation.      Electronically signed by: Hemanth Ceja  Date:    11/02/2018  Time:    10:38    and X-Ray Chest PA and Lateral (CXR): No results found for this visit on 10/29/18.    Assessment and Plan:     * Shock liver    AST 3103/ ALT 1175  Hold Eliquis and ASA for now  INR 3.4  Supportive care  Levophed for BP support as needed  LA >12, needs septic workup per critical care.      Shock, unspecified    Agree with IV levophed for BP support  Repeat ECHO today  revealed EF 60-65%, -WMA's, indeterminate LV diastolic fn, low normal RV systolic fn,   LA >12, needs septic workup per critical care      .      Left displaced femoral neck fracture    Per primary team/ ortho     CAD (coronary artery disease)    Continue BB and Torsemide  Hold ASA and Eliquis  Hold ACei/ARB for now given renal function  No chest pain or anginal equivalents on exam today.   Resume statin post op  Check FLP in AM    Low to moderate periop risk of CV events for moderate risk procedure.  No chest pain, active arrhythmia and CHF symptoms.  Ok to proceed the scheduled surgery without further cardiac study.    Will be available as needed.     11/2  -Hold ASA and eliquis for now given elevated LFT's and INR 3.4  Hold BB, ACEi, Torsemide for now given low BP requiring Levophed support  Hold statin given elevated LFT's         H/O aortic valve replacement    Continue BB and torsemide  Hold ASA and Eliquis prior to surgery  Resume as soon as possible post op     CHF (congestive heart failure)    Appears compensated on exam  Continue torsemide  DASH diet, 2 gm sodium restriction  1L fluid restriction per day  Intake and output     Essential hypertension    On medical therapy  Continue BB and torsemide  Hold ACEI due to elevated renal function  Resume when able.     11/2  -Hold BB, Torsemide and ACEI for now given Levophed gtt for BP support  Resume when clinical course allows         VTE Risk Mitigation (From admission, onward)    None          Thank you for your consult. I will follow-up with patient. Please contact us if you have any additional questions.    Sahara Olguin NP  Cardiology   [facility]

## 2018-11-02 NOTE — PT/OT/SLP PROGRESS
Occupational Therapy      Patient Name:  Chung Meneses Sr.   MRN:  [id number]    Patient not seen today secondary to hypertensive. Pt placed on hold per IMAN Mckeon and nurse [place]. Will follow-up on next visit.    Kirsty Jama OT  11/2/2018   1136

## 2018-11-02 NOTE — SUBJECTIVE & OBJECTIVE
Past Medical History:   Diagnosis Date    Aortic stenosis 8/2/2013    Atrial fibrillation 7/5/2013    CHF (congestive heart failure)     Coronary artery disease     Dizziness - light-headed     Hyperlipidemia     Hypertension     Pulmonary hypertension 11/30/2017    S/P AVR 12/1/2017    Syncope 1/26/2018       Past Surgical History:   Procedure Laterality Date    AORTOCORONARY BYPASS-CABG N/A 11/30/2017    Performed by Shaq Boateng MD at [facility] OR    ARTHROPLASTY OF HIP BY ANTERIOR APPROACH Left 10/31/2018    Procedure: ARTHROPLASTY, HIP, ANTERIOR APPROACH;  Surgeon: Marlon Simental Sr., MD;  Location: [facility] OR;  Service: Orthopedics;  Laterality: Left;    ARTHROPLASTY, HIP, ANTERIOR APPROACH Left 10/31/2018    Performed by Marlon Simental Sr., MD at [facility] OR    BACK SURGERY  2003    BONE EXOSTOSIS EXCISION Left 10/31/2018    Procedure: EXCISION, EXOSTOSIS;  Surgeon: Marlon Simental Sr., MD;  Location: [facility] OR;  Service: Orthopedics;  Laterality: Left;    CAPSULOTOMY OF JOINT Left 10/31/2018    Procedure: CAPSULOTOMY, JOINT;  Surgeon: Marlon Simental Sr., MD;  Location: [facility] OR;  Service: Orthopedics;  Laterality: Left;    CAPSULOTOMY, JOINT Left 10/31/2018    Performed by Marlon Simental Sr., MD at [facility] OR    CARDIAC VALVE SURGERY      CARDIOVERSION N/A 5/1/2017    Performed by Patricia Espinoza MD at [facility] CATH LAB    CARDIOVERSION N/A 9/4/2013    Performed by Patricia Espinoza MD at [facility] CATH LAB    CORONARY ARTERY BYPASS GRAFT      EXCISION, EXOSTOSIS Left 10/31/2018    Performed by Marlon Simental Sr., MD at [facility] OR    HARVEST-VEIN-ENDOVASCULAR Left 11/30/2017    Performed by Shaq Boateng MD at [facility] OR    HEART CATH-BILATERAL Bilateral 11/14/2017    Performed by Patricia Espinoza MD at [facility] CATH LAB    knee replacemnt bilateral  2001    REPLACEMENT-VALVE-AORTIC N/A 11/30/2017    Performed by Shaq Boateng MD at [facility] OR    SHOULDER SURGERY  2002    SYNOVECTOMY OF HIP Left 10/31/2018     Procedure: SYNOVECTOMY, HIP;  Surgeon: Marlon Simental Sr., MD;  Location: [facility] OR;  Service: Orthopedics;  Laterality: Left;    SYNOVECTOMY, HIP Left 10/31/2018    Performed by Marlon Simental Sr., MD at [facility] OR    TRANSESOPHAGEAL ECHOCARDIOGRAM (ESTEFANY) N/A 11/30/2017    Performed by Shaq Boateng MD at [facility] OR       Review of patient's allergies indicates:   Allergen Reactions    Sulfa (sulfonamide antibiotics) Hives       No current facility-administered medications on file prior to encounter.      Current Outpatient Medications on File Prior to Encounter   Medication Sig    metoprolol succinate (TOPROL-XL) 25 MG 24 hr tablet Take 25 mg by mouth 2 (two) times daily.    apixaban (ELIQUIS) 2.5 mg Tab Take 1 tablet (2.5 mg total) by mouth 2 (two) times daily.    aspirin (ECOTRIN) 81 MG EC tablet Take 1 tablet (81 mg total) by mouth once daily.    fish oil-omega-3 fatty acids 300-1,000 mg capsule Take 1,200 mg by mouth once daily.    multivitamin with folic acid 400 mcg Tab Take 1 tablet by mouth.    potassium chloride SA (K-DUR,KLOR-CON) 20 MEQ tablet TAKE 1 TABLET BY MOUTH IN THE MORNING    ranitidine (ZANTAC) 75 MG tablet Take 75 mg by mouth nightly.     torsemide (DEMADEX) 20 MG Tab Take 20 mg by mouth once daily. 2 tablets (40 mg) everyday    vitamin D 1000 units Tab Take 1,000 Units by mouth once daily.      Family History     Problem Relation (Age of Onset)    Heart attack Brother    Heart disease Brother    Heart failure Brother    Hypertension Mother, Father, Sister, Brother        Tobacco Use    Smoking status: Never Smoker    Smokeless tobacco: Never Used   Substance and Sexual Activity    Alcohol use: No     Comment: HOLIDAY    Drug use: No    Sexual activity: No     Review of Systems   Constitution: Positive for malaise/fatigue. Negative for weakness.   HENT: Negative for hearing loss and hoarse voice.    Eyes: Negative for blurred vision and visual disturbance.   Cardiovascular: Negative  for chest pain, claudication, dyspnea on exertion, irregular heartbeat, leg swelling, near-syncope, orthopnea, palpitations, paroxysmal nocturnal dyspnea and syncope.   Respiratory: Negative for cough, hemoptysis, shortness of breath, sleep disturbances due to breathing, snoring and wheezing.    Endocrine: Negative for cold intolerance and heat intolerance.   Hematologic/Lymphatic: Does not bruise/bleed easily.   Skin: Negative for color change, dry skin and nail changes.   Musculoskeletal: Positive for arthritis and back pain. Negative for joint pain and myalgias.   Gastrointestinal: Negative for bloating, abdominal pain, constipation, nausea and vomiting.   Genitourinary: Negative for dysuria, flank pain, hematuria and hesitancy.   Neurological: Negative for headaches, light-headedness, loss of balance, numbness and paresthesias.   Psychiatric/Behavioral: Negative for altered mental status.   Allergic/Immunologic: Negative for environmental allergies.     Objective:     Vital Signs (Most Recent):  Temp: 97.1 °F (36.2 °C) (11/02/18 1015)  Pulse: 67 (11/02/18 1330)  Resp: 18 (11/02/18 1330)  BP: (!) 87/33 (11/02/18 1330)  SpO2: (pulse ox not registering) (11/02/18 1200) Vital Signs (24h Range):  Temp:  [97.1 °F (36.2 °C)-98.2 °F (36.8 °C)] 97.1 °F (36.2 °C)  Pulse:  [] 67  Resp:  [15-21] 18  SpO2:  [65 %-97 %] 65 %  BP: ()/(29-65) 87/33     Weight: 84.6 kg (186 lb 8.2 oz)  Body mass index is 27.54 kg/m².    SpO2: (pulse ox not registering)  O2 Device (Oxygen Therapy): nasal cannula      Intake/Output Summary (Last 24 hours) at 11/2/2018 1458  Last data filed at 11/2/2018 1200  Gross per 24 hour   Intake 2050 ml   Output 110 ml   Net 1940 ml       Lines/Drains/Airways     Central Venous Catheter Line                 Percutaneous Central Line Insertion/Assessment - triple lumen  11/02/18 1100 right femoral less than 1 day          Drain                 Urethral Catheter 10/31/18 1020 Latex;Straight-tip 16  Fr. 2 days          Peripheral Intravenous Line                 Peripheral IV - Single Lumen 10/31/18 1235 Right Wrist 2 days                Physical Exam   Constitutional: He is oriented to person, place, and time. He appears well-developed and well-nourished. No distress.   HENT:   Head: Normocephalic and atraumatic.   Eyes: Pupils are equal, round, and reactive to light.   Neck: Normal range of motion and full passive range of motion without pain. Neck supple. No JVD present.   Cardiovascular: Normal rate, regular rhythm, S1 normal, S2 normal and intact distal pulses. PMI is not displaced. Exam reveals no distant heart sounds.   No murmur heard.  Pulses:       Radial pulses are 2+ on the right side, and 2+ on the left side.        Dorsalis pedis pulses are 2+ on the right side, and 2+ on the left side.   Pulmonary/Chest: Effort normal. No accessory muscle usage. No respiratory distress. He has decreased breath sounds in the right lower field and the left lower field. He has no wheezes.   Abdominal: Soft. Bowel sounds are normal. He exhibits no distension. There is no tenderness.   Musculoskeletal: Normal range of motion. He exhibits no edema.        Right ankle: He exhibits no swelling.        Left ankle: He exhibits no swelling.   Neurological: He is alert and oriented to person, place, and time.   Skin: Skin is warm and dry. He is not diaphoretic. No cyanosis. Nails show no clubbing.   Psychiatric: He has a normal mood and affect. His speech is normal and behavior is normal. Judgment and thought content normal. Cognition and memory are normal.   Nursing note and vitals reviewed.      Significant Labs:   All pertinent lab results from the last 24 hours have been reviewed. and   Recent Lab Results       11/02/18  1315   11/02/18  1258   11/02/18  1157   11/02/18  0951   11/02/18  0927        Procalcitonin               Acetaminophen (Tylenol), Serum               Albumin               Alkaline Phosphatase                Allens Test     Pass         ALT               Ammonia               Anion Gap 19             Aniso               AST               Baso #               Basophil%               Bilirubin, Direct               Total Bilirubin               Site     LR         BUN, Bld 90             Bill Cells               Calcium 8.3             Calculated EF               Chloride 98             CO2 15             Cortisol               Creatinine 4.8             DelSys     Nasal Can         Differential Method               eGFR if  12             eGFR if non  10  Comment:  Calculation used to obtain the estimated glomerular filtration  rate (eGFR) is the CKD-EPI equation.                Eos #               Eosinophil%               FiO2     28         Flow     2         Glucose 107             Gran # (ANC)               Gran%               Haptoglobin               Hematocrit               Hemoglobin               Coumadin Monitoring INR         3.4  Comment:  Coumadin Therapy:  2.0 - 3.0 for INR for all indicators except mechanical heart valves  and antiphospholipid syndromes which should use 2.5 - 3.5.       Lactate, Phil               LD               Lymph #               Lymph%               MCH               MCHC               MCV               Mode     SPONT         Mono #               Mono%               MPV               Ovalocytes               Est. PA Systolic Pressure               Platelet Estimate               Platelets               POC BE     -12         POC HCO3     13.6         POC PCO2     25.9         POC PH     7.327         POC PO2     109         POC SATURATED O2     98         POCT Glucose   111   114       Poik               Poly               Potassium 5.7             Total Protein               Protime         33.0  Comment:  Results confirmed, test repeated  PT result(s) called and verbal readback obtained from Sharad Herbert RN. Said results were expected.,  11/02/2018 10:14       RBC               RDW               Sample     ARTERIAL         Sodium 132             Tear Drop Cells               Tricuspid Valve Regurgitation               WBC                                11/02/18  0926   11/02/18  0913   11/02/18  0811   11/02/18  0647   11/02/18  0612        Procalcitonin               Acetaminophen (Tylenol), Serum               Albumin               Alkaline Phosphatase               Allens Test               ALT               Ammonia               Anion Gap 24             Aniso               AST               Baso #               Basophil%               Bilirubin, Direct               Total Bilirubin               Site               BUN, Bld 86             [place] Cells               Calcium 8.6             Calculated EF   60           Chloride 97             CO2 12             Cortisol               Creatinine 4.5             DelSys               Differential Method               eGFR if  13             eGFR if non  11  Comment:  Calculation used to obtain the estimated glomerular filtration  rate (eGFR) is the CKD-EPI equation.                Eos #               Eosinophil%               FiO2               Flow               Glucose 103             Gran # (ANC)               Gran%               Haptoglobin               Hematocrit               Hemoglobin               Coumadin Monitoring INR               Lactate, Phil >12.0  Comment:  Falsely low lactic acid results can be found in samples   containing >=13.0 mg/dL total bilirubin and/or >=3.5 mg/dL   direct bilirubin.  lactic acid  critical result(s) called and verbal readback obtained   from adis montero rn, 11/02/2018 11:11               LD               Lymph #               Lymph%               MCH               MCHC               MCV               Mode               Mono #               Mono%               MPV               Ovalocytes               Est. PA Systolic  Pressure   28.52           Platelet Estimate               Platelets               POC BE               POC HCO3               POC PCO2               POC PH               POC PO2               POC SATURATED O2               POCT Glucose     65 84 28     Poik               Poly               Potassium 6.1             Total Protein               Protime               RBC               RDW               Sample               Sodium 133             Tear Drop Cells               Tricuspid Valve Regurgitation   MODERATE           WBC                                11/02/18  0432   11/02/18  0431   11/01/18  1649   11/01/18  1643        Procalcitonin     9.53  Comment:  A concentration < 0.25 ng/mL represents a low risk bacterial   infection.  Procalcitonin may not be accurate among patients with localized   infection, recent trauma or major surgery, immunosuppressed state,   invasive fungal infection, renal dysfunction. Decisions regarding   initiation or continuation of antibiotic therapy should not be based   solely on procalcitonin levels.         Acetaminophen (Tylenol), Serum 10.0  Comment:  Toxic Levels:  Adults (4 hr post-ingestion).........>150 ug/mL  Adults (12 hr post-ingestion)........>40 ug/mL  Peds (2 hr post-ingestion, liquid)...>225 ug/mL             Albumin 3.0           Alkaline Phosphatase 141           Allens Test             ALT 1,175           Ammonia       70     Anion Gap 21           Aniso Slight           AST 3,103           Baso # 0.01           Basophil% 0.1           Bilirubin, Direct 5.2     4.7     Total Bilirubin 7.4  Comment:  For infants and newborns, interpretation of results should be based  on gestational age, weight and in agreement with clinical  observations.  Premature Infant recommended reference ranges:  Up to 24 hours.............<8.0 mg/dL  Up to 48 hours............<12.0 mg/dL  3-5 days..................<15.0 mg/dL  6-29 days.................<15.0 mg/dL       6.5  Comment:  For  infants and newborns, interpretation of results should be based  on gestational age, weight and in agreement with clinical  observations.  Premature Infant recommended reference ranges:  Up to 24 hours.............<8.0 mg/dL  Up to 48 hours............<12.0 mg/dL  3-5 days..................<15.0 mg/dL  6-29 days.................<15.0 mg/dL       Site             BUN, Bld 88           [place] Cells Occasional           Calcium 8.9           Calculated EF             Chloride 99           CO2 12           Cortisol   28.90  Comment:  Cortisol Reference Range:  Before 10:00 am: 4.46-22.7 ug/dL  After 5:00 pm:    1.7-14.1 ug/dL   32.70  Comment:  Cortisol Reference Range:  Before 10:00 am: 4.46-22.7 ug/dL  After 5:00 pm:    1.7-14.1 ug/dL         Creatinine 4.2           DelSys             Differential Method Automated           eGFR if  14           eGFR if non  12  Comment:  Calculation used to obtain the estimated glomerular filtration  rate (eGFR) is the CKD-EPI equation.              Eos # 0.0           Eosinophil% 0.1           FiO2             Flow             Glucose 39  Comment:  GLUCOSE critical result(s) called and verbal readback obtained from   DORIS WEST RN, 11/02/2018 06:04             Gran # (ANC) 11.2           Gran% 77.9           Haptoglobin     102       Hematocrit 31.3           Hemoglobin 10.3           Coumadin Monitoring INR             Lactate, Phil             LD       1,651  Comment:  Results are increased in hemolyzed samples.     Lymph # 1.1           Lymph% 7.7           MCH 30.5           MCHC 32.9           MCV 93           Mode             Mono # 2.1           Mono% 14.7           MPV 11.3           Ovalocytes Occasional           Est. PA Systolic Pressure             Platelet Estimate Appears normal           Platelets 218  Comment:  Results confirmed, test repeated           POC BE             POC HCO3             POC PCO2             POC PH              POC PO2             POC SATURATED O2             POCT Glucose             Poik Slight           Poly Occasional           Potassium 6.1  Comment:  no visible hemolysis.           Total Protein 6.3           Protime             RBC 3.38           RDW 18.2           Sample             Sodium 132           Tear Drop Cells Occasional           Tricuspid Valve Regurgitation             WBC 14.44                 Significant Imaging: Echocardiogram:   2D echo with color flow doppler:   Results for orders placed or performed in visit on 01/26/18   2D Echo w/ Color Flow Doppler   Result Value Ref Range    Calculated EF 50 55 - 65    Mitral Valve Regurgitation MODERATE (A)     Diastolic Dysfunction Yes (A)     Est. PA Systolic Pressure 41.01 (A)     Mitral Valve Mobility NORMAL     Tricuspid Valve Regurgitation MILD TO MODERATE     and X-Ray: CXR: X-Ray Chest 1 View (CXR):   Results for orders placed or performed during the hospital encounter of 10/29/18   X-Ray Chest 1 View    Narrative    EXAMINATION:  XR CHEST 1 VIEW    CLINICAL HISTORY:  supect pneumonia leading to sepsis after a fall causes bruise to left ribs.;.    TECHNIQUE:  Single frontal portable view of the chest was performed.    COMPARISON:  11/01/2018    FINDINGS:  Support devices: Telemetry leads, median sternotomy wires, bilateral shoulder arthroplasty    The lungs are clear, with normal appearance of pulmonary vasculature and no pleural effusion or pneumothorax.    The cardiac silhouette is normal in size. The hilar and mediastinal contours are unremarkable.    Nondisplaced left 9th rib fracture laterally.  No pleural effusion or pneumothorax.      Impression    Nondisplaced left 9th rib fracture laterally. No pleural effusion or pneumothorax.  No appreciable consolidation.      Electronically signed by: Hemanth Ceja  Date:    11/02/2018  Time:    10:38    and X-Ray Chest PA and Lateral (CXR): No results found for this visit on 10/29/18.

## 2018-11-02 NOTE — ASSESSMENT & PLAN NOTE
AST 3103/ ALT 1175  Hold Eliquis and ASA for now  INR 3.4  Supportive care  Levophed for BP support as needed  LA >12, needs septic workup per critical care.

## 2018-11-02 NOTE — ASSESSMENT & PLAN NOTE
Continue BB and Torsemide  Hold ASA and Eliquis  Hold ACei/ARB for now given renal function  No chest pain or anginal equivalents on exam today.   Resume statin post op  Check FLP in AM    Low to moderate periop risk of CV events for moderate risk procedure.  No chest pain, active arrhythmia and CHF symptoms.  Ok to proceed the scheduled surgery without further cardiac study.    Will be available as needed.     11/2  -Hold ASA and eliquis for now given elevated LFT's and INR 3.4  Hold BB, ACEi, Torsemide for now given low BP requiring Levophed support  Hold statin given elevated LFT's

## 2018-11-02 NOTE — CONSULTS
[facility] -   Nephrology  Consult Note      Patient Name: Chung Meneses Sr.  MRN: [id number]  Admission Date: 10/29/2018  Hospital Length of Stay: 4 days  Attending Provider: Jake Eastman MD   Primary Care Physician: Glenroy Carmichael MD  Principal Problem:Left displaced femoral neck fracture     Referring physician: Dr. Eastman  Reason for consult: XIANG    Consults  Subjective:     HPI: Pt was seen and examined in ICU. H/o was reviewed. Pt is a 86 y/o male with CKD stage 3 (baseline s Cr 2.0), diastolic CHF, and AF who presented with left femoral neck fx and L side/ribs contusion (no rib fx on CXR) after a fall at home when taking the garbage out (trippedover his shoes) who has worsening in s cR with s Cr now 4.2 and K 6.1, as well as impressive increase in liver tests. Pt has been hypotensive, with SBP 77. Pt says that it hurts to breathe, o/w no SOB, no cough. Has elevated WBC 14 and procalcitonin. Initial CXR on admit shows clear lung fields. Discussed the medical issues with PCP, and pt was transferred to the ICU.    Past Medical History:   Diagnosis Date    Aortic stenosis 8/2/2013    Atrial fibrillation 7/5/2013    CHF (congestive heart failure)     Coronary artery disease     Dizziness - light-headed     Hyperlipidemia     Hypertension     Pulmonary hypertension 11/30/2017    S/P AVR 12/1/2017    Syncope 1/26/2018       Past Surgical History:   Procedure Laterality Date    AORTOCORONARY BYPASS-CABG N/A 11/30/2017    Performed by Shaq Boateng MD at [facility] OR    ARTHROPLASTY OF HIP BY ANTERIOR APPROACH Left 10/31/2018    Procedure: ARTHROPLASTY, HIP, ANTERIOR APPROACH;  Surgeon: Marlon Simental Sr., MD;  Location: [facility] OR;  Service: Orthopedics;  Laterality: Left;    ARTHROPLASTY, HIP, ANTERIOR APPROACH Left 10/31/2018    Performed by Marlon Simental Sr., MD at [facility] OR    BACK SURGERY  2003    BONE EXOSTOSIS EXCISION Left 10/31/2018    Procedure: EXCISION, EXOSTOSIS;   Surgeon: Marlon Simental Sr., MD;  Location: [facility] OR;  Service: Orthopedics;  Laterality: Left;    CAPSULOTOMY OF JOINT Left 10/31/2018    Procedure: CAPSULOTOMY, JOINT;  Surgeon: Marlon Simental Sr., MD;  Location: [facility] OR;  Service: Orthopedics;  Laterality: Left;    CAPSULOTOMY, JOINT Left 10/31/2018    Performed by Marlon Simental Sr., MD at [facility] OR    CARDIAC VALVE SURGERY      CARDIOVERSION N/A 5/1/2017    Performed by Patricia Espinoza MD at [facility] CATH LAB    CARDIOVERSION N/A 9/4/2013    Performed by Patricia Espinoza MD at [facility] CATH LAB    CORONARY ARTERY BYPASS GRAFT      EXCISION, EXOSTOSIS Left 10/31/2018    Performed by Marlon Simental Sr., MD at [facility] OR    HARVEST-VEIN-ENDOVASCULAR Left 11/30/2017    Performed by Shaq Boateng MD at [facility] OR    HEART CATH-BILATERAL Bilateral 11/14/2017    Performed by Patricia Espinoza MD at [facility] CATH LAB    knee replacemnt bilateral  2001    REPLACEMENT-VALVE-AORTIC N/A 11/30/2017    Performed by Shaq Boateng MD at [facility] OR    SHOULDER SURGERY  2002    SYNOVECTOMY OF HIP Left 10/31/2018    Procedure: SYNOVECTOMY, HIP;  Surgeon: Marlon Simental Sr., MD;  Location: [facility] OR;  Service: Orthopedics;  Laterality: Left;    SYNOVECTOMY, HIP Left 10/31/2018    Performed by Marlon Simental Sr., MD at [facility] OR    TRANSESOPHAGEAL ECHOCARDIOGRAM (ESTEFANY) N/A 11/30/2017    Performed by Shaq Boateng MD at [facility] OR       Review of patient's allergies indicates:   Allergen Reactions    Sulfa (sulfonamide antibiotics) Hives     Current Facility-Administered Medications   Medication Frequency    albuterol-ipratropium 2.5 mg-0.5 mg/3 mL nebulizer solution 3 mL Q4H PRN    chlorhexidine 0.12 % solution 10 mL BID    dextrose 50% injection 12.5 g PRN    dextrose 50% injection 25 g PRN    glucagon (human recombinant) injection 1 mg PRN    hydrALAZINE injection 10 mg Q6H PRN    metoprolol succinate (TOPROL-XL) 24 hr tablet 50 mg BID    morphine injection 2 mg Q4H PRN     morphine injection 4 mg Q4H PRN    nozaseptin (NOZIN) nasal  BID    ondansetron disintegrating tablet 8 mg Q8H PRN    ondansetron injection 4 mg Q8H PRN    piperacillin-tazobactam 2.25 g in dextrose 5 % 50 mL IVPB (ready to mix system) Q8H    sodium chloride 0.45% 1,000 mL with sodium bicarbonate 1 mEq/mL (8.4 %) 75 mEq infusion Continuous    sodium chloride 0.9% flush 3 mL PRN    sodium chloride 0.9% flush 3 mL PRN    sodium polystyrene 15 gram/60 mL suspension 30 g Once    tranexamic acid (CYKLOKAPRON) 1000 mg in sodium chloride 0.9 % 50 mL (ready to mix system) On Call Procedure    vancomycin (VANCOCIN) 1,500 mg in dextrose 5 % 250 mL IVPB Once     Family History     Problem Relation (Age of Onset)    Heart attack Brother    Heart disease Brother    Heart failure Brother    Hypertension Mother, Father, Sister, Brother        Tobacco Use    Smoking status: Never Smoker    Smokeless tobacco: Never Used   Substance and Sexual Activity    Alcohol use: No     Comment: HOLIDAY    Drug use: No    Sexual activity: No     Review of Systems   Constitutional: Positive for fatigue. Negative for fever.   HENT: Negative.    Respiratory: Positive for shortness of breath.    Cardiovascular: Negative.    Gastrointestinal: Negative.    Genitourinary: Negative.    Musculoskeletal: Negative.    Neurological: Negative.    Psychiatric/Behavioral: Negative.      Objective:     Vital Signs (Most Recent):  Temp: 97.1 °F (36.2 °C) (11/02/18 0801)  Pulse: 87 (11/02/18 0801)  Resp: 16 (11/02/18 0801)  BP: (!) 88/49 (11/02/18 0801)  SpO2: (!) 92 % (11/02/18 0801)  O2 Device (Oxygen Therapy): room air (11/02/18 0801) Vital Signs (24h Range):  Temp:  [96.8 °F (36 °C)-98.2 °F (36.8 °C)] 97.1 °F (36.2 °C)  Pulse:  [62-92] 87  Resp:  [15-18] 16  SpO2:  [88 %-97 %] 92 %  BP: ()/(45-65) 88/49     Weight: 84.6 kg (186 lb 8.2 oz) (11/02/18 0600)  Body mass index is 27.54 kg/m².  Body surface area is 2.03 meters  squared.    I/O last 3 completed shifts:  In: 1890 [P.O.:540; I.V.:300; IV Piggyback:1050]  Out: 535 [Urine:535]    Physical Exam   Constitutional: He appears well-developed and well-nourished. No distress.   HENT:   Head: Normocephalic and atraumatic.   Neck: No JVD present.   Cardiovascular: Normal rate.   Irregular   Pulmonary/Chest: Effort normal.   Decreased BS's at left base   Abdominal: Soft. Bowel sounds are normal.   Skin: Skin is warm and dry.   Psychiatric: He has a normal mood and affect. His behavior is normal.   Nursing note and vitals reviewed.      Significant Labs: reviewed  BMP  Lab Results   Component Value Date     (L) 11/02/2018    K 6.1 (H) 11/02/2018    CL 99 11/02/2018    CO2 12 (L) 11/02/2018    BUN 88 (H) 11/02/2018    CREATININE 4.2 (H) 11/02/2018    CALCIUM 8.9 11/02/2018    ANIONGAP 21 (H) 11/02/2018    ESTGFRAFRICA 14 (A) 11/02/2018    EGFRNONAA 12 (A) 11/02/2018     Lab Results   Component Value Date    WBC 14.44 (H) 11/02/2018    HGB 10.3 (L) 11/02/2018    HCT 31.3 (L) 11/02/2018    MCV 93 11/02/2018     11/02/2018     procalcitonin 9  Blood cx sent    Significant Imaging: CXR reviewed    Assessment/Plan:   86 y/o male with XIANG on CKD:         XIANG (acute kidney injury)    Renal: XIANG on CKD stage 3.  Likely cause of XIANG is hypotension/prerenal azotemia  Hypotension suspect due to sepsis  Has hyperkalemia  Agree with IV Ca, D50, insulin  Will add kayexelate 30 g po x 1.  Repeat K  Metabolic acidosis  May need dialysis support if no response to pharmacologic intervention and BP support  IVF's reviewed: switch to include bicarbonate infusion    Hypotension: suspect sepsis  Has decreased BS's in L base, leukocytosis, and elevated procalcitonin  Suspect source is pneumonia, due to decreased breathing effort in an elderly pt  Will include lactic acid  Send blood cx's  Broad spectrum abx (vancomycin and zosyn)  May need inotropic support with levophed     CAD (coronary artery  disease)    Has multiple cardiac issues.  CAD, past revasularization  Chronic AF  Diastolic CHF  Appears hemodynamically stable at this point     Shock liver    Abnormal liver tests likely due to passive congestion and hypotension  Reviewed GI tests.       * Left displaced femoral neck fracture    Per orthopedics       Plans and recommendations:  As reviewed above  Agree with ICU transfer.  Total critical care time spent 70 minutes including time needed to review the records, the   patient evaluation, documentation, face-to-face discussion with the patient,   more than 50% of the time was spent on coordination of care and counseling.    Level V visit.      Dedra Alonso MD   Nephrology  [facility]

## 2018-11-02 NOTE — PT/OT/SLP PROGRESS
Physical Therapy      Patient Name:  Chung Meneses Sr.   MRN:  [id number]    ATTEMPTED P.T. TX. THIS AM, PT WITH DECLINE IN MEDICAL STATUS SO TF TO ICU, DR. TAY REQUESTS TO HOLD TX. FOR TODAY BUT TO RE-ATTEMPT PT TOMORROW, WILL ASSESS PT NEXT VISIT    Marci Maher, PT   11/2/2018  2370

## 2018-11-02 NOTE — ASSESSMENT & PLAN NOTE
- INR elevated at 3.4  - monitor  - consider vitamin K if not improving by tomorrow.   - Eliquis and ASA on hold.

## 2018-11-02 NOTE — SUBJECTIVE & OBJECTIVE
Past Medical History:   Diagnosis Date    Aortic stenosis 8/2/2013    Atrial fibrillation 7/5/2013    CHF (congestive heart failure)     Coronary artery disease     Dizziness - light-headed     Hyperlipidemia     Hypertension     Pulmonary hypertension 11/30/2017    S/P AVR 12/1/2017    Syncope 1/26/2018       Past Surgical History:   Procedure Laterality Date    AORTOCORONARY BYPASS-CABG N/A 11/30/2017    Performed by Shaq Boateng MD at [facility] OR    ARTHROPLASTY OF HIP BY ANTERIOR APPROACH Left 10/31/2018    Procedure: ARTHROPLASTY, HIP, ANTERIOR APPROACH;  Surgeon: Marlon Simental Sr., MD;  Location: [facility] OR;  Service: Orthopedics;  Laterality: Left;    ARTHROPLASTY, HIP, ANTERIOR APPROACH Left 10/31/2018    Performed by Marlon Simental Sr., MD at [facility] OR    BACK SURGERY  2003    BONE EXOSTOSIS EXCISION Left 10/31/2018    Procedure: EXCISION, EXOSTOSIS;  Surgeon: Marlon Simental Sr., MD;  Location: [facility] OR;  Service: Orthopedics;  Laterality: Left;    CAPSULOTOMY OF JOINT Left 10/31/2018    Procedure: CAPSULOTOMY, JOINT;  Surgeon: Marlon Simetnal Sr., MD;  Location: [facility] OR;  Service: Orthopedics;  Laterality: Left;    CAPSULOTOMY, JOINT Left 10/31/2018    Performed by Marlon Simental Sr., MD at [facility] OR    CARDIAC VALVE SURGERY      CARDIOVERSION N/A 5/1/2017    Performed by Patricia Espinoza MD at [facility] CATH LAB    CARDIOVERSION N/A 9/4/2013    Performed by Patricia Espinoza MD at [facility] CATH LAB    CORONARY ARTERY BYPASS GRAFT      EXCISION, EXOSTOSIS Left 10/31/2018    Performed by Marlon Simental Sr., MD at [facility] OR    HARVEST-VEIN-ENDOVASCULAR Left 11/30/2017    Performed by Shaq Boateng MD at [facility] OR    HEART CATH-BILATERAL Bilateral 11/14/2017    Performed by Patricia Espinoza MD at [facility] CATH LAB    knee replacemnt bilateral  2001    REPLACEMENT-VALVE-AORTIC N/A 11/30/2017    Performed by Shaq Boateng MD at [facility] OR    SHOULDER SURGERY  2002    SYNOVECTOMY OF HIP Left 10/31/2018     Procedure: SYNOVECTOMY, HIP;  Surgeon: Marlon Simental Sr., MD;  Location: [facility] OR;  Service: Orthopedics;  Laterality: Left;    SYNOVECTOMY, HIP Left 10/31/2018    Performed by Marlon Simental Sr., MD at [facility] OR    TRANSESOPHAGEAL ECHOCARDIOGRAM (ESTEFANY) N/A 11/30/2017    Performed by Shaq Boateng MD at [facility] OR       Review of patient's allergies indicates:   Allergen Reactions    Sulfa (sulfonamide antibiotics) Hives     Family History     Problem Relation (Age of Onset)    Heart attack Brother    Heart disease Brother    Heart failure Brother    Hypertension Mother, Father, Sister, Brother        Tobacco Use    Smoking status: Never Smoker    Smokeless tobacco: Never Used   Substance and Sexual Activity    Alcohol use: No     Comment: HOLIDAY    Drug use: No    Sexual activity: No     Review of Systems   Unable to perform ROS: Acuity of condition     Objective:     Vital Signs (Most Recent):  Temp: 97.1 °F (36.2 °C) (11/02/18 1015)  Pulse: 67 (11/02/18 1330)  Resp: 18 (11/02/18 1330)  BP: (!) 87/33 (11/02/18 1330)  SpO2: (pulse ox not registering) (11/02/18 1200) Vital Signs (24h Range):  Temp:  [97.1 °F (36.2 °C)-98.2 °F (36.8 °C)] 97.1 °F (36.2 °C)  Pulse:  [] 67  Resp:  [15-21] 18  SpO2:  [65 %-97 %] 65 %  BP: ()/(29-65) 87/33     Weight: 84.6 kg (186 lb 8.2 oz) (11/02/18 0600)  Body mass index is 27.54 kg/m².      Intake/Output Summary (Last 24 hours) at 11/2/2018 1436  Last data filed at 11/2/2018 1200  Gross per 24 hour   Intake 2050 ml   Output 110 ml   Net 1940 ml       Lines/Drains/Airways     Central Venous Catheter Line                 Percutaneous Central Line Insertion/Assessment - triple lumen  11/02/18 1100 right femoral less than 1 day          Drain                 Urethral Catheter 10/31/18 1020 Latex;Straight-tip 16 Fr. 2 days          Peripheral Intravenous Line                 Peripheral IV - Single Lumen 10/31/18 1235 Right Wrist 2 days                Physical Exam    Constitutional: He is oriented to person, place, and time.   Cardiovascular: Normal rate. An irregular rhythm present.   Pulmonary/Chest: Effort normal and breath sounds normal. No respiratory distress.   Abdominal: Soft. Bowel sounds are normal. He exhibits no distension. There is no tenderness.   Neurological: He is alert and oriented to person, place, and time. No cranial nerve deficit.   Skin: Skin is warm and dry. No rash noted.       Significant Labs:  Acute Hepatitis Panel: No results for input(s): HEPBSAG, HEPAIGM, HEPCAB in the last 48 hours.    Invalid input(s): HAPBIGM  CBC:   Recent Labs   Lab 11/01/18 0436 11/02/18 0432   WBC 9.78 14.44*   HGB 9.2* 10.3*   HCT 27.8* 31.3*    218     CMP:   Recent Labs   Lab 11/02/18 0432 11/02/18  1315   GLU 39*   < > 107   CALCIUM 8.9   < > 8.3*   ALBUMIN 3.0*  --   --    PROT 6.3  --   --    *   < > 132*   K 6.1*   < > 5.7*   CO2 12*   < > 15*   CL 99   < > 98   BUN 88*   < > 90*   CREATININE 4.2*   < > 4.8*   ALKPHOS 141*  --   --    ALT 1,175*  --   --    AST 3,103*  --   --    BILITOT 7.4*  --   --     < > = values in this interval not displayed.     Coagulation:   Recent Labs   Lab 11/02/18  0927   INR 3.4*     Liver Function Test:   Recent Labs   Lab 11/01/18 0436 11/01/18  1643 11/02/18 0432   ALT 40  --  1,175*   *  --  3,103*   ALKPHOS 122  --  141*   BILITOT 5.1* 6.5* 7.4*   PROT 5.8*  --  6.3   ALBUMIN 2.8*  --  3.0*       Significant Imaging:  Imaging results within the past 24 hours have been reviewed.

## 2018-11-02 NOTE — NURSING
Pt Blood sugar rechecked. Blood sugar is 84 currently. Patient was a bit confused x1 not realizing where he was but was able to identify family members. Pt was reoriented. No obvious signs of distress. Will continue to monitor.

## 2018-11-02 NOTE — PLAN OF CARE
Problem: Patient Care Overview  Goal: Plan of Care Review  Outcome: Ongoing (interventions implemented as appropriate)  Plan for CT of abdomen later today, VS improved with levophed gtt. No complaints of discomfort at this time. Family visiting, POC discussed.

## 2018-11-02 NOTE — ASSESSMENT & PLAN NOTE
Monitor potassium level.  Received calcium gluconate, insulin, dextrose 50.  Kayexalate p.o. once.  Nephrology evaluation for possible need of dialysis.

## 2018-11-02 NOTE — ASSESSMENT & PLAN NOTE
Renal: XIANG on CKD stage 3.  Likely cause of XIANG is hypotension/prerenal azotemia  Hypotension suspect due to sepsis  Has hyperkalemia  Metabolic acidosis  May need dialysis support if no response to pharmacologic intervention and BP support  IVF's reviewed: switch to include bicarbonate infusion    Hypotension: suspect sepsis  Has decreased BS's in L base, leukocytosis, and elevated procalcitonin  Suspect source is pneumonia, due to decreased breathing effort in an elderly pt  Will include lactic acid  Send blood cx's  Broad spectrum abx (vancomycin and zosyn)  May need inotropic support with levophed

## 2018-11-02 NOTE — ASSESSMENT & PLAN NOTE
Has multiple cardiac issues.  CAD, past revasularization  Chronic AF  Diastolic CHF  Appears hemodynamically stable at this point

## 2018-11-02 NOTE — ASSESSMENT & PLAN NOTE
- likely shock liver  - continue supportive care per ICU team  - monitor labs  - workup pending    New Orders to be Acknowledged     Hepatitis B core antibody, total   Once, Standing Count: 1 Occurrences, Prio: Routine   When 11/02/18 0829    Ordering Provider Madi Kidd MD          Hepatitis B surface antibody   Once, Standing Count: 1 Occurrences, Prio: Routine   When 11/02/18 0829    Ordering Provider Madi Kidd MD          Hepatitis B surface antigen   Once, Standing Count: 1 Occurrences, Prio: Routine   When 11/02/18 0829    Ordering Provider Madi iKdd MD          Hepatitis C antibody   Once, Standing Count: 1 Occurrences, Prio: Routine   When 11/02/18 0829    Ordering Provider Madi Kidd MD          Hepatitis A antibody, IgG   Once, Standing Count: 1 Occurrences, Prio: Routine   When 11/02/18 0829    Ordering Provider Madi Kidd MD          Antimitochondrial antibody   Once, Standing Count: 1 Occurrences, Prio: Routine   When 11/02/18 0829    Ordering Provider Madi Kidd MD          Anti-smooth muscle antibody   Once, Standing Count: 1 Occurrences, Prio: Routine   When 11/02/18 0829    Ordering Provider Madi Kidd MD

## 2018-11-02 NOTE — NURSING
Received patient from [facility] earlier today. Patient was hypotensive. Central line inserted to rt femoral site. Low dose levophed gtt started. Patient's BP steadily improved. Patient has been anuric since arrival to ICU, Dr Alonso, Dr Eastman, and Mike Crump NP all have been informed. Patient seen by WOC RN Ana earlier today, Ana viewed sacral breakdown site. Left hip incision site covered with dressing C/D/I. Left hip thigh area swollen and tight to touch, abdomen rounded, taut.

## 2018-11-02 NOTE — HPI
Pt was seen and examined in ICU. H/o was reviewed. Pt is a 86 y/o male with CKD stage 3 (baseline s Cr 2.0), diastolic CHF, and AF who presented with left femoral neck fx and L side/ribs contusion (no rib fx on CXR) after a fall at home when taking the garbage out (trippedover his shoes) who has worsening in s cR with s Cr now 4.2 and K 6.1, as well as impressive increase in liver tests. Pt has been hypotensive, with SBP 77. Pt says that it hurts to breathe, o/w no SOB, no cough. Has elevated WBC 14 and procalcitonin. Initial CXR on admit shows clear lung fields. Discussed the medical issues with PCP, and pt was transferred to the ICU.

## 2018-11-03 VITALS
SYSTOLIC BLOOD PRESSURE: 105 MMHG | OXYGEN SATURATION: 99 % | HEART RATE: 78 BPM | DIASTOLIC BLOOD PRESSURE: 65 MMHG | TEMPERATURE: 98 F | WEIGHT: 186.5 LBS | RESPIRATION RATE: 14 BRPM | HEIGHT: 69 IN | BODY MASS INDEX: 27.62 KG/M2

## 2018-11-03 PROBLEM — K76.82 HEPATIC ENCEPHALOPATHY: Status: ACTIVE | Noted: 2018-11-03

## 2018-11-03 LAB
ALBUMIN SERPL BCP-MCNC: 2.9 G/DL
ALP SERPL-CCNC: 137 U/L
ALT SERPL W/O P-5'-P-CCNC: 1221 U/L
AMMONIA PLAS-SCNC: 149 UMOL/L
ANION GAP SERPL CALC-SCNC: 22 MMOL/L
AST SERPL-CCNC: 3196 U/L
BASOPHILS # BLD AUTO: 0.01 K/UL
BASOPHILS NFR BLD: 0.1 %
BILIRUB SERPL-MCNC: 8 MG/DL
BUN SERPL-MCNC: 94 MG/DL
CALCIUM SERPL-MCNC: 7.4 MG/DL
CHLORIDE SERPL-SCNC: 95 MMOL/L
CO2 SERPL-SCNC: 13 MMOL/L
CREAT SERPL-MCNC: 5.2 MG/DL
DIFFERENTIAL METHOD: ABNORMAL
EOSINOPHIL # BLD AUTO: 0 K/UL
EOSINOPHIL NFR BLD: 0.3 %
ERYTHROCYTE [DISTWIDTH] IN BLOOD BY AUTOMATED COUNT: 17.9 %
EST. GFR  (AFRICAN AMERICAN): 11 ML/MIN/1.73 M^2
EST. GFR  (NON AFRICAN AMERICAN): 9 ML/MIN/1.73 M^2
GLUCOSE SERPL-MCNC: 130 MG/DL
HCT VFR BLD AUTO: 27.9 %
HGB BLD-MCNC: 9.2 G/DL
INR PPP: 5.2
LACTATE SERPL-SCNC: 6.6 MMOL/L
LYMPHOCYTES # BLD AUTO: 0.9 K/UL
LYMPHOCYTES NFR BLD: 6.6 %
MAGNESIUM SERPL-MCNC: 2.1 MG/DL
MCH RBC QN AUTO: 29.8 PG
MCHC RBC AUTO-ENTMCNC: 33 G/DL
MCV RBC AUTO: 90 FL
MONOCYTES # BLD AUTO: 1.1 K/UL
MONOCYTES NFR BLD: 8.5 %
NEUTROPHILS # BLD AUTO: 11 K/UL
NEUTROPHILS NFR BLD: 84.5 %
PLATELET # BLD AUTO: 182 K/UL
PMV BLD AUTO: 10.5 FL
POCT GLUCOSE: 114 MG/DL (ref 70–110)
POCT GLUCOSE: 147 MG/DL (ref 70–110)
POCT GLUCOSE: 71 MG/DL (ref 70–110)
POTASSIUM SERPL-SCNC: 6 MMOL/L
PROT SERPL-MCNC: 6 G/DL
PROTHROMBIN TIME: 48.8 SEC
RBC # BLD AUTO: 3.09 M/UL
SODIUM SERPL-SCNC: 130 MMOL/L
TRIGL SERPL-MCNC: 95 MG/DL
VANCOMYCIN SERPL-MCNC: 14.8 UG/ML
WBC # BLD AUTO: 12.97 K/UL

## 2018-11-03 PROCEDURE — 63600175 PHARM REV CODE 636 W HCPCS: Performed by: INTERNAL MEDICINE

## 2018-11-03 PROCEDURE — 83605 ASSAY OF LACTIC ACID: CPT

## 2018-11-03 PROCEDURE — 99291 CRITICAL CARE FIRST HOUR: CPT | Mod: ,,, | Performed by: NURSE PRACTITIONER

## 2018-11-03 PROCEDURE — 99232 SBSQ HOSP IP/OBS MODERATE 35: CPT | Mod: ,,, | Performed by: INTERNAL MEDICINE

## 2018-11-03 PROCEDURE — 25000003 PHARM REV CODE 250: Performed by: INTERNAL MEDICINE

## 2018-11-03 PROCEDURE — 85025 COMPLETE CBC W/AUTO DIFF WBC: CPT

## 2018-11-03 PROCEDURE — 25000003 PHARM REV CODE 250: Performed by: NURSE PRACTITIONER

## 2018-11-03 PROCEDURE — 83735 ASSAY OF MAGNESIUM: CPT

## 2018-11-03 PROCEDURE — 82140 ASSAY OF AMMONIA: CPT

## 2018-11-03 PROCEDURE — 84478 ASSAY OF TRIGLYCERIDES: CPT

## 2018-11-03 PROCEDURE — 27000221 HC OXYGEN, UP TO 24 HOURS

## 2018-11-03 PROCEDURE — 80202 ASSAY OF VANCOMYCIN: CPT

## 2018-11-03 PROCEDURE — 80053 COMPREHEN METABOLIC PANEL: CPT

## 2018-11-03 PROCEDURE — 85610 PROTHROMBIN TIME: CPT

## 2018-11-03 PROCEDURE — 99291 CRITICAL CARE FIRST HOUR: CPT | Mod: ,,, | Performed by: INTERNAL MEDICINE

## 2018-11-03 RX ORDER — LACTULOSE 10 G/15ML
30 SOLUTION ORAL 3 TIMES DAILY
Status: DISCONTINUED | OUTPATIENT
Start: 2018-11-03 | End: 2018-11-03

## 2018-11-03 RX ORDER — MORPHINE SULFATE 10 MG/ML
5 INJECTION INTRAMUSCULAR; INTRAVENOUS; SUBCUTANEOUS
Status: DISCONTINUED | OUTPATIENT
Start: 2018-11-03 | End: 2018-11-03 | Stop reason: HOSPADM

## 2018-11-03 RX ORDER — ONDANSETRON 2 MG/ML
4 INJECTION INTRAMUSCULAR; INTRAVENOUS EVERY 8 HOURS PRN
Status: DISCONTINUED | OUTPATIENT
Start: 2018-11-03 | End: 2018-11-03 | Stop reason: HOSPADM

## 2018-11-03 RX ADMIN — Medication 0.16 MCG/KG/MIN: at 03:11

## 2018-11-03 RX ADMIN — Medication 2.25 G: at 02:11

## 2018-11-03 NOTE — ASSESSMENT & PLAN NOTE
88 y/o male with XIANG on CKD:                XIANG (acute kidney injury)     Renal: XIANG on CKD stage 3.  Likely cause of XIANG is hypotension/prerenal azotemia  Hypotension suspect due to sepsis  Has hyperkalemia  Agree with IV Ca, D50, insulin  Will add kayexelate 30 g po x 1.  Repeat K  Metabolic acidosis  May need dialysis support if no response to pharmacologic intervention and BP support  IVF's reviewed: switch to include bicarbonate infusion     Hypotension: suspect sepsis  Has decreased BS's in L base, leukocytosis, and elevated procalcitonin  Suspect source is pneumonia, due to decreased breathing effort in an elderly pt  Will include lactic acid  Send blood cx's  Broad spectrum abx (vancomycin and zosyn)  May need inotropic support with levophed      CAD (coronary artery disease)     Has multiple cardiac issues.  CAD, past revasularization  Chronic AF  Diastolic CHF  Appears hemodynamically stable at this point      Shock liver     Abnormal liver tests likely due to passive congestion and hypotension  Reviewed GI tests.             * Left displaced femoral neck fracture     Per orthopedics         Plans and recommendations:  As reviewed above  Agree with ICU transfer.  Total critical care time spent 70 minutes including time needed to review the records, the   patient evaluation, documentation, face-to-face discussion with the patient,   more than 50% of the time was spent on coordination of care and counseling.    Level V visit.

## 2018-11-03 NOTE — NURSING
Report given to Omaira at [facility] at 1000. IV and Dawson remain in patient per hospice request. Pt's POA signed DNR. [facility] called.     11:00 EMS at bedside, paperwork handed to EMS along w/ copy of DNR paperwork. All personal belongings given to family. Pt transported to [facility]

## 2018-11-03 NOTE — PLAN OF CARE
Preference form signed by dtr. Copy given to the dtr and the original to the blue folder. CM notified and fax face sheet , H/P, d/c orders and pertinent clinical to [facility] hospice oPatient d/c and transfer to [facility] inpatient unit- [facility]. [facility] called amd will arrive within the hour for transportation     11/03/18 1010   Final Note   Assessment Type Final Discharge Note   Anticipated Discharge Disposition [facility] Follow Up  Appt(s) scheduled? No   Discharge plans and expectations educations in teach back method with documentation complete? No   Right Care Referral Info   Post Acute Recommendation ([facility]- Inpatient at the [facility])

## 2018-11-03 NOTE — PROGRESS NOTES
Patient blood sugar currently 71. Dr. Borden notified. MD orders 25g of dextrose IV push and to start a D10W drip @ 30mL/hr. Will recheck blood sugar in one hour per MD order.  IV push of 25g of dextrose administered. D10W drip started. Patient has no complaints at this time. Patient asymptomatic and resting quietly in bed. Will continue to monitor.

## 2018-11-03 NOTE — PLAN OF CARE
Problem: Patient Care Overview  Goal: Plan of Care Review  Outcome: Ongoing (interventions implemented as appropriate)  Patient currently resting quietly in bed. Patient in controlled A-fib on monitor at this time. Patient currently receiving oxygen via 2L nasal cannula. Patient currently on levophed infusion for hypotension. Patient also on bicarb and D10W drip at this time. Patient encouraged to turn in bed every 2 hours to avoid further skin breakdown to back side. Patient turned with 2 assist and positioned with pillows. Patient bilateral heels elevated with pillows off bed. No sign of new wound development or further skin breakdown noted. Patient has no complaints of hip pain at this time. Plan of care updated with patient and family. There are no further questions after updated on plan of care at this time. Will continue to monitor.

## 2018-11-03 NOTE — PROGRESS NOTES
Patient and family have decided to proceed with inpatient hospice care. He has acute renal failure with oliguria and electrolyte derangements. Patient and family are declining hemodialysis and prefer for comfort measures at this time. Case has been discussed with Dr. Eastman. Please feel free to contact our service if there are questions or concerns. We will sign off at this time.

## 2018-11-03 NOTE — EICU
eICU Note :    Called by the [facility] eRN:    Problem: Lactic acid down from 12.5-7.7    Pertinent History and labs reviewed : 86 y/o male  With metabolic Acidosis  has Essential hypertension; Hyperlipidemia; Light headed; Dizziness and giddiness; Nonrheumatic aortic valve stenosis; Pain of left lower extremity; CHF (congestive heart failure); Pulmonary hypertension; Hyperglycemia, unspecified; H/O aortic valve replacement; XIANG (acute kidney injury); Hyperkalemia; CAD (coronary artery disease); Acute on chronic heart failure; Acute on chronic diastolic HFpEF of 50-55% with anasarca; Elevated troponin; Pressure ulcer of coccygeal region, stage 2; Chronic ulcer of left lower extremity; Hypokalemia; Laceration of right ear; Iron deficiency anemia secondary to inadequate dietary iron intake; XIANG on CKD stage III; Leg ulcer, left; Vitamin B12 deficiency anemia; Permanent atrial fibrillation; Left displaced femoral neck fracture; Venous ulcer of left leg; Abrasion; Dermatitis associated with moisture; Shock, unspecified; Shock liver; Shortness of breath; Metabolic acidemia; Arterial hypotension; and Abnormal INR on their problem list.      Treatment /Intervention given: Trend Lactic acid         Ghazal Chavarria M.D  eICU Physician  11:23 PM  Blood Glucose 71 , will give an amp of D 50 and start D10W @30cc/hr

## 2018-11-03 NOTE — PROGRESS NOTES
[facility] -   Critical Care Medicine  Progress Note    Patient Name: Chung Meneses Sr.  MRN: [id number]  Admission Date: 10/29/2018  Hospital Length of Stay: 5 days  Code Status: Full Code  Attending Provider: Jake Eastman MD  Primary Care Provider: Glenroy Carmichael MD   Principal Problem: Shock, unspecified    Subjective:     HPI:  87-year-old male patient initially admitted after a fall at home, known with history of CABG and tissue aortic valve replacement in December 2017, has history of AFib on apixaban at home.  Had left hip fracture status post surgery.    Hospital/ICU Course:  Status post left hip anterior total hip replacement , capsulotomy, synovectomy and exostosis excision, open treatment of left femoral neck fracture. POD # 2   Transferred today to ICU after he was found hypotensive, confused at times, feeling weak.  Received 1.75 L NS yesterday on the floor.  11/3 - Worsening renal and liver failure.  NH3 up to 149 and INR up to 5.2.  Patient and daughter offered RRT and aggressive care and daughter and family declined aggressive Rx w/ RRT and request comfort care only.           Review of Systems   Unable to perform ROS: Mental acuity       Objective:     Vital Signs (Most Recent):  Temp: 98.1 °F (36.7 °C) (11/03/18 0305)  Pulse: 78 (11/03/18 0815)  Resp: 20 (11/03/18 0815)  BP: (!) 112/51 (11/03/18 0645)  SpO2: 96 % (11/03/18 0815) Vital Signs (24h Range):  Temp:  [97.1 °F (36.2 °C)-98.1 °F (36.7 °C)] 98.1 °F (36.7 °C)  Pulse:  [] 78  Resp:  [14-22] 20  SpO2:  [65 %-99 %] 96 %  BP: ()/(18-73) 112/51     Weight: 84.6 kg (186 lb 8.2 oz)  Body mass index is 27.54 kg/m².      Intake/Output Summary (Last 24 hours) at 11/3/2018 0918  Last data filed at 11/3/2018 0600  Gross per 24 hour   Intake 2500.31 ml   Output 15 ml   Net 2485.31 ml       Physical Exam   Constitutional: He appears well-developed. He appears lethargic. He is cooperative. He is easily aroused.  Non-toxic  appearance. He has a sickly appearance. He does not appear ill. No distress. He is not intubated. Face mask in place.   HENT:   Head: Normocephalic and atraumatic.   Mouth/Throat: Oropharynx is clear and moist and mucous membranes are normal.   Eyes: EOM and lids are normal. Pupils are equal, round, and reactive to light.   jaundice   Neck: Trachea normal and full passive range of motion without pain. Carotid bruit is not present.   Cardiovascular: Normal rate, regular rhythm and normal heart sounds.   Pulses:       Radial pulses are 1+ on the right side, and 1+ on the left side.        Dorsalis pedis pulses are 1+ on the right side, and 1+ on the left side.   Pulmonary/Chest: Effort normal. No accessory muscle usage. He is not intubated. No respiratory distress. He has decreased breath sounds.   Abdominal: Soft. Normal appearance. He exhibits no distension. Bowel sounds are decreased. There is no tenderness.   Genitourinary: Penis normal.   Genitourinary Comments: Dawson in place   Musculoskeletal: Normal range of motion.        Right foot: There is no deformity.        Left foot: There is no deformity.   +1 LE edema   Lymphadenopathy:     He has no cervical adenopathy.   Neurological: He is easily aroused. He appears lethargic.   Easily aroused and orientation X 2.   Skin: Skin is warm, dry and intact. Capillary refill takes less than 2 seconds. No rash noted. No cyanosis.   Mild jaundice   Psychiatric: His affect is blunt. His speech is delayed. He is slowed. He is inattentive.       Vents:  Oxygen Concentration (%): 40 (11/03/18 0815)    Lines/Drains/Airways     Central Venous Catheter Line                 Percutaneous Central Line Insertion/Assessment - triple lumen  11/02/18 1100 right femoral less than 1 day          Drain                 Urethral Catheter 10/31/18 1020 Latex;Straight-tip 16 Fr. 2 days          Peripheral Intravenous Line                 Peripheral IV - Single Lumen 10/31/18 1235 Right Wrist 2  days                Significant Labs:    CBC/Anemia Profile:  Recent Labs   Lab 11/02/18  0432 11/03/18  0544   WBC 14.44* 12.97*   HGB 10.3* 9.2*   HCT 31.3* 27.9*    182   MCV 93 90   RDW 18.2* 17.9*        Chemistries:  Recent Labs   Lab 11/01/18  1643 11/02/18  0432  11/02/18  1819 11/02/18 2213 11/03/18  0544   NA  --  132*   < > 131* 131* 130*   K  --  6.1*   < > 6.1* 6.1* 6.0*   CL  --  99   < > 96 96 95   CO2  --  12*   < > 13* 14* 13*   BUN  --  88*   < > 91* 91* 94*   CREATININE  --  4.2*   < > 4.8* 5.1* 5.2*   CALCIUM  --  8.9   < > 8.4* 8.0* 7.4*   ALBUMIN  --  3.0*  --   --   --  2.9*   PROT  --  6.3  --   --   --  6.0   BILITOT 6.5* 7.4*  --   --   --  8.0*   ALKPHOS  --  141*  --   --   --  137*   ALT  --  1,175*  --   --   --  1,221*   AST  --  3,103*  --   --   --  3,196*   MG  --   --   --   --   --  2.1    < > = values in this interval not displayed.       Coagulation:   Recent Labs   Lab 11/03/18  0544   INR 5.2*     Lactic Acid:   Recent Labs   Lab 11/02/18  0926 11/02/18 1819 11/03/18  0545   LACTATE >12.0* 7.7*  7.7* 6.6*     All pertinent labs within the past 24 hours have been reviewed.  NH3 147    Significant Imaging:  CXR: I have reviewed all pertinent results/findings within the past 24 hours and my personal findings are:  unremarkable for acute process      Assessment/Plan:     Problem   Shock, Unspecified   Ford (Acute Kidney Injury)   Shock liver w/ Coagulopathy   Metabolic Acidemia   Hyperkalemia   Hepatic Encephalopathy   Left Displaced Femoral Neck Fracture   Permanent Atrial Fibrillation   H/O Aortic Valve Replacement   Hyperglycemia, Unspecified   Cad (Coronary Artery Disease)   Pulmonary Hypertension   Nonrheumatic Aortic Valve Stenosis   Hyperlipidemia   Chronic Systolic Congestive Heart Failure (Resolved)   Chronic Atrial Fibrillation (Resolved)     Worsening FORD and Acute Liver Failure.  Long conference with family offering aggressive care with RRT vs comfort care.   Patient and family elect for comfort care and Inpt Hospice.   consulted.  Will provide Morphine PRN and sign off.  All family questions answered.      Critical Care Time: 58 minutes  Critical secondary to Patient has a condition that poses threat to life and bodily function: Acute Renal Failure  Patient has an abrupt change in neurologic status: Hepatic Encephalopathy  Patient is currently on drug therapy requiring intensive monitoring for toxicity: Levophed infusion      Critical care was time spent personally by me on the following activities: development of treatment plan with patient or surrogate and bedside caregivers, discussions with consultants, evaluation of patient's response to treatment, examination of patient, ordering and performing treatments and interventions, ordering and review of laboratory studies, ordering and review of radiographic studies, pulse oximetry, re-evaluation of patient's condition. This critical care time did not overlap with that of any other provider or involve time for any procedures.     Teodoro Crump, NP  Critical Care Medicine  [facility]

## 2018-11-03 NOTE — ASSESSMENT & PLAN NOTE
Continue BB and Torsemide  Hold ASA and Eliquis  Hold ACei/ARB for now given renal function  No chest pain or anginal equivalents on exam today.   Resume statin post op  Check FLP in AM    Low to moderate periop risk of CV events for moderate risk procedure.  No chest pain, active arrhythmia and CHF symptoms.  Ok to proceed the scheduled surgery without further cardiac study.    Will be available as needed.     11/2  -Hold ASA and eliquis for now given elevated LFT's and INR 3.4  Hold BB, ACEi, Torsemide for now given low BP requiring Levophed support  Hold statin given elevated LFT's    11/3  -Hold ASA, Eliquis, BB, ACEi, Torsemide, Statin for now due to elevated LFT's, INR 5.2  No chest pain on exam  Considering hospice care today due to need for dialysis.

## 2018-11-03 NOTE — CONSULTS
Pharmacy Vancomycin Consult Note    WBC=12.97  Tmax=98.1  CrCl=10ml/min Scr=5.2 (up from 4.5 yesterday)    Cultures: NGTD  Dx. Pneumonia  Patient is currently being pulse dosed.    Vancomycin am random=14.8mcg/ml  Patient can receive Vancomycin 1250mg one time dose.  Vancomycin random level due 11/4 with AM labs.  Vancomycin  1 gram Q72 hours is a placeholder dose only and should not be given.    Thank you for allowing us to participate in this patient's care.  Tamra Castro, Pharm D 11/3/2018 9:25 AM

## 2018-11-03 NOTE — PROGRESS NOTES
Patient blood sugar now 114. Patient has no complaints at this time. Patient resting quietly in bed. No distress noted. No complaints of pain. Will continue to monitor.

## 2018-11-03 NOTE — EICU
Had bedside nurse check pt due to sats in the 60's-70's, pt resting quietly nad observed.  Remberto salled back to report the sats are inaccurate and not picking up right.

## 2018-11-03 NOTE — SUBJECTIVE & OBJECTIVE
Interval History: Met with pt's family. Discussed with ICU. Noted pt and family have declined HD. Explained this am that role of dialysis would be for acute care. Family confirms prior decisions.    Review of patient's allergies indicates:   Allergen Reactions    Sulfa (sulfonamide antibiotics) Hives     Current Facility-Administered Medications   Medication Frequency    dextrose 10 % infusion Continuous    morphine injection 5 mg Q1H PRN    ondansetron injection 4 mg Q8H PRN    sodium chloride 0.9% flush 3 mL PRN       Objective:     Vital Signs (Most Recent):  Temp: 98.1 °F (36.7 °C) (11/03/18 0305)  Pulse: 78 (11/03/18 0815)  Resp: 20 (11/03/18 0815)  BP: (!) 112/51 (11/03/18 0645)  SpO2: 96 % (11/03/18 0815)  O2 Device (Oxygen Therapy): venti mask (11/03/18 0815) Vital Signs (24h Range):  Temp:  [97.5 °F (36.4 °C)-98.1 °F (36.7 °C)] 98.1 °F (36.7 °C)  Pulse:  [] 78  Resp:  [14-22] 20  SpO2:  [91 %-99 %] 96 %  BP: ()/(18-73) 112/51     Weight: 84.6 kg (186 lb 8.2 oz) (11/02/18 0600)  Body mass index is 27.54 kg/m².  Body surface area is 2.03 meters squared.    I/O last 3 completed shifts:  In: 3300.3 [P.O.:800; I.V.:1100.3; IV Piggyback:1400]  Out: 125 [Urine:125]    Physical Exam   Constitutional: He appears well-developed and well-nourished. No distress.   HENT:   Head: Normocephalic and atraumatic.   Neck: No JVD present.   Cardiovascular: Normal rate.   Irregular   Pulmonary/Chest: Effort normal.   Decreased BS's at left base   Abdominal: Soft. Bowel sounds are normal.   Skin: Skin is warm and dry.   Psychiatric: He has a normal mood and affect. His behavior is normal.   Nursing note and vitals reviewed.      Significant Labs: reviewed  BMP  Lab Results   Component Value Date     (L) 11/03/2018    K 6.0 (H) 11/03/2018    CL 95 11/03/2018    CO2 13 (L) 11/03/2018    BUN 94 (H) 11/03/2018    CREATININE 5.2 (H) 11/03/2018    CALCIUM 7.4 (L) 11/03/2018    ANIONGAP 22 (H) 11/03/2018     ESTGFRAFRICA 11 (A) 11/03/2018    EGFRNONAA 9 (A) 11/03/2018     Lab Results   Component Value Date    WBC 12.97 (H) 11/03/2018    HGB 9.2 (L) 11/03/2018    HCT 27.9 (L) 11/03/2018    MCV 90 11/03/2018     11/03/2018     AST 3200  ALT 1200  Bili 8.0  Alb 2.9    Significant Imaging: reviewed

## 2018-11-03 NOTE — PROGRESS NOTES
[facility] -   Cardiology  Progress Note    Patient Name: Chung Meneses Sr.  MRN: [id number]  Admission Date: 10/29/2018  Hospital Length of Stay: 5 days  Code Status: Full Code   Attending Physician: Jake Eastman MD   Primary Care Physician: Glenroy Carmichael MD  Expected Discharge Date: 11/9/2018  Principal Problem:Shock, unspecified    Subjective:     Hospital Course:   10/30/18-Patient admitted to telemetry, pending left hip replacement tomorrow. No chest pain or anginal equivalents. No shortness of breath or palpitations.     11/2/18-Patient admitted to ICU today due to low BP requiring Levophed support. No chest pain or anginal equivalents at this time. Denies SOB or EISENBERG. TTE pending.     11/3/18-Patient seen and examined in ICU, IV levophed gtt in place. Is contemplating dialysis vs hospice care. Labs reviewed, Cr 5.2, INR 5.2, LA 6.6.    Interval History: remains on IV levophed for BP support    Review of Systems   Constitution: Positive for weakness and malaise/fatigue.   HENT: Negative for hearing loss and hoarse voice.    Eyes: Negative for blurred vision and visual disturbance.   Cardiovascular: Negative for chest pain, claudication, dyspnea on exertion, irregular heartbeat, leg swelling, near-syncope, orthopnea, palpitations, paroxysmal nocturnal dyspnea and syncope.   Respiratory: Negative for cough, hemoptysis, shortness of breath, sleep disturbances due to breathing, snoring and wheezing.    Endocrine: Negative for cold intolerance and heat intolerance.   Hematologic/Lymphatic: Bruises/bleeds easily.   Skin: Negative for color change, dry skin and nail changes.   Musculoskeletal: Positive for arthritis and back pain. Negative for joint pain and myalgias.   Gastrointestinal: Negative for bloating, abdominal pain, constipation, nausea and vomiting.   Genitourinary: Negative for dysuria, flank pain, hematuria and hesitancy.   Neurological: Negative for headaches, light-headedness,  loss of balance, numbness and paresthesias.   Psychiatric/Behavioral: Negative for altered mental status.   Allergic/Immunologic: Negative for environmental allergies.     Objective:     Vital Signs (Most Recent):  Temp: 98.1 °F (36.7 °C) (11/03/18 0305)  Pulse: 78 (11/03/18 0815)  Resp: 20 (11/03/18 0815)  BP: (!) 112/51 (11/03/18 0645)  SpO2: 96 % (11/03/18 0815) Vital Signs (24h Range):  Temp:  [97.1 °F (36.2 °C)-98.1 °F (36.7 °C)] 98.1 °F (36.7 °C)  Pulse:  [] 78  Resp:  [14-22] 20  SpO2:  [65 %-99 %] 96 %  BP: ()/(18-73) 112/51     Weight: 84.6 kg (186 lb 8.2 oz)  Body mass index is 27.54 kg/m².     SpO2: 96 %  O2 Device (Oxygen Therapy): venti mask      Intake/Output Summary (Last 24 hours) at 11/3/2018 0925  Last data filed at 11/3/2018 0600  Gross per 24 hour   Intake 2500.31 ml   Output 15 ml   Net 2485.31 ml       Lines/Drains/Airways     Central Venous Catheter Line                 Percutaneous Central Line Insertion/Assessment - triple lumen  11/02/18 1100 right femoral less than 1 day          Drain                 Urethral Catheter 10/31/18 1020 Latex;Straight-tip 16 Fr. 2 days          Peripheral Intravenous Line                 Peripheral IV - Single Lumen 10/31/18 1235 Right Wrist 2 days                Physical Exam   Constitutional: He is oriented to person, place, and time. He appears well-developed and well-nourished. No distress.   HENT:   Head: Normocephalic and atraumatic.   Eyes: Pupils are equal, round, and reactive to light.   Neck: Normal range of motion and full passive range of motion without pain. Neck supple. No JVD present.   Cardiovascular: Normal rate, regular rhythm, S1 normal, S2 normal and intact distal pulses. PMI is not displaced. Exam reveals no distant heart sounds.   No murmur heard.  Pulses:       Radial pulses are 2+ on the right side, and 2+ on the left side.        Dorsalis pedis pulses are 2+ on the right side, and 2+ on the left side.   Pulmonary/Chest:  Effort normal. No accessory muscle usage. No respiratory distress. He has decreased breath sounds in the right lower field and the left lower field. He has no wheezes.   Abdominal: Soft. Bowel sounds are normal. He exhibits distension. There is no tenderness.   Musculoskeletal: Normal range of motion. He exhibits no edema.        Right ankle: He exhibits no swelling.        Left ankle: He exhibits no swelling.   Neurological: He is alert and oriented to person, place, and time.   Skin: Skin is warm and dry. He is not diaphoretic. No cyanosis. Nails show no clubbing.   Psychiatric: He has a normal mood and affect. His speech is normal and behavior is normal. Judgment and thought content normal. Cognition and memory are normal.   Nursing note and vitals reviewed.      Significant Labs:   All pertinent lab results from the last 24 hours have been reviewed. and   Recent Lab Results       11/03/18  0545   11/03/18  0544   11/03/18  0503   11/03/18  0023   11/02/18  2317        Albumin   2.9           Alkaline Phosphatase   137           Allens Test               ALT   1,221           Ammonia   149           Anion Gap   22           AST   3,196           Baso #   0.01           Basophil%   0.1           Total Bilirubin   8.0  Comment:  For infants and newborns, interpretation of results should be based  on gestational age, weight and in agreement with clinical  observations.  Premature Infant recommended reference ranges:  Up to 24 hours.............<8.0 mg/dL  Up to 48 hours............<12.0 mg/dL  3-5 days..................<15.0 mg/dL  6-29 days.................<15.0 mg/dL             Blood Culture, Routine               Site               BUN, Bld   94           Calcium   7.4           Chloride   95           CO2   13           Creatinine   5.2           DelSys               Differential Method   Automated           eGFR if    11           eGFR if non    9  Comment:  Calculation used to  obtain the estimated glomerular filtration  rate (eGFR) is the CKD-EPI equation.              Eos #   0.0           Eosinophil%   0.3           FiO2               Flow               Glucose   130           Gran # (ANC)   11.0           Gran%   84.5           Hematocrit   27.9           Hemoglobin   9.2           Coumadin Monitoring INR   5.2  Comment:  Coumadin Therapy:  2.0 - 3.0 for INR for all indicators except mechanical heart valves  and antiphospholipid syndromes which should use 2.5 - 3.5.  INR  critical result(s) called and verbal readback obtained from   Shraddha Humphreys RN , 11/03/2018 06:44             Lactate, Phil 6.6  Comment:  Falsely low lactic acid results can be found in samples   containing >=13.0 mg/dL total bilirubin and/or >=3.5 mg/dL   direct bilirubin.  Lactic acid critical result(s) called and verbal readback obtained   from Shraddha Humphreys RN, 11/03/2018 06:42               Lymph #   0.9           Lymph%   6.6           Magnesium   2.1           MCH   29.8           MCHC   33.0           MCV   90           Mode               Mono #   1.1           Mono%   8.5           MPV   10.5           Platelets   182           POC BE               POC HCO3               POC PCO2               POC PH               POC PO2               POC SATURATED O2               POCT Glucose     147 114 71     Potassium   6.0           Total Protein   6.0           Protime   48.8           RBC   3.09           RDW   17.9           Sample               Sodium   130           Vancomycin, Random 14.8             WBC   12.97                            11/02/18  2213   11/02/18  1819   11/02/18  1716   11/02/18  1315   11/02/18  1258        Albumin               Alkaline Phosphatase               Allens Test               ALT               Ammonia               Anion Gap 21 22   19       AST               Baso #               Basophil%               Total Bilirubin               Blood Culture, Routine               Site                BUN, Bld 91 91   90       Calcium 8.0 8.4   8.3       Chloride 96 96   98       CO2 14 13   15       Creatinine 5.1 4.8   4.8       DelSys               Differential Method               eGFR if  11 12   12       eGFR if non  9  Comment:  Calculation used to obtain the estimated glomerular filtration  rate (eGFR) is the CKD-EPI equation.    10  Comment:  Calculation used to obtain the estimated glomerular filtration  rate (eGFR) is the CKD-EPI equation.      10  Comment:  Calculation used to obtain the estimated glomerular filtration  rate (eGFR) is the CKD-EPI equation.          Eos #               Eosinophil%               FiO2               Flow               Glucose 76 97   107       Gran # (ANC)               Gran%               Hematocrit               Hemoglobin               Coumadin Monitoring INR               Lactate, Phil   7.7  Comment:  Falsely low lactic acid results can be found in samples   containing >=13.0 mg/dL total bilirubin and/or >=3.5 mg/dL   direct bilirubin.  LA critical result(s) called and verbal readback obtained from Francisco Javier Orourke RN ICU, 11/02/2018 18:52                7.7  Comment:  Falsely low lactic acid results can be found in samples   containing >=13.0 mg/dL total bilirubin and/or >=3.5 mg/dL   direct bilirubin.  LA critical result(s) called and verbal readback obtained from Francisco Javier Orourke RN ICU, 11/02/2018 18:52             Lymph #               Lymph%               Magnesium               MCH               MCHC               MCV               Mode               Mono #               Mono%               MPV               Platelets               POC BE               POC HCO3               POC PCO2               POC PH               POC PO2               POC SATURATED O2               POCT Glucose     95   111     Potassium 6.1 6.1   5.7       Total Protein               Protime               RBC               RDW               Sample                Sodium 131 131   132       Vancomycin, Random               WBC                                11/02/18  1157   11/02/18  1028   11/02/18  1027   11/02/18  0951   11/02/18  0927        Albumin               Alkaline Phosphatase               Allens Test Pass             ALT               Ammonia               Anion Gap               AST               Baso #               Basophil%               Total Bilirubin               Blood Culture, Routine   No Growth to date[P] No Growth to date[P]         Site LR             BUN, Bld               Calcium               Chloride               CO2               Creatinine               DelSys Nasal Can             Differential Method               eGFR if                eGFR if non                Eos #               Eosinophil%               FiO2 28             Flow 2             Glucose               Gran # (ANC)               Gran%               Hematocrit               Hemoglobin               Coumadin Monitoring INR         3.4  Comment:  Coumadin Therapy:  2.0 - 3.0 for INR for all indicators except mechanical heart valves  and antiphospholipid syndromes which should use 2.5 - 3.5.       Lactate, Phil               Lymph #               Lymph%               Magnesium               MCH               MCHC               MCV               Mode SPONT             Mono #               Mono%               MPV               Platelets               POC BE -12             POC HCO3 13.6             POC PCO2 25.9             POC PH 7.327             POC PO2 109             POC SATURATED O2 98             POCT Glucose       114       Potassium               Total Protein               Protime         33.0  Comment:  Results confirmed, test repeated  PT result(s) called and verbal readback obtained from Sharad Herbert RN. Said results were expected., 11/02/2018 10:14       RBC               RDW               Sample ARTERIAL             Sodium                Vancomycin, Random               WBC                                    Significant Imaging: X-Ray: CXR: X-Ray Chest 1 View (CXR):   Results for orders placed or performed during the hospital encounter of 10/29/18   X-Ray Chest 1 View    Narrative    EXAMINATION:  XR CHEST 1 VIEW    CLINICAL HISTORY:  SOB;    COMPARISON:  November 2, 2018    FINDINGS:  Prior sternotomy is again noted.  Stable mild cardiomegaly..  Trace bilateral pleural effusions are present.  Mild vascular congestion or low-grade interstitial edema in the lung bases may be present.  The remainder of the lungs appear clear of active disease..      Impression    Mild cardiomegaly.  Possible mild interstitial edema.  Trace pleural effusions..      Electronically signed by: Jean Figueroa MD  Date:    11/03/2018  Time:    09:04     Assessment and Plan:       * Shock, unspecified    Agree with IV levophed for BP support  Repeat ECHO today revealed EF 60-65%, -WMA's, indeterminate LV diastolic fn, low normal RV systolic fn,   LA >12, needs septic workup per critical care      .      Shock liver w/ Coagulopathy    AST 3103/ ALT 1175  Hold Eliquis and ASA for now  INR 3.4  Supportive care  Levophed for BP support as needed  LA >12, needs septic workup per critical care.      Left displaced femoral neck fracture    Per primary team/ ortho     CAD (coronary artery disease)    Continue BB and Torsemide  Hold ASA and Eliquis  Hold ACei/ARB for now given renal function  No chest pain or anginal equivalents on exam today.   Resume statin post op  Check FLP in AM    Low to moderate periop risk of CV events for moderate risk procedure.  No chest pain, active arrhythmia and CHF symptoms.  Ok to proceed the scheduled surgery without further cardiac study.    Will be available as needed.     11/2  -Hold ASA and eliquis for now given elevated LFT's and INR 3.4  Hold BB, ACEi, Torsemide for now given low BP requiring Levophed support  Hold statin given elevated  LFT's    11/3  -Hold ASA, Eliquis, BB, ACEi, Torsemide, Statin for now due to elevated LFT's, INR 5.2  No chest pain on exam  Considering hospice care today due to need for dialysis.            H/O aortic valve replacement    Continue BB and torsemide  Hold ASA and Eliquis prior to surgery  Resume as soon as possible post op     CHF (congestive heart failure)    Appears compensated on exam  Continue torsemide  DASH diet, 2 gm sodium restriction  1L fluid restriction per day  Intake and output    11/3  -considering hospice care due to acute renal failure requiring dialysis       Essential hypertension    On medical therapy  Continue BB and torsemide  Hold ACEI due to elevated renal function  Resume when able.     11/2  -Hold BB, Torsemide and ACEI for now given Levophed gtt for BP support  Resume when clinical course allows         VTE Risk Mitigation (From admission, onward)    None          Sahara Olguin NP  Cardiology  [facility] [place]

## 2018-11-03 NOTE — PROGRESS NOTES
[facility] Hospital Medicine  Progress Note    Patient Name: Chung Meneses Sr.  MRN: [id number]  Patient Class: IP- Inpatient   Admission Date: 10/29/2018  Length of Stay: 4 days  Attending Physician: Jake Eastman MD  Primary Care Provider: Glenroy Carmichael MD        Subjective:     Principal Problem:Shock liver    HPI:  Chung Meneses is an 88 yo male with a PMH of Afib, CHF, CAD, HTN, S/P AVR who presented to the ER after suffering a ground level fall at home today. The patient reports that he tripped and fell while walking outside today. The patient reports that he fell on his left side hitting his head. The patient denies any loss of consciousness. Imaging showed a left femoral neck fracture. CXR showed an acute appearing nondisplaced fracture in the lateral aspect of the left 9th rib. The patient reports pain is currently well controlled. Dr. Simental (Ortho) was consulted and will evaluate the patient. Patient denies fever, chills, CP, cough, stallworth, pnd, orthopnea, palpitations, diaphoresis, headache, blurred vision, numbness, tingling, dizziness, localized weakness, n/v/d, abdominal pain, blood in stools, melena, hematemesis, urinary frequency, urgency, dysuria or hematuria.         Hospital Course:  10/30/18 No acute issues overnight. The patient reports that his pain is well controlled at this time. Dr. Simental will likely take the patient to surgery tomorrow. NPO after MN 10/31/18 No acute issues overnight. The patient is being taken to the OR by Dr. Simental today. 11/1/18 No acute issues overnight. The patient reports that his pain is well controlled. The patient worked well with PT/OT who recommended SNF vs rehab for continued therapy at the time of discharge.  02 November:  Intermittent hypotension overnight.  Did not correct consistently with fluid bolus.  More lethargic.  Transferred to ICU.  US liver and cardiac echo pending.  Worsening renal function with oliguria and liver enzymes show  evidence of shock liver.    Interval History:  Intermittent hypotension overnight.  Did not correct consistently with fluid bolus.  More lethargic.  The patient worked well with PT/OT who recommended SNF vs rehab for continued therapy at the time of discharge.       Review of Systems   Constitutional: Positive for fatigue. Negative for activity change, appetite change, chills, fever and unexpected weight change.   HENT: Negative for congestion, facial swelling, rhinorrhea, sinus pressure, sneezing and sore throat.    Eyes: Negative for discharge, redness and visual disturbance.   Respiratory: Negative for apnea, cough, chest tightness, shortness of breath, wheezing and stridor.    Cardiovascular: Negative for chest pain, palpitations and leg swelling.   Gastrointestinal: Negative for abdominal distention, abdominal pain, anal bleeding, blood in stool, constipation, diarrhea, nausea and vomiting.   Genitourinary: Negative for difficulty urinating, discharge, dysuria, frequency and hematuria.   Musculoskeletal: Positive for arthralgias. Negative for back pain, gait problem, joint swelling, myalgias, neck pain and neck stiffness.        Left elbow pain, Left hip pain   Skin: Negative for color change and pallor.   Neurological: Negative for dizziness, tremors, seizures, syncope, facial asymmetry, speech difficulty, weakness, light-headedness, numbness and headaches.   Psychiatric/Behavioral: Positive for confusion. Negative for behavioral problems, hallucinations and suicidal ideas. The patient is not nervous/anxious.    All other systems reviewed and are negative.    Objective:     Vital Signs (Most Recent):  Temp: 97.8 °F (36.6 °C) (11/02/18 2305)  Pulse: 76 (11/02/18 2315)  Resp: 18 (11/02/18 2315)  BP: (!) 90/48 (11/02/18 2315)  SpO2: (!) 74 % (11/02/18 2315) Vital Signs (24h Range):  Temp:  [97.1 °F (36.2 °C)-98.2 °F (36.8 °C)] 97.8 °F (36.6 °C)  Pulse:  [] 76  Resp:  [14-22] 18  SpO2:  [65 %-99 %] 74 %  BP:  ()/(18-73) 90/48     Weight: 84.6 kg (186 lb 8.2 oz)  Body mass index is 27.54 kg/m².    Intake/Output Summary (Last 24 hours) at 11/2/2018 2346  Last data filed at 11/2/2018 2300  Gross per 24 hour   Intake 3250.31 ml   Output 105 ml   Net 3145.31 ml      Physical Exam   Constitutional: He is oriented to person, place, and time. He appears well-developed and well-nourished.   Elderly appearing    HENT:   Head: Normocephalic and atraumatic.   Eyes: Conjunctivae are normal. Pupils are equal, round, and reactive to light.   Neck: Normal range of motion. Neck supple.   Cardiovascular: Normal rate, regular rhythm, normal heart sounds and intact distal pulses.   No murmur heard.  Pulmonary/Chest: Effort normal and breath sounds normal. No respiratory distress.   Abdominal: Soft. Bowel sounds are normal. He exhibits no distension. There is no tenderness.   Musculoskeletal: He exhibits tenderness. He exhibits no edema or deformity.   Left hip pain, Decreased ROM to LLE.    Neurological: He is alert and oriented to person, place, and time.   Skin: Skin is warm and dry. No rash noted. No erythema.   Abrasion noted to Left scalp, Abrasion noted to Left elbow.    Psychiatric: He has a normal mood and affect. His behavior is normal.   Nursing note and vitals reviewed.      Significant Labs: All pertinent labs within the past 24 hours have been reviewed.    Significant Imaging:   Imaging Results          X-Ray Chest AP Portable (Final result)  Result time 10/29/18 14:19:11    Final result by PAULA Mckinney Sr., MD (10/29/18 14:19:11)                 Impression:      1. There is an acute appearing nondisplaced fracture in the lateral aspect of the left 9th rib that is best seen on the dedicated rib series.  2. The lungs are clear.  3. Surgical changes  .      Electronically signed by: Antelmo Mckinney MD  Date:    10/29/2018  Time:    14:19             Narrative:    EXAMINATION:  XR CHEST AP PORTABLE    CLINICAL  HISTORY:  Fracture of unspecified part of neck of unspecified femur, initial encounter for closed fracture    COMPARISON:  A plain film examination of the left ribs performed on 10/29/2018.    FINDINGS:  There are multiple sternotomy wires in place.  There is partial visualization of a prosthetic right humeral head.  There is a prosthetic left humeral head in place.  There is an acute appearing nondisplaced fracture in the lateral aspect of the left 9th rib that is best seen on the dedicated rib series.  The size of the heart is normal. The lungs are clear. There is no pneumothorax.  The costophrenic angles are sharp.                               CT Head Without Contrast (Final result)  Result time 10/29/18 12:41:21    Final result by Sharad Wall MD (10/29/18 12:41:21)                 Impression:      Chronic microvascular ischemic changes.      Electronically signed by: Sharad Wall MD  Date:    10/29/2018  Time:    12:41             Narrative:    EXAMINATION:  CT HEAD WITHOUT CONTRAST    CLINICAL HISTORY:  head injury;    TECHNIQUE:  Low dose axial CT images obtained throughout the head without intravenous contrast. Sagittal and coronal reconstructions were performed.  All CT scans at this facility use dose modulation, iterative reconstruction and/or weight based dosing when appropriate to reduce radiation dose to as low as reasonably achievable.    COMPARISON:  03/06/2018    FINDINGS:  Intracranial compartment:    The brain parenchyma demonstrates areas of decreased attenuation with mild to moderate periventricular white matter consistent with chronic microvascular ischemic changes..  No parenchymal mass, hemorrhage, edema or major vascular distribution infarct.  Vascular calcifications are noted.    Mild prominence of the sulci and ventricles are consistent with age-related involutional changes.    No extra-axial blood or fluid collections.    Skull/extracranial contents (limited evaluation): No fracture.  Mastoid air cells and paranasal sinuses are essentially clear.                               X-Ray Elbow Complete Left (Final result)  Result time 10/29/18 13:03:52    Final result by PAULA Mckinney Sr., MD (10/29/18 13:03:52)                 Impression:      1. There is no acute fracture visualized.  2. There are several osseous densities in the lateral aspect of the left elbow. One of the larger ones measures 8 mm.  These are characteristic of intra-articular loose bodies.  3. There is a 6 mm calcific density in the expected location of the distal aspect of the triceps tendon.  This is characteristic of prior trauma.      Electronically signed by: Antelmo Mckinney MD  Date:    10/29/2018  Time:    13:03             Narrative:    EXAMINATION:  XR ELBOW COMPLETE 3 VIEW LEFT    CLINICAL HISTORY:  Unspecified injury of left elbow, initial encounter    COMPARISON:  None    FINDINGS:  There is no acute fracture visualized.  There is no dislocation.  There are several osseous densities in the lateral aspect of the left elbow.  One of the larger ones measures 8 mm.  There is a 6 mm calcific density in the expected location of the distal aspect of the triceps tendon.                               X-Ray Hip 2 View Left (Final result)  Result time 10/29/18 13:01:26    Final result by PAULA Mckinney Sr., MD (10/29/18 13:01:26)                 Impression:      1. There is a poorly visualized fracture in the left femoral neck.  The left femoral head still articulates with the left acetabulum.  2. There is partial visualization of posterior spinal fusion hardware in the lumbar spine.  3. There is a moderate amount of atherosclerosis.      Electronically signed by: Antelmo Mckinney MD  Date:    10/29/2018  Time:    13:01             Narrative:    EXAMINATION:  XR HIP 2 VIEW LEFT    CLINICAL HISTORY:  Unspecified injury of left hip, initial encounter    COMPARISON:  None    FINDINGS:  There is a poorly visualized fracture in the  left femoral neck.  The left femoral head still articulates with the left acetabulum.  There is a moderate amount of atherosclerosis.  There is partial visualization of posterior spinal fusion hardware in the lumbar spine.                               X-Ray Ribs 2 View Left (Final result)  Result time 10/29/18 13:09:06    Final result by PAULA Mckinney Sr., MD (10/29/18 13:09:06)                 Impression:      1. There is an acute appearing nondisplaced fracture in the lateral aspect of the left 9th rib.  2. Surgical changes      Electronically signed by: Antelmo Mckinney MD  Date:    10/29/2018  Time:    13:09             Narrative:    EXAMINATION:  XR RIBS 2 VIEW LEFT    TECHNIQUE:  Standard knee MRI protocol without IV contrast was performed.    FINDINGS:  There is an acute appearing nondisplaced fracture in the lateral aspect of the left 9th rib.  There are healed fractures in the left 10th and 11th ribs.  There are multiple sternotomy wires in place.  There is partial visualization of a prosthetic left humeral head.  There is no pneumothorax.  The left costophrenic angle is sharp.  There is no evidence of an acute pulmonary process.                                   Assessment/Plan:      * Shock liver    Consult to Gastroenterology.  Liver ultrasound pending.  Add-on PT/INR.  Transfer to ICU.     Shock, unspecified    Uncertain etiology but unable to rule out infectious cause.  Procalcitonin elevated and now has elevated WBC.  Empirically start Vancomycin and Zosyn.     XIANG (acute kidney injury)    Worsening in spite of fluid resuscitation.  Consult Nephrology.     Dermatitis associated with moisture           Left displaced femoral neck fracture    - Ortho consulted   - Hold Eliquis and ASA  - Analgesia PRN   - PT/OT consult   - NPO after midnight     10/30/18 No acute issues overnight. The patient reports that his pain is well controlled at this time. Dr. Simental will likely take the patient to surgery  tomorrow. NPO after MN  10/31/18 No acute issues overnight. The patient is being taken to the OR by Dr. Simental today.   11/1/18 No acute issues overnight. The patient reports that his pain is well controlled. The patient worked well with PT/OT who recommended SNF vs rehab for continued therapy at the time of discharge.            CAD (coronary artery disease)    - Resume home meds        H/O aortic valve replacement    - Resume home meds  - Keep outpatient follow up       Pulmonary hypertension           CHF (congestive heart failure)    - Resume torsemide   - monitor I&O   - resume BB   - Low Na diet       Essential hypertension    - Monitor VS   - Resume metoprolol            VTE Risk Mitigation (From admission, onward)    None          Critical care time spent on the evaluation and treatment of severe organ dysfunction, review of pertinent labs and imaging studies, discussions with consulting providers and discussions with patient/family: 90 minutes.    Jake Eastman MD  Department of Hospital Medicine   [facility]

## 2018-11-03 NOTE — SUBJECTIVE & OBJECTIVE
Interval History:  Intermittent hypotension overnight.  Did not correct consistently with fluid bolus.  More lethargic.  The patient worked well with PT/OT who recommended SNF vs rehab for continued therapy at the time of discharge.       Review of Systems   Constitutional: Positive for fatigue. Negative for activity change, appetite change, chills, fever and unexpected weight change.   HENT: Negative for congestion, facial swelling, rhinorrhea, sinus pressure, sneezing and sore throat.    Eyes: Negative for discharge, redness and visual disturbance.   Respiratory: Negative for apnea, cough, chest tightness, shortness of breath, wheezing and stridor.    Cardiovascular: Negative for chest pain, palpitations and leg swelling.   Gastrointestinal: Negative for abdominal distention, abdominal pain, anal bleeding, blood in stool, constipation, diarrhea, nausea and vomiting.   Genitourinary: Negative for difficulty urinating, discharge, dysuria, frequency and hematuria.   Musculoskeletal: Positive for arthralgias. Negative for back pain, gait problem, joint swelling, myalgias, neck pain and neck stiffness.        Left elbow pain, Left hip pain   Skin: Negative for color change and pallor.   Neurological: Negative for dizziness, tremors, seizures, syncope, facial asymmetry, speech difficulty, weakness, light-headedness, numbness and headaches.   Psychiatric/Behavioral: Positive for confusion. Negative for behavioral problems, hallucinations and suicidal ideas. The patient is not nervous/anxious.    All other systems reviewed and are negative.    Objective:     Vital Signs (Most Recent):  Temp: 97.8 °F (36.6 °C) (11/02/18 2305)  Pulse: 76 (11/02/18 2315)  Resp: 18 (11/02/18 2315)  BP: (!) 90/48 (11/02/18 2315)  SpO2: (!) 74 % (11/02/18 2315) Vital Signs (24h Range):  Temp:  [97.1 °F (36.2 °C)-98.2 °F (36.8 °C)] 97.8 °F (36.6 °C)  Pulse:  [] 76  Resp:  [14-22] 18  SpO2:  [65 %-99 %] 74 %  BP: ()/(18-73) 90/48      Weight: 84.6 kg (186 lb 8.2 oz)  Body mass index is 27.54 kg/m².    Intake/Output Summary (Last 24 hours) at 11/2/2018 2346  Last data filed at 11/2/2018 2300  Gross per 24 hour   Intake 3250.31 ml   Output 105 ml   Net 3145.31 ml      Physical Exam   Constitutional: He is oriented to person, place, and time. He appears well-developed and well-nourished.   Elderly appearing    HENT:   Head: Normocephalic and atraumatic.   Eyes: Conjunctivae are normal. Pupils are equal, round, and reactive to light.   Neck: Normal range of motion. Neck supple.   Cardiovascular: Normal rate, regular rhythm, normal heart sounds and intact distal pulses.   No murmur heard.  Pulmonary/Chest: Effort normal and breath sounds normal. No respiratory distress.   Abdominal: Soft. Bowel sounds are normal. He exhibits no distension. There is no tenderness.   Musculoskeletal: He exhibits tenderness. He exhibits no edema or deformity.   Left hip pain, Decreased ROM to LLE.    Neurological: He is alert and oriented to person, place, and time.   Skin: Skin is warm and dry. No rash noted. No erythema.   Abrasion noted to Left scalp, Abrasion noted to Left elbow.    Psychiatric: He has a normal mood and affect. His behavior is normal.   Nursing note and vitals reviewed.      Significant Labs: All pertinent labs within the past 24 hours have been reviewed.    Significant Imaging:   Imaging Results          X-Ray Chest AP Portable (Final result)  Result time 10/29/18 14:19:11    Final result by PAULA Mckinney Sr., MD (10/29/18 14:19:11)                 Impression:      1. There is an acute appearing nondisplaced fracture in the lateral aspect of the left 9th rib that is best seen on the dedicated rib series.  2. The lungs are clear.  3. Surgical changes  .      Electronically signed by: Antelmo Mckinney MD  Date:    10/29/2018  Time:    14:19             Narrative:    EXAMINATION:  XR CHEST AP PORTABLE    CLINICAL HISTORY:  Fracture of unspecified  part of neck of unspecified femur, initial encounter for closed fracture    COMPARISON:  A plain film examination of the left ribs performed on 10/29/2018.    FINDINGS:  There are multiple sternotomy wires in place.  There is partial visualization of a prosthetic right humeral head.  There is a prosthetic left humeral head in place.  There is an acute appearing nondisplaced fracture in the lateral aspect of the left 9th rib that is best seen on the dedicated rib series.  The size of the heart is normal. The lungs are clear. There is no pneumothorax.  The costophrenic angles are sharp.                               CT Head Without Contrast (Final result)  Result time 10/29/18 12:41:21    Final result by Sharad Wall MD (10/29/18 12:41:21)                 Impression:      Chronic microvascular ischemic changes.      Electronically signed by: Sharad Wall MD  Date:    10/29/2018  Time:    12:41             Narrative:    EXAMINATION:  CT HEAD WITHOUT CONTRAST    CLINICAL HISTORY:  head injury;    TECHNIQUE:  Low dose axial CT images obtained throughout the head without intravenous contrast. Sagittal and coronal reconstructions were performed.  All CT scans at this facility use dose modulation, iterative reconstruction and/or weight based dosing when appropriate to reduce radiation dose to as low as reasonably achievable.    COMPARISON:  03/06/2018    FINDINGS:  Intracranial compartment:    The brain parenchyma demonstrates areas of decreased attenuation with mild to moderate periventricular white matter consistent with chronic microvascular ischemic changes..  No parenchymal mass, hemorrhage, edema or major vascular distribution infarct.  Vascular calcifications are noted.    Mild prominence of the sulci and ventricles are consistent with age-related involutional changes.    No extra-axial blood or fluid collections.    Skull/extracranial contents (limited evaluation): No fracture. Mastoid air cells and paranasal  sinuses are essentially clear.                               X-Ray Elbow Complete Left (Final result)  Result time 10/29/18 13:03:52    Final result by PAULA Mckinney Sr., MD (10/29/18 13:03:52)                 Impression:      1. There is no acute fracture visualized.  2. There are several osseous densities in the lateral aspect of the left elbow. One of the larger ones measures 8 mm.  These are characteristic of intra-articular loose bodies.  3. There is a 6 mm calcific density in the expected location of the distal aspect of the triceps tendon.  This is characteristic of prior trauma.      Electronically signed by: Antelmo Mckinney MD  Date:    10/29/2018  Time:    13:03             Narrative:    EXAMINATION:  XR ELBOW COMPLETE 3 VIEW LEFT    CLINICAL HISTORY:  Unspecified injury of left elbow, initial encounter    COMPARISON:  None    FINDINGS:  There is no acute fracture visualized.  There is no dislocation.  There are several osseous densities in the lateral aspect of the left elbow.  One of the larger ones measures 8 mm.  There is a 6 mm calcific density in the expected location of the distal aspect of the triceps tendon.                               X-Ray Hip 2 View Left (Final result)  Result time 10/29/18 13:01:26    Final result by PAULA Mckinney Sr., MD (10/29/18 13:01:26)                 Impression:      1. There is a poorly visualized fracture in the left femoral neck.  The left femoral head still articulates with the left acetabulum.  2. There is partial visualization of posterior spinal fusion hardware in the lumbar spine.  3. There is a moderate amount of atherosclerosis.      Electronically signed by: Antelmo Mckinney MD  Date:    10/29/2018  Time:    13:01             Narrative:    EXAMINATION:  XR HIP 2 VIEW LEFT    CLINICAL HISTORY:  Unspecified injury of left hip, initial encounter    COMPARISON:  None    FINDINGS:  There is a poorly visualized fracture in the left femoral neck.  The left  femoral head still articulates with the left acetabulum.  There is a moderate amount of atherosclerosis.  There is partial visualization of posterior spinal fusion hardware in the lumbar spine.                               X-Ray Ribs 2 View Left (Final result)  Result time 10/29/18 13:09:06    Final result by PAULA Mckinney Sr., MD (10/29/18 13:09:06)                 Impression:      1. There is an acute appearing nondisplaced fracture in the lateral aspect of the left 9th rib.  2. Surgical changes      Electronically signed by: Antelmo Mckinney MD  Date:    10/29/2018  Time:    13:09             Narrative:    EXAMINATION:  XR RIBS 2 VIEW LEFT    TECHNIQUE:  Standard knee MRI protocol without IV contrast was performed.    FINDINGS:  There is an acute appearing nondisplaced fracture in the lateral aspect of the left 9th rib.  There are healed fractures in the left 10th and 11th ribs.  There are multiple sternotomy wires in place.  There is partial visualization of a prosthetic left humeral head.  There is no pneumothorax.  The left costophrenic angle is sharp.  There is no evidence of an acute pulmonary process.

## 2018-11-03 NOTE — PROGRESS NOTES
[facility] -   Nephrology  Progress Note    Patient Name: Chung Meneses Sr.  MRN: [id number]  Admission Date: 10/29/2018  Hospital Length of Stay: 5 days  Attending Provider: Jake Eastman MD   Primary Care Physician: Glenroy Carmichael MD  Principal Problem:Shock, unspecified    Subjective:     HPI: Pt was seen and examined in ICU. H/o was reviewed. Pt is a 88 y/o male with CKD stage 3 (baseline s Cr 2.0), diastolic CHF, and AF who presented with left femoral neck fx and L side/ribs contusion (no rib fx on CXR) after a fall at home when taking the garbage out (trippedover his shoes) who has worsening in s cR with s Cr now 4.2 and K 6.1, as well as impressive increase in liver tests. Pt has been hypotensive, with SBP 77. Pt says that it hurts to breathe, o/w no SOB, no cough. Has elevated WBC 14 and procalcitonin. Initial CXR on admit shows clear lung fields. Discussed the medical issues with PCP, and pt was transferred to the ICU.    Interval History: Met with pt's family. Discussed with ICU. Noted pt and family have declined HD. Explained this am that role of dialysis would be for acute care. Family confirms prior decisions.    Review of patient's allergies indicates:   Allergen Reactions    Sulfa (sulfonamide antibiotics) Hives     Current Facility-Administered Medications   Medication Frequency    dextrose 10 % infusion Continuous    morphine injection 5 mg Q1H PRN    ondansetron injection 4 mg Q8H PRN    sodium chloride 0.9% flush 3 mL PRN       Objective:     Vital Signs (Most Recent):  Temp: 98.1 °F (36.7 °C) (11/03/18 0305)  Pulse: 78 (11/03/18 0815)  Resp: 20 (11/03/18 0815)  BP: (!) 112/51 (11/03/18 0645)  SpO2: 96 % (11/03/18 0815)  O2 Device (Oxygen Therapy): venti mask (11/03/18 0815) Vital Signs (24h Range):  Temp:  [97.5 °F (36.4 °C)-98.1 °F (36.7 °C)] 98.1 °F (36.7 °C)  Pulse:  [] 78  Resp:  [14-22] 20  SpO2:  [91 %-99 %] 96 %  BP: ()/(18-73) 112/51     Weight: 84.6  kg (186 lb 8.2 oz) (11/02/18 0600)  Body mass index is 27.54 kg/m².  Body surface area is 2.03 meters squared.    I/O last 3 completed shifts:  In: 3300.3 [P.O.:800; I.V.:1100.3; IV Piggyback:1400]  Out: 125 [Urine:125]    Physical Exam   Constitutional: He appears well-developed and well-nourished. No distress.   HENT:   Head: Normocephalic and atraumatic.   Neck: No JVD present.   Cardiovascular: Normal rate.   Irregular   Pulmonary/Chest: Effort normal.   Decreased BS's at left base   Abdominal: Soft. Bowel sounds are normal.   Skin: Skin is warm and dry.   Psychiatric: He has a normal mood and affect. His behavior is normal.   Nursing note and vitals reviewed.      Significant Labs: reviewed  BMP  Lab Results   Component Value Date     (L) 11/03/2018    K 6.0 (H) 11/03/2018    CL 95 11/03/2018    CO2 13 (L) 11/03/2018    BUN 94 (H) 11/03/2018    CREATININE 5.2 (H) 11/03/2018    CALCIUM 7.4 (L) 11/03/2018    ANIONGAP 22 (H) 11/03/2018    ESTGFRAFRICA 11 (A) 11/03/2018    EGFRNONAA 9 (A) 11/03/2018     Lab Results   Component Value Date    WBC 12.97 (H) 11/03/2018    HGB 9.2 (L) 11/03/2018    HCT 27.9 (L) 11/03/2018    MCV 90 11/03/2018     11/03/2018     AST 3200  ALT 1200  Bili 8.0  Alb 2.9    Blood cx neg x 2    Significant Imaging: reviewed    Assessment/Plan:         88 y/o male with XIANG on CKD:                XIANG (acute kidney injury)     Renal: XIANG on CKD stage 3.  No sign of recovery  Persistent hyperkalemia despite pharmacologic treatment  Likely cause is hypotension and organ hypoperfusion, shock  Acute HD can be possibly be offered if underlying conditions can be improved   However, family and pt are declining dialysis  Pt is now on comfort care  Hypotension: suspect sepsis  Blood cx neg  Elevated lactic acid and procalcitonin      CAD (coronary artery disease)     Has multiple cardiac issues as documented previously      Shock liver     Abnormal liver tests likely due to passive congestion  and hypotension  Reviewed GI tests.  No change or improvement noted  Poor prognosis             * Left displaced femoral neck fracture     Per orthopedics         Plans and recommendations:  As reviewed above  Total critical care time spent 30 minutes              Dedra Alonso MD  Nephrology  [facility]

## 2018-11-03 NOTE — PT/OT/SLP PROGRESS
Physical Therapy      Patient Name:  Chung Meneses .   MRN:  [id number]    Patient not seen today secondary to Other (Comment). Pt DC to hospice     Remberto Okeefe PT

## 2018-11-03 NOTE — PROGRESS NOTES
Pt observed still a little lethargic.Oriented to person, place and time. Arouses to voice. Denies pain or discomfort. Not much urine observed in [place]. BP 88/49 HR 87 and Blood sugar 65.     Pt condition discussed with Charge nurse and Dr Eastman. It was decided that the pt be moved to the ICU. Prior to transfer, Keira MORRIS gave 25g dextrose IV. I also administered 10 units of regular insulin subq and 500ml NS bolus.     Following EKG, Echo and Liver US the transfer was made. Bedside assessment and  report given to ALEXANDRA Orourke in ICU. Pt was left in stable condition and prepared for further orders.     Plan of care reviewed with family by providers.

## 2018-11-03 NOTE — ASSESSMENT & PLAN NOTE
Uncertain etiology but unable to rule out infectious cause.  Procalcitonin elevated and now has elevated WBC.  Empirically start Vancomycin and Zosyn.

## 2018-11-03 NOTE — SUBJECTIVE & OBJECTIVE
Interval History: remains on IV levophed for BP support    Review of Systems   Constitution: Positive for weakness and malaise/fatigue.   HENT: Negative for hearing loss and hoarse voice.    Eyes: Negative for blurred vision and visual disturbance.   Cardiovascular: Negative for chest pain, claudication, dyspnea on exertion, irregular heartbeat, leg swelling, near-syncope, orthopnea, palpitations, paroxysmal nocturnal dyspnea and syncope.   Respiratory: Negative for cough, hemoptysis, shortness of breath, sleep disturbances due to breathing, snoring and wheezing.    Endocrine: Negative for cold intolerance and heat intolerance.   Hematologic/Lymphatic: Bruises/bleeds easily.   Skin: Negative for color change, dry skin and nail changes.   Musculoskeletal: Positive for arthritis and back pain. Negative for joint pain and myalgias.   Gastrointestinal: Negative for bloating, abdominal pain, constipation, nausea and vomiting.   Genitourinary: Negative for dysuria, flank pain, hematuria and hesitancy.   Neurological: Negative for headaches, light-headedness, loss of balance, numbness and paresthesias.   Psychiatric/Behavioral: Negative for altered mental status.   Allergic/Immunologic: Negative for environmental allergies.     Objective:     Vital Signs (Most Recent):  Temp: 98.1 °F (36.7 °C) (11/03/18 0305)  Pulse: 78 (11/03/18 0815)  Resp: 20 (11/03/18 0815)  BP: (!) 112/51 (11/03/18 0645)  SpO2: 96 % (11/03/18 0815) Vital Signs (24h Range):  Temp:  [97.1 °F (36.2 °C)-98.1 °F (36.7 °C)] 98.1 °F (36.7 °C)  Pulse:  [] 78  Resp:  [14-22] 20  SpO2:  [65 %-99 %] 96 %  BP: ()/(18-73) 112/51     Weight: 84.6 kg (186 lb 8.2 oz)  Body mass index is 27.54 kg/m².     SpO2: 96 %  O2 Device (Oxygen Therapy): venti mask      Intake/Output Summary (Last 24 hours) at 11/3/2018 0925  Last data filed at 11/3/2018 0600  Gross per 24 hour   Intake 2500.31 ml   Output 15 ml   Net 2485.31 ml       Lines/Drains/Airways     Central  Venous Catheter Line                 Percutaneous Central Line Insertion/Assessment - triple lumen  11/02/18 1100 right femoral less than 1 day          Drain                 Urethral Catheter 10/31/18 1020 Latex;Straight-tip 16 Fr. 2 days          Peripheral Intravenous Line                 Peripheral IV - Single Lumen 10/31/18 1235 Right Wrist 2 days                Physical Exam   Constitutional: He is oriented to person, place, and time. He appears well-developed and well-nourished. No distress.   HENT:   Head: Normocephalic and atraumatic.   Eyes: Pupils are equal, round, and reactive to light.   Neck: Normal range of motion and full passive range of motion without pain. Neck supple. No JVD present.   Cardiovascular: Normal rate, regular rhythm, S1 normal, S2 normal and intact distal pulses. PMI is not displaced. Exam reveals no distant heart sounds.   No murmur heard.  Pulses:       Radial pulses are 2+ on the right side, and 2+ on the left side.        Dorsalis pedis pulses are 2+ on the right side, and 2+ on the left side.   Pulmonary/Chest: Effort normal. No accessory muscle usage. No respiratory distress. He has decreased breath sounds in the right lower field and the left lower field. He has no wheezes.   Abdominal: Soft. Bowel sounds are normal. He exhibits distension. There is no tenderness.   Musculoskeletal: Normal range of motion. He exhibits no edema.        Right ankle: He exhibits no swelling.        Left ankle: He exhibits no swelling.   Neurological: He is alert and oriented to person, place, and time.   Skin: Skin is warm and dry. He is not diaphoretic. No cyanosis. Nails show no clubbing.   Psychiatric: He has a normal mood and affect. His speech is normal and behavior is normal. Judgment and thought content normal. Cognition and memory are normal.   Nursing note and vitals reviewed.      Significant Labs:   All pertinent lab results from the last 24 hours have been reviewed. and   Recent Lab  Results       11/03/18  0545   11/03/18  0544   11/03/18  0503   11/03/18  0023   11/02/18  2317        Albumin   2.9           Alkaline Phosphatase   137           Allens Test               ALT   1,221           Ammonia   149           Anion Gap   22           AST   3,196           Baso #   0.01           Basophil%   0.1           Total Bilirubin   8.0  Comment:  For infants and newborns, interpretation of results should be based  on gestational age, weight and in agreement with clinical  observations.  Premature Infant recommended reference ranges:  Up to 24 hours.............<8.0 mg/dL  Up to 48 hours............<12.0 mg/dL  3-5 days..................<15.0 mg/dL  6-29 days.................<15.0 mg/dL             Blood Culture, Routine               Site               BUN, Bld   94           Calcium   7.4           Chloride   95           CO2   13           Creatinine   5.2           DelSys               Differential Method   Automated           eGFR if    11           eGFR if non    9  Comment:  Calculation used to obtain the estimated glomerular filtration  rate (eGFR) is the CKD-EPI equation.              Eos #   0.0           Eosinophil%   0.3           FiO2               Flow               Glucose   130           Gran # (ANC)   11.0           Gran%   84.5           Hematocrit   27.9           Hemoglobin   9.2           Coumadin Monitoring INR   5.2  Comment:  Coumadin Therapy:  2.0 - 3.0 for INR for all indicators except mechanical heart valves  and antiphospholipid syndromes which should use 2.5 - 3.5.  INR  critical result(s) called and verbal readback obtained from   Shraddha Humphreys RN , 11/03/2018 06:44             Lactate, Phil 6.6  Comment:  Falsely low lactic acid results can be found in samples   containing >=13.0 mg/dL total bilirubin and/or >=3.5 mg/dL   direct bilirubin.  Lactic acid critical result(s) called and verbal readback obtained   from Shraddha Humphreys RN,  11/03/2018 06:42               Lymph #   0.9           Lymph%   6.6           Magnesium   2.1           MCH   29.8           MCHC   33.0           MCV   90           Mode               Mono #   1.1           Mono%   8.5           MPV   10.5           Platelets   182           POC BE               POC HCO3               POC PCO2               POC PH               POC PO2               POC SATURATED O2               POCT Glucose     147 114 71     Potassium   6.0           Total Protein   6.0           Protime   48.8           RBC   3.09           RDW   17.9           Sample               Sodium   130           Vancomycin, Random 14.8             WBC   12.97                            11/02/18  2213   11/02/18  1819   11/02/18  1716   11/02/18  1315   11/02/18  1258        Albumin               Alkaline Phosphatase               Allens Test               ALT               Ammonia               Anion Gap 21 22   19       AST               Baso #               Basophil%               Total Bilirubin               Blood Culture, Routine               Site               BUN, Bld 91 91   90       Calcium 8.0 8.4   8.3       Chloride 96 96   98       CO2 14 13   15       Creatinine 5.1 4.8   4.8       DelSys               Differential Method               eGFR if  11 12   12       eGFR if non  9  Comment:  Calculation used to obtain the estimated glomerular filtration  rate (eGFR) is the CKD-EPI equation.    10  Comment:  Calculation used to obtain the estimated glomerular filtration  rate (eGFR) is the CKD-EPI equation.      10  Comment:  Calculation used to obtain the estimated glomerular filtration  rate (eGFR) is the CKD-EPI equation.          Eos #               Eosinophil%               FiO2               Flow               Glucose 76 97   107       Gran # (ANC)               Gran%               Hematocrit               Hemoglobin               Coumadin Monitoring INR                Lactate, Phil   7.7  Comment:  Falsely low lactic acid results can be found in samples   containing >=13.0 mg/dL total bilirubin and/or >=3.5 mg/dL   direct bilirubin.  LA critical result(s) called and verbal readback obtained from Francisco Javier Orourke RN ICU, 11/02/2018 18:52                7.7  Comment:  Falsely low lactic acid results can be found in samples   containing >=13.0 mg/dL total bilirubin and/or >=3.5 mg/dL   direct bilirubin.  LA critical result(s) called and verbal readback obtained from Francisco Javier Orourke RN ICU, 11/02/2018 18:52             Lymph #               Lymph%               Magnesium               MCH               MCHC               MCV               Mode               Mono #               Mono%               MPV               Platelets               POC BE               POC HCO3               POC PCO2               POC PH               POC PO2               POC SATURATED O2               POCT Glucose     95   111     Potassium 6.1 6.1   5.7       Total Protein               Protime               RBC               RDW               Sample               Sodium 131 131   132       Vancomycin, Random               WBC                                11/02/18  1157   11/02/18  1028   11/02/18  1027   11/02/18  0951   11/02/18  0927        Albumin               Alkaline Phosphatase               Allens Test Pass             ALT               Ammonia               Anion Gap               AST               Baso #               Basophil%               Total Bilirubin               Blood Culture, Routine   No Growth to date[P] No Growth to date[P]         Site LR             BUN, Bld               Calcium               Chloride               CO2               Creatinine               DelSys Nasal Can             Differential Method               eGFR if                eGFR if non                Eos #               Eosinophil%               FiO2 28             Flow 2              Glucose               Gran # (ANC)               Gran%               Hematocrit               Hemoglobin               Coumadin Monitoring INR         3.4  Comment:  Coumadin Therapy:  2.0 - 3.0 for INR for all indicators except mechanical heart valves  and antiphospholipid syndromes which should use 2.5 - 3.5.       Lactate, Phil               Lymph #               Lymph%               Magnesium               MCH               MCHC               MCV               Mode SPONT             Mono #               Mono%               MPV               Platelets               POC BE -12             POC HCO3 13.6             POC PCO2 25.9             POC PH 7.327             POC PO2 109             POC SATURATED O2 98             POCT Glucose       114       Potassium               Total Protein               Protime         33.0  Comment:  Results confirmed, test repeated  PT result(s) called and verbal readback obtained from Sharad Herbert RN. Said results were expected., 11/02/2018 10:14       RBC               RDW               Sample ARTERIAL             Sodium               Vancomycin, Random               WBC                                    Significant Imaging: X-Ray: CXR: X-Ray Chest 1 View (CXR):   Results for orders placed or performed during the hospital encounter of 10/29/18   X-Ray Chest 1 View    Narrative    EXAMINATION:  XR CHEST 1 VIEW    CLINICAL HISTORY:  SOB;    COMPARISON:  November 2, 2018    FINDINGS:  Prior sternotomy is again noted.  Stable mild cardiomegaly..  Trace bilateral pleural effusions are present.  Mild vascular congestion or low-grade interstitial edema in the lung bases may be present.  The remainder of the lungs appear clear of active disease..      Impression    Mild cardiomegaly.  Possible mild interstitial edema.  Trace pleural effusions..      Electronically signed by: Jean Figueroa MD  Date:    11/03/2018  Time:    09:04

## 2018-11-03 NOTE — SUBJECTIVE & OBJECTIVE
Review of Systems   Unable to perform ROS: Mental acuity       Objective:     Vital Signs (Most Recent):  Temp: 98.1 °F (36.7 °C) (11/03/18 0305)  Pulse: 78 (11/03/18 0815)  Resp: 20 (11/03/18 0815)  BP: (!) 112/51 (11/03/18 0645)  SpO2: 96 % (11/03/18 0815) Vital Signs (24h Range):  Temp:  [97.1 °F (36.2 °C)-98.1 °F (36.7 °C)] 98.1 °F (36.7 °C)  Pulse:  [] 78  Resp:  [14-22] 20  SpO2:  [65 %-99 %] 96 %  BP: ()/(18-73) 112/51     Weight: 84.6 kg (186 lb 8.2 oz)  Body mass index is 27.54 kg/m².      Intake/Output Summary (Last 24 hours) at 11/3/2018 0918  Last data filed at 11/3/2018 0600  Gross per 24 hour   Intake 2500.31 ml   Output 15 ml   Net 2485.31 ml       Physical Exam   Constitutional: He appears well-developed. He appears lethargic. He is cooperative. He is easily aroused.  Non-toxic appearance. He has a sickly appearance. He does not appear ill. No distress. He is not intubated. Face mask in place.   HENT:   Head: Normocephalic and atraumatic.   Mouth/Throat: Oropharynx is clear and moist and mucous membranes are normal.   Eyes: EOM and lids are normal. Pupils are equal, round, and reactive to light.   jaundice   Neck: Trachea normal and full passive range of motion without pain. Carotid bruit is not present.   Cardiovascular: Normal rate, regular rhythm and normal heart sounds.   Pulses:       Radial pulses are 1+ on the right side, and 1+ on the left side.        Dorsalis pedis pulses are 1+ on the right side, and 1+ on the left side.   Pulmonary/Chest: Effort normal. No accessory muscle usage. He is not intubated. No respiratory distress. He has decreased breath sounds.   Abdominal: Soft. Normal appearance. He exhibits no distension. Bowel sounds are decreased. There is no tenderness.   Genitourinary: Penis normal.   Genitourinary Comments: Dawson in place   Musculoskeletal: Normal range of motion.        Right foot: There is no deformity.        Left foot: There is no deformity.   +1 LE  edema   Lymphadenopathy:     He has no cervical adenopathy.   Neurological: He is easily aroused. He appears lethargic.   Easily aroused and orientation X 2.   Skin: Skin is warm, dry and intact. Capillary refill takes less than 2 seconds. No rash noted. No cyanosis.   Mild jaundice   Psychiatric: His affect is blunt. His speech is delayed. He is slowed. He is inattentive.       Vents:  Oxygen Concentration (%): 40 (11/03/18 0815)    Lines/Drains/Airways     Central Venous Catheter Line                 Percutaneous Central Line Insertion/Assessment - triple lumen  11/02/18 1100 right femoral less than 1 day          Drain                 Urethral Catheter 10/31/18 1020 Latex;Straight-tip 16 Fr. 2 days          Peripheral Intravenous Line                 Peripheral IV - Single Lumen 10/31/18 1235 Right Wrist 2 days                Significant Labs:    CBC/Anemia Profile:  Recent Labs   Lab 11/02/18  0432 11/03/18  0544   WBC 14.44* 12.97*   HGB 10.3* 9.2*   HCT 31.3* 27.9*    182   MCV 93 90   RDW 18.2* 17.9*        Chemistries:  Recent Labs   Lab 11/01/18  1643 11/02/18  0432  11/02/18  1819 11/02/18  2213 11/03/18  0544   NA  --  132*   < > 131* 131* 130*   K  --  6.1*   < > 6.1* 6.1* 6.0*   CL  --  99   < > 96 96 95   CO2  --  12*   < > 13* 14* 13*   BUN  --  88*   < > 91* 91* 94*   CREATININE  --  4.2*   < > 4.8* 5.1* 5.2*   CALCIUM  --  8.9   < > 8.4* 8.0* 7.4*   ALBUMIN  --  3.0*  --   --   --  2.9*   PROT  --  6.3  --   --   --  6.0   BILITOT 6.5* 7.4*  --   --   --  8.0*   ALKPHOS  --  141*  --   --   --  137*   ALT  --  1,175*  --   --   --  1,221*   AST  --  3,103*  --   --   --  3,196*   MG  --   --   --   --   --  2.1    < > = values in this interval not displayed.       Coagulation:   Recent Labs   Lab 11/03/18  0544   INR 5.2*     Lactic Acid:   Recent Labs   Lab 11/02/18  0926 11/02/18  1819 11/03/18  0545   LACTATE >12.0* 7.7*  7.7* 6.6*     All pertinent labs within the past 24 hours have been  reviewed.  NH3 147    Significant Imaging:  CXR: I have reviewed all pertinent results/findings within the past 24 hours and my personal findings are:  unremarkable for acute process

## 2018-11-03 NOTE — ASSESSMENT & PLAN NOTE
Appears compensated on exam  Continue torsemide  DASH diet, 2 gm sodium restriction  1L fluid restriction per day  Intake and output    11/3  -considering hospice care due to acute renal failure requiring dialysis

## 2018-11-04 NOTE — DISCHARGE SUMMARY
[facility] Hospital Medicine  Discharge Summary      Patient Name: Chung Meneses Sr.  MRN: [id number]  Admission Date: 10/29/2018  Hospital Length of Stay: 5 days  Discharge Date and Time: 11/3/2018 11:00 AM  Attending Physician:  Jake Eastman MD  Discharging Provider: Jake Eastman MD  Primary Care Provider: Glenroy Carmichael MD      HPI:   Chung Meneses is an 88 yo male with a PMH of Afib, CHF, CAD, HTN, S/P AVR who presented to the ER after suffering a ground level fall at home today. The patient reports that he tripped and fell while walking outside today. The patient reports that he fell on his left side hitting his head. The patient denies any loss of consciousness. Imaging showed a left femoral neck fracture. CXR showed an acute appearing nondisplaced fracture in the lateral aspect of the left 9th rib. The patient reports pain is currently well controlled. Dr. Simental (Ortho) was consulted and will evaluate the patient. Patient denies fever, chills, CP, cough, stallworth, pnd, orthopnea, palpitations, diaphoresis, headache, blurred vision, numbness, tingling, dizziness, localized weakness, n/v/d, abdominal pain, blood in stools, melena, hematemesis, urinary frequency, urgency, dysuria or hematuria.         Procedure(s) (LRB):  ARTHROPLASTY, HIP, ANTERIOR APPROACH (Left)  CAPSULOTOMY, JOINT (Left)  SYNOVECTOMY, HIP (Left)  EXCISION, EXOSTOSIS (Left)      Hospital Course:   10/30/18 No acute issues overnight. The patient reports that his pain is well controlled at this time. Dr. Simental will likely take the patient to surgery tomorrow. NPO after MN 10/31/18 No acute issues overnight. The patient is being taken to the OR by Dr. Simental today. 11/1/18 No acute issues overnight. The patient reports that his pain is well controlled. The patient worked well with PT/OT who recommended SNF vs rehab for continued therapy at the time of discharge.  02 November:  Intermittent hypotension overnight.  Did not  correct consistently with fluid bolus.  More lethargic.  Transferred to ICU.  US liver and cardiac echo pending.  Worsening renal function with oliguria and liver enzymes show evidence of shock liver.  Requiring low dose Levophed to maintain blood pressure.  Renal function worsening and would need CRRT.  Patient and family discussed treatment plan and prognosis and opted for comfort care.  Discharge plan for inpatient hospice.     Consults:   Consults (From admission, onward)        Status Ordering Provider     Inpatient consult to Cardiology  Once     Provider:  Duran Palomares MD    Completed VIRGILIO WAITE     Inpatient consult to Cardiology  Once     Provider:  Duran Palomares MD    Completed JOCELYN TAY     Inpatient consult to Gastroenterology  Once     Provider:  Madi Kidd MD    Completed JOCELYN TAY     Inpatient consult to Social Work  Once     Provider:  (Not yet assigned)    Completed GERALD KRAUSE     Inpatient consult to Social Work  Once     Provider:  (Not yet assigned)    Completed JOCELYN TAY          No new Assessment & Plan notes have been filed under this hospital service since the last note was generated.  Service: Hospital Medicine    Final Active Diagnoses:    Diagnosis Date Noted POA    PRINCIPAL PROBLEM:  Shock, unspecified [R57.9] 11/02/2018 Yes    Shock liver w/ Coagulopathy [K72.00] 11/02/2018 No    XIANG (acute kidney injury) [N17.9] 12/18/2017 Yes    Hepatic encephalopathy [K72.90] 11/03/2018 No    Abnormal INR [R79.1] 11/02/2018 Unknown    Shortness of breath [R06.02]  Unknown    Metabolic acidemia [E87.2]  No    Arterial hypotension [I95.9]  Unknown    Venous ulcer of left leg [I83.029, L97.929] 10/30/2018 Yes    Abrasion [T14.8XXA] 10/30/2018 Yes    Dermatitis associated with moisture [L30.8] 10/30/2018 Yes    Left displaced femoral neck fracture [S72.002A] 10/29/2018 Yes    Permanent atrial fibrillation [I48.2]  10/10/2018 Yes     Chronic    CAD (coronary artery disease) [I25.10] 12/11/2017 Yes     Chronic    Hyperkalemia [E87.5] 12/11/2017 No    H/O aortic valve replacement [Z95.2] 12/01/2017 Not Applicable    Pulmonary hypertension [I27.20] 11/30/2017 Yes     Chronic    CHF (congestive heart failure) [I50.9] 11/14/2017 Yes    Essential hypertension [I10]  Yes     Chronic      Problems Resolved During this Admission:       Discharged Condition: poor    Disposition: Hospice/Medical Facility    Follow Up:    Patient Instructions:   No discharge procedures on file.    Significant Diagnostic Studies: Labs: All labs within the past 24 hours have been reviewed    Pending Diagnostic Studies:     Procedure Component Value Units Date/Time    Anti-smooth muscle antibody [[id number]] Collected:  11/02/18 0926    Order Status:  Sent Lab Status:  In process Updated:  11/02/18 1638    Specimen:  Blood     Antimitochondrial antibody [[id number]] Collected:  11/02/18 0926    Order Status:  Sent Lab Status:  In process Updated:  11/02/18 1638    Specimen:  Blood     Basic metabolic panel [[id number]] Collected:  11/03/18 0430    Order Status:  Sent Lab Status:  No result     Specimen:  Blood     Hepatitis A antibody, IgG [[id number]] Collected:  11/02/18 0926    Order Status:  Sent Lab Status:  In process Updated:  11/02/18 1638    Specimen:  Blood     Hepatitis B core antibody, total [[id number]] Collected:  11/02/18 0926    Order Status:  Sent Lab Status:  In process Updated:  11/02/18 1638    Specimen:  Blood     Hepatitis B surface antibody [[id number]] Collected:  11/02/18 0926    Order Status:  Sent Lab Status:  In process Updated:  11/02/18 1638    Specimen:  Blood     Hepatitis B surface antigen [[id number]] Collected:  11/02/18 0926    Order Status:  Sent Lab Status:  In process Updated:  11/02/18 1638    Specimen:  Blood     Hepatitis C antibody [[id number]] Collected:  11/02/18 0926    Order Status:  Sent Lab Status:  In process  Updated:  11/02/18 1638    Specimen:  Blood          Medications:  Reconciled Home Medications:      Medication List      CONTINUE taking these medications    apixaban 2.5 mg Tab  Commonly known as:  ELIQUIS  Take 1 tablet (2.5 mg total) by mouth 2 (two) times daily.     aspirin 81 MG EC tablet  Commonly known as:  ECOTRIN  Take 1 tablet (81 mg total) by mouth once daily.     fish oil-omega-3 fatty acids 300-1,000 mg capsule  Take 1,200 mg by mouth once daily.     metoprolol succinate 25 MG 24 hr tablet  Commonly known as:  TOPROL-XL  Take 25 mg by mouth 2 (two) times daily.     multivitamin with folic acid 400 mcg Tab  Take 1 tablet by mouth.     potassium chloride SA 20 MEQ tablet  Commonly known as:  K-DUR,KLOR-CON  TAKE 1 TABLET BY MOUTH IN THE MORNING     ranitidine 75 MG tablet  Commonly known as:  ZANTAC  Take 75 mg by mouth nightly.     torsemide 20 MG Tab  Commonly known as:  DEMADEX  Take 20 mg by mouth once daily. 2 tablets (40 mg) everyday     vitamin D 1000 units Tab  Commonly known as:  VITAMIN D3  Take 1,000 Units by mouth once daily.            Indwelling Lines/Drains at time of discharge:   Lines/Drains/Airways     Central Venous Catheter Line                 Percutaneous Central Line Insertion/Assessment - triple lumen  11/02/18 1100 right femoral 1 day          Drain                 Urethral Catheter 10/31/18 1020 Latex;Straight-tip 16 Fr. 3 days                Time spent on the discharge of patient: 30 minutes  Patient was seen and examined on the date of discharge and determined to be suitable for discharge.    Critical care time spent on the evaluation and treatment of severe organ dysfunction, review of pertinent labs and imaging studies, discussions with consulting providers and discussions with patient/family: 30 minutes.     Jake Eastman MD  Department of Hospital Medicine  [facility]

## 2018-11-05 LAB
HBV CORE AB SERPL QL IA: NEGATIVE
HBV SURFACE AB SER-ACNC: NEGATIVE M[IU]/ML
HBV SURFACE AG SERPL QL IA: NEGATIVE
HCV AB SERPL QL IA: NEGATIVE
HEPATITIS A ANTIBODY, IGG: POSITIVE
MITOCHONDRIA AB TITR SER IF: NORMAL {TITER}
SMOOTH MUSCLE AB TITR SER IF: NORMAL {TITER}

## 2018-11-06 NOTE — PLAN OF CARE
11/06/18 0753   Final Note   Assessment Type Final Discharge Note   Anticipated Discharge Disposition [facility]   Right Care Referral Info   Post Acute Recommendation Other   Facility Name [facility]

## 2018-11-07 LAB
BACTERIA BLD CULT: NORMAL
BACTERIA BLD CULT: NORMAL

## 2018-11-09 NOTE — PHYSICIAN QUERY
PT Name: Chung Meneses .  MR #: [id number]  Physician Query Form - Renal Condition Clarification     CDS/: Talya Styles               Contact information: [phone]    This form is a permanent document in the medical record.     QueryDate: November 9, 2018    By submitting this query, we are merely seeking further clarification of documentation. Please utilize your independent clinical judgment when addressing the question(s) below.    The Medical record contains the following:   Indicator Supporting Clinical Findings Location in Medical Record    Kidney (Renal) Insufficiency      x     Kidney (Renal) Failure / Injury      XIANG (acute kidney injury)       Renal: XIANG on CKD stage 3.       Likely cause of XIANG is        Hypotension/prerenal        azotemia      Nephrology Consults Note   11/2/2018    Nephrotoxic Agents      x BUN/Creatinine GFR BUN=88  Creatinine=4.2    GFR=12  Nephrology Consults Note 11/2/2018    Urine: Casts         Eosinophils      Dehydration      Nausea/Vomiting      x Dialysis/CRRT  May need dialysis support if no response to pharmacologic intervention and BP support  Nephrology Consults Note 11/2/2018    x Treatment:  sodium chloride 0.9% bolus 250 mL : Intravenous: Once    Sodium chloride 0.9% bolus 500 mL Dose: 500 mL: Intravenous: Once     0.9% NaCl infusion 75 mL/hr: Intravenous: Continous      Sodium chloride 0.9% bolus 500 mL Dose: 500 mL: Intravenous: Once    Sodium chloride 0.45% 1,000 mL with sodium bicarbonate 1 mEq/mL (8.4%) 75 mEq infusion 100 mL/hr: Intravenous: Continous  MAR 11/1/2018                       MAR 11/2/2018          x Other:   88 y/o male with XIANG on CKD:     Nephrology Consults Note 11/1/2018   Acute Kidney Injury / Acute Renal Failure has different defining criteria. A generally accepted guideline  is:   A greater than 100% (2X) rise in serum creatinine from baseline* occurring during the course of a single hospital stay.   *Baseline as determined by the  providers judgment and consideration of previous lab values and other documentation, if available.    A diagnosis of Acute Kidney Injury/ Acute Renal Failure should incorporate abnormal labs and clinical findings that are clinically significant      References: 1. Pk et al. Acute renal failure-definition, outcome measures, animal models, fluid therapy and information technology needs: the Second International Consensus Conference of the Acute Dialysis Quality Initiative (ADQI) Group. Crit Care 2004; 8:B204; 2. Og et al. Acute Kidney Injury Network: report of an initiative to improve outcomes in acute kidney injury. Crit Care 2007; 11:R31; 3. Kidney Disease: Improving Global Outcomes (KDIGO). Acute Kidney Injury Work Group. KDIGO clinical practice guidelines for acute kidney injury. Kidney Int Suppl 2012; 2:1.    The clinical guidelines noted below is only a system guideline, it does not replace the providers clinical judgment.    Provider, please specify the diagnosis or diagnoses associated with above clinical findings.    [   X ]  Acute Kidney Failure/Injury with Tubular Necrosis  Damage to the tubule cells of the kidney. Common triggers: shock, hypotension, IV contrast, rhabdomyolysis, medications  [    ]  Other Acute Kidney Failure/Injury (please specify): ____________    [    ]  Unspecified Acute Kidney Failure/Injury     [    ]  Chronic Kidney Disease (CKD) stage 3   Moderately reduced kidney function eGFR 30-59   [    ]  Chronic Kidney Disease (CKD) stage 5 Kidney failure, requiring transplant or dialysis eGFR <15  [    ]  Other (please specify): _______________________________  [  ]  Clinically Undetermined    Please document in your progress notes daily for the duration of treatment until resolved and include in your discharge summary.

## 2018-11-09 NOTE — PHYSICIAN QUERY
PT Name: Chung Meneses Sr.  MR #: [id number]     Physician Query Form - Diagnosis Clarification      CDS/: Talya Styles               Contact information:[phone]    This form is a permanent document in the medical record.     Query Date: November 9, 2018    By submitting this query, we are merely seeking further clarification of documentation.  Please utilize your independent clinical judgment when addressing the question(s) below.     The medical record contains the following:      Findings Supporting Clinical Information Location in Medical Record     Pneumonia   Suspect source is pneumonia, due to decreased breathing effort in an elderly pt    Indication: pneumonia --> when pt fell, he also developed rib contusion on L side --> per Dr. Alonso's note, the decreased breathing effort on L side may have led to PNA    CLINICAL HISTORY:  supect pneumonia leading to sepsis after a fall causes bruise to left ribs.;.          Nephrology Consults 11/2/2018    Vancomycin Consult Note 11/2/2018        Cardiology Consults  Note 11/2/2018     Please clarify if the __Pneumonia_________________________ diagnosis has been:     Ruled In    Ruled In, Now Resolved   X Ruled Out    Other/Clarification of findings (please specify):    Clinically insignificant      Clinically undetermined     Please document in your progress notes daily for the duration of treatment, until resolved, and include in your discharge summary.

## 2018-11-09 NOTE — PHYSICIAN QUERY
PT Name: Chung Meneses .  MR #: [id number]     Physician Query Form - Diagnosis Clarification      CDS/: Talya Styles               Contact information: [phone]    This form is a permanent document in the medical record.     Query Date: November 9, 2018    By submitting this query, we are merely seeking further clarification of documentation.  Please utilize your independent clinical judgment when addressing the question(s) below.     The medical record contains the following:      Findings Supporting Clinical Information Location in Medical Record     Sepsis   Hypotension: suspect sepsis  Has decreased BS's in L base, leukocytosis, and elevated procalcitonin  Suspect source is pneumonia, due to decreased breathing effort in an elderly pt  Broad spectrum abx (vancomycin and zosyn)    CLINICAL HISTORY:  supect pneumonia leading to sepsis after a fall causes bruise to left ribs.;.   Nephrology Consults Note 11/2/2018            Cardiology Consults Note 11/2/2018       Please clarify if the ___Sepsis________________________ diagnosis has been:     Ruled In    Ruled In, Now Resolved   X Ruled Out    Other/Clarification of findings (please specify):    Clinically insignificant      Clinically undetermined     Please document in your progress notes daily for the duration of treatment, until resolved, and include in your discharge summary.

## 2018-11-09 NOTE — PHYSICIAN QUERY
PT Name: Chung Meneses .  MR #: [id number]     Physician Query Form - Diagnosis Clarification      CDS/: Talya Styles               Contact information:[phone]    This form is a permanent document in the medical record.     Query Date: November 9, 2018    By submitting this query, we are merely seeking further clarification of documentation.  Please utilize your independent clinical judgment when addressing the question(s) below.     The medical record contains the following:      Findings Supporting Clinical Information Location in Medical Record     Septic Shock   Shock    Cardiogenic versus septic.   Broad-spectrum antibiotic.  Check cultures.    Check repeat 2D echo.  Monitor lactic acid level.  Repeat NS bolus 500 cc x1 today. Received 500 cc earlier.  Start Levophed, keep map above 65 mm Hg.    Shock, unspecified    Agree with IV levophed for BP support  Repeat ECHO today revealed EF 60-65%, -WMA's, indeterminate LV diastolic fn, low normal RV systolic fn,   LA >12, needs septic workup per critical care       Principal Problem: Shock, unspecified       11/3/18-Patient seen and examined in ICU, IV levophed gtt in place. Is contemplating dialysis vs hospice care. Labs reviewed, Cr 5.2, INR 5.2, LA 6.6.   Pulmonology Consult s Note 11/2/2018                Cardiology Consults Note 11/2/2018                  Pulmonary Progress Note 11/3/2018    Cardiology progress Note 11/3/2018     Please clarify if the __Septic Shock_________________________ diagnosis has been:     Ruled In    Ruled In, Now Resolved   X Ruled Out    Other/Clarification of findings (please specify):    Clinically insignificant      Clinically undetermined     Please document in your progress notes daily for the duration of treatment, until resolved, and include in your discharge summary.

## 2018-11-18 RX ORDER — METOPROLOL SUCCINATE 25 MG/1
TABLET, EXTENDED RELEASE ORAL
Qty: 60 TABLET | Refills: 3 | OUTPATIENT
Start: 2018-11-18

## 2018-11-23 NOTE — PROGRESS NOTES
[facility]  Critical Care Medicine  Progress Note    Patient Name: Chung Meneses Sr.  MRN: [id number]  Admission Date: 11/30/2017  Hospital Length of Stay: 1 days  Code Status: No Order  Attending Provider: Shaq Boateng MD  Primary Care Provider: Glenroy Carmichael MD   Principal Problem: Aortic stenosis    Subjective:     HPI:  Mr Meneses is a 87 yo WM with a PMH of HTN, HLD, AS and paroxysmal A-fib.  He was followed as outpt and referred to CVT for AVR.  He was electively admitted today and taken to OR undergoing AVR and 1-vessel CABG without reported complications.    Hospital/ICU Course:  11/30 - [facility]  12/1 - Tolerated extubation yesterday afternoon.  Up in chair this AM with no complaints.  Cleared for transfer to [facility] from Dr. Shanks    Review of Systems   Constitutional: Negative for chills, fever and malaise/fatigue.   HENT: Negative for congestion.    Eyes: Negative for blurred vision.   Respiratory: Negative for cough, sputum production and shortness of breath.    Cardiovascular: Negative for chest pain and leg swelling.   Gastrointestinal: Negative for abdominal pain, nausea and vomiting.   Genitourinary: Negative for dysuria.   Musculoskeletal: Positive for myalgias (post op CP).   Skin: Negative for rash.   Neurological: Positive for weakness. Negative for dizziness, focal weakness and headaches.   Endo/Heme/Allergies: Does not bruise/bleed easily.   Psychiatric/Behavioral: The patient is not nervous/anxious.        Objective:     Vital Signs (Most Recent):  Temp: 99.5 °F (37.5 °C) (12/01/17 0715)  Pulse: 75 (12/01/17 0930)  Resp: 16 (12/01/17 0930)  BP: (!) 108/58 (12/01/17 0930)  SpO2: 98 % (12/01/17 0930) Vital Signs (24h Range):  Temp:  [96.6 °F (35.9 °C)-100.4 °F (38 °C)] 99.5 °F (37.5 °C)  Pulse:  [] 75  Resp:  [12-32] 16  SpO2:  [93 %-100 %] 98 %  BP: ()/(44-76) 108/58     Weight: 79.2 kg (174 lb 9.7 oz)  Body mass  index is 25.78 kg/m².      Intake/Output Summary (Last 24 hours) at 12/01/17 1002  Last data filed at 12/01/17 0900   Gross per 24 hour   Intake          4269.52 ml   Output             1183 ml   Net          3086.52 ml       Physical Exam   Constitutional: He is oriented to person, place, and time. Vital signs are normal. He appears well-developed and well-nourished. He is cooperative. He is easily aroused. No distress. He is not intubated. Nasal cannula in place.   HENT:   Head: Normocephalic and atraumatic.   Mouth/Throat: Oropharynx is clear and moist.   Eyes: EOM are normal. Pupils are equal, round, and reactive to light.   Neck: Trachea normal and normal range of motion.   Cardiovascular: Normal rate and regular rhythm.    Pulses:       Radial pulses are 2+ on the right side, and 2+ on the left side.        Dorsalis pedis pulses are 1+ on the right side, and 1+ on the left side.   Pulmonary/Chest: Breath sounds normal. No accessory muscle usage. He is not intubated. No respiratory distress.       Abdominal: Soft. He exhibits no distension. Bowel sounds are decreased. There is no tenderness.   Genitourinary: Penis normal.   Genitourinary Comments: Dawson in place   Musculoskeletal: Normal range of motion.   No edema   Lymphadenopathy:     He has no cervical adenopathy.   Neurological: He is alert, oriented to person, place, and time and easily aroused.   Skin: Skin is warm and dry. Capillary refill takes less than 2 seconds. No cyanosis.   Psychiatric: He has a normal mood and affect. His speech is normal and behavior is normal. Judgment and thought content normal. Cognition and memory are normal.       Vents:  Vent Mode: SIMV (11/30/17 1530)  Ventilator Initiated: Yes (11/30/17 1115)  Set Rate: 12 bmp (11/30/17 1530)  Vt Set: 500 mL (11/30/17 1530)  Pressure Support: 10 cmH20 (11/30/17 1530)  PEEP/CPAP: 5 cmH20 (11/30/17 1530)  Oxygen Concentration (%): 36 (12/01/17 0736)  Peak Airway Pressure: 19 cmH2O  (11/30/17 1530)  Plateau Pressure: 0 cmH20 (11/30/17 1530)  Total Ve: 8.32 mL (11/30/17 1530)  F/VT Ratio<105 (RSBI): (!) 41.57 (11/30/17 1530)    Lines/Drains/Airways     Drain                 Urethral Catheter 11/30/17 0725 Temperature probe 16 Fr. 1 day         Y Chest Tube 1 and 2 11/30/17 1027 Other (Comment) Mediastinal 24 Fr. Other (Comment) Pleural 24 Fr. less than 1 day          Line                 Pacer Wires 11/30/17 1 day          Peripheral Intravenous Line                 Peripheral IV - Single Lumen 11/30/17 0615 Left Forearm 1 day                Significant Labs:    CBC/Anemia Profile:    Recent Labs  Lab 11/30/17  1007 11/30/17  1008 11/30/17  1112 12/01/17  0428   WBC  --   --  11.36 10.82   HGB 8.5*  --  8.2* 9.4*   HCT 27.1* 27* 26.5* 29.0*     --  177 186   MCV  --   --  85 81*   RDW  --   --  16.5* 16.7*        Chemistries:    Recent Labs  Lab 11/30/17  1112 12/01/17  0428    136   K 4.9  4.9 4.9    106   CO2 24 21*   BUN 28* 30*   CREATININE 1.3 1.6*   CALCIUM 9.9 9.1   MG 2.6  --        ABGs:   Recent Labs  Lab 11/30/17  1124   PH 7.340*   PCO2 43.9   HCO3 23.7*   POCSATURATED 92*   BE -2     Coagulation:   Recent Labs  Lab 11/30/17  1112   INR 1.2   APTT 32.4*     All pertinent labs within the past 24 hours have been reviewed.    Significant Imaging:  CXR: I have reviewed all pertinent results/findings within the past 24 hours and my personal findings are:  no acute process noted      Assessment/Plan:     Pulmonary   Pulmonary hypertension    Cont Lasix        Cardiac/Vascular   * Aortic stenosis and CAD S/P CABG with bioprosthetic AVR on 11/30/17    Cards and CVT following  POD #1  Cont Lopressor, ASA, Plavix and statin        Hyperlipidemia    Cont home statin        Hypertension    Cont Lopressor and Lasix        Chronic systolic congestive heart failure    Cont Lasix  Consider ACE-I.  Defer to Cards        Paroxysmal atrial fibrillation    Cardiac monitoring  Cont  Lopressor PO  Resume home Xarelto once chest tubes out and CVT agrees        Endocrine   Hyperglycemia, unspecified    Change insulin infusion to SSI            Preventive Measures and Monitoring:   Stress Ulcer: Pepcid  Nutrition: start Cardiac diet   Glucose control: insulin infusion change to SSI  Bowel prophylaxis: Miralax  DVT prophylaxis: LMWH/SCDs  Hx CAD on B-Blocker: Lopressor  Head of Bed/Reposition: Elevate HOB and turn Q1-2 hours   Early Mobility: OOB now  Chest tubes X 2 Day: #2  Dawson Day: #2  IVAB Day: #2  Code Status: Full  Pneumonia Vaccine: UTD per patient  Flu Vaccine: UTD per patient     Counseling/Consultation:I have discussed the care of this patient in detail with the bedside nursing staff and Dr. Calderon, Dr. Mckee and Dr. Shanks    Will transfer to Tele and sign off.     Teodoro Crump NP  Critical Care Medicine  [facility]    65

## 2022-07-29 NOTE — PLAN OF CARE
Operative Note      Patient: Gabriel Sheikh  YOB: 1982  MRN: [id number]    Date of Procedure: 7/29/2022    Pre-Op Diagnosis: Chronic bilateral low back pain without sciatica [M54.50, G89.29]    Post-Op Diagnosis: Same       Procedure(s):  BILATERAL L3-4 MEDIAL BRANCH AND L5 DORSAL RAMI INJECTION    Surgeon(s):  Presley Oneal MD    Assistant:   * No surgical staff found *    Anesthesia: IV Sedation    Estimated Blood Loss (mL): Minimal    Complications: None      Anesthesia: Moderate sedation using 2 mg IV versed & 100 mcg IV fentanyl. CONSENT:     The risks, benefits, alternatives, plan, complications, and personnel were discussed prior to the procedure. The patient understood and agreed to proceed. The patient was positioned prone. Sign-in and time-out was performed. Identifying the   site, in this case, both sides L3 L4 medial branches and L5 dorsal ramus/rami  to address the L4-5 and L5-S1 joints     Sterile technique was done in the usual manner using chlorhexidine. This was followed by infiltration of a  25 ml  of 1% preservative-free lidocaine. A 3.5 inch  22-gauge spinal needle(s)  were positioned under fluoroscopic guidance. A total of  6 needles were positioned. Upon confirmation that the needle was properly positioned, a solution of 80 mg of triamcinolone and   7 ml  of 1% preservative-free lidocaine was injected without difficulty. The patient tolerated the procedure well and went to the recovery room with stable vital signs. Moderate Sedation Time: > 15 minutes with a nurse administering the medication(s) under my supervision. The patient was independently monitored by a Registered Nurse assigned to the Procedure Room ( automated blood pressure, continuous EKG, Capnography and continuous pulse oximetry)    Start time:  0948 am  End Time:   1010 am      PLAN:     Home instructions were provided.      The patient will follow up in 4 to 6 weeks or earlier if Problem: Patient Care Overview  Goal: Plan of Care Review  Outcome: Ongoing (interventions implemented as appropriate)  Plan of care reviewed with patient. Patient verbalize understanding. Patient is afib  on tele monitor no true red  alarms noted. Voices no complaints at this time. Patient receiving IV fluids normal saline 50/ml hr. IV site c/d/i ,Fall risk precaution maintained. Bed in the lowest position with bed alarm on. Call light within reach. Patient educated on calling for assistance before transferring from bed. Patient verbalize understanding. Will continue to monitor.       needed.       Electronically signed by Lu Marc MD on 7/29/2022 at 10:04 AM

## 2023-03-16 NOTE — SUBJECTIVE & OBJECTIVE
Past Medical History:   Diagnosis Date    Aortic stenosis 8/2/2013    Atrial fibrillation 7/5/2013    CHF (congestive heart failure)     Dizziness - light-headed     Hyperlipidemia     Hypertension     Syncope 1/26/2018       Past Surgical History:   Procedure Laterality Date    BACK SURGERY  2003    knee replacemnt bilateral  2001    SHOULDER SURGERY  2002       Review of patient's allergies indicates:   Allergen Reactions    Sulfa (sulfonamide antibiotics) Hives       No current facility-administered medications on file prior to encounter.      Current Outpatient Prescriptions on File Prior to Encounter   Medication Sig    apixaban (ELIQUIS) 2.5 mg Tab Take 2.5 mg by mouth 2 (two) times daily.    aspirin (ECOTRIN) 81 MG EC tablet Take 1 tablet (81 mg total) by mouth once daily.    fish oil-omega-3 fatty acids 300-1,000 mg capsule Take 1,200 mg by mouth once daily.    furosemide (LASIX) 40 MG tablet Take 1 tablet (40 mg total) by mouth once daily.    metoprolol succinate (TOPROL-XL) 25 MG 24 hr tablet Take 1 tablet (25 mg total) by mouth once daily.    multivitamin with folic acid 400 mcg Tab Take 1 tablet by mouth.    potassium chloride (K-TAB) 20 mEq Take 1 tablet (20 mEq total) by mouth once daily.    ranitidine (ZANTAC) 75 MG tablet Take 75 mg by mouth 2 (two) times daily.     VENTOLIN HFA 90 mcg/actuation inhaler INHALE 1 TO 2 PUFFS 4 TIMES A DAY AS NEEDED FOR WHEEZING    vitamin D 1000 units Tab Take by mouth.     Family History     Problem Relation (Age of Onset)    Heart attack Brother    Heart disease Brother    Heart failure Brother    Hypertension Mother, Father, Sister, Brother        Social History Main Topics    Smoking status: Never Smoker    Smokeless tobacco: Never Used    Alcohol use No      Comment: HOLIDAY    Drug use: No    Sexual activity: No     Review of Systems   Constitution: Positive for weakness and malaise/fatigue.   HENT: Negative.    Eyes: Negative.     Cardiovascular: Positive for dyspnea on exertion and leg swelling.   Respiratory: Positive for shortness of breath.    Endocrine: Negative.    Hematologic/Lymphatic: Negative.    Skin: Negative.    Musculoskeletal: Negative.    Gastrointestinal: Negative.    Genitourinary: Negative.    Psychiatric/Behavioral: Negative.    Allergic/Immunologic: Negative.      Objective:     Vital Signs (Most Recent):  Temp: 97.8 °F (36.6 °C) (02/21/18 1124)  Pulse: (!) 111 (02/21/18 1124)  Resp: 17 (02/21/18 1124)  BP: (!) 141/71 (02/21/18 1124)  SpO2: 97 % (02/21/18 1124) Vital Signs (24h Range):  Temp:  [96.2 °F (35.7 °C)-98.5 °F (36.9 °C)] 97.8 °F (36.6 °C)  Pulse:  [] 111  Resp:  [16-21] 17  SpO2:  [95 %-100 %] 97 %  BP: (115-151)/(63-89) 141/71     Weight: 89.1 kg (196 lb 6.9 oz)  Body mass index is 29.01 kg/m².    SpO2: 97 %  O2 Device (Oxygen Therapy): room air      Intake/Output Summary (Last 24 hours) at 02/21/18 1131  Last data filed at 02/21/18 0946   Gross per 24 hour   Intake              340 ml   Output             2100 ml   Net            -1760 ml       Lines/Drains/Airways     Pressure Ulcer                 Pressure Ulcer coccyx -- days         Pressure Ulcer 12/02/17 0705 Left coccyx suspected deep tissue injury 81 days          Peripheral Intravenous Line                 Peripheral IV - Single Lumen 02/20/18 1911 Left Antecubital less than 1 day                Physical Exam   Constitutional: He is oriented to person, place, and time. He appears well-developed and well-nourished. No distress.   HENT:   Head: Normocephalic and atraumatic.   Eyes: Pupils are equal, round, and reactive to light. Right eye exhibits no discharge. Left eye exhibits no discharge.   Neck: Neck supple. JVD present.   Cardiovascular: S1 normal and S2 normal.  An irregularly irregular rhythm present. Tachycardia present.  PMI is not displaced.  Exam reveals no gallop, no S3, no S4 and no friction rub.    Murmur heard.   Harsh  midsystolic murmur is present  at the upper right sternal border radiating to the neck  Pulmonary/Chest: Effort normal and breath sounds normal. No respiratory distress. He has no wheezes. He has no rales.   Abdominal: Soft. He exhibits no distension. There is no tenderness. There is no rebound.   Musculoskeletal: He exhibits edema (Pitting edema BLE up to tighs/sacrum).   Neurological: He is alert and oriented to person, place, and time.   Skin: Skin is warm and dry. He is not diaphoretic. No erythema.   Psychiatric: He has a normal mood and affect. His behavior is normal. Thought content normal.   Nursing note and vitals reviewed.      Significant Labs:   CMP   Recent Labs  Lab 02/20/18 1911 02/21/18  0536   * 137   K 5.5* 4.3    103   CO2 22* 22*    97   BUN 65* 66*   CREATININE 2.6* 2.4*   CALCIUM 9.0 8.9   PROT 6.5 6.3   ALBUMIN 3.3* 3.2*   BILITOT 1.8* 1.8*   ALKPHOS 113 113   AST 57* 54*   ALT 52* 51*   ANIONGAP 10 12   ESTGFRAFRICA 25* 27*   EGFRNONAA 21* 24*   , CBC   Recent Labs  Lab 02/20/18 1911 02/21/18  0536   WBC 6.45 6.64   HGB 8.4* 8.3*   HCT 28.4* 27.8*    228   , Troponin   Recent Labs  Lab 02/20/18 1911 02/21/18  0005 02/21/18  0536   TROPONINI 0.117* 0.109* 0.097*    and All pertinent lab results from the last 24 hours have been reviewed.    Significant Imaging: Echocardiogram:   2D echo with color flow doppler:   Results for orders placed or performed in visit on 01/26/18   2D Echo w/ Color Flow Doppler   Result Value Ref Range    EF 50 55 - 65    Mitral Valve Regurgitation MODERATE (A)     Diastolic Dysfunction Yes (A)     Est. PA Systolic Pressure 41.01 (A)     Mitral Valve Mobility NORMAL     Tricuspid Valve Regurgitation MILD TO MODERATE       Patient requests all Lab, Cardiology, and Radiology Results on their Discharge Instructions

## 2024-06-11 NOTE — SUBJECTIVE & OBJECTIVE
Past Medical History:   Diagnosis Date    Aortic stenosis 8/2/2013    Atrial fibrillation 7/5/2013    CHF (congestive heart failure)     Coronary artery disease     Dizziness - light-headed     Hyperlipidemia     Hypertension     Pulmonary hypertension 11/30/2017    S/P AVR 12/1/2017    Syncope 1/26/2018       Past Surgical History:   Procedure Laterality Date    AORTOCORONARY BYPASS-CABG N/A 11/30/2017    Performed by Shaq Boateng MD at [facility] [place]    ARTHROPLASTY OF HIP BY ANTERIOR APPROACH Left 10/31/2018    Procedure: ARTHROPLASTY, HIP, ANTERIOR APPROACH;  Surgeon: Marlon Simental Sr., MD;  Location: [facility];  Service: Orthopedics;  Laterality: Left;    ARTHROPLASTY, HIP, ANTERIOR APPROACH Left 10/31/2018    Performed by Marlon Simental Sr., MD at [facility]    BACK SURGERY  2003    BONE EXOSTOSIS EXCISION Left 10/31/2018    Procedure: EXCISION, EXOSTOSIS;  Surgeon: Marlon Simental Sr., MD;  Location: [facility] [place];  Service: Orthopedics;  Laterality: Left;    CAPSULOTOMY OF JOINT Left 10/31/2018    Procedure: CAPSULOTOMY, JOINT;  Surgeon: Marlon Simental Sr., MD;  Location: [facility] [place];  Service: Orthopedics;  Laterality: Left;    CAPSULOTOMY, JOINT Left 10/31/2018    Performed by Mralon iSmental Sr., MD at [facility] [place]    CARDIAC VALVE SURGERY      CARDIOVERSION N/A 5/1/2017    Performed by Patricia Espinoza MD at [facility] C[facility]    CARDIOVERSION N/A 9/4/2013    Performed by aPtricia Espinoza [facility] at [facility]    CORONARY ARTERY BYPASS GRAFT      EXCISION, EXOSTOSIS Left 10/31/2018    Performed by Marlon Simental Sr., MD at [facility] [place]    HARVEST-VEIN-ENDOVASCULAR Left 11/30/2017    Performed by Shaq Boateng MD at [facility] [place]    HEART CATH-BILATERAL Bilateral 11/14/2017    Performed by Patricia Espinoza MD at [facility] C[facility]    knee replacemnt bilateral  2001    REPLACEMENT-VALVE-AORTIC N/A 11/30/2017    Performed by Shaq Boateng MD at [facility]    SHOULDER SURGERY  2002    SYNOVECTOMY OF HIP Left 10/31/2018     Procedure: SYNOVECTOMY, HIP;  Surgeon: Marlon Simental Sr., MD;  Location: [facility] OR;  Service: Orthopedics;  Laterality: Left;    SYNOVECTOMY, HIP Left 10/31/2018    Performed by Marlon Simental Sr., MD at [facility] OR    TRANSESOPHAGEAL ECHOCARDIOGRAM (ESTEFANY) N/A 11/30/2017    Performed by Shaq Boateng MD at [facility] OR       Review of patient's allergies indicates:   Allergen Reactions    Sulfa (sulfonamide antibiotics) Hives     Current Facility-Administered Medications   Medication Frequency    albuterol-ipratropium 2.5 mg-0.5 mg/3 mL nebulizer solution 3 mL Q4H PRN    chlorhexidine 0.12 % solution 10 mL BID    dextrose 50% injection 12.5 g PRN    dextrose 50% injection 25 g PRN    glucagon (human recombinant) injection 1 mg PRN    hydrALAZINE injection 10 mg Q6H PRN    metoprolol succinate (TOPROL-XL) 24 hr tablet 50 mg BID    morphine injection 2 mg Q4H PRN    morphine injection 4 mg Q4H PRN    nozaseptin (NOZIN) nasal  BID    ondansetron disintegrating tablet 8 mg Q8H PRN    ondansetron injection 4 mg Q8H PRN    piperacillin-tazobactam 2.25 g in dextrose 5 % 50 mL IVPB (ready to mix system) Q8H    sodium chloride 0.45% 1,000 mL with sodium bicarbonate 1 mEq/mL (8.4 %) 75 mEq infusion Continuous    sodium chloride 0.9% flush 3 mL PRN    sodium chloride 0.9% flush 3 mL PRN    sodium polystyrene 15 gram/60 mL suspension 30 g Once    tranexamic acid (CYKLOKAPRON) 1000 mg in sodium chloride 0.9 % 50 mL (ready to mix system) On Call Procedure    vancomycin (VANCOCIN) 1,500 mg in dextrose 5 % 250 mL IVPB Once     Family History     Problem Relation (Age of Onset)    Heart attack Brother    Heart disease Brother    Heart failure Brother    Hypertension Mother, Father, Sister, Brother        Tobacco Use    Smoking status: Never Smoker    Smokeless tobacco: Never Used   Substance and Sexual Activity    Alcohol use: No     Comment: HOLIDAY    Drug use: No    Sexual activity: No     Review of  Systems   Constitutional: Positive for fatigue. Negative for fever.   HENT: Negative.    Respiratory: Positive for shortness of breath.    Cardiovascular: Negative.    Gastrointestinal: Negative.    Genitourinary: Negative.    Musculoskeletal: Negative.    Neurological: Negative.    Psychiatric/Behavioral: Negative.      Objective:     Vital Signs (Most Recent):  Temp: 97.1 °F (36.2 °C) (11/02/18 0801)  Pulse: 87 (11/02/18 0801)  Resp: 16 (11/02/18 0801)  BP: (!) 88/49 (11/02/18 0801)  SpO2: (!) 92 % (11/02/18 0801)  O2 Device (Oxygen Therapy): room air (11/02/18 0801) Vital Signs (24h Range):  Temp:  [96.8 °F (36 °C)-98.2 °F (36.8 °C)] 97.1 °F (36.2 °C)  Pulse:  [62-92] 87  Resp:  [15-18] 16  SpO2:  [88 %-97 %] 92 %  BP: ()/(45-65) 88/49     Weight: 84.6 kg (186 lb 8.2 oz) (11/02/18 0600)  Body mass index is 27.54 kg/m².  Body surface area is 2.03 meters squared.    I/O last 3 completed shifts:  In: 1890 [P.O.:540; I.V.:300; IV Piggyback:1050]  Out: 535 [Urine:535]    Physical Exam   Constitutional: He appears well-developed and well-nourished. No distress.   HENT:   Head: Normocephalic and atraumatic.   Neck: No JVD present.   Cardiovascular: Normal rate.   Irregular   Pulmonary/Chest: Effort normal.   Decreased BS's at left base   Abdominal: Soft. Bowel sounds are normal.   Skin: Skin is warm and dry.   Psychiatric: He has a normal mood and affect. His behavior is normal.   Nursing note and vitals reviewed.      Significant Labs: reviewed  BMP  Lab Results   Component Value Date     (L) 11/02/2018    K 6.1 (H) 11/02/2018    CL 99 11/02/2018    CO2 12 (L) 11/02/2018    BUN 88 (H) 11/02/2018    CREATININE 4.2 (H) 11/02/2018    CALCIUM 8.9 11/02/2018    ANIONGAP 21 (H) 11/02/2018    ESTGFRAFRICA 14 (A) 11/02/2018    EGFRNONAA 12 (A) 11/02/2018     Lab Results   Component Value Date    WBC 14.44 (H) 11/02/2018    HGB 10.3 (L) 11/02/2018    HCT 31.3 (L) 11/02/2018    MCV 93 11/02/2018     11/02/2018      procalcitonin 9  Blood cx sent    Significant Imaging: CXR reviewed   Pleural effusion, not elsewhere classified

## (undated) DEVICE — SUT PROLENE 5/0 RB-1 36 IN

## (undated) DEVICE — LIGATING CLIP LARGE

## (undated) DEVICE — SYS CLSR DERMABOND PRINEO 22CM

## (undated) DEVICE — SHEET XLGE DRAPE

## (undated) DEVICE — SEE MEDLINE ITEM 157166

## (undated) DEVICE — CONNECTOR CHEST DRN TRPL

## (undated) DEVICE — CAUTERY TIP POLISHER

## (undated) DEVICE — PENCIL ROCKER SWITCH 10FT CORD

## (undated) DEVICE — DRAPE STERI INSTRUMENT 1018

## (undated) DEVICE — DECANTER VIAL ASEPTIC TRANSFER

## (undated) DEVICE — SUT ETHIBOND 2-0 V-7 PXX77

## (undated) DEVICE — SUT STL DBL WR STNAL CCS#1

## (undated) DEVICE — SHUNT FLO THRU 2.0X18
Type: IMPLANTABLE DEVICE | Site: AORTA | Status: NON-FUNCTIONAL
Removed: 2017-11-30

## (undated) DEVICE — NDL HYPODERMIC BLUNT 18G 1.5IN

## (undated) DEVICE — STAPLER SKIN PROXIMATE WIDE

## (undated) DEVICE — SEE MEDLINE ITEM 157125

## (undated) DEVICE — GAUZE SPONGE 4X4 12PLY

## (undated) DEVICE — SPONGE IV DRAIN 4X4 STERILE

## (undated) DEVICE — KIT PT CARE HANA PROFX SSXT

## (undated) DEVICE — NDL PERC ENTRY BSDN 18-7.0

## (undated) DEVICE — SOL ISOLYTE S PH 7.4 1000ML

## (undated) DEVICE — STOPCOCK 1 WAY PLASTIC

## (undated) DEVICE — SEE MEDLINE ITEM 157117

## (undated) DEVICE — SUT SILK 1 BLK BRAID SA87G

## (undated) DEVICE — BLADE SURG CARBON STEEL #10

## (undated) DEVICE — SUT SILK 3-0 LIGAPAK LA54G

## (undated) DEVICE — PACK OPEN HEART BR

## (undated) DEVICE — Device

## (undated) DEVICE — HOOD FLYTE PEELWY STERISHIELD

## (undated) DEVICE — DRAIN CHAN RND HUBLS 8MM 24FR

## (undated) DEVICE — TUBING HEATED INSUFFLATOR

## (undated) DEVICE — ELECTRODE REM PLYHSV RETURN 9

## (undated) DEVICE — SPONGE LAP 18X18 PREWASHED

## (undated) DEVICE — COVER OVERHEAD SURG LT BLUE

## (undated) DEVICE — DRAPE SLUSH WARMER WITH DISC

## (undated) DEVICE — DRAPE PLASTIC U 60X72

## (undated) DEVICE — SEE MEDLINE ITEM 157131

## (undated) DEVICE — MANIFOLD 4 PORT

## (undated) DEVICE — CATH ALL PUR URTHL RR 20FR

## (undated) DEVICE — GLOVE 8.5 PROTEXIS PI BLUE

## (undated) DEVICE — GLOVE BIOGEL PI MICRO SZ 7

## (undated) DEVICE — CLIP LIGATING MEDIUM

## (undated) DEVICE — SEE L#133928

## (undated) DEVICE — SUT VICRYL 4-0 27 PS-1 27IN

## (undated) DEVICE — SYS VEIN HARVESTING

## (undated) DEVICE — KIT IRR SUCTION HND PIECE

## (undated) DEVICE — TAPE SURGICAL MICROFOAM 3IN

## (undated) DEVICE — ALCOHOL 70% ISOP RUBBING 4OZ

## (undated) DEVICE — SOL LR INJ 1000ML BG

## (undated) DEVICE — SOL WATER STRL IRR 1000ML

## (undated) DEVICE — SEE MEDLINE ITEM 153199

## (undated) DEVICE — GUIDEWIRE TSCF-35-50-3

## (undated) DEVICE — TAPE SILK 3IN

## (undated) DEVICE — APPLICATOR CHLORAPREP ORN 26ML

## (undated) DEVICE — SOL IRR NACL .9% 3000ML

## (undated) DEVICE — BLADE STERNUM SYSTEM 6

## (undated) DEVICE — DRAIN CHANNEL ROUND 19FR

## (undated) DEVICE — FILTER FASTSTART KIT 40 MICRON

## (undated) DEVICE — SEE MEDLINE ITEM 152622

## (undated) DEVICE — SYR 50CC LL

## (undated) DEVICE — DRESSING TRANS 2X2 TEGADERM

## (undated) DEVICE — CABLE PACING SURG SM DISP ST

## (undated) DEVICE — TIP SUCTION YANKAUER

## (undated) DEVICE — ELECTRODE BLD EXT 6.50 ST DISP

## (undated) DEVICE — SUT SILK 3-0 STRANDS 30IN

## (undated) DEVICE — SHUNT FLO THRU 1.5
Type: IMPLANTABLE DEVICE | Site: AORTA | Status: NON-FUNCTIONAL
Removed: 2017-11-30

## (undated) DEVICE — SUT VICRYL BR 1 GEN 27 CT-1

## (undated) DEVICE — SEE MEDLINE ITEM 157027

## (undated) DEVICE — SOL 9P NACL IRR PIC IL

## (undated) DEVICE — STABILIZER TRIPOD

## (undated) DEVICE — SUT PROLENE 8-0 24IN

## (undated) DEVICE — DRAIN CHEST DRY SUCTION

## (undated) DEVICE — SEALER AQUAMANTYS 3.48MM

## (undated) DEVICE — SYR ONLY LUER LOCK 20CC

## (undated) DEVICE — TUBING PRESSURE 84IN

## (undated) DEVICE — CLIPS LIGATING TITANIUM WDE SM

## (undated) DEVICE — SKIN MARKER DEVON 160

## (undated) DEVICE — SUT SILK 0 SUTUPAK SA86H

## (undated) DEVICE — CONTAINER SPECIMEN STRL 4OZ

## (undated) DEVICE — SYR 30CC LUER LOCK

## (undated) DEVICE — GOWN SURG 2XL DISP TIE BACK

## (undated) DEVICE — DRESSING N ADH OIL EMUL 3X3

## (undated) DEVICE — DRAPE MOBILE C-ARM

## (undated) DEVICE — SOL NACL 0.9% INJ 250ML BG

## (undated) DEVICE — SCRUB 10% POVIDONE IODINE 4OZ

## (undated) DEVICE — DRESSING TRANS 4X4 TEGADERM

## (undated) DEVICE — TAPE RETRACT-O-TAPE

## (undated) DEVICE — SYR 3CC LUER LOC

## (undated) DEVICE — SOL NS 1000CC

## (undated) DEVICE — BLADE SAG DUAL 18MMX1.27MMX90M

## (undated) DEVICE — SYR 10CC LUER LOCK

## (undated) DEVICE — CONTAINER 32OZ PATHOLOGY

## (undated) DEVICE — WIRE TEMP PACING 0 SH SKS-3

## (undated) DEVICE — GLOVE PROTEXIS HYDROGEL SZ8

## (undated) DEVICE — PUNCH AORTIC 4.0MM 6/CASE

## (undated) DEVICE — SUPPORT ULNA NERVE PROTECTOR

## (undated) DEVICE — SUT VICRYL 2-0 27 CT-1

## (undated) DEVICE — BLOWER MISTER

## (undated) DEVICE — DRAPE INCISE IOBAN 2 23X33IN

## (undated) DEVICE — TRAY FOLEY SURESTEP 16FR

## (undated) DEVICE — POSITIONER HEAD DONUT 9IN FOAM

## (undated) DEVICE — NDL SPINAL 18GX3.5 SPINOCAN

## (undated) DEVICE — SPONGE LAP 4X18 PREWASHED

## (undated) DEVICE — SUT SILK 2-0 STRANDS 30IN

## (undated) DEVICE — SUT PROLENE 2-0 FSLX BL

## (undated) DEVICE — SUT PROLENE 7-0 BV-1 30 BLU

## (undated) DEVICE — GLOVE LINER CUT RESISTANT LG

## (undated) DEVICE — SEE MEDLINE ITEM 146231

## (undated) DEVICE — SEE MEDLINE ITEM 146308